# Patient Record
Sex: FEMALE | Race: BLACK OR AFRICAN AMERICAN | Employment: UNEMPLOYED | URBAN - METROPOLITAN AREA
[De-identification: names, ages, dates, MRNs, and addresses within clinical notes are randomized per-mention and may not be internally consistent; named-entity substitution may affect disease eponyms.]

---

## 2017-12-30 ENCOUNTER — APPOINTMENT (OUTPATIENT)
Dept: GENERAL RADIOLOGY | Age: 72
End: 2017-12-30
Attending: EMERGENCY MEDICINE
Payer: MEDICARE

## 2017-12-30 ENCOUNTER — HOSPITAL ENCOUNTER (EMERGENCY)
Age: 72
Discharge: HOME | End: 2017-12-31
Attending: EMERGENCY MEDICINE
Payer: MEDICARE

## 2017-12-30 DIAGNOSIS — B34.9 VIRAL SYNDROME: Primary | ICD-10-CM

## 2017-12-30 DIAGNOSIS — J44.1 ACUTE EXACERBATION OF CHRONIC OBSTRUCTIVE PULMONARY DISEASE (COPD) (HCC): ICD-10-CM

## 2017-12-30 DIAGNOSIS — R05.9 COUGH: ICD-10-CM

## 2017-12-30 PROCEDURE — 99283 EMERGENCY DEPT VISIT LOW MDM: CPT

## 2017-12-30 PROCEDURE — 71020 XR CHEST PA LAT: CPT

## 2017-12-30 RX ORDER — IPRATROPIUM BROMIDE AND ALBUTEROL SULFATE 2.5; .5 MG/3ML; MG/3ML
3 SOLUTION RESPIRATORY (INHALATION)
Status: COMPLETED | OUTPATIENT
Start: 2017-12-30 | End: 2017-12-31

## 2017-12-30 RX ORDER — COLCHICINE 0.6 MG/1
0.6 TABLET ORAL DAILY
COMMUNITY
End: 2018-12-03

## 2017-12-30 RX ORDER — PANTOPRAZOLE SODIUM 40 MG/1
40 TABLET, DELAYED RELEASE ORAL DAILY
COMMUNITY
End: 2018-01-30

## 2017-12-30 RX ORDER — ACETAMINOPHEN 500 MG
1000 TABLET ORAL ONCE
Status: COMPLETED | OUTPATIENT
Start: 2017-12-30 | End: 2017-12-31

## 2017-12-30 RX ORDER — CHLORTHALIDONE 25 MG/1
TABLET ORAL DAILY
COMMUNITY
End: 2018-12-03

## 2017-12-31 VITALS
TEMPERATURE: 100.5 F | HEART RATE: 83 BPM | OXYGEN SATURATION: 93 % | WEIGHT: 275 LBS | SYSTOLIC BLOOD PRESSURE: 130 MMHG | BODY MASS INDEX: 46.95 KG/M2 | DIASTOLIC BLOOD PRESSURE: 52 MMHG | RESPIRATION RATE: 16 BRPM | HEIGHT: 64 IN

## 2017-12-31 LAB
ALBUMIN SERPL-MCNC: 3.8 G/DL (ref 3.4–5)
ALBUMIN/GLOB SERPL: 0.9 {RATIO} (ref 0.8–1.7)
ALP SERPL-CCNC: 85 U/L (ref 45–117)
ALT SERPL-CCNC: 52 U/L (ref 13–56)
ANION GAP SERPL CALC-SCNC: 9 MMOL/L (ref 3–18)
AST SERPL-CCNC: 42 U/L (ref 15–37)
ATRIAL RATE: 84 BPM
BASOPHILS # BLD: 0 K/UL (ref 0–0.06)
BASOPHILS NFR BLD: 0 % (ref 0–2)
BILIRUB SERPL-MCNC: 0.3 MG/DL (ref 0.2–1)
BUN SERPL-MCNC: 12 MG/DL (ref 7–18)
BUN/CREAT SERPL: 12 (ref 12–20)
CALCIUM SERPL-MCNC: 9.3 MG/DL (ref 8.5–10.1)
CALCULATED P AXIS, ECG09: 70 DEGREES
CALCULATED R AXIS, ECG10: -2 DEGREES
CALCULATED T AXIS, ECG11: 49 DEGREES
CHLORIDE SERPL-SCNC: 101 MMOL/L (ref 100–108)
CO2 SERPL-SCNC: 30 MMOL/L (ref 21–32)
CREAT SERPL-MCNC: 0.99 MG/DL (ref 0.6–1.3)
DIAGNOSIS, 93000: NORMAL
DIFFERENTIAL METHOD BLD: ABNORMAL
EOSINOPHIL # BLD: 0.1 K/UL (ref 0–0.4)
EOSINOPHIL NFR BLD: 1 % (ref 0–5)
ERYTHROCYTE [DISTWIDTH] IN BLOOD BY AUTOMATED COUNT: 12.8 % (ref 11.6–14.5)
FLUAV AG NPH QL IA: NEGATIVE
FLUBV AG NOSE QL IA: NEGATIVE
GLOBULIN SER CALC-MCNC: 4.3 G/DL (ref 2–4)
GLUCOSE SERPL-MCNC: 114 MG/DL (ref 74–99)
HCT VFR BLD AUTO: 40.8 % (ref 35–45)
HGB BLD-MCNC: 13.7 G/DL (ref 12–16)
LYMPHOCYTES # BLD: 1.2 K/UL (ref 0.9–3.6)
LYMPHOCYTES NFR BLD: 20 % (ref 21–52)
MCH RBC QN AUTO: 29 PG (ref 24–34)
MCHC RBC AUTO-ENTMCNC: 33.6 G/DL (ref 31–37)
MCV RBC AUTO: 86.4 FL (ref 74–97)
MONOCYTES # BLD: 0.5 K/UL (ref 0.05–1.2)
MONOCYTES NFR BLD: 9 % (ref 3–10)
NEUTS SEG # BLD: 4.1 K/UL (ref 1.8–8)
NEUTS SEG NFR BLD: 70 % (ref 40–73)
P-R INTERVAL, ECG05: 148 MS
PLATELET # BLD AUTO: 204 K/UL (ref 135–420)
PMV BLD AUTO: 9.6 FL (ref 9.2–11.8)
POTASSIUM SERPL-SCNC: 3.9 MMOL/L (ref 3.5–5.5)
PROT SERPL-MCNC: 8.1 G/DL (ref 6.4–8.2)
Q-T INTERVAL, ECG07: 330 MS
QRS DURATION, ECG06: 88 MS
QTC CALCULATION (BEZET), ECG08: 389 MS
RBC # BLD AUTO: 4.72 M/UL (ref 4.2–5.3)
SODIUM SERPL-SCNC: 140 MMOL/L (ref 136–145)
VENTRICULAR RATE, ECG03: 84 BPM
WBC # BLD AUTO: 5.9 K/UL (ref 4.6–13.2)

## 2017-12-31 PROCEDURE — 93005 ELECTROCARDIOGRAM TRACING: CPT

## 2017-12-31 PROCEDURE — 77030013140 HC MSK NEB VYRM -A

## 2017-12-31 PROCEDURE — 94640 AIRWAY INHALATION TREATMENT: CPT

## 2017-12-31 PROCEDURE — 74011000250 HC RX REV CODE- 250: Performed by: EMERGENCY MEDICINE

## 2017-12-31 PROCEDURE — 85025 COMPLETE CBC W/AUTO DIFF WBC: CPT | Performed by: EMERGENCY MEDICINE

## 2017-12-31 PROCEDURE — 74011250637 HC RX REV CODE- 250/637: Performed by: EMERGENCY MEDICINE

## 2017-12-31 PROCEDURE — 87804 INFLUENZA ASSAY W/OPTIC: CPT | Performed by: EMERGENCY MEDICINE

## 2017-12-31 PROCEDURE — 80053 COMPREHEN METABOLIC PANEL: CPT | Performed by: EMERGENCY MEDICINE

## 2017-12-31 RX ORDER — ALBUTEROL SULFATE 90 UG/1
2 AEROSOL, METERED RESPIRATORY (INHALATION)
Qty: 1 INHALER | Refills: 2 | Status: SHIPPED | OUTPATIENT
Start: 2017-12-31 | End: 2018-01-05

## 2017-12-31 RX ORDER — CODEINE PHOSPHATE AND GUAIFENESIN 10; 100 MG/5ML; MG/5ML
5 SOLUTION ORAL
Qty: 118 ML | Refills: 0 | Status: SHIPPED | OUTPATIENT
Start: 2017-12-31 | End: 2018-12-03

## 2017-12-31 RX ORDER — AZITHROMYCIN 250 MG/1
TABLET, FILM COATED ORAL
Qty: 6 TAB | Refills: 0 | Status: SHIPPED | OUTPATIENT
Start: 2017-12-31 | End: 2018-01-05

## 2017-12-31 RX ORDER — PREDNISONE 10 MG/1
TABLET ORAL
Qty: 1 PACKAGE | Refills: 0 | Status: SHIPPED | OUTPATIENT
Start: 2017-12-31 | End: 2018-01-30

## 2017-12-31 RX ORDER — IBUPROFEN 600 MG/1
600 TABLET ORAL
Status: COMPLETED | OUTPATIENT
Start: 2017-12-31 | End: 2017-12-31

## 2017-12-31 RX ADMIN — IBUPROFEN 600 MG: 600 TABLET, FILM COATED ORAL at 01:48

## 2017-12-31 RX ADMIN — IPRATROPIUM BROMIDE AND ALBUTEROL SULFATE 3 ML: .5; 3 SOLUTION RESPIRATORY (INHALATION) at 00:06

## 2017-12-31 RX ADMIN — ACETAMINOPHEN 1000 MG: 500 TABLET ORAL at 00:30

## 2017-12-31 NOTE — ED TRIAGE NOTES
Cough for three days. Reports pain in chest when she coughs. Sepsis Screening completed    (  )Patient meets SIRS criteria. ( x )Patient does not meet SIRS criteria.       SIRS Criteria is achieved when two or more of the following are present   Temperature < 96.8°F (36°C) or > 100.9°F (38.3°C)   Heart Rate > 90 beats per minute   Respiratory Rate > 20 breaths per minute   WBC count > 12,000 or <4,000 or > 10% bands

## 2017-12-31 NOTE — DISCHARGE INSTRUCTIONS
COPD Exacerbation Plan: Care Instructions  Your Care Instructions    If you have chronic obstructive pulmonary disease (COPD), your usual shortness of breath could suddenly get worse. You may start coughing more and have more mucus. This flare-up is called a COPD exacerbation (say \"ca-HXO-cd-BAY-talat\"). A lung infection or air pollution could set off an exacerbation. Sometimes it can happen after a quick change in temperature or being around chemicals. Work with your doctor to make a plan for dealing with an exacerbation. You can better manage it if you plan ahead. Follow-up care is a key part of your treatment and safety. Be sure to make and go to all appointments, and call your doctor if you are having problems. It's also a good idea to know your test results and keep a list of the medicines you take. How can you care for yourself at home? During an exacerbation  · Do not panic if you start to have one. Quick treatment at home may help you prevent serious breathing problems. If you have a COPD exacerbation plan that you developed with your doctor, follow it. · Take your medicines exactly as your doctor tells you. ¨ Use your inhaler as directed by your doctor. If your symptoms do not get better after you use your medicine, have someone take you to the emergency room. Call an ambulance if necessary. ¨ With inhaled medicines, a spacer or a nebulizer may help you get more medicine to your lungs. Ask your doctor or pharmacist how to use them properly. Practice using the spacer in front of a mirror before you have an exacerbation. This may help you get the medicine into your lungs quickly. ¨ If your doctor has given you steroid pills, take them as directed. ¨ Your doctor may have given you a prescription for antibiotics, which you can fill if you need it. ¨ Talk to your doctor if you have any problems with your medicine.  And call your doctor if you have to use your antibiotic or steroid pills.  Preventing an exacerbation  · Do not smoke. This is the most important step you can take to prevent more damage to your lungs and prevent problems. If you already smoke, it is never too late to stop. If you need help quitting, talk to your doctor about stop-smoking programs and medicines. These can increase your chances of quitting for good. · Take your daily medicines as prescribed. · Avoid colds and flu. ¨ Get a pneumococcal vaccine. ¨ Get a flu vaccine each year, as soon as it is available. Ask those you live or work with to do the same, so they will not get the flu and infect you. ¨ Try to stay away from people with colds or the flu. ¨ Wash your hands often. · Avoid secondhand smoke; air pollution; cold, dry air; hot, humid air; and high altitudes. Stay at home with your windows closed when air pollution is bad. · Learn breathing techniques for COPD, such as breathing through pursed lips. These techniques can help you breathe easier during an exacerbation. When should you call for help? Call 911 anytime you think you may need emergency care. For example, call if:  ? · You have severe trouble breathing. ? · You have severe chest pain. ?Call your doctor now or seek immediate medical care if:  ? · You have new or worse shortness of breath. ? · You develop new chest pain. ? · You are coughing more deeply or more often, especially if you notice more mucus or a change in the color of your mucus. ? · You cough up blood. ? · You have new or increased swelling in your legs or belly. ? · You have a fever. ? Watch closely for changes in your health, and be sure to contact your doctor if:  ? · You need to use your antibiotic or steroid pills. ? · Your symptoms are getting worse. Where can you learn more? Go to http://ebnoy-marci.info/. Enter U568 in the search box to learn more about \"COPD Exacerbation Plan: Care Instructions. \"  Current as of:  May 12, 2017  Content Version: 11.4  © 0804-9210 [employer]. Care instructions adapted under license by [employer] (which disclaims liability or warranty for this information). If you have questions about a medical condition or this instruction, always ask your healthcare professional. [address] any warranty or liability for your use of this information.

## 2017-12-31 NOTE — ED PROVIDER NOTES
EMERGENCY DEPARTMENT HISTORY AND PHYSICAL EXAM    Date: 12/30/2017  Patient Name: Lea Vang    History of Presenting Illness     Chief Complaint   Patient presents with    Cough         History Provided By: Patient    Chief Complaint: Cough  Duration: 3 Days  Timing:  Constant  Severity: 10 out of 10  Modifying Factors: No modifying factors   Associated Symptoms: rhinorrhea, sore throat, fever, and HA    Additional History (Context):   10:53 PM  Lea Vang is a 81 y.o. female with PMHx of CAD, HTN, DM, and GERD presents to the emergency department C/O productive cough onset 3 days. Associated sxs include rhinorrhea, sore throat, fever, and HA. Pt states that she has been having a persistent painful and productive cough with yellowish sputum. Pt affirms contact with her daughter-in-law and granddaughter, both of which are currently sick with similar symptoms. Pt denies any other sxs or complaints. - smoker, - EtOH use, - illicit drug use     PCP: Arlander Meckel, MD    Current Outpatient Prescriptions   Medication Sig Dispense Refill    albuterol (PROVENTIL HFA, VENTOLIN HFA, PROAIR HFA) 90 mcg/actuation inhaler Take 2 Puffs by inhalation every four (4) hours as needed for Wheezing or Shortness of Breath for up to 5 days. 1 Inhaler 2    predniSONE (STERAPRED DS) 10 mg dose pack 6 day dose pack per package instructions 1 Package 0    azithromycin (ZITHROMAX Z-YARON) 250 mg tablet Take 2 Tabs PO Day 1 and 1 Tab PO Day 2-5 6 Tab 0    chlorthalidone (HYGROTEN) 25 mg tablet Take  by mouth daily.  pantoprazole (PROTONIX) 40 mg tablet Take 40 mg by mouth daily.  colchicine 0.6 mg tablet Take 0.6 mg by mouth daily.  clopidogrel (PLAVIX) 75 mg tablet Take 75 mg by mouth daily.  simvastatin (ZOCOR) 40 mg tablet Take 40 mg by mouth nightly.  traZODone (DESYREL) 50 mg tablet Take 50 mg by mouth nightly.  atenolol (TENORMIN) 50 mg tablet Take 50 mg by mouth daily.         metformin 500 mg TG24 Take 500 mg by mouth daily (with breakfast).  albuterol (PROVENTIL, VENTOLIN) 90 mcg/actuation inhaler Take 1-2 Puffs by inhalation every four (4) hours as needed for Wheezing. 17 g 0       Past History     Past Medical History:  Past Medical History:   Diagnosis Date    CAD (coronary artery disease)     Diabetes ([address]     Gastrointestinal disorder     Hypertension     Reflux        Past Surgical History:  Past Surgical History:   Procedure Laterality Date    CARDIAC SURG PROCEDURE UNLIST      VASCULAR SURGERY PROCEDURE UNLIST         Family History:  History reviewed. No pertinent family history. Social History:  Social History   Substance Use Topics    Smoking status: Never Smoker    Smokeless tobacco: None    Alcohol use No       Allergies:  No Known Allergies      Review of Systems   Review of Systems   Constitutional: Positive for fever. HENT: Positive for rhinorrhea and sore throat. Respiratory: Positive for cough. Neurological: Positive for headaches. All other systems reviewed and are negative. Physical Exam     Vitals:    12/30/17 2251 12/31/17 0008   BP: 164/75    Pulse: 83    Resp: 16    Temp: (!) 100.5 °F (38.1 °C)    SpO2: 96% 96%   Weight: 124.7 kg (275 lb)    Height: 5' 3.5\" (1.613 m)      Physical Exam   Nursing note and vitals reviewed.   Constitutional: Well appearing, Elderly, mild distress   Head: Normocephalic, Atraumatic  ENT: Erythematous oropharynx and rhinorrhea   Eyes: Pupils are equal, round, and reactive to light, EOMI  Neck: Supple  Cardiovascular: Regular rate and rhythm, no murmurs, rubs, or gallops  Chest: Normal work of breathing and chest excursion bilaterally  Lungs: Slightly diminished in bilateral bases with scattered wheezes  Back: No evidence of trauma or deformity  Extremities: No evidence of trauma or deformity, no LE edema  Skin: Warm and dry, normal cap refill  Neuro: Alert and appropriate, CN intact, normal speech  Psychiatric: Normal mood and affect      Diagnostic Study Results     Labs -     Recent Results (from the past 12 hour(s))   CBC WITH AUTOMATED DIFF    Collection Time: 12/31/17 12:30 AM   Result Value Ref Range    WBC 5.9 4.6 - 13.2 K/uL    RBC 4.72 4.20 - 5.30 M/uL    HGB 13.7 12.0 - 16.0 g/dL    HCT 40.8 35.0 - 45.0 %    MCV 86.4 74.0 - 97.0 FL    MCH 29.0 24.0 - 34.0 PG    MCHC 33.6 31.0 - 37.0 g/dL    RDW 12.8 11.6 - 14.5 %    PLATELET 594 176 - 061 K/uL    MPV 9.6 9.2 - 11.8 FL    NEUTROPHILS 70 40 - 73 %    LYMPHOCYTES 20 (L) 21 - 52 %    MONOCYTES 9 3 - 10 %    EOSINOPHILS 1 0 - 5 %    BASOPHILS 0 0 - 2 %    ABS. NEUTROPHILS 4.1 1.8 - 8.0 K/UL    ABS. LYMPHOCYTES 1.2 0.9 - 3.6 K/UL    ABS. MONOCYTES 0.5 0.05 - 1.2 K/UL    ABS. EOSINOPHILS 0.1 0.0 - 0.4 K/UL    ABS. BASOPHILS 0.0 0.0 - 0.06 K/UL    DF AUTOMATED     METABOLIC PANEL, COMPREHENSIVE    Collection Time: 12/31/17 12:30 AM   Result Value Ref Range    Sodium 140 136 - 145 mmol/L    Potassium 3.9 3.5 - 5.5 mmol/L    Chloride 101 100 - 108 mmol/L    CO2 30 21 - 32 mmol/L    Anion gap 9 3.0 - 18 mmol/L    Glucose 114 (H) 74 - 99 mg/dL    BUN 12 7.0 - 18 MG/DL    Creatinine 0.99 0.6 - 1.3 MG/DL    BUN/Creatinine ratio 12 12 - 20      GFR est AA >60 >60 ml/min/1.73m2    GFR est non-AA 55 (L) >60 ml/min/1.73m2    Calcium 9.3 8.5 - 10.1 MG/DL    Bilirubin, total 0.3 0.2 - 1.0 MG/DL    ALT (SGPT) 52 13 - 56 U/L    AST (SGOT) 42 (H) 15 - 37 U/L    Alk.  phosphatase 85 45 - 117 U/L    Protein, total 8.1 6.4 - 8.2 g/dL    Albumin 3.8 3.4 - 5.0 g/dL    Globulin 4.3 (H) 2.0 - 4.0 g/dL    A-G Ratio 0.9 0.8 - 1.7     INFLUENZA A & B AG (RAPID TEST)    Collection Time: 12/31/17 12:35 AM   Result Value Ref Range    Influenza A Antigen NEGATIVE  NEG      Influenza B Antigen NEGATIVE  NEG         Radiologic Studies -   XR CHEST PA LAT    (Results Pending)     CT Results  (Last 48 hours)    None        CXR Results  (Last 48 hours)    None        12:20 AM  RADIOLOGY FINDINGS  Chest X-ray shows No acute process  Pending review by Radiologist  Recorded by Gorge Johnson ED Scribe, as dictated by Brenda Montano MD    Medical Decision Making   I am the first provider for this patient. I reviewed the vital signs, available nursing notes, past medical history, past surgical history, family history and social history. Vital Signs-Reviewed the patient's vital signs. Pulse Oximetry Analysis - 96% on RA     Cardiac Monitor:  Rate: 83 bpm  Rhythm: NSR    EKG interpretation: (Preliminary)  12:43 AM   84 bpm NSR QRS duration 88 ms  EKG read by Brenda Montano MD at 12:44 AM     Records Reviewed: Nursing Notes    Provider Notes (Medical Decision Making):     Procedures:  Procedures    ED Course:   10:59 PM Initial assessment performed. The patients presenting problems have been discussed, and they are in agreement with the care plan formulated and outlined with them. I have encouraged them to ask questions as they arise throughout their visit. Diagnosis and Disposition       DISCHARGE NOTE:  1:11 AM  Zhou Bautista  results have been reviewed with her. She has been counseled regarding her diagnosis, treatment, and plan. She verbally conveys understanding and agreement of the signs, symptoms, diagnosis, treatment and prognosis and additionally agrees to follow up as discussed. She also agrees with the care-plan and conveys that all of her questions have been answered. I have also provided discharge instructions for her that include: educational information regarding their diagnosis and treatment, and list of reasons why they would want to return to the ED prior to their follow-up appointment, should her condition change. She has been provided with education for proper emergency department utilization. CLINICAL IMPRESSION:    1. Viral syndrome    2.  Cough    3. Acute exacerbation of chronic obstructive pulmonary disease (COPD) (Former[facility])        Discussion:  80 y/o female presenting for three days of cough, rhinorrhea. sxs similar to sick contacts in her house, no pneumonia on xray, flu negative. Will treat as COPD exacerbation with zpak steroids and albuterol and will discharge with PCP follow and return precautions, pt understands and agrees with plan. PLAN:  1. D/C Home  2. Current Discharge Medication List      START taking these medications    Details   albuterol (PROVENTIL HFA, VENTOLIN HFA, PROAIR HFA) 90 mcg/actuation inhaler Take 2 Puffs by inhalation every four (4) hours as needed for Wheezing or Shortness of Breath for up to 5 days. Qty: 1 Inhaler, Refills: 2      predniSONE (STERAPRED DS) 10 mg dose pack 6 day dose pack per package instructions  Qty: 1 Package, Refills: 0      azithromycin (ZITHROMAX Z-YARON) 250 mg tablet Take 2 Tabs PO Day 1 and 1 Tab PO Day 2-5  Qty: 6 Tab, Refills: 0         CONTINUE these medications which have NOT CHANGED    Details   albuterol (PROVENTIL, VENTOLIN) 90 mcg/actuation inhaler Take 1-2 Puffs by inhalation every four (4) hours as needed for Wheezing. Qty: 17 g, Refills: 0           3. Follow-up Information     Follow up With Details Comments [id number] y [phone], MD Schedule an appointment as soon as possible for a visit in 2 days For primary care follow up [address] [facility]. [address] [place] [address]      [facility] EMERGENCY DEPT  As needed, if symptoms worsen [address] [place] [id number] [phone]        _______________________________    Attestations: This note is prepared by Gorge Johnson, acting as Scribe for Brenda Montano MD.    Brenda Montano MD: The scribe's documentation has been prepared under my direction and personally reviewed by me in its entirety.   I confirm that the note above accurately reflects all work, treatment, procedures, and medical decision making performed by me.  _______________________________

## 2018-01-03 ENCOUNTER — HOSPITAL ENCOUNTER (EMERGENCY)
Age: 73
Discharge: HOME | End: 2018-01-03
Attending: EMERGENCY MEDICINE
Payer: MEDICARE

## 2018-01-03 VITALS
RESPIRATION RATE: 16 BRPM | WEIGHT: 265 LBS | OXYGEN SATURATION: 98 % | TEMPERATURE: 97.5 F | SYSTOLIC BLOOD PRESSURE: 139 MMHG | HEIGHT: 64 IN | BODY MASS INDEX: 45.24 KG/M2 | DIASTOLIC BLOOD PRESSURE: 98 MMHG | HEART RATE: 64 BPM

## 2018-01-03 DIAGNOSIS — R06.2 WHEEZING: ICD-10-CM

## 2018-01-03 DIAGNOSIS — J20.9 ACUTE BRONCHITIS, UNSPECIFIED ORGANISM: ICD-10-CM

## 2018-01-03 DIAGNOSIS — K12.0 ORAL APHTHOUS ULCER: Primary | ICD-10-CM

## 2018-01-03 PROCEDURE — 99283 EMERGENCY DEPT VISIT LOW MDM: CPT

## 2018-01-03 PROCEDURE — 74011000250 HC RX REV CODE- 250: Performed by: EMERGENCY MEDICINE

## 2018-01-03 PROCEDURE — 74011250637 HC RX REV CODE- 250/637: Performed by: EMERGENCY MEDICINE

## 2018-01-03 PROCEDURE — 94640 AIRWAY INHALATION TREATMENT: CPT

## 2018-01-03 PROCEDURE — 77030013140 HC MSK NEB VYRM -A

## 2018-01-03 RX ORDER — HYDROXYZINE 25 MG/1
25 TABLET, FILM COATED ORAL ONCE
Status: COMPLETED | OUTPATIENT
Start: 2018-01-03 | End: 2018-01-03

## 2018-01-03 RX ORDER — IPRATROPIUM BROMIDE AND ALBUTEROL SULFATE 2.5; .5 MG/3ML; MG/3ML
3 SOLUTION RESPIRATORY (INHALATION)
Status: COMPLETED | OUTPATIENT
Start: 2018-01-03 | End: 2018-01-03

## 2018-01-03 RX ORDER — HYDROXYZINE 25 MG/1
TABLET, FILM COATED ORAL
Qty: 30 TAB | Refills: 0 | Status: SHIPPED | OUTPATIENT
Start: 2018-01-03 | End: 2018-12-03

## 2018-01-03 RX ADMIN — Medication 30 ML: at 03:24

## 2018-01-03 RX ADMIN — HYDROXYZINE HYDROCHLORIDE 25 MG: 25 TABLET, FILM COATED ORAL at 03:45

## 2018-01-03 RX ADMIN — IPRATROPIUM BROMIDE AND ALBUTEROL SULFATE 3 ML: .5; 3 SOLUTION RESPIRATORY (INHALATION) at 03:25

## 2018-01-03 NOTE — ED NOTES
Pt stable. No signs of acute distress. No complaints or questions at this time. Pt discharged home. I have reviewed discharge instructions with the patient. The patient verbalized understanding.

## 2018-01-03 NOTE — ED TRIAGE NOTES
Pt reports that she thinks she may be having an allergic reaction. Has rash to top of mouth and reports that lips feel they are peeling. Sepsis Screening completed    (  )Patient meets SIRS criteria. ( x )Patient does not meet SIRS criteria.       SIRS Criteria is achieved when two or more of the following are present   Temperature < 96.8°F (36°C) or > 100.9°F (38.3°C)   Heart Rate > 90 beats per minute   Respiratory Rate > 20 breaths per minute   WBC count > 12,000 or <4,000 or > 10% bands

## 2018-01-03 NOTE — ED PROVIDER NOTES
EMERGENCY DEPARTMENT HISTORY AND PHYSICAL EXAM    Date: 1/3/2018  Patient Name: Enoc Goff    History of Presenting Illness     Chief Complaint   Patient presents with    Medication Reaction         History Provided By: Patient    Chief Complaint: Wheezing  Duration: 2 days  Timing:  Improving  Location: Chest  Severity: 9 out of 10  Associated Symptoms: burning sensation to lips and roof of mouth x few days, cough, HA, dizziness, and lightheadedness    Additional History (Context):   3:03 AM  Enoc Goff is a 81 y.o. female with PMHX CAD, HTN, DM, GERD who presents ambulatory to the emergency department C/O wheezing that has improved (rated 9/10), onset 2 days ago. Pt used steroid inhaler with improvement of wheezing. Associated sxs include burning sensation to lips and roof of mouth x few days, cough, HA, dizziness, and lightheadedness. Pt states that she \"feels like a blister ruptured in my mouth. \" Pt thinks that she may be having a reaction to medication to recently prescribed abx. Notes FHX of DM. Pt denies tobacco use, rash, or any other sxs or complaints. PCP: Yesica Villarreal MD    Current Outpatient Prescriptions   Medication Sig Dispense Refill    MAG&AL/SIM/DIPHENHYD/LIDOCAINE (MAGIC MOUTHWASH, NO SUCRALFATE,) mwsh oral suspension (compounded) Take 5 mL by mouth Before breakfast, lunch, and dinner. 90 mL 0    hydrOXYzine HCl (ATARAX) 25 mg tablet Take 1-2 tablets by mouth every 6 hours as needed. Indications: Pruritus of Skin 30 Tab 0    albuterol (PROVENTIL HFA, VENTOLIN HFA, PROAIR HFA) 90 mcg/actuation inhaler Take 2 Puffs by inhalation every four (4) hours as needed for Wheezing or Shortness of Breath for up to 5 days.  1 Inhaler 2    predniSONE (STERAPRED DS) 10 mg dose pack 6 day dose pack per package instructions 1 Package 0    azithromycin (ZITHROMAX Z-YARON) 250 mg tablet Take 2 Tabs PO Day 1 and 1 Tab PO Day 2-5 6 Tab 0    guaiFENesin-codeine (ROBITUSSIN AC) 100-10 mg/5 mL solution Take 5 mL by mouth three (3) times daily as needed for Cough. Max Daily Amount: 15 mL. 118 mL 0    chlorthalidone (HYGROTEN) 25 mg tablet Take  by mouth daily.  pantoprazole (PROTONIX) 40 mg tablet Take 40 mg by mouth daily.  colchicine 0.6 mg tablet Take 0.6 mg by mouth daily.  clopidogrel (PLAVIX) 75 mg tablet Take 75 mg by mouth daily.  simvastatin (ZOCOR) 40 mg tablet Take 40 mg by mouth nightly.  traZODone (DESYREL) 50 mg tablet Take 50 mg by mouth nightly.  atenolol (TENORMIN) 50 mg tablet Take 50 mg by mouth daily.  metformin 500 mg TG24 Take 500 mg by mouth daily (with breakfast). Past History     Past Medical History:  Past Medical History:   Diagnosis Date    CAD (coronary artery disease)     Diabetes ([address]     Gastrointestinal disorder     Hypertension     Reflux        Past Surgical History:  Past Surgical History:   Procedure Laterality Date    CARDIAC SURG PROCEDURE UNLIST      VASCULAR SURGERY PROCEDURE UNLIST         Family History:  History reviewed. No pertinent family history. Social History:  Social History   Substance Use Topics    Smoking status: Never Smoker    Smokeless tobacco: None    Alcohol use No       Allergies:  No Known Allergies      Review of Systems   Review of Systems   Constitutional: Negative for chills, diaphoresis, fever and unexpected weight change. HENT: Negative for congestion, drooling, ear pain, rhinorrhea, sore throat, tinnitus and trouble swallowing.         (+)  burning sensation to lips and roof of mouth   Eyes: Negative for photophobia, pain, redness and visual disturbance. Respiratory: Positive for cough and wheezing. Negative for choking, chest tightness, shortness of breath and stridor. Cardiovascular: Negative for chest pain, palpitations and leg swelling.    Gastrointestinal: Negative for abdominal distention, abdominal pain, anal bleeding, blood in stool, constipation, diarrhea, nausea and vomiting. Genitourinary: Negative for difficulty urinating, dysuria, flank pain, frequency, hematuria and urgency. Musculoskeletal: Negative for arthralgias, back pain and neck pain. Skin: Negative for color change, rash and wound. Neurological: Positive for dizziness and headaches. Negative for seizures, syncope, speech difficulty and light-headedness. Hematological: Does not bruise/bleed easily. Psychiatric/Behavioral: Negative for agitation, behavioral problems, hallucinations, self-injury and suicidal ideas. The patient is not hyperactive. Physical Exam     Vitals:    01/03/18 0222   BP: (!) 139/98   Pulse: 64   Resp: 16   Temp: 97.5 °F (36.4 °C)   SpO2: 98%   Weight: 120.2 kg (265 lb)   Height: 5' 4\" (1.626 m)     Physical Exam   Constitutional: She is oriented to person, place, and time. She appears well-developed and well-nourished. No distress. Obese, comfortable appearing, nontoxic   HENT:   Head: Normocephalic and atraumatic. Right Ear: External ear normal.   Left Ear: External ear normal.   Mouth/Throat: Oropharynx is clear and moist. No oropharyngeal exudate. Scattered minute vesicles   Eyes: Conjunctivae and EOM are normal. Pupils are equal, round, and reactive to light. No scleral icterus. No pallor   Neck: Normal range of motion. Neck supple. No JVD present. No tracheal deviation present. No thyromegaly present. Cardiovascular: Normal rate, regular rhythm and normal heart sounds. Pulmonary/Chest: Effort normal. No stridor. No respiratory distress. She has wheezes (Expiratory wheezes in right apex only). Abdominal: Soft. Bowel sounds are normal. She exhibits no distension. There is no tenderness. There is no rebound and no guarding. Musculoskeletal: Normal range of motion. She exhibits no edema or tenderness. No soft tissue injuries   Lymphadenopathy:     She has no cervical adenopathy.    Neurological: She is alert and oriented to person, place, and time. She has normal reflexes. No cranial nerve deficit. Coordination normal.   Skin: Skin is warm and dry. No rash noted. She is not diaphoretic. No erythema. Psychiatric: She has a normal mood and affect. Her behavior is normal. Judgment and thought content normal.   Nursing note and vitals reviewed. Diagnostic Study Results     Labs -   No results found for this or any previous visit (from the past 12 hour(s)). Radiologic Studies -    No orders to display     CT Results  (Last 48 hours)    None        CXR Results  (Last 48 hours)    None            Medical Decision Making   I am the first provider for this patient. I reviewed the vital signs, available nursing notes, past medical history, past surgical history, family history and social history. Vital Signs-Reviewed the patient's vital signs. Pulse Oximetry Analysis - 98% on RA     Records Reviewed: Nursing Notes    Provider Notes (Medical Decision Making):   MDM: Small vesicles that appear to be more viral lesions than allergic reaction, though Jerson-Johnsons syndrome is possible but very unlikely    Procedures:  Procedures    ED Course:   3:03 AM   Initial assessment performed. The patients presenting problems have been discussed, and they are in agreement with the care plan formulated and outlined with them. I have encouraged them to ask questions as they arise throughout their visit. Diagnosis and Disposition       DISCHARGE NOTE:  4:01 AM  Perla Gonsales's  results have been reviewed with her. She has been counseled regarding her diagnosis, treatment, and plan. She verbally conveys understanding and agreement of the signs, symptoms, diagnosis, treatment and prognosis and additionally agrees to follow up as discussed. She also agrees with the care-plan and conveys that all of her questions have been answered.   I have also provided discharge instructions for her that include: educational information regarding their diagnosis and treatment, and list of reasons why they would want to return to the ED prior to their follow-up appointment, should her condition change. She has been provided with education for proper emergency department utilization. CLINICAL IMPRESSION:    1. Oral aphthous ulcer    2. Acute bronchitis, unspecified organism    3. Wheezing        PLAN:  1. D/C Home  2. Discharge Medication List as of 1/3/2018  4:03 AM      START taking these medications    Details   MAG&AL/SIM/DIPHENHYD/LIDOCAINE (MAGIC MOUTHWASH, NO SUCRALFATE,) mwsh oral suspension (compounded) Take 5 mL by mouth Before breakfast, lunch, and dinner., Print, Disp-90 mL, R-0      hydrOXYzine HCl (ATARAX) 25 mg tablet Take 1-2 tablets by mouth every 6 hours as needed. Indications: Pruritus of Skin, Normal, Disp-30 Tab, R-0         CONTINUE these medications which have NOT CHANGED    Details   albuterol (PROVENTIL HFA, VENTOLIN HFA, PROAIR HFA) 90 mcg/actuation inhaler Take 2 Puffs by inhalation every four (4) hours as needed for Wheezing or Shortness of Breath for up to 5 days. , Print, Disp-1 Inhaler, R-2      predniSONE (STERAPRED DS) 10 mg dose pack 6 day dose pack per package instructions, Print, Disp-1 Package, R-0      azithromycin (ZITHROMAX Z-YARON) 250 mg tablet Take 2 Tabs PO Day 1 and 1 Tab PO Day 2-5, Print, Disp-6 Tab, R-0      guaiFENesin-codeine (ROBITUSSIN AC) 100-10 mg/5 mL solution Take 5 mL by mouth three (3) times daily as needed for Cough. Max Daily Amount: 15 mL. , Print, Disp-118 mL, R-0      chlorthalidone (HYGROTEN) 25 mg tablet Take  by mouth daily. , Historical Med      pantoprazole (PROTONIX) 40 mg tablet Take 40 mg by mouth daily. , Historical Med      colchicine 0.6 mg tablet Take 0.6 mg by mouth daily. , Historical Med      clopidogrel (PLAVIX) 75 mg tablet Take 75 mg by mouth daily. Historical Med, 75 mg      simvastatin (ZOCOR) 40 mg tablet Take 40 mg by mouth nightly.   Historical Med, 40 mg      traZODone (DESYREL) 50 mg tablet Take 50 mg by mouth nightly. Historical Med, 50 mg      atenolol (TENORMIN) 50 mg tablet Take 50 mg by mouth daily. Historical Med, 50 mg      metformin 500 mg TG24 Take 500 mg by mouth daily (with breakfast). Historical Med, 500 mg           3. Follow-up Information     Follow up With Details Comments 1859 Mary Davis MD Schedule an appointment as soon as possible for a visit in 2 days for PCP follow up [address] [facility]. [address] [facility] EMERGENCY DEPT  As needed, If symptoms worsen [address] [id number]  [phone]        _______________________________    Attestations: This note is prepared by Jenifer Alegria, acting as Scribe for Nadege. Halina Go MD.    [employer] Halina Go MD:  The scribe's documentation has been prepared under my direction and personally reviewed by me in its entirety.   I confirm that the note above accurately reflects all work, treatment, procedures, and medical decision making performed by me.  _______________________________

## 2018-01-03 NOTE — ED NOTES
Pt alert and oriented x4. No signs of acute distress. Pt breathing with ease on room air. Pt states \"I think I have a reaction from the steroids I am taking. I have a rash on the top of my mouth and I have itching\" Skin warm dry and intact. Continue to monitor. M aintain safety precautions.

## 2018-01-29 ENCOUNTER — APPOINTMENT (OUTPATIENT)
Dept: GENERAL RADIOLOGY | Age: 73
End: 2018-01-29
Attending: EMERGENCY MEDICINE
Payer: MEDICARE

## 2018-01-29 ENCOUNTER — HOSPITAL ENCOUNTER (EMERGENCY)
Age: 73
Discharge: HOME | End: 2018-01-30
Attending: EMERGENCY MEDICINE
Payer: MEDICARE

## 2018-01-29 ENCOUNTER — APPOINTMENT (OUTPATIENT)
Dept: ULTRASOUND IMAGING | Age: 73
End: 2018-01-29
Attending: EMERGENCY MEDICINE
Payer: MEDICARE

## 2018-01-29 VITALS
OXYGEN SATURATION: 100 % | HEART RATE: 69 BPM | HEIGHT: 64 IN | WEIGHT: 260 LBS | BODY MASS INDEX: 44.39 KG/M2 | SYSTOLIC BLOOD PRESSURE: 188 MMHG | RESPIRATION RATE: 18 BRPM | DIASTOLIC BLOOD PRESSURE: 89 MMHG | TEMPERATURE: 98.5 F

## 2018-01-29 DIAGNOSIS — R10.13 ABDOMINAL PAIN, EPIGASTRIC: Primary | ICD-10-CM

## 2018-01-29 DIAGNOSIS — K29.60 REFLUX GASTRITIS: ICD-10-CM

## 2018-01-29 LAB
ALBUMIN SERPL-MCNC: 3.4 G/DL (ref 3.4–5)
ALBUMIN/GLOB SERPL: 0.8 {RATIO} (ref 0.8–1.7)
ALP SERPL-CCNC: 79 U/L (ref 45–117)
ALT SERPL-CCNC: 34 U/L (ref 13–56)
ANION GAP SERPL CALC-SCNC: 11 MMOL/L (ref 3–18)
AST SERPL-CCNC: 31 U/L (ref 15–37)
BASOPHILS # BLD: 0 K/UL (ref 0–0.06)
BASOPHILS NFR BLD: 1 % (ref 0–2)
BILIRUB SERPL-MCNC: 0.2 MG/DL (ref 0.2–1)
BUN SERPL-MCNC: 16 MG/DL (ref 7–18)
BUN/CREAT SERPL: 20 (ref 12–20)
CALCIUM SERPL-MCNC: 8.7 MG/DL (ref 8.5–10.1)
CHLORIDE SERPL-SCNC: 108 MMOL/L (ref 100–108)
CO2 SERPL-SCNC: 26 MMOL/L (ref 21–32)
CREAT SERPL-MCNC: 0.82 MG/DL (ref 0.6–1.3)
DIFFERENTIAL METHOD BLD: NORMAL
EOSINOPHIL # BLD: 0.1 K/UL (ref 0–0.4)
EOSINOPHIL NFR BLD: 2 % (ref 0–5)
ERYTHROCYTE [DISTWIDTH] IN BLOOD BY AUTOMATED COUNT: 13.3 % (ref 11.6–14.5)
GLOBULIN SER CALC-MCNC: 4.2 G/DL (ref 2–4)
GLUCOSE SERPL-MCNC: 126 MG/DL (ref 74–99)
HCT VFR BLD AUTO: 41.2 % (ref 35–45)
HGB BLD-MCNC: 13.5 G/DL (ref 12–16)
LIPASE SERPL-CCNC: 76 U/L (ref 73–393)
LYMPHOCYTES # BLD: 2.7 K/UL (ref 0.9–3.6)
LYMPHOCYTES NFR BLD: 45 % (ref 21–52)
MAGNESIUM SERPL-MCNC: 1.7 MG/DL (ref 1.6–2.6)
MCH RBC QN AUTO: 28.5 PG (ref 24–34)
MCHC RBC AUTO-ENTMCNC: 32.8 G/DL (ref 31–37)
MCV RBC AUTO: 87.1 FL (ref 74–97)
MONOCYTES # BLD: 0.4 K/UL (ref 0.05–1.2)
MONOCYTES NFR BLD: 7 % (ref 3–10)
NEUTS SEG # BLD: 2.7 K/UL (ref 1.8–8)
NEUTS SEG NFR BLD: 45 % (ref 40–73)
PLATELET # BLD AUTO: 226 K/UL (ref 135–420)
PMV BLD AUTO: 9.8 FL (ref 9.2–11.8)
POTASSIUM SERPL-SCNC: 3.9 MMOL/L (ref 3.5–5.5)
PROT SERPL-MCNC: 7.6 G/DL (ref 6.4–8.2)
RBC # BLD AUTO: 4.73 M/UL (ref 4.2–5.3)
SODIUM SERPL-SCNC: 145 MMOL/L (ref 136–145)
WBC # BLD AUTO: 6 K/UL (ref 4.6–13.2)

## 2018-01-29 PROCEDURE — 83690 ASSAY OF LIPASE: CPT | Performed by: EMERGENCY MEDICINE

## 2018-01-29 PROCEDURE — 80053 COMPREHEN METABOLIC PANEL: CPT | Performed by: EMERGENCY MEDICINE

## 2018-01-29 PROCEDURE — 85025 COMPLETE CBC W/AUTO DIFF WBC: CPT | Performed by: EMERGENCY MEDICINE

## 2018-01-29 PROCEDURE — 74011250637 HC RX REV CODE- 250/637: Performed by: EMERGENCY MEDICINE

## 2018-01-29 PROCEDURE — 83735 ASSAY OF MAGNESIUM: CPT | Performed by: EMERGENCY MEDICINE

## 2018-01-29 PROCEDURE — 96374 THER/PROPH/DIAG INJ IV PUSH: CPT

## 2018-01-29 PROCEDURE — 74011250636 HC RX REV CODE- 250/636: Performed by: EMERGENCY MEDICINE

## 2018-01-29 PROCEDURE — 71045 X-RAY EXAM CHEST 1 VIEW: CPT

## 2018-01-29 PROCEDURE — 99283 EMERGENCY DEPT VISIT LOW MDM: CPT

## 2018-01-29 PROCEDURE — 76705 ECHO EXAM OF ABDOMEN: CPT

## 2018-01-29 PROCEDURE — 93005 ELECTROCARDIOGRAM TRACING: CPT

## 2018-01-29 RX ORDER — KETOROLAC TROMETHAMINE 30 MG/ML
15 INJECTION, SOLUTION INTRAMUSCULAR; INTRAVENOUS
Status: DISCONTINUED | OUTPATIENT
Start: 2018-01-29 | End: 2018-01-29

## 2018-01-29 RX ORDER — KETOROLAC TROMETHAMINE 15 MG/ML
15 INJECTION, SOLUTION INTRAMUSCULAR; INTRAVENOUS
Status: COMPLETED | OUTPATIENT
Start: 2018-01-29 | End: 2018-01-29

## 2018-01-29 RX ORDER — DICYCLOMINE HYDROCHLORIDE 10 MG/1
20 CAPSULE ORAL
Status: COMPLETED | OUTPATIENT
Start: 2018-01-29 | End: 2018-01-29

## 2018-01-29 RX ADMIN — KETOROLAC TROMETHAMINE 15 MG: 15 INJECTION, SOLUTION INTRAMUSCULAR; INTRAVENOUS at 22:04

## 2018-01-29 RX ADMIN — DICYCLOMINE HYDROCHLORIDE 20 MG: 10 CAPSULE ORAL at 21:44

## 2018-01-30 PROCEDURE — 74011250637 HC RX REV CODE- 250/637: Performed by: EMERGENCY MEDICINE

## 2018-01-30 RX ORDER — PANTOPRAZOLE SODIUM 40 MG/1
80 TABLET, DELAYED RELEASE ORAL DAILY
Qty: 60 TAB | Refills: 0 | Status: SHIPPED | OUTPATIENT
Start: 2018-01-30 | End: 2018-12-03

## 2018-01-30 RX ORDER — DIPHENHYDRAMINE HCL 25 MG
25 CAPSULE ORAL
Status: COMPLETED | OUTPATIENT
Start: 2018-01-30 | End: 2018-01-30

## 2018-01-30 RX ORDER — FAMOTIDINE 20 MG/1
20 TABLET, FILM COATED ORAL 2 TIMES DAILY
Qty: 30 TAB | Refills: 0 | Status: SHIPPED | OUTPATIENT
Start: 2018-01-30 | End: 2018-12-03

## 2018-01-30 RX ADMIN — DIPHENHYDRAMINE HYDROCHLORIDE 25 MG: 25 CAPSULE ORAL at 00:39

## 2018-01-30 NOTE — ED TRIAGE NOTES
Pt reports back pain and intermittent headache x 1 week. Sepsis Screening completed    (  )Patient meets SIRS criteria. ( X )Patient does not meet SIRS criteria.       SIRS Criteria is achieved when two or more of the following are present   Temperature < 96.8°F (36°C) or > 100.9°F (38.3°C)   Heart Rate > 90 beats per minute   Respiratory Rate > 20 breaths per minute   WBC count > 12,000 or <4,000 or > 10% bands

## 2018-01-30 NOTE — DISCHARGE INSTRUCTIONS
Gastritis: Care Instructions  Your Care Instructions    Gastritis is a sore and upset stomach. It happens when something irritates the stomach lining. Many things can cause it. These include an infection such as the flu or something you ate or drank. Medicines or a sore on the lining of the stomach (ulcer) also can cause it. Your belly may bloat and ache. You may belch, vomit, and feel sick to your stomach. You should be able to relieve the problem by taking medicine. And it may help to change your diet. If gastritis lasts, your doctor may prescribe medicine. Follow-up care is a key part of your treatment and safety. Be sure to make and go to all appointments, and call your doctor if you are having problems. It's also a good idea to know your test results and keep a list of the medicines you take. How can you care for yourself at home? · If your doctor prescribed antibiotics, take them as directed. Do not stop taking them just because you feel better. You need to take the full course of antibiotics. · Be safe with medicines. If your doctor prescribed medicine to decrease stomach acid, take it as directed. Call your doctor if you think you are having a problem with your medicine. · Do not take any other medicine, including over-the-counter pain relievers, without talking to your doctor first.  · If your doctor recommends over-the-counter medicine to reduce stomach acid, such as Pepcid AC, Prilosec, Tagamet HB, or Zantac 75, follow the directions on the label. · Drink plenty of fluids (enough so that your urine is light yellow or clear like water) to prevent dehydration. Choose water and other caffeine-free clear liquids. If you have kidney, heart, or liver disease and have to limit fluids, talk with your doctor before you increase the amount of fluids you drink. · Limit how much alcohol you drink. · Avoid coffee, tea, cola drinks, chocolate, and other foods with caffeine.  They increase stomach acid.  When should you call for help? Call 911 anytime you think you may need emergency care. For example, call if:  ? · You vomit blood or what looks like coffee grounds. ? · You pass maroon or very bloody stools. ?Call your doctor now or seek immediate medical care if:  ? · You start breathing fast and have not produced urine in the last 8 hours. ? · You cannot keep fluids down. ? Watch closely for changes in your health, and be sure to contact your doctor if:  ? · You do not get better as expected. Where can you learn more? Go to http://ebony-marci.info/. Enter [id number] in the search box to learn more about \"Gastritis: Care Instructions. \"  Current as of: May 12, 2017  Content Version: 11.4  © 6990-0310 [employer]. Care instructions adapted under license by [employer] (which disclaims liability or warranty for this information). If you have questions about a medical condition or this instruction, always ask your healthcare professional. [place] [address] any warranty or liability for your use of this information. Abdominal Pain: Care Instructions  Your Care Instructions    Abdominal pain has many possible causes. Some aren't serious and get better on their own in a few days. Others need more testing and treatment. If your pain continues or gets worse, you need to be rechecked and may need more tests to find out what is wrong. You may need surgery to correct the problem. Don't ignore new symptoms, such as fever, nausea and vomiting, urination problems, pain that gets worse, and dizziness. These may be signs of a more serious problem. Your doctor may have recommended a follow-up visit in the next 8 to 12 hours. If you are not getting better, you may need more tests or treatment. The doctor has checked you carefully, but problems can develop later. If you notice any problems or new symptoms, get medical treatment right away.   Follow-up care is a key part of your treatment and safety. Be sure to make and go to all appointments, and call your doctor if you are having problems. It's also a good idea to know your test results and keep a list of the medicines you take. How can you care for yourself at home? · Rest until you feel better. · To prevent dehydration, drink plenty of fluids, enough so that your urine is light yellow or clear like water. Choose water and other caffeine-free clear liquids until you feel better. If you have kidney, heart, or liver disease and have to limit fluids, talk with your doctor before you increase the amount of fluids you drink. · If your stomach is upset, eat mild foods, such as rice, dry toast or crackers, bananas, and applesauce. Try eating several small meals instead of two or three large ones. · Wait until 48 hours after all symptoms have gone away before you have spicy foods, alcohol, and drinks that contain caffeine. · Do not eat foods that are high in fat. · Avoid anti-inflammatory medicines such as aspirin, ibuprofen (Advil, Motrin), and naproxen (Aleve). These can cause stomach upset. Talk to your doctor if you take daily aspirin for another health problem. When should you call for help? Call 911 anytime you think you may need emergency care. For example, call if:  ? · You passed out (lost consciousness). ? · You pass maroon or very bloody stools. ? · You vomit blood or what looks like coffee grounds. ? · You have new, severe belly pain. ?Call your doctor now or seek immediate medical care if:  ? · Your pain gets worse, especially if it becomes focused in one area of your belly. ? · You have a new or higher fever. ? · Your stools are black and look like tar, or they have streaks of blood. ? · You have unexpected vaginal bleeding. ? · You have symptoms of a urinary tract infection. These may include:  ¨ Pain when you urinate. ¨ Urinating more often than usual.  ¨ Blood in your urine. ? · You are dizzy or lightheaded, or you feel like you may faint. ? Watch closely for changes in your health, and be sure to contact your doctor if:  ? · You are not getting better after 1 day (24 hours). Where can you learn more? Go to http://ebony-marci.info/. Enter R971 in the search box to learn more about \"Abdominal Pain: Care Instructions. \"  Current as of: March 20, 2017  Content Version: 11.4  © 3938-7863 [employer]. Care instructions adapted under license by [employer] (which disclaims liability or warranty for this information). If you have questions about a medical condition or this instruction, always ask your healthcare professional. [address] any warranty or liability for your use of this information.

## 2018-01-30 NOTE — ED PROVIDER NOTES
EMERGENCY DEPARTMENT HISTORY AND PHYSICAL EXAM    Date: 1/29/2018  Patient Name: Nini Hyman    History of Presenting Illness     Chief Complaint   Patient presents with    Headache    Back Pain         History Provided By: Patient    Chief Complaint: Back pain  Duration: 1 week   Timing:  Intermittent  Location: Back  Quality: Aching  Severity: 10 out of 10  Modifying Factors: Worsened after certain foods such as steak and barbeque  Associated Symptoms: HA, epigastric pain, nausea, vomiting, and generalized body aches    Additional History (Context):   9:22 PM  Nini Hyman is a 81 y.o. female with PMHX CAD, MI, HTN, DM, acid reflux who presents ambulatory to the emergency department C/O upper and low back pain (rated 10/10), onset 1 week ago. Worsened after certain foods such as steak and barbeque. Associated sxs include HA, epigastric pain, nausea, vomiting, and generalized body aches. Pt took Motrin for HA. Pt states that pain is similar to acid reflux and on same severity as previous MI. Pt states that she would throw up food and often throw up water after drinking. Pt has not taken Plavix in the last 24 hours because she took her gout medication. Denies any hx of abdominal surgery. Pt denies fever, chills, tobacco use (former), chance of pregnancy, or any other sxs or complaints. PCP: Phillip Villalta MD    Current Outpatient Prescriptions   Medication Sig Dispense Refill    pantoprazole (PROTONIX) 40 mg tablet Take 2 Tabs by mouth daily. 60 Tab 0    famotidine (PEPCID AC) 20 mg tablet Take 1 Tab by mouth two (2) times a day. 30 Tab 0    MAG&AL/SIM/DIPHENHYD/LIDOCAINE (MAGIC MOUTHWASH, NO SUCRALFATE,) mwsh oral suspension (compounded) Take 5 mL by mouth Before breakfast, lunch, and dinner. 90 mL 0    hydrOXYzine HCl (ATARAX) 25 mg tablet Take 1-2 tablets by mouth every 6 hours as needed.   Indications: Pruritus of Skin 30 Tab 0    guaiFENesin-codeine (ROBITUSSIN AC) 100-10 mg/5 mL solution Take 5 mL by mouth three (3) times daily as needed for Cough. Max Daily Amount: 15 mL. 118 mL 0    chlorthalidone (HYGROTEN) 25 mg tablet Take  by mouth daily.  colchicine 0.6 mg tablet Take 0.6 mg by mouth daily.  clopidogrel (PLAVIX) 75 mg tablet Take 75 mg by mouth daily.  simvastatin (ZOCOR) 40 mg tablet Take 40 mg by mouth nightly.  traZODone (DESYREL) 50 mg tablet Take 50 mg by mouth nightly.  atenolol (TENORMIN) 50 mg tablet Take 50 mg by mouth daily.  metformin 500 mg TG24 Take 500 mg by mouth daily (with breakfast). Past History     Past Medical History:  Past Medical History:   Diagnosis Date    CAD (coronary artery disease)     Diabetes ([address]     Gastrointestinal disorder     Hypertension     Reflux        Past Surgical History:  Past Surgical History:   Procedure Laterality Date    CARDIAC SURG PROCEDURE UNLIST      VASCULAR SURGERY PROCEDURE UNLIST         Family History:  History reviewed. No pertinent family history. Social History:  Social History   Substance Use Topics    Smoking status: Never Smoker    Smokeless tobacco: Never Used    Alcohol use No       Allergies: Allergies   Allergen Reactions    Pcn [Penicillins] Angioedema         Review of Systems   Review of Systems   Constitutional: Negative for chills, diaphoresis, fever and unexpected weight change. HENT: Negative for congestion, drooling, ear pain, rhinorrhea, sore throat, tinnitus and trouble swallowing. Eyes: Negative for photophobia, pain, redness and visual disturbance. Respiratory: Negative for cough, choking, chest tightness, shortness of breath, wheezing and stridor. Cardiovascular: Negative for chest pain, palpitations and leg swelling. Gastrointestinal: Positive for abdominal pain (epigastric), nausea and vomiting. Negative for abdominal distention, anal bleeding, blood in stool, constipation and diarrhea.    Genitourinary: Negative for difficulty urinating, dysuria, flank pain, frequency, hematuria and urgency. Musculoskeletal: Positive for back pain and myalgias (generalized). Negative for arthralgias and neck pain. Skin: Negative for color change, rash and wound. Neurological: Positive for headaches. Negative for dizziness, seizures, syncope, speech difficulty and light-headedness. Hematological: Does not bruise/bleed easily. Psychiatric/Behavioral: Negative for agitation, behavioral problems, hallucinations, self-injury and suicidal ideas. The patient is not hyperactive. Physical Exam     Vitals:    01/29/18 2004 01/29/18 2207   BP: 183/72 188/89   Pulse: 69    Resp: 18    Temp: 98.5 °F (36.9 °C)    SpO2: 98% 100%   Weight: 117.9 kg (260 lb)    Height: 5' 4\" (1.626 m)      Physical Exam   Constitutional: She is oriented to person, place, and time. She appears well-developed and well-nourished. No distress. HENT:   Head: Normocephalic and atraumatic. Right Ear: External ear normal.   Left Ear: External ear normal.   Mouth/Throat: Oropharynx is clear and moist. No oropharyngeal exudate. Eyes: Conjunctivae and EOM are normal. Pupils are equal, round, and reactive to light. No scleral icterus. No pallor   Neck: Normal range of motion. Neck supple. No JVD present. No tracheal deviation present. No thyromegaly present. Cardiovascular: Normal rate, regular rhythm and normal heart sounds. Pulmonary/Chest: Effort normal and breath sounds normal. No stridor. No respiratory distress. Abdominal: Soft. Bowel sounds are normal. She exhibits no distension. There is tenderness in the right upper quadrant and epigastric area. There is no rebound, no guarding and negative Rios's sign. Musculoskeletal: Normal range of motion. She exhibits no edema or tenderness. No soft tissue injuries   Lymphadenopathy:     She has no cervical adenopathy. Neurological: She is alert and oriented to person, place, and time. She has normal reflexes.  No cranial nerve deficit. Coordination normal.   Skin: Skin is warm and dry. No rash noted. She is not diaphoretic. No erythema. Psychiatric: She has a normal mood and affect. Her behavior is normal. Judgment and thought content normal.   Nursing note and vitals reviewed. Diagnostic Study Results     Labs -     Recent Results (from the past 12 hour(s))   CBC WITH AUTOMATED DIFF    Collection Time: 01/29/18 10:00 PM   Result Value Ref Range    WBC 6.0 4.6 - 13.2 K/uL    RBC 4.73 4.20 - 5.30 M/uL    HGB 13.5 12.0 - 16.0 g/dL    HCT 41.2 35.0 - 45.0 %    MCV 87.1 74.0 - 97.0 FL    MCH 28.5 24.0 - 34.0 PG    MCHC 32.8 31.0 - 37.0 g/dL    RDW 13.3 11.6 - 14.5 %    PLATELET 920 083 - 552 K/uL    MPV 9.8 9.2 - 11.8 FL    NEUTROPHILS 45 40 - 73 %    LYMPHOCYTES 45 21 - 52 %    MONOCYTES 7 3 - 10 %    EOSINOPHILS 2 0 - 5 %    BASOPHILS 1 0 - 2 %    ABS. NEUTROPHILS 2.7 1.8 - 8.0 K/UL    ABS. LYMPHOCYTES 2.7 0.9 - 3.6 K/UL    ABS. MONOCYTES 0.4 0.05 - 1.2 K/UL    ABS. EOSINOPHILS 0.1 0.0 - 0.4 K/UL    ABS. BASOPHILS 0.0 0.0 - 0.06 K/UL    DF AUTOMATED     LIPASE    Collection Time: 01/29/18 10:00 PM   Result Value Ref Range    Lipase 76 73 - 393 U/L   MAGNESIUM    Collection Time: 01/29/18 10:00 PM   Result Value Ref Range    Magnesium 1.7 1.6 - 2.6 mg/dL   METABOLIC PANEL, COMPREHENSIVE    Collection Time: 01/29/18 10:00 PM   Result Value Ref Range    Sodium 145 136 - 145 mmol/L    Potassium 3.9 3.5 - 5.5 mmol/L    Chloride 108 100 - 108 mmol/L    CO2 26 21 - 32 mmol/L    Anion gap 11 3.0 - 18 mmol/L    Glucose 126 (H) 74 - 99 mg/dL    BUN 16 7.0 - 18 MG/DL    Creatinine 0.82 0.6 - 1.3 MG/DL    BUN/Creatinine ratio 20 12 - 20      GFR est AA >60 >60 ml/min/1.73m2    GFR est non-AA >60 >60 ml/min/1.73m2    Calcium 8.7 8.5 - 10.1 MG/DL    Bilirubin, total 0.2 0.2 - 1.0 MG/DL    ALT (SGPT) 34 13 - 56 U/L    AST (SGOT) 31 15 - 37 U/L    Alk.  phosphatase 79 45 - 117 U/L    Protein, total 7.6 6.4 - 8.2 g/dL    Albumin 3.4 3.4 - 5.0 g/dL    Globulin 4.2 (H) 2.0 - 4.0 g/dL    A-G Ratio 0.8 0.8 - 1.7     EKG, 12 LEAD, INITIAL    Collection Time: 01/29/18 10:02 PM   Result Value Ref Range    Ventricular Rate 62 BPM    Atrial Rate 62 BPM    P-R Interval 162 ms    QRS Duration 90 ms    Q-T Interval 408 ms    QTC Calculation (Bezet) 414 ms    Calculated P Axis 61 degrees    Calculated T Axis -22 degrees    Diagnosis       Normal sinus rhythm  Nonspecific T wave abnormality  Abnormal ECG  When compared with ECG of 31-DEC-2017 00:43,  Inverted T waves have replaced nonspecific T wave abnormality in Inferior   leads  Nonspecific T wave abnormality, improved in Lateral leads         Radiologic Studies -    US ABD LTD   Final Result   IMPRESSION:     No acute sonographic abnormalities. Specifically, no abnormal gallbladder  findings.     Hepatic steatosis. As read by the radiologist.   XR CHEST PORT    (Results Pending)     2:07 AM  RADIOLOGY FINDINGS  Chest X-ray shows NAP  Pending review by Radiologist  Recorded by Janny Chandler ED Scribe, as dictated by Fidel Guidry. Rach Jarrett MD    CT Results  (Last 48 hours)    None        CXR Results  (Last 48 hours)    None            Medical Decision Making   I am the first provider for this patient. I reviewed the vital signs, available nursing notes, past medical history, past surgical history, family history and social history. Vital Signs-Reviewed the patient's vital signs. Pulse Oximetry Analysis - 98% on RA     EKG interpretation: (Preliminary)  22:02  NSR at 62 bpm. Flattened T waves. EKG read by Parra Ramp. Rach Jarrett MD at 22:03    Records Reviewed: Nursing Notes    Provider Notes (Medical Decision Making):   Ddx: RUQ pain after heavy meals concerning for gallbladder disease. This could be reflux or other cardiopulmonary process. She is overweight so musculoskeletal pain and kidney problems are also in the scope of her pain location.  Unlikely that this represents brain or cervical radicular pain.    Procedures:  Procedures    ED Course:   9:22 PM   Initial assessment performed. The patients presenting problems have been discussed, and they are in agreement with the care plan formulated and outlined with them. I have encouraged them to ask questions as they arise throughout their visit. 2:11 AM  Feeling better after tx. Review of medications shows recent use of steroids and she used Motrin to tx head pain, these are likely contributors to gastritis reflux. Diagnosis and Disposition       DISCHARGE NOTE:  2:10 AM  Marco A Kinsey  results have been reviewed with her. She has been counseled regarding her diagnosis, treatment, and plan. She verbally conveys understanding and agreement of the signs, symptoms, diagnosis, treatment and prognosis and additionally agrees to follow up as discussed. She also agrees with the care-plan and conveys that all of her questions have been answered. I have also provided discharge instructions for her that include: educational information regarding their diagnosis and treatment, and list of reasons why they would want to return to the ED prior to their follow-up appointment, should her condition change. She has been provided with education for proper emergency department utilization. CLINICAL IMPRESSION:    1. Abdominal pain, epigastric    2. Reflux gastritis        PLAN:  1. D/C Home  2. Current Discharge Medication List      START taking these medications    Details   famotidine (PEPCID AC) 20 mg tablet Take 1 Tab by mouth two (2) times a day. Qty: 30 Tab, Refills: 0         CONTINUE these medications which have CHANGED    Details   pantoprazole (PROTONIX) 40 mg tablet Take 2 Tabs by mouth daily. Qty: 60 Tab, Refills: 0         CONTINUE these medications which have NOT CHANGED    Details   hydrOXYzine HCl (ATARAX) 25 mg tablet Take 1-2 tablets by mouth every 6 hours as needed.   Indications: Pruritus of Skin  Qty: 30 Tab, Refills: 0 guaiFENesin-codeine (ROBITUSSIN AC) 100-10 mg/5 mL solution Take 5 mL by mouth three (3) times daily as needed for Cough. Max Daily Amount: 15 mL. Qty: 118 mL, Refills: 0    Associated Diagnoses: Cough      chlorthalidone (HYGROTEN) 25 mg tablet Take  by mouth daily. colchicine 0.6 mg tablet Take 0.6 mg by mouth daily. clopidogrel (PLAVIX) 75 mg tablet Take 75 mg by mouth daily. simvastatin (ZOCOR) 40 mg tablet Take 40 mg by mouth nightly. traZODone (DESYREL) 50 mg tablet Take 50 mg by mouth nightly. atenolol (TENORMIN) 50 mg tablet Take 50 mg by mouth daily. metformin 500 mg TG24 Take 500 mg by mouth daily (with breakfast). STOP taking these medications       predniSONE (STERAPRED DS) 10 mg dose pack Comments:   Reason for Stopping:             3.   Follow-up Information     Follow up With Details Comments [id number], MD Schedule an appointment as soon as possible for a visit in 2 days for PCP follow up [address] [facility]. [address] [place] [address] EMERGENCY DEPT  As needed, If symptoms worsen [address] [facility] [id number]  [phone]        _______________________________    Attestations: This note is prepared by Savana Wills, acting as Scribe for Nadege. Idalia Bermudez MD.    [employer]. Idalia Bermudez MD:  The scribe's documentation has been prepared under my direction and personally reviewed by me in its entirety.   I confirm that the note above accurately reflects all work, treatment, procedures, and medical decision making performed by me.  _______________________________

## 2018-02-06 ENCOUNTER — HOSPITAL ENCOUNTER (EMERGENCY)
Age: 73
Discharge: HOME | End: 2018-02-06
Attending: INTERNAL MEDICINE
Payer: MEDICARE

## 2018-02-06 ENCOUNTER — APPOINTMENT (OUTPATIENT)
Dept: CT IMAGING | Age: 73
End: 2018-02-06
Attending: PHYSICIAN ASSISTANT
Payer: MEDICARE

## 2018-02-06 VITALS
TEMPERATURE: 97.5 F | HEART RATE: 70 BPM | BODY MASS INDEX: 45.24 KG/M2 | HEIGHT: 64 IN | OXYGEN SATURATION: 99 % | SYSTOLIC BLOOD PRESSURE: 139 MMHG | WEIGHT: 265 LBS | RESPIRATION RATE: 20 BRPM | DIASTOLIC BLOOD PRESSURE: 87 MMHG

## 2018-02-06 DIAGNOSIS — M62.838 MUSCLE SPASM: ICD-10-CM

## 2018-02-06 DIAGNOSIS — M48.062 SPINAL STENOSIS OF LUMBAR REGION WITH NEUROGENIC CLAUDICATION: ICD-10-CM

## 2018-02-06 DIAGNOSIS — K21.9 GASTROESOPHAGEAL REFLUX DISEASE, ESOPHAGITIS PRESENCE NOT SPECIFIED: Primary | ICD-10-CM

## 2018-02-06 LAB
ALBUMIN SERPL-MCNC: 3.5 G/DL (ref 3.4–5)
ALBUMIN/GLOB SERPL: 0.8 {RATIO} (ref 0.8–1.7)
ALP SERPL-CCNC: 79 U/L (ref 45–117)
ALT SERPL-CCNC: 36 U/L (ref 13–56)
ANION GAP SERPL CALC-SCNC: 9 MMOL/L (ref 3–18)
APPEARANCE UR: CLEAR
AST SERPL-CCNC: 34 U/L (ref 15–37)
BASOPHILS # BLD: 0 K/UL (ref 0–0.06)
BASOPHILS NFR BLD: 0 % (ref 0–2)
BILIRUB SERPL-MCNC: 0.2 MG/DL (ref 0.2–1)
BILIRUB UR QL: NEGATIVE
BUN SERPL-MCNC: 16 MG/DL (ref 7–18)
BUN/CREAT SERPL: 17 (ref 12–20)
CALCIUM SERPL-MCNC: 9 MG/DL (ref 8.5–10.1)
CHLORIDE SERPL-SCNC: 104 MMOL/L (ref 100–108)
CO2 SERPL-SCNC: 30 MMOL/L (ref 21–32)
COLOR UR: YELLOW
CREAT SERPL-MCNC: 0.94 MG/DL (ref 0.6–1.3)
DIFFERENTIAL METHOD BLD: NORMAL
EOSINOPHIL # BLD: 0.1 K/UL (ref 0–0.4)
EOSINOPHIL NFR BLD: 2 % (ref 0–5)
ERYTHROCYTE [DISTWIDTH] IN BLOOD BY AUTOMATED COUNT: 13.3 % (ref 11.6–14.5)
GLOBULIN SER CALC-MCNC: 4.2 G/DL (ref 2–4)
GLUCOSE SERPL-MCNC: 122 MG/DL (ref 74–99)
GLUCOSE UR STRIP.AUTO-MCNC: NEGATIVE MG/DL
HCT VFR BLD AUTO: 41 % (ref 35–45)
HGB BLD-MCNC: 13.7 G/DL (ref 12–16)
HGB UR QL STRIP: NEGATIVE
KETONES UR QL STRIP.AUTO: ABNORMAL MG/DL
LEUKOCYTE ESTERASE UR QL STRIP.AUTO: NEGATIVE
LIPASE SERPL-CCNC: 93 U/L (ref 73–393)
LYMPHOCYTES # BLD: 2.2 K/UL (ref 0.9–3.6)
LYMPHOCYTES NFR BLD: 39 % (ref 21–52)
MCH RBC QN AUTO: 29 PG (ref 24–34)
MCHC RBC AUTO-ENTMCNC: 33.4 G/DL (ref 31–37)
MCV RBC AUTO: 86.7 FL (ref 74–97)
MONOCYTES # BLD: 0.4 K/UL (ref 0.05–1.2)
MONOCYTES NFR BLD: 7 % (ref 3–10)
NEUTS SEG # BLD: 2.9 K/UL (ref 1.8–8)
NEUTS SEG NFR BLD: 52 % (ref 40–73)
NITRITE UR QL STRIP.AUTO: NEGATIVE
PH UR STRIP: 5.5 [PH] (ref 5–8)
PLATELET # BLD AUTO: 244 K/UL (ref 135–420)
PMV BLD AUTO: 10.2 FL (ref 9.2–11.8)
POTASSIUM SERPL-SCNC: 4 MMOL/L (ref 3.5–5.5)
PROT SERPL-MCNC: 7.7 G/DL (ref 6.4–8.2)
PROT UR STRIP-MCNC: NEGATIVE MG/DL
RBC # BLD AUTO: 4.73 M/UL (ref 4.2–5.3)
SODIUM SERPL-SCNC: 143 MMOL/L (ref 136–145)
SP GR UR REFRACTOMETRY: 1.02 (ref 1–1.03)
UROBILINOGEN UR QL STRIP.AUTO: 0.2 EU/DL (ref 0.2–1)
WBC # BLD AUTO: 5.6 K/UL (ref 4.6–13.2)

## 2018-02-06 PROCEDURE — 99283 EMERGENCY DEPT VISIT LOW MDM: CPT

## 2018-02-06 PROCEDURE — 81003 URINALYSIS AUTO W/O SCOPE: CPT

## 2018-02-06 PROCEDURE — 74176 CT ABD & PELVIS W/O CONTRAST: CPT

## 2018-02-06 PROCEDURE — 85025 COMPLETE CBC W/AUTO DIFF WBC: CPT

## 2018-02-06 PROCEDURE — 80053 COMPREHEN METABOLIC PANEL: CPT

## 2018-02-06 PROCEDURE — 83690 ASSAY OF LIPASE: CPT

## 2018-02-06 PROCEDURE — 74011250637 HC RX REV CODE- 250/637: Performed by: PHYSICIAN ASSISTANT

## 2018-02-06 RX ORDER — DIAZEPAM 5 MG/1
5 TABLET ORAL
Qty: 20 TAB | Refills: 0 | Status: SHIPPED | OUTPATIENT
Start: 2018-02-06 | End: 2018-12-03

## 2018-02-06 RX ORDER — DIAZEPAM 5 MG/1
5 TABLET ORAL
Status: COMPLETED | OUTPATIENT
Start: 2018-02-06 | End: 2018-02-06

## 2018-02-06 RX ADMIN — Medication 30 ML: at 19:16

## 2018-02-06 RX ADMIN — DIAZEPAM 5 MG: 5 TABLET ORAL at 20:28

## 2018-02-06 NOTE — ED TRIAGE NOTES
Patient ambu to ED with c/o lower back pain. Patient was seen here last week for same complaint. Sepsis Screening completed    (  )Patient meets SIRS criteria. ( x )Patient does not meet SIRS criteria.       SIRS Criteria is achieved when two or more of the following are present   Temperature < 96.8°F (36°C) or > 100.9°F (38.3°C)   Heart Rate > 90 beats per minute   Respiratory Rate > 20 breaths per minute   WBC count > 12,000 or <4,000 or > 10% bands

## 2018-02-06 NOTE — ED PROVIDER NOTES
EMERGENCY DEPARTMENT HISTORY AND PHYSICAL EXAM    Date: 2/6/2018  Patient Name: Samantha Mckeon    History of Presenting Illness     Chief Complaint   Patient presents with    Back Pain         History Provided By: Patient    Chief Complaint: back pain  Duration: 1 Weeks  Timing:  Constant  Location: lumbar spine  Quality: Burning  Severity: 10 out of 10  Modifying Factors: pain worsens when eating, no relief with Pepcid AC and Atarax  Associated Symptoms: nausea, abdominal pain, and leg and ankle swelling    Additional History (Context):   6:48 PM  Samantha Mckeon is a 81 y.o. female with PMHX of CAD, HTN, GERD, and DM who presents to the emergency department C/O constant 10/10 burning lower back pain onset 1 week. Associated sxs include nausea, abdominal pain, and leg and ankle swelling. Pt was last seen at [facility] 7 days ago for her back pain, had an US, and was rx'd Pepcid AC and Atarax, but has not received any relief. Pt states that her sxs are similar to when she had gallstones previously. Pt states that her pain worsens when eating. Pt denies vomiting, dysuria, urinary frequency, and any other sxs or complaints. PCP: Arlander Meckel, MD    Current Facility-Administered Medications   Medication Dose Route Frequency Provider Last Rate Last Dose    diazePAM (VALIUM) tablet 5 mg  5 mg Oral NOW KATIE Moreno         Current Outpatient Prescriptions   Medication Sig Dispense Refill    diazePAM (VALIUM) 5 mg tablet Take 1 Tab by mouth every eight (8) hours as needed. Max Daily Amount: 15 mg. Indications: Muscle Spasm 20 Tab 0    pantoprazole (PROTONIX) 40 mg tablet Take 2 Tabs by mouth daily. 60 Tab 0    famotidine (PEPCID AC) 20 mg tablet Take 1 Tab by mouth two (2) times a day. 30 Tab 0    MAG&AL/SIM/DIPHENHYD/LIDOCAINE (MAGIC MOUTHWASH, NO SUCRALFATE,) mwsh oral suspension (compounded) Take 5 mL by mouth Before breakfast, lunch, and dinner.  90 mL 0    hydrOXYzine HCl (ATARAX) 25 mg tablet Take 1-2 tablets by mouth every 6 hours as needed. Indications: Pruritus of Skin 30 Tab 0    guaiFENesin-codeine (ROBITUSSIN AC) 100-10 mg/5 mL solution Take 5 mL by mouth three (3) times daily as needed for Cough. Max Daily Amount: 15 mL. 118 mL 0    chlorthalidone (HYGROTEN) 25 mg tablet Take  by mouth daily.  colchicine 0.6 mg tablet Take 0.6 mg by mouth daily.  clopidogrel (PLAVIX) 75 mg tablet Take 75 mg by mouth daily.  simvastatin (ZOCOR) 40 mg tablet Take 40 mg by mouth nightly.  traZODone (DESYREL) 50 mg tablet Take 50 mg by mouth nightly.  atenolol (TENORMIN) 50 mg tablet Take 50 mg by mouth daily.  metformin 500 mg TG24 Take 500 mg by mouth daily (with breakfast). Past History     Past Medical History:  Past Medical History:   Diagnosis Date    CAD (coronary artery disease)     Diabetes ([address]     Gastrointestinal disorder     Hypertension     Reflux        Past Surgical History:  Past Surgical History:   Procedure Laterality Date    CARDIAC SURG PROCEDURE UNLIST      VASCULAR SURGERY PROCEDURE UNLIST         Family History:  History reviewed. No pertinent family history. Social History:  Social History   Substance Use Topics    Smoking status: Never Smoker    Smokeless tobacco: Never Used    Alcohol use No       Allergies: Allergies   Allergen Reactions    Pcn [Penicillins] Angioedema         Review of Systems   Review of Systems   Constitutional: Negative for chills and fever. Cardiovascular: Positive for leg swelling (and ankle swelling). Gastrointestinal: Positive for abdominal pain and nausea. Negative for vomiting. Genitourinary: Negative for dysuria and frequency. Musculoskeletal: Positive for back pain (lower). All other systems reviewed and are negative.       Physical Exam     Vitals:    02/06/18 1708   BP: (!) 180/156   Pulse: 70   Resp: 20   Temp: 97.5 °F (36.4 °C)   SpO2: 99%   Weight: 120.2 kg (265 lb)   Height: 5' 4\" (1.626 m) Physical Exam   Constitutional: She is oriented to person, place, and time. Vital signs are normal. She appears well-developed and well-nourished. No distress. HENT:   Head: Normocephalic and atraumatic. Right Ear: External ear normal.   Left Ear: External ear normal.   Nose: Nose normal.   Mouth/Throat: Oropharynx is clear and moist. No oropharyngeal exudate. Eyes: Conjunctivae and EOM are normal. Pupils are equal, round, and reactive to light. Neck: Normal range of motion. Neck supple. Cardiovascular: Normal rate, regular rhythm, normal heart sounds and intact distal pulses. Pulmonary/Chest: Effort normal and breath sounds normal. No respiratory distress. She has no wheezes. She has no rales. She exhibits no tenderness. Abdominal: Soft. Bowel sounds are normal. She exhibits no distension and no mass. There is tenderness (epigastric, \"burning\"). There is no rebound and no guarding. Musculoskeletal: Normal range of motion. She exhibits tenderness. She exhibits no edema or deformity. Right knee: Normal.        Left knee: Normal.        Right ankle: Normal.        Left ankle: Normal.        Lumbar back: She exhibits tenderness and spasm. She exhibits normal range of motion, no bony tenderness, no swelling, no edema, no deformity, no laceration, no pain and normal pulse. Back:         Right upper leg: Normal.        Left upper leg: Normal.        Right lower leg: Normal.        Left lower leg: Normal.   Neurological: She is alert and oriented to person, place, and time. Skin: Skin is warm and dry. She is not diaphoretic. Psychiatric: She has a normal mood and affect. Her behavior is normal.   Nursing note and vitals reviewed.         Diagnostic Study Results     Labs -     Recent Results (from the past 12 hour(s))   URINALYSIS W/ RFLX MICROSCOPIC    Collection Time: 02/06/18  6:21 PM   Result Value Ref Range    Color YELLOW      Appearance CLEAR      Specific gravity 1.021 1.005 - 1.030      pH (UA) 5.5 5.0 - 8.0      Protein NEGATIVE  NEG mg/dL    Glucose NEGATIVE  NEG mg/dL    Ketone TRACE (A) NEG mg/dL    Bilirubin NEGATIVE  NEG      Blood NEGATIVE  NEG      Urobilinogen 0.2 0.2 - 1.0 EU/dL    Nitrites NEGATIVE  NEG      Leukocyte Esterase NEGATIVE  NEG     CBC WITH AUTOMATED DIFF    Collection Time: 02/06/18  7:05 PM   Result Value Ref Range    WBC 5.6 4.6 - 13.2 K/uL    RBC 4.73 4.20 - 5.30 M/uL    HGB 13.7 12.0 - 16.0 g/dL    HCT 41.0 35.0 - 45.0 %    MCV 86.7 74.0 - 97.0 FL    MCH 29.0 24.0 - 34.0 PG    MCHC 33.4 31.0 - 37.0 g/dL    RDW 13.3 11.6 - 14.5 %    PLATELET 063 488 - 587 K/uL    MPV 10.2 9.2 - 11.8 FL    NEUTROPHILS 52 40 - 73 %    LYMPHOCYTES 39 21 - 52 %    MONOCYTES 7 3 - 10 %    EOSINOPHILS 2 0 - 5 %    BASOPHILS 0 0 - 2 %    ABS. NEUTROPHILS 2.9 1.8 - 8.0 K/UL    ABS. LYMPHOCYTES 2.2 0.9 - 3.6 K/UL    ABS. MONOCYTES 0.4 0.05 - 1.2 K/UL    ABS. EOSINOPHILS 0.1 0.0 - 0.4 K/UL    ABS. BASOPHILS 0.0 0.0 - 0.06 K/UL    DF AUTOMATED     METABOLIC PANEL, COMPREHENSIVE    Collection Time: 02/06/18  7:05 PM   Result Value Ref Range    Sodium 143 136 - 145 mmol/L    Potassium 4.0 3.5 - 5.5 mmol/L    Chloride 104 100 - 108 mmol/L    CO2 30 21 - 32 mmol/L    Anion gap 9 3.0 - 18 mmol/L    Glucose 122 (H) 74 - 99 mg/dL    BUN 16 7.0 - 18 MG/DL    Creatinine 0.94 0.6 - 1.3 MG/DL    BUN/Creatinine ratio 17 12 - 20      GFR est AA >60 >60 ml/min/1.73m2    GFR est non-AA 59 (L) >60 ml/min/1.73m2    Calcium 9.0 8.5 - 10.1 MG/DL    Bilirubin, total 0.2 0.2 - 1.0 MG/DL    ALT (SGPT) 36 13 - 56 U/L    AST (SGOT) 34 15 - 37 U/L    Alk.  phosphatase 79 45 - 117 U/L    Protein, total 7.7 6.4 - 8.2 g/dL    Albumin 3.5 3.4 - 5.0 g/dL    Globulin 4.2 (H) 2.0 - 4.0 g/dL    A-G Ratio 0.8 0.8 - 1.7     LIPASE    Collection Time: 02/06/18  7:05 PM   Result Value Ref Range    Lipase 93 73 - 393 U/L       Radiologic Studies -   CT ABD PELV WO CONT   Final Result        CT Results  (Last 48 hours) 02/06/18 1930  CT ABD PELV WO CONT Final result    Impression:  IMPRESSION:       1. No acute process       Severe spinal stenosis present at L4-L5 in significant spinal stenosis at L5-S1           Narrative:  EXAM: CT of the abdomen and pelvis       INDICATION: Abdominal pain, nausea, vomiting, bilateral lower back pain       COMPARISON: September 22, 2009       TECHNIQUE: Axial CT imaging of the abdomen and pelvis was performed without   intravenous contrast. Multiplanar reformats were generated. DOSE REDUCTION:  One or more dose reduction techniques were used on this CT:   automated exposure control, adjustment of the mAs and/or kVp according to   patient's size, and iterative reconstruction techniques. The specific techniques   utilized on this CT exam have been documented in the patient's electronic   medical record.       _______________       FINDINGS:       LOWER CHEST: Unremarkable. Small hiatal hernia present. Raenell Mortimer LIVER, BILIARY: Liver is normal. No biliary dilation. Gallbladder is   unremarkable. PANCREAS: Normal.       SPLEEN: Normal.       ADRENALS: Normal.       KIDNEYS/URETERS: Normal.       VASCULATURE: Mild calcific atherosclerosis. LYMPH NODES: No enlarged lymph nodes       GASTRO INTESTINAL TRACT: Appendix is normal. There is no bowel obstruction. Colonic diverticulosis present of the descending and sigmoid colon. No acute   diverticulitis present. PELVIC ORGANS/BLADDER: Calcified fibroid seen in the uterus. The uterus is   enlarged. Urinary bladder is under distended therefore difficult to evaluate. No   free fluid is seen. BONES: No acute or aggressive osseous abnormalities identified. There is   osteopenia. Degenerative disc disease present at L5-S1 with moderate disc space   narrowing and vacuum phenomenon. There is significant spinal stenosis at L5-S1   secondary to posterior disc osteophyte complex and facet arthropathy.  At L4-L5   there is posterior disc osteophyte complex and facet arthropathy resulting in   severe spinal and foraminal stenosis. .       OTHER: None.       _______________               CXR Results  (Last 48 hours)    None          Medications given in the ED-  Medications   diazePAM (VALIUM) tablet 5 mg (not administered)   GI COCKTAIL [facility]) (30 mL Oral Given 2/6/18 1916)         Medical Decision Making   I am the first provider for this patient. I reviewed the vital signs, available nursing notes, past medical history, past surgical history, family history and social history. Vital Signs-Reviewed the patient's vital signs. Pulse Oximetry Analysis - 99% on RA     Records Reviewed: Nursing Notes and Old Medical Records    Provider Notes (Medical Decision Making):     Procedures:  Procedures    ED Course:   6:48 PM Initial assessment performed. The patients presenting problems have been discussed, and they are in agreement with the care plan formulated and outlined with them. I have encouraged them to ask questions as they arise throughout their visit. 8:05 PM HANG mae helped the pt with her pain. Discussed spinal stenosis as the likely cause of her back pan and LE sxs. Pt is to follow up with GI and orthopedics. Diagnosis and Disposition       DISCHARGE NOTE:  8:15 PM  Abel Christianson  results have been reviewed with her. She has been counseled regarding her diagnosis, treatment, and plan. She verbally conveys understanding and agreement of the signs, symptoms, diagnosis, treatment and prognosis and additionally agrees to follow up as discussed. She also agrees with the care-plan and conveys that all of her questions have been answered. I have also provided discharge instructions for her that include: educational information regarding their diagnosis and treatment, and list of reasons why they would want to return to the ED prior to their follow-up appointment, should her condition change.  She has been provided with education for proper emergency department utilization. CLINICAL IMPRESSION:    1. Gastroesophageal reflux disease, esophagitis presence not specified    2. Spinal stenosis of lumbar region with neurogenic claudication    3. Muscle spasm        PLAN:  1. D/C Home  2. Current Discharge Medication List      START taking these medications    Details   diazePAM (VALIUM) 5 mg tablet Take 1 Tab by mouth every eight (8) hours as needed. Max Daily Amount: 15 mg. Indications: Muscle Spasm  Qty: 20 Tab, Refills: 0    Associated Diagnoses: Muscle spasm; Spinal stenosis of lumbar region with neurogenic claudication           3. Follow-up Information     Follow up With Details Comments Karen Tolentino MD Schedule an appointment as soon as possible for a visit in 2 days For follow up with GI [address] [place] [address] [id number] [facility]      Magan Cano MD Schedule an appointment as soon as possible for a visit in 2 days For follow up with orthopedics [address]      [facility] EMERGENCY DEPT Go to As needed, If symptoms worsen [address]  [address] [address]  [phone]        _______________________________    Attestations: This note is prepared by Eryn Mckeon, acting as Scribe for [employer], PA-C. [employer], PA-C:  The scribe's documentation has been prepared under my direction and personally reviewed by me in its entirety.   I confirm that the note above accurately reflects all work, treatment, procedures, and medical decision making performed by me.  _______________________________

## 2018-02-07 NOTE — DISCHARGE INSTRUCTIONS
Gastroesophageal Reflux Disease (GERD): Care Instructions  Your Care Instructions    Gastroesophageal reflux disease (GERD) is the backward flow of stomach acid into the esophagus. The esophagus is the tube that leads from your throat to your stomach. A one-way valve prevents the stomach acid from moving up into this tube. When you have GERD, this valve does not close tightly enough. If you have mild GERD symptoms including heartburn, you may be able to control the problem with antacids or over-the-counter medicine. Changing your diet, losing weight, and making other lifestyle changes can also help reduce symptoms. Follow-up care is a key part of your treatment and safety. Be sure to make and go to all appointments, and call your doctor if you are having problems. It's also a good idea to know your test results and keep a list of the medicines you take. How can you care for yourself at home? · Take your medicines exactly as prescribed. Call your doctor if you think you are having a problem with your medicine. · Your doctor may recommend over-the-counter medicine. For mild or occasional indigestion, antacids, such as Tums, Gaviscon, Mylanta, or Maalox, may help. Your doctor also may recommend over-the-counter acid reducers, such as Pepcid AC, Tagamet HB, Zantac 75, or Prilosec. Read and follow all instructions on the label. If you use these medicines often, talk with your doctor. · Change your eating habits. ¨ It's best to eat several small meals instead of two or three large meals. ¨ After you eat, wait 2 to 3 hours before you lie down. ¨ Chocolate, mint, and alcohol can make GERD worse. ¨ Spicy foods, foods that have a lot of acid (like tomatoes and oranges), and coffee can make GERD symptoms worse in some people. If your symptoms are worse after you eat a certain food, you may want to stop eating that food to see if your symptoms get better.   · Do not smoke or chew tobacco. Smoking can make GERD worse. If you need help quitting, talk to your doctor about stop-smoking programs and medicines. These can increase your chances of quitting for good. · If you have GERD symptoms at night, raise the head of your bed 6 to 8 inches by putting the frame on blocks or placing a foam wedge under the head of your mattress. (Adding extra pillows does not work.)  · Do not wear tight clothing around your middle. · Lose weight if you need to. Losing just 5 to 10 pounds can help. When should you call for help? Call your doctor now or seek immediate medical care if:  ? · You have new or different belly pain. ? · Your stools are black and tarlike or have streaks of blood. ? Watch closely for changes in your health, and be sure to contact your doctor if:  ? · Your symptoms have not improved after 2 days. ? · Food seems to catch in your throat or chest.   Where can you learn more? Go to http://ebony-marci.info/. Enter B972 in the search box to learn more about \"Gastroesophageal Reflux Disease (GERD): Care Instructions. \"  Current as of: May 12, 2017  Content Version: 11.4  © 2774-6891 [employer]. Care instructions adapted under license by [employer] (which disclaims liability or warranty for this information). If you have questions about a medical condition or this instruction, always ask your healthcare professional. [place] [address] any warranty or liability for your use of this information. Lumbar Spinal Stenosis: Care Instructions  Your Care Instructions    Stenosis in the spine is a narrowing of the canal that is around the spinal cord and nerve roots in your back. It can happen as part of aging. Sometimes bone and other tissue grow into this canal and press on the nerves that branch out from the spinal cord. This can cause pain, numbness, and weakness. When it happens in the lower part of your back, it is called lumbar spinal stenosis.  It can cause problems in the legs, feet, and rear end (buttocks). You may be able to get relief from the symptoms of spinal stenosis by taking pain medicine. Your doctor may suggest physical therapy and exercises to keep your spine strong and flexible. Some people try steroid shots to reduce swelling. If pain and numbness in your legs are still so bad that you cannot do your normal activities, you may need surgery. Follow-up care is a key part of your treatment and safety. Be sure to make and go to all appointments, and call your doctor if you are having problems. It's also a good idea to know your test results and keep a list of the medicines you take. How can you care for yourself at home? · Take an over-the-counter pain medicine, such as acetaminophen (Tylenol), ibuprofen (Advil, Motrin), or naproxen (Aleve). Be safe with medicines. Read and follow all instructions on the label. · Do not take two or more pain medicines at the same time unless the doctor told you to. Many pain medicines have acetaminophen, which is Tylenol. Too much acetaminophen (Tylenol) can be harmful. · Stay at a healthy weight. Being overweight puts extra strain on your spine. · Change positions often when you sit or stand. This can ease pain. It may also reduce pressure on the spinal cord and its nerves. · Avoid doing things that make your symptoms worse. Walking downhill and standing for a long time may cause pain. · Stretch and strengthen your back muscles as your doctor or physical therapist recommends. If your doctor says it is okay to do them, these exercises may help. ¨ Lie on your back with your knees bent. Gently pull one bent knee to your chest. Put that foot back on the floor, and then pull the other knee to your chest.  ¨ Do pelvic tilts. Lie on your back with your knees bent. Tighten your stomach muscles. Pull your belly button (navel) in and up toward your ribs.  You should feel like your back is pressing to the floor and your hips and pelvis are slightly lifting off the floor. Hold for 6 seconds while breathing smoothly. ¨ Stand with your back flat against a wall. Slowly slide down until your knees are slightly bent. Hold for 10 seconds, then slide back up the wall. · Remove or change anything in your house that may cause you to fall. Keep walkways clear of clutter, electrical cords, and throw rugs. When should you call for help? Call 911 anytime you think you may need emergency care. For example, call if:  ? · You are unable to move a leg at all. ?Call your doctor now or seek immediate medical care if:  ? · You have new or worse symptoms in your legs, belly, or buttocks. Symptoms may include:  ¨ Numbness or tingling. ¨ Weakness. ¨ Pain. ? · You lose bladder or bowel control. ? Watch closely for changes in your health, and be sure to contact your doctor if you are not getting better as expected. Where can you learn more? Go to http://ebony-marci.info/. Chinedu Valadez in the search box to learn more about \"Lumbar Spinal Stenosis: Care Instructions. \"  Current as of: March 21, 2017  Content Version: 11.4  © 5931-8196 [employer]. Care instructions adapted under license by [employer] (which disclaims liability or warranty for this information). If you have questions about a medical condition or this instruction, always ask your healthcare professional. [place] [address] any warranty or liability for your use of this information. Back Spasm: Care Instructions  Your Care Instructions  A back spasm is sudden tightness and pain in your back muscles. It may happen from overuse or an injury. Things like sleeping in an awkward way, bending, lifting, standing, or sitting can sometimes cause a spasm. But the cause isn't always clear.   Home treatment includes using heat or ice, taking over-the-counter (OTC) pain medicines, and avoiding activities that may cause back pain.  For a back spasm that doesn't get better with home care, your doctor may prescribe medicine. Treatments such as massage or manipulation may also help ease a back spasm. Your doctor may also suggest exercise or physical therapy to help improve strength and flexibility in your back muscles. In most cases, getting back to your normal activities is good foryour back. Just make sure to avoid doing things that make your pain worse. Follow-up care is a key part of your treatment and safety. Be sure to make and go to all appointments, and call your doctor if you are having problems. It's also a good idea to know your test results and keep a list of the medicines you take. How can you care for yourself at home? ? Heat, ice, and medicines  ? · To relieve pain, use heat or ice (whichever feels better) on the affected area. ¨ Put a warm water bottle, a heating pad set on low, or a warm cloth on your back. Put a thin cloth between the heating pad and your skin. Do not go to sleep with a heating pad on your skin. ¨ Try ice or a cold pack on the area for 10 to 20 minutes at a time. Put a thin cloth between the ice and your skin. ? · Ask your doctor if you can take acetaminophen (such as Tylenol) or nonsteroidal anti-inflammatory drugs, such as ibuprofen or naproxen. Your doctor can prescribe stronger medicines if needed. Be safe with medicines. Read and follow all instructions on the label. ?Body positions and posture  ? · Sit or lie in positions that are most comfortable for you and that reduce pain. Try one of these positions when you lie down:  ¨ Lie on your back with your knees bent and supported by large pillows. ¨ Lie on the floor with your legs on the seat of a sofa or chair. Rachael Basset on your side with your knees and hips bent and a pillow between your legs. ¨ Lie on your stomach if it does not make pain worse. ? · Do not sit up in bed. Avoid soft couches and twisted positions.    ? · Avoid bed rest after the first day of back pain. Bed rest can help relieve pain at first, but it delays healing. Continued rest without activity is usually not good for your back. ? · If you must sit for long periods of time, take breaks from sitting. Change positions every 30 minutes. Get up and walk around, or lie in a comfortable position. Activity  ? · Take short walks several times a day. You can start with 5 to 10 minutes, 3 or 4 times a day, and work up to longer walks. Walk on level surfaces and avoid hills and stairs until your back starts to feel better. ? · After your back spasm starts to feel better, try to stretch your muscles every day, especially before and after exercise and at bedtime. Regular stretching can help relax your muscles. ? · To prevent future back pain, do exercises to stretch and strengthen your back and stomach. Learn to use good posture, safe lifting techniques, and other ways to move to help you avoid back pain. When should you call for help? Call 911 anytime you think you may need emergency care. For example, call if:  ? · You are unable to move an arm or a leg at all. ?Call your doctor now or seek immediate medical care if:  ? · You have new or worse symptoms in your legs, belly, or buttocks. Symptoms may include:  ¨ Numbness or tingling. ¨ Weakness. ¨ Pain. ? · You lose bladder or bowel control. ? Watch closely for changes in your health, and be sure to contact your doctor if:  ? · You do not get better as expected. Where can you learn more? Go to http://ebony-marci.info/. Enter E232 in the search box to learn more about \"Back Spasm: Care Instructions. \"  Current as of: March 21, 2017  Content Version: 11.4  © 6143-0694 [employer]. Care instructions adapted under license by [employer] (which disclaims liability or warranty for this information).  If you have questions about a medical condition or this instruction, always ask your healthcare professional. [address] any warranty or liability for your use of this information.

## 2018-02-10 LAB
ATRIAL RATE: 62 BPM
CALCULATED P AXIS, ECG09: 61 DEGREES
CALCULATED T AXIS, ECG11: -22 DEGREES
DIAGNOSIS, 93000: NORMAL
P-R INTERVAL, ECG05: 162 MS
Q-T INTERVAL, ECG07: 408 MS
QRS DURATION, ECG06: 90 MS
QTC CALCULATION (BEZET), ECG08: 414 MS
VENTRICULAR RATE, ECG03: 62 BPM

## 2018-09-17 ENCOUNTER — HOSPITAL ENCOUNTER (EMERGENCY)
Age: 73
Discharge: HOME | End: 2018-09-18
Attending: EMERGENCY MEDICINE
Payer: MEDICARE

## 2018-09-17 DIAGNOSIS — R22.0 LIP SWELLING: Primary | ICD-10-CM

## 2018-09-17 DIAGNOSIS — T78.40XA ALLERGIC REACTION, INITIAL ENCOUNTER: ICD-10-CM

## 2018-09-17 PROCEDURE — 96375 TX/PRO/DX INJ NEW DRUG ADDON: CPT

## 2018-09-17 PROCEDURE — 74011250636 HC RX REV CODE- 250/636: Performed by: EMERGENCY MEDICINE

## 2018-09-17 PROCEDURE — 96374 THER/PROPH/DIAG INJ IV PUSH: CPT

## 2018-09-17 PROCEDURE — 99283 EMERGENCY DEPT VISIT LOW MDM: CPT

## 2018-09-17 RX ORDER — FAMOTIDINE 10 MG/ML
20 INJECTION INTRAVENOUS
Status: COMPLETED | OUTPATIENT
Start: 2018-09-17 | End: 2018-09-17

## 2018-09-17 RX ORDER — DIPHENHYDRAMINE HYDROCHLORIDE 50 MG/ML
25 INJECTION, SOLUTION INTRAMUSCULAR; INTRAVENOUS ONCE
Status: COMPLETED | OUTPATIENT
Start: 2018-09-17 | End: 2018-09-17

## 2018-09-17 RX ADMIN — METHYLPREDNISOLONE SODIUM SUCCINATE 125 MG: 125 INJECTION, POWDER, FOR SOLUTION INTRAMUSCULAR; INTRAVENOUS at 23:57

## 2018-09-17 RX ADMIN — FAMOTIDINE 20 MG: 10 INJECTION, SOLUTION INTRAVENOUS at 23:57

## 2018-09-17 RX ADMIN — DIPHENHYDRAMINE HYDROCHLORIDE 25 MG: 50 INJECTION, SOLUTION INTRAMUSCULAR; INTRAVENOUS at 23:57

## 2018-09-18 ENCOUNTER — HOSPITAL ENCOUNTER (EMERGENCY)
Dept: LAB | Age: 73
Discharge: HOME | End: 2018-09-18

## 2018-09-18 VITALS
BODY MASS INDEX: 45.24 KG/M2 | TEMPERATURE: 97.7 F | DIASTOLIC BLOOD PRESSURE: 65 MMHG | RESPIRATION RATE: 13 BRPM | OXYGEN SATURATION: 99 % | SYSTOLIC BLOOD PRESSURE: 186 MMHG | HEIGHT: 64 IN | WEIGHT: 265 LBS | HEART RATE: 67 BPM

## 2018-09-18 LAB
HBV SURFACE AG SER QL: <0.1 INDEX
HBV SURFACE AG SER QL: NEGATIVE
HCV AB SER IA-ACNC: 0.14 INDEX
HCV AB SERPL QL IA: NEGATIVE
HCV COMMENT,HCGAC: NORMAL
HIV1 P24 AG SERPL QL IA: NONREACTIVE
HIV1+2 AB SERPL QL IA: NONREACTIVE

## 2018-09-18 PROCEDURE — 86803 HEPATITIS C AB TEST: CPT | Performed by: EMERGENCY MEDICINE

## 2018-09-18 PROCEDURE — 87389 HIV-1 AG W/HIV-1&-2 AB AG IA: CPT | Performed by: EMERGENCY MEDICINE

## 2018-09-18 PROCEDURE — 87340 HEPATITIS B SURFACE AG IA: CPT | Performed by: EMERGENCY MEDICINE

## 2018-09-18 RX ORDER — PREDNISONE 20 MG/1
60 TABLET ORAL DAILY
Qty: 9 TAB | Refills: 0 | Status: SHIPPED | OUTPATIENT
Start: 2018-09-18 | End: 2018-09-21

## 2018-09-18 NOTE — ED NOTES
Pt presents to ED with having Lower lip swelling. Pt reports that she feels like she is having trouble swallowing/and SOB. \"It all started today. \" Pt appears to be managing her secretions, RR non labored. Speaking in clear sentences. NAD observed upon assessment.

## 2018-09-18 NOTE — ED NOTES
Pt denies SOB. Pt continues to have the lower lip swelling- not gotten larger in size. Pt voicing no complaints/or needs. Speech is clear.

## 2018-09-18 NOTE — ED PROVIDER NOTES
EMERGENCY DEPARTMENT HISTORY AND PHYSICAL EXAM 
 
Date: 9/17/2018 Patient Name: Stefano To History of Presenting Illness Chief Complaint Patient presents with  Lip Swelling History Provided By: Patient Chief Complaint: Lip swelling Duration: Few Hours Timing:  Acute Location: Lower lip Quality: Swelling Severity: Mild Modifying Factors: No modifying factors Associated Symptoms:  trouble swallowing, SOB Additional History (Context):  
11:31 PM 
Stefano To is a 72 y.o. female with PMHX of CAD, HTN, DM, GERD, who presents to the emergency department C/O lip swelling onset few hours ago. Associated sxs include trouble swallowing, SOB. Pt states that this has happened before and went to the ED. Pt states that she was told it was caused by Lisinopril. Pt denies fever, chills, and any other sxs or complaints. PCP: Callie Valencia MD 
 
Current Outpatient Prescriptions Medication Sig Dispense Refill  predniSONE (DELTASONE) 20 mg tablet Take 3 Tabs by mouth daily for 3 days. With Breakfast 9 Tab 0  
 diazePAM (VALIUM) 5 mg tablet Take 1 Tab by mouth every eight (8) hours as needed. Max Daily Amount: 15 mg. Indications: Muscle Spasm 20 Tab 0  
 pantoprazole (PROTONIX) 40 mg tablet Take 2 Tabs by mouth daily. 60 Tab 0  
 famotidine (PEPCID AC) 20 mg tablet Take 1 Tab by mouth two (2) times a day. 30 Tab 0  
 MAG&AL/SIM/DIPHENHYD/LIDOCAINE (MAGIC MOUTHWASH, NO SUCRALFATE,) mwsh oral suspension (compounded) Take 5 mL by mouth Before breakfast, lunch, and dinner. 90 mL 0  
 hydrOXYzine HCl (ATARAX) 25 mg tablet Take 1-2 tablets by mouth every 6 hours as needed. Indications: Pruritus of Skin 30 Tab 0  
 guaiFENesin-codeine (ROBITUSSIN AC) 100-10 mg/5 mL solution Take 5 mL by mouth three (3) times daily as needed for Cough. Max Daily Amount: 15 mL. 118 mL 0  
 chlorthalidone (HYGROTEN) 25 mg tablet Take  by mouth daily.  colchicine 0.6 mg tablet Take 0.6 mg by mouth daily.  clopidogrel (PLAVIX) 75 mg tablet Take 75 mg by mouth daily.  simvastatin (ZOCOR) 40 mg tablet Take 40 mg by mouth nightly.  traZODone (DESYREL) 50 mg tablet Take 50 mg by mouth nightly.  atenolol (TENORMIN) 50 mg tablet Take 50 mg by mouth daily.  metformin 500 mg TG24 Take 500 mg by mouth daily (with breakfast). Past History Past Medical History: 
Past Medical History:  
Diagnosis Date  CAD (coronary artery disease)  Diabetes ([address]  Gastrointestinal disorder  Hypertension  Reflux Past Surgical History: 
Past Surgical History:  
Procedure Laterality Date  CARDIAC SURG PROCEDURE UNLIST  VASCULAR SURGERY PROCEDURE UNLIST Family History: No family history on file. Social History: 
Social History Substance Use Topics  Smoking status: Never Smoker  Smokeless tobacco: Never Used  Alcohol use No  
 
 
Allergies: Allergies Allergen Reactions  Lisinopril Swelling  Pcn [Penicillins] Angioedema Review of Systems Review of Systems Constitutional: Negative for chills and fever. HENT: Positive for trouble swallowing.   
     (+) lower lip swelling Respiratory: Positive for shortness of breath. All other systems reviewed and are negative. Physical Exam  
 
Vitals:  
 09/17/18 2335 09/17/18 2335 09/17/18 2340 09/18/18 0000 BP: (!) 166/101 172/87 172/87 Pulse:  71 Resp:  18 Temp:  98.3 °F (36.8 °C) SpO2: 98% 98% 96% 98% Weight:  120.2 kg (265 lb) Height:  5' 4\" (1.626 m) Physical Exam 
Constitutional: Elderly female, no acute distress Head: Normocephalic, Atraumatic Eyes: Pupils are equal, round, and reactive to light, EOMI, no scleral icterus, no conjunctival pallor ENT: Left lower lip swelling, no pharyngeal edema, moist mucous membranes, hearing intact, left Neck: Supple, non-tender Cardiovascular: Regular rate and rhythm, no murmurs, rubs, or gallops Chest: Normal work of breathing and chest excursion bilaterally Lungs: Clear to ausculation bilaterally, no wheezes, no rhonchi Abdomen: Soft, non tender, non distended, normoactive bowel sounds Back: No evidence of trauma or deformity Extremities: No evidence of trauma or deformity, no LE edema Skin: Warm and dry, normal cap refill Neuro: Alert and appropriate, CN intact, normal speech, moving all 4 extremities freely and symmetrically Psychiatric: Cooperative, appropriate mood Diagnostic Study Results Labs - No results found for this or any previous visit (from the past 12 hour(s)). Radiologic Studies - No orders to display CT Results  (Last 48 hours) None CXR Results  (Last 48 hours) None Medications given in the ED- Medications  
famotidine (PF) (PEPCID) injection 20 mg (20 mg IntraVENous Given 9/17/18 2357) methylPREDNISolone (PF) (SOLU-MEDROL) injection 125 mg (125 mg IntraVENous Given 9/17/18 2357) diphenhydrAMINE (BENADRYL) injection 25 mg (25 mg IntraVENous Given 9/17/18 2357) Medical Decision Making I am the first provider for this patient. I reviewed the vital signs, available nursing notes, past medical history, past surgical history, family history and social history. Vital Signs-Reviewed the patient's vital signs. Pulse Oximetry Analysis - 98% on RA Records Reviewed: Nursing Notes and Old Medical Records Provider Notes (Medical Decision Making):  
73 y/o female hx of HTN prior angioedema with Lisinopril and Penicillin who presents with left lower lip swelling appears in no respiratory distress, clear lung sounds, with no pharyngeal edema. Will treat with Solu-medrol, Pepcid, and Benadryl and observe in ED for progression of sxs. Procedures: 
Procedures ED Course:  
11:31 PM Initial assessment performed.  The patients presenting problems have been discussed, and they are in agreement with the care plan formulated and outlined with them. I have encouraged them to ask questions as they arise throughout their visit. 1:15 AM On reassessment patient continues to be well appearing and in no respiratory distress. Able to tolerate PO without difficulty or drooling. Discharge with short course of Prednisone. Diagnosis and Disposition DISCHARGE NOTE: 
1:20 AM 
Perla Gonsales's  results have been reviewed with her. She has been counseled regarding her diagnosis, treatment, and plan. She verbally conveys understanding and agreement of the signs, symptoms, diagnosis, treatment and prognosis and additionally agrees to follow up as discussed. She also agrees with the care-plan and conveys that all of her questions have been answered. I have also provided discharge instructions for her that include: educational information regarding their diagnosis and treatment, and list of reasons why they would want to return to the ED prior to their follow-up appointment, should her condition change. She has been provided with education for proper emergency department utilization. CLINICAL IMPRESSION: 
 
1. Lip swelling 2. Allergic reaction, initial encounter PLAN: 
1. D/C Home 2. Current Discharge Medication List  
  
START taking these medications Details  
predniSONE (DELTASONE) 20 mg tablet Take 3 Tabs by mouth daily for 3 days. With Breakfast 
Qty: 9 Tab, Refills: 0  
  
  
 
3. Follow-up Information Follow up With Details Comments Contact Info Erika Amanda MD Schedule an appointment as soon as possible for a visit in 2 days For primary care follow up [address] [place] [address] [facility] 
[phone] [facility] EMERGENCY DEPT Go to As needed, if symptoms worsen [address] [address] 
[phone]  
  
 
_______________________________ Attestations: This note is prepared by [facility], acting as Scribe for General Rigo DO. General Rigo DO:  The scribe's documentation has been prepared under my direction and personally reviewed by me in its entirety. I confirm that the note above accurately reflects all work, treatment, procedures, and medical decision making performed by me. 
_______________________________

## 2018-09-18 NOTE — ED NOTES
PIV access obtained. See MAR for the adm of meds to aide sxms. Pt has the continuous pulseox intact. NAD observed. Pt voicing no complaints. Lights dimmed to aide comfort. Sheet provided- offered blanket, as pt deferred.

## 2018-09-18 NOTE — ED NOTES
The swelling of her lower lip has lessen. Pt denying SOB, managing her own secretions well, tolerated drinking ice water. Pt being d/c home. D/C instructions/RX reviewed with pt/understanding acknowledged by pt. Pt d/c to [facility] via [facility]- pt calling her son for ride home.

## 2018-09-18 NOTE — DISCHARGE INSTRUCTIONS
Allergic Reaction: Care Instructions  Your Care Instructions    An allergic reaction is an excessive response from your immune system to a medicine, chemical, food, insect bite, or other substance. A reaction can range from mild to life-threatening. Some people have a mild rash, hives, and itching or stomach cramps. In severe reactions, swelling of your tongue and throat can close up your airway so that you cannot breathe. Follow-up care is a key part of your treatment and safety. Be sure to make and go to all appointments, and call your doctor if you are having problems. It's also a good idea to know your test results and keep a list of the medicines you take. How can you care for yourself at home? · If you know what caused your allergic reaction, be sure to avoid it. Your allergy may become more severe each time you have a reaction. · Take an over-the-counter antihistamine, such as cetirizine (Zyrtec) or loratadine (Claritin), to treat mild symptoms. Read and follow directions on the label. Some antihistamines can make you feel sleepy. Do not give antihistamines to a child unless you have checked with your doctor first. Mild symptoms include sneezing or an itchy or runny nose; an itchy mouth; a few hives or mild itching; and mild nausea or stomach discomfort. · Do not scratch hives or a rash. Put a cold, moist towel on them or take cool baths to relieve itching. Put ice packs on hives, swelling, or insect stings for 10 to 15 minutes at a time. Put a thin cloth between the ice pack and your skin. Do not take hot baths or showers. They will make the itching worse. · Your doctor may prescribe a shot of epinephrine to carry with you in case you have a severe reaction. Learn how to give yourself the shot and keep it with you at all times. Make sure it is not . · Go to the emergency room every time you have a severe reaction, even if you have used your shot of epinephrine and are feeling better. Symptoms can come back after a shot. · Wear medical alert jewelry that lists your allergies. You can buy this at most drugstores. · If your child has a severe allergy, make sure that his or her teachers, babysitters, coaches, and other caregivers know about the allergy. They should have an epinephrine shot, know how and when to give it, and have a plan to take your child to the hospital.  When should you call for help? Give an epinephrine shot if:    · You think you are having a severe allergic reaction.     · You have symptoms in more than one body area, such as mild nausea and an itchy mouth.    After giving an epinephrine shot call 911, even if you feel better.   Call 911 if:    · You have symptoms of a severe allergic reaction. These may include:  ¨ Sudden raised, red areas (hives) all over your body. ¨ Swelling of the throat, mouth, lips, or tongue. ¨ Trouble breathing. ¨ Passing out (losing consciousness). Or you may feel very lightheaded or suddenly feel weak, confused, or restless.     · You have been given an epinephrine shot, even if you feel better.    Call your doctor now or seek immediate medical care if:    · You have symptoms of an allergic reaction, such as:  ¨ A rash or hives (raised, red areas on the skin). ¨ Itching. ¨ Swelling. ¨ Belly pain, nausea, or vomiting.    Watch closely for changes in your health, and be sure to contact your doctor if:    · You do not get better as expected. Where can you learn more? Go to http://ebony-marci.info/. Enter V704 in the search box to learn more about \"Allergic Reaction: Care Instructions. \"  Current as of: October 6, 2017  Content Version: 11.7  © 4588-2792 [employer]. Care instructions adapted under license by [employer] (which disclaims liability or warranty for this information).  If you have questions about a medical condition or this instruction, always ask your healthcare professional. [employer] disclaims any warranty or liability for your use of this information.

## 2018-09-19 LAB
HIV 1+2 AB+HIV1 P24 AG SERPL QL IA: NONREACTIVE
HIV12 RESULT COMMENT, HHIVC: NORMAL

## 2018-12-03 ENCOUNTER — APPOINTMENT (OUTPATIENT)
Dept: GENERAL RADIOLOGY | Age: 73
DRG: 300 | End: 2018-12-03
Attending: EMERGENCY MEDICINE
Payer: MEDICARE

## 2018-12-03 ENCOUNTER — APPOINTMENT (OUTPATIENT)
Dept: CT IMAGING | Age: 73
DRG: 300 | End: 2018-12-03
Attending: EMERGENCY MEDICINE
Payer: MEDICARE

## 2018-12-03 ENCOUNTER — APPOINTMENT (OUTPATIENT)
Dept: VASCULAR SURGERY | Age: 73
DRG: 300 | End: 2018-12-03
Attending: EMERGENCY MEDICINE
Payer: MEDICARE

## 2018-12-03 ENCOUNTER — HOSPITAL ENCOUNTER (INPATIENT)
Age: 73
LOS: 3 days | Discharge: HOME | DRG: 300 | End: 2018-12-06
Attending: EMERGENCY MEDICINE | Admitting: HOSPITALIST
Payer: MEDICARE

## 2018-12-03 DIAGNOSIS — R07.9 ACUTE CHEST PAIN: Primary | ICD-10-CM

## 2018-12-03 DIAGNOSIS — I82.402 ACUTE DEEP VEIN THROMBOSIS (DVT) OF LEFT LOWER EXTREMITY, UNSPECIFIED VEIN (HCC): ICD-10-CM

## 2018-12-03 PROBLEM — I10 HYPERTENSION: Status: ACTIVE | Noted: 2018-12-03

## 2018-12-03 PROBLEM — I82.442 ACUTE DVT OF LEFT TIBIAL VEIN (HCC): Status: ACTIVE | Noted: 2018-12-03

## 2018-12-03 PROBLEM — M10.9 GOUT: Status: ACTIVE | Noted: 2018-12-03

## 2018-12-03 PROBLEM — E66.01 MORBID OBESITY WITH BMI OF 40.0-44.9, ADULT (HCC): Status: ACTIVE | Noted: 2018-12-03

## 2018-12-03 PROBLEM — K92.9 GASTROINTESTINAL DISORDER: Status: ACTIVE | Noted: 2018-12-03

## 2018-12-03 PROBLEM — E11.9 DIABETES (HCC): Status: ACTIVE | Noted: 2018-12-03

## 2018-12-03 PROBLEM — I25.10 CAD (CORONARY ARTERY DISEASE): Status: ACTIVE | Noted: 2018-12-03

## 2018-12-03 LAB
ALBUMIN SERPL-MCNC: 3 G/DL (ref 3.4–5)
ALBUMIN/GLOB SERPL: 0.7 {RATIO} (ref 0.8–1.7)
ALP SERPL-CCNC: 94 U/L (ref 45–117)
ALT SERPL-CCNC: 28 U/L (ref 13–56)
ANION GAP SERPL CALC-SCNC: 9 MMOL/L (ref 3–18)
APTT PPP: 26.8 SEC (ref 23–36.4)
APTT PPP: >180 SEC (ref 23–36.4)
AST SERPL-CCNC: 22 U/L (ref 15–37)
BASOPHILS # BLD: 0 K/UL (ref 0–0.1)
BASOPHILS NFR BLD: 0 % (ref 0–2)
BILIRUB SERPL-MCNC: 0.4 MG/DL (ref 0.2–1)
BNP SERPL-MCNC: 116 PG/ML (ref 0–900)
BUN SERPL-MCNC: 16 MG/DL (ref 7–18)
BUN/CREAT SERPL: 18
CALCIUM SERPL-MCNC: 8.6 MG/DL (ref 8.5–10.1)
CHLORIDE SERPL-SCNC: 102 MMOL/L (ref 100–108)
CK MB CFR SERPL CALC: 1.6 % (ref 0–4)
CK MB CFR SERPL CALC: 1.7 % (ref 0–4)
CK MB SERPL-MCNC: 6.5 NG/ML (ref 5–25)
CK MB SERPL-MCNC: 6.8 NG/ML (ref 5–25)
CK MB SERPL-MCNC: 7 NG/ML (ref 5–25)
CK MB SERPL-MCNC: 7.4 NG/ML (ref 5–25)
CK SERPL-CCNC: 376 U/L (ref 26–192)
CK SERPL-CCNC: 410 U/L (ref 26–192)
CK SERPL-CCNC: 428 U/L (ref 26–192)
CK SERPL-CCNC: 430 U/L (ref 26–192)
CO2 SERPL-SCNC: 31 MMOL/L (ref 21–32)
CREAT SERPL-MCNC: 0.88 MG/DL (ref 0.6–1.3)
D DIMER PPP FEU-MCNC: 1.15 UG/ML(FEU)
DIFFERENTIAL METHOD BLD: NORMAL
EOSINOPHIL # BLD: 0.2 K/UL (ref 0–0.4)
EOSINOPHIL NFR BLD: 2 % (ref 0–5)
ERYTHROCYTE [DISTWIDTH] IN BLOOD BY AUTOMATED COUNT: 12.8 % (ref 11.6–14.5)
EST. AVERAGE GLUCOSE BLD GHB EST-MCNC: 183 MG/DL
GLOBULIN SER CALC-MCNC: 4.6 G/DL (ref 2–4)
GLUCOSE BLD STRIP.AUTO-MCNC: 117 MG/DL (ref 70–110)
GLUCOSE BLD STRIP.AUTO-MCNC: 166 MG/DL (ref 70–110)
GLUCOSE BLD STRIP.AUTO-MCNC: 181 MG/DL (ref 70–110)
GLUCOSE SERPL-MCNC: 134 MG/DL (ref 74–99)
HBA1C MFR BLD: 8 % (ref 4.2–5.6)
HCT VFR BLD AUTO: 39.2 % (ref 35–45)
HGB BLD-MCNC: 12.8 G/DL (ref 12–16)
LIPASE SERPL-CCNC: 63 U/L (ref 73–393)
LYMPHOCYTES # BLD: 3.1 K/UL (ref 0.9–3.6)
LYMPHOCYTES NFR BLD: 41 % (ref 21–52)
MAGNESIUM SERPL-MCNC: 1.7 MG/DL (ref 1.6–2.6)
MCH RBC QN AUTO: 28.6 PG (ref 24–34)
MCHC RBC AUTO-ENTMCNC: 32.7 G/DL (ref 31–37)
MCV RBC AUTO: 87.7 FL (ref 74–97)
MONOCYTES # BLD: 0.5 K/UL (ref 0.05–1.2)
MONOCYTES NFR BLD: 6 % (ref 3–10)
NEUTS SEG # BLD: 3.9 K/UL (ref 1.8–8)
NEUTS SEG NFR BLD: 51 % (ref 40–73)
PLATELET # BLD AUTO: 230 K/UL (ref 135–420)
PMV BLD AUTO: 9.5 FL (ref 9.2–11.8)
POTASSIUM SERPL-SCNC: 3.5 MMOL/L (ref 3.5–5.5)
PROT SERPL-MCNC: 7.6 G/DL (ref 6.4–8.2)
RBC # BLD AUTO: 4.47 M/UL (ref 4.2–5.3)
SODIUM SERPL-SCNC: 142 MMOL/L (ref 136–145)
TROPONIN I SERPL-MCNC: 0.02 NG/ML (ref 0–0.04)
TROPONIN I SERPL-MCNC: <0.02 NG/ML (ref 0–0.04)
WBC # BLD AUTO: 7.7 K/UL (ref 4.6–13.2)

## 2018-12-03 PROCEDURE — 74011250636 HC RX REV CODE- 250/636: Performed by: EMERGENCY MEDICINE

## 2018-12-03 PROCEDURE — 74011000250 HC RX REV CODE- 250: Performed by: HOSPITALIST

## 2018-12-03 PROCEDURE — 80053 COMPREHEN METABOLIC PANEL: CPT

## 2018-12-03 PROCEDURE — 74011250636 HC RX REV CODE- 250/636: Performed by: HOSPITALIST

## 2018-12-03 PROCEDURE — 83690 ASSAY OF LIPASE: CPT

## 2018-12-03 PROCEDURE — 71045 X-RAY EXAM CHEST 1 VIEW: CPT

## 2018-12-03 PROCEDURE — 83735 ASSAY OF MAGNESIUM: CPT

## 2018-12-03 PROCEDURE — 74011636637 HC RX REV CODE- 636/637: Performed by: HOSPITALIST

## 2018-12-03 PROCEDURE — 71275 CT ANGIOGRAPHY CHEST: CPT

## 2018-12-03 PROCEDURE — 74011250637 HC RX REV CODE- 250/637: Performed by: EMERGENCY MEDICINE

## 2018-12-03 PROCEDURE — 93005 ELECTROCARDIOGRAM TRACING: CPT

## 2018-12-03 PROCEDURE — 74011636320 HC RX REV CODE- 636/320: Performed by: EMERGENCY MEDICINE

## 2018-12-03 PROCEDURE — 85025 COMPLETE CBC W/AUTO DIFF WBC: CPT

## 2018-12-03 PROCEDURE — 82553 CREATINE MB FRACTION: CPT

## 2018-12-03 PROCEDURE — 96375 TX/PRO/DX INJ NEW DRUG ADDON: CPT

## 2018-12-03 PROCEDURE — 82962 GLUCOSE BLOOD TEST: CPT

## 2018-12-03 PROCEDURE — 94660 CPAP INITIATION&MGMT: CPT

## 2018-12-03 PROCEDURE — 93971 EXTREMITY STUDY: CPT

## 2018-12-03 PROCEDURE — C9113 INJ PANTOPRAZOLE SODIUM, VIA: HCPCS | Performed by: HOSPITALIST

## 2018-12-03 PROCEDURE — 99285 EMERGENCY DEPT VISIT HI MDM: CPT

## 2018-12-03 PROCEDURE — 74011250637 HC RX REV CODE- 250/637: Performed by: HOSPITALIST

## 2018-12-03 PROCEDURE — 85379 FIBRIN DEGRADATION QUANT: CPT

## 2018-12-03 PROCEDURE — 83880 ASSAY OF NATRIURETIC PEPTIDE: CPT

## 2018-12-03 PROCEDURE — 85730 THROMBOPLASTIN TIME PARTIAL: CPT

## 2018-12-03 PROCEDURE — 83036 HEMOGLOBIN GLYCOSYLATED A1C: CPT

## 2018-12-03 PROCEDURE — 96365 THER/PROPH/DIAG IV INF INIT: CPT

## 2018-12-03 PROCEDURE — 36415 COLL VENOUS BLD VENIPUNCTURE: CPT

## 2018-12-03 PROCEDURE — 65660000000 HC RM CCU STEPDOWN

## 2018-12-03 RX ORDER — GABAPENTIN 300 MG/1
300 CAPSULE ORAL 3 TIMES DAILY
Status: DISCONTINUED | OUTPATIENT
Start: 2018-12-03 | End: 2018-12-06 | Stop reason: HOSPADM

## 2018-12-03 RX ORDER — HEPARIN SODIUM 1000 [USP'U]/ML
80 INJECTION, SOLUTION INTRAVENOUS; SUBCUTANEOUS ONCE
Status: COMPLETED | OUTPATIENT
Start: 2018-12-03 | End: 2018-12-03

## 2018-12-03 RX ORDER — METFORMIN HYDROCHLORIDE 500 MG/1
500 TABLET ORAL 2 TIMES DAILY WITH MEALS
COMMUNITY
End: 2021-11-06

## 2018-12-03 RX ORDER — NALOXONE HYDROCHLORIDE 0.4 MG/ML
0.4 INJECTION, SOLUTION INTRAMUSCULAR; INTRAVENOUS; SUBCUTANEOUS AS NEEDED
Status: DISCONTINUED | OUTPATIENT
Start: 2018-12-03 | End: 2018-12-06 | Stop reason: HOSPADM

## 2018-12-03 RX ORDER — DEXTROSE 50 % IN WATER (D50W) INTRAVENOUS SYRINGE
25-50 AS NEEDED
Status: DISCONTINUED | OUTPATIENT
Start: 2018-12-03 | End: 2018-12-06 | Stop reason: HOSPADM

## 2018-12-03 RX ORDER — CHOLECALCIFEROL (VITAMIN D3) 125 MCG
2000 CAPSULE ORAL DAILY
Status: ON HOLD | COMMUNITY
End: 2018-12-15

## 2018-12-03 RX ORDER — NITROGLYCERIN 20 MG/1
1 PATCH TRANSDERMAL EVERY 24 HOURS
Status: DISCONTINUED | OUTPATIENT
Start: 2018-12-03 | End: 2018-12-05

## 2018-12-03 RX ORDER — TRAZODONE HYDROCHLORIDE 50 MG/1
50 TABLET ORAL
COMMUNITY
End: 2020-09-01

## 2018-12-03 RX ORDER — GABAPENTIN 300 MG/1
300 CAPSULE ORAL 3 TIMES DAILY
COMMUNITY
End: 2021-11-06

## 2018-12-03 RX ORDER — INSULIN GLARGINE 100 [IU]/ML
45 INJECTION, SOLUTION SUBCUTANEOUS
COMMUNITY
End: 2020-09-01

## 2018-12-03 RX ORDER — MAGNESIUM SULFATE 100 %
4 CRYSTALS MISCELLANEOUS AS NEEDED
Status: DISCONTINUED | OUTPATIENT
Start: 2018-12-03 | End: 2018-12-06 | Stop reason: HOSPADM

## 2018-12-03 RX ORDER — SIMVASTATIN 10 MG/1
10 TABLET, FILM COATED ORAL
COMMUNITY
End: 2020-09-01

## 2018-12-03 RX ORDER — TRAZODONE HYDROCHLORIDE 50 MG/1
50 TABLET ORAL
Status: DISCONTINUED | OUTPATIENT
Start: 2018-12-03 | End: 2018-12-06 | Stop reason: HOSPADM

## 2018-12-03 RX ORDER — DIPHENHYDRAMINE HYDROCHLORIDE 50 MG/ML
12.5 INJECTION, SOLUTION INTRAMUSCULAR; INTRAVENOUS
Status: DISCONTINUED | OUTPATIENT
Start: 2018-12-03 | End: 2018-12-06 | Stop reason: HOSPADM

## 2018-12-03 RX ORDER — ACETAMINOPHEN 325 MG/1
650 TABLET ORAL
Status: DISCONTINUED | OUTPATIENT
Start: 2018-12-03 | End: 2018-12-06 | Stop reason: HOSPADM

## 2018-12-03 RX ORDER — NITROGLYCERIN 0.4 MG/1
0.4 TABLET SUBLINGUAL AS NEEDED
Status: DISCONTINUED | OUTPATIENT
Start: 2018-12-03 | End: 2018-12-06 | Stop reason: HOSPADM

## 2018-12-03 RX ORDER — ONDANSETRON 2 MG/ML
4 INJECTION INTRAMUSCULAR; INTRAVENOUS
Status: DISCONTINUED | OUTPATIENT
Start: 2018-12-03 | End: 2018-12-06 | Stop reason: HOSPADM

## 2018-12-03 RX ORDER — HEPARIN SODIUM 10000 [USP'U]/100ML
18-36 INJECTION, SOLUTION INTRAVENOUS
Status: DISCONTINUED | OUTPATIENT
Start: 2018-12-03 | End: 2018-12-05

## 2018-12-03 RX ORDER — NITROGLYCERIN 20 MG/1
1 PATCH TRANSDERMAL DAILY
Status: DISCONTINUED | OUTPATIENT
Start: 2018-12-04 | End: 2018-12-03

## 2018-12-03 RX ORDER — SIMVASTATIN 10 MG/1
10 TABLET, FILM COATED ORAL
Status: DISCONTINUED | OUTPATIENT
Start: 2018-12-03 | End: 2018-12-06 | Stop reason: HOSPADM

## 2018-12-03 RX ORDER — ALLOPURINOL 100 MG/1
100 TABLET ORAL 2 TIMES DAILY
Status: DISCONTINUED | OUTPATIENT
Start: 2018-12-03 | End: 2018-12-06 | Stop reason: HOSPADM

## 2018-12-03 RX ORDER — DIFLUPREDNATE 0.5 MG/ML
1 EMULSION OPHTHALMIC 4 TIMES DAILY
COMMUNITY
End: 2021-07-29

## 2018-12-03 RX ORDER — ACETAMINOPHEN 10 MG/ML
1000 INJECTION, SOLUTION INTRAVENOUS ONCE
Status: COMPLETED | OUTPATIENT
Start: 2018-12-03 | End: 2018-12-03

## 2018-12-03 RX ORDER — INSULIN LISPRO 100 [IU]/ML
INJECTION, SOLUTION INTRAVENOUS; SUBCUTANEOUS
Status: DISCONTINUED | OUTPATIENT
Start: 2018-12-03 | End: 2018-12-06 | Stop reason: HOSPADM

## 2018-12-03 RX ORDER — ALLOPURINOL 100 MG/1
100 TABLET ORAL 2 TIMES DAILY
COMMUNITY

## 2018-12-03 RX ORDER — KETOROLAC TROMETHAMINE 30 MG/ML
15 INJECTION, SOLUTION INTRAMUSCULAR; INTRAVENOUS
Status: DISCONTINUED | OUTPATIENT
Start: 2018-12-03 | End: 2018-12-03

## 2018-12-03 RX ORDER — INSULIN GLARGINE 100 [IU]/ML
35 INJECTION, SOLUTION SUBCUTANEOUS DAILY
Status: DISCONTINUED | OUTPATIENT
Start: 2018-12-04 | End: 2018-12-06 | Stop reason: HOSPADM

## 2018-12-03 RX ORDER — ERGOCALCIFEROL 1.25 MG/1
50000 CAPSULE ORAL
COMMUNITY
End: 2021-07-29

## 2018-12-03 RX ORDER — ATENOLOL 50 MG/1
100 TABLET ORAL DAILY
Status: DISCONTINUED | OUTPATIENT
Start: 2018-12-04 | End: 2018-12-06 | Stop reason: HOSPADM

## 2018-12-03 RX ORDER — GUAIFENESIN 100 MG/5ML
324 LIQUID (ML) ORAL
Status: COMPLETED | OUTPATIENT
Start: 2018-12-03 | End: 2018-12-03

## 2018-12-03 RX ORDER — MORPHINE SULFATE 2 MG/ML
1 INJECTION, SOLUTION INTRAMUSCULAR; INTRAVENOUS
Status: DISCONTINUED | OUTPATIENT
Start: 2018-12-03 | End: 2018-12-06 | Stop reason: HOSPADM

## 2018-12-03 RX ORDER — ATENOLOL 100 MG/1
100 TABLET ORAL DAILY
COMMUNITY
End: 2020-09-01

## 2018-12-03 RX ORDER — MORPHINE SULFATE 2 MG/ML
4 INJECTION, SOLUTION INTRAMUSCULAR; INTRAVENOUS
Status: COMPLETED | OUTPATIENT
Start: 2018-12-03 | End: 2018-12-03

## 2018-12-03 RX ORDER — DIFLUPREDNATE 0.5 MG/ML
1 EMULSION OPHTHALMIC 4 TIMES DAILY
Status: DISCONTINUED | OUTPATIENT
Start: 2018-12-03 | End: 2018-12-06 | Stop reason: HOSPADM

## 2018-12-03 RX ADMIN — INSULIN LISPRO 2 UNITS: 100 INJECTION, SOLUTION INTRAVENOUS; SUBCUTANEOUS at 22:10

## 2018-12-03 RX ADMIN — HEPARIN SODIUM 15 UNITS/KG/HR: 10000 INJECTION, SOLUTION INTRAVENOUS at 22:18

## 2018-12-03 RX ADMIN — MORPHINE SULFATE 4 MG: 2 INJECTION, SOLUTION INTRAMUSCULAR; INTRAVENOUS at 09:35

## 2018-12-03 RX ADMIN — INSULIN LISPRO 2 UNITS: 100 INJECTION, SOLUTION INTRAVENOUS; SUBCUTANEOUS at 16:30

## 2018-12-03 RX ADMIN — HEPARIN SODIUM 9620 UNITS: 1000 INJECTION INTRAVENOUS; SUBCUTANEOUS at 10:46

## 2018-12-03 RX ADMIN — HEPARIN SODIUM 18 UNITS/KG/HR: 10000 INJECTION, SOLUTION INTRAVENOUS at 10:49

## 2018-12-03 RX ADMIN — ASPIRIN 81 MG 324 MG: 81 TABLET ORAL at 09:34

## 2018-12-03 RX ADMIN — GABAPENTIN 300 MG: 300 CAPSULE ORAL at 19:21

## 2018-12-03 RX ADMIN — IOPAMIDOL 100 ML: 755 INJECTION, SOLUTION INTRAVENOUS at 06:48

## 2018-12-03 RX ADMIN — TRAZODONE HYDROCHLORIDE 50 MG: 50 TABLET ORAL at 22:10

## 2018-12-03 RX ADMIN — HEPARIN SODIUM 15 UNITS/KG/HR: 10000 INJECTION, SOLUTION INTRAVENOUS at 19:25

## 2018-12-03 RX ADMIN — ACETAMINOPHEN 1000 MG: 10 INJECTION, SOLUTION INTRAVENOUS at 07:09

## 2018-12-03 RX ADMIN — SIMVASTATIN 10 MG: 10 TABLET, FILM COATED ORAL at 22:10

## 2018-12-03 RX ADMIN — ALLOPURINOL 100 MG: 100 TABLET ORAL at 22:10

## 2018-12-03 RX ADMIN — LIDOCAINE HYDROCHLORIDE: 20 SOLUTION ORAL; TOPICAL at 19:29

## 2018-12-03 RX ADMIN — MORPHINE SULFATE 1 MG: 2 INJECTION, SOLUTION INTRAMUSCULAR; INTRAVENOUS at 14:28

## 2018-12-03 RX ADMIN — PANTOPRAZOLE SODIUM 40 MG: 40 INJECTION, POWDER, FOR SOLUTION INTRAVENOUS at 14:27

## 2018-12-03 RX ADMIN — MORPHINE SULFATE 1 MG: 2 INJECTION, SOLUTION INTRAMUSCULAR; INTRAVENOUS at 19:22

## 2018-12-03 RX ADMIN — LIDOCAINE HYDROCHLORIDE 40 ML: 20 SOLUTION ORAL; TOPICAL at 14:30

## 2018-12-03 RX ADMIN — GABAPENTIN 300 MG: 300 CAPSULE ORAL at 22:10

## 2018-12-03 NOTE — PROGRESS NOTES
Admission Medication Reconciliation has been performed on this ED patient consisting of interview of the patient regarding their PTA Home Medication List, Allergies and PMH as well as obtaining outpatient pharmacy information. Chief complaint:  
Chief Complaint Patient presents with  Back Pain  Ankle swelling  
  left Pt has PMH of  
Past Medical History:  
Diagnosis Date  CAD (coronary artery disease)  Diabetes ([facility] [address]  Gastrointestinal disorder  Hypertension  MI (myocardial infarction) ([facility] [address]  Reflux Interviewed patient who was very interactive but a very poor historian. Patient did not provide a written list of home medications. Medications are managed by: self Patient's outpatient pharmacy is [facility] Added gout to [facility] list as the patient's PTA Med List contains medication for this indication. PAML was not marked complete and did  require modification. Admission orders have not already been written. Medication Compliance Issues and/or Medication Concerns:  
Pt is a very poor historian. There is nobody at home who can relay RX vial info to me. Obtained current RX list from [facility] [employer]. Discussed with pt the importance of and strategies they could employ to maintain a current and readily available home medication list . Pt states she has been taking an OTC vit D supplement in addition to weekly RX vit D, she states her doctor is unaware. Pt states she hasn't taken her Plavix for over a month because \"somebody\" told her she can't take it with her gout meds. I recommended that she not stop nor start any meds without checking with her doctor first. 
 
         
 
 
Lakshmi Roper Clinical Pharmacist 
[phone]

## 2018-12-03 NOTE — CONSULTS
[facility] Consult Note      Patient: Nevin Melgoza MRN: [id number]  SSN: [id number]    YOB: 1945  Age: 72 y.o. Sex: female    Date of Consultation: 12/3/2018  Referring Physician: Dr. Karlie Briceno  Reason for Consultation: chest pain:    HPI:  I was asked by ER physician Dr. Ashtyn Almazan to see this pleasant patient. Nevin Melgoza is a 72years old fem aly with past medical history of CAD and PCI to left circumflex artery in 2007, HTN, Obesity, Gout, arthritis. patient came to hospital with intermittent chest pain more in right side of chest and also in lower chest and back pain, 5/10, pressure like without any radiation. Denied SOB, palpitation, presyncope, syncope.   Patient underwent CTA chest for PE that was negative for large PE, but positive for left leg DVT, But patient she may have gall bladder disease because her mother was having similar type of symptom  Denied fever, nausea/vomiting        Past Medical History:   Diagnosis Date    CAD (coronary artery disease)     [facility] ([facility] [address]     Gastrointestinal disorder     Gout     Hypertension     MI (myocardial infarction) ([facility] [address]     Reflux      Past Surgical History:   Procedure Laterality Date    CARDIAC SURG PROCEDURE UNLIST      VASCULAR SURGERY PROCEDURE UNLIST       Current Facility-Administered Medications   Medication Dose Route Frequency    heparin 25,000 units in D5W 250 ml infusion  18-36 Units/kg/hr IntraVENous TITRATE    insulin lispro (HUMALOG) injection   SubCUTAneous AC&HS    glucose chewable tablet 16 g  4 Tab Oral PRN    glucagon (GLUCAGEN) injection 1 mg  1 mg IntraMUSCular PRN    dextrose (D50W) injection syrg 12.5-25 g  25-50 mL IntraVENous PRN    acetaminophen (TYLENOL) tablet 650 mg  650 mg Oral Q4H PRN    naloxone (NARCAN) injection 0.4 mg  0.4 mg IntraVENous PRN    diphenhydrAMINE (BENADRYL) injection 12.5 mg  12.5 mg IntraVENous Q4H PRN    ondansetron (ZOFRAN) injection 4 mg  4 mg IntraVENous Q4H PRN    morphine injection 1 mg  1 mg IntraVENous Q2H PRN    nitroglycerin (NITROSTAT) tablet 0.4 mg  0.4 mg SubLINGual PRN    [START ON 12/4/2018] nitroglycerin (NITRODUR) 0.1 mg/hr patch 1 Patch  1 Patch TransDERmal DAILY    pantoprazole (PROTONIX) 40 mg in sodium chloride 0.9% 10 mL injection  40 mg IntraVENous DAILY    [START ON 12/4/2018] atenolol (TENORMIN) tablet 100 mg  100 mg Oral DAILY    Difluprednate (DUREZOL) 0.05 % ophthalmic emulsion 1 Drop (Patient Supplied)  1 Drop Right Eye QID    gabapentin (NEURONTIN) capsule 300 mg  300 mg Oral TID    simvastatin (ZOCOR) tablet 10 mg  10 mg Oral QHS    traZODone (DESYREL) tablet 50 mg  50 mg Oral QHS PRN    allopurinol (ZYLOPRIM) tablet 100 mg  100 mg Oral BID    [START ON 12/4/2018] insulin glargine (LANTUS) injection 35 Units  35 Units SubCUTAneous DAILY       Allergies and Intolerances: Allergies   Allergen Reactions    Lisinopril Swelling    Pcn [Penicillins] Angioedema       Family History:   No family history on file. Social History:   She  reports that  has never smoked. she has never used smokeless tobacco.  She  reports that she does not drink alcohol. Review of Systems:     Review of Systems  Gen: No fever, chills, malaise, weight loss/gain. Heent: No headache, rhinorrhea, epistaxis, ear pain, hearing loss, sinus pain, neck pain/stiffness, sore throat. Heart: + chest pain, palpitations, DEMARCO, pnd, or orthopnea. Resp: No cough, hemoptysis, wheezing and shortness of breath. GI: No nausea, vomiting, diarrhea, constipation, melena or hematochezia. : No urinary obstruction, dysuria or hematuria. Derm: No rash, new skin lesion or pruritis. Musc/skeletal: no bone or + joint complains. Vasc: No edema, cyanosis or claudication. Endo: No heat/cold intolerance, no polyuria,polydipsia or polyphagia. Neuro: No unilateral weakness, numbness, tingling. No seizures. Heme: No easy bruising or bleeding.         Physical:   Patient Vitals for the past 6 hrs:   Temp Pulse Resp BP SpO2   12/03/18 1635 98.5 °F (36.9 °C) 83 17 143/73 98 %   12/03/18 1255 98 °F (36.7 °C) 79 17 140/69 100 %         Exam:   General Appearance: Comfortable, not using accessory muscles of respiration. Reproducible in right and mid chest pain and moderate tenderness. HEENT: JUAN. HEAD: Atraumatic  NECK: No JVD, no thyroidomeglay. CAROTIDS:claer   LUNGS: Clear bilaterally. HEART: S1+S2     ABD: Non-tender, BS Audible    EXT: No edema, and no cysnosis. VASCULAR EXAM: Pulses are intact. PSYCHIATRIC EXAM: Mood is appropriate. MUSCULOSKELETAL: Grossly no joint deformity. NEUROLOGICAL: Motor and sensory sytem intact and Cranial nerves II-XII intact.     Review of Data:   LABS:   Lab Results   Component Value Date/Time    WBC 7.7 12/03/2018 05:10 AM    HGB 12.8 12/03/2018 05:10 AM    HCT 39.2 12/03/2018 05:10 AM    PLATELET 494 48/05/8518 05:10 AM     Lab Results   Component Value Date/Time    Sodium 142 12/03/2018 05:10 AM    Potassium 3.5 12/03/2018 05:10 AM    Chloride 102 12/03/2018 05:10 AM    CO2 31 12/03/2018 05:10 AM    Glucose 134 (H) 12/03/2018 05:10 AM    BUN 16 12/03/2018 05:10 AM    Creatinine 0.88 12/03/2018 05:10 AM     No results found for: CHOL, CHOLX, CHLST, CHOLV, HDL, LDL, LDLC, DLDLP, TGLX, TRIGL, TRIGP  No results found for: GPT  Lab Results   Component Value Date/Time    Hemoglobin A1c 8.0 (H) 12/03/2018 05:10 AM         Cardiology Procedures:   Results for orders placed or performed during the hospital encounter of 12/03/18   EKG, 12 LEAD, INITIAL   Result Value Ref Range    Ventricular Rate 89 BPM    Atrial Rate 89 BPM    P-R Interval 144 ms    QRS Duration 90 ms    Q-T Interval 362 ms    QTC Calculation (Bezet) 440 ms    Calculated P Axis 69 degrees    Calculated R Axis -11 degrees    Calculated T Axis 42 degrees    Diagnosis       Normal sinus rhythm  Minimal voltage criteria for LVH, may be normal variant  Borderline ECG  When compared with ECG of 29-JAN-2018 22:02,  Nonspecific T wave abnormality has replaced inverted T waves in Inferior   leads  Nonspecific T wave abnormality, improved in Lateral leads             Impression / Plan:    Patient Active Problem List   Diagnosis Code    Acute bronchitis J20.9    Leg muscle spasm M62.838    Chest pain R07.9    CAD (coronary artery disease) I25.10    Hypertension I10    Diabetes ([facility] [address] E11.9    Gastrointestinal disorder K92.9    Acute DVT of left tibial vein ([facility]) I82.442    Morbid obesity with BMI of 40.0-44.9, adult ([facility]) E66.01, Z68.41    Gout M10.9       A/P  #1 chest pain  #2 acute DVT of LEFT tibial vein  #3 CAD, status post PCI in 2007  #4 hypertension  #5 obesity    Plan  Patient's symptoms are makes more of open musculoskeletal symptoms but also have risk factors for CAD with known history of  PCI. Frutoso Golder Continue with heparin as per ACS protocol  Will get Pharmacological Lexiscan nuclear stress test  Will get echocardiogram to assess systolic and diastolic function. Continue with atenolol, simvastatin and aspirin. Management plan was discussed wit and she agreed with above treatment.             Signed By: Winnie Rutherford MD     December 3, 2018

## 2018-12-03 NOTE — ED NOTES
Pt returned from ultrasound with complaints of shoulder pain and requesting medication. MD notified.

## 2018-12-03 NOTE — ED PROVIDER NOTES
EMERGENCY DEPARTMENT HISTORY AND PHYSICAL EXAM 
 
Date: 12/3/2018 Patient Name: Logan Samuel History of Presenting Illness Chief Complaint Patient presents with  Back Pain  Ankle swelling  
  left History Provided By: Patient Chief Complaint: Multiple complaints Duration: 2-3 Days Timing:  Constant Severity: N/A Modifying Factors: No modifying factors Associated Symptoms: right shoulder pain, left LE swelling, nausea, back pain Additional History (Context):  
4:44 AM 
Logan Samuel is a 72 y.o. female with PMHx of CAD, HTN, DM, GERD, MI presents to the emergency department with multiple complaints onset 2-3 days . Associated sxs include right shoulder pain, left LE swelling, nausea, back pain, and left ankle pain. Pt's son states that the patient has been having epigastric pain, shoulder pain, and left ankle pain. Pt believes her abd pain is related to her gall bladder. . Pt denies chills, fever, diarrhea, vomiting, and any other sxs or complaints. PCP: Usha Spence MD 
 
Current Facility-Administered Medications Medication Dose Route Frequency Provider Last Rate Last Dose  heparin (porcine) 1,000 unit/mL injection 9,620 Units  80 Units/kg IntraVENous ONCE Ana Paula Gale MD      
 heparin 25,000 units in D5W 250 ml infusion  18-36 Units/kg/hr IntraVENous TITRATE Ana Paula Gale MD      
 
Current Outpatient Medications Medication Sig Dispense Refill  pantoprazole (PROTONIX) 40 mg tablet Take 2 Tabs by mouth daily. 60 Tab 0  chlorthalidone (HYGROTEN) 25 mg tablet Take  by mouth daily.  atenolol (TENORMIN) 50 mg tablet Take 50 mg by mouth daily.  famotidine (PEPCID AC) 20 mg tablet Take 1 Tab by mouth two (2) times a day. 30 Tab 0  
 colchicine 0.6 mg tablet Take 0.6 mg by mouth daily.  clopidogrel (PLAVIX) 75 mg tablet Take 75 mg by mouth daily.  simvastatin (ZOCOR) 40 mg tablet Take 40 mg by mouth nightly.  traZODone (DESYREL) 50 mg tablet Take 50 mg by mouth nightly.  metformin 500 mg TG24 Take 500 mg by mouth daily (with breakfast). Past History Past Medical History: 
Past Medical History:  
Diagnosis Date  CAD (coronary artery disease)  Diabetes ([facility] [address]  Gastrointestinal disorder  Hypertension  MI (myocardial infarction) ([facility] [address]  Reflux Past Surgical History: 
Past Surgical History:  
Procedure Laterality Date  CARDIAC SURG PROCEDURE UNLIST  VASCULAR SURGERY PROCEDURE UNLIST Family History: No family history on file. Social History: 
Social History Tobacco Use  Smoking status: Never Smoker  Smokeless tobacco: Never Used Substance Use Topics  Alcohol use: No  
 Drug use: No  
 
 
Allergies: Allergies Allergen Reactions  Lisinopril Swelling  Pcn [Penicillins] Angioedema Review of Systems Review of Systems Constitutional: Negative for chills and fever. Respiratory: Positive for shortness of breath. Gastrointestinal: Positive for nausea. Negative for diarrhea and vomiting. Musculoskeletal: Positive for arthralgias, back pain and joint swelling. All other systems reviewed and are negative. Physical Exam  
 
Vitals:  
 12/03/18 0430 12/03/18 7649 12/03/18 0615 12/03/18 4392 BP: 168/72 188/87 159/82 146/70 Pulse: 82 77 75 72 Resp: 24 25 25 18 Temp:    97.6 °F (36.4 °C) SpO2: 97% 99% 97% 100% Weight:      
Height:      
 
Physical Exam  
Nursing note and vitals reviewed. GENERAL: Obese , NAD, AOx4, NAD, awake EYES: PERRLA, EOMI, no conjunctival pallor, no hyphema HEAD: NC/AT, no lacerations NECK: No midline tenderness, no step offs,  trachea midline ENT: MMM, no pharyngeal edema, no hemotympanum, hearing intact CV: Distant heart sounds, RRR, +S1/S2, no JVD RESP: Diminished breath sounds secondary to body habitus, no w/r/r, breaths sounds equal and nonlabored, no accessory muscle use CHEST: No crepitus or obvious deformity, no ecchymosis or abrasions ABD: Exam limited secondary to obese habitus but normoactive BS x4 quadrants, non-TTP EXTREMITIES:  +1 pitting edema bilaterally extending up to her ankle, no obvious deformity INTEGUMENTARY: warm, dry, no pallor MSK: normal ROM, normal gross strength NEURO: Alert and appropriate, normal speech,  CNs II-XII intact, moving all 4 extremities freely and symmetrically Diagnostic Study Results Labs - Recent Results (from the past 12 hour(s)) EKG, 12 LEAD, INITIAL Collection Time: 12/03/18  4:32 AM  
Result Value Ref Range Ventricular Rate 89 BPM  
 Atrial Rate 89 BPM  
 P-R Interval 144 ms QRS Duration 90 ms Q-T Interval 362 ms QTC Calculation (Bezet) 440 ms Calculated P Axis 69 degrees Calculated R Axis -11 degrees Calculated T Axis 42 degrees Diagnosis Normal sinus rhythm Minimal voltage criteria for LVH, may be normal variant Borderline ECG When compared with ECG of 29-JAN-2018 22:02, Nonspecific T wave abnormality has replaced inverted T waves in Inferior  
leads Nonspecific T wave abnormality, improved in Lateral leads CBC WITH AUTOMATED DIFF Collection Time: 12/03/18  5:10 AM  
Result Value Ref Range WBC 7.7 4.6 - 13.2 K/uL  
 RBC 4.47 4.20 - 5.30 M/uL  
 HGB 12.8 12.0 - 16.0 g/dL HCT 39.2 35.0 - 45.0 % MCV 87.7 74.0 - 97.0 FL  
 MCH 28.6 24.0 - 34.0 PG  
 MCHC 32.7 31.0 - 37.0 g/dL  
 RDW 12.8 11.6 - 14.5 % PLATELET 704 367 - 535 K/uL MPV 9.5 9.2 - 11.8 FL  
 NEUTROPHILS 51 40 - 73 % LYMPHOCYTES 41 21 - 52 % MONOCYTES 6 3 - 10 % EOSINOPHILS 2 0 - 5 % BASOPHILS 0 0 - 2 %  
 ABS. NEUTROPHILS 3.9 1.8 - 8.0 K/UL  
 ABS. LYMPHOCYTES 3.1 0.9 - 3.6 K/UL  
 ABS. MONOCYTES 0.5 0.05 - 1.2 K/UL  
 ABS. EOSINOPHILS 0.2 0.0 - 0.4 K/UL  
 ABS. BASOPHILS 0.0 0.0 - 0.1 K/UL  
 DF AUTOMATED METABOLIC PANEL, COMPREHENSIVE  Collection Time: 12/03/18  5:10 AM  
 Result Value Ref Range Sodium 142 136 - 145 mmol/L Potassium 3.5 3.5 - 5.5 mmol/L Chloride 102 100 - 108 mmol/L  
 CO2 31 21 - 32 mmol/L Anion gap 9 3.0 - 18 mmol/L Glucose 134 (H) 74 - 99 mg/dL BUN 16 7.0 - 18 MG/DL Creatinine 0.88 0.6 - 1.3 MG/DL  
 BUN/Creatinine ratio 18 GFR est AA >60 >60 ml/min/1.73m2 GFR est non-AA >60 >60 ml/min/1.73m2 Calcium 8.6 8.5 - 10.1 MG/DL Bilirubin, total 0.4 0.2 - 1.0 MG/DL  
 ALT (SGPT) 28 13 - 56 U/L  
 AST (SGOT) 22 15 - 37 U/L Alk. phosphatase 94 45 - 117 U/L Protein, total 7.6 6.4 - 8.2 g/dL Albumin 3.0 (L) 3.4 - 5.0 g/dL Globulin 4.6 (H) 2.0 - 4.0 g/dL A-G Ratio 0.7 (L) 0.8 - 1.7 NT-PRO BNP Collection Time: 12/03/18  5:10 AM  
Result Value Ref Range NT pro- 0 - 900 PG/ML  
CARDIAC PANEL,(CK, CKMB & TROPONIN) Collection Time: 12/03/18  5:10 AM  
Result Value Ref Range  (H) 26 - 192 U/L  
 CK - MB 7.0 (H) <3.6 ng/ml CK-MB Index 1.6 0.0 - 4.0 % Troponin-I, Qt. 0.02 0.0 - 0.045 NG/ML  
LIPASE Collection Time: 12/03/18  5:10 AM  
Result Value Ref Range Lipase 63 (L) 73 - 393 U/L MAGNESIUM Collection Time: 12/03/18  5:10 AM  
Result Value Ref Range Magnesium 1.7 1.6 - 2.6 mg/dL D DIMER Collection Time: 12/03/18  5:10 AM  
Result Value Ref Range D DIMER 1.15 (H) <0.46 ug/ml(FEU) EKG, 12 LEAD, SUBSEQUENT Collection Time: 12/03/18  7:49 AM  
Result Value Ref Range Ventricular Rate 73 BPM  
 Atrial Rate 73 BPM  
 P-R Interval 160 ms QRS Duration 92 ms Q-T Interval 390 ms QTC Calculation (Bezet) 429 ms Calculated P Axis 58 degrees Calculated R Axis -11 degrees Calculated T Axis 24 degrees Diagnosis Normal sinus rhythm Minimal voltage criteria for LVH, may be normal variant Nonspecific T wave abnormality Abnormal ECG When compared with ECG of 03-DEC-2018 04:32, No significant change was found CARDIAC PANEL,(CK, CKMB & TROPONIN) Collection Time: 12/03/18  7:55 AM  
Result Value Ref Range  (H) 26 - 192 U/L  
 CK - MB 6.8 (H) <3.6 ng/ml CK-MB Index 1.7 0.0 - 4.0 % Troponin-I, Qt. <0.02 0.0 - 0.045 NG/ML Radiologic Studies -  
DUPLEX LOWER EXT VENOUS LEFT 
 
· Non-occlusive thrombus present in one of the left posterior tibial veins. · No evidence of deep venous thrombosis in the contralateral common femoral vein. As read by the ultrasound tech CT Results  (Last 48 hours) 12/03/18 1205  CTA [address] [facility] CONT Final result Impression:  IMPRESSION:  
   
Motion degraded study. 1.  No convincing evidence of pulmonary embolism. No large or central PE. Cannot  
exclude small distal filling defects given motion artifact. 2.  No consolidation. Mild amount of bibasilar scarring versus atelectasis. Narrative:  EXAM: CTA CHEST INDICATION: Chest pain, acute; pulmonary embolism (PE) suspected COMPARISON: CT chest 09/01/2012 TECHNIQUE: Axial CT imaging from the thoracic inlet through the diaphragm with  
intravenous contrast utilizing CTA study for pulmonary artery evaluation. Coronal and sagittal MIP reformations were generated at a separate workstation. One or more dose reduction techniques were used on this CT: automated exposure  
control, adjustment of the mAs and/or kVp according to patient's size, and  
iterative reconstruction techniques. The specific techniques utilized on this CT  
exam have been documented in the patient's electronic medical record.  
   
_______________ FINDINGS:  
   
EXAM QUALITY: Overall exam quality is suboptimal. Pulmonary arterial enhancement  
is adequate. The breath hold is suboptimal. Respiratory motion artifact is  
present, limiting evaluation, especially in the lung bases. Scatter artifact is  
produced by dense contrast within the SVC PULMONARY ARTERIES: No convincing evidence of pulmonary embolism. No large or central PE is seen. Respiratory motion artifact does degrade evaluation of  
segmental and subsegmental arteries particularly in the lung bases. MEDIASTINUM: Mild atherosclerotic calcifications are present. Normal variant  
branching pattern of great vessels along aortic arch is present with a conjoined  
origin of the innominate artery and left common carotid artery. Coronary artery  
calcifications are present. PLEURA: Unremarkable. LYMPH NODES: No enlarged lymph nodes by size criteria. LUNGS/AIRWAY: No consolidation is seen. Tracheomalacia and bronchomalacia is  
seen. Small subpleural curvilinear densities are present in both lung bases  
greater on the right scarring or atelectasis. No definite suspicious pulmonary  
nodules are seen. UPPER ABDOMEN: Hiatal hernia is present. Visualized upper abdominal structures  
are unremarkable. OSSEOUS STRUCTURES: Degenerative changes are seen in the spine. OTHER: None  
_______________ CXR Results  (Last 48 hours) 12/03/18 0502  XR CHEST PORT Final result Impression:  IMPRESSION:  
   
1. No acute cardiopulmonary disease. Narrative:  EXAM: Chest portable INDICATION: Chest pain COMPARISON: Single view chest 1/29/2018  
   
_______________ FINDINGS:  
   
AP portable chest film was performed. Lungs remain clear. No effusion or  
pneumothorax. Heart and pulmonary vascularity are normal for AP technique. _______________  
   
  
  
 
7:04 AM 
RADIOLOGY FINDINGS Chest X-ray shows cardiomegaly with mild vascular congestion Pending review by Radiologist 
Recorded by Nick Boyd ED Scribe, as dictated by Mari Garcia DO Medical Decision Making I am the first provider for this patient. I reviewed the vital signs, available nursing notes, past medical history, past surgical history, family history and social history. Vital Signs-Reviewed the patient's vital signs. Pulse Oximetry Analysis - 98% on RA Cardiac Monitor: 
Rate: 88 bpm 
Rhythm: NSR 
 
EKG interpretation: (Preliminary) 4:32 AM  
89 bpm NSR No CAT QTc 440 ms EKG read by Mari Garcia DO at 4:34 PM  
 
EKG interpretation: (Subsequent) 7:55 AM  
Normal sinus rhythm, 73 bpm, QRS duration: 92 ms. EKG read by Sita Ramirez MD at 7:55 AM. Records Reviewed: Nursing Notes and Old Medical Records Provider Notes (Medical Decision Making):  
72 y.o. female Hx of DM, HTN, presenting for multiple complaints in, CP, lower Ext edema x 2-3 days. On exam she is in NAD with appropriate VS, mild bibasilar crackles. We will obtain cardiac enzymes, CXR, BNP to evaluate for CHF versus ACS. Will obtain D-Dimer. Procedures: 
Procedures ED Course:  
4:44 AM Initial assessment performed. The patients presenting problems have been discussed, and they are in agreement with the care plan formulated and outlined with them. I have encouraged them to ask questions as they arise throughout their visit. BEDSIDE TRANSFER OF CARE: 
7:00 AM 
Patient care will be transferred from Mari Garcia DO to Sita Ramirez MD.  Discussed available diagnostic results and care plan at length at beside. Patient and family made aware of provider change. All questions answered. Patient and family voiced understanding. 9:23 AM Patient updated on results. She is still having some chest pain on the right. I will consult cardiology and order Morphine. 9:29 AM Discussed patient's history, exam, and available diagnostics results with José Mora MD, cardiology, who agrees with admission. He would like the patient to have Aspirin and be started on a Heparin drip with bolus. 10:16 AM Discussed patient's history, exam, and available diagnostics results with Adriana Melo MD, internal medicine, who agrees to admit the patient to telemetry. Diagnosis and Disposition Critical Care Time:  
I have spent 41 minutes of critical care time involved in lab review, consultations with specialist, family decision-making, and documentation. During this entire length of time I was immediately available to the patient. Critical Care: The reason for providing this level of medical care for this critically ill patient was due a critical illness that impaired one or more vital organ systems such that there was a high probability of imminent or life threatening deterioration in the patients condition. This care involved high complexity decision making to assess, manipulate, and support vital system functions, to treat this degreee vital organ system failure and to prevent further life threatening deterioration of the patients condition. Core Measures: 
For Hospitalized Patients: 
 
1. Hospitalization Decision Time: The decision to hospitalize the patient was made by Luis Antonio Patel MD at 9:17 AM on 12/3/2018 2. Aspirin: Aspirin was given 10:16 AM  Patient is being admitted to the hospital by Allene Ormond, MD to telemetry. The results of their tests and reasons for their admission have been discussed with them and/or available family. They convey agreement and understanding for the need to be admitted and for their admission diagnosis. CONDITIONS ON ADMISSION: 
Sepsis is not present at the time of admission. Deep Vein Thrombosis is present at the time of admission. Thrombosis is not present at the time of admission. Urinary Tract Infection is not present at the time of admission. Pneumonia is not present at the time of admission. MRSA is not present at the time of admission. Wound infection is not present at the time of admission. Pressure Ulcer is not present at the time of admission. CLINICAL IMPRESSION: 
 
1. Acute chest pain 2. Acute deep vein thrombosis (DVT) of left lower extremity, unspecified vein (HCC) _______________________________ Attestations: This note is prepared by Nancy Rodriguez, acting as Scribe for General Rigo DO. General Rigo DO:  The scribe's documentation has been prepared under my direction and personally reviewed by me in its entirety. I confirm that the note above accurately reflects all work, treatment, procedures, and medical decision making performed by me. This note is prepared by [employer] and Shay Mcdermott, acting as Scribe for Jensen Crandall MD. Jensen Crandall MD:  The scribe's documentation has been prepared under my direction and personally reviewed by me in its entirety. I confirm that the note above accurately reflects all work, treatment, procedures, and medical decision making performed by me. 
_______________________________

## 2018-12-03 NOTE — ED NOTES
Attempted to call report. Was notified that nurse is doing rounds until [phone]. Will reattempt at that time.

## 2018-12-03 NOTE — ED TRIAGE NOTES
Triage: pt complains of right posterior shoulder pain, left LE swelling. Pt stopped taking her plavix 1 month ago because she says that she has been having problems with gout and cannot mix the medications.

## 2018-12-03 NOTE — PROGRESS NOTES
Physical Therapy Screening: 
Services are indicated and therapy order is required. An InBasket screening referral was triggered for physical therapy based on results obtained during the nursing admission assessment. The patients chart was reviewed and the patient is appropriate for a skilled therapy evaluation. Please order a consult for physical therapy if you are in agreement and would like an evaluation to be completed. Thank you.  
 
Tawanna Ramirez, PTA

## 2018-12-03 NOTE — ROUTINE PROCESS
TRANSFER - OUT REPORT: 
 
Verbal report given to Dorrie Paget, RN(name) on Grisel Ortega  being transferred to 3N(unit) for routine progression of care Report consisted of patients Situation, Background, Assessment and  
Recommendations(SBAR). Information from the following report(s) SBAR, ED Summary, Intake/Output, MAR and Recent Results was reviewed with the receiving nurse. Lines:  
Peripheral IV 12/03/18 Right Hand (Active) Site Assessment Clean, dry, & intact 12/3/2018 10:18 AM  
Phlebitis Assessment 0 12/3/2018 10:18 AM  
Infiltration Assessment 0 12/3/2018 10:18 AM  
Dressing Status Clean, dry, & intact 12/3/2018 10:18 AM  
Dressing Type Transparent 12/3/2018 10:18 AM  
  
 
Opportunity for questions and clarification was provided. Patient transported with: 
 Monitor Registered Nurse

## 2018-12-03 NOTE — H&P
History & Physical 
Patient: Chelsea Charles MRN: [id number]  CSN: [id number] YOB: 1945  Age: 72 y.o. Sex: female DOA: 12/3/2018 Primary Care Provider:  Manoj Mason MD 
 
 
Assessment/Plan Hospital Problems  Never Reviewed Codes Class Noted POA Chest pain ICD-10-CM: R07.9 ICD-9-CM: 786.50  12/3/2018 Unknown CAD (coronary artery disease) ICD-10-CM: I25.10 ICD-9-CM: 414.00  12/3/2018 Unknown Hypertension ICD-10-CM: I10 
ICD-9-CM: 401.9  12/3/2018 Unknown Diabetes ([address] ICD-10-CM: E11.9 ICD-9-CM: 250.00  12/3/2018 Unknown Gastrointestinal disorder ICD-10-CM: K92.9 ICD-9-CM: 569.9  12/3/2018 Unknown Acute DVT of left tibial vein (HCC) ICD-10-CM: Q52.167 ICD-9-CM: 453.42  12/3/2018 Unknown Morbid obesity with BMI of 40.0-44.9, adult [facility]) ICD-10-CM: E66.01, Z68.41 
ICD-9-CM: 278.01, V85.41  12/3/2018 Unknown Gout ICD-10-CM: M10.9 ICD-9-CM: 274.9  12/3/2018 Unknown Admit to tele Chest pain and  cad Still having, on heparin gtt now Will continue ce trend, so far negative 
cta : no PE in main artery, can not exclude small one Card on board, possible cath tomorrow Will work on possible evaluation after acs ruled out Will try gi cocktail, ppi also Echo, case discussed with dr. Frank Adams  
plavix was stopped per her self  
 
 
 
dvt:left posterior tibial veins On heparin gtt . Monitoring h/h  
 
 
DM type II , with complication, 
-long term insulin , Complication with cad 
-on lantus,  ssi, diabetic diet , hypoglycemia protocol   
 
 HTN, accelerated Continue home medication. Leg swelling Left dvt,  
 
gerd  
ppi Gout: so far stable. continue home regimen Morbid obesity Continue lifestyle modification  
 
 full code DVT : heparin,  ppi proph CC: chest pain and leg swelling HPI:  
 
Chelsea Charles is a 72 y.o. female who hx of cad, HTN, gout , obesity came to ER due to chest pain and leg swellings. She had hx of MI, not f/u with cardiology. She  stopped plavix for one month per herself due to recurrent gout. She has on and off chest pain-located epigastric area radiated to her back for several days. She \"prayed-some times works\". The pain is dull and sharp, not related to palpitation or sob. She came to the hospital suspected some gallbladder disease. She also reported legs swelling for several days, rt side-chronic swelling and left side was new. In ER, her trop wnl, but ck-mb elevated. cta no pe in big artery. pvl indicated left leg dvt Visit Vitals /69 (BP 1 Location: Left arm, BP Patient Position: Sitting) Pulse 79 Temp 98 °F (36.7 °C) Resp 17 Ht 5' 4\" (1.626 m) Wt 120.2 kg (265 lb) SpO2 100% BMI 45.49 kg/m² O2 Device: Room air Past Medical History:  
Diagnosis Date  CAD (coronary artery disease)  Diabetes ([facility] [address]  Gastrointestinal disorder  Gout  Hypertension  MI (myocardial infarction) ([facility] [address]  Reflux Past Surgical History:  
Procedure Laterality Date  CARDIAC SURG PROCEDURE UNLIST  VASCULAR SURGERY PROCEDURE UNLIST No family history on file. Social History Socioeconomic History  Marital status:  Spouse name: Not on file  Number of children: Not on file  Years of education: Not on file  Highest education level: Not on file Tobacco Use  Smoking status: Never Smoker  Smokeless tobacco: Never Used Substance and Sexual Activity  Alcohol use: No  
 Drug use: No  
 
 
Prior to Admission medications Medication Sig Start Date End Date Taking? Authorizing Provider  
atenolol (TENORMIN) 100 mg tablet Take 100 mg by mouth daily. Yes Provider, Historical  
traZODone (DESYREL) 50 mg tablet Take 50 mg by mouth nightly as needed for Sleep. Yes Provider, Historical  
simvastatin (ZOCOR) 10 mg tablet Take 10 mg by mouth nightly.    Yes Provider, Historical  
metFORMIN (GLUCOPHAGE) 500 mg tablet Take 500 mg by mouth two (2) times daily (with meals). Yes Provider, Historical  
multivitamin, tx-iron-ca-min (THERA-M W/ IRON) 9 mg iron-400 mcg tab tablet Take 1 Tab by mouth daily. Formulary substitution for DAILY MULTIVITAMIN   Yes Provider, Historical  
ergocalciferol (VITAMIN D2) 50,000 unit capsule Take 50,000 Units by mouth every Monday. Yes Provider, Historical  
insulin glargine (LANTUS U-100 INSULIN) 100 unit/mL injection 42 Units by SubCUTAneous route nightly. Yes Provider, Historical  
allopurinol (ZYLOPRIM) 100 mg tablet Take 100 mg by mouth two (2) times a day. Yes Provider, Historical  
gabapentin (NEURONTIN) 300 mg capsule Take 300 mg by mouth three (3) times daily. Yes Provider, Historical  
Difluprednate (DUREZOL) 0.05 % ophthalmic emulsion Administer 1 Drop to right eye four (4) times daily. In relation to cataract surgery   Yes Provider, Historical  
cholecalciferol, vitamin D3, (VITAMIN D3) 2,000 unit tab Take 2,000 Units by mouth daily. Yes Provider, Historical  
clopidogrel (PLAVIX) 75 mg tablet Take 75 mg by mouth daily. Other, MD Santiago  
 
 
Allergies Allergen Reactions  Lisinopril Swelling  Pcn [Penicillins] Angioedema Review of Systems Gen: No fever, chills, malaise, weight loss/gain. Heent: No headache, rhinorrhea, epistaxis, ear pain, hearing loss, sinus pain, neck pain/stiffness, sore throat. Heart: chest pain, no palpitations, DEMARCO, pnd, or orthopnea. Resp: No cough, hemoptysis, wheezing and shortness of breath. GI: No nausea, vomiting, diarrhea, constipation, melena or hematochezia. : No urinary obstruction, dysuria or hematuria. Derm: No rash, new skin lesion or pruritis. Musc/skeletal: back pain , leg pain Vasc: edema in bilateral legs, no cyanosis or claudication. Endo: No heat/cold intolerance, no polyuria,polydipsia or polyphagia. Neuro: No unilateral weakness, numbness, tingling. No seizures. Heme: No easy bruising or bleeding. Physical Exam:  
 
Physical Exam: 
Visit Vitals /69 (BP 1 Location: Left arm, BP Patient Position: Sitting) Pulse 79 Temp 98 °F (36.7 °C) Resp 17 Ht 5' 4\" (1.626 m) Wt 120.2 kg (265 lb) SpO2 100% BMI 45.49 kg/m² O2 Device: Room air Temp (24hrs), Av.8 °F (36.6 °C), Min:97.5 °F (36.4 °C), Max:98 °F (36.7 °C) No intake/output data recorded. No intake/output data recorded. General:  Awake, cooperative, no distress. Head:  Normocephalic, without obvious abnormality, atraumatic. Eyes:  Conjunctivae/corneas clear, sclera anicteric, PERRL, EOMs intact. Nose: Nares normal. No drainage or sinus tenderness. Throat: Lips, mucosa, and tongue normal. .  
Neck: Supple, symmetrical, trachea midline, no adenopathy. Lungs:   Clear to auscultation bilaterally. Heart:  Regular rate and rhythm, S1, S2 normal, no murmur, click, rub or gallop. Abdomen: Soft, tender in epigastric area. Bowel sounds normal. No masses,  No organomegaly. Extremities: Extremities normal, atraumatic, no cyanosis. +edema bilaterally Pulses: 2+ and symmetric all extremities. Skin: Skin color-pink, texture, turgor normal. No rashes or lesions. Capillary refill normal   
Neurologic: CNII-XII intact. No focal motor or sensory deficit. Labs Reviewed: 
 
BMP:  
Lab Results Component Value Date/Time  2018 05:10 AM  
 K 3.5 2018 05:10 AM  
  2018 05:10 AM  
 CO2 31 2018 05:10 AM  
 AGAP 9 2018 05:10 AM  
  (H) 2018 05:10 AM  
 BUN 16 2018 05:10 AM  
 CREA 0.88 2018 05:10 AM  
 GFRAA >60 2018 05:10 AM  
 GFRNA >60 2018 05:10 AM  
 
CMP:  
Lab Results Component Value Date/Time   2018 05:10 AM  
 K 3.5 2018 05:10 AM  
  2018 05:10 AM  
 CO2 31 2018 05:10 AM  
 AGAP 9 12/03/2018 05:10 AM  
  (H) 12/03/2018 05:10 AM  
 BUN 16 12/03/2018 05:10 AM  
 CREA 0.88 12/03/2018 05:10 AM  
 GFRAA >60 12/03/2018 05:10 AM  
 GFRNA >60 12/03/2018 05:10 AM  
 CA 8.6 12/03/2018 05:10 AM  
 MG 1.7 12/03/2018 05:10 AM  
 ALB 3.0 (L) 12/03/2018 05:10 AM  
 TP 7.6 12/03/2018 05:10 AM  
 GLOB 4.6 (H) 12/03/2018 05:10 AM  
 AGRAT 0.7 (L) 12/03/2018 05:10 AM  
 SGOT 22 12/03/2018 05:10 AM  
 ALT 28 12/03/2018 05:10 AM  
 
CBC:  
Lab Results Component Value Date/Time WBC 7.7 12/03/2018 05:10 AM  
 HGB 12.8 12/03/2018 05:10 AM  
 HCT 39.2 12/03/2018 05:10 AM  
  12/03/2018 05:10 AM  
 
All Cardiac Markers in the last 24 hours:  
Lab Results Component Value Date/Time  (H) 12/03/2018 07:55 AM  
  (H) 12/03/2018 05:10 AM  
 CKMB 6.8 (H) 12/03/2018 07:55 AM  
 CKMB 7.0 (H) 12/03/2018 05:10 AM  
 CKND1 1.7 12/03/2018 07:55 AM  
 CKND1 1.6 12/03/2018 05:10 AM  
 TROIQ <0.02 12/03/2018 07:55 AM  
 TROIQ 0.02 12/03/2018 05:10 AM  
 
Recent Glucose Results:  
Lab Results Component Value Date/Time  (H) 12/03/2018 05:10 AM  
 
ABG: No results found for: PH, PHI, PCO2, PCO2I, PO2, PO2I, HCO3, HCO3I, FIO2, FIO2I 
COAGS:  
Lab Results Component Value Date/Time APTT 26.8 12/03/2018 10:10 AM  
 
Liver Panel:  
Lab Results Component Value Date/Time ALB 3.0 (L) 12/03/2018 05:10 AM  
 TP 7.6 12/03/2018 05:10 AM  
 GLOB 4.6 (H) 12/03/2018 05:10 AM  
 AGRAT 0.7 (L) 12/03/2018 05:10 AM  
 SGOT 22 12/03/2018 05:10 AM  
 ALT 28 12/03/2018 05:10 AM  
 AP 94 12/03/2018 05:10 AM  
 
Pancreatic Markers:  
Lab Results Component Value Date/Time LPSE 63 (L) 12/03/2018 05:10 AM  
 
 
Procedures/imaging: see electronic medical records for all procedures/Xrays and details which were not copied into this note but were reviewed prior to creation of Plan Radha Ortiz MD, Internal Medicine CC: Kati Whitman MD

## 2018-12-04 ENCOUNTER — APPOINTMENT (OUTPATIENT)
Dept: NON INVASIVE DIAGNOSTICS | Age: 73
DRG: 300 | End: 2018-12-04
Attending: HOSPITALIST
Payer: MEDICARE

## 2018-12-04 ENCOUNTER — APPOINTMENT (OUTPATIENT)
Dept: NUCLEAR MEDICINE | Age: 73
DRG: 300 | End: 2018-12-04
Attending: INTERNAL MEDICINE
Payer: MEDICARE

## 2018-12-04 ENCOUNTER — APPOINTMENT (OUTPATIENT)
Dept: NON INVASIVE DIAGNOSTICS | Age: 73
DRG: 300 | End: 2018-12-04
Attending: INTERNAL MEDICINE
Payer: MEDICARE

## 2018-12-04 LAB
ANION GAP SERPL CALC-SCNC: 7 MMOL/L (ref 3–18)
APTT PPP: 82.9 SEC (ref 23–36.4)
APTT PPP: >180 SEC (ref 23–36.4)
BASOPHILS # BLD: 0 K/UL (ref 0–0.1)
BASOPHILS NFR BLD: 0 % (ref 0–2)
BUN SERPL-MCNC: 12 MG/DL (ref 7–18)
BUN/CREAT SERPL: 16
CALCIUM SERPL-MCNC: 7.9 MG/DL (ref 8.5–10.1)
CHLORIDE SERPL-SCNC: 102 MMOL/L (ref 100–108)
CK MB CFR SERPL CALC: 1.7 % (ref 0–4)
CK MB SERPL-MCNC: 6.7 NG/ML (ref 5–25)
CK SERPL-CCNC: 395 U/L (ref 26–192)
CO2 SERPL-SCNC: 29 MMOL/L (ref 21–32)
CREAT SERPL-MCNC: 0.76 MG/DL (ref 0.6–1.3)
DIFFERENTIAL METHOD BLD: NORMAL
ECHO AO ARCH DIAM: 2.79 CM
ECHO AO ASC DIAM: 3.07 CM
ECHO AO ROOT DIAM: 2.98 CM
ECHO AV AREA PEAK VELOCITY: 2.2 CM2
ECHO AV AREA VTI: 2.3 CM2
ECHO AV AREA/BSA PEAK VELOCITY: 0.9 CM2/M2
ECHO AV AREA/BSA VTI: 1 CM2/M2
ECHO AV MEAN GRADIENT: 2.8 MMHG
ECHO AV PEAK GRADIENT: 4.6 MMHG
ECHO AV PEAK VELOCITY: 106.8 CM/S
ECHO AV VTI: 25.39 CM
ECHO LA MAJOR AXIS: 3.77 CM
ECHO LA VOL 2C: 40.73 ML (ref 22–52)
ECHO LA VOL 4C: 35.24 ML (ref 22–52)
ECHO LA VOL BP: 43.24 ML (ref 22–52)
ECHO LA VOL/BSA BIPLANE: 19.61 ML/M2 (ref 16–28)
ECHO LA VOLUME INDEX A2C: 18.48 ML/M2 (ref 16–28)
ECHO LA VOLUME INDEX A4C: 15.99 ML/M2 (ref 16–28)
ECHO LV E' LATERAL VELOCITY: 0.07 CM/S
ECHO LV E' SEPTAL VELOCITY: 0.04 CM/S
ECHO LV EDV A2C: 97.8 ML
ECHO LV EDV A4C: 108.1 ML
ECHO LV EDV BP: 103.2 ML (ref 56–104)
ECHO LV EDV INDEX A4C: 49 ML/M2
ECHO LV EDV INDEX BP: 46.8 ML/M2
ECHO LV EDV NDEX A2C: 44.4 ML/M2
ECHO LV EJECTION FRACTION A2C: 53 %
ECHO LV EJECTION FRACTION A4C: 47 %
ECHO LV EJECTION FRACTION BIPLANE: 49.9 % (ref 55–100)
ECHO LV ESV A2C: 46 ML
ECHO LV ESV A4C: 56.9 ML
ECHO LV ESV BP: 51.7 ML (ref 19–49)
ECHO LV ESV INDEX A2C: 20.9 ML/M2
ECHO LV ESV INDEX A4C: 25.8 ML/M2
ECHO LV ESV INDEX BP: 23.5 ML/M2
ECHO LV INTERNAL DIMENSION DIASTOLIC: 4.85 CM (ref 3.9–5.3)
ECHO LV INTERNAL DIMENSION SYSTOLIC: 3.8 CM
ECHO LV IVSD: 1.23 CM (ref 0.6–0.9)
ECHO LV MASS 2D: 270.9 G (ref 67–162)
ECHO LV MASS INDEX 2D: 122.9 G/M2 (ref 43–95)
ECHO LV POSTERIOR WALL DIASTOLIC: 1.21 CM (ref 0.6–0.9)
ECHO LVOT DIAM: 1.98 CM
ECHO LVOT PEAK GRADIENT: 2.3 MMHG
ECHO LVOT PEAK VELOCITY: 76.47 CM/S
ECHO LVOT VTI: 18.93 CM
ECHO MV A VELOCITY: 85.27 CM/S
ECHO MV AREA PHT: 3.1 CM2
ECHO MV E DECELERATION TIME (DT): 247.2 MS
ECHO MV E VELOCITY: 0.54 CM/S
ECHO MV E/A RATIO: 0.01
ECHO MV E/E' LATERAL: 7.71
ECHO MV E/E' RATIO (AVERAGED): 10.61
ECHO MV E/E' SEPTAL: 13.5
ECHO MV PRESSURE HALF TIME (PHT): 71.7 MS
ECHO PULMONARY ARTERY SYSTOLIC PRESSURE (PASP): 30 MMHG
ECHO RA AREA 4C: 10.99 CM2
ECHO RV INTERNAL DIMENSION: 3.63 CM
ECHO RV TAPSE: 1.9 CM (ref 1.5–2)
ECHO TV REGURGITANT MAX VELOCITY: 259.82 CM/S
ECHO TV REGURGITANT PEAK GRADIENT: 27 MMHG
EOSINOPHIL # BLD: 0.2 K/UL (ref 0–0.4)
EOSINOPHIL NFR BLD: 2 % (ref 0–5)
ERYTHROCYTE [DISTWIDTH] IN BLOOD BY AUTOMATED COUNT: 12.9 % (ref 11.6–14.5)
GLUCOSE BLD STRIP.AUTO-MCNC: 124 MG/DL (ref 70–110)
GLUCOSE BLD STRIP.AUTO-MCNC: 129 MG/DL (ref 70–110)
GLUCOSE BLD STRIP.AUTO-MCNC: 152 MG/DL (ref 70–110)
GLUCOSE BLD STRIP.AUTO-MCNC: 174 MG/DL (ref 70–110)
GLUCOSE SERPL-MCNC: 128 MG/DL (ref 74–99)
HCT VFR BLD AUTO: 37.4 % (ref 35–45)
HGB BLD-MCNC: 12.2 G/DL (ref 12–16)
LYMPHOCYTES # BLD: 3.4 K/UL (ref 0.9–3.6)
LYMPHOCYTES NFR BLD: 45 % (ref 21–52)
MAGNESIUM SERPL-MCNC: 1.9 MG/DL (ref 1.6–2.6)
MCH RBC QN AUTO: 28.8 PG (ref 24–34)
MCHC RBC AUTO-ENTMCNC: 32.6 G/DL (ref 31–37)
MCV RBC AUTO: 88.2 FL (ref 74–97)
MONOCYTES # BLD: 0.6 K/UL (ref 0.05–1.2)
MONOCYTES NFR BLD: 8 % (ref 3–10)
NEUTS SEG # BLD: 3.4 K/UL (ref 1.8–8)
NEUTS SEG NFR BLD: 45 % (ref 40–73)
PLATELET # BLD AUTO: 210 K/UL (ref 135–420)
PMV BLD AUTO: 9.7 FL (ref 9.2–11.8)
POTASSIUM SERPL-SCNC: 3.7 MMOL/L (ref 3.5–5.5)
RBC # BLD AUTO: 4.24 M/UL (ref 4.2–5.3)
SODIUM SERPL-SCNC: 138 MMOL/L (ref 136–145)
STRESS BASELINE DIAS BP: 79 MMHG
STRESS BASELINE HR: 63 BPM
STRESS BASELINE SYS BP: 185 MMHG
STRESS ESTIMATED WORKLOAD: 1 METS
STRESS EXERCISE DUR MIN: NORMAL
STRESS PEAK DIAS BP: 79 MMHG
STRESS PEAK SYS BP: 185 MMHG
STRESS PERCENT HR ACHIEVED: 61 %
STRESS POST PEAK HR: 77 BPM
STRESS RATE PRESSURE PRODUCT: NORMAL BPM*MMHG
STRESS ST DEPRESSION: 0 MM
STRESS ST ELEVATION: 0 MM
STRESS STAGE 1 BP: NORMAL MMHG
STRESS STAGE 1 DURATION: NORMAL MIN:SEC
STRESS STAGE 1 HR: 68 BPM
STRESS STAGE RECOVERY 1 BP: NORMAL MMHG
STRESS STAGE RECOVERY 1 DURATION: NORMAL MIN:SEC
STRESS STAGE RECOVERY 1 HR: 74 BPM
STRESS TARGET HR: 127 BPM
TROPONIN I SERPL-MCNC: <0.02 NG/ML (ref 0–0.04)
WBC # BLD AUTO: 7.5 K/UL (ref 4.6–13.2)

## 2018-12-04 PROCEDURE — 82962 GLUCOSE BLOOD TEST: CPT

## 2018-12-04 PROCEDURE — A9500 TC99M SESTAMIBI: HCPCS

## 2018-12-04 PROCEDURE — 83735 ASSAY OF MAGNESIUM: CPT

## 2018-12-04 PROCEDURE — 77030035694 HC MSK BIPAP FLL FAC PERFMAX PHIL -B

## 2018-12-04 PROCEDURE — 82550 ASSAY OF CK (CPK): CPT

## 2018-12-04 PROCEDURE — 85025 COMPLETE CBC W/AUTO DIFF WBC: CPT

## 2018-12-04 PROCEDURE — 74011250637 HC RX REV CODE- 250/637: Performed by: HOSPITALIST

## 2018-12-04 PROCEDURE — C8929 TTE W OR WO FOL WCON,DOPPLER: HCPCS

## 2018-12-04 PROCEDURE — C9113 INJ PANTOPRAZOLE SODIUM, VIA: HCPCS | Performed by: HOSPITALIST

## 2018-12-04 PROCEDURE — 65660000000 HC RM CCU STEPDOWN

## 2018-12-04 PROCEDURE — 85730 THROMBOPLASTIN TIME PARTIAL: CPT

## 2018-12-04 PROCEDURE — 74011250636 HC RX REV CODE- 250/636: Performed by: HOSPITALIST

## 2018-12-04 PROCEDURE — 74011636637 HC RX REV CODE- 636/637: Performed by: HOSPITALIST

## 2018-12-04 PROCEDURE — 80048 BASIC METABOLIC PNL TOTAL CA: CPT

## 2018-12-04 PROCEDURE — 36415 COLL VENOUS BLD VENIPUNCTURE: CPT

## 2018-12-04 PROCEDURE — 93017 CV STRESS TEST TRACING ONLY: CPT

## 2018-12-04 PROCEDURE — 74011250636 HC RX REV CODE- 250/636: Performed by: EMERGENCY MEDICINE

## 2018-12-04 RX ADMIN — GABAPENTIN 300 MG: 300 CAPSULE ORAL at 22:13

## 2018-12-04 RX ADMIN — GABAPENTIN 300 MG: 300 CAPSULE ORAL at 18:03

## 2018-12-04 RX ADMIN — GABAPENTIN 300 MG: 300 CAPSULE ORAL at 08:26

## 2018-12-04 RX ADMIN — MORPHINE SULFATE 1 MG: 2 INJECTION, SOLUTION INTRAMUSCULAR; INTRAVENOUS at 14:38

## 2018-12-04 RX ADMIN — SIMVASTATIN 10 MG: 10 TABLET, FILM COATED ORAL at 22:13

## 2018-12-04 RX ADMIN — INSULIN GLARGINE 35 UNITS: 100 INJECTION, SOLUTION SUBCUTANEOUS at 08:27

## 2018-12-04 RX ADMIN — ATENOLOL 100 MG: 50 TABLET ORAL at 08:25

## 2018-12-04 RX ADMIN — ALLOPURINOL 100 MG: 100 TABLET ORAL at 20:42

## 2018-12-04 RX ADMIN — PANTOPRAZOLE SODIUM 40 MG: 40 INJECTION, POWDER, FOR SOLUTION INTRAVENOUS at 08:25

## 2018-12-04 RX ADMIN — TRAZODONE HYDROCHLORIDE 50 MG: 50 TABLET ORAL at 23:51

## 2018-12-04 RX ADMIN — REGADENOSON 0.4 MG: 0.08 INJECTION, SOLUTION INTRAVENOUS at 12:15

## 2018-12-04 RX ADMIN — MORPHINE SULFATE 1 MG: 2 INJECTION, SOLUTION INTRAMUSCULAR; INTRAVENOUS at 20:42

## 2018-12-04 RX ADMIN — INSULIN LISPRO 2 UNITS: 100 INJECTION, SOLUTION INTRAVENOUS; SUBCUTANEOUS at 22:13

## 2018-12-04 RX ADMIN — ALLOPURINOL 100 MG: 100 TABLET ORAL at 08:26

## 2018-12-04 RX ADMIN — PERFLUTREN 1 ML: 6.52 INJECTION, SUSPENSION INTRAVENOUS at 10:52

## 2018-12-04 RX ADMIN — HEPARIN SODIUM 12 UNITS/KG/HR: 10000 INJECTION, SOLUTION INTRAVENOUS at 16:55

## 2018-12-04 NOTE — PROGRESS NOTES
TRANSFER - IN REPORT: 
 
Verbal report received from Simona Larson RN(name) on Maxi Nabor  being received from ED(unit) for routine progression of care Report consisted of patients Situation, Background, Assessment and  
Recommendations(SBAR). Information from the following report(s) SBAR, Kardex and MAR was reviewed with the receiving nurse. Opportunity for questions and clarification was provided. Assessment completed upon patients arrival to unit and care assumed. Bedside shift change report given to Ruy Causey RN (oncoming nurse) by Yury Goode RN (offgoing nurse). Report included the following information SBAR, Kardex and MAR.

## 2018-12-04 NOTE — PROGRESS NOTES
Hospitalist Progress Note-critical care note Patient: Maxi Tomlin MRN: [id number]  CSN: [id number] YOB: 1945  Age: 72 y.o. Sex: female DOA: 12/3/2018 LOS:  LOS: 1 day Chief complaint: dvt. Chest pain, htn, dm ,  
 
Assessment/Plan Hospital Problems  Never Reviewed Codes Class Noted POA * (Principal) Chest pain ICD-10-CM: R07.9 ICD-9-CM: 786.50  12/3/2018 Unknown CAD (coronary artery disease) ICD-10-CM: I25.10 ICD-9-CM: 414.00  12/3/2018 Unknown Hypertension ICD-10-CM: I10 
ICD-9-CM: 401.9  12/3/2018 Unknown Diabetes ([address] ICD-10-CM: E11.9 ICD-9-CM: 250.00  12/3/2018 Unknown Gastrointestinal disorder ICD-10-CM: K92.9 ICD-9-CM: 569.9  12/3/2018 Unknown Acute DVT of left tibial vein (HCC) ICD-10-CM: N15.122 ICD-9-CM: 453.42  12/3/2018 Unknown Morbid obesity with BMI of 40.0-44.9, adult [facility]) ICD-10-CM: E66.01, Z68.41 
ICD-9-CM: 278.01, V85.41  12/3/2018 Unknown Gout ICD-10-CM: M10.9 ICD-9-CM: 274.9  12/3/2018 Unknown Chest pain and  cad No chest pain overnight  
on heparin gtt now Stress test done today  
cta : no PE in main artery, can not exclude small one 
  
  
  
dvt:left posterior tibial veins On heparin gtt . Monitoring h/h  
  
  
DM type II , with complication, 
-long term insulin , Complication with cad 
-on lantus,  ssi, diabetic diet , hypoglycemia protocol   
  
 HTN, accelerated Continue home medication. 
  
Leg swelling Left dvt improving  
  
gerd  
ppi  
  
Gout: so far stable. On allopurinol  
  
Morbid obesity Continue lifestyle modification Continue hospitalization, will have cath per cardiology if stress test positive, if negative ,will have v/q scan to r/o PE due to cta no conclusive. , due to she got nuclear stress test today, for pt safety,v/w scan will be perform in 24 hr after nuclear stress test.  
 Son was at the bedside. Discussed with pt about anticoagulant about warfarin/xarelto/eliquis -side effect and benefit. Then can not make decision now. All questions have been answered. 35 total min's spent on patient care including >50% on counseling/coordinating care. Discussed the above assessments. also discussed labs, medications and hospital course Disposition :1-2 days Review of systems: 
 
General: No fevers or chills. Cardiovascular: No chest pain or pressure. No palpitations. Pulmonary: No shortness of breath. Gastrointestinal: No nausea, vomiting. Vital signs/Intake and Output: 
Visit Vitals /84 (BP 1 Location: Right arm, BP Patient Position: At rest) Pulse 67 Temp 97.4 °F (36.3 °C) Resp 18 Ht 5' 4\" (1.626 m) Wt 120.2 kg (265 lb) SpO2 96% BMI 45.49 kg/m² Current Shift:  No intake/output data recorded. Last three shifts:  No intake/output data recorded. Physical Exam: 
General: WD, WN. Alert, cooperative, no acute distress   
HEENT: NC, Atraumatic. PERRLA, anicteric sclerae. Lungs: CTA Bilaterally. No Wheezing/Rhonchi/Rales. Heart:  Regular  rhythm,  No murmur, No Rubs, No Gallops Abdomen: Soft, Non distended, Non tender.  +Bowel sounds, Extremities: No c/c/e Psych:   Not anxious or agitated. Neurologic:  No acute neurological deficit. Labs: Results:  
   
Chemistry Recent Labs 12/04/18 0113 12/03/18 
0510 * 134*  142  
K 3.7 3.5  102 CO2 29 31 BUN 12 16 CREA 0.76 0.88 CA 7.9* 8.6 AGAP 7 9 BUCR 16 18 AP  --  94  
TP  --  7.6 ALB  --  3.0*  
GLOB  --  4.6* AGRAT  --  0.7* CBC w/Diff Recent Labs 12/04/18 0113 12/03/18 
0510 WBC 7.5 7.7  
RBC 4.24 4.47 HGB 12.2 12.8 HCT 37.4 39.2  230 GRANS 45 51 LYMPH 45 41 EOS 2 2 Cardiac Enzymes Recent Labs 12/04/18 0113 12/03/18 [address] * 376* CKND1 1.7 1.7 Coagulation Recent Labs 12/04/18 1300 12/04/18 
0113 APTT 82.9* >180.0* Lipid Panel No results found for: CHOL, CHOLPOCT, CHOLX, CHLST, CHOLV, 049131, HDL, LDL, LDLC, DLDLP, 988558, VLDLC, VLDL, TGLX, TRIGL, TRIGP, TGLPOCT, CHHD, CHHDX  
BNP No results for input(s): BNPP in the last 72 hours. Liver Enzymes Recent Labs 12/03/18 
0510 TP 7.6 ALB 3.0* AP 94 SGOT 22 Thyroid Studies No results found for: T4, T3U, TSH, TSHEXT Procedures/imaging: see electronic medical records for all procedures/Xrays and details which were not copied into this note but were reviewed prior to creation of Plan Khoi Ugalde MD

## 2018-12-04 NOTE — DIABETES MGMT
Diabetes Patient/Family Education RecordFactors That  May Influence Patients Ability  to Learn or  Comply with Recommendations []   Language barrier    []   Cultural needs   []   Motivation  
 []   Cognitive limitation    []   Physical   []   Education  
 []   Physiological factors   []   Hearing/vision/speaking impairment   []   Ep[employer] beliefs []   Financial factors   []  Other:   [x]  No factors identified at this time. Person Instructed: [x]   Patient   []   Family   []  Other Preference for Learning: 
 [x]   Verbal   []   Written   []  Demonstration Level of Comprehension & Competence:   
[]  Good                                      [x] Fair                                     []  Poor                             []  Needs Reinforcement  
[x]  Teachback completed Education Component:  
[x]  Medication management, including how to administer insulin and confirmation of home regimen; pt c/o pain when injecting Lantus; GC will follow up with evaluation of pt injection technique prior to d/c   
[]  Nutritional management -obtain usual meal pattern  
[]  Exercise  
[]  Signs, symptoms, and treatment of hyperglycemia and hypoglycemia  
[] Prevention, recognition and treatment of hyperglycemia and hypoglycemia [x]  Importance of blood glucose monitoring; SMBG 1-2x/day  
[]  Instruction on use of the blood glucose meter [x]  Discuss the importance of HbA1C monitoring   
[]  Sick day guidelines  
[]  Proper use and disposal of lancets, needles, syringes or insulin pens (if appropriate) []  Potential long-term complications (retinopathy, kidney disease, neuropathy, foot care)  
[] Information about whom to contact in case of emergency or for more information   
[x]  Goal:  Patient/family will demonstrate understanding of Diabetes Self Management Skills by: 2/28/19 Plan for post-discharge education or self-management support: [x] Outpatient class schedule provided            [] Patient Declined 
  [] Scheduled for outpatient classes (date) _______ Verify: 
Does patient understand how diabetes medications work? ____________________________ Does patient know what their most recent A1c is? yes Does patient monitor glucose at home? yes Does patient have a glucometer, testing supplies or difficulty obtaining diabetes medications? Hari Thompson MS, RN, CDE Glycemic Control Team 
[phone] Pager [phone] (M-TH 8:00-4:30P) *After Hours pager [phone]

## 2018-12-04 NOTE — PROGRESS NOTES
Problem: Falls - Risk of 
Goal: *Absence of Falls Document Kellie Dia Fall Risk and appropriate interventions in the flowsheet. Outcome: Progressing Towards Goal 
Fall Risk Interventions: 
Mobility Interventions: Assess mobility with egress test, Bed/chair exit alarm, Communicate number of staff needed for ambulation/transfer, Utilize walker, cane, or other assistive device Medication Interventions: Patient to call before getting OOB, Teach patient to arise slowly

## 2018-12-04 NOTE — PROGRESS NOTES
1900: Received report from [facility]. White board updated. Pt is alert and oriented x4. Pt currently reports left leg pain, leg is repositioned to help with discomfort. Pt's questions and concerns addressed at this time. Will continue to monitor. 2146: Pt states to CNA pain as a '9' in left leg, pt declines pain medication at this time, legs repositioned and pt encouraged to rest to ease discomfort. 0000: Reassessment completed, no changes compared to initial assessment, pt does not report any pain or discomfort at this time. Bed in lowest position, and call bell within reach. Will continue to monitor. 0147: PTT result >180, infusion held for one hour, change dose to 12. Next PTT to be drawn @0900. 
 
0730: Bedside and Verbal shift change report given to Neeraj MCKEON (oncoming nurse) by Ko Rausch RN 
 (offgoing nurse). Report included the following information SBAR, Kardex, Procedure Summary, Intake/Output, MAR and Recent Results.

## 2018-12-04 NOTE — DIABETES MGMT
NUTRITIONAL ASSESSMENT GLYCEMIC CONTROL/ PLAN OF CARE Ora Mann           72 y.o.           12/3/2018 1. Acute chest pain 2. Acute deep vein thrombosis (DVT) of left lower extremity, unspecified vein (HCC) PMHx: Diabetes, CAD, Gastrointestinal disorder, Gout, HTN, MI, Reflux INTERVENTIONS/PLAN:  
-Recommend continuing Diabetic Consistent Carbohydrate/ AHA LOW CHOL FAT Diet 
-Provide Diabetes Self-Mgt Education p/t d/c. ASSESSMENT:  
Nutritional Status: 
Obese class III per BMI 45.5 kg/m2 (>40.0 kg/m2) Diabetes Management:  
Recent Glucose Results:  
Lab Results Component Value Date/Time  (H) 12/04/2018 01:13 AM  
 GLUCPOC 166 (H) 12/03/2018 09:48 PM  
 GLUCPOC 181 (H) 12/03/2018 06:33 PM  
 GLUCPOC 117 (H) 12/03/2018 11:18 AM  
 
Within target range (non-ICU: <140; ICU<180): [] Yes   [x]  No 
 
Current Insulin regimen:   
Lanuts 35 units Humalog, corrective, normal insulin sensitivity Home medication/insulin regimen:  
Lantus 42 units Metformin 500 mg 2 x daily HbA1c: (12/3/18) 8.0% equivalent to average blood glucose level 183 mg/dL. Adequate glycemic control PTA:  [] Yes  [x] No 
  
SUBJECTIVE/OBJECTIVE: Information obtained from: Pt confirmed PTA diabetes medications. Diet: DIET DIABETIC CONSISTENT CARB Patient Vitals for the past 100 hrs: 
 % Diet Eaten 12/03/18 1911 0 % Medications: [x]                Reviewed Most Recent POC Glucose:  
Recent Labs 12/04/18 
0113 12/03/18 
0510 * 134* Labs:  
Lab Results Component Value Date/Time Hemoglobin A1c 8.0 (H) 12/03/2018 05:10 AM  
 
Lab Results Component Value Date/Time  Sodium 138 12/04/2018 01:13 AM  
 Potassium 3.7 12/04/2018 01:13 AM  
 Chloride 102 12/04/2018 01:13 AM  
 CO2 29 12/04/2018 01:13 AM  
 Anion gap 7 12/04/2018 01:13 AM  
 Glucose 128 (H) 12/04/2018 01:13 AM  
 BUN 12 12/04/2018 01:13 AM  
 Creatinine 0.76 12/04/2018 01:13 AM  
 Calcium 7.9 (L) 12/04/2018 01:13 AM  
 Magnesium 1.9 12/04/2018 01:13 AM  
 Albumin 3.0 (L) 12/03/2018 05:10 AM  
 
 
Anthropometrics:BMI (calculated): 45.5 Wt Readings from Last 1 Encounters:  
12/03/18 120.2 kg (265 lb) Ht Readings from Last 1 Encounters:  
12/03/18 5' 4\" (1.626 m) Estimated Nutrition Needs: 1704 kcal/day (MSJ x 1.0 ),  71 g PRO/day ( 1 g PRO/kg), 2404 mL fluid/day (20 mL fluid/ kg Actual BW) Based on:   [x]  Actual BW for fluid needs: 120.2 kg ,  [x]    Adjusted BW for Kcal and Protein needs: 71.02 kg Nutrition Diagnoses: Altered nutrition-related lab value related to less stringent diabetes self-mgt as evidenced by A1C 8.0%. Nutrition Interventions: 
-Recommend continuing Diabetic Consistent Carbohydrate/ AHA LOW CHOL FAT Diet Goal:  
Blood glucose will be within target range of  mg/dL by 12/7/18. Nutrition Monitoring and Evaluation []     Monitor po intake on meal rounds 
[x]     Continue inpatient monitoring and intervention 
[]     Other: 
 
Nutrition Risk:  []   High     []  Moderate    [x]  Minimal/Uncompromised Jono Apodaca RD 
pgr [phone]

## 2018-12-04 NOTE — PROGRESS NOTES
Cardiology Progress Note Patient: Sylvain Hallman        Sex: female          DOA: 12/3/2018 YOB: 1945      Age:  72 y.o.        LOS:  LOS: 1 day Assessment/Plan Principal Problem: 
  Chest pain (12/3/2018) Active Problems: 
  CAD (coronary artery disease) (12/3/2018) Hypertension (12/3/2018) Diabetes ([address] (12/3/2018) Gastrointestinal disorder (12/3/2018) Acute DVT of left tibial vein ([address] (12/3/2018) Morbid obesity with BMI of 40.0-44.9, adult ([address] (12/3/2018) Gout (12/3/2018) Plan: 
Chest pain is clearly musculoskeletal pain and reproducible pain Stress test is negative for ischemia EF 50% Pt can be switched to oral anticoagulation for DVT Discussed with patient and son Subjective:  
 cc: 
Chest pain REVIEW OF SYSTEMS:  
 
General: No fevers or chills. Cardiovascular: No chest pain or pressure. No palpitations. No ankle swelling Pulmonary: No SOB, orthopnea, PND Gastrointestinal: No nausea, vomiting or diarrhea Objective:  
  
Visit Vitals /84 (BP 1 Location: Right arm, BP Patient Position: At rest) Pulse 67 Temp 97.4 °F (36.3 °C) Resp 18 Ht 5' 4\" (1.626 m) Wt 120.2 kg (265 lb) SpO2 96% BMI 45.49 kg/m² Body mass index is 45.49 kg/m². Physical Exam: 
General Appearance: Comfortable, not using accessory muscles of respiration. NECK: No JVD, no thyroidomeglay. LUNGS: Clear bilaterally. HEART: S1+S2 audible, ABD: Non-tender, BS Audible EXT: No edema, and no cysnosis. VASCULAR EXAM: Pulses are intact. PSYCHIATRIC EXAM: Mood is appropriate. Medication: 
Current Facility-Administered Medications Medication Dose Route Frequency  heparin 25,000 units in D5W 250 ml infusion  18-36 Units/kg/hr IntraVENous TITRATE  insulin lispro (HUMALOG) injection   SubCUTAneous AC&HS  
 glucose chewable tablet 16 g  4 Tab Oral PRN  
  glucagon (GLUCAGEN) injection 1 mg  1 mg IntraMUSCular PRN  
 dextrose (D50W) injection syrg 12.5-25 g  25-50 mL IntraVENous PRN  
 acetaminophen (TYLENOL) tablet 650 mg  650 mg Oral Q4H PRN  
 naloxone (NARCAN) injection 0.4 mg  0.4 mg IntraVENous PRN  
 diphenhydrAMINE (BENADRYL) injection 12.5 mg  12.5 mg IntraVENous Q4H PRN  
 ondansetron (ZOFRAN) injection 4 mg  4 mg IntraVENous Q4H PRN  
 morphine injection 1 mg  1 mg IntraVENous Q2H PRN  
 nitroglycerin (NITROSTAT) tablet 0.4 mg  0.4 mg SubLINGual PRN  pantoprazole (PROTONIX) 40 mg in sodium chloride 0.9% 10 mL injection  40 mg IntraVENous DAILY  atenolol (TENORMIN) tablet 100 mg  100 mg Oral DAILY  Difluprednate (DUREZOL) 0.05 % ophthalmic emulsion 1 Drop (Patient Supplied)  1 Drop Right Eye QID  gabapentin (NEURONTIN) capsule 300 mg  300 mg Oral TID  simvastatin (ZOCOR) tablet 10 mg  10 mg Oral QHS  traZODone (DESYREL) tablet 50 mg  50 mg Oral QHS PRN  
 allopurinol (ZYLOPRIM) tablet 100 mg  100 mg Oral BID  insulin glargine (LANTUS) injection 35 Units  35 Units SubCUTAneous DAILY  nitroglycerin (NITRODUR) 0.1 mg/hr patch 1 Patch  1 Patch TransDERmal Q24H Lab/Data Reviewed: 
Procedures/imaging: see electronic medical records for all procedures/Xrays  
and details which were not copied into this note but were reviewed prior to creation of Plan 
  
 
All lab results for the last 24 hours reviewed. Recent Labs 12/04/18 
0113 12/03/18 
0510 WBC 7.5 7.7 HGB 12.2 12.8 HCT 37.4 39.2  230 Recent Labs 12/04/18 
0113 12/03/18 
0510  142  
K 3.7 3.5  102 CO2 29 31 * 134* BUN 12 16 CREA 0.76 0.88 CA 7.9* 8.6 Signed By: Joe Callaway MD   
 December 4, 2018

## 2018-12-04 NOTE — PROGRESS NOTES
Patient was visited by [facility]. Volunteer conducted a Spiritual Care Screening and reported no needs to this . Spiritual Care literature was provided. Chaplains will continue to follow and will provide pastoral care as needed or requested. [address], M.Div. Board Certified [address] [place] [address] Road [phone] - Office

## 2018-12-04 NOTE — PROGRESS NOTES
Reason for Admission:   dvt. Chest pain, htn, dm RRAT Score:  Low; 9 Plan for utilizing home health: Yes Likelihood of Readmission:  Low Transition of Care Plan:    Yohana Cervantes is a 72 y.o. female who hx of cad, HTN, gout , obesity came to ER due to chest pain and leg swellings. CM met with pt and her son, Dilan Rausch ([phone]), at bedside to discuss transition of care. Pt lives alone and uses a cane to ambulate. Pt will be going to her son's home ([address], [address] [place]), when discharged. Discussed HH as an option and both are in agreement if needed. CM provided a list of area [facility] agencies for family to review. Please order therapy services when appropriate to assist with determining transition of care needs. CM continuing to follow. Care Management Interventions PCP Verified by CM: Yes Mode of Transport at Discharge: Other (see comment)(Family) Transition of Care Consult (CM Consult): Discharge Planning Health Maintenance Reviewed: Yes Current Support Network: Lives Alone, Family Lives [place] Confirm Follow Up Transport: Family Plan discussed with Pt/Family/Caregiver: Yes Freedom of Choice Offered: Yes Discharge Location Discharge Placement: Home with home health

## 2018-12-04 NOTE — DIABETES MGMT
NUTRITION / GLYCEMIC CONTROL PLAN OF CARE Diabetes Management:  
 -known h/o T2DM, HbA1c not within recommended range for age + comorbids on basal + oral home regimen 
-morbid obesity with h/o excessive energy intake, do believe diet and lifestyle are biggest opportunities to improvement of DM control 
-pt may benefit from dose adjustment to Metformin and/or an agent to target PPG execursions 
- recommend: monitor overnight BG trends, pt may benefit from   
- education: (see [address] RN note) 
- goals: *BG will be in target range of  mg/dl (non-ICU) by 12/14 *PO intake will be 75% of meals offered by 12/14 *Pt will follow up with OP PCP for Diabetes Management by 12/30 
- TDD: < 24 hour admission - BG range: < 24 hour admission - Hypo: no 
- BG in target range (non-ICU: <140; ICU<180): [] Yes  [x] No 
- Steroids: no 
- Intake:  
  
Patient Vitals for the past 100 hrs: 
 % Diet Eaten 12/03/18 1911 0 % Current Insulin regimen:  
Lantus 35 units daily - Humalog Normal Insulin Sensitivity Corrective Coverage Home medication/insulin regimen: 
Lantus 42 units daily Metformin 500 mg BID Recent Glucose Results:  
Lab Results Component Value Date/Time  (H) 12/04/2018 01:13 AM  
 GLUCPOC 152 (H) 12/04/2018 06:33 AM  
 GLUCPOC 166 (H) 12/03/2018 09:48 PM  
 GLUCPOC 181 (H) 12/03/2018 06:33 PM  
Adequate glycemic control PTA:  [] Yes  [x] No 
 
HbA1c: equivalent  to ave BGlucose of 183 mg/dl for 2-3 months prior to admission Lab Results Component Value Date/Time Hemoglobin A1c 8.0 (H) 12/03/2018 05:10 AM  
 
Diet:  
Active Orders Diet DIET DIABETIC CONSISTENT CARB Regular; AHA-LOW-CHOL FAT Tamanna Rodriguez MS, RN, CDE Glycemic Control Team 
[phone] Pager [phone] (M-TH 8:00-4:30P) *After Hours pager [phone]

## 2018-12-04 NOTE — PROGRESS NOTES
Problem: Falls - Risk of 
Goal: *Absence of Falls Document Rosendo Yu Fall Risk and appropriate interventions in the flowsheet. Outcome: Progressing Towards Goal 
Fall Risk Interventions: 
Mobility Interventions: Patient to call before getting OOB Medication Interventions: Teach patient to arise slowly

## 2018-12-05 PROBLEM — M25.569 KNEE PAIN: Status: ACTIVE | Noted: 2018-12-05

## 2018-12-05 PROBLEM — M54.9 BACK PAIN: Status: ACTIVE | Noted: 2018-12-05

## 2018-12-05 LAB
ANION GAP SERPL CALC-SCNC: 7 MMOL/L (ref 3–18)
APTT PPP: 108.5 SEC (ref 23–36.4)
BASOPHILS # BLD: 0 K/UL (ref 0–0.1)
BASOPHILS NFR BLD: 0 % (ref 0–2)
BUN SERPL-MCNC: 14 MG/DL (ref 7–18)
BUN/CREAT SERPL: 18
CALCIUM SERPL-MCNC: 8.2 MG/DL (ref 8.5–10.1)
CHLORIDE SERPL-SCNC: 103 MMOL/L (ref 100–108)
CO2 SERPL-SCNC: 31 MMOL/L (ref 21–32)
CREAT SERPL-MCNC: 0.8 MG/DL (ref 0.6–1.3)
DIFFERENTIAL METHOD BLD: NORMAL
EOSINOPHIL # BLD: 0.1 K/UL (ref 0–0.4)
EOSINOPHIL NFR BLD: 2 % (ref 0–5)
ERYTHROCYTE [DISTWIDTH] IN BLOOD BY AUTOMATED COUNT: 13 % (ref 11.6–14.5)
GLUCOSE BLD STRIP.AUTO-MCNC: 123 MG/DL (ref 70–110)
GLUCOSE BLD STRIP.AUTO-MCNC: 145 MG/DL (ref 70–110)
GLUCOSE BLD STRIP.AUTO-MCNC: 176 MG/DL (ref 70–110)
GLUCOSE BLD STRIP.AUTO-MCNC: 186 MG/DL (ref 70–110)
GLUCOSE SERPL-MCNC: 122 MG/DL (ref 74–99)
HCT VFR BLD AUTO: 37.5 % (ref 35–45)
HGB BLD-MCNC: 12.1 G/DL (ref 12–16)
LYMPHOCYTES # BLD: 2.8 K/UL (ref 0.9–3.6)
LYMPHOCYTES NFR BLD: 42 % (ref 21–52)
MAGNESIUM SERPL-MCNC: 2.1 MG/DL (ref 1.6–2.6)
MCH RBC QN AUTO: 28.8 PG (ref 24–34)
MCHC RBC AUTO-ENTMCNC: 32.3 G/DL (ref 31–37)
MCV RBC AUTO: 89.3 FL (ref 74–97)
MONOCYTES # BLD: 0.5 K/UL (ref 0.05–1.2)
MONOCYTES NFR BLD: 7 % (ref 3–10)
NEUTS SEG # BLD: 3.3 K/UL (ref 1.8–8)
NEUTS SEG NFR BLD: 49 % (ref 40–73)
PLATELET # BLD AUTO: 227 K/UL (ref 135–420)
PMV BLD AUTO: 9.8 FL (ref 9.2–11.8)
POTASSIUM SERPL-SCNC: 4.1 MMOL/L (ref 3.5–5.5)
RBC # BLD AUTO: 4.2 M/UL (ref 4.2–5.3)
SODIUM SERPL-SCNC: 141 MMOL/L (ref 136–145)
WBC # BLD AUTO: 6.7 K/UL (ref 4.6–13.2)

## 2018-12-05 PROCEDURE — 74011250636 HC RX REV CODE- 250/636: Performed by: HOSPITALIST

## 2018-12-05 PROCEDURE — 36415 COLL VENOUS BLD VENIPUNCTURE: CPT

## 2018-12-05 PROCEDURE — 80048 BASIC METABOLIC PNL TOTAL CA: CPT

## 2018-12-05 PROCEDURE — 85025 COMPLETE CBC W/AUTO DIFF WBC: CPT

## 2018-12-05 PROCEDURE — 74011250636 HC RX REV CODE- 250/636: Performed by: INTERNAL MEDICINE

## 2018-12-05 PROCEDURE — 83735 ASSAY OF MAGNESIUM: CPT

## 2018-12-05 PROCEDURE — 85730 THROMBOPLASTIN TIME PARTIAL: CPT

## 2018-12-05 PROCEDURE — 82962 GLUCOSE BLOOD TEST: CPT

## 2018-12-05 PROCEDURE — 74011250636 HC RX REV CODE- 250/636: Performed by: EMERGENCY MEDICINE

## 2018-12-05 PROCEDURE — 74011250637 HC RX REV CODE- 250/637: Performed by: HOSPITALIST

## 2018-12-05 PROCEDURE — C9113 INJ PANTOPRAZOLE SODIUM, VIA: HCPCS | Performed by: HOSPITALIST

## 2018-12-05 PROCEDURE — 65660000000 HC RM CCU STEPDOWN

## 2018-12-05 PROCEDURE — 74011636637 HC RX REV CODE- 636/637: Performed by: HOSPITALIST

## 2018-12-05 PROCEDURE — 74011000250 HC RX REV CODE- 250: Performed by: HOSPITALIST

## 2018-12-05 RX ORDER — LIDOCAINE 4 G/100G
1 PATCH TOPICAL EVERY 24 HOURS
Status: DISCONTINUED | OUTPATIENT
Start: 2018-12-05 | End: 2018-12-06 | Stop reason: HOSPADM

## 2018-12-05 RX ORDER — HEPARIN SODIUM 10000 [USP'U]/100ML
18-36 INJECTION, SOLUTION INTRAVENOUS
Status: DISCONTINUED | OUTPATIENT
Start: 2018-12-05 | End: 2018-12-05

## 2018-12-05 RX ORDER — HYDRALAZINE HYDROCHLORIDE 20 MG/ML
10 INJECTION INTRAMUSCULAR; INTRAVENOUS AS NEEDED
Status: DISCONTINUED | OUTPATIENT
Start: 2018-12-05 | End: 2018-12-06 | Stop reason: HOSPADM

## 2018-12-05 RX ADMIN — RIVAROXABAN 15 MG: 15 TABLET, FILM COATED ORAL at 13:46

## 2018-12-05 RX ADMIN — MORPHINE SULFATE 1 MG: 2 INJECTION, SOLUTION INTRAMUSCULAR; INTRAVENOUS at 13:00

## 2018-12-05 RX ADMIN — INSULIN LISPRO 2 UNITS: 100 INJECTION, SOLUTION INTRAVENOUS; SUBCUTANEOUS at 22:00

## 2018-12-05 RX ADMIN — ALLOPURINOL 100 MG: 100 TABLET ORAL at 08:17

## 2018-12-05 RX ADMIN — SIMVASTATIN 10 MG: 10 TABLET, FILM COATED ORAL at 22:48

## 2018-12-05 RX ADMIN — ACETAMINOPHEN 650 MG: 325 TABLET ORAL at 04:48

## 2018-12-05 RX ADMIN — DIFLUPREDNATE 1 DROP: 0.5 EMULSION OPHTHALMIC at 22:58

## 2018-12-05 RX ADMIN — INSULIN GLARGINE 35 UNITS: 100 INJECTION, SOLUTION SUBCUTANEOUS at 08:18

## 2018-12-05 RX ADMIN — GABAPENTIN 300 MG: 300 CAPSULE ORAL at 22:48

## 2018-12-05 RX ADMIN — ACETAMINOPHEN 650 MG: 325 TABLET ORAL at 10:56

## 2018-12-05 RX ADMIN — HEPARIN SODIUM 12 UNITS/KG/HR: 10000 INJECTION, SOLUTION INTRAVENOUS at 07:30

## 2018-12-05 RX ADMIN — GABAPENTIN 300 MG: 300 CAPSULE ORAL at 08:17

## 2018-12-05 RX ADMIN — GABAPENTIN 300 MG: 300 CAPSULE ORAL at 16:46

## 2018-12-05 RX ADMIN — INSULIN LISPRO 2 UNITS: 100 INJECTION, SOLUTION INTRAVENOUS; SUBCUTANEOUS at 12:10

## 2018-12-05 RX ADMIN — ALLOPURINOL 100 MG: 100 TABLET ORAL at 22:48

## 2018-12-05 RX ADMIN — HEPARIN SODIUM 12 UNITS/KG/HR: 10000 INJECTION, SOLUTION INTRAVENOUS at 11:01

## 2018-12-05 RX ADMIN — HYDRALAZINE HYDROCHLORIDE 10 MG: 20 INJECTION INTRAMUSCULAR; INTRAVENOUS at 07:22

## 2018-12-05 RX ADMIN — PANTOPRAZOLE SODIUM 40 MG: 40 INJECTION, POWDER, FOR SOLUTION INTRAVENOUS at 08:18

## 2018-12-05 RX ADMIN — MORPHINE SULFATE 1 MG: 2 INJECTION, SOLUTION INTRAMUSCULAR; INTRAVENOUS at 22:51

## 2018-12-05 RX ADMIN — ATENOLOL 100 MG: 50 TABLET ORAL at 08:18

## 2018-12-05 NOTE — PROGRESS NOTES
Cannot do VQ scan today as pt had nuclear stress test yesterday. Residual isotope will still remain in the heart obstructing images of the lungs. VQ cannot be done until tomorrow if still needed

## 2018-12-05 NOTE — PROGRESS NOTES
Verbal bedside received from [facility] off going nurse. Assumed patient care, bed in low position, call light within reach, white board updated. 2000 Meds administered and assessment completed. 2300 Patient Bp trending up. 150/92, talked to Dr. Effie Washburn about it. Order given for hydralazine 10 mg for EO>787 systolic and >329 diastolic.   
 
7756 Patient had uneventful night. Complains of pain reported was addressed. NAD 
 
0700 patient /71. Hydralazine 10 mg administered. Bedside and Verbal shift change report given to Arielle Leyva RN (oncoming nurse) by Will Christensen (offgoing nurse). Report included the following information SBAR, Kardex and MAR.

## 2018-12-05 NOTE — PROGRESS NOTES
Problem: Falls - Risk of 
Goal: *Absence of Falls Document Kellie Dia Fall Risk and appropriate interventions in the flowsheet. Outcome: Progressing Towards Goal 
Fall Risk Interventions: 
Mobility Interventions: Patient to call before getting OOB, Utilize gait belt for transfers/ambulation Medication Interventions: Teach patient to arise slowly, Patient to call before getting OOB

## 2018-12-05 NOTE — PROGRESS NOTES
DC Plan: DC to son's house, possible need for [facility] Chart reviewed. Pt admitted for chest pain. Attended IDRs with MD Carmine Kenney and primary RN Jason Aranda. Pt will likely dc in 24-48hrs. MD Carmine Kenney will place order for PT eval.  Pt will have VQ scan tomorrow. This CM met with pt at bedside. Pt states she lives alone, but will dc to son, Bridger crespo. Pt has a cane. Offered FOC for [facility] and provided pt a HH list.  Pt will discuss with son and get back with CM. Will await PT recommendations. No other dc concerns identified. CM will cont to follow.

## 2018-12-05 NOTE — PROGRESS NOTES
Hospitalist Progress Note-critical care note Patient: Grisel Ortega MRN: [id number]  [facility]: [id number] YOB: 1945  Age: 72 y.o. Sex: female DOA: 12/3/2018 LOS:  LOS: 2 days Chief complaint: dvt. Chest pain, htn, dm , knee pain, back pain Assessment/Plan Hospital Problems  Never Reviewed Codes Class Noted POA * (Principal) Chest pain ICD-10-CM: R07.9 ICD-9-CM: 786.50  12/3/2018 Unknown CAD (coronary artery disease) ICD-10-CM: I25.10 ICD-9-CM: 414.00  12/3/2018 Unknown Hypertension ICD-10-CM: I10 
ICD-9-CM: 401.9  12/3/2018 Unknown Diabetes ([address] ICD-10-CM: E11.9 ICD-9-CM: 250.00  12/3/2018 Unknown Gastrointestinal disorder ICD-10-CM: K92.9 ICD-9-CM: 569.9  12/3/2018 Unknown Acute DVT of left tibial vein (HCC) ICD-10-CM: Z03.993 ICD-9-CM: 453.42  12/3/2018 Unknown Morbid obesity with BMI of 40.0-44.9, adult [facility]) ICD-10-CM: E66.01, Z68.41 
ICD-9-CM: 278.01, V85.41  12/3/2018 Unknown Gout ICD-10-CM: M10.9 ICD-9-CM: 274.9  12/3/2018 Unknown Chest pain and  cad No chest pain overnight  
on heparin gtt , will d/c  
Stress test done negative  
cta : no PE in main artery, can not exclude small one Will have v/q test -tomorrow  
  
  
  
dvt:left posterior tibial veins On heparin gtt . Agree to have xarlto . Will start today Monitoring h/h  
  
  
DM type II , with complication, 
-long term insulin , Complication with cad Well controlled  
-on lantus,  ssi, diabetic diet , hypoglycemia protocol   
  
 HTN, accelerated Continue home medication. 
  
Leg swelling Left dvt improving  
  
gerd  
ppi  
  
Gout: so far stable. On allopurinol  
  
Morbid obesity Continue lifestyle modification Knee pain/back pain Lidocaine patch Will d/c home tomorrow after v/q scan Pt/ot today Subjective: knee pain. Back pain Disposition :1-2 days Review of systems: General: No fevers or chills. Cardiovascular: No chest pain or pressure. No palpitations. Pulmonary: No shortness of breath. Gastrointestinal: No nausea, vomiting. Vital signs/Intake and Output: 
Visit Vitals /58 (BP 1 Location: Left arm, BP Patient Position: Sitting) Pulse 64 Temp 97.4 °F (36.3 °C) Resp 18 Ht 5' 4\" (1.626 m) Wt 122.3 kg (269 lb 10 oz) SpO2 100% BMI 46.28 kg/m² Current Shift:  No intake/output data recorded. Last three shifts:  12/03 1901 - 12/05 0700 In: 360 [P.O.:360] Out: 0 Physical Exam: 
General: WD, WN. Alert, cooperative, no acute distress   
HEENT: NC, Atraumatic. PERRLA, anicteric sclerae. Lungs: CTA Bilaterally. No Wheezing/Rhonchi/Rales. Heart:  Regular  rhythm,  No murmur, No Rubs, No Gallops Abdomen: Soft, Non distended, Non tender.  +Bowel sounds, Extremities: No c/c/e Psych:   Not anxious or agitated. Neurologic:  No acute neurological deficit. Labs: Results:  
   
Chemistry Recent Labs 12/05/18 0425 12/04/18 
0113 12/03/18 
0510 * 128* 134*  138 142  
K 4.1 3.7 3.5  102 102 CO2 31 29 31 BUN 14 12 16 CREA 0.80 0.76 0.88 CA 8.2* 7.9* 8.6 AGAP 7 7 9 BUCR 18 16 18 AP  --   --  94  
TP  --   --  7.6 ALB  --   --  3.0*  
GLOB  --   --  4.6* AGRAT  --   --  0.7* CBC w/Diff Recent Labs 12/05/18 
0425 12/04/18 
0113 12/03/18 
0510 WBC 6.7 7.5 7.7  
RBC 4.20 4.24 4.47 HGB 12.1 12.2 12.8 HCT 37.5 37.4 39.2  210 230 GRANS 49 45 51 LYMPH 42 45 41 EOS 2 2 2 Cardiac Enzymes Recent Labs 12/04/18 
0113 12/03/18 1201 [address] * 376* CKND1 1.7 1.7 Coagulation Recent Labs 12/05/18 
0425 12/04/18 
1300 APTT 108.5* 82.9* Lipid Panel No results found for: CHOL, CHOLPOCT, CHOLX, CHLST, CHOLV, 930292, HDL, LDL, LDLC, DLDLP, 563912, VLDLC, VLDL, TGLX, TRIGL, TRIGP, TGLPOCT, CHHD, CHHDX BNP No results for input(s): BNPP in the last 72 hours. Liver Enzymes Recent Labs 12/03/18 
0510 TP 7.6 ALB 3.0* AP 94 SGOT 22 Thyroid Studies No results found for: T4, T3U, TSH, TSHEXT, TSHEXT Procedures/imaging: see electronic medical records for all procedures/Xrays and details which were not copied into this note but were reviewed prior to creation of Plan Mikal Rogers MD

## 2018-12-06 ENCOUNTER — APPOINTMENT (OUTPATIENT)
Dept: NUCLEAR MEDICINE | Age: 73
DRG: 300 | End: 2018-12-06
Attending: HOSPITALIST
Payer: MEDICARE

## 2018-12-06 VITALS
SYSTOLIC BLOOD PRESSURE: 121 MMHG | RESPIRATION RATE: 20 BRPM | WEIGHT: 272.6 LBS | HEART RATE: 63 BPM | DIASTOLIC BLOOD PRESSURE: 53 MMHG | BODY MASS INDEX: 46.54 KG/M2 | HEIGHT: 64 IN | OXYGEN SATURATION: 100 % | TEMPERATURE: 98.2 F

## 2018-12-06 LAB
ANION GAP SERPL CALC-SCNC: 6 MMOL/L (ref 3–18)
BASOPHILS # BLD: 0 K/UL (ref 0–0.1)
BASOPHILS NFR BLD: 0 % (ref 0–2)
BUN SERPL-MCNC: 14 MG/DL (ref 7–18)
BUN/CREAT SERPL: 18
CALCIUM SERPL-MCNC: 8.3 MG/DL (ref 8.5–10.1)
CHLORIDE SERPL-SCNC: 102 MMOL/L (ref 100–108)
CO2 SERPL-SCNC: 31 MMOL/L (ref 21–32)
CREAT SERPL-MCNC: 0.8 MG/DL (ref 0.6–1.3)
DIFFERENTIAL METHOD BLD: ABNORMAL
EOSINOPHIL # BLD: 0.1 K/UL (ref 0–0.4)
EOSINOPHIL NFR BLD: 2 % (ref 0–5)
ERYTHROCYTE [DISTWIDTH] IN BLOOD BY AUTOMATED COUNT: 13 % (ref 11.6–14.5)
GLUCOSE BLD STRIP.AUTO-MCNC: 126 MG/DL (ref 70–110)
GLUCOSE BLD STRIP.AUTO-MCNC: 171 MG/DL (ref 70–110)
GLUCOSE SERPL-MCNC: 114 MG/DL (ref 74–99)
HCT VFR BLD AUTO: 37.2 % (ref 35–45)
HGB BLD-MCNC: 12 G/DL (ref 12–16)
LYMPHOCYTES # BLD: 2.6 K/UL (ref 0.9–3.6)
LYMPHOCYTES NFR BLD: 41 % (ref 21–52)
MAGNESIUM SERPL-MCNC: 2.2 MG/DL (ref 1.6–2.6)
MCH RBC QN AUTO: 28.6 PG (ref 24–34)
MCHC RBC AUTO-ENTMCNC: 32.3 G/DL (ref 31–37)
MCV RBC AUTO: 88.8 FL (ref 74–97)
MONOCYTES # BLD: 0.7 K/UL (ref 0.05–1.2)
MONOCYTES NFR BLD: 11 % (ref 3–10)
NEUTS SEG # BLD: 2.8 K/UL (ref 1.8–8)
NEUTS SEG NFR BLD: 46 % (ref 40–73)
PLATELET # BLD AUTO: 231 K/UL (ref 135–420)
PMV BLD AUTO: 9.7 FL (ref 9.2–11.8)
POTASSIUM SERPL-SCNC: 4.2 MMOL/L (ref 3.5–5.5)
RBC # BLD AUTO: 4.19 M/UL (ref 4.2–5.3)
SODIUM SERPL-SCNC: 139 MMOL/L (ref 136–145)
WBC # BLD AUTO: 6.3 K/UL (ref 4.6–13.2)

## 2018-12-06 PROCEDURE — 74011636637 HC RX REV CODE- 636/637: Performed by: HOSPITALIST

## 2018-12-06 PROCEDURE — 94660 CPAP INITIATION&MGMT: CPT

## 2018-12-06 PROCEDURE — 36415 COLL VENOUS BLD VENIPUNCTURE: CPT

## 2018-12-06 PROCEDURE — 85025 COMPLETE CBC W/AUTO DIFF WBC: CPT

## 2018-12-06 PROCEDURE — 80048 BASIC METABOLIC PNL TOTAL CA: CPT

## 2018-12-06 PROCEDURE — 74011250637 HC RX REV CODE- 250/637: Performed by: HOSPITALIST

## 2018-12-06 PROCEDURE — C9113 INJ PANTOPRAZOLE SODIUM, VIA: HCPCS | Performed by: HOSPITALIST

## 2018-12-06 PROCEDURE — A9540 TC99M MAA: HCPCS

## 2018-12-06 PROCEDURE — 74011000250 HC RX REV CODE- 250: Performed by: HOSPITALIST

## 2018-12-06 PROCEDURE — 74011250636 HC RX REV CODE- 250/636: Performed by: HOSPITALIST

## 2018-12-06 PROCEDURE — 82962 GLUCOSE BLOOD TEST: CPT

## 2018-12-06 PROCEDURE — 77030018846 HC SOL IRR STRL H20 ICUM -A

## 2018-12-06 PROCEDURE — 83735 ASSAY OF MAGNESIUM: CPT

## 2018-12-06 RX ORDER — ASPIRIN 81 MG/1
81 TABLET ORAL DAILY
Qty: 30 TAB | Refills: 0 | Status: SHIPPED | OUTPATIENT
Start: 2018-12-06

## 2018-12-06 RX ADMIN — PANTOPRAZOLE SODIUM 40 MG: 40 INJECTION, POWDER, FOR SOLUTION INTRAVENOUS at 09:39

## 2018-12-06 RX ADMIN — INSULIN GLARGINE 35 UNITS: 100 INJECTION, SOLUTION SUBCUTANEOUS at 09:39

## 2018-12-06 RX ADMIN — MORPHINE SULFATE 1 MG: 2 INJECTION, SOLUTION INTRAMUSCULAR; INTRAVENOUS at 09:50

## 2018-12-06 RX ADMIN — RIVAROXABAN 15 MG: 15 TABLET, FILM COATED ORAL at 09:39

## 2018-12-06 RX ADMIN — GABAPENTIN 300 MG: 300 CAPSULE ORAL at 09:39

## 2018-12-06 RX ADMIN — ALLOPURINOL 100 MG: 100 TABLET ORAL at 09:39

## 2018-12-06 RX ADMIN — INSULIN LISPRO 2 UNITS: 100 INJECTION, SOLUTION INTRAVENOUS; SUBCUTANEOUS at 11:30

## 2018-12-06 RX ADMIN — MORPHINE SULFATE 1 MG: 2 INJECTION, SOLUTION INTRAMUSCULAR; INTRAVENOUS at 05:43

## 2018-12-06 RX ADMIN — ATENOLOL 100 MG: 50 TABLET ORAL at 09:39

## 2018-12-06 NOTE — DISCHARGE SUMMARY
Discharge Summary    Patient: Nadja Patel MRN: [id number]  CSN: [id number]    YOB: 1945  Age: 72 y.o. Sex: female    DOA: 12/3/2018 LOS:  LOS: 3 days   Discharge Date: 12/6/2018     Primary Care Provider:  Trevon Villa MD    Admission Diagnoses: Chest pain [R07.9]    Discharge Diagnoses:    Hospital Problems  Never Reviewed          Codes Class Noted POA    Knee pain ICD-10-CM: M25.569  ICD-9-CM: 719.46  12/5/2018 Unknown        Back pain ICD-10-CM: M54.9  ICD-9-CM: 724.5  12/5/2018 Unknown        * (Principal) Chest pain ICD-10-CM: R07.9  ICD-9-CM: 786.50  12/3/2018 Unknown        CAD (coronary artery disease) ICD-10-CM: I25.10  ICD-9-CM: 414.00  12/3/2018 Unknown        Hypertension ICD-10-CM: I10  ICD-9-CM: 401.9  12/3/2018 Unknown        Diabetes ([facility] ICD-10-CM: E11.9  ICD-9-CM: 250.00  12/3/2018 Unknown        Gastrointestinal disorder ICD-10-CM: K92.9  ICD-9-CM: 569.9  12/3/2018 Unknown        Acute DVT of left tibial vein ([facility] ICD-10-CM: R64.716  ICD-9-CM: 453.42  12/3/2018 Unknown        Morbid obesity with BMI of 40.0-44.9, adult [facility]) ICD-10-CM: E66.01, Z68.41  ICD-9-CM: 278.01, V85.41  12/3/2018 Unknown        Gout ICD-10-CM: M10.9  ICD-9-CM: 274.9  12/3/2018 Unknown              Discharge Condition: Stable    Discharge Medications:     Discharge Medication List as of 12/6/2018  1:06 PM      START taking these medications    Details   rivaroxaban (XARELTO) 15 mg (42)- 20 mg (9) DsPk Take one 15 mg tablet twice a day with food for the first 21 days. Then, take one 20 mg tablet once a day with food for 9 days. , Normal, Disp-1 Dose Pack, R-0      aspirin delayed-release 81 mg tablet Take 1 Tab by mouth daily. , Normal, Disp-30 Tab, R-0         CONTINUE these medications which have NOT CHANGED    Details   atenolol (TENORMIN) 100 mg tablet Take 100 mg by mouth daily. , Historical Med      traZODone (DESYREL) 50 mg tablet Take 50 mg by mouth nightly as needed for Sleep., Historical Med      simvastatin (ZOCOR) 10 mg tablet Take 10 mg by mouth nightly., Historical Med      metFORMIN (GLUCOPHAGE) 500 mg tablet Take 500 mg by mouth two (2) times daily (with meals). , Historical Med      multivitamin, tx-iron-ca-min (THERA-M W/ IRON) 9 mg iron-400 mcg tab tablet Take 1 Tab by mouth daily. Formulary substitution for DAILY MULTIVITAMIN, Historical Med      ergocalciferol (VITAMIN D2) 50,000 unit capsule Take 50,000 Units by mouth every Monday., Historical Med      insulin glargine (LANTUS U-100 INSULIN) 100 unit/mL injection 42 Units by SubCUTAneous route nightly., Historical Med      allopurinol (ZYLOPRIM) 100 mg tablet Take 100 mg by mouth two (2) times a day., Historical Med      gabapentin (NEURONTIN) 300 mg capsule Take 300 mg by mouth three (3) times daily. , Historical Med      Difluprednate (DUREZOL) 0.05 % ophthalmic emulsion Administer 1 Drop to right eye four (4) times daily. In relation to cataract surgery, Historical Med      cholecalciferol, vitamin D3, (VITAMIN D3) 2,000 unit tab Take 2,000 Units by mouth daily. , Historical Med         STOP taking these medications       clopidogrel (PLAVIX) 75 mg tablet Comments:   Reason for Stopping:               Procedures : stress test     Consults: Cardiology      PHYSICAL EXAM   Visit Vitals  /53 (BP 1 Location: Left arm, BP Patient Position: At rest;Supine)   Pulse 63   Temp 98.2 °F (36.8 °C)   Resp 20   Ht 5' 4\" (1.626 m)   Wt 123.7 kg (272 lb 9.6 oz)   SpO2 100%   BMI 46.79 kg/m²     General: Awake, cooperative, no acute distress    HEENT: NC, Atraumatic. PERRLA, EOMI. Anicteric sclerae. Lungs:  CTA Bilaterally. No Wheezing/Rhonchi/Rales. Heart:  Regular  rhythm,  No murmur, No Rubs, No Gallops  Abdomen: Soft, Non distended, Non tender. +Bowel sounds,   Extremities: No c/c/e  Psych:   Not anxious or agitated. Neurologic:  No acute neurological deficits.                                      Admission HPI :   Akosua Ortiz is a 72 y.o. female who hx of cad, HTN, gout , obesity came to ER due to chest pain and leg swellings. She had hx of MI, not f/u with cardiology. She  stopped plavix for one month per herself due to recurrent gout. She has on and off chest pain-located epigastric area radiated to her back for several days. She \"prayed-some times works\". The pain is dull and sharp, not related to palpitation or sob. She came to the hospital suspected some gallbladder disease. She also reported legs swelling for several days, rt side-chronic swelling and left side was new. In ER, her trop wnl, but ck-mb elevated. cta no pe in big artery. pvl indicated left leg dvt         Hospital Course :   Since she was admitted, iv  Heparin gtt were administrated for possible acs and dvt. Cardiology on board, stress test negative. acs was ruled out. On admission, cta can not ruled out PE, v/q scan was performed and PE was ruled out. She agrees to take xarelto for her dvt. She was on plavix for her cad, case discussed with Dr. Kathleen Brar, he is ok to hold palvix, recommend aspirin. She was cleared to be d/c home per cardiology. Her gout was stable during her stay and she needs to f/u with her pcm for her knee pain and back pain.      Activity: Activity as tolerated    Diet: Cardiac Diet    Follow-up: pcm and Dr. Kathleen Brar     Disposition: home     Minutes spent on discharge: 45 min       Labs: Results:       Chemistry Recent Labs     12/06/18  0400 12/05/18  0425 12/04/18  0113   * 122* 128*    141 138   K 4.2 4.1 3.7    103 102   CO2 31 31 29   BUN 14 14 12   CREA 0.80 0.80 0.76   CA 8.3* 8.2* 7.9*   AGAP 6 7 7   BUCR 18 18 16      CBC w/Diff Recent Labs     12/06/18  0400 12/05/18  0425 12/04/18  0113   WBC 6.3 6.7 7.5   RBC 4.19* 4.20 4.24   HGB 12.0 12.1 12.2   HCT 37.2 37.5 37.4    227 210   GRANS 46 49 45   LYMPH 41 42 45   EOS 2 2 2      Cardiac Enzymes Recent Labs     12/04/18  0113 12/03/18  2215   * 376*   CKND1 1.7 1.7      Coagulation Recent Labs     12/05/18  0425 12/04/18  1300   APTT 108.5* 82.9*       Lipid Panel No results found for: CHOL, CHOLPOCT, CHOLX, CHLST, CHOLV, 930803, HDL, LDL, LDLC, DLDLP, 559753, VLDLC, VLDL, TGLX, TRIGL, TRIGP, TGLPOCT, CHHD, CHHDX   BNP No results for input(s): BNPP in the last 72 hours. Liver Enzymes No results for input(s): TP, ALB, TBIL, AP, SGOT, GPT in the last 72 hours. No lab exists for component: DBIL   Thyroid Studies No results found for: T4, T3U, TSH, TSHEXT, TSHEXT         Significant Diagnostic Studies: Cta Chest W Or W Wo Cont    Result Date: 12/3/2018  EXAM: CTA CHEST INDICATION: Chest pain, acute; pulmonary embolism (PE) suspected COMPARISON: CT chest 09/01/2012 TECHNIQUE: Axial CT imaging from the thoracic inlet through the diaphragm with intravenous contrast utilizing CTA study for pulmonary artery evaluation. Coronal and sagittal MIP reformations were generated at a separate workstation. One or more dose reduction techniques were used on this CT: automated exposure control, adjustment of the mAs and/or kVp according to patient's size, and iterative reconstruction techniques. The specific techniques utilized on this CT exam have been documented in the patient's electronic medical record. _______________ FINDINGS: EXAM QUALITY: Overall exam quality is suboptimal. Pulmonary arterial enhancement is adequate. The breath hold is suboptimal. Respiratory motion artifact is present, limiting evaluation, especially in the lung bases. Scatter artifact is produced by dense contrast within the SVC PULMONARY ARTERIES: No convincing evidence of pulmonary embolism. No large or central PE is seen. Respiratory motion artifact does degrade evaluation of segmental and subsegmental arteries particularly in the lung bases. MEDIASTINUM: Mild atherosclerotic calcifications are present.  Normal variant branching pattern of great vessels along aortic arch is present with a conjoined origin of the innominate artery and left common carotid artery. Coronary artery calcifications are present. PLEURA: Unremarkable. LYMPH NODES: No enlarged lymph nodes by size criteria. LUNGS/AIRWAY: No consolidation is seen. Tracheomalacia and bronchomalacia is seen. Small subpleural curvilinear densities are present in both lung bases greater on the right scarring or atelectasis. No definite suspicious pulmonary nodules are seen. UPPER ABDOMEN: Hiatal hernia is present. Visualized upper abdominal structures are unremarkable. OSSEOUS STRUCTURES: Degenerative changes are seen in the spine. OTHER: None _______________     IMPRESSION: Motion degraded study. 1.  No convincing evidence of pulmonary embolism. No large or central PE. Cannot exclude small distal filling defects given motion artifact. 2.  No consolidation. Mild amount of bibasilar scarring versus atelectasis. Xr Chest Port    Result Date: 12/3/2018  EXAM: Chest portable INDICATION: Chest pain COMPARISON: Single view chest 1/29/2018 _______________ FINDINGS: AP portable chest film was performed. Lungs remain clear. No effusion or pneumothorax. Heart and pulmonary vascularity are normal for AP technique. _______________     IMPRESSION: 1. No acute cardiopulmonary disease.             [facility]     CC: Royal Hilton MD

## 2018-12-06 NOTE — ROUTINE PROCESS
Bedside and Verbal shift change report given to KENDAL COPELAND [facility] (oncoming nurse) by Susanne Villareal RN 
 (offgoing nurse). Report included the following information SBAR, Kardex, Intake/Output, MAR and Recent Results.

## 2018-12-06 NOTE — DISCHARGE INSTRUCTIONS
Deep Vein Thrombosis: Care Instructions  Your Care Instructions    A deep vein thrombosis (DVT) is a blood clot in certain veins of the legs, pelvis, or arms. Blood clots in these veins need to be treated because they can get bigger, break loose, and travel through the bloodstream to the lungs. A blood clot in a lung can be life-threatening. The doctor may have given you a blood thinner (anticoagulant). A blood thinner can stop the blood clot from growing larger and prevent new clots from forming. You will need to take a blood thinner for 3 to 6 months or longer. The doctor has checked you carefully, but problems can develop later. If you notice any problems or new symptoms, get medical treatment right away. Follow-up care is a key part of your treatment and safety. Be sure to make and go to all appointments, and call your doctor if you are having problems. It's also a good idea to know your test results and keep a list of the medicines you take. How can you care for yourself at home? · Take your medicines exactly as prescribed. Call your doctor if you think you are having a problem with your medicine. · If you are taking a blood thinner, be sure you get instructions about how to take your medicine safely. Blood thinners can cause serious bleeding problems. · Wear compression stockings if your doctor recommends them. These stockings are tighter at the feet than on the legs. They may reduce pain and swelling in your legs. But there are different types of stockings, and they need to fit right. So your doctor will recommend what you need. · When you sit, use a pillow to raise the arm or leg that has the blood clot. Try to keep it above the level of your heart. When should you call for help? Call 911 anytime you think you may need emergency care. For example, call if:    · You passed out (lost consciousness).     · You have symptoms of a blood clot in your lung (called a pulmonary embolism).  These include:  ? Sudden chest pain. ? Trouble breathing. ? Coughing up blood.    Call your doctor now or seek immediate medical care if:    · You have new or worse trouble breathing.     · You are dizzy or lightheaded, or you feel like you may faint.     · You have symptoms of a blood clot in your arm or leg. These may include:  ? Pain in the arm, calf, back of the knee, thigh, or groin. ? Redness and swelling in the arm, leg, or groin.    Watch closely for changes in your health, and be sure to contact your doctor if:    · You do not get better as expected. Where can you learn more? Go to http://ebony-marci.info/. Enter M640 in the search box to learn more about \"Deep Vein Thrombosis: Care Instructions. \"  Current as of: November 21, 2017  Content Version: 11.8  © 2635-4845 [employer]. Care instructions adapted under license by [employer] (which disclaims liability or warranty for this information). If you have questions about a medical condition or this instruction, always ask your healthcare professional. [address] any warranty or liability for your use of this information.   Patient armband removed and shredded

## 2018-12-06 NOTE — PROGRESS NOTES
DC Plan:  Discharge home with MD follow up and family assistance Chart reviewed. Discharge order for today noted. Attended IDRs this AM with MD Estuardo Hernandez and primary RN. Met with pt at bedside, offered [facility] for [place]. Pt defers to her son, Alok Body . This CM spoke with son and offered [facility]. He states he is unable to make [place] decision, as he is thinking about having a family member's company utilized for [place]. Home health list and CM card left at bedside. Pt will dc to son's house. No other dc concerns identified by pt or pts son. CM will cont to be available. Care Management Interventions PCP Verified by CM: Yes Mode of Transport at Discharge: Other (see comment)(son) Transition of Care Consult (CM Consult): Discharge Planning Health Maintenance Reviewed: Yes Current Support Network: Relative's Home(will dc to son's house) Confirm Follow Up Transport: Family Plan discussed with Pt/Family/Caregiver: Yes Freedom of Choice Offered: Yes Discharge Location Discharge Placement: Home with family assistance

## 2018-12-06 NOTE — PROGRESS NOTES
Shift progress Not: 
Assumed care of patient in bed awake all night, wore cpcpa part of the night. Continues to c/o of back and rib cage pain. Talking on phone all night. No Shortness of breath or s/s of acute distress or discomfort. Call bell within reach. Patient Vitals for the past 12 hrs: 
 Temp Pulse Resp BP SpO2  
12/06/18 0400 98.8 °F (37.1 °C) 63 19 145/58 100 % 12/06/18 0000 98.2 °F (36.8 °C) 62 18 149/70 100 % 12/05/18 2000 98.2 °F (36.8 °C) 66 19 156/79 100 %

## 2018-12-06 NOTE — ROUTINE PROCESS
Bedside and Verbal shift change report given to Emely Harley RN (oncoming nurse) by Pavithra Elizalde RN (offgoing nurse). Report included the following information SBAR, Kardex, Procedure Summary, Intake/Output, MAR, Accordion and Recent Results.

## 2018-12-06 NOTE — ROUTINE PROCESS
Shift summary: 
0820: Pt transported to [facility] via bed with transporter. 0930: Pt arrived back on unit 
1000: Contacted Janet about diabetes teaching for pt. Stated she will educate pt before discharge. {Dual AVS reviewed with Mattie Iglesias RN. All medications reviewed individually with patient. Opportunities for questions and concerns provided. Patient discharged via (mode of transport ie. Car, ambulance or air transport) wheelchair  Patient's arm band appropriately discarded.

## 2018-12-06 NOTE — DIABETES MGMT
GLYCEMIC CONTROL PROGRESS NOTE: 
 
-known h/o T2DM, HbA1c not within recommended range for age + comorbids on basal + oral home regimen 
-pt c/o pain with Lantus injections at home \"it hurts really bad when I take my shot\" 
-pt denies reuse of needles, cold insulin, or injection into scar tissue or use of same injection sites 
-this writer requested pt have family member bring in pen needle to evaluate if needle length too long and causing pain r/t injecting into muscle vs subq tissue 
-this writer observed pt use 4mm needle (from home) and inject using a 15 degree angle 
-pt educated on the correct technique for insulin injection using 90 angle 
-pt able to demonstrate successful dialing up of correct dose (42 units) and injection using a sample insulin pen 
-educated on the risk of hypoglycemia with pt able to identify appropriate treatment options 
-pt verbalized understanding of above education with no further questions at this time Recent Glucose Results:  
Lab Results Component Value Date/Time  (H) 12/06/2018 04:00 AM  
 GLUCPOC 171 (H) 12/06/2018 11:03 AM  
 GLUCPOC 126 (H) 12/06/2018 06:57 AM  
 GLUCPOC 186 (H) 12/05/2018 09:57 PM  
 
Mendoza Castillo MS, RN, CDE Glycemic Control Team 
[phone] Pager [phone] (M-TH 8:00-4:30P) *After Hours pager [phone]

## 2018-12-06 NOTE — PROGRESS NOTES
Patient up and talking on phone. Convinced patient to get in the bed with legs elevated and place cpap on as ordered. Patient states she uses her cpap @ home . Patient sated \" I sit up with my cpap on and watch TV cause I never really sleep. \"

## 2018-12-06 NOTE — PROGRESS NOTES
OT orders received and chart reviewed. Pt was sitting in chair upon arrival fully dressed and ready to be discharged. Pt does not need skilled OT services at this time.  
 
Thank you for referral, 
Fausto Quick MS, OTR/L

## 2018-12-06 NOTE — PROGRESS NOTES
0010--pt awake and sitting up at the bedside. Pt drinking coffee and having a snack and is not yet ready to go to sleep. RT will check back at a later time. 0100--pt continues to be awake and sitting at bedside, not ready for cpap for HS. RN notified. Will check back. 0200--pt encouraged by RN to get settled into bed and go on cpap. Hospital-issued Resmed Cpap in auto-titrating cpap mode subsequently placed on patient with assistance of RN. No distress noted.

## 2018-12-06 NOTE — PROGRESS NOTES
12/6/2018 PT note: consult received and chart reviewed. Pt dressed and sitting up in chair awaiting discharge on PT arrival.  Reports ambulating with cane without assistance. Observed pt ambulate to transfer chair with supervision/mod I. Pt using cane in L hand due to ?arthritic pain R knee per pt report. Pt may benefit from [address]; pt states her family member \"will take care of all that. \"   No evaluation completed as pt discharging at this time. Thank you.    
Amara Jacome, PT

## 2018-12-12 ENCOUNTER — APPOINTMENT (OUTPATIENT)
Dept: CT IMAGING | Age: 73
DRG: 690 | End: 2018-12-12
Attending: EMERGENCY MEDICINE
Payer: MEDICARE

## 2018-12-12 ENCOUNTER — APPOINTMENT (OUTPATIENT)
Dept: ULTRASOUND IMAGING | Age: 73
DRG: 690 | End: 2018-12-12
Attending: EMERGENCY MEDICINE
Payer: MEDICARE

## 2018-12-12 ENCOUNTER — HOSPITAL ENCOUNTER (EMERGENCY)
Age: 73
Discharge: HOME | DRG: 690 | End: 2018-12-12
Attending: EMERGENCY MEDICINE
Payer: MEDICARE

## 2018-12-12 VITALS
TEMPERATURE: 98 F | OXYGEN SATURATION: 100 % | WEIGHT: 290 LBS | RESPIRATION RATE: 12 BRPM | DIASTOLIC BLOOD PRESSURE: 83 MMHG | HEART RATE: 94 BPM | BODY MASS INDEX: 49.51 KG/M2 | HEIGHT: 64 IN | SYSTOLIC BLOOD PRESSURE: 173 MMHG

## 2018-12-12 DIAGNOSIS — N12 PYELONEPHRITIS: Primary | ICD-10-CM

## 2018-12-12 DIAGNOSIS — D28.1 VAGINAL FIBROIDS: ICD-10-CM

## 2018-12-12 LAB
ALBUMIN SERPL-MCNC: 3.3 G/DL (ref 3.4–5)
ALBUMIN/GLOB SERPL: 0.7 {RATIO} (ref 0.8–1.7)
ALP SERPL-CCNC: 95 U/L (ref 45–117)
ALT SERPL-CCNC: 35 U/L (ref 13–56)
ANION GAP SERPL CALC-SCNC: 6 MMOL/L (ref 3–18)
APPEARANCE UR: ABNORMAL
APTT PPP: 38.6 SEC (ref 23–36.4)
AST SERPL-CCNC: 21 U/L (ref 15–37)
BACTERIA URNS QL MICRO: ABNORMAL /HPF
BASOPHILS # BLD: 0 K/UL (ref 0–0.1)
BASOPHILS NFR BLD: 0 % (ref 0–2)
BILIRUB SERPL-MCNC: 0.4 MG/DL (ref 0.2–1)
BILIRUB UR QL: NEGATIVE
BUN SERPL-MCNC: 18 MG/DL (ref 7–18)
BUN/CREAT SERPL: 20
C TRACH RRNA SPEC QL NAA+PROBE: NEGATIVE
CALCIUM SERPL-MCNC: 8.6 MG/DL (ref 8.5–10.1)
CHLORIDE SERPL-SCNC: 104 MMOL/L (ref 100–108)
CO2 SERPL-SCNC: 30 MMOL/L (ref 21–32)
COLOR UR: ABNORMAL
CREAT SERPL-MCNC: 0.88 MG/DL (ref 0.6–1.3)
DIFFERENTIAL METHOD BLD: NORMAL
EOSINOPHIL # BLD: 0.1 K/UL (ref 0–0.4)
EOSINOPHIL NFR BLD: 1 % (ref 0–5)
EPITH CASTS URNS QL MICRO: ABNORMAL /LPF (ref 0–5)
ERYTHROCYTE [DISTWIDTH] IN BLOOD BY AUTOMATED COUNT: 13 % (ref 11.6–14.5)
GLOBULIN SER CALC-MCNC: 4.8 G/DL (ref 2–4)
GLUCOSE SERPL-MCNC: 136 MG/DL (ref 74–99)
GLUCOSE UR STRIP.AUTO-MCNC: NEGATIVE MG/DL
HCT VFR BLD AUTO: 39.9 % (ref 35–45)
HGB BLD-MCNC: 13 G/DL (ref 12–16)
HGB UR QL STRIP: ABNORMAL
INR PPP: 1.6 (ref 0.8–1.2)
KETONES UR QL STRIP.AUTO: NEGATIVE MG/DL
LEUKOCYTE ESTERASE UR QL STRIP.AUTO: ABNORMAL
LYMPHOCYTES # BLD: 2.1 K/UL (ref 0.9–3.6)
LYMPHOCYTES NFR BLD: 24 % (ref 21–52)
MCH RBC QN AUTO: 29.4 PG (ref 24–34)
MCHC RBC AUTO-ENTMCNC: 32.6 G/DL (ref 31–37)
MCV RBC AUTO: 90.3 FL (ref 74–97)
MONOCYTES # BLD: 0.6 K/UL (ref 0.05–1.2)
MONOCYTES NFR BLD: 7 % (ref 3–10)
N GONORRHOEA RRNA SPEC QL NAA+PROBE: NEGATIVE
NEUTS SEG # BLD: 6 K/UL (ref 1.8–8)
NEUTS SEG NFR BLD: 68 % (ref 40–73)
NITRITE UR QL STRIP.AUTO: POSITIVE
PH UR STRIP: 5.5 [PH] (ref 5–8)
PLATELET # BLD AUTO: 264 K/UL (ref 135–420)
PMV BLD AUTO: 9.8 FL (ref 9.2–11.8)
POTASSIUM SERPL-SCNC: 4.3 MMOL/L (ref 3.5–5.5)
PROT SERPL-MCNC: 8.1 G/DL (ref 6.4–8.2)
PROT UR STRIP-MCNC: 100 MG/DL
PROTHROMBIN TIME: 18.5 SEC (ref 11.5–15.2)
RBC # BLD AUTO: 4.42 M/UL (ref 4.2–5.3)
RBC #/AREA URNS HPF: ABNORMAL /HPF (ref 0–5)
SERVICE CMNT-IMP: NORMAL
SODIUM SERPL-SCNC: 140 MMOL/L (ref 136–145)
SP GR UR REFRACTOMETRY: 1.02 (ref 1–1.03)
SPECIMEN SOURCE: NORMAL
UROBILINOGEN UR QL STRIP.AUTO: 1 EU/DL (ref 0.2–1)
WBC # BLD AUTO: 8.8 K/UL (ref 4.6–13.2)
WBC URNS QL MICRO: >100 /HPF (ref 0–5)
WET PREP GENITAL: NORMAL

## 2018-12-12 PROCEDURE — 99285 EMERGENCY DEPT VISIT HI MDM: CPT

## 2018-12-12 PROCEDURE — 87077 CULTURE AEROBIC IDENTIFY: CPT

## 2018-12-12 PROCEDURE — 76830 TRANSVAGINAL US NON-OB: CPT

## 2018-12-12 PROCEDURE — 96365 THER/PROPH/DIAG IV INF INIT: CPT

## 2018-12-12 PROCEDURE — 85610 PROTHROMBIN TIME: CPT

## 2018-12-12 PROCEDURE — 74011250636 HC RX REV CODE- 250/636: Performed by: EMERGENCY MEDICINE

## 2018-12-12 PROCEDURE — 93005 ELECTROCARDIOGRAM TRACING: CPT

## 2018-12-12 PROCEDURE — 80053 COMPREHEN METABOLIC PANEL: CPT

## 2018-12-12 PROCEDURE — 81001 URINALYSIS AUTO W/SCOPE: CPT

## 2018-12-12 PROCEDURE — 96361 HYDRATE IV INFUSION ADD-ON: CPT

## 2018-12-12 PROCEDURE — 96375 TX/PRO/DX INJ NEW DRUG ADDON: CPT

## 2018-12-12 PROCEDURE — 87591 N.GONORRHOEAE DNA AMP PROB: CPT

## 2018-12-12 PROCEDURE — 96366 THER/PROPH/DIAG IV INF ADDON: CPT

## 2018-12-12 PROCEDURE — 36415 COLL VENOUS BLD VENIPUNCTURE: CPT

## 2018-12-12 PROCEDURE — 87086 URINE CULTURE/COLONY COUNT: CPT

## 2018-12-12 PROCEDURE — 74176 CT ABD & PELVIS W/O CONTRAST: CPT

## 2018-12-12 PROCEDURE — 96376 TX/PRO/DX INJ SAME DRUG ADON: CPT

## 2018-12-12 PROCEDURE — 87186 SC STD MICRODIL/AGAR DIL: CPT

## 2018-12-12 PROCEDURE — 85025 COMPLETE CBC W/AUTO DIFF WBC: CPT

## 2018-12-12 PROCEDURE — 85730 THROMBOPLASTIN TIME PARTIAL: CPT

## 2018-12-12 PROCEDURE — 87210 SMEAR WET MOUNT SALINE/INK: CPT

## 2018-12-12 RX ORDER — CIPROFLOXACIN 500 MG/1
500 TABLET ORAL 2 TIMES DAILY
Qty: 14 TAB | Refills: 0 | Status: ON HOLD | OUTPATIENT
Start: 2018-12-12 | End: 2018-12-18 | Stop reason: SDUPTHER

## 2018-12-12 RX ORDER — CIPROFLOXACIN 500 MG/1
500 TABLET ORAL 2 TIMES DAILY
Qty: 14 TAB | Refills: 0 | Status: SHIPPED | OUTPATIENT
Start: 2018-12-12 | End: 2018-12-12 | Stop reason: SDUPTHER

## 2018-12-12 RX ORDER — ONDANSETRON 2 MG/ML
4 INJECTION INTRAMUSCULAR; INTRAVENOUS
Status: COMPLETED | OUTPATIENT
Start: 2018-12-12 | End: 2018-12-12

## 2018-12-12 RX ORDER — PHENAZOPYRIDINE HYDROCHLORIDE 200 MG/1
200 TABLET, FILM COATED ORAL 3 TIMES DAILY
Qty: 6 TAB | Refills: 0 | Status: SHIPPED | OUTPATIENT
Start: 2018-12-12 | End: 2018-12-12

## 2018-12-12 RX ORDER — MORPHINE SULFATE 4 MG/ML
4 INJECTION INTRAVENOUS
Status: COMPLETED | OUTPATIENT
Start: 2018-12-12 | End: 2018-12-12

## 2018-12-12 RX ORDER — CIPROFLOXACIN 500 MG/1
500 TABLET ORAL 2 TIMES DAILY
Qty: 20 TAB | Refills: 0 | Status: SHIPPED | OUTPATIENT
Start: 2018-12-12 | End: 2018-12-12

## 2018-12-12 RX ORDER — OXYCODONE AND ACETAMINOPHEN 5; 325 MG/1; MG/1
1 TABLET ORAL
Qty: 10 TAB | Refills: 0 | Status: SHIPPED | OUTPATIENT
Start: 2018-12-12 | End: 2019-05-22

## 2018-12-12 RX ORDER — LEVOFLOXACIN 5 MG/ML
750 INJECTION, SOLUTION INTRAVENOUS
Status: COMPLETED | OUTPATIENT
Start: 2018-12-12 | End: 2018-12-12

## 2018-12-12 RX ORDER — PHENAZOPYRIDINE HYDROCHLORIDE 200 MG/1
200 TABLET, FILM COATED ORAL 3 TIMES DAILY
Qty: 6 TAB | Refills: 0 | Status: SHIPPED | OUTPATIENT
Start: 2018-12-12 | End: 2018-12-18

## 2018-12-12 RX ADMIN — ONDANSETRON 4 MG: 2 INJECTION INTRAMUSCULAR; INTRAVENOUS at 02:20

## 2018-12-12 RX ADMIN — MORPHINE SULFATE 4 MG: 4 INJECTION INTRAVENOUS at 04:04

## 2018-12-12 RX ADMIN — SODIUM CHLORIDE 1000 ML: 900 INJECTION, SOLUTION INTRAVENOUS at 02:20

## 2018-12-12 RX ADMIN — LEVOFLOXACIN 750 MG: 5 INJECTION, SOLUTION INTRAVENOUS at 06:53

## 2018-12-12 RX ADMIN — MORPHINE SULFATE 4 MG: 4 INJECTION INTRAVENOUS at 02:20

## 2018-12-12 NOTE — ED TRIAGE NOTES
Pt states \" I am having some sort of reaction to the Xarelto, I am having stomach pains, lower back pain and also vaginal bleeding. I am feeling short of breath and nauseas. \"

## 2018-12-12 NOTE — ED PROVIDER NOTES
EMERGENCY DEPARTMENT HISTORY AND PHYSICAL EXAM    Date: 12/12/2018  Patient Name: Amber Officer    History of Presenting Illness     Chief Complaint   Patient presents with    Medication Reaction         History Provided By: Patient    Chief Complaint: Medication Reation  Duration: 3 Days  Timing:  Acute  Location: N/A  Quality: N/A  Severity: Mild  Modifying Factors: No modifying factors  Associated Symptoms: back pain, abdominal pain, vaginal bleeding    Additional History (Context):   1:26 AM  Amber Officer is a 72 y.o. female with PMHX of CAD, HTN, DM, Gastrointestinal disorder, Reflux, MI, Gout, who presents to the emergency department C/O medication reaction onset 3 days ago. Associated sxs include back pain, abdominal pain, urinary frequency, vaginal bleeding. Pt states that she started a new medication for a blood clot in her leg. Pt denies fever, chills, and any other sxs or complaints. PCP: Esperanza Rivera MD    Current Facility-Administered Medications   Medication Dose Route Frequency Provider Last Rate Last Dose    levoFLOXacin (LEVAQUIN) 750 mg in D5W IVPB  750 mg IntraVENous NOW Tej Vines  mL/hr at 12/12/18 0653 750 mg at 12/12/18 5675     Current Outpatient Medications   Medication Sig Dispense Refill    rivaroxaban (XARELTO) 15 mg (42)- 20 mg (9) DsPk Take one 15 mg tablet twice a day with food for the first 21 days. Then, take one 20 mg tablet once a day with food for 9 days. 1 Dose Pack 0    aspirin delayed-release 81 mg tablet Take 1 Tab by mouth daily. 30 Tab 0    atenolol (TENORMIN) 100 mg tablet Take 100 mg by mouth daily.  traZODone (DESYREL) 50 mg tablet Take 50 mg by mouth nightly as needed for Sleep.  simvastatin (ZOCOR) 10 mg tablet Take 10 mg by mouth nightly.  metFORMIN (GLUCOPHAGE) 500 mg tablet Take 500 mg by mouth two (2) times daily (with meals).       ergocalciferol (VITAMIN D2) 50,000 unit capsule Take 50,000 Units by mouth every Monday.  insulin glargine (LANTUS U-100 INSULIN) 100 unit/mL injection 42 Units by SubCUTAneous route nightly.  gabapentin (NEURONTIN) 300 mg capsule Take 300 mg by mouth three (3) times daily.  Difluprednate (DUREZOL) 0.05 % ophthalmic emulsion Administer 1 Drop to right eye four (4) times daily. In relation to cataract surgery      cholecalciferol, vitamin D3, (VITAMIN D3) 2,000 unit tab Take 2,000 Units by mouth daily.  multivitamin, tx-iron-ca-min (THERA-M W/ IRON) 9 mg iron-400 mcg tab tablet Take 1 Tab by mouth daily. Formulary substitution for DAILY MULTIVITAMIN      allopurinol (ZYLOPRIM) 100 mg tablet Take 100 mg by mouth two (2) times a day. Past History     Past Medical History:  Past Medical History:   Diagnosis Date    CAD (coronary artery disease)     Diabetes ([facility] [address]     Gastrointestinal disorder     Gout     Hypertension     MI (myocardial infarction) ([facility] [address]     Reflux        Past Surgical History:  Past Surgical History:   Procedure Laterality Date    CARDIAC SURG PROCEDURE UNLIST      VASCULAR SURGERY PROCEDURE UNLIST         Family History:  History reviewed. No pertinent family history. Social History:  Social History     Tobacco Use    Smoking status: Never Smoker    Smokeless tobacco: Never Used   Substance Use Topics    Alcohol use: No    Drug use: No       Allergies: Allergies   Allergen Reactions    Lisinopril Swelling    Pcn [Penicillins] Angioedema         Review of Systems   Review of Systems   Constitutional: Negative for chills and fever. Gastrointestinal: Positive for abdominal pain. Genitourinary: Positive for frequency and vaginal bleeding. Musculoskeletal: Positive for back pain. All other systems reviewed and are negative.       Physical Exam     Vitals:    12/12/18 0100 12/12/18 0130   BP: (!) 201/93 176/86   Pulse: 91 89   Resp: 18 18   Temp: 98 °F (36.7 °C)    SpO2: 100% 100%   Weight: 131.5 kg (290 lb)    Height: 5' 4\" (1.626 m)      Physical Exam   Constitutional: She is oriented to person, place, and time. She appears well-developed and well-nourished. Obese elderly female in no distress   HENT:   Head: Normocephalic and atraumatic. Eyes: Pupils are equal, round, and reactive to light. Neck: Neck supple. Cardiovascular: Normal rate, regular rhythm, S1 normal, S2 normal and normal heart sounds. Pulmonary/Chest: Breath sounds normal. No respiratory distress. She has no wheezes. She has no rales. She exhibits no tenderness. Abdominal: Soft. She exhibits no distension and no mass. There is no tenderness. There is no guarding. Musculoskeletal: Normal range of motion. She exhibits no edema or tenderness. Neurological: She is alert and oriented to person, place, and time. No cranial nerve deficit. Skin: No rash noted. Psychiatric: She has a normal mood and affect. Her behavior is normal. Thought content normal.   Nursing note and vitals reviewed.         Diagnostic Study Results     Labs -     Recent Results (from the past 12 hour(s))   EKG, 12 LEAD, INITIAL    Collection Time: 12/12/18  1:16 AM   Result Value Ref Range    Ventricular Rate 89 BPM    Atrial Rate 89 BPM    P-R Interval 130 ms    QRS Duration 82 ms    Q-T Interval 338 ms    QTC Calculation (Bezet) 411 ms    Calculated P Axis 62 degrees    Calculated R Axis 4 degrees    Calculated T Axis 20 degrees    Diagnosis       Normal sinus rhythm  Normal ECG  When compared with ECG of 03-DEC-2018 07:49,  No significant change was found     CBC WITH AUTOMATED DIFF    Collection Time: 12/12/18  1:35 AM   Result Value Ref Range    WBC 8.8 4.6 - 13.2 K/uL    RBC 4.42 4.20 - 5.30 M/uL    HGB 13.0 12.0 - 16.0 g/dL    HCT 39.9 35.0 - 45.0 %    MCV 90.3 74.0 - 97.0 FL    MCH 29.4 24.0 - 34.0 PG    MCHC 32.6 31.0 - 37.0 g/dL    RDW 13.0 11.6 - 14.5 %    PLATELET 962 452 - 987 K/uL    MPV 9.8 9.2 - 11.8 FL    NEUTROPHILS 68 40 - 73 %    LYMPHOCYTES 24 21 - 52 %    MONOCYTES 7 3 - 10 %    EOSINOPHILS 1 0 - 5 %    BASOPHILS 0 0 - 2 %    ABS. NEUTROPHILS 6.0 1.8 - 8.0 K/UL    ABS. LYMPHOCYTES 2.1 0.9 - 3.6 K/UL    ABS. MONOCYTES 0.6 0.05 - 1.2 K/UL    ABS. EOSINOPHILS 0.1 0.0 - 0.4 K/UL    ABS. BASOPHILS 0.0 0.0 - 0.1 K/UL    DF AUTOMATED     PROTHROMBIN TIME + INR    Collection Time: 12/12/18  1:35 AM   Result Value Ref Range    Prothrombin time 18.5 (H) 11.5 - 15.2 sec    INR 1.6 (H) 0.8 - 1.2     PTT    Collection Time: 12/12/18  1:35 AM   Result Value Ref Range    aPTT 38.6 (H) 23.0 - 36.4 SEC   URINALYSIS W/ RFLX MICROSCOPIC    Collection Time: 12/12/18  1:48 AM   Result Value Ref Range    Color BROWN      Appearance TURBID      Specific gravity 1.017 1.005 - 1.030      pH (UA) 5.5 5.0 - 8.0      Protein 100 (A) NEG mg/dL    Glucose NEGATIVE  NEG mg/dL    Ketone NEGATIVE  NEG mg/dL    Bilirubin NEGATIVE  NEG      Blood LARGE (A) NEG      Urobilinogen 1.0 0.2 - 1.0 EU/dL    Nitrites POSITIVE (A) NEG      Leukocyte Esterase LARGE (A) NEG     URINE MICROSCOPIC ONLY    Collection Time: 12/12/18  1:48 AM   Result Value Ref Range    WBC >100 (H) 0 - 5 /hpf    RBC TOO NUMEROUS TO COUNT 0 - 5 /hpf    Epithelial cells 2 to 4 0 - 5 /lpf    Bacteria 1+ (A) NEG /hpf   METABOLIC PANEL, COMPREHENSIVE    Collection Time: 12/12/18  2:40 AM   Result Value Ref Range    Sodium 140 136 - 145 mmol/L    Potassium 4.3 3.5 - 5.5 mmol/L    Chloride 104 100 - 108 mmol/L    CO2 30 21 - 32 mmol/L    Anion gap 6 3.0 - 18 mmol/L    Glucose 136 (H) 74 - 99 mg/dL    BUN 18 7.0 - 18 MG/DL    Creatinine 0.88 0.6 - 1.3 MG/DL    BUN/Creatinine ratio 20      GFR est AA >60 >60 ml/min/1.73m2    GFR est non-AA >60 >60 ml/min/1.73m2    Calcium 8.6 8.5 - 10.1 MG/DL    Bilirubin, total 0.4 0.2 - 1.0 MG/DL    ALT (SGPT) 35 13 - 56 U/L    AST (SGOT) 21 15 - 37 U/L    Alk.  phosphatase 95 45 - 117 U/L    Protein, total 8.1 6.4 - 8.2 g/dL    Albumin 3.3 (L) 3.4 - 5.0 g/dL    Globulin 4.8 (H) 2.0 - 4.0 g/dL    A-G Ratio 0.7 (L) 0.8 - 1. 7     WET PREP    Collection Time: 12/12/18  5:28 AM   Result Value Ref Range    Special Requests: NO SPECIAL REQUESTS      Wet prep NO YEAST,TRICHOMONAS OR CLUE CELLS NOTED         Radiologic Studies -   US TRANSVAGINAL   Final Result   IMPRESSION:      1. Multi fibroid uterus. The endometrial complex is not visualized on this   examination. Recommend OB/GYN consultation with consideration for endometrial   sampling given patient history of abnormal vaginal bleeding in a postmenopausal   female. 2. Nonvisualized bilateral ovaries, which is not an unexpected finding in a   postmenopausal female. Of note, comparison with earlier same day CT scan   demonstrates no discrete masses. CT ABD PELV WO CONT   Final Result   IMPRESSION:      Fibroid uterus. Nonobstructing 1 mm calculus midpole left kidney. No hydronephrosis. Descending colon diverticulosis without diverticulitis. Small hiatal hernia. CT Results  (Last 48 hours)               12/12/18 0159  CT ABD PELV WO CONT Final result    Impression:  IMPRESSION:       Fibroid uterus. Nonobstructing 1 mm calculus midpole left kidney. No hydronephrosis. Descending colon diverticulosis without diverticulitis. Small hiatal hernia. Narrative:  EXAM: CT of the abdomen and pelvis       INDICATION: Abdominal pain. COMPARISON: February 6, 2018. TECHNIQUE: Axial CT imaging of the abdomen and pelvis was performed without   intravenous contrast. Multiplanar reformats were generated. Dose reduction   techniques used: automated exposure control, adjustment of the mAs and/or kVp   according to patient size, and iterative reconstruction techniques. _______________       FINDINGS:       LOWER CHEST: There is minimal scarring at the left lung base. LIVER, BILIARY: Liver is normal. No biliary dilation. Gallbladder is   unremarkable.        PANCREAS: Normal.       SPLEEN: Normal.       ADRENALS: There is bilateral adrenal gland thickening and nodularity similar to   previous. KIDNEYS: There is a nonobstructing 1 mm calculus mid pole left kidney. There is   no hydronephrosis. LYMPH NODES: No enlarged lymph nodes. GASTROINTESTINAL TRACT: No bowel dilation or wall thickening. There is   descending colon diverticulosis without diverticulitis. There is a small hiatal   hernia. PELVIC ORGANS: There are partially calcified uterine masses measuring up to 6.3   cm with uterine enlargement. [facility] VASCULATURE: Unremarkable. BONES: There is degenerative change throughout the lumbar spine most pronounced   at L5-S1.       OTHER: None.       _______________               CXR Results  (Last 48 hours)    None          Medications given in the ED-  Medications   levoFLOXacin (LEVAQUIN) 750 mg in D5W IVPB (750 mg IntraVENous New Bag 12/12/18 0653)   sodium chloride 0.9 % bolus infusion 1,000 mL (0 mL IntraVENous IV Completed 12/12/18 0640)   ondansetron (ZOFRAN) injection 4 mg (4 mg IntraVENous Given 12/12/18 0220)   morphine injection 4 mg (4 mg IntraVENous Given 12/12/18 0220)   morphine injection 4 mg (4 mg IntraVENous Given 12/12/18 0404)         Medical Decision Making   I am the first provider for this patient. I reviewed the vital signs, available nursing notes, past medical history, past surgical history, family history and social history. Vital Signs-Reviewed the patient's vital signs. Pulse Oximetry Analysis - 100% on RA     Cardiac Monitor:  Rate: 89 bpm  Rhythm: NSR    EKG interpretation: (Preliminary)  1:17 AM   NSR,89 bpm, QRS 82 ms  EKG read by Carmelita Chen MD at 1:16 AM     Records Reviewed: Nursing Notes and Old Medical Records    Provider Notes (Medical Decision Making): Initial impression back pain and vaginal bleeding.  Need to rule out mediation reaction, pyelonephritis, pancreatitis, UTI      Procedures:  Procedures  PROCEDURE NOTE - PELVIC EXAM:    5:33 AM  Performed by: Tanisha Butler MD  difficult procedure due to pt unable to remain in good position. No blood in vault  Pelvic exam was performed using bimanual and speculum. Procedure chaperoned by nursing staff. The procedure took 1-15 minutes, and pt tolerated well. Written by [id number], ED Scribe, as dictated by Tanisha Butler MD.      ED Course:   1:26 AM Initial assessment performed. The patients presenting problems have been discussed, and they are in agreement with the care plan formulated and outlined with them. I have encouraged them to ask questions as they arise throughout their visit. 7:34 AM Pt is on Xeralto for DVT and c/o vaginal bleeding. No exam no blood in urine or vagina but has UTI and back pain. Most likely has pyelonephritis. Hemoglobin nml. She does have uterine fibroids bilaterally by US and has had vaginal spotting. Will have her follow up with OB or PCP for further evaluation of care and whether to continue with [facility]. Will discharge on Cipro and pain medications. Diagnosis and Disposition       DISCHARGE NOTE:  7:34 AM  Perla Gonsales's  results have been reviewed with her. She has been counseled regarding her diagnosis, treatment, and plan. She verbally conveys understanding and agreement of the signs, symptoms, diagnosis, treatment and prognosis and additionally agrees to follow up as discussed. She also agrees with the care-plan and conveys that all of her questions have been answered. I have also provided discharge instructions for her that include: educational information regarding their diagnosis and treatment, and list of reasons why they would want to return to the ED prior to their follow-up appointment, should her condition change. She has been provided with education for proper emergency department utilization. CLINICAL IMPRESSION:    1. Pyelonephritis    2. Vaginal fibroids        PLAN:  1. D/C Home  2.    Current Discharge Medication List 3.   Follow-up Information     Follow up With Specialties Details Why Contact Info    Amanda Caldwell MD Family Practice Schedule an appointment as soon as possible for a visit For primary care follow up [address] [facility]. [address] [place] [address]      Jasson Spencer MD Obstetrics & Gynecology, Gynecology, Obstetrics Schedule an appointment as soon as possible for a visit For follow up with OB/GYN [id number] [facility] [address]      [facility] EMERGENCY DEPT Emergency Medicine Go to As needed, if symptoms worsen [address] [facility] [id number]  [phone]        _______________________________    Attestations: This note is prepared by ANA SALDAÑA and Maggy Rosa, acting as Scribe for [employer], MD.    [employer], MD:  The scribe's documentation has been prepared under my direction and personally reviewed by me in its entirety.   I confirm that the note above accurately reflects all work, treatment, procedures, and medical decision making performed by me.  _______________________________

## 2018-12-12 NOTE — DISCHARGE INSTRUCTIONS
Kidney Infection: Care Instructions  Your Care Instructions    A kidney infection (pyelonephritis) is a type of urinary tract infection, or UTI. Most UTIs are bladder infections. Kidney infections tend to make people much sicker than bladder infections do. A kidney infection is also more serious because it can cause lasting damage if it is not treated quickly. Follow-up care is a key part of your treatment and safety. Be sure to make and go to all appointments, and call your doctor if you are having problems. It's also a good idea to know your test results and keep a list of the medicines you take. How can you care for yourself at home? · Take your antibiotics as directed. Do not stop taking them just because you feel better. You need to take the full course of antibiotics. · Drink plenty of water, enough so that your urine is light yellow or clear like water. This may help wash out bacteria that are causing the infection. If you have kidney, heart, or liver disease and have to limit fluids, talk with your doctor before you increase the amount of fluids you drink. · Urinate often. Try to empty your bladder each time. · To relieve pain, take a hot shower or lay a heating pad (set on low) over your lower belly. Never go to sleep with a heating pad in place. Put a thin cloth between the heating pad and your skin. To help prevent kidney infections  · Drink plenty of water each day. This helps you urinate often, which clears bacteria from your system. If you have kidney, heart, or liver disease and have to limit fluids, talk with your doctor before you increase the amount of fluids you drink. · Urinate when you have the urge. Do not hold your urine for a long time. Urinate before you go to sleep. · If you have symptoms of a bladder infection, such as burning when you urinate or having to urinate often, call your doctor so you can treat the problem before it gets worse.  If you do not treat a bladder infection quickly, it can spread to the kidney. · Men should keep the tip of the penis clean. If you are a woman, keep these ideas in mind:  · Urinate right after you have sex. · Change sanitary pads often. Avoid douches, feminine hygiene sprays, and other feminine hygiene products that have deodorants. · After going to the bathroom, wipe from front to back. When should you call for help? Call your doctor now or seek immediate medical care if:    · You have symptoms that a kidney infection is getting worse. These may include:  ? Pain or burning when you urinate. ? A frequent need to urinate without being able to pass much urine. ? Pain in the flank, which is just below the rib cage and above the waist on either side of the back. ? Blood in the urine. ? A fever.     · You are vomiting or nauseated.    Watch closely for changes in your health, and be sure to contact your doctor if:    · You do not get better as expected. Where can you learn more? Go to http://ebony-marci.info/. Enter Y560 in the search box to learn more about \"Kidney Infection: Care Instructions. \"  Current as of: March 15, 2018  Content Version: 11.8  © 1817-2978 [employer]. Care instructions adapted under license by [employer] (which disclaims liability or warranty for this information). If you have questions about a medical condition or this instruction, always ask your healthcare professional. [address] any warranty or liability for your use of this information. Uterine Fibroids: Care Instructions  Your Care Instructions    Uterine fibroids are growths in the uterus. Fibroids aren't cancer. Doctors don't know what causes fibroids. Fibroids are very common in women during their childbearing years. Fibroids can grow on the inside of the uterus, in the muscle wall of the uterus, or near the outside wall of the uterus.  In some women, fibroids cause painful cramps and heavy periods. In these cases, taking anti-inflammatory medicines, birth control pills, or using an intrauterine device (IUD) often helps decrease symptoms. Sometimes surgery is needed to treat fibroids. But if you are near menopause, you may want to wait and see if your symptoms get better. Most fibroids shrink and go away after menopause, when your menstrual periods stop completely. Follow-up care is a key part of your treatment and safety. Be sure to make and go to all appointments, and call your doctor if you are having problems. It's also a good idea to know your test results and keep a list of the medicines you take. How can you care for yourself at home? · If your doctor gave you medicine, take it as exactly as prescribed. Be safe with medicines. Call your doctor if you think you are having a problem with your medicine. · Take anti-inflammatory medicines for pain. These include ibuprofen (Advil, Motrin) and naproxen (Aleve). Read and follow all instructions on the label. · Use heat, such as a hot water bottle or a heating pad set on low, or a warm bath to relax tense muscles and relieve cramping. Put a thin cloth between the heating pad and your skin. Never go to sleep with a heating pad on. · Lie down and put a pillow under your knees. Or, lie on your side and bring your knees up to your chest. These positions may help relieve belly pain or pressure. · Keep track of how many sanitary pads or tampons you use each day. · Get at least 30 minutes of exercise on most days of the week. Walking is a good choice. You also may want to do other activities, such as running, swimming, cycling, or playing tennis or team sports. · If you bleed longer than usual or have heavy bleeding, take a daily multivitamin with iron. When should you call for help? Call your doctor now or seek immediate medical care if:    · You have severe vaginal bleeding.     · You have new or worse belly or pelvic pain.  Watch closely for changes in your health, and be sure to contact your doctor if:    · You have unusual vaginal bleeding.     · You do not get better as expected. Where can you learn more? Go to http://ebony-marci.info/. Enter B121 in the search box to learn more about \"Uterine Fibroids: Care Instructions. \"  Current as of: May 15, 2018  Content Version: 11.8  © 7749-6278 [employer]. Care instructions adapted under license by [employer] (which disclaims liability or warranty for this information). If you have questions about a medical condition or this instruction, always ask your healthcare professional. [address] any warranty or liability for your use of this information.

## 2018-12-14 LAB
BACTERIA SPEC CULT: ABNORMAL
SERVICE CMNT-IMP: ABNORMAL

## 2018-12-15 ENCOUNTER — APPOINTMENT (OUTPATIENT)
Dept: ULTRASOUND IMAGING | Age: 73
DRG: 690 | End: 2018-12-15
Attending: NURSE PRACTITIONER
Payer: MEDICARE

## 2018-12-15 ENCOUNTER — APPOINTMENT (OUTPATIENT)
Dept: CT IMAGING | Age: 73
DRG: 690 | End: 2018-12-15
Attending: NURSE PRACTITIONER
Payer: MEDICARE

## 2018-12-15 ENCOUNTER — HOSPITAL ENCOUNTER (INPATIENT)
Age: 73
LOS: 3 days | Discharge: HOME HEALTH | DRG: 690 | End: 2018-12-18
Attending: EMERGENCY MEDICINE | Admitting: HOSPITALIST
Payer: MEDICARE

## 2018-12-15 DIAGNOSIS — R74.8 ELEVATED CK: ICD-10-CM

## 2018-12-15 DIAGNOSIS — R10.84 ABDOMINAL PAIN, GENERALIZED: Primary | ICD-10-CM

## 2018-12-15 DIAGNOSIS — N12 PYELONEPHRITIS: ICD-10-CM

## 2018-12-15 DIAGNOSIS — R11.2 NON-INTRACTABLE VOMITING WITH NAUSEA, UNSPECIFIED VOMITING TYPE: ICD-10-CM

## 2018-12-15 PROBLEM — N39.0 UTI (URINARY TRACT INFECTION): Status: RESOLVED | Noted: 2018-12-15 | Resolved: 2018-12-15

## 2018-12-15 PROBLEM — R11.10 VOMITING: Status: ACTIVE | Noted: 2018-12-15

## 2018-12-15 PROBLEM — R07.9 CHEST PAIN: Status: RESOLVED | Noted: 2018-12-03 | Resolved: 2018-12-15

## 2018-12-15 PROBLEM — M25.569 KNEE PAIN: Status: RESOLVED | Noted: 2018-12-05 | Resolved: 2018-12-15

## 2018-12-15 PROBLEM — E11.9 DIABETES (HCC): Status: RESOLVED | Noted: 2018-12-03 | Resolved: 2018-12-15

## 2018-12-15 PROBLEM — N39.0 UTI (URINARY TRACT INFECTION): Status: ACTIVE | Noted: 2018-12-15

## 2018-12-15 PROBLEM — M54.9 BACK PAIN: Status: RESOLVED | Noted: 2018-12-05 | Resolved: 2018-12-15

## 2018-12-15 LAB
ALBUMIN SERPL-MCNC: 3.2 G/DL (ref 3.4–5)
ALBUMIN/GLOB SERPL: 0.7 {RATIO} (ref 0.8–1.7)
ALP SERPL-CCNC: 91 U/L (ref 45–117)
ALT SERPL-CCNC: 30 U/L (ref 13–56)
ANION GAP SERPL CALC-SCNC: 9 MMOL/L (ref 3–18)
APPEARANCE UR: CLEAR
AST SERPL-CCNC: 26 U/L (ref 15–37)
BACTERIA URNS QL MICRO: ABNORMAL /HPF
BASOPHILS # BLD: 0 K/UL (ref 0–0.1)
BASOPHILS NFR BLD: 0 % (ref 0–2)
BILIRUB SERPL-MCNC: 0.3 MG/DL (ref 0.2–1)
BILIRUB UR QL: NEGATIVE
BUN SERPL-MCNC: 10 MG/DL (ref 7–18)
BUN/CREAT SERPL: 11
CALCIUM SERPL-MCNC: 9.5 MG/DL (ref 8.5–10.1)
CHLORIDE SERPL-SCNC: 97 MMOL/L (ref 100–108)
CK MB CFR SERPL CALC: 1.7 % (ref 0–4)
CK MB CFR SERPL CALC: 1.7 % (ref 0–4)
CK MB SERPL-MCNC: 5.9 NG/ML (ref 5–25)
CK MB SERPL-MCNC: 6.4 NG/ML (ref 5–25)
CK SERPL-CCNC: 351 U/L (ref 26–192)
CK SERPL-CCNC: 379 U/L (ref 26–192)
CO2 SERPL-SCNC: 32 MMOL/L (ref 21–32)
COLOR UR: ABNORMAL
CREAT SERPL-MCNC: 0.88 MG/DL (ref 0.6–1.3)
DIFFERENTIAL METHOD BLD: ABNORMAL
EOSINOPHIL # BLD: 0 K/UL (ref 0–0.4)
EOSINOPHIL NFR BLD: 0 % (ref 0–5)
EPITH CASTS URNS QL MICRO: ABNORMAL /LPF (ref 0–5)
ERYTHROCYTE [DISTWIDTH] IN BLOOD BY AUTOMATED COUNT: 12.4 % (ref 11.6–14.5)
EST. AVERAGE GLUCOSE BLD GHB EST-MCNC: 194 MG/DL
GLOBULIN SER CALC-MCNC: 4.9 G/DL (ref 2–4)
GLUCOSE BLD STRIP.AUTO-MCNC: 164 MG/DL (ref 70–110)
GLUCOSE SERPL-MCNC: 232 MG/DL (ref 74–99)
GLUCOSE UR STRIP.AUTO-MCNC: NEGATIVE MG/DL
HBA1C MFR BLD: 8.4 % (ref 4.2–5.6)
HCT VFR BLD AUTO: 40.6 % (ref 35–45)
HGB BLD-MCNC: 13.3 G/DL (ref 12–16)
HGB UR QL STRIP: ABNORMAL
KETONES UR QL STRIP.AUTO: 15 MG/DL
LEUKOCYTE ESTERASE UR QL STRIP.AUTO: ABNORMAL
LIPASE SERPL-CCNC: 75 U/L (ref 73–393)
LYMPHOCYTES # BLD: 0.9 K/UL (ref 0.9–3.6)
LYMPHOCYTES NFR BLD: 16 % (ref 21–52)
MCH RBC QN AUTO: 28.9 PG (ref 24–34)
MCHC RBC AUTO-ENTMCNC: 32.8 G/DL (ref 31–37)
MCV RBC AUTO: 88.3 FL (ref 74–97)
MONOCYTES # BLD: 0.4 K/UL (ref 0.05–1.2)
MONOCYTES NFR BLD: 6 % (ref 3–10)
NEUTS SEG # BLD: 4.6 K/UL (ref 1.8–8)
NEUTS SEG NFR BLD: 78 % (ref 40–73)
NITRITE UR QL STRIP.AUTO: POSITIVE
PH UR STRIP: 8 [PH] (ref 5–8)
PLATELET # BLD AUTO: 284 K/UL (ref 135–420)
PMV BLD AUTO: 9.2 FL (ref 9.2–11.8)
POTASSIUM SERPL-SCNC: 3.7 MMOL/L (ref 3.5–5.5)
PROT SERPL-MCNC: 8.1 G/DL (ref 6.4–8.2)
PROT UR STRIP-MCNC: ABNORMAL MG/DL
RBC # BLD AUTO: 4.6 M/UL (ref 4.2–5.3)
RBC #/AREA URNS HPF: ABNORMAL /HPF (ref 0–5)
SODIUM SERPL-SCNC: 138 MMOL/L (ref 136–145)
SP GR UR REFRACTOMETRY: 1.01 (ref 1–1.03)
TROPONIN I SERPL-MCNC: <0.02 NG/ML (ref 0–0.04)
TROPONIN I SERPL-MCNC: <0.02 NG/ML (ref 0–0.04)
UROBILINOGEN UR QL STRIP.AUTO: 1 EU/DL (ref 0.2–1)
WBC # BLD AUTO: 5.9 K/UL (ref 4.6–13.2)
WBC URNS QL MICRO: ABNORMAL /HPF (ref 0–5)

## 2018-12-15 PROCEDURE — 65270000029 HC RM PRIVATE

## 2018-12-15 PROCEDURE — 74011250637 HC RX REV CODE- 250/637: Performed by: NURSE PRACTITIONER

## 2018-12-15 PROCEDURE — C9113 INJ PANTOPRAZOLE SODIUM, VIA: HCPCS | Performed by: HOSPITALIST

## 2018-12-15 PROCEDURE — 81001 URINALYSIS AUTO W/SCOPE: CPT

## 2018-12-15 PROCEDURE — 99285 EMERGENCY DEPT VISIT HI MDM: CPT

## 2018-12-15 PROCEDURE — 96375 TX/PRO/DX INJ NEW DRUG ADDON: CPT

## 2018-12-15 PROCEDURE — 83690 ASSAY OF LIPASE: CPT

## 2018-12-15 PROCEDURE — 74011250637 HC RX REV CODE- 250/637: Performed by: HOSPITALIST

## 2018-12-15 PROCEDURE — 74174 CTA ABD&PLVS W/CONTRAST: CPT

## 2018-12-15 PROCEDURE — 74011000258 HC RX REV CODE- 258: Performed by: HOSPITALIST

## 2018-12-15 PROCEDURE — 82962 GLUCOSE BLOOD TEST: CPT

## 2018-12-15 PROCEDURE — 96374 THER/PROPH/DIAG INJ IV PUSH: CPT

## 2018-12-15 PROCEDURE — 93005 ELECTROCARDIOGRAM TRACING: CPT

## 2018-12-15 PROCEDURE — 76775 US EXAM ABDO BACK WALL LIM: CPT

## 2018-12-15 PROCEDURE — 74011250636 HC RX REV CODE- 250/636: Performed by: HOSPITALIST

## 2018-12-15 PROCEDURE — 74011636320 HC RX REV CODE- 636/320: Performed by: EMERGENCY MEDICINE

## 2018-12-15 PROCEDURE — 87086 URINE CULTURE/COLONY COUNT: CPT

## 2018-12-15 PROCEDURE — 74011250636 HC RX REV CODE- 250/636: Performed by: NURSE PRACTITIONER

## 2018-12-15 PROCEDURE — 96376 TX/PRO/DX INJ SAME DRUG ADON: CPT

## 2018-12-15 PROCEDURE — 83036 HEMOGLOBIN GLYCOSYLATED A1C: CPT

## 2018-12-15 PROCEDURE — 96361 HYDRATE IV INFUSION ADD-ON: CPT

## 2018-12-15 PROCEDURE — 80053 COMPREHEN METABOLIC PANEL: CPT

## 2018-12-15 PROCEDURE — 74011636637 HC RX REV CODE- 636/637: Performed by: HOSPITALIST

## 2018-12-15 PROCEDURE — 85025 COMPLETE CBC W/AUTO DIFF WBC: CPT

## 2018-12-15 PROCEDURE — 82553 CREATINE MB FRACTION: CPT

## 2018-12-15 RX ORDER — MORPHINE SULFATE 2 MG/ML
4 INJECTION, SOLUTION INTRAMUSCULAR; INTRAVENOUS
Status: COMPLETED | OUTPATIENT
Start: 2018-12-15 | End: 2018-12-15

## 2018-12-15 RX ORDER — ONDANSETRON 2 MG/ML
4 INJECTION INTRAMUSCULAR; INTRAVENOUS
Status: DISCONTINUED | OUTPATIENT
Start: 2018-12-15 | End: 2018-12-18 | Stop reason: HOSPADM

## 2018-12-15 RX ORDER — INSULIN LISPRO 100 [IU]/ML
INJECTION, SOLUTION INTRAVENOUS; SUBCUTANEOUS
Status: DISCONTINUED | OUTPATIENT
Start: 2018-12-15 | End: 2018-12-18 | Stop reason: HOSPADM

## 2018-12-15 RX ORDER — SODIUM CHLORIDE 9 MG/ML
500 INJECTION, SOLUTION INTRAVENOUS ONCE
Status: COMPLETED | OUTPATIENT
Start: 2018-12-15 | End: 2018-12-15

## 2018-12-15 RX ORDER — MAGNESIUM SULFATE 100 %
4 CRYSTALS MISCELLANEOUS AS NEEDED
Status: DISCONTINUED | OUTPATIENT
Start: 2018-12-15 | End: 2018-12-18 | Stop reason: HOSPADM

## 2018-12-15 RX ORDER — CIPROFLOXACIN 500 MG/1
250 TABLET ORAL
Status: COMPLETED | OUTPATIENT
Start: 2018-12-15 | End: 2018-12-15

## 2018-12-15 RX ORDER — ALLOPURINOL 100 MG/1
100 TABLET ORAL 2 TIMES DAILY
Status: DISCONTINUED | OUTPATIENT
Start: 2018-12-16 | End: 2018-12-16

## 2018-12-15 RX ORDER — SIMVASTATIN 20 MG/1
10 TABLET, FILM COATED ORAL
Status: DISCONTINUED | OUTPATIENT
Start: 2018-12-16 | End: 2018-12-18 | Stop reason: HOSPADM

## 2018-12-15 RX ORDER — ONDANSETRON 2 MG/ML
4 INJECTION INTRAMUSCULAR; INTRAVENOUS
Status: COMPLETED | OUTPATIENT
Start: 2018-12-15 | End: 2018-12-15

## 2018-12-15 RX ORDER — INSULIN GLARGINE 100 [IU]/ML
42 INJECTION, SOLUTION SUBCUTANEOUS
Status: DISCONTINUED | OUTPATIENT
Start: 2018-12-16 | End: 2018-12-18

## 2018-12-15 RX ORDER — ASPIRIN 81 MG/1
81 TABLET ORAL DAILY
Status: DISCONTINUED | OUTPATIENT
Start: 2018-12-16 | End: 2018-12-18 | Stop reason: HOSPADM

## 2018-12-15 RX ORDER — MELATONIN
2000 DAILY
Status: DISCONTINUED | OUTPATIENT
Start: 2018-12-16 | End: 2018-12-18 | Stop reason: HOSPADM

## 2018-12-15 RX ORDER — ACETAMINOPHEN 325 MG/1
650 TABLET ORAL
Status: DISCONTINUED | OUTPATIENT
Start: 2018-12-15 | End: 2018-12-18 | Stop reason: HOSPADM

## 2018-12-15 RX ORDER — DIFLUPREDNATE 0.5 MG/ML
1 EMULSION OPHTHALMIC 4 TIMES DAILY
Status: DISCONTINUED | OUTPATIENT
Start: 2018-12-16 | End: 2018-12-18 | Stop reason: HOSPADM

## 2018-12-15 RX ORDER — SODIUM CHLORIDE 9 MG/ML
50 INJECTION, SOLUTION INTRAVENOUS CONTINUOUS
Status: DISCONTINUED | OUTPATIENT
Start: 2018-12-15 | End: 2018-12-17

## 2018-12-15 RX ORDER — ATENOLOL 50 MG/1
100 TABLET ORAL DAILY
Status: DISCONTINUED | OUTPATIENT
Start: 2018-12-16 | End: 2018-12-18 | Stop reason: HOSPADM

## 2018-12-15 RX ORDER — LEVOFLOXACIN 5 MG/ML
500 INJECTION, SOLUTION INTRAVENOUS EVERY 24 HOURS
Status: DISCONTINUED | OUTPATIENT
Start: 2018-12-15 | End: 2018-12-18 | Stop reason: HOSPADM

## 2018-12-15 RX ORDER — GABAPENTIN 300 MG/1
300 CAPSULE ORAL 3 TIMES DAILY
Status: DISCONTINUED | OUTPATIENT
Start: 2018-12-16 | End: 2018-12-16

## 2018-12-15 RX ORDER — DEXTROSE 50 % IN WATER (D50W) INTRAVENOUS SYRINGE
25-50 AS NEEDED
Status: DISCONTINUED | OUTPATIENT
Start: 2018-12-15 | End: 2018-12-18 | Stop reason: HOSPADM

## 2018-12-15 RX ADMIN — PROMETHAZINE HYDROCHLORIDE 12.5 MG: 25 INJECTION, SOLUTION INTRAMUSCULAR; INTRAVENOUS at 22:58

## 2018-12-15 RX ADMIN — SIMVASTATIN 10 MG: 20 TABLET, FILM COATED ORAL at 23:56

## 2018-12-15 RX ADMIN — LEVOFLOXACIN 500 MG: 5 INJECTION, SOLUTION INTRAVENOUS at 22:59

## 2018-12-15 RX ADMIN — INSULIN GLARGINE 42 UNITS: 100 INJECTION, SOLUTION SUBCUTANEOUS at 23:57

## 2018-12-15 RX ADMIN — INSULIN LISPRO 2 UNITS: 100 INJECTION, SOLUTION INTRAVENOUS; SUBCUTANEOUS at 23:57

## 2018-12-15 RX ADMIN — ONDANSETRON 4 MG: 2 INJECTION INTRAMUSCULAR; INTRAVENOUS at 16:32

## 2018-12-15 RX ADMIN — MORPHINE SULFATE 4 MG: 2 INJECTION, SOLUTION INTRAMUSCULAR; INTRAVENOUS at 18:11

## 2018-12-15 RX ADMIN — SODIUM CHLORIDE 500 ML: 900 INJECTION, SOLUTION INTRAVENOUS at 16:31

## 2018-12-15 RX ADMIN — IOPAMIDOL 100 ML: 755 INJECTION, SOLUTION INTRAVENOUS at 20:31

## 2018-12-15 RX ADMIN — CIPROFLOXACIN 250 MG: 500 TABLET, FILM COATED ORAL at 19:33

## 2018-12-15 RX ADMIN — GABAPENTIN 300 MG: 300 CAPSULE ORAL at 23:56

## 2018-12-15 RX ADMIN — SODIUM CHLORIDE 100 ML/HR: 900 INJECTION, SOLUTION INTRAVENOUS at 22:58

## 2018-12-15 RX ADMIN — MORPHINE SULFATE 4 MG: 2 INJECTION, SOLUTION INTRAMUSCULAR; INTRAVENOUS at 16:47

## 2018-12-15 RX ADMIN — PANTOPRAZOLE SODIUM 40 MG: 40 INJECTION, POWDER, FOR SOLUTION INTRAVENOUS at 22:59

## 2018-12-15 NOTE — ED PROVIDER NOTES
[facility]ENCY DEPARTMENT HISTORY AND PHYSICAL EXAM    Date: 12/15/2018  Patient Name: Josias Martins    History of Presenting Illness     Chief Complaint   Patient presents with    Vomiting         History Provided By: Patient and Patient's Son    Chief Complaint: Vomiting  Duration: 1 Days  Timing:  Acute  Location: GI  Quality: Productive of \"black\" vomit  Severity: Mild  Associated Symptoms: Right sided back pain and abd pain    Additional History (Context):   4:16 PM:  Josias Martins is a 72 y.o. female with PMHX of CAD, HTN, and DM who presents to the emergency department C/O worsening N/V productive of \"black\" vomit, onset last night. Associated sxs include right sided back pain and abd pain, CP, and constipation (LBM 3 days ago). Pt was previously seen on 12/12/2018 for dysuria and was diagnosed with a UTI and discharged with Cipro prescription. Pt notes that back and right sided abd have persisted since last ED visit, but have not changed or worsened. Pt denies bloody vomit, fever, bloody stool, and any other sxs or complaints.      PCP: Marie Oliver MD    Current Facility-Administered Medications   Medication Dose Route Frequency Provider Last Rate Last Dose    allopurinol (ZYLOPRIM) tablet 100 mg  100 mg Oral BID Maicol Tineo MD        aspirin delayed-release tablet 81 mg  81 mg Oral DAILY Maicol Tineo MD        atenolol (TENORMIN) tablet 100 mg  100 mg Oral DAILY Maicol Tineo MD        cholecalciferol (VITAMIN D3) tablet 2,000 Units  2,000 Units Oral DAILY Maicol Tineo MD        difluprednate (DUREZOL) 0.05 % ophthalmic emulsion 1 Drop  1 Drop Right Eye QID Maicol Tineo MD        gabapentin (NEURONTIN) capsule 300 mg  300 mg Oral TID Maicol Tineo MD   300 mg at 12/15/18 2356    insulin glargine (LANTUS) injection 42 Units  42 Units SubCUTAneous QHS Maicol Tineo MD   42 Units at 12/15/18 2357    simvastatin (ZOCOR) tablet 10 mg  10 mg Oral QHS Maicol Tineo MD   10 mg at 12/15/18 2356    rivaroxaban (XARELTO) tablet 20 mg  20 mg Oral DAILY WITH BREAKFAST Geno Paget, MD        insulin lispro (HUMALOG) injection   SubCUTAneous AC&HS Geno Paget, MD   2 Units at 12/15/18 2357    glucose chewable tablet 16 g  4 Tab Oral PRN Geno Paget, MD        glucagon [facility]) injection 1 mg  1 mg IntraMUSCular PRN Geno Paget, MD        dextrose (D50W) injection syrg 12.5-25 g  25-50 mL IntraVENous PRN Geno Paget, MD        ondansetron [facility]) injection 4 mg  4 mg IntraVENous Q6H PRN Geno Paget, MD        0.9% sodium chloride infusion  100 mL/hr IntraVENous CONTINUOUS Geno Paget,  mL/hr at 12/15/18 2258 100 mL/hr at 12/15/18 2258    levoFLOXacin (LEVAQUIN) 500 mg in D5W IVPB  500 mg IntraVENous Q24H Geno Paget,  mL/hr at 12/15/18 2259 500 mg at 12/15/18 2259    acetaminophen (TYLENOL) tablet 650 mg  650 mg Oral Q6H PRN Geno Paget, MD        promethazine (PHENERGAN) 12.5 mg in 0.9% sodium chloride 50 mL IVPB  12.5 mg IntraVENous Q6H PRN Geno Paget,  mL/hr at 12/15/18 2258 12.5 mg at 12/15/18 2258    pantoprazole (PROTONIX) 40 mg in sodium chloride 0.9% 10 mL injection  40 mg IntraVENous DAILY Geno Paget, MD   40 mg at 12/15/18 2259       Past History     Past Medical History:  Past Medical History:   Diagnosis Date    CAD (coronary artery disease)     Diabetes ([facility] [address]     Gastrointestinal disorder     Gout     Hypertension     MI (myocardial infarction) ([facility] [address]     Reflux        Past Surgical History:  Past Surgical History:   Procedure Laterality Date    CARDIAC SURG PROCEDURE UNLIST      VASCULAR SURGERY PROCEDURE UNLIST         Family History:  History reviewed. No pertinent family history. Social History:  Social History     Tobacco Use    Smoking status: Never Smoker    Smokeless tobacco: Never Used   Substance Use Topics    Alcohol use: No    Drug use: No       Allergies:   Allergies   Allergen Reactions    Lisinopril Swelling  Pcn [Penicillins] Angioedema         Review of Systems   Review of Systems   Cardiovascular: Positive for chest pain. Gastrointestinal: Positive for abdominal pain (right sided), constipation (LBM 3 days ago), nausea and vomiting. Negative for blood in stool. (-) Blood in vomit   Musculoskeletal: Positive for back pain (right sided). All other systems reviewed and are negative. Physical Exam     Vitals:    12/15/18 2119 12/15/18 2120 12/15/18 2200 12/15/18 2245   BP: 155/77  (!) 162/92 179/89   Pulse:  93 92 89   Resp:  23 17 16   Temp:   98.4 °F (36.9 °C) 98.8 °F (37.1 °C)   SpO2: 91% 93% 98% 97%   Weight:       Height:         Physical Exam   Constitutional: She is oriented to person, place, and time. She appears well-developed and well-nourished. Elderly, overweight , NAD   HENT:   Head: Normocephalic and atraumatic. Eyes: Conjunctivae are normal.   Neck: Normal range of motion. Neck supple. Cardiovascular: Regular rhythm and intact distal pulses. Mild tachycardia   Strong equal peripheral pulses present    Pulmonary/Chest: Effort normal.   Decreased b/l bases   Abdominal: Soft. Bowel sounds are normal. There is tenderness. There is no rebound and no guarding. C/o right flank pain, no point TTP, generalized TTP, No pulsatile mass palpated   Musculoskeletal: Normal range of motion. Neurological: She is alert and oriented to person, place, and time. Skin: Skin is warm and dry. Nursing note and vitals reviewed.         Diagnostic Study Results     Labs -     Recent Results (from the past 12 hour(s))   CBC WITH AUTOMATED DIFF    Collection Time: 12/15/18  4:28 PM   Result Value Ref Range    WBC 5.9 4.6 - 13.2 K/uL    RBC 4.60 4.20 - 5.30 M/uL    HGB 13.3 12.0 - 16.0 g/dL    HCT 40.6 35.0 - 45.0 %    MCV 88.3 74.0 - 97.0 FL    MCH 28.9 24.0 - 34.0 PG    MCHC 32.8 31.0 - 37.0 g/dL    RDW 12.4 11.6 - 14.5 %    PLATELET 569 954 - 458 K/uL    MPV 9.2 9.2 - 11.8 FL NEUTROPHILS 78 (H) 40 - 73 %    LYMPHOCYTES 16 (L) 21 - 52 %    MONOCYTES 6 3 - 10 %    EOSINOPHILS 0 0 - 5 %    BASOPHILS 0 0 - 2 %    ABS. NEUTROPHILS 4.6 1.8 - 8.0 K/UL    ABS. LYMPHOCYTES 0.9 0.9 - 3.6 K/UL    ABS. MONOCYTES 0.4 0.05 - 1.2 K/UL    ABS. EOSINOPHILS 0.0 0.0 - 0.4 K/UL    ABS. BASOPHILS 0.0 0.0 - 0.1 K/UL    DF AUTOMATED     METABOLIC PANEL, COMPREHENSIVE    Collection Time: 12/15/18  4:28 PM   Result Value Ref Range    Sodium 138 136 - 145 mmol/L    Potassium 3.7 3.5 - 5.5 mmol/L    Chloride 97 (L) 100 - 108 mmol/L    CO2 32 21 - 32 mmol/L    Anion gap 9 3.0 - 18 mmol/L    Glucose 232 (H) 74 - 99 mg/dL    BUN 10 7.0 - 18 MG/DL    Creatinine 0.88 0.6 - 1.3 MG/DL    BUN/Creatinine ratio 11      GFR est AA >60 >60 ml/min/1.73m2    GFR est non-AA >60 >60 ml/min/1.73m2    Calcium 9.5 8.5 - 10.1 MG/DL    Bilirubin, total 0.3 0.2 - 1.0 MG/DL    ALT (SGPT) 30 13 - 56 U/L    AST (SGOT) 26 15 - 37 U/L    Alk.  phosphatase 91 45 - 117 U/L    Protein, total 8.1 6.4 - 8.2 g/dL    Albumin 3.2 (L) 3.4 - 5.0 g/dL    Globulin 4.9 (H) 2.0 - 4.0 g/dL    A-G Ratio 0.7 (L) 0.8 - 1.7     LIPASE    Collection Time: 12/15/18  4:28 PM   Result Value Ref Range    Lipase 75 73 - 393 U/L   CARDIAC PANEL,(CK, CKMB & TROPONIN)    Collection Time: 12/15/18  4:28 PM   Result Value Ref Range     (H) 26 - 192 U/L    CK - MB 6.4 (H) <3.6 ng/ml    CK-MB Index 1.7 0.0 - 4.0 %    Troponin-I, Qt. <0.02 0.0 - 0.045 NG/ML   HEMOGLOBIN A1C WITH EAG    Collection Time: 12/15/18  4:28 PM   Result Value Ref Range    Hemoglobin A1c 8.4 (H) 4.2 - 5.6 %    Est. average glucose 194 mg/dL   EKG, 12 LEAD, INITIAL    Collection Time: 12/15/18  4:43 PM   Result Value Ref Range    Ventricular Rate 96 BPM    Atrial Rate 96 BPM    P-R Interval 148 ms    QRS Duration 90 ms    Q-T Interval 350 ms    QTC Calculation (Bezet) 442 ms    Calculated P Axis 49 degrees    Calculated R Axis -15 degrees    Calculated T Axis 47 degrees    Diagnosis Normal sinus rhythm  Moderate voltage criteria for LVH, may be normal variant  Borderline ECG  When compared with ECG of 12-DEC-2018 01:16,  No significant change was found     URINALYSIS W/ RFLX MICROSCOPIC    Collection Time: 12/15/18  5:27 PM   Result Value Ref Range    Color DARK YELLOW      Appearance CLEAR      Specific gravity 1.012 1.005 - 1.030      pH (UA) 8.0 5.0 - 8.0      Protein TRACE (A) NEG mg/dL    Glucose NEGATIVE  NEG mg/dL    Ketone 15 (A) NEG mg/dL    Bilirubin NEGATIVE  NEG      Blood LARGE (A) NEG      Urobilinogen 1.0 0.2 - 1.0 EU/dL    Nitrites POSITIVE (A) NEG      Leukocyte Esterase TRACE (A) NEG     URINE MICROSCOPIC ONLY    Collection Time: 12/15/18  5:27 PM   Result Value Ref Range    WBC 5 to 10 0 - 5 /hpf    RBC TOO NUMEROUS TO COUNT 0 - 5 /hpf    Epithelial cells 1+ 0 - 5 /lpf    Bacteria 1+ (A) NEG /hpf   EKG, 12 LEAD, SUBSEQUENT    Collection Time: 12/15/18  7:29 PM   Result Value Ref Range    Ventricular Rate 94 BPM    Atrial Rate 94 BPM    P-R Interval 150 ms    QRS Duration 88 ms    Q-T Interval 352 ms    QTC Calculation (Bezet) 440 ms    Calculated P Axis 70 degrees    Calculated R Axis -15 degrees    Calculated T Axis 33 degrees    Diagnosis       Sinus rhythm with premature supraventricular complexes  Minimal voltage criteria for LVH, may be normal variant  Borderline ECG  When compared with ECG of 15-DEC-2018 16:43,  premature supraventricular complexes are now present     CARDIAC PANEL,(CK, CKMB & TROPONIN)    Collection Time: 12/15/18  7:40 PM   Result Value Ref Range     (H) 26 - 192 U/L    CK - MB 5.9 (H) <3.6 ng/ml    CK-MB Index 1.7 0.0 - 4.0 %    Troponin-I, Qt. <0.02 0.0 - 0.045 NG/ML   GLUCOSE, POC    Collection Time: 12/15/18 11:42 PM   Result Value Ref Range    Glucose (POC) 164 (H) 70 - 110 mg/dL       Radiologic Studies -   US AORTA B-SCAN LTD   Final Result   IMPRESSION:      Aorta and iliac arteries are patent and normal in caliber.  The inferior vena cava is patent. Complex cystic structure seen in the region the umbilicus unclear etiology. CT   recommended for further evaluation. Findings are discussed with nurse   practitioner Dayo Syed 7:51 PM 7/15/2018. CTA CHEST ABD PELV W CONT    (Results Pending)     CT Results  (Last 48 hours)               12/15/18 2052  CTA CHEST ABD PELV W CONT Final result    Impression:  IMPRESSION:       No evidence of an abdominal aortic aneurysm. Mild calcific atherosclerosis   present. No complex mass or abnormal fluid collection seen to account for the ultrasound   findings. No abnormality seen in the region the umbilicus. Stranding around the left proximal ureter raising the possibility of pyelitis   correlate with urinalysis. Fluid distended stomach with some wall thickening of the antrum of the stomach   and first portion the duodenum. This may reflect under distention versus   gastritis or other gastric wall pathology. Large calcified fibroid, colonic diverticulosis without diverticulitis, small   hiatal hernia. Narrative:  EXAM: CTA chest, abdomen and pelvis       INDICATION: Vomiting since yesterday complex cystic structure seen on ultrasound       COMPARISON: Ultrasound dated today and CT dated December 12, 2018       TECHNIQUE: Axial CT imaging from the thoracic inlet through the symphysis with   intravenous contrast. Coronal and sagittal MIP reformats were generated. One or   more dose reduction techniques were used on this CT: automated exposure control,   adjustment of the mAs and/or kVp according to patient size, and iterative   reconstruction techniques. The specific techniques used on this CT exam have   been documented in the patient's electronic medical record.       _______________       FINDINGS:       AORTA AND VESSELS: The neck vessels are patent. There is no aortic dissection. Mild calcific atherosclerosis present.  Celiac artery, superior mesenteric artery, both renal arteries, inferior mesenteric artery, iliac arteries and   proximal femoral arteries are patent. There is no evidence of an abdominal   aortic aneurysm. PULMONARY ARTERIES: No evidence of pulmonary embolism. LYMPH Nodes: No enlarged lymph nodes seen       PLEURA: There are no pleural effusions       HEART: Cardiac size is borderline. There is no pericardial effusion. Calcific   coronary artery disease present. VASCULATURE/MEDIASTINUM: There is a small hiatal hernia. LUNGS: No suspicious nodule or mass. No abnormal opacities. AIRWAY: Normal.       ABDOMEN AND PELVIS:        Liver: There is hepatic steatosis. No hepatic lesion seen. Gallbladder is   unremarkable. No bile duct dilatation. Pancreas: Unremarkable        Spleen: Unremarkable        Adrenals: Hyperplasia of both adrenals present. Kidneys: Kidneys demonstrate no nephrolithiasis. There is normal enhancement. There is perinephric stranding around the left ureter raising the possibility of   pyelitis. .       Lymph nodes: Subcentimeter periaortic lymph nodes are seen       Bowel: There is colonic diverticulosis without diverticulitis. Appendix is   normal. No bowel obstruction seen. There is a small hiatal hernia. There is wall   thickening of the antrum of the stomach as well as first portion the duodenum. This may reflect under distention versus gastritis or gastric wall pathology. There is no small bowel obstruction. Pelvic organs: There is an enlarged uterus with calcified fibroids. There is no   free fluid. The urinary bladder is under distended therefore difficult to   evaluate. No abnormal fluid collections or heterogeneous masses seen to account for the   ultrasound findings. OTHER: No acute or aggressive osseous abnormalities identified.  There is   osteopenia and degenerative disc disease at L5-S1.       _______________               CXR Results  (Last 48 hours) None          Medications given in the ED-  Medications   allopurinol (ZYLOPRIM) tablet 100 mg (100 mg Oral Refused 12/16/18 0000)   aspirin delayed-release tablet 81 mg (not administered)   atenolol (TENORMIN) tablet 100 mg (not administered)   cholecalciferol (VITAMIN D3) tablet 2,000 Units (not administered)   difluprednate (DUREZOL) 0.05 % ophthalmic emulsion 1 Drop (not administered)   gabapentin (NEURONTIN) capsule 300 mg (300 mg Oral Given 12/15/18 2356)   insulin glargine (LANTUS) injection 42 Units (42 Units SubCUTAneous Given 12/15/18 2357)   simvastatin (ZOCOR) tablet 10 mg (10 mg Oral Given 12/15/18 2356)   rivaroxaban (XARELTO) tablet 20 mg (not administered)   insulin lispro (HUMALOG) injection (2 Units SubCUTAneous Given 12/15/18 2357)   glucose chewable tablet 16 g (not administered)   glucagon (GLUCAGEN) injection 1 mg (not administered)   dextrose (D50W) injection syrg 12.5-25 g (not administered)   ondansetron (ZOFRAN) injection 4 mg (not administered)   0.9% sodium chloride infusion (100 mL/hr IntraVENous New Bag 12/15/18 2258)   levoFLOXacin (LEVAQUIN) 500 mg in D5W IVPB (500 mg IntraVENous New Bag 12/15/18 2259)   acetaminophen (TYLENOL) tablet 650 mg (not administered)   promethazine (PHENERGAN) 12.5 mg in 0.9% sodium chloride 50 mL IVPB (12.5 mg IntraVENous New Bag 12/15/18 2258)   pantoprazole (PROTONIX) 40 mg in sodium chloride 0.9% 10 mL injection (40 mg IntraVENous Given 12/15/18 2259)   0.9% sodium chloride infusion 500 mL (0 mL IntraVENous IV Completed 12/15/18 1726)   morphine injection 4 mg (4 mg IntraVENous Given 12/15/18 1647)   ondansetron (ZOFRAN) injection 4 mg (4 mg IntraVENous Given 12/15/18 1632)   morphine injection 4 mg (4 mg IntraVENous Given 12/15/18 1811)   ciprofloxacin HCl (CIPRO) tablet 250 mg (250 mg Oral Given 12/15/18 1933)   iopamidol (ISOVUE-370) 76 % injection  mL (100 mL IntraVENous Given 12/15/18 2031)         Medical Decision Making   I am the first provider for this patient. I reviewed the vital signs, available nursing notes, past medical history, past surgical history, family history and social history. Vital Signs-Reviewed the patient's vital signs. Pulse Oximetry Analysis - 100% on RA     Cardiac Monitor:  Rate: 104 bpm  Rhythm: tachycardia    EKG interpretation: (Preliminary)  4:43 PM   NSR at 96 bpm. No STEMI. EKG read by Sofy Mccollum NP at 4:43 PM     EKG interpretation: (Preliminary)  7:29 PM   94 bpm NSR with PVCs No STEMI  EKG read by Sofy Mccollum FNP-BC at 7:32 PM    Records Reviewed: Nursing Notes and Old Medical Records   Patient was seen 12/12 for back pain, abdominal pain and vaginal bleeding and increased frequency  of urination. Transvaginal ultrasound performed shows Mulit-fibroid uterus, CT abd pelv wo contrast shows Fibroid uterus, 1 mm calculus non-obstructing left kidney, diverticulosis and a small hiatal hernia. Pelvic exam performed showing no blood. Started on Cipro for UTI and possibly Pyelo. Labs showed no signs of infection and was not anemic    Provider Notes (Medical Decision Making):     Procedures:  Procedures    ED Course:   4:16 PM: Initial assessment performed. The patients presenting problems have been discussed, and they are in agreement with the care plan formulated and outlined with them. I have encouraged them to ask questions as they arise throughout their visit. 6:43 PM Discussed patient's history, exam, and available diagnostics results with Chelsea Johnson DO, ED Attending, who agrees to evaluate the patient. 7:50 PM: Received Call from radiologist requesting Ct abd pelv with contrast to look at cystic structure not seen in previous CT    FACE-TO-FACE PROGRESS NOTE:  7:17 PM  72 y.o. recently diagnosed with UTI but has been vomiting, unable to tolerate ABX since yesterday. On exam, pt is non-toxic appearing and afebrile with no leukocytosis on lab work.  Pt already had CT scan when seen at ED a few days ago thus will obtain ultrasound to evaluate for aortic pathology. Pt will have to be PO challenged to see if she is able to tolerate oral ABX and if she is unable, will require admission for IV ABX as her UA is indicative of persistent UTI/pyelonephritis. I have personally seen and examined the patient, reviewed the CHIARA's note and agree with findings and plan. Written by JIM Galeana, as dictated by Brandi Worley DO.    9:33 PM Discussed patient's history, exam, and available diagnostics results with Karen Marie MD, internal medicine, who agrees to admit the patient to medical.     9:36 PM Pt updated on need for admission, patient is agreeable and has no questions or concerns at this point. Diagnosis and Disposition       Core Measures:  For Hospitalized Patients:    1. Hospitalization Decision Time:  The decision to hospitalize the patient was made by Rickey PIERRE at 9:15 PM on 12/15/2018    2. Aspirin: Aspirin was not given because the patient did not present with a stroke at the time of their Emergency Department evaluation    9:35 PM  Patient is being admitted to the hospital by Karen Marie MD. The results of their tests and reasons for their admission have been discussed with them and/or available family. They convey agreement and understanding for the need to be admitted and for their admission diagnosis. CONDITIONS ON ADMISSION:  Sepsis is not present at the time of admission. Deep Vein Thrombosis is not present at the time of admission. Urinary Tract Infection is present at the time of admission. MRSA is not present at the time of admission. Wound infection is not present at the time of admission. Pressure Ulcer is not present at the time of admission. Discussion: 72 y.o. female presents after recent diagnosis of UTI with persistent vomiting and epigastric pain. Labs showed no signs of infection, they do show elevated CK and CKNB.  She had no further vomiting but had extreme nausea after taking Cipro. US does not show AAA but radiologist requested CTA to further assess and is also negative for aneurysm. Pt will be admitted for nausea medications and IV abx. She was stable during her stay and agreeable to treatment. CLINICAL IMPRESSION:    1. Abdominal pain, generalized    2. Pyelonephritis    3. Non-intractable vomiting with nausea, unspecified vomiting type    4. Elevated CK         PLAN:  1. Admit to medical  _______________________________    Attestations: This note is prepared by Jalyn Guzmán, acting as Scribe for Mortimer Post FNP-BC. Mortimer Post FNP-BC:  The scribe's documentation has been prepared under my direction and personally reviewed by me in its entirety. I confirm that the note above accurately reflects all work, treatment, procedures, and medical decision making performed by me. This note is prepared by Marissa Gutierrez, acting as Scribe for General Rigo DO. General Rigo DO:  The scribe's documentation has been prepared under my direction and personally reviewed by me in its entirety.   I confirm that the note above accurately reflects all work, treatment, procedures, and medical decision making performed by me.  _______________________________

## 2018-12-16 LAB
ANION GAP SERPL CALC-SCNC: 6 MMOL/L (ref 3–18)
BUN SERPL-MCNC: 8 MG/DL (ref 7–18)
BUN/CREAT SERPL: 10
CALCIUM SERPL-MCNC: 8.6 MG/DL (ref 8.5–10.1)
CHLORIDE SERPL-SCNC: 101 MMOL/L (ref 100–108)
CO2 SERPL-SCNC: 32 MMOL/L (ref 21–32)
CREAT SERPL-MCNC: 0.83 MG/DL (ref 0.6–1.3)
ERYTHROCYTE [DISTWIDTH] IN BLOOD BY AUTOMATED COUNT: 12.5 % (ref 11.6–14.5)
GLUCOSE BLD STRIP.AUTO-MCNC: 103 MG/DL (ref 70–110)
GLUCOSE BLD STRIP.AUTO-MCNC: 140 MG/DL (ref 70–110)
GLUCOSE BLD STRIP.AUTO-MCNC: 177 MG/DL (ref 70–110)
GLUCOSE BLD STRIP.AUTO-MCNC: 198 MG/DL (ref 70–110)
GLUCOSE SERPL-MCNC: 106 MG/DL (ref 74–99)
HCT VFR BLD AUTO: 36.8 % (ref 35–45)
HGB BLD-MCNC: 11.9 G/DL (ref 12–16)
MCH RBC QN AUTO: 28.9 PG (ref 24–34)
MCHC RBC AUTO-ENTMCNC: 32.3 G/DL (ref 31–37)
MCV RBC AUTO: 89.3 FL (ref 74–97)
PLATELET # BLD AUTO: 287 K/UL (ref 135–420)
PMV BLD AUTO: 9.4 FL (ref 9.2–11.8)
POTASSIUM SERPL-SCNC: 3.8 MMOL/L (ref 3.5–5.5)
RBC # BLD AUTO: 4.12 M/UL (ref 4.2–5.3)
SODIUM SERPL-SCNC: 139 MMOL/L (ref 136–145)
WBC # BLD AUTO: 6.8 K/UL (ref 4.6–13.2)

## 2018-12-16 PROCEDURE — 82962 GLUCOSE BLOOD TEST: CPT

## 2018-12-16 PROCEDURE — 85027 COMPLETE CBC AUTOMATED: CPT

## 2018-12-16 PROCEDURE — 74011636637 HC RX REV CODE- 636/637: Performed by: HOSPITALIST

## 2018-12-16 PROCEDURE — 74011250637 HC RX REV CODE- 250/637: Performed by: HOSPITALIST

## 2018-12-16 PROCEDURE — 80048 BASIC METABOLIC PNL TOTAL CA: CPT

## 2018-12-16 PROCEDURE — 36415 COLL VENOUS BLD VENIPUNCTURE: CPT

## 2018-12-16 PROCEDURE — 65270000029 HC RM PRIVATE

## 2018-12-16 PROCEDURE — 74011250636 HC RX REV CODE- 250/636: Performed by: HOSPITALIST

## 2018-12-16 PROCEDURE — 74011250637 HC RX REV CODE- 250/637: Performed by: FAMILY MEDICINE

## 2018-12-16 RX ORDER — ALLOPURINOL 100 MG/1
100 TABLET ORAL DAILY
Status: DISCONTINUED | OUTPATIENT
Start: 2018-12-17 | End: 2018-12-18 | Stop reason: HOSPADM

## 2018-12-16 RX ORDER — HYDROCODONE BITARTRATE AND ACETAMINOPHEN 5; 325 MG/1; MG/1
1-2 TABLET ORAL
Status: DISCONTINUED | OUTPATIENT
Start: 2018-12-16 | End: 2018-12-18 | Stop reason: HOSPADM

## 2018-12-16 RX ORDER — DIPHENHYDRAMINE HYDROCHLORIDE 50 MG/ML
25 INJECTION, SOLUTION INTRAMUSCULAR; INTRAVENOUS
Status: DISCONTINUED | OUTPATIENT
Start: 2018-12-16 | End: 2018-12-16

## 2018-12-16 RX ORDER — DIPHENHYDRAMINE HCL 25 MG
25 CAPSULE ORAL
Status: DISCONTINUED | OUTPATIENT
Start: 2018-12-16 | End: 2018-12-18 | Stop reason: HOSPADM

## 2018-12-16 RX ADMIN — HYDROCODONE BITARTRATE AND ACETAMINOPHEN 2 TABLET: 5; 325 TABLET ORAL at 11:41

## 2018-12-16 RX ADMIN — INSULIN LISPRO 2 UNITS: 100 INJECTION, SOLUTION INTRAVENOUS; SUBCUTANEOUS at 16:53

## 2018-12-16 RX ADMIN — ACETAMINOPHEN 650 MG: 325 TABLET ORAL at 08:52

## 2018-12-16 RX ADMIN — SIMVASTATIN 10 MG: 20 TABLET, FILM COATED ORAL at 22:22

## 2018-12-16 RX ADMIN — INSULIN GLARGINE 42 UNITS: 100 INJECTION, SOLUTION SUBCUTANEOUS at 22:21

## 2018-12-16 RX ADMIN — RIVAROXABAN 20 MG: 10 TABLET, FILM COATED ORAL at 08:51

## 2018-12-16 RX ADMIN — ATENOLOL 100 MG: 50 TABLET ORAL at 08:51

## 2018-12-16 RX ADMIN — VITAMIN D, TAB 1000IU (100/BT) 2000 UNITS: 25 TAB at 08:51

## 2018-12-16 RX ADMIN — INSULIN LISPRO 2 UNITS: 100 INJECTION, SOLUTION INTRAVENOUS; SUBCUTANEOUS at 22:21

## 2018-12-16 RX ADMIN — SODIUM CHLORIDE 100 ML/HR: 900 INJECTION, SOLUTION INTRAVENOUS at 11:44

## 2018-12-16 RX ADMIN — HYDROCODONE BITARTRATE AND ACETAMINOPHEN 2 TABLET: 5; 325 TABLET ORAL at 22:21

## 2018-12-16 RX ADMIN — ASPIRIN 81 MG: 81 TABLET, COATED ORAL at 08:52

## 2018-12-16 RX ADMIN — LEVOFLOXACIN 500 MG: 5 INJECTION, SOLUTION INTRAVENOUS at 22:23

## 2018-12-16 RX ADMIN — ACETAMINOPHEN 650 MG: 325 TABLET ORAL at 04:18

## 2018-12-16 NOTE — PROGRESS NOTES
Hospitalist Progress Note    Patient: Terence Aponte MRN: [id number]  CSN: [id number]    YOB: 1945  Age: 72 y.o. Sex: female    DOA: 12/15/2018 LOS:  LOS: 1 day            Assessment/Plan     Principal Problem:    Pyelonephritis (12/15/2018)    Active Problems:    CAD (coronary artery disease) (12/3/2018)      Hypertension (12/3/2018)      Insulin dependent diabetes mellitus with complications ([facility] (85/3/3458)      Acute DVT of left tibial vein ([facility] (12/3/2018)      Morbid obesity with BMI of 40.0-44.9, adult ([facility] (12/3/2018)      Vomiting (12/15/2018)      Intractable vomiting with nausea (12/15/2018)    Pyelonephritis with intractable nausea and vomiting:  IV Levaquin. Will f/u the urine Cx (+ E.Coli on 12/12). Antiemesis for N/V    Mild dehydration: IVF    Insulin-dependent diabetes mellitus: On lantus and SSI    Recent hx of left leg DVT: On Xarelto      Coronary artery disease: On ASA, Zocor, lopressor      History of gout:      Gastroesophageal reflux disease: Symptomatic. On Protonix     Abnormal abdominal ultrasound: CT done. + multiple fibroids. Denies vaginal bleeding. Need GYN eval but can be done per out patient. Hypertension: Controlled. On Lopressor    Hyperlipidemia: On Zocor    Pain control    Dispo: 2-3 days       CC:   \" My back hurts\"         Subjective:     Pt was seen and examined with the nurse in the morning round. C/O right back pain. 8/10 with radiation to abdominal area. + N/V. Review of systems  General: No fevers or chills. Cardiovascular: No chest pain. + pressure. No palpitations. Pulmonary: No cough, SOB  Gastrointestinal: + nausea/vomiting. Objective:      Visit Vitals  /73 (BP 1 Location: Right arm, BP Patient Position: At rest)   Pulse 83   Temp 98.2 °F (36.8 °C)   Resp 18   Ht 5' 4\" (1.626 m)   Wt 116.7 kg (257 lb 4.4 oz)   SpO2 98%   BMI 44.16 kg/m²       Physical Exam:    Gen: NAD, uncomfortable  Heent:  MMM, NC, AT. Cor: s1s2 RRR. No MRG. PMI mid 5th intercostal space. Resp:  CTA b/l. No w/r/r. Nml effort and diaphragmatic excursion. Abd:  TTP diffusely. BS positive. No rebound or guarding. Ext: No edema or cyanosis. Intake and Output:  Current Shift:  12/16 0701 - 12/16 1900  In: -   Out: 300 [Urine:300]  Last three shifts:  12/14 1901 - 12/16 0700  In: 650 [I.V.:650]  Out: -     Labs: Results:       Chemistry Recent Labs     12/16/18  0401 12/15/18  1628   * 232*    138   K 3.8 3.7    97*   CO2 32 32   BUN 8 10   CREA 0.83 0.88   CA 8.6 9.5   AGAP 6 9   BUCR 10 11   AP  --  91   TP  --  8.1   ALB  --  3.2*   GLOB  --  4.9*   AGRAT  --  0.7*      CBC w/Diff Recent Labs     12/16/18  0401 12/15/18  1628   WBC 6.8 5.9   RBC 4.12* 4.60   HGB 11.9* 13.3   HCT 36.8 40.6    284   GRANS  --  78*   LYMPH  --  16*   EOS  --  0      Cardiac Enzymes Recent Labs     12/15/18  1940 12/15/18  1628   * 379*   CKND1 1.7 1.7      Coagulation No results for input(s): PTP, INR, APTT in the last 72 hours. No lab exists for component: INREXT, INREXT    Lipid Panel No results found for: CHOL, CHOLPOCT, CHOLX, CHLST, CHOLV, 469814, HDL, LDL, LDLC, DLDLP, 083412, VLDLC, VLDL, TGLX, TRIGL, TRIGP, TGLPOCT, CHHD, CHHDX   BNP No results for input(s): BNPP in the last 72 hours.    Liver Enzymes Recent Labs     12/15/18  1628   TP 8.1   ALB 3.2*   AP 91   SGOT 26      Thyroid Studies No results found for: T4, T3U, TSH, TSHEXT, TSHEXT     Procedures/imaging: see electronic medical records for all procedures/Xrays and details which were not copied into this note but were reviewed prior to creation of Plan      Medications Reviewed  Xi Siegel MD

## 2018-12-16 NOTE — PROGRESS NOTES
Chart reviewed. Pt admitted for pyelonephritis. Pt noted to be readmit. Pt has a high RRAT of 21. Per CM notes, on pts previous admission she refused HH and decided to have family assistance instead. Provider, please consider ordering PT/OT to assist in identifying discharge planning needs. 1010  Met with patient at bedside, no family present. Pt lives alone, but has been staying at her son Marcio crespo. Pt has cane. Pt denies having RW. Pts PCP is MD Marques Keita. Discussed need for Located within [facility] and pt states her brother has a home health agency that can assist, if needed. CM will cont to follow. Reason for Readmission:   Per H&P, \"This is a 59-year-old [place] American female who was recently diagnosed with a DVT during her last hospitalization at the beginning of December. She has been on Xarelto since then. She had come in for chest pain, had an extensive workup including a stress test, cardiology consultation. They found a DVT in her left leg, but she had a negative VQ scan and ultimately she went home and was doing okay, but came back to the emergency room this past week, on 12/12/2018, and subsequently was complaining of reflux, possible medication intolerance, and felt like the medicines were making her sick. They did a urinalysis that was positive for infection. She was started on Cipro and discharged from the emergency room. She notes that she has not been able to keep the Cipro down at home. The last 2 days, she has been vomiting. It got worse last night where she was up all night vomiting. She could not keep any medications down. She did take her insulin, however, and her son brought her into the emergency room today. \"    RRAT Score and Risk Level:     21 high     Level of Readmission:    21      Care Conference scheduled:   N/a       Resources/supports as identified by patient/family:          Top Challenges facing patient (as identified by patient/family and CM): Finances/Medication cost?       Transportation        Support system or lack thereof? Living arrangements? Lives alone, but will dc to son's house        Self-care/ADLs/Cognition? Current Advanced Directive/Advance Care Plan:  Not on file. Provider, please consider ordering palliative care/spiritual care consult to address ACP           Plan for utilizing home health:   Likely will be a need, HH was recommended after last admission             Likelihood of additional readmission:   Mod/high             Transition of Care Plan:    Based on readmission, the patient's previous Plan of Care   has been evaluated and/or modified. The current Transition of Care Plan is:    [facility] v SNF? Care Management Interventions  PCP Verified by CM: Yes  Mode of Transport at Discharge: Other (see comment)(family)  Transition of Care Consult (CM Consult): Discharge Planning  Current Support Network: Own Home, Family Lives Nearby(will dc to son's house)  Confirm Follow Up Transport: Family  Plan discussed with Pt/Family/Caregiver: Yes  Freedom of Choice Offered:  Yes

## 2018-12-16 NOTE — ROUTINE PROCESS
Bedside and Verbal shift change report given to FERNANDO Mccurdy RN (oncoming nurse) by Juan Fernandez RN   (offgoing nurse). Report included the following information SBAR, Kardex, Intake/Output, MAR and Recent Results.

## 2018-12-16 NOTE — ROUTINE PROCESS
TRANSFER - IN REPORT:    Verbal report received from Sarah Joy RN (name) on Kia Woodard  being received from ED (unit) for routine progression of care      Report consisted of patients Situation, Background, Assessment and   Recommendations(SBAR). Information from the following report(s) SBAR, Kardex, ED Summary, [facility] and Recent Results was reviewed with the receiving nurse. Opportunity for questions and clarification was provided. Assessment completed upon patients arrival to unit and care assumed.

## 2018-12-16 NOTE — PROGRESS NOTES
[address] [facility] with [facility] from lab and made her aware of STAT urine culture. Will use specimen from UA. Pt received from ED. Ambulated from stretcher to bed with walker. Son at bedside. /89. C/o nausea and headache 8/10. PTA med list is incomplete. Pt states that she has 4 blood pressure medications at home, but only takes 1 because \"they aren't working\". Pt doesn't know which medications these are. Pt has not been taking allopurinol because she says she doesn't have issues with gout. Son who states that he hasn't been giving pt some of her medications like gabapentin because he's afraid of it interacting with her other medications. 7359- Pt c/o throat pain (from vomiting PTA) and back pain. Tylenol given. Will continue to monitor. 0500- Pt sleeping. NAD.        Patient Vitals for the past 12 hrs:   Temp Pulse Resp BP SpO2   12/16/18 0340 98 °F (36.7 °C) 79 18 151/76 100 %   12/15/18 2245 98.8 °F (37.1 °C) 89 16 179/89 97 %   12/15/18 2200 98.4 °F (36.9 °C) 92 17 (!) 162/92 98 %   12/15/18 2120  93 23  93 %   12/15/18 2119    155/77 91 %   12/15/18 1910     100 %   12/15/18 1900  88 19 175/82 94 %   12/15/18 1845  87 16 (!) 172/92 94 %   12/15/18 1830  87 18 175/89 95 %   12/15/18 1815  86 17 (!) 180/110 96 %   12/15/18 1800  95 15 (!) 199/106 96 %

## 2018-12-16 NOTE — ED NOTES
Pt hourly rounding competed. Safety   Pt (x) resting on stretcher with side rails up and call bell in reach. () in chair    () in parents arms. Toileting   Pt offered ()Bedpan     ()Assistance to Restroom     ()Urinal  Ongoing Updates  Updated on plan of care and status of test results. Pain Management  Inquired as to comfort and offered comfort measures:    (x) warm blankets   (x) dimmed lights    Pt resting in stretcher in NAD, able to speak in full, clear sentences to make needs known. States she's had increased nausea, notified provider.

## 2018-12-16 NOTE — PROGRESS NOTES
Problem: Falls - Risk of  Goal: *Absence of Falls  Document Aldo Fall Risk and appropriate interventions in the flowsheet.   Outcome: Progressing Towards Goal  Fall Risk Interventions:  Mobility Interventions: Utilize walker, cane, or other assistive device         Medication Interventions: Teach patient to arise slowly

## 2018-12-16 NOTE — H&P
[facility] [place]  HISTORY AND PHYSICAL      Name:Oma FELIZ  MR#: [id number]  : 1945  ACCOUNT #: [de-identified]   ADMIT DATE: 12/15/2018    ADMITTING DIAGNOSES:  1. Pyelonephritis. 2.  Mild dehydration. 3.  Intractable nausea and vomiting. 4.  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 5.  Coronary artery disease. 6.  Morbid obesity. 7.  History of hypertension. 8.  History of gout. HISTORY OF PRESENT ILLNESS:  This is a 79-year-old [facility] American female who was recently diagnosed with a DVT during her last hospitalization at the beginning of December. She has been on Xarelto since then. She had come in for chest pain, had an extensive workup including a stress test, cardiology consultation. They found a DVT in her left leg, but she had a negative VQ scan and ultimately she went home and was doing okay, but came back to the emergency room this past week, on 2018, and subsequently was complaining of reflux, possible medication intolerance, and felt like the medicines were making her sick. They did a urinalysis that was positive for infection. She was started on Cipro and discharged from the emergency room. She notes that she has not been able to keep the Cipro down at home. The last 2 days, she has been vomiting. It got worse last night where she was up all night vomiting. She could not keep any medications down. She did take her insulin, however, and her son brought her into the emergency room today. PAST MEDICAL HISTORY:  Notable for coronary disease, high blood pressure, insulin-dependent diabetes mellitus, DVT in the left leg, morbid obesity, hypercholesterolemia, gout, diabetic neuropathy, and GERD. PAST SURGICAL HISTORY:  Cardiac procedure. FAMILY HISTORY:  Diabetes, high blood pressure. SOCIAL HISTORY:  Nonsmoker. No alcohol or drug use. She lives with her son. ALLERGIES:  LISINOPRIL AND PENICILLIN.     REVIEW OF SYSTEMS:  GENERAL:  She denies any fevers or chills. No myalgias. GASTROINTESTINAL:  She has had abdominal pain, midepigastric and right-sided; nausea and vomiting over the past 24 hours. No bowel movement or diarrhea. No blood in the emesis. GENITOURINARY:  She denies dysuria, but she has bilateral back floor flank pain primarily on the right; however, she denies any hematuria. CARDIOVASCULAR:  No chest pains that are typical for cardiac symptoms. She complains of pain all across her, basically what she is pointing to is her complete chest and epigastrium. She denies palpitations or leg swelling. RESPIRATORY:  No shortness of breath, cough, or wheezing. HEMATOLOGIC:  No bleeding or bruisability. NEUROLOGIC:  No syncope, dizziness, lightheadedness, or headaches. No visual disturbance. PHYSICAL EXAMINATION:  GENERAL:  She is an obese [place] American female, very pleasant and cooperative, oriented x3, but feeling sick and nauseous and vomited while I was doing her interview. VITAL SIGNS:  Temperature is 98.9, pulse 93, blood pressure 155/77, respiratory rate 23, SpO2 of 92% on room air. HEENT:  Sclerae are anicteric. Conjunctivae are pink. Her oropharynx is dry. No lesions. NECK:  Supple. No thyromegaly, lymphadenopathy. LUNGS:  Clear bilaterally. No rales, rhonchi, wheezes. CARDIOVASCULAR:  Regular rate and rhythm. No murmur, rub, or gallop. ABDOMEN:  Soft, obese, nontender. No right upper quadrant tenderness. Diminished bowel sounds, but no tenderness or guarding. No notable CVA tenderness on exam.  LOWER EXTREMITIES:  Trace ankle edema. Distal pulses palpable bilaterally. SKIN:  No rash on the skin. LABORATORY DATA:  Show a normal CBC, a metabolic panel that is unremarkable except glucose is 232. Two sets of cardiac markers are normal.  Troponin less than 0.02.   CK was 379 and 351 and she just had a stress test, by the way, on 12/04/2018 that showed a low risk of inducible myocardial ischemia and an EF of 55% to 60%. Urine culture from 12/12/2018 grew 2 variance of E. coli. Both are susceptible to fluoroquinolones, which would be our choice considering her significant ALLERGY NOTED TO PENICILLIN and the possibility of cephalosporins cross reactivity. She had an ultrasound of her aorta. The ultrasound was done today. It shows aorta and iliac arteries are patent and normal in caliber. The IVC is patent, but there was a complex cystic structure seen in the region of the umbilicus of unclear etiology and so a CT scan was ordered. The result of that is not back at this point in time. It looks like she had a CT abdomen and pelvis, 12/12/2018, that showed a fibroid uterus, a 1 mm calculus in the mid pole of the left kidney, no hydronephrosis, and she had diverticulosis, so it would be unlikely that something has developed between 12/12/2018 and now, but we will follow up the results of that scan as soon as the results are posted. ASSESSMENT:  1. Pyelonephritis with intractable nausea and vomiting. 2.  Mild dehydration. 3.  Insulin-dependent diabetes mellitus. 4.  Coronary artery disease. 5.  History of gout. 6.  Gastroesophageal reflux disease. 7.  Abnormal abdominal ultrasound. 8.  Hypertension. 9.  Hyperlipidemia. PLAN:  Admission to medical.  There is no indication that there are cardiac issues currently. This appears to be infectious and her symptomatology related to that, so I think she is okay for a medical bed admission. We will put her on Protonix IV for the significant GERD symptoms she is having from the continued retching and vomiting. We will start Levaquin for the pyelonephritis since she has failed oral Cipro. This will be given IV, of course. Accu-Cheks before each meal and at bedtime and we will continue her Xarelto 20 mg daily that will start with breakfast tomorrow. Give her IV fluids tonight.   Alternate Zofran and Phenergan for nausea, depending on which one works better. Tylenol as needed for any headaches and she can be on a diabetic diet once she is able to tolerate something. We will monitor her blood sugars. I have reconciled her home medications for her to continue holding the Glucophage and the various supplements that she takes, which are not absolutely necessary at this moment in time and with that, I expect her to be in the hospital probably 48 hours considering her other comorbidities at this point.       MD FRANKY Wilcox/ALLEGRA  D: 12/15/2018 22:05     T: 12/15/2018 22:41  JOB #: [id number]  CC: Ailyn Dorantes MD  [address], [address] [place] [address]

## 2018-12-16 NOTE — PROGRESS NOTES
0730-received report from Kirill Headely RN included SBAR MAR and Kardex. Shift summary-no change in pt status, medicated for pain with good result. IV infiltrated and removed. Bedside and Verbal shift change report given to Philipp Nageotte RN (oncoming nurse) by Paddy Cota RN (offgoing nurse). Report included the following information SBAR, Kardex and MAR.

## 2018-12-16 NOTE — ED NOTES
TRANSFER - OUT REPORT:    Verbal report given to Shaheen Cotto RN on Socorro Hopkins  being transferred to medical for routine progression of care       Report consisted of patients Situation, Background, Assessment and   Recommendations(SBAR). Information from the following report(s) SBAR, ED Summary, [facility] and Recent Results was reviewed with the receiving nurse. Lines:   Peripheral IV 12/15/18 Right Forearm (Active)   Site Assessment Clean, dry, & intact 12/15/2018  4:35 PM   Phlebitis Assessment 0 12/15/2018  4:35 PM   Infiltration Assessment 0 12/15/2018  4:35 PM   Dressing Status Clean, dry, & intact 12/15/2018  4:35 PM   Dressing Type Transparent 12/15/2018  4:35 PM        Opportunity for questions and clarification was provided.       Patient transported with:   [email or web address]

## 2018-12-17 LAB
BACTERIA SPEC CULT: NORMAL
GLUCOSE BLD STRIP.AUTO-MCNC: 128 MG/DL (ref 70–110)
GLUCOSE BLD STRIP.AUTO-MCNC: 138 MG/DL (ref 70–110)
GLUCOSE BLD STRIP.AUTO-MCNC: 168 MG/DL (ref 70–110)
GLUCOSE BLD STRIP.AUTO-MCNC: 203 MG/DL (ref 70–110)
SERVICE CMNT-IMP: NORMAL

## 2018-12-17 PROCEDURE — 74011250636 HC RX REV CODE- 250/636: Performed by: HOSPITALIST

## 2018-12-17 PROCEDURE — 74011636637 HC RX REV CODE- 636/637: Performed by: HOSPITALIST

## 2018-12-17 PROCEDURE — C9113 INJ PANTOPRAZOLE SODIUM, VIA: HCPCS | Performed by: HOSPITALIST

## 2018-12-17 PROCEDURE — 74011000250 HC RX REV CODE- 250: Performed by: HOSPITALIST

## 2018-12-17 PROCEDURE — 74011250637 HC RX REV CODE- 250/637: Performed by: FAMILY MEDICINE

## 2018-12-17 PROCEDURE — 74011250637 HC RX REV CODE- 250/637: Performed by: INTERNAL MEDICINE

## 2018-12-17 PROCEDURE — 65270000029 HC RM PRIVATE

## 2018-12-17 PROCEDURE — 82962 GLUCOSE BLOOD TEST: CPT

## 2018-12-17 PROCEDURE — 74011250637 HC RX REV CODE- 250/637: Performed by: HOSPITALIST

## 2018-12-17 RX ORDER — DOCUSATE SODIUM 100 MG/1
100 CAPSULE, LIQUID FILLED ORAL 2 TIMES DAILY
Status: DISCONTINUED | OUTPATIENT
Start: 2018-12-17 | End: 2018-12-18 | Stop reason: HOSPADM

## 2018-12-17 RX ORDER — POLYETHYLENE GLYCOL 3350 17 G/17G
17 POWDER, FOR SOLUTION ORAL DAILY
Status: DISCONTINUED | OUTPATIENT
Start: 2018-12-17 | End: 2018-12-18 | Stop reason: HOSPADM

## 2018-12-17 RX ORDER — FLUCONAZOLE 100 MG/1
200 TABLET ORAL DAILY
Status: DISCONTINUED | OUTPATIENT
Start: 2018-12-17 | End: 2018-12-18 | Stop reason: HOSPADM

## 2018-12-17 RX ORDER — NYSTATIN 100000 [USP'U]/G
POWDER TOPICAL 3 TIMES DAILY
Status: DISCONTINUED | OUTPATIENT
Start: 2018-12-17 | End: 2018-12-18 | Stop reason: HOSPADM

## 2018-12-17 RX ADMIN — SIMVASTATIN 10 MG: 20 TABLET, FILM COATED ORAL at 21:17

## 2018-12-17 RX ADMIN — HYDROCODONE BITARTRATE AND ACETAMINOPHEN 2 TABLET: 5; 325 TABLET ORAL at 05:56

## 2018-12-17 RX ADMIN — INSULIN LISPRO 2 UNITS: 100 INJECTION, SOLUTION INTRAVENOUS; SUBCUTANEOUS at 17:48

## 2018-12-17 RX ADMIN — DUREZOL 1 DROP: 0.5 EMULSION OPHTHALMIC at 21:18

## 2018-12-17 RX ADMIN — ONDANSETRON 4 MG: 2 INJECTION INTRAMUSCULAR; INTRAVENOUS at 22:40

## 2018-12-17 RX ADMIN — DIPHENHYDRAMINE HYDROCHLORIDE 25 MG: 25 CAPSULE ORAL at 09:53

## 2018-12-17 RX ADMIN — ALLOPURINOL 100 MG: 100 TABLET ORAL at 08:29

## 2018-12-17 RX ADMIN — VITAMIN D, TAB 1000IU (100/BT) 2000 UNITS: 25 TAB at 08:29

## 2018-12-17 RX ADMIN — ASPIRIN 81 MG: 81 TABLET, COATED ORAL at 08:29

## 2018-12-17 RX ADMIN — RIVAROXABAN 20 MG: 10 TABLET, FILM COATED ORAL at 08:29

## 2018-12-17 RX ADMIN — DIPHENHYDRAMINE HYDROCHLORIDE 25 MG: 25 CAPSULE ORAL at 00:13

## 2018-12-17 RX ADMIN — DIPHENHYDRAMINE HYDROCHLORIDE 25 MG: 25 CAPSULE ORAL at 22:40

## 2018-12-17 RX ADMIN — NYSTATIN: 100000 POWDER TOPICAL at 11:14

## 2018-12-17 RX ADMIN — NYSTATIN: 100000 POWDER TOPICAL at 16:00

## 2018-12-17 RX ADMIN — SODIUM CHLORIDE 100 ML/HR: 900 INJECTION, SOLUTION INTRAVENOUS at 04:14

## 2018-12-17 RX ADMIN — DOCUSATE SODIUM 100 MG: 100 CAPSULE, LIQUID FILLED ORAL at 21:17

## 2018-12-17 RX ADMIN — DOCUSATE SODIUM 100 MG: 100 CAPSULE, LIQUID FILLED ORAL at 09:21

## 2018-12-17 RX ADMIN — ATENOLOL 100 MG: 50 TABLET ORAL at 08:29

## 2018-12-17 RX ADMIN — INSULIN GLARGINE 42 UNITS: 100 INJECTION, SOLUTION SUBCUTANEOUS at 21:17

## 2018-12-17 RX ADMIN — LEVOFLOXACIN 500 MG: 5 INJECTION, SOLUTION INTRAVENOUS at 21:19

## 2018-12-17 RX ADMIN — PANTOPRAZOLE SODIUM 40 MG: 40 INJECTION, POWDER, FOR SOLUTION INTRAVENOUS at 08:28

## 2018-12-17 RX ADMIN — POLYETHYLENE GLYCOL 3350 17 G: 17 POWDER, FOR SOLUTION ORAL at 09:22

## 2018-12-17 RX ADMIN — NYSTATIN: 100000 POWDER TOPICAL at 21:18

## 2018-12-17 RX ADMIN — FLUCONAZOLE 200 MG: 100 TABLET ORAL at 09:21

## 2018-12-17 RX ADMIN — INSULIN LISPRO 2 UNITS: 100 INJECTION, SOLUTION INTRAVENOUS; SUBCUTANEOUS at 21:16

## 2018-12-17 NOTE — ROUTINE PROCESS
Bedside and Verbal shift change report given to Devonna Bernheim, RN (oncoming nurse) by Jon Rashid RN (offgoing nurse). Report included the following information SBAR, Kardex, Intake/Output, MAR and Recent Results.

## 2018-12-17 NOTE — PROGRESS NOTES
Received bedside report from night shift RN. Patient resting quietly in bed. Lung sounds clear, bowel sounds active, patient complaint of vaginal itching and constipation. 6924 Dr Dashawn Marquez in to assess patient. 0935 Benadryl given for general itching all over body. Reassessed and patient stated relief.      Patient Vitals for the past 12 hrs:   Temp Pulse Resp BP SpO2   12/17/18 1525 97.7 °F (36.5 °C) 68 16 168/79 97 %   12/17/18 1101 98.3 °F (36.8 °C) 60 16 142/69 99 %   12/17/18 0721 98.4 °F (36.9 °C) 66 16 186/78 95 %

## 2018-12-17 NOTE — PROGRESS NOTES
Hospitalist Progress Note    Patient: Ameena Pruett MRN: [id number]  CSN: [id number]    YOB: 1945  Age: 72 y.o. Sex: female    DOA: 12/15/2018 LOS:  LOS: 2 days          Chief Complaint:    pyelonephritis      Assessment/Plan     Acute Pyelonephritis-levaquin IV, last cx reviewed  Intractable nausea/vomiting-appears resolved-tolerated diet this am  Recent dx of DVT in leg-xarelto PO  HTN-continue beta blocker  Ins dep diabetes with neuropathy-basal insulin, SSI PRN  Morbid obesity  CAD-no chnages  Yeast vaginitis-add diflucan and nystatin powder  Constipation-add miralax and colace    Expect she can d/c later today or by tomorrow if doing well from pain and GI standpoint      Disposition :  Patient Active Problem List   Diagnosis Code    CAD (coronary artery disease) I25.10    Hypertension I10    [facility] mellitus [facility] complications [facility] X75.8, Z79.4    Gastrointestinal disorder K92.9    Acute DVT of left tibial vein ([facility]) I82.442    Morbid obesity with BMI of 40.0-44.9, adult ([facility] E66.01, Z68.41    Gout M10.9    Vomiting R11.10    Pyelonephritis N12    Intractable vomiting with nausea R11.2       Subjective:    I feel better! Had a good breakfast, reg food  Denies nausea now  Back pain is better  C/o no BM in 5-6 days and vaginal itching c/w yeast like symptoms she has had prior    Review of systems:    Constitutional: denies fevers, chills  Respiratory: denies SOB, cough  Cardiovascular: denies chest pain, palpitations  Gastrointestinal: denies  diarrhea      Vital signs/Intake and Output:  Visit Vitals  /78   Pulse 66   Temp 98.4 °F (36.9 °C)   Resp 16   Ht 5' 4\" (1.626 m)   Wt 119 kg (262 lb 4.8 oz)   SpO2 95%   BMI 45.02 kg/m²     Current Shift:  No intake/output data recorded.   Last three shifts:  12/15 1901 - 12/17 0700  In: 1629.7 [I.V.:1629.7]  Out: 500 [Urine:500]    Exam:    General: obese elderly BF, alert, NAD, OX3  Head/Neck: NCAT, supple, No masses, No lymphadenopathy  CVS:Regular rate and rhythm, no M/R/G, S1/S2 heard, no thrill  Lungs:Clear to auscultation bilaterally, no wheezes, rhonchi, or rales  Abdomen: Soft, Nontender, No distention, Normal Bowel sounds, No hepatomegaly  No CVA tenderness  Extremities: No C/C/E, pulses palpable 2+  Neuro:grossly normal , follows commands  Psych:appropriate                Labs: Results:       Chemistry Recent Labs     12/16/18  0401 12/15/18  1628   * 232*    138   K 3.8 3.7    97*   CO2 32 32   BUN 8 10   CREA 0.83 0.88   CA 8.6 9.5   AGAP 6 9   BUCR 10 11   AP  --  91   TP  --  8.1   ALB  --  3.2*   GLOB  --  4.9*   AGRAT  --  0.7*      CBC w/Diff Recent Labs     12/16/18  0401 12/15/18  1628   WBC 6.8 5.9   RBC 4.12* 4.60   HGB 11.9* 13.3   HCT 36.8 40.6    284   GRANS  --  78*   LYMPH  --  16*   EOS  --  0      Cardiac Enzymes Recent Labs     12/15/18  1940 12/15/18  1628   * 379*   CKND1 1.7 1.7      Coagulation No results for input(s): PTP, INR, APTT in the last 72 hours. No lab exists for component: INREXT    Lipid Panel No results found for: CHOL, CHOLPOCT, CHOLX, CHLST, CHOLV, 605263, HDL, LDL, LDLC, DLDLP, 943190, VLDLC, VLDL, TGLX, TRIGL, TRIGP, TGLPOCT, CHHD, CHHDX   BNP No results for input(s): BNPP in the last 72 hours.    Liver Enzymes Recent Labs     12/15/18  1628   TP 8.1   ALB 3.2*   AP 91   SGOT 26      Thyroid Studies No results found for: T4, T3U, TSH, TSHEXT     Procedures/imaging: see electronic medical records for all procedures/Xrays and details which were not copied into this note but were reviewed prior to creation of Boris Harman MD

## 2018-12-17 NOTE — PROGRESS NOTES
2300 - Pt reporting itching after taking Norco for pain. No current PRN orders. Will notify hospitalist.    [phone] - paged hospitalist.    [phone] - Telephone orders received by Dr. Shannon Fine for Benadryl 25 mg PO Q6 PRN for itching. 0013 - Pt states itching is primarily external vaginal itching. States that this is a frequent problem when taking antibiotics. Will relay this to oncoming nurse for rounds with hospitalist to be aware.

## 2018-12-17 NOTE — CDMP QUERY
A diagnosis of Pyelonephritis is documented in the H&P and progress notes. For further specificity, please clarify if this is Acute or Chronic Pyelonephritis. The medical record reflects the following:    Risk: h/o gout, DM, DVT, recent E. coli UTI    Clinical Indicators:  Patient was seen on 12/12/18 and diagnosed w/ UTI and started on cipro. UC from 12/12//18 returned w/ E. coli. Patient returned to ER on 12/15/18 with continued severe back and abd pain, UA +nitrites, trace LE, 5-10 WBC  CTA: There is perinephric stranding around the left ureter raising the possibility of pyelitis    Treatment:  IV levaquin, Repeated UC    Please clarify if this patient is being treated/managed for:    =>Acute pyelonephritis, treated w/ IV levaquin  =>Chronic pyelonephritis, treated w/ IV levaquin  =>Other Explanation of clinical findings  =>Unable to Determine (no explanation of clinical findings)    Please clarify and document your clinical opinion in the progress notes and discharge summary including the definitive and/or presumptive diagnosis, (suspected or probable), related to the above clinical findings. Please include clinical findings supporting your diagnosis. If you DECLINE this query or would like to communicate with [facility], please utilize the \"[employer] message box\" at the TOP of the Progress Note on the right.       Thank you,  Neftali Arambula [facility], [phone]

## 2018-12-17 NOTE — PROGRESS NOTES
D/C Plan: Home Health 12/17/18 vs 12/18/18    Notified pt will discharge with [facility] services upon discharge. CM met with pt at bedside and provided a list of area [facility] agencies to refer to. Pt has indicated her brother has a [facility] agency and she would like to use his agency. Pt was unable to provide the name of the [facility] agency at this time. CM notified pt we will need to know the name of the agency to assist with [facility]. Pt has indicated she will reach out to her brother and will have a name by [facility]. CM continuing to follow. Care Management Interventions  PCP Verified by CM: Yes  Mode of Transport at Discharge:  Other (see comment)(family)  Transition of Care Consult (CM Consult): Discharge Planning, [address] Maintenance Reviewed: Yes  Current Support Network: Own Home, Family Lives Nearby, Lives Alone(will dc to son's house)  Confirm Follow Up Transport: Family  Plan discussed with Pt/Family/Caregiver: Yes  Freedom of Choice Offered: Yes  Discharge Location  Discharge Placement: Home with home health

## 2018-12-18 VITALS
TEMPERATURE: 98.2 F | WEIGHT: 262.79 LBS | RESPIRATION RATE: 20 BRPM | HEIGHT: 64 IN | OXYGEN SATURATION: 97 % | BODY MASS INDEX: 44.86 KG/M2 | SYSTOLIC BLOOD PRESSURE: 154 MMHG | HEART RATE: 67 BPM | DIASTOLIC BLOOD PRESSURE: 81 MMHG

## 2018-12-18 LAB
ANION GAP SERPL CALC-SCNC: 3 MMOL/L (ref 3–18)
BUN SERPL-MCNC: 9 MG/DL (ref 7–18)
BUN/CREAT SERPL: 11
CALCIUM SERPL-MCNC: 8.6 MG/DL (ref 8.5–10.1)
CHLORIDE SERPL-SCNC: 104 MMOL/L (ref 100–108)
CO2 SERPL-SCNC: 32 MMOL/L (ref 21–32)
CREAT SERPL-MCNC: 0.85 MG/DL (ref 0.6–1.3)
ERYTHROCYTE [DISTWIDTH] IN BLOOD BY AUTOMATED COUNT: 12.6 % (ref 11.6–14.5)
GLUCOSE BLD STRIP.AUTO-MCNC: 90 MG/DL (ref 70–110)
GLUCOSE SERPL-MCNC: 90 MG/DL (ref 74–99)
HCT VFR BLD AUTO: 38.2 % (ref 35–45)
HGB BLD-MCNC: 12.2 G/DL (ref 12–16)
MCH RBC QN AUTO: 28.6 PG (ref 24–34)
MCHC RBC AUTO-ENTMCNC: 31.9 G/DL (ref 31–37)
MCV RBC AUTO: 89.7 FL (ref 74–97)
PLATELET # BLD AUTO: 304 K/UL (ref 135–420)
PMV BLD AUTO: 9.4 FL (ref 9.2–11.8)
POTASSIUM SERPL-SCNC: 4.7 MMOL/L (ref 3.5–5.5)
RBC # BLD AUTO: 4.26 M/UL (ref 4.2–5.3)
SODIUM SERPL-SCNC: 139 MMOL/L (ref 136–145)
WBC # BLD AUTO: 5.8 K/UL (ref 4.6–13.2)

## 2018-12-18 PROCEDURE — 74011250637 HC RX REV CODE- 250/637: Performed by: FAMILY MEDICINE

## 2018-12-18 PROCEDURE — 82962 GLUCOSE BLOOD TEST: CPT

## 2018-12-18 PROCEDURE — 74011000250 HC RX REV CODE- 250: Performed by: HOSPITALIST

## 2018-12-18 PROCEDURE — 85027 COMPLETE CBC AUTOMATED: CPT

## 2018-12-18 PROCEDURE — 80048 BASIC METABOLIC PNL TOTAL CA: CPT

## 2018-12-18 PROCEDURE — 36415 COLL VENOUS BLD VENIPUNCTURE: CPT

## 2018-12-18 PROCEDURE — 97161 PT EVAL LOW COMPLEX 20 MIN: CPT

## 2018-12-18 PROCEDURE — 74011250637 HC RX REV CODE- 250/637: Performed by: HOSPITALIST

## 2018-12-18 PROCEDURE — 74011250637 HC RX REV CODE- 250/637: Performed by: INTERNAL MEDICINE

## 2018-12-18 PROCEDURE — 74011250636 HC RX REV CODE- 250/636: Performed by: HOSPITALIST

## 2018-12-18 RX ORDER — CIPROFLOXACIN 500 MG/1
500 TABLET ORAL 2 TIMES DAILY
Qty: 14 TAB | Refills: 0 | Status: SHIPPED | OUTPATIENT
Start: 2018-12-18 | End: 2018-12-25

## 2018-12-18 RX ORDER — ONDANSETRON HYDROCHLORIDE 8 MG/1
8 TABLET, FILM COATED ORAL
Qty: 10 TAB | Refills: 0 | Status: SHIPPED | OUTPATIENT
Start: 2018-12-18 | End: 2020-09-01

## 2018-12-18 RX ORDER — INSULIN GLARGINE 100 [IU]/ML
38 INJECTION, SOLUTION SUBCUTANEOUS
Status: DISCONTINUED | OUTPATIENT
Start: 2018-12-18 | End: 2018-12-18 | Stop reason: HOSPADM

## 2018-12-18 RX ADMIN — DUREZOL 1 DROP: 0.5 EMULSION OPHTHALMIC at 09:06

## 2018-12-18 RX ADMIN — ALLOPURINOL 100 MG: 100 TABLET ORAL at 09:04

## 2018-12-18 RX ADMIN — ONDANSETRON 4 MG: 2 INJECTION INTRAMUSCULAR; INTRAVENOUS at 07:47

## 2018-12-18 RX ADMIN — DOCUSATE SODIUM 100 MG: 100 CAPSULE, LIQUID FILLED ORAL at 09:04

## 2018-12-18 RX ADMIN — ATENOLOL 100 MG: 50 TABLET ORAL at 09:04

## 2018-12-18 RX ADMIN — FLUCONAZOLE 200 MG: 100 TABLET ORAL at 09:04

## 2018-12-18 RX ADMIN — ASPIRIN 81 MG: 81 TABLET, COATED ORAL at 09:04

## 2018-12-18 RX ADMIN — DIPHENHYDRAMINE HYDROCHLORIDE 25 MG: 25 CAPSULE ORAL at 07:47

## 2018-12-18 RX ADMIN — VITAMIN D, TAB 1000IU (100/BT) 2000 UNITS: 25 TAB at 09:04

## 2018-12-18 RX ADMIN — RIVAROXABAN 20 MG: 10 TABLET, FILM COATED ORAL at 07:47

## 2018-12-18 RX ADMIN — NYSTATIN: 100000 POWDER TOPICAL at 09:07

## 2018-12-18 RX ADMIN — POLYETHYLENE GLYCOL 3350 17 G: 17 POWDER, FOR SOLUTION ORAL at 09:02

## 2018-12-18 RX ADMIN — HYDROCODONE BITARTRATE AND ACETAMINOPHEN 1 TABLET: 5; 325 TABLET ORAL at 07:47

## 2018-12-18 NOTE — PROGRESS NOTES
Discharge instructions explained and understood by the patient. IV discontinued, no redness, swelling or pain noted. Pt awaiting brother to call with home health information and then son will transport home.

## 2018-12-18 NOTE — PROGRESS NOTES
Received bedside report from night shift RN. Patient resting quietly in bed, slight nausea. Lung sounds clear, bowel sounds active, pain rated 4/10.     0747 Benadryl given for itching, Zofran given for nausea, Norco given for lower leg pain from neuropathy. Patient tolerating diet well, and ambulates to restroom without difficulty. Patient Vitals for the past 8 hrs:   Temp Pulse Resp BP SpO2   12/18/18 1036 98.2 °F (36.8 °C) 67 20 154/81 97 %   12/18/18 0741 98.2 °F (36.8 °C) 62 18 178/87 95 %   12/18/18 0332 97.6 °F (36.4 °C) 60 17 158/70 96 %       1100 SBAR given to LINDA Paz taking over care of patient.

## 2018-12-18 NOTE — PROGRESS NOTES
Shift summary: Pt A&Ox4, denies pain, pt reports nausea and mild itching after receiving IV levaquin- pt received IV zofran and PO benadryl per MAR. Symptoms subsided afterwards.

## 2018-12-18 NOTE — PROGRESS NOTES
Problem: Mobility Impaired (Adult and Pediatric)  Goal: *Acute Goals and Plan of Care (Insert Text)  No goals set at this time as pt has been discharged home and to receive HHPT (see CM note for details). Pt demonstrates functional mobility including ambulation with RW with mod I/supervision. Outcome: Resolved/Met Date Met: 12/18/18  physical Therapy EVALUATION & Discharge    Patient: Yuli Cortes (60 y.o. female)  Date: 12/18/2018  Primary Diagnosis: UTI (urinary tract infection)  Vomiting  Precautions:Fall    ASSESSMENT AND RECOMMENDATIONS:  Based on the objective data described below, the patient is a 73 yo F seen on med/surg unit. Pt presents with decreased but functional ROM/motor performance UE's/LE's and demonstrates functional mobility at mod I/supervision level. Initially completed GT with [facility], however, pt reaching out for furniture assist intermittently. Instructed in use of RW and demonstrated use accurately in hallway. Verbal cues for safety when negotiating turns with accurate follow through. Pt returned to bedside and left seated EOB in NAD. Reports pain 7/10 LB due to kidney infection currently. Recommend HHPT f/u upon discharge in order for pt to reach maximal level of independence and safety with LRAD. Care manager made aware of HHPT and RW needs. Discharge Recommendations: Home Health  Further Equipment Recommendations for Discharge: rolling walker      SUBJECTIVE:   Patient stated I just came from the bathroom.     OBJECTIVE DATA SUMMARY:     Past Medical History:   Diagnosis Date    CAD (coronary artery disease)     Diabetes ([facility]     Gastrointestinal disorder     Gout     Hypertension     MI (myocardial infarction) ([facility]     Reflux      Past Surgical History:   Procedure Laterality Date    CARDIAC SURG PROCEDURE UNLIST      VASCULAR SURGERY PROCEDURE UNLIST       Barriers to Learning/Limitations: None  Compensate with: visual, verbal, tactile, kinesthetic cues/model  Prior Level of Function/Home Situation: amb with Rayray Timmons PTA  Home Situation  Home Environment: Private residence(son's home where pt will d/c to )  One/Two Story Residence: Two story  # of Interior Steps: 12  Living Alone: No  Support Systems: Child(sonali)  Patient Expects to be Discharged to[de-identified] Private residence  Current DME Used/Available at Home: [employer], straight  Critical Behavior:  Neurologic State: Alert; Appropriate for age  Orientation Level: Oriented X4  Cognition: Follows commands  Safety/Judgement: Awareness of environment; Fall prevention  Psychosocial  Patient Behaviors: Calm; Cooperative  Purposeful Interaction: Yes  Pt Identified Daily Priority: Clinical issues (comment)  Caring Interventions: Reassure  Reassure: Therapeutic listening; Informing;Caring rounds  Strength:    Strength: Generally decreased, functional  Tone & Sensation:   Tone: Normal  Sensation: Intact  Range Of Motion:  AROM: Grossly decreased, non-functional  Functional Mobility:  Bed Mobility:  Rolling: Modified independent  Supine to Sit: Modified independent     Scooting: Modified independent  Transfers:  Sit to Stand: Modified independent  Stand to Sit: Modified independent  Balance:   Sitting: Intact  Standing: Intact; With support  Ambulation/Gait Training:  Distance (ft): 80 Feet (ft)(40ft  x2)  Assistive Device: Gait belt;Walker, rolling  Ambulation - Level of Assistance: Supervision; Modified independent  Gait Description (WDL): Exceptions to WDL  Gait Abnormalities: Decreased step clearance  Base of Support: Widened  Speed/Debora: Slow  Step Length: Right shortened;Left shortened  Pain:  Pain Scale 1: Numeric (0 - 10)  Pain Intensity 1: 7  Pain Location 1: Back  Pain Orientation 1: Lower  Pain Description 1: Other (comment)(\"hurts\")  Activity Tolerance:   Fair   Please refer to the flowsheet for vital signs taken during this treatment.   After treatment:   [x]         Patient left in no apparent distress sitting up EOB  []         Patient left in no apparent distress in bed  [x]         Call bell left within reach  []         Nursing notified  []         Caregiver present  []         Bed alarm activated    COMMUNICATION/EDUCATION:   [x]         Fall prevention education was provided and the patient/caregiver indicated understanding. []         Patient/family have participated as able in goal setting and plan of care. []         Patient/family agree to work toward stated goals and plan of care. []         Patient understands intent and goals of therapy, but is neutral about his/her participation. []         Patient is unable to participate in goal setting and plan of care. Eval Complexity: History: HIGH Complexity :3+ comorbidities / personal factors will impact the outcome/ POC Exam:LOW Complexity : 1-2 Standardized tests and measures addressing body structure, function, activity limitation and / or participation in recreation  Presentation: LOW Complexity : Stable, uncomplicated  Clinical Decision Making:Low Complexity amb >30ft with RW/sup/mod I Overall Complexity:LOW     G-Codes (GP)  Mobility  [id number] Current  CI= 1-19%   Goal  CI= 1-19%  D/C  CI= 1-19%  The severity rating is based on the functional mobility assessment.     Thank you for this referral.  Aydin Blue, PT   Time Calculation: 15 mins

## 2018-12-18 NOTE — DIABETES MGMT
NUTRITIONAL ASSESSMENT GLYCEMIC CONTROL/ PLAN OF CARE     Kandice Brain           72 y.o.           12/15/2018                 1. Abdominal pain, generalized    2. Pyelonephritis    3. Non-intractable vomiting with nausea, unspecified vomiting type    4. Elevated CK      PMHx: Diabetes, CAD, HTN, DVT of left leg, morbid obesity, hypercholesterolemia, gout, Diabetic neuropathy, GERD     Brief Update: Per H&P pt was admitted early December and found w/ DVT of Right leg. Later presented to ER and found w/ infections. Discharged from ER w/ antibiotics. Returned a few days later w/ complaints fo N/V and inability to keep medications down. Admitted and found w/ pyelonephritis. INTERVENTIONS/PLAN:   -Recommend continuing Diabetic Consistent Carbohydrate Diet   -Provide Diabetes Self-Mgt Education     ASSESSMENT:   Nutritional Status:  Obese class III per BMI 45.11 kg/m2 (>40.0 kg/m2)      Diabetes Management:     Recent Glucose Results:   Lab Results   Component Value Date/Time    GLU 90 12/18/2018 03:50 AM    GLUCPOC 90 12/18/2018 07:01 AM    GLUCPOC 203 (H) 12/17/2018 08:59 PM    GLUCPOC 168 (H) 12/17/2018 04:22 PM       Within target range (non-ICU: <140; ICU<180): [] Yes   [x]  No    Current Insulin regimen:   Lantus 32 units  Humalog, corrective, normal insulin sensitivity     Home medication/insulin regimen:   Lantus 42 units  Metformin 500mg 2x daily     HbA1c: (12/15/18) 8.4% equivalent to average blood glucose level 194 mg/dL. Adequate glycemic control PTA:  [] Yes  [x] No       SUBJECTIVE/OBJECTIVE:   Information obtained from: Unable to meet w/ pt before discharged.      Diet:DIET DIABETIC WITH OPTIONS      Patient Vitals for the past 100 hrs:   % Diet Eaten   12/18/18 0837 100 %   12/17/18 1300 80 %   12/17/18 0837 100 %         Medications: [x]                Reviewed     Most Recent POC Glucose:   Recent Labs     12/18/18  0350 12/16/18  0401 12/15/18  1628   GLU 90 106* 232*         Labs:   Lab Results   Component Value Date/Time    Hemoglobin A1c 8.4 (H) 12/15/2018 04:28 PM     Lab Results   Component Value Date/Time    Sodium 139 12/18/2018 03:50 AM    Potassium 4.7 12/18/2018 03:50 AM    Chloride 104 12/18/2018 03:50 AM    CO2 32 12/18/2018 03:50 AM    Anion gap 3 12/18/2018 03:50 AM    Glucose 90 12/18/2018 03:50 AM    BUN 9 12/18/2018 03:50 AM    Creatinine 0.85 12/18/2018 03:50 AM    Calcium 8.6 12/18/2018 03:50 AM    Magnesium 2.2 12/06/2018 04:00 AM    Albumin 3.2 (L) 12/15/2018 04:28 PM       Anthropometrics: BMI (calculated): 45.1  Wt Readings from Last 1 Encounters:   12/17/18 119.2 kg (262 lb 12.6 oz)      Ht Readings from Last 1 Encounters:   12/16/18 5' 4\" (1.626 m)       Estimated Nutrition Needs: .1626 kcal/day (23kcal/kg), 71 g PRO/day ( 1 g PRO/kg), # mL fluid/day (# mL fluid/ kg or kcal)  Based on:     [x]            Adjusted BW : 70.68 kg      Nutrition Diagnoses: . Altered nutrition-related lab value related to unknown causes at this time as evidenced by A1C 8.4%.      Nutrition Interventions:  -Recommend continuing Diabetic Consistent Carbohydrate     Goal:   Blood glucose will be within target range of  mg/dL by 12/21/18        Nutrition Monitoring and Evaluation      [x]     Monitor po intake   [x]     Continue inpatient monitoring and intervention  []     Other:      Nutrition Risk:  []   High     [x]  Moderate    []  Minimal/Uncompromised    Rex Risk  pgr [phone]

## 2018-12-18 NOTE — DIABETES MGMT
GLYCEMIC CONTROL PROGRESS NOTE:    -known h/o T12DM, HbA1c not within recommended range for age + comorbids  -24 BG trending down FBG within target range 90 mg/dl  -recommend small decrease in Lantus dose (orders entered with permission from Dr. Santi Vargas)   *Lantus 38 units daily    Recent Glucose Results:   Lab Results   Component Value Date/Time    GLU 90 12/18/2018 03:50 AM    GLUCPOC 90 12/18/2018 07:01 AM    GLUCPOC 203 (H) 12/17/2018 08:59 PM    GLUCPOC 168 (H) 12/17/2018 04:22 PM     Thomas Wilkes MS, RN, CDE  Glycemic Control Team  [phone]  Pager [phone] (M-TH 8:00-4:30P)  *After Hours pager [phone]

## 2018-12-18 NOTE — PROGRESS NOTES
D/C Plan: Home Health and RW 12/18/18    CM met with pt at bedside to discuss transition of care and [facility] agency of choice. Pt has indicated she does not have the name of the [facility] agency to provide. CM suggested pt select a [facility] agency from the list provided and she can cancel and request information to be sent to her brother's agency if she is not satisfied with the agency. Pt does not wish to select another [facility] agency and only wants to select her brother's agency. CM asked if there are other family members that would know the name of the [facility] agency and pt indicated they do not. Pt has been provided with a RW to assist with transition home. Pt is to be discharged today and her family will transport her home. Pt will be going to her son's,  Fracisco Barraza ([phone]),  home ([address], [address]), when discharged. CM awaiting pt to provide the name and contact number of the [facility] agency to move forward with transition of care. CM continuing to follow. 1400:  CM met with pt at bedside. Pt has not been able to provide the [facility] agency. CM further discussed transition of care. Pt has indicated she will call CM and provide the name and number of the [facility] agency this evening. Pt has indicated if she does not provide the name and number then she is agreeable to having [facility] arranged through [address]. CM provided pt with her card to ensure she has the correct number to contact CM with name of [facility] agency. CM continuing to assist.    Care Management Interventions  PCP Verified by CM: Yes  Mode of Transport at Discharge:  Other (see comment)(family)  Transition of Care Consult (CM Consult): Discharge Planning, Home Health  Health Maintenance Reviewed: Yes  Current Support Network: Own Home, Family Lives Nearby, Lives Alone(will dc to son's house)  Confirm Follow Up Transport: Family  Plan discussed with Pt/Family/Caregiver: Yes  Freedom of Choice Offered: Yes  Discharge Location  Discharge Placement: Home with home health

## 2018-12-18 NOTE — DISCHARGE SUMMARY
Discharge Summary    Patient: Kush Adler MRN: [id number]  CSN: [id number]    YOB: 1945  Age: 72 y.o.   Sex: female    DOA: 12/15/2018 LOS:  LOS: 3 days   Discharge Date:      Primary Care Provider:  Luis Meyers MD    Admission Diagnoses: UTI (urinary tract infection)  Vomiting    Discharge Diagnoses:    Problem List as of 12/18/2018 Date Reviewed: 12/16/2018          Codes Class Noted - Resolved    Vomiting ICD-10-CM: R11.10  ICD-9-CM: 787.03  12/15/2018 - Present        * (Principal) Pyelonephritis ICD-10-CM: N12  ICD-9-CM: 590.80  12/15/2018 - Present        Intractable vomiting with nausea ICD-10-CM: R11.2  ICD-9-CM: 536.2  12/15/2018 - Present        CAD (coronary artery disease) ICD-10-CM: I25.10  ICD-9-CM: 414.00  12/3/2018 - Present        Hypertension ICD-10-CM: I10  ICD-9-CM: 401.9  12/3/2018 - Present        Insulin dependent diabetes mellitus with complications ([facility] MXE-36-AE: E11.8, Z79.4  ICD-9-CM: 250.90, V58.67  12/3/2018 - Present        Gastrointestinal disorder ICD-10-CM: K92.9  ICD-9-CM: 569.9  12/3/2018 - Present        Acute DVT of left tibial vein ([facility] ICD-10-CM: W40.644  ICD-9-CM: 453.42  12/3/2018 - Present        Morbid obesity with BMI of 40.0-44.9, adult ([facility] ICD-10-CM: E66.01, Z68.41  ICD-9-CM: 278.01, V85.41  12/3/2018 - Present        Gout ICD-10-CM: M10.9  ICD-9-CM: 274.9  12/3/2018 - Present        RESOLVED: UTI (urinary tract infection) ICD-10-CM: N39.0  ICD-9-CM: 599.0  12/15/2018 - 12/15/2018        RESOLVED: Knee pain ICD-10-CM: M25.569  ICD-9-CM: 719.46  12/5/2018 - 12/15/2018        RESOLVED: Back pain ICD-10-CM: M54.9  ICD-9-CM: 724.5  12/5/2018 - 12/15/2018        RESOLVED: Chest pain ICD-10-CM: R07.9  ICD-9-CM: 786.50  12/3/2018 - 12/15/2018        RESOLVED: Acute bronchitis ICD-10-CM: J20.9  ICD-9-CM: 466.0  9/1/2012 - 12/15/2018        RESOLVED: Leg muscle spasm ICD-10-CM: G19.979  ICD-9-CM: 728.85  9/1/2012 - 12/15/2018 Discharge Medications:         Discharge Condition: Good    Procedures : None    Consults: None      PHYSICAL EXAM  Visit Vitals  /87   Pulse 62   Temp 98.2 °F (36.8 °C)   Resp 18   Ht 5' 4\" (1.626 m)   Wt 119.2 kg (262 lb 12.6 oz)   SpO2 95%   BMI 45.11 kg/m²     General: Awake, cooperative, no acute distress    HEENT: NC, Atraumatic. PERRLA, EOMI. Anicteric sclerae. Lungs:  CTA Bilaterally. No Wheezing/Rhonchi/Rales. Heart:  Regular  rhythm,  No murmur, No Rubs, No Gallops  Abdomen: Soft, Non distended, Non tender. +Bowel sounds,   Extremities: No c/c/e  Psych:   Not anxious or agitated. Neurologic:  No acute neurological deficits. Admission HPI :    ADMITTING DIAGNOSES:  1. Pyelonephritis. 2.  Mild dehydration. 3.  Intractable nausea and vomiting. 4.  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 5.  Coronary artery disease. 6.  Morbid obesity. 7.  History of hypertension. 8.  History of gout.     HISTORY OF PRESENT ILLNESS:  This is a 73-year-old [facility] American female who was recently diagnosed with a DVT during her last hospitalization at the beginning of December. She has been on Xarelto since then. She had come in for chest pain, had an extensive workup including a stress test, cardiology consultation. They found a DVT in her left leg, but she had a negative VQ scan and ultimately she went home and was doing okay, but came back to the emergency room this past week, on 12/12/2018, and subsequently was complaining of reflux, possible medication intolerance, and felt like the medicines were making her sick. They did a urinalysis that was positive for infection. She was started on Cipro and discharged from the emergency room. She notes that she has not been able to keep the Cipro down at home. The last 2 days, she has been vomiting. It got worse last night where she was up all night vomiting.   She could not keep any medications down. She did take her insulin, however, and her son brought her into the emergency room today.     PAST MEDICAL HISTORY:  Notable for coronary disease, high blood pressure, insulin-dependent diabetes mellitus, DVT in the left leg, morbid obesity, hypercholesterolemia, gout, diabetic neuropathy, and GERD. Hospital Course :     Admitted. Started on IV Levaquin which is pretty much the same as her Cipro. Given fluids and antiemetics. Improved. Culture of urine with e. coli. Results below. To go homoe on Cipro. Will go home on antiemetic as well. Seen by PtArianna Briones and [facility] recommended. Activity: Activity as tolerated    Diet: Diabetic Diet    Follow-up:  1 week     Disposition: [facility]    Minutes spent on discharge: 45      Labs: Results:       Chemistry Recent Labs     12/18/18  0350 12/16/18  0401 12/15/18  1628   GLU 90 106* 232*    139 138   K 4.7 3.8 3.7    101 97*   CO2 32 32 32   BUN 9 8 10   CREA 0.85 0.83 0.88   CA 8.6 8.6 9.5   AGAP 3 6 9   BUCR 11 10 11   AP  --   --  91   TP  --   --  8.1   ALB  --   --  3.2*   GLOB  --   --  4.9*   AGRAT  --   --  0.7*      CBC w/Diff Recent Labs     12/18/18  0350 12/16/18  0401 12/15/18  1628   WBC 5.8 6.8 5.9   RBC 4.26 4.12* 4.60   HGB 12.2 11.9* 13.3   HCT 38.2 36.8 40.6    287 284   GRANS  --   --  78*   LYMPH  --   --  16*   EOS  --   --  0      Cardiac Enzymes Recent Labs     12/15/18  1940 12/15/18  1628   * 379*   CKND1 1.7 1.7      Coagulation No results for input(s): PTP, INR, APTT in the last 72 hours. No lab exists for component: INREXT    Lipid Panel No results found for: CHOL, CHOLPOCT, CHOLX, CHLST, CHOLV, 546148, HDL, LDL, LDLC, DLDLP, 557986, VLDLC, VLDL, TGLX, TRIGL, TRIGP, TGLPOCT, CHHD, CHHDX   BNP No results for input(s): BNPP in the last 72 hours.    Liver Enzymes Recent Labs     12/15/18  1628   TP 8.1   ALB 3.2*   AP 91   SGOT 26      Thyroid Studies No results found for: T4, T3U, TSH, TSHEXT Significant Diagnostic Studies: Nm Lung Vent/perf    Result Date: 12/6/2018  VQ SCAN INDICATION: Acute chest pain COMPARISON: None Available TECHNIQUE: Right hand venous access. After aerosolization of 0.8 mCi 99mTc-DTPA, ventilatory images of the lungs were obtained in multiple projections. After intravenous administration of 7.2 mCi 99mTc-MAA, perfusion images of the lungs were obtained in multiple projections. Images are correlated with portable AP chest radiograph dated 12/3/2018. FINDINGS: Central deposition of radiotracer on ventilatory phase images, suggestive of nonlaminar/turbulent flow, most often seen with COPD. There is no evidence of moderate or large mismatched segmental perfusion defect to indicate the presence of pulmonary embolism. Impression: Low likelihood ratio for pulmonary embolism. Cta Chest W Or W Wo Cont    Result Date: 12/3/2018  EXAM: CTA CHEST INDICATION: Chest pain, acute; pulmonary embolism (PE) suspected COMPARISON: CT chest 09/01/2012 TECHNIQUE: Axial CT imaging from the thoracic inlet through the diaphragm with intravenous contrast utilizing CTA study for pulmonary artery evaluation. Coronal and sagittal MIP reformations were generated at a separate workstation. One or more dose reduction techniques were used on this CT: automated exposure control, adjustment of the mAs and/or kVp according to patient's size, and iterative reconstruction techniques. The specific techniques utilized on this CT exam have been documented in the patient's electronic medical record. _______________ FINDINGS: EXAM QUALITY: Overall exam quality is suboptimal. Pulmonary arterial enhancement is adequate. The breath hold is suboptimal. Respiratory motion artifact is present, limiting evaluation, especially in the lung bases. Scatter artifact is produced by dense contrast within the SVC PULMONARY ARTERIES: No convincing evidence of pulmonary embolism. No large or central PE is seen. Respiratory motion artifact does degrade evaluation of segmental and subsegmental arteries particularly in the lung bases. MEDIASTINUM: Mild atherosclerotic calcifications are present. Normal variant branching pattern of great vessels along aortic arch is present with a conjoined origin of the innominate artery and left common carotid artery. Coronary artery calcifications are present. PLEURA: Unremarkable. LYMPH NODES: No enlarged lymph nodes by size criteria. LUNGS/AIRWAY: No consolidation is seen. Tracheomalacia and bronchomalacia is seen. Small subpleural curvilinear densities are present in both lung bases greater on the right scarring or atelectasis. No definite suspicious pulmonary nodules are seen. UPPER ABDOMEN: Hiatal hernia is present. Visualized upper abdominal structures are unremarkable. OSSEOUS STRUCTURES: Degenerative changes are seen in the spine. OTHER: None _______________     IMPRESSION: Motion degraded study. 1.  No convincing evidence of pulmonary embolism. No large or central PE. Cannot exclude small distal filling defects given motion artifact. 2.  No consolidation. Mild amount of bibasilar scarring versus atelectasis. Ct Abd Pelv Wo Cont    Result Date: 12/12/2018  EXAM: CT of the abdomen and pelvis INDICATION: Abdominal pain. COMPARISON: February 6, 2018. TECHNIQUE: Axial CT imaging of the abdomen and pelvis was performed without intravenous contrast. Multiplanar reformats were generated. Dose reduction techniques used: automated exposure control, adjustment of the mAs and/or kVp according to patient size, and iterative reconstruction techniques. _______________ FINDINGS: LOWER CHEST: There is minimal scarring at the left lung base. LIVER, BILIARY: Liver is normal. No biliary dilation. Gallbladder is unremarkable. PANCREAS: Normal. SPLEEN: Normal. ADRENALS: There is bilateral adrenal gland thickening and nodularity similar to previous.  KIDNEYS: There is a nonobstructing 1 mm calculus mid pole left kidney. There is no hydronephrosis. LYMPH NODES: No enlarged lymph nodes. GASTROINTESTINAL TRACT: No bowel dilation or wall thickening. There is descending colon diverticulosis without diverticulitis. There is a small hiatal hernia. PELVIC ORGANS: There are partially calcified uterine masses measuring up to 6.3 cm with uterine enlargement. [place] VASCULATURE: Unremarkable. BONES: There is degenerative change throughout the lumbar spine most pronounced at L5-S1. OTHER: None. _______________     IMPRESSION: Fibroid uterus. Nonobstructing 1 mm calculus midpole left kidney. No hydronephrosis. Descending colon diverticulosis without diverticulitis. Small hiatal hernia. Us Transvaginal    Result Date: 12/12/2018  EXAM: Ultrasound Pelvis: Transvaginal HISTORY: Abnormal vaginal bleeding in postmenopausal patient for 3 days. COMPARISON: CT of the abdomen and pelvis from same day. TECHNIQUE: Real-time transvaginal sonography in multiple planes was performed with image documentation. Color Doppler interrogation was also utilized. ___________________ FINDINGS: UTERUS: Fibroid uterus measuring 9 x 8 x 8 cm. Right anterior mid uterine segment heterogeneous partial calcified fibroid measuring 3.9 cm, with a second rim calcified shadowing right posterior mid uterine segment 3.8 cm fibroid. Comparison same day CT scan demonstrates at least 3 separate fibroids. ENDOMETRIUM: The endometrial complex is not visualized. RIGHT OVARY and ADNEXA: The right ovary is not visualized. Comparison same day CT scan demonstrates no discrete right adnexal mass. No discrete right adnexal masses identified on this ultrasound. LEFT OVARY and ADNEXA: The left ovary is not visualized. Comparison same day CT scan demonstrates no discrete left adnexal mass. No discrete left adnexal masses identified on this ultrasound. FREE FLUID: No free fluid identified. _________________    IMPRESSION: 1. Multi fibroid uterus.  The endometrial complex is not visualized on this examination. Recommend OB/GYN consultation with consideration for endometrial sampling given patient history of abnormal vaginal bleeding in a postmenopausal female. 2. Nonvisualized bilateral ovaries, which is not an unexpected finding in a postmenopausal female. Of note, comparison with earlier same day CT scan demonstrates no discrete masses. Xr Chest Port    Result Date: 12/3/2018  EXAM: Chest portable INDICATION: Chest pain COMPARISON: Single view chest 1/29/2018 _______________ FINDINGS: AP portable chest film was performed. Lungs remain clear. No effusion or pneumothorax. Heart and pulmonary vascularity are normal for AP technique. _______________     IMPRESSION: 1. No acute cardiopulmonary disease. Us Aorta B-scan [employer]    Result Date: 12/15/2018  EXAM: Ultrasound aorta limited INDICATION: Epigastric pain and vomiting COMPARISON: CT dated December 12, 2018 _______________ FINDINGS: Proximal abdominal aorta measures 2.2 x 2.2 cm. Mid abdominal aorta measures 2 x 2.2 cm and the distal abdominal aorta measures 1.5 x 1.67 m. Inferior vena cava is patent. Right common iliac artery measures 1 cm in the left common iliac artery also measures 1 cm. There is a complex structure in the umbilical area measuring 9.5 x 6 x 11.2 cm containing both cystic and solid components. Exact etiology of this is unclear. CT of the abdomen and pelvis recommended for further evaluation. _______________     IMPRESSION: Aorta and iliac arteries are patent and normal in caliber. The inferior vena cava is patent. Complex cystic structure seen in the region the umbilicus unclear etiology. CT recommended for further evaluation. Findings are discussed with nurse practitioner Nathaniel Caballero 7:51 PM 7/15/2018.     Cta Chest Abd Pelv W Cont    Result Date: 12/15/2018  EXAM: CTA chest, abdomen and pelvis INDICATION: Vomiting since yesterday complex cystic structure seen on ultrasound COMPARISON: Ultrasound dated today and CT dated December 12, 2018 TECHNIQUE: Axial CT imaging from the thoracic inlet through the symphysis with intravenous contrast. Coronal and sagittal MIP reformats were generated. One or more dose reduction techniques were used on this CT: automated exposure control, adjustment of the mAs and/or kVp according to patient size, and iterative reconstruction techniques. The specific techniques used on this CT exam have been documented in the patient's electronic medical record. _______________ FINDINGS: AORTA AND VESSELS: The neck vessels are patent. There is no aortic dissection. Mild calcific atherosclerosis present. Celiac artery, superior mesenteric artery, both renal arteries, inferior mesenteric artery, iliac arteries and proximal femoral arteries are patent. There is no evidence of an abdominal aortic aneurysm. PULMONARY ARTERIES: No evidence of pulmonary embolism. LYMPH Nodes: No enlarged lymph nodes seen PLEURA: There are no pleural effusions HEART: Cardiac size is borderline. There is no pericardial effusion. Calcific coronary artery disease present. VASCULATURE/MEDIASTINUM: There is a small hiatal hernia. LUNGS: No suspicious nodule or mass. No abnormal opacities. AIRWAY: Normal. ABDOMEN AND PELVIS: Liver: There is hepatic steatosis. No hepatic lesion seen. Gallbladder is unremarkable. No bile duct dilatation. Pancreas: Unremarkable Spleen: Unremarkable Adrenals: Hyperplasia of both adrenals present. Kidneys: Kidneys demonstrate no nephrolithiasis. There is normal enhancement. There is perinephric stranding around the left ureter raising the possibility of pyelitis. . Lymph nodes: Subcentimeter periaortic lymph nodes are seen Bowel: There is colonic diverticulosis without diverticulitis. Appendix is normal. No bowel obstruction seen. There is a small hiatal hernia. There is wall thickening of the antrum of the stomach as well as first portion the duodenum.  This may reflect under distention versus gastritis or gastric wall pathology. There is no small bowel obstruction. Pelvic organs: There is an enlarged uterus with calcified fibroids. There is no free fluid. The urinary bladder is under distended therefore difficult to evaluate. No abnormal fluid collections or heterogeneous masses seen to account for the ultrasound findings. OTHER: No acute or aggressive osseous abnormalities identified. There is osteopenia and degenerative disc disease at L5-S1. _______________     IMPRESSION: No evidence of an abdominal aortic aneurysm. Mild calcific atherosclerosis present. No complex mass or abnormal fluid collection seen to account for the ultrasound findings. No abnormality seen in the region the umbilicus. Stranding around the left proximal ureter raising the possibility of pyelitis correlate with urinalysis. Fluid distended stomach with some wall thickening of the antrum of the stomach and first portion the duodenum. This may reflect under distention versus gastritis or other gastric wall pathology. Large calcified fibroid, colonic diverticulosis without diverticulitis, small hiatal hernia. No results found for this or any previous visit.         CC: Terence Ramos MD     CULT[facility], URINE [[id number]] [id number]   Microbiology   Date: 12/12/2018 Department: [facility] Released By/Authorizing: Elizabeth Gold MD (auto-released)   Specimen Information: Clean catch; Urine        Component Value Flag Ref Range Units Status   Special Requests:      Final   NO SPECIAL REQUESTS    Culture result: (Abnormal)      Final   >100,000 COLONIES/mL ESCHERICHIA COLI Abnormal     Susceptibility     Escherichia coli     Antibiotic Interpretation Value Method Comment   Ampicillin ($) Susceptible <=2 ug/mL JERRY    Ampicillin/sulbactam ($) Susceptible <=2 ug/mL JERRY    Cefazolin ($) Susceptible <=4 ug/mL JERRY    Cefepime ($$) Susceptible <=1 ug/mL JERRY    Ceftazidime ($) Susceptible <=1 ug/mL JERRY    Ceftriaxone ($) Susceptible <=1 ug/mL JERRY    Ciprofloxacin ($) Susceptible <=0.25 ug/mL JERRY    Gentamicin ($) Resistant >=16 ug/mL JERRY    Imipenem Susceptible <=0.25 ug/mL JERRY    Levofloxacin ($) Susceptible <=0.12 ug/mL JERRY    Nitrofurantoin Susceptible <=16 ug/mL JERRY    Piperacillin/Tazobac ($) Susceptible <=4 ug/mL JERRY    Tobramycin ($) Susceptible 4 ug/mL JERRY    Trimeth-Sulfamethoxa Susceptible <=20 ug/mL JERRY    Susceptibility     Escherichia coli (1)     Antibiotic Interpretation Method Status   Ampicillin ($) Susceptible JERRY Final   Ampicillin/sulbactam ($) Susceptible JERRY Final   Cefazolin ($) Susceptible JERRY Final   Cefepime ($$) Susceptible JERRY Final   Ceftazidime ($) Susceptible JERRY Final   Ceftriaxone ($) Susceptible JERRY Final   Ciprofloxacin ($) Susceptible JERRY Final   Gentamicin ($) Resistant JERRY Final   Imipenem Susceptible JERRY Final   Levofloxacin ($) Susceptible JERRY Final   Nitrofurantoin Susceptible JERRY Final   Piperacillin/Tazobac ($) Susceptible JERRY Final   Tobramycin ($) Susceptible JERRY Final   Trimeth-Sulfamethoxa Susceptible JERRY Final

## 2018-12-18 NOTE — ROUTINE PROCESS
Bedside and Verbal shift change report given to LINDA Paz (oncoming nurse) by Sathya Garcia RN (offgoing nurse). Report included the following information SBAR, Kardex, Intake/Output, MAR and Recent Results.

## 2018-12-18 NOTE — ROUTINE PROCESS
Bedside and Verbal shift change report given to KRIS Smart (oncoming nurse) by MICHAEL Johnson RN (offgoing nurse). Report included the following information SBAR, Kardex, Intake/Output and MAR.

## 2018-12-18 NOTE — DISCHARGE INSTRUCTIONS
Kidney Infection: Care Instructions  Your Care Instructions    A kidney infection (pyelonephritis) is a type of urinary tract infection, or UTI. Most UTIs are bladder infections. Kidney infections tend to make people much sicker than bladder infections do. A kidney infection is also more serious because it can cause lasting damage if it is not treated quickly. Follow-up care is a key part of your treatment and safety. Be sure to make and go to all appointments, and call your doctor if you are having problems. It's also a good idea to know your test results and keep a list of the medicines you take. How can you care for yourself at home? · Take your antibiotics as directed. Do not stop taking them just because you feel better. You need to take the full course of antibiotics. · Drink plenty of water, enough so that your urine is light yellow or clear like water. This may help wash out bacteria that are causing the infection. If you have kidney, heart, or liver disease and have to limit fluids, talk with your doctor before you increase the amount of fluids you drink. · Urinate often. Try to empty your bladder each time. · To relieve pain, take a hot shower or lay a heating pad (set on low) over your lower belly. Never go to sleep with a heating pad in place. Put a thin cloth between the heating pad and your skin. To help prevent kidney infections  · Drink plenty of water each day. This helps you urinate often, which clears bacteria from your system. If you have kidney, heart, or liver disease and have to limit fluids, talk with your doctor before you increase the amount of fluids you drink. · Urinate when you have the urge. Do not hold your urine for a long time. Urinate before you go to sleep. · If you have symptoms of a bladder infection, such as burning when you urinate or having to urinate often, call your doctor so you can treat the problem before it gets worse.  If you do not treat a bladder infection quickly, it can spread to the kidney. · Men should keep the tip of the penis clean. If you are a woman, keep these ideas in mind:  · Urinate right after you have sex. · Change sanitary pads often. Avoid douches, feminine hygiene sprays, and other feminine hygiene products that have deodorants. · After going to the bathroom, wipe from front to back. When should you call for help? Call your doctor now or seek immediate medical care if:    · You have symptoms that a kidney infection is getting worse. These may include:  ? Pain or burning when you urinate. ? A frequent need to urinate without being able to pass much urine. ? Pain in the flank, which is just below the rib cage and above the waist on either side of the back. ? Blood in the urine. ? A fever.     · You are vomiting or nauseated.    Watch closely for changes in your health, and be sure to contact your doctor if:    · You do not get better as expected. Where can you learn more? Go to http://ebony-marci.info/. Enter P787 in the search box to learn more about \"Kidney Infection: Care Instructions. \"  Current as of: March 15, 2018  Content Version: 11.8  © 6364-7420 [employer], [employer]. Care instructions adapted under license by [employer] (which disclaims liability or warranty for this information). If you have questions about a medical condition or this instruction, always ask your healthcare professional. [place] [address] any warranty or liability for your use of this information.

## 2018-12-19 NOTE — ADT AUTH CERT NOTES
Patient Demographics     Patient Name  Lidia Cano  [id number] Sex  Female   1945 Address  [address] [place] [address] Phone  [phone] (Home)   CSN:   [id number]   Admit Date: Admit Time Room Bed   Dec 15, 2018  4:11 PM 25 [id number]   Attending Providers     Provider Pager From To   Alvarado Gong DO  12/15/18 12/15/18   David Sommer MD  12/15/18 12/18/18   Emergency Contact(s)     Name Relation Home Work [address] [place] [address] Child [phone]   Utilization Reviews        ADDITIONAL CLINICAL REQUEST. by Juani Moreno RN        Review Entered Review Status   2018 09:32 In Primary      Criteria Review               ADDITIONAL CLINICAL REQUEST 18     FAMILY PRACTICE 2018  Acute Pyelonephritis-levaquin IV, last cx reviewed  Intractable nausea/vomiting-appears resolved-tolerated diet this am  Recent dx of DVT in leg-xarelto PO  HTN-continue beta blocker  Ins dep diabetes with neuropathy-basal insulin, SSI PRN  Morbid obesity  CAD-no chnages  Yeast vaginitis-add diflucan and nystatin powder  Constipation-add miralax and colace  Expect she can d/c later today or by tomorrow if doing well from pain and GI standpoint     Visit Vitals  BP       186/78  Pulse   66  Temp   98.4 °F (36.9 °C)  Resp    16  Ht         5' 4\" (1.626 m)  Wt        119 kg (262 lb 4.8 oz)  SpO2   95% RA     MEDS: PROTONIX 40MG IV QD; ZOFRAN 4MG IV Q6HRS; LEVAQUIN 500MG IV Q24HRS; NACL CONTINUOUS IV INFUSION 100ML/HR      Urinary Tract Infection (UTI) - Care Day 2 (2018) by Juani Moreno RN        Review Entered Review Status   2018 09:26 Completed      Criteria Review      Care Day: 2 Care Date: 2018 Level of Care: Inpatient Floor   Guideline Day 2    Level Of Care   (X) Floor   2018 9:26 AM EST by Beth Crooks     MEDICAL FLOOR         Clinical Status   (X) * Hypotension absent   (X) * Mental status at baseline   (X) * Fever absent or reduced   (X) * Vomiting absent or improved      Activity   (X) * Ambulatory      Routes   (X) IV fluids, medications   (X) Advance diet as tolerated      Interventions   (X) * Reversible urinary system abnormality (eg, obstruction, abscess) absent, addressed, or to be treated at next level of care   (X) WBC   (X) Renal function tests      Medications   (X) Antibiotics   12/19/2018 9:26 AM EST by Jhonatan Waldron     LEVAQUIN 500MG IV Q24HRS                  * Milestone      Additional Notes   REVIEW 12/16/2018      FAMILY PRACTICE PROGRESS NOTE:   Principal Problem:     Pyelonephritis (12/15/2018)   Active Problems:     CAD (coronary artery disease) (12/3/2018)     Hypertension (12/3/2018)     Insulin dependent diabetes mellitus with complications ([facility] (19/4/6133)     Acute DVT of left tibial vein ([facility] (12/3/2018)     Morbid obesity with BMI of 40.0-44.9, adult ([facility] (12/3/2018)    Vomiting (12/15/2018)     Intractable vomiting with nausea (12/15/2018)   Pyelonephritis with intractable nausea and vomiting:  IV Levaquin. Will f/u the urine Cx (+ E.Coli on 12/12). Antiemesis for N/V   Mild dehydration: IVF   Insulin-dependent diabetes mellitus: On lantus and SSI   Recent hx of left leg DVT: On Xarelto     Coronary artery disease: On ASA, Zocor, lopressor     History of gout:     Gastroesophageal reflux disease: Symptomatic. On Protonix    Abnormal abdominal ultrasound: CT done. + multiple fibroids. Denies vaginal bleeding. Need GYN eval but can be done per out patient. Hypertension: Controlled. On Lopressor   Hyperlipidemia: On Zocor   Pain control   Dispo: 2-3 days        Visit Vitals   /73 (BP 1 Location: Right arm, BP Patient Position: At rest)   Pulse 83   Temp 98.2 °F (36.8 °C)   Resp 18   SpO2 98% RA      US AORTA B-SCAN LTD 12/15: IMPRESSION:   Aorta and iliac arteries are patent and normal in caliber. The inferior vena   cava is patent.    Complex cystic structure seen in the region the umbilicus unclear etiology. CT   recommended for further evaluation. Findings are discussed with nurse   practitioner Mikel Park 7:51 PM 7/15/2018. CTA CHEST ABD PELV W CONT 12/15:   IMPRESSION:   No evidence of an abdominal aortic aneurysm. Mild calcific atherosclerosis   present. No complex mass or abnormal fluid collection seen to account for the ultrasound   findings. No abnormality seen in the region the umbilicus. Stranding around the left proximal ureter raising the possibility of pyelitis   correlate with urinalysis. Fluid distended stomach with some wall thickening of the antrum of the stomach   and first portion the duodenum. This may reflect under distention versus   gastritis or other gastric wall pathology.    Large calcified fibroid, colonic diverticulosis without diverticulitis, small   hiatal hernia.       EKG 12/15: Normal sinus rhythm    Moderate voltage criteria for LVH, may be normal variant    Borderline ECG    When compared with ECG of 12-DEC-2018 01:16,    No significant change was found      EKG SUBSEQUENT: Sinus rhythm with premature supraventricular complexes    Minimal voltage criteria for LVH, may be normal variant    Borderline ECG    When compared with ECG of 15-DEC-2018 16:43,    premature supraventricular complexes are now present      MEDS: ZOCOR TAB 10MG PO QHS; XARELTO TAB 20MG PO QD; LEVAQUIN 500MG IV Q24HRS; HUMALOG INJECTION SC QID; LANTUS INJECTION SC QHS; NORCO 2 TAB PO Q6HRS PRN X3 DOSES IN 24HRS; VIT D3 TAB PO QD; TENORMIN TAB 100MG PO QD; ASPIRIN 81MG PO QD; TYLENOL TAB 650MG PO Q6HRS PRN X2 DOSES; NACL CONTINUOUS IV INFUSION 100ML/HR

## 2018-12-20 ENCOUNTER — HOME HEALTH ADMISSION (OUTPATIENT)
Dept: HOME HEALTH SERVICES | Facility: HOME HEALTH | Age: 73
End: 2018-12-20

## 2018-12-21 ENCOUNTER — HOME CARE VISIT (OUTPATIENT)
Dept: HOME HEALTH SERVICES | Facility: HOME HEALTH | Age: 73
End: 2018-12-21

## 2018-12-29 NOTE — DISCHARGE INSTRUCTIONS
Stomatitis: Care Instructions  Your Care Instructions  Stomatitis is swelling and redness of the lining of your mouth. It can cause painful sores that can make it hard for you to eat, drink, or swallow. Stomatitis may be caused by a viral or bacterial infection, a disease, or not taking care of your teeth and gums properly. Other causes include reactions to smoking, burns from hot food or drinks, or an allergic reaction. Allergy causes may be a result of flavorings such as spearmint or cinnamon that are used in chewing gum, toothpaste, soft drinks, candies, and other products. Bronchitis: Care Instructions  Your Care Instructions    Bronchitis is inflammation of the bronchial tubes, which carry air to the lungs. The tubes swell and produce mucus, or phlegm. The mucus and inflamed bronchial tubes make you cough. You may have trouble breathing. Most cases of bronchitis are caused by viruses like those that cause colds. Antibiotics usually do not help and they may be harmful. Bronchitis usually develops rapidly and lasts about 2 to 3 weeks in otherwise healthy people. Follow-up care is a key part of your treatment and safety. Be sure to make and go to all appointments, and call your doctor if you are having problems. It's also a good idea to know your test results and keep a list of the medicines you take. How can you care for yourself at home? · Take all medicines exactly as prescribed. Call your doctor if you think you are having a problem with your medicine. · Get some extra rest.  · Take an over-the-counter pain medicine, such as acetaminophen (Tylenol), ibuprofen (Advil, Motrin), or naproxen (Aleve) to reduce fever and relieve body aches. Read and follow all instructions on the label. · Do not take two or more pain medicines at the same time unless the doctor told you to. Many pain medicines have acetaminophen, which is Tylenol. Too much acetaminophen (Tylenol) can be harmful.   · Take an over-the-counter cough medicine that contains dextromethorphan to help quiet a dry, hacking cough so that you can sleep. Avoid cough medicines that have more than one active ingredient. Read and follow all instructions on the label. · Breathe moist air from a humidifier, hot shower, or sink filled with hot water. The heat and moisture will thin mucus so you can cough it out. · Do not smoke. Smoking can make bronchitis worse. If you need help quitting, talk to your doctor about stop-smoking programs and medicines. These can increase your chances of quitting for good. When should you call for help? Call 911 anytime you think you may need emergency care. For example, call if:  ? · You have severe trouble breathing. ?Call your doctor now or seek immediate medical care if:  ? · You have new or worse trouble breathing. ? · You cough up dark brown or bloody mucus (sputum). ? · You have a new or higher fever. ? · You have a new rash. ? Watch closely for changes in your health, and be sure to contact your doctor if:  ? · You cough more deeply or more often, especially if you notice more mucus or a change in the color of your mucus. ? · You are not getting better as expected. Where can you learn more? Go to http://ebony-marci.info/. Enter H333 in the search box to learn more about \"Bronchitis: Care Instructions. \"  Current as of: May 12, 2017  Content Version: 11.4  © 3009-6805 [employer]. Care instructions adapted under license by [employer] (which disclaims liability or warranty for this information). If you have questions about a medical condition or this instruction, always ask your healthcare professional. [place] [address] any warranty or liability for your use of this information. Your doctor may prescribe pain medicines or special mouth rinses. He or she may tell you to quit using some products that may be causing the sores.  It can take up to 2 weeks for the sores to heal.  Some people with stomatitis also get a yeast infection of the mouth, called thrush. Medicines can treat this problem. Follow-up care is a key part of your treatment and safety. Be sure to make and go to all appointments, and call your doctor if you are having problems. It's also a good idea to know your test results and keep a list of the medicines you take. How can you care for yourself at home? · Be safe with medicines. Read and follow all instructions on the label. ¨ If the doctor gave you a prescription medicine for pain, take it as prescribed. ¨ If you are not taking a prescription pain medicine, ask your doctor if you can take an over-the-counter medicine. · If your doctor prescribed antibiotics, take them as directed. Do not stop taking them just because you feel better. You need to take the full course of antibiotics. · Use prescription mouth rinses as prescribed. Ask your doctor if you can freeze the mouth rinse in an ice cube tray. Sucking on a frozen cube of the mouth rinse can help ease the pain. · Make a rinse to keep your mouth from getting dry. Add 1 teaspoon baking soda and ½ teaspoon salt to a quart of water. Use it to rinse your mouth 4 to 6 times each day. Spit out the rinse. Do not swallow it. · Do not use a mouthwash or other over-the-counter rinse with alcohol. These can dry out your mouth or cause more pain. · If your doctor gave you mouthwash or lozenges to treat your yeast infection, use them as directed. · Eat soft foods such as mashed potatoes and other cooked vegetables, noodles, applesauce, clear broth soups, yogurt, and cottage cheese. You can get extra protein by adding protein powder to milk shakes or breakfast drinks. Avoid eating spicy or crunchy foods. · Try eating cold foods such as ice cream or yogurt. · Avoid drinking high-acid juices such as orange, grapefruit, and cranberry juices.   · Drink plenty of fluids to prevent dehydration. Drinking through a straw may help with pain. · Practice good oral hygiene. Use a very soft toothbrush to brush your teeth at least two times a day. Or use your finger to brush your teeth if your mouth is sore. When should you call for help? Call your doctor now or seek immediate medical care if:  ? · You have new or worse symptoms of infection, such as:  ¨ Increased pain, swelling, warmth, or redness. ¨ Red streaks leading from the area. ¨ Pus draining from the area. ¨ A fever. ? Watch closely for changes in your health, and be sure to contact your doctor if:  ? · You do not get better as expected. Where can you learn more? Go to http://ebony-marci.info/. Enter F907 in the search box to learn more about \"Stomatitis: Care Instructions. \"  Current as of: May 12, 2017  Content Version: 11.4  © 2451-1399 [employer]. Care instructions adapted under license by [employer] (which disclaims liability or warranty for this information). If you have questions about a medical condition or this instruction, always ask your healthcare professional. [place] [address] any warranty or liability for your use of this information. Stage 13: Additional Anesthesia Type: 1% lidocaine with epinephrine

## 2018-12-31 LAB
ATRIAL RATE: 73 BPM
ATRIAL RATE: 89 BPM
ATRIAL RATE: 94 BPM
ATRIAL RATE: 96 BPM
CALCULATED P AXIS, ECG09: 49 DEGREES
CALCULATED P AXIS, ECG09: 58 DEGREES
CALCULATED P AXIS, ECG09: 69 DEGREES
CALCULATED P AXIS, ECG09: 70 DEGREES
CALCULATED R AXIS, ECG10: -11 DEGREES
CALCULATED R AXIS, ECG10: -11 DEGREES
CALCULATED R AXIS, ECG10: -15 DEGREES
CALCULATED R AXIS, ECG10: -15 DEGREES
CALCULATED T AXIS, ECG11: 24 DEGREES
CALCULATED T AXIS, ECG11: 33 DEGREES
CALCULATED T AXIS, ECG11: 42 DEGREES
CALCULATED T AXIS, ECG11: 47 DEGREES
DIAGNOSIS, 93000: NORMAL
P-R INTERVAL, ECG05: 144 MS
P-R INTERVAL, ECG05: 148 MS
P-R INTERVAL, ECG05: 150 MS
P-R INTERVAL, ECG05: 160 MS
Q-T INTERVAL, ECG07: 350 MS
Q-T INTERVAL, ECG07: 352 MS
Q-T INTERVAL, ECG07: 362 MS
Q-T INTERVAL, ECG07: 390 MS
QRS DURATION, ECG06: 88 MS
QRS DURATION, ECG06: 90 MS
QRS DURATION, ECG06: 90 MS
QRS DURATION, ECG06: 92 MS
QTC CALCULATION (BEZET), ECG08: 429 MS
QTC CALCULATION (BEZET), ECG08: 440 MS
QTC CALCULATION (BEZET), ECG08: 440 MS
QTC CALCULATION (BEZET), ECG08: 442 MS
VENTRICULAR RATE, ECG03: 73 BPM
VENTRICULAR RATE, ECG03: 89 BPM
VENTRICULAR RATE, ECG03: 94 BPM
VENTRICULAR RATE, ECG03: 96 BPM

## 2019-01-30 LAB
ATRIAL RATE: 89 BPM
CALCULATED P AXIS, ECG09: 62 DEGREES
CALCULATED R AXIS, ECG10: 4 DEGREES
CALCULATED T AXIS, ECG11: 20 DEGREES
DIAGNOSIS, 93000: NORMAL
P-R INTERVAL, ECG05: 130 MS
Q-T INTERVAL, ECG07: 338 MS
QRS DURATION, ECG06: 82 MS
QTC CALCULATION (BEZET), ECG08: 411 MS
VENTRICULAR RATE, ECG03: 89 BPM

## 2019-05-21 ENCOUNTER — HOSPITAL ENCOUNTER (EMERGENCY)
Age: 74
Discharge: HOME | End: 2019-05-22
Attending: EMERGENCY MEDICINE
Payer: MEDICARE

## 2019-05-21 ENCOUNTER — APPOINTMENT (OUTPATIENT)
Dept: VASCULAR SURGERY | Age: 74
End: 2019-05-21
Attending: EMERGENCY MEDICINE
Payer: MEDICARE

## 2019-05-21 DIAGNOSIS — N12 PYELONEPHRITIS: ICD-10-CM

## 2019-05-21 DIAGNOSIS — M25.462 KNEE EFFUSION, LEFT: ICD-10-CM

## 2019-05-21 DIAGNOSIS — M17.11 ARTHRITIS OF RIGHT KNEE: Primary | ICD-10-CM

## 2019-05-21 LAB
ALBUMIN SERPL-MCNC: 3.2 G/DL (ref 3.4–5)
ALBUMIN/GLOB SERPL: 0.7 {RATIO} (ref 0.8–1.7)
ALP SERPL-CCNC: 94 U/L (ref 45–117)
ALT SERPL-CCNC: 21 U/L (ref 13–56)
ANION GAP SERPL CALC-SCNC: 6 MMOL/L (ref 3–18)
AST SERPL-CCNC: 19 U/L (ref 15–37)
BASOPHILS # BLD: 0 K/UL (ref 0–0.1)
BASOPHILS NFR BLD: 0 % (ref 0–2)
BILIRUB SERPL-MCNC: 0.2 MG/DL (ref 0.2–1)
BNP SERPL-MCNC: 587 PG/ML (ref 0–900)
BUN SERPL-MCNC: 14 MG/DL (ref 7–18)
BUN/CREAT SERPL: 17 (ref 12–20)
CALCIUM SERPL-MCNC: 8.9 MG/DL (ref 8.5–10.1)
CHLORIDE SERPL-SCNC: 105 MMOL/L (ref 100–108)
CK MB CFR SERPL CALC: 1.4 % (ref 0–4)
CK MB SERPL-MCNC: 4.1 NG/ML (ref 5–25)
CK SERPL-CCNC: 294 U/L (ref 26–192)
CO2 SERPL-SCNC: 29 MMOL/L (ref 21–32)
CREAT SERPL-MCNC: 0.81 MG/DL (ref 0.6–1.3)
DIFFERENTIAL METHOD BLD: NORMAL
EOSINOPHIL # BLD: 0.1 K/UL (ref 0–0.4)
EOSINOPHIL NFR BLD: 1 % (ref 0–5)
ERYTHROCYTE [DISTWIDTH] IN BLOOD BY AUTOMATED COUNT: 13.1 % (ref 11.6–14.5)
GLOBULIN SER CALC-MCNC: 4.6 G/DL (ref 2–4)
GLUCOSE SERPL-MCNC: 105 MG/DL (ref 74–99)
HCT VFR BLD AUTO: 39 % (ref 35–45)
HGB BLD-MCNC: 12.8 G/DL (ref 12–16)
LYMPHOCYTES # BLD: 1.8 K/UL (ref 0.9–3.6)
LYMPHOCYTES NFR BLD: 29 % (ref 21–52)
MCH RBC QN AUTO: 28.7 PG (ref 24–34)
MCHC RBC AUTO-ENTMCNC: 32.8 G/DL (ref 31–37)
MCV RBC AUTO: 87.4 FL (ref 74–97)
MONOCYTES # BLD: 0.5 K/UL (ref 0.05–1.2)
MONOCYTES NFR BLD: 8 % (ref 3–10)
NEUTS SEG # BLD: 3.9 K/UL (ref 1.8–8)
NEUTS SEG NFR BLD: 62 % (ref 40–73)
PLATELET # BLD AUTO: 258 K/UL (ref 135–420)
PMV BLD AUTO: 9.5 FL (ref 9.2–11.8)
POTASSIUM SERPL-SCNC: 3.9 MMOL/L (ref 3.5–5.5)
PROT SERPL-MCNC: 7.8 G/DL (ref 6.4–8.2)
RBC # BLD AUTO: 4.46 M/UL (ref 4.2–5.3)
SODIUM SERPL-SCNC: 140 MMOL/L (ref 136–145)
TROPONIN I SERPL-MCNC: <0.02 NG/ML (ref 0–0.04)
WBC # BLD AUTO: 6.3 K/UL (ref 4.6–13.2)

## 2019-05-21 PROCEDURE — 82550 ASSAY OF CK (CPK): CPT

## 2019-05-21 PROCEDURE — 93971 EXTREMITY STUDY: CPT

## 2019-05-21 PROCEDURE — 99284 EMERGENCY DEPT VISIT MOD MDM: CPT

## 2019-05-21 PROCEDURE — 85025 COMPLETE CBC W/AUTO DIFF WBC: CPT

## 2019-05-21 PROCEDURE — 80053 COMPREHEN METABOLIC PANEL: CPT

## 2019-05-21 PROCEDURE — 83880 ASSAY OF NATRIURETIC PEPTIDE: CPT

## 2019-05-22 ENCOUNTER — APPOINTMENT (OUTPATIENT)
Dept: GENERAL RADIOLOGY | Age: 74
End: 2019-05-22
Attending: EMERGENCY MEDICINE
Payer: MEDICARE

## 2019-05-22 VITALS
OXYGEN SATURATION: 100 % | DIASTOLIC BLOOD PRESSURE: 66 MMHG | HEART RATE: 58 BPM | SYSTOLIC BLOOD PRESSURE: 173 MMHG | RESPIRATION RATE: 14 BRPM | TEMPERATURE: 98 F

## 2019-05-22 PROCEDURE — 96374 THER/PROPH/DIAG INJ IV PUSH: CPT

## 2019-05-22 PROCEDURE — 96361 HYDRATE IV INFUSION ADD-ON: CPT

## 2019-05-22 PROCEDURE — 96375 TX/PRO/DX INJ NEW DRUG ADDON: CPT

## 2019-05-22 PROCEDURE — 73564 X-RAY EXAM KNEE 4 OR MORE: CPT

## 2019-05-22 PROCEDURE — 74011250636 HC RX REV CODE- 250/636: Performed by: EMERGENCY MEDICINE

## 2019-05-22 RX ORDER — OXYCODONE AND ACETAMINOPHEN 5; 325 MG/1; MG/1
1 TABLET ORAL
Qty: 12 TAB | Refills: 0 | Status: SHIPPED | OUTPATIENT
Start: 2019-05-22 | End: 2019-05-29

## 2019-05-22 RX ORDER — KETOROLAC TROMETHAMINE 30 MG/ML
15 INJECTION, SOLUTION INTRAMUSCULAR; INTRAVENOUS
Status: COMPLETED | OUTPATIENT
Start: 2019-05-22 | End: 2019-05-22

## 2019-05-22 RX ORDER — MORPHINE SULFATE 2 MG/ML
2 INJECTION, SOLUTION INTRAMUSCULAR; INTRAVENOUS
Status: COMPLETED | OUTPATIENT
Start: 2019-05-22 | End: 2019-05-22

## 2019-05-22 RX ORDER — DICLOFENAC SODIUM 10 MG/G
4 GEL TOPICAL 4 TIMES DAILY
Qty: 100 G | Refills: 1 | Status: SHIPPED | OUTPATIENT
Start: 2019-05-22 | End: 2021-07-29

## 2019-05-22 RX ORDER — ONDANSETRON 2 MG/ML
4 INJECTION INTRAMUSCULAR; INTRAVENOUS
Status: COMPLETED | OUTPATIENT
Start: 2019-05-22 | End: 2019-05-22

## 2019-05-22 RX ADMIN — MORPHINE SULFATE 2 MG: 2 INJECTION, SOLUTION INTRAMUSCULAR; INTRAVENOUS at 01:03

## 2019-05-22 RX ADMIN — SODIUM CHLORIDE 250 ML: 900 INJECTION, SOLUTION INTRAVENOUS at 01:02

## 2019-05-22 RX ADMIN — KETOROLAC TROMETHAMINE 15 MG: 30 INJECTION, SOLUTION INTRAMUSCULAR at 01:03

## 2019-05-22 RX ADMIN — ONDANSETRON 4 MG: 2 INJECTION INTRAMUSCULAR; INTRAVENOUS at 01:02

## 2019-05-22 NOTE — ED PROVIDER NOTES
EMERGENCY DEPARTMENT HISTORY AND PHYSICAL EXAM    Date: 5/21/2019  Patient Name: Doc Thompson    History of Presenting Illness     Chief Complaint   Patient presents with    Leg Swelling    Knee Pain         History Provided By: patient     Additional History (Context):   10:45 PM  Doc Thompson is a 72 y.o. female with PMHX of HTN, DM, CAD, previous MI who presents to the emergency department C/O bilateral leg swelling and discomfort with worsening right knee pain and swelling. Symptoms are chronic, but now worsening over the last week and now pain with simple walking. There is no trauma or other related injury. She denies any CP/SOB, cough. No belly pain, no diaphoresis. There has been no foot injury, puncture wound. No fevers, chills or body aches. She is already on Xarelto for previous DVT in LEFT leg in Jan 2019    PCP: Pastor Norton MD    Current Outpatient Medications   Medication Sig Dispense Refill    oxyCODONE-acetaminophen (PERCOCET) 5-325 mg per tablet Take 1 Tab by mouth every four (4) hours as needed for Pain for up to 7 days. Max Daily Amount: 6 Tabs. 12 Tab 0    diclofenac (VOLTAREN) 1 % gel Apply 4 g to affected area four (4) times daily. 100 g 1    rivaroxaban (XARELTO) 15 mg (42)- 20 mg (9) DsPk Take one 15 mg tablet twice a day with food for the first 21 days. Then, take one 20 mg tablet once a day with food for 9 days. 1 Dose Pack 0    traZODone (DESYREL) 50 mg tablet Take 50 mg by mouth nightly as needed for Sleep.  simvastatin (ZOCOR) 10 mg tablet Take 10 mg by mouth nightly.  metFORMIN (GLUCOPHAGE) 500 mg tablet Take 500 mg by mouth two (2) times daily (with meals).  ergocalciferol (VITAMIN D2) 50,000 unit capsule Take 50,000 Units by mouth every Monday.  insulin glargine (LANTUS U-100 INSULIN) 100 unit/mL injection 42 Units by SubCUTAneous route nightly.  allopurinol (ZYLOPRIM) 100 mg tablet Take 100 mg by mouth two (2) times a day.       gabapentin (NEURONTIN) 300 mg capsule Take 300 mg by mouth three (3) times daily.  Difluprednate (DUREZOL) 0.05 % ophthalmic emulsion Administer 1 Drop to right eye four (4) times daily. In relation to cataract surgery      ondansetron hcl (ZOFRAN) 8 mg tablet Take 1 Tab by mouth every eight (8) hours as needed for Nausea. 10 Tab 0    aspirin delayed-release 81 mg tablet Take 1 Tab by mouth daily. 30 Tab 0    atenolol (TENORMIN) 100 mg tablet Take 100 mg by mouth daily.  multivitamin, tx-iron-ca-min (THERA-M W/ IRON) 9 mg iron-400 mcg tab tablet Take 1 Tab by mouth daily. Formulary substitution for DAILY MULTIVITAMIN         Past History     Past Medical History:  Past Medical History:   Diagnosis Date    CAD (coronary artery disease)     Diabetes ([facility] [facility]     Gastrointestinal disorder     Gout     Hypertension     MI (myocardial infarction) ([facility] [address] [facility]     Reflux        Past Surgical History:  Past Surgical History:   Procedure Laterality Date    CARDIAC SURG PROCEDURE UNLIST      VASCULAR SURGERY PROCEDURE UNLIST         Family History:  History reviewed. No pertinent family history. Social History:  Social History     Tobacco Use    Smoking status: Never Smoker    Smokeless tobacco: Never Used   Substance Use Topics    Alcohol use: No    Drug use: No       Allergies: Allergies   Allergen Reactions    Lisinopril Swelling    Pcn [Penicillins] Angioedema         Review of Systems   Review of Systems   Constitutional: Negative for chills, diaphoresis, fever and unexpected weight change. HENT: Negative for congestion, drooling, ear pain, rhinorrhea, sore throat, tinnitus and trouble swallowing. Eyes: Negative for photophobia, pain, redness and visual disturbance. Respiratory: Negative for cough, choking, chest tightness, shortness of breath, wheezing and stridor. Cardiovascular: Negative for chest pain, palpitations and leg swelling.    Gastrointestinal: Negative for abdominal distention, abdominal pain, anal bleeding, blood in stool, constipation, diarrhea, nausea and vomiting. Endocrine: Negative for cold intolerance, heat intolerance, polydipsia and polyuria. Genitourinary: Negative for difficulty urinating, dysuria, flank pain, frequency, hematuria and urgency. Musculoskeletal: Positive for arthralgias and joint swelling. Negative for back pain and neck pain. Right knee swelling and increased right calf and foot swelling, (mild swelling distal to the knee)   Skin: Negative for color change, rash and wound. Allergic/Immunologic: Negative for immunocompromised state. Neurological: Negative for dizziness, seizures, syncope, speech difficulty, light-headedness and headaches. Hematological: Does not bruise/bleed easily. Psychiatric/Behavioral: Negative for agitation, behavioral problems, hallucinations, self-injury and suicidal ideas. The patient is not hyperactive. Physical Exam     Vitals:    05/21/19 2119 05/22/19 0110 05/22/19 0115   BP: 185/81 190/82 173/66   Pulse: 63 (!) 58    Resp: 16 14    Temp:  98 °F (36.7 °C)    SpO2: 99% 99% 100%     Physical Exam   Constitutional: She is oriented to person, place, and time. She appears well-developed and well-nourished. No distress. HENT:   Head: Normocephalic and atraumatic. Right Ear: External ear normal.   Left Ear: External ear normal.   Mouth/Throat: Oropharynx is clear and moist. No oropharyngeal exudate. Eyes: Pupils are equal, round, and reactive to light. Conjunctivae and EOM are normal. No scleral icterus. No pallor   Neck: Normal range of motion. Neck supple. No JVD present. No tracheal deviation present. No thyromegaly present. Cardiovascular: Normal rate, regular rhythm, S1 normal, S2 normal, normal heart sounds and intact distal pulses. Exam reveals no decreased pulses. Pulses:       Radial pulses are 2+ on the right side, and 2+ on the left side.         Femoral pulses are 2+ on the right side, and 2+ on the left side. Dorsalis pedis pulses are 2+ on the right side, and 2+ on the left side. Posterior tibial pulses are 2+ on the right side, and 2+ on the left side. Pulmonary/Chest: Effort normal and breath sounds normal. No stridor. No respiratory distress. Abdominal: Soft. Bowel sounds are normal. She exhibits no distension. There is no tenderness. There is no rebound and no guarding. Musculoskeletal: She exhibits edema and tenderness. She exhibits no deformity. Right knee: She exhibits decreased range of motion and swelling. She exhibits no effusion, no deformity, no erythema and normal alignment. Tenderness found. Medial joint line, lateral joint line, MCL and LCL tenderness noted. No patellar tendon tenderness noted. Left knee: She exhibits bony tenderness. She exhibits normal range of motion, no deformity and no erythema. Tenderness found. No other visible soft tissue injuries   Lymphadenopathy:     She has no cervical adenopathy. Neurological: She is alert and oriented to person, place, and time. She has normal reflexes. No cranial nerve deficit. Coordination normal.   Skin: Skin is warm and dry. No rash noted. She is not diaphoretic. No erythema. Psychiatric: She has a normal mood and affect. Her behavior is normal. Judgment and thought content normal.   Nursing note and vitals reviewed.         Diagnostic Study Results     Labs -     Recent Results (from the past 12 hour(s))   CBC WITH AUTOMATED DIFF    Collection Time: 05/21/19 10:40 PM   Result Value Ref Range    WBC 6.3 4.6 - 13.2 K/uL    RBC 4.46 4.20 - 5.30 M/uL    HGB 12.8 12.0 - 16.0 g/dL    HCT 39.0 35.0 - 45.0 %    MCV 87.4 74.0 - 97.0 FL    MCH 28.7 24.0 - 34.0 PG    MCHC 32.8 31.0 - 37.0 g/dL    RDW 13.1 11.6 - 14.5 %    PLATELET 544 433 - 512 K/uL    MPV 9.5 9.2 - 11.8 FL    NEUTROPHILS 62 40 - 73 %    LYMPHOCYTES 29 21 - 52 %    MONOCYTES 8 3 - 10 %    EOSINOPHILS 1 0 - 5 %    BASOPHILS 0 0 - 2 %    ABS. NEUTROPHILS 3.9 1.8 - 8.0 K/UL    ABS. LYMPHOCYTES 1.8 0.9 - 3.6 K/UL    ABS. MONOCYTES 0.5 0.05 - 1.2 K/UL    ABS. EOSINOPHILS 0.1 0.0 - 0.4 K/UL    ABS. BASOPHILS 0.0 0.0 - 0.1 K/UL    DF AUTOMATED     METABOLIC PANEL, COMPREHENSIVE    Collection Time: 05/21/19 10:40 PM   Result Value Ref Range    Sodium 140 136 - 145 mmol/L    Potassium 3.9 3.5 - 5.5 mmol/L    Chloride 105 100 - 108 mmol/L    CO2 29 21 - 32 mmol/L    Anion gap 6 3.0 - 18 mmol/L    Glucose 105 (H) 74 - 99 mg/dL    BUN 14 7.0 - 18 MG/DL    Creatinine 0.81 0.6 - 1.3 MG/DL    BUN/Creatinine ratio 17 12 - 20      GFR est AA >60 >60 ml/min/1.73m2    GFR est non-AA >60 >60 ml/min/1.73m2    Calcium 8.9 8.5 - 10.1 MG/DL    Bilirubin, total 0.2 0.2 - 1.0 MG/DL    ALT (SGPT) 21 13 - 56 U/L    AST (SGOT) 19 15 - 37 U/L    Alk. phosphatase 94 45 - 117 U/L    Protein, total 7.8 6.4 - 8.2 g/dL    Albumin 3.2 (L) 3.4 - 5.0 g/dL    Globulin 4.6 (H) 2.0 - 4.0 g/dL    A-G Ratio 0.7 (L) 0.8 - 1.7     NT-PRO BNP    Collection Time: 05/21/19 10:40 PM   Result Value Ref Range    NT pro- 0 - 900 PG/ML   CARDIAC PANEL,(CK, CKMB & TROPONIN)    Collection Time: 05/21/19 10:40 PM   Result Value Ref Range     (H) 26 - 192 U/L    CK - MB 4.1 (H) <3.6 ng/ml    CK-MB Index 1.4 0.0 - 4.0 %    Troponin-I, QT <0.02 0.0 - 0.045 NG/ML       Radiologic Studies -   Right Lower Venous     No evidence of deep vein thrombosis in the right lower extremity. The common femoral, profunda femoral, proximal femoral, mid femoral, distal femoral, popliteal, posterior tibial, peroneal and saphenous femoral junction vein(s) were imaged in the transverse and longitudinal planes. The vessels showed normal color filling and compressibility. Doppler interrogation of the veins showed phasic and spontaneous flow. Left Lower Venous     For comparison purposes, the left common femoral vein was briefly interrogated.  These vein demonstrates normal color filing and compressibility. Doppler flow was phasic and spontaneous. Procedure Staff     Technologist/Clinician: Miguelito eLe  Supporting Staff: None  Performing Physician/Midlevel: None     Exam Completion Date/Time: 5/21/19 11:26 PM         Preliminary findings -   Right knee xrays - significant DJD with surrounding soft tissue swelling, likely joint effusion  XR KNEE RT MIN 4 V    (Results Pending)     CT Results  (Last 48 hours)    None        CXR Results  (Last 48 hours)    None          Medications given in the ED-  Medications   sodium chloride 0.9 % bolus infusion 250 mL (0 mL IntraVENous IV Completed 5/22/19 0154)   ketorolac (TORADOL) injection 15 mg (15 mg IntraVENous Given 5/22/19 0103)   morphine injection 2 mg (2 mg IntraVENous Given 5/22/19 0103)   ondansetron (ZOFRAN) injection 4 mg (4 mg IntraVENous Given 5/22/19 0102)         Medical Decision Making   I am the first provider for this patient. I reviewed the vital signs, available nursing notes, past medical history, past surgical history, family history and social history. Vital Signs-Reviewed the patient's vital signs. Pulse Oximetry Analysis - 100% on room air      Records Reviewed: NURSING NOTES AND PREVIOUS MEDICAL RECORDS    Provider Notes (Medical Decision Making):   Leg swelling looks to be arthritic,likely with some inflammatory effusion, would strongly consider bleeding from Xarelto, very unlikely septic joint, another great concern would be recurrent DVT despite Xarelto, though none were found. Treat by APAP, topical NSAIDs only, rescue opiates    Procedures:  Procedures    ED Course:   10:45 PM: Initial assessment performed. The patients presenting problems have been discussed, and they are in agreement with the care plan formulated and outlined with them. I have encouraged them to ask questions as they arise throughout their visit.     Diagnosis and Disposition       DISCHARGE NOTE:  1:45 AM  Perla Ilda's  results have been reviewed with her. She has been counseled regarding her diagnosis, treatment, and plan. She verbally conveys understanding and agreement of the signs, symptoms, diagnosis, treatment and prognosis and additionally agrees to follow up as discussed. She also agrees with the care-plan and conveys that all of her questions have been answered. I have also provided discharge instructions for her that include: educational information regarding their diagnosis and treatment, and list of reasons why they would want to return to the ED prior to their follow-up appointment, should her condition change. She has been provided with education for proper emergency department utilization. CLINICAL IMPRESSION:    1. Arthritis of right knee    2. Knee effusion, left    3. Pyelonephritis        PLAN:  1. D/C Home  2. Discharge Medication List as of 5/22/2019  1:54 AM      START taking these medications    Details   diclofenac (VOLTAREN) 1 % gel Apply 4 g to affected area four (4) times daily. , Normal, Disp-100 g, R-1         CONTINUE these medications which have CHANGED    Details   oxyCODONE-acetaminophen (PERCOCET) 5-325 mg per tablet Take 1 Tab by mouth every four (4) hours as needed for Pain for up to 7 days. Max Daily Amount: 6 Tabs., Print, Disp-12 Tab, R-0         CONTINUE these medications which have NOT CHANGED    Details   rivaroxaban (XARELTO) 15 mg (42)- 20 mg (9) DsPk Take one 15 mg tablet twice a day with food for the first 21 days. Then, take one 20 mg tablet once a day with food for 9 days. , Normal, Disp-1 Dose Pack, R-0      traZODone (DESYREL) 50 mg tablet Take 50 mg by mouth nightly as needed for Sleep., Historical Med      simvastatin (ZOCOR) 10 mg tablet Take 10 mg by mouth nightly., Historical Med      metFORMIN (GLUCOPHAGE) 500 mg tablet Take 500 mg by mouth two (2) times daily (with meals). , Historical Med      ergocalciferol (VITAMIN D2) 50,000 unit capsule Take 50,000 Units by mouth every Monday., Historical Med      insulin glargine (LANTUS U-100 INSULIN) 100 unit/mL injection 42 Units by SubCUTAneous route nightly., Historical Med      allopurinol (ZYLOPRIM) 100 mg tablet Take 100 mg by mouth two (2) times a day., Historical Med      gabapentin (NEURONTIN) 300 mg capsule Take 300 mg by mouth three (3) times daily. , Historical Med      Difluprednate (DUREZOL) 0.05 % ophthalmic emulsion Administer 1 Drop to right eye four (4) times daily. In relation to cataract surgery, Historical Med      ondansetron hcl (ZOFRAN) 8 mg tablet Take 1 Tab by mouth every eight (8) hours as needed for Nausea. , Print, Disp-10 Tab, R-0      aspirin delayed-release 81 mg tablet Take 1 Tab by mouth daily. , Normal, Disp-30 Tab, R-0      atenolol (TENORMIN) 100 mg tablet Take 100 mg by mouth daily. , Historical Med      multivitamin, tx-iron-ca-min (THERA-M W/ IRON) 9 mg iron-400 mcg tab tablet Take 1 Tab by mouth daily. Formulary substitution for DAILY MULTIVITAMIN, Historical Med           3. Follow-up Information     Follow up With Specialties Details Why Contact Info    Billy Almaraz MD Family Practice In 2 weeks  [address]  [phone]      Doni Graham MD Orthopedic Surgery In 2 weeks  [address]      [facility] EMERGENCY DE[facility] Emergency Medicine  As needed [address] [place] [id number] [phone]        _______________________________    This note was partially transcribed via voice recognition software. Although efforts have been made to catch any discrepancies, it may contain sound alike words, grammatical errors, or nonsensical words.

## 2019-05-22 NOTE — DISCHARGE INSTRUCTIONS
Patient Education        Arthritis: Care Instructions  Your Care Instructions  Arthritis, also called osteoarthritis, is a breakdown of the cartilage that cushions your joints. When the cartilage wears down, your bones rub against each other. This causes pain and stiffness. Many people have some arthritis as they age. Arthritis most often affects the joints of the spine, hands, hips, knees, or feet. You can take simple measures to protect your joints, ease your pain, and help you stay active. Follow-up care is a key part of your treatment and safety. Be sure to make and go to all appointments, and call your doctor if you are having problems. It's also a good idea to know your test results and keep a list of the medicines you take. How can you care for yourself at home? · Stay at a healthy weight. Being overweight puts extra strain on your joints. · Talk to your doctor or physical therapist about exercises that will help ease joint pain. ? Stretch. You may enjoy gentle forms of yoga to help keep your joints and muscles flexible. ? Walk instead of jog. Other types of exercise that are less stressful on the joints include riding a bicycle, swimming, anuj chi, or water exercise. ? Lift weights. Strong muscles help reduce stress on your joints. Stronger thigh muscles, for example, take some of the stress off of the knees and hips. Learn the right way to lift weights so you do not make joint pain worse. · Take your medicines exactly as prescribed. Call your doctor if you think you are having a problem with your medicine. · Take pain medicines exactly as directed. ? If the doctor gave you a prescription medicine for pain, take it as prescribed. ? If you are not taking a prescription pain medicine, ask your doctor if you can take an over-the-counter medicine. · Use a cane, crutch, walker, or another device if you need help to get around. These can help rest your joints.  You also can use other things to make life easier, such as a higher toilet seat and padded handles on kitchen utensils. · Do not sit in low chairs, which can make it hard to get up. · Put heat or cold on your sore joints as needed. Use whichever helps you most. You also can take turns with hot and cold packs. ? Apply heat 2 or 3 times a day for 20 to 30 minutes--using a heating pad, hot shower, or hot pack--to relieve pain and stiffness. ? Put ice or a cold pack on your sore joint for 10 to 20 minutes at a time. Put a thin cloth between the ice and your skin. When should you call for help? Call your doctor now or seek immediate medical care if:    · You have sudden swelling, warmth, or pain in any joint.     · You have joint pain and a fever or rash.     · You have such bad pain that you cannot use a joint.    Watch closely for changes in your health, and be sure to contact your doctor if:    · You have mild joint symptoms that continue even with more than 6 weeks of care at home.     · You have stomach pain or other problems with your medicine. Where can you learn more? Go to http://ebony-marci.info/. Enter E697 in the search box to learn more about \"Arthritis: Care Instructions. \"  Current as of: Dyan 10, 2018  Content Version: 11.9  © 1070-0629 [employer]. Care instructions adapted under license by [employer] (which disclaims liability or warranty for this information). If you have questions about a medical condition or this instruction, always ask your healthcare professional. [place] [address] any warranty or liability for your use of this information. Patient Education        Knee Arthritis: Exercises  Your Care Instructions  Here are some examples of exercises for knee arthritis. Start each exercise slowly. Ease off the exercise if you start to have pain.   Your doctor or physical therapist will tell you when you can start these exercises and which ones will work best for you.  How to do the exercises  Knee flexion with heel slide    1. Lie on your back with your knees bent. 2. Slide your heel back by bending your affected knee as far as you can. Then hook your other foot around your ankle to help pull your heel even farther back. 3. Hold for about 6 seconds, then rest for up to 10 seconds. 4. Repeat 8 to 12 times. 5. Switch legs and repeat steps 1 through 4, even if only one knee is sore. Quad sets    1. Sit with your affected leg straight and supported on the floor or a firm bed. Place a small, rolled-up towel under your knee. Your other leg should be bent, with that foot flat on the floor. 2. Tighten the thigh muscles of your affected leg by pressing the back of your knee down into the towel. 3. Hold for about 6 seconds, then rest for up to 10 seconds. 4. Repeat 8 to 12 times. 5. Switch legs and repeat steps 1 through 4, even if only one knee is sore. Straight-leg raises to the front    1. Lie on your back with your good knee bent so that your foot rests flat on the floor. Your affected leg should be straight. Make sure that your low back has a normal curve. You should be able to slip your hand in between the floor and the small of your back, with your palm touching the floor and your back touching the back of your hand. 2. Tighten the thigh muscles in your affected leg by pressing the back of your knee flat down to the floor. Hold your knee straight. 3. Keeping the thigh muscles tight and your leg straight, lift your affected leg up so that your heel is about 12 inches off the floor. Hold for about 6 seconds, then lower slowly. 4. Relax for up to 10 seconds between repetitions. 5. Repeat 8 to 12 times. 6. Switch legs and repeat steps 1 through 5, even if only one knee is sore. Active knee flexion    1. Lie on your stomach with your knees straight.  If your kneecap is uncomfortable, roll up a washcloth and put it under your leg just above your kneecap. 2. Lift the foot of your affected leg by bending the knee so that you bring the foot up toward your buttock. If this motion hurts, try it without bending your knee quite as far. This may help you avoid any painful motion. 3. Slowly move your leg up and down. 4. Repeat 8 to 12 times. 5. Switch legs and repeat steps 1 through 4, even if only one knee is sore. Quadriceps stretch (facedown)    1. Lie flat on your stomach, and rest your face on the floor. 2. Wrap a towel or belt strap around the lower part of your affected leg. Then use the towel or belt strap to slowly pull your heel toward your buttock until you feel a stretch. 3. Hold for about 15 to 30 seconds, then relax your leg against the towel or belt strap. 4. Repeat 2 to 4 times. 5. Switch legs and repeat steps 1 through 4, even if only one knee is sore. Stationary exercise bike    1. If you do not have a stationary exercise bike at home, you can find one to ride at your local health club or community center. 2. Adjust the height of the bike seat so that your knee is slightly bent when your leg is extended downward. If your knee hurts when the pedal reaches the top, you can raise the seat so that your knee does not bend as much. 3. Start slowly. At first, try to do 5 to 10 minutes of cycling with little to no resistance. Then increase your time and the resistance bit by bit until you can do 20 to 30 minutes without pain. 4. If you start to have pain, rest your knee until your pain gets back to the level that is normal for you. Or cycle for less time or with less effort. Follow-up care is a key part of your treatment and safety. Be sure to make and go to all appointments, and call your doctor if you are having problems. It's also a good idea to know your test results and keep a list of the medicines you take. Where can you learn more? Go to http://ebony-marci.info/.   Enter C159 in the search box to learn more about \"Knee Arthritis: Exercises. \"  Current as of: September 20, 2018  Content Version: 11.9  © 7593-0589 [employer]. Care instructions adapted under license by [employer] (which disclaims liability or warranty for this information). If you have questions about a medical condition or this instruction, always ask your healthcare professional. [place] [address] any warranty or liability for your use of this information.

## 2019-05-22 NOTE — ED NOTES
Verbal shift change report given to this RN (oncoming nurse) by Roselee Dubin, RN (offgoing nurse). Report included the following information SBAR.

## 2019-05-22 NOTE — ED NOTES
Pt hourly rounding competed. Safety   Pt (**) resting on stretcher with side rails up and call bell in reach. () in chair    () in parents arms. Toileting   Pt offered ()Bedpan     (**)Assistance to Restroom     ()Urinal  Ongoing Updates  Updated on plan of care and status of test results.   Pain Management  Inquired as to comfort and offered comfort measures:    (**) warm blankets   (**) dimmed lights

## 2019-05-22 NOTE — ED TRIAGE NOTES
Pt c/o bilateral leg swelling and right knee pain. Leg swelling has been \"off and on\" for the past week.

## 2019-05-22 NOTE — ED NOTES
Pt hourly rounding competed. Safety   Pt (**) resting on stretcher with side rails up and call bell in reach. () in chair    () in parents arms. Toileting   Pt offered ()Bedpan     (**)Assistance to Restroom     ()Urinal  Ongoing Updates  Updated on plan of care and status of test results.   Pain Management  Inquired as to comfort and offered comfort measures:    () warm blankets   (**) dimmed lights

## 2019-05-23 ENCOUNTER — APPOINTMENT (OUTPATIENT)
Dept: GENERAL RADIOLOGY | Age: 74
End: 2019-05-23
Attending: EMERGENCY MEDICINE
Payer: MEDICARE

## 2019-05-23 ENCOUNTER — HOSPITAL ENCOUNTER (OUTPATIENT)
Age: 74
Setting detail: OBSERVATION
Discharge: HOME | End: 2019-05-26
Attending: EMERGENCY MEDICINE | Admitting: INTERNAL MEDICINE
Payer: MEDICARE

## 2019-05-23 DIAGNOSIS — Z79.4 TYPE 2 DIABETES MELLITUS WITH COMPLICATION, WITH LONG-TERM CURRENT USE OF INSULIN (HCC): ICD-10-CM

## 2019-05-23 DIAGNOSIS — R07.9 ACUTE CHEST PAIN: Primary | ICD-10-CM

## 2019-05-23 DIAGNOSIS — E11.8 TYPE 2 DIABETES MELLITUS WITH COMPLICATION, WITH LONG-TERM CURRENT USE OF INSULIN (HCC): ICD-10-CM

## 2019-05-23 DIAGNOSIS — Z79.01 ENCOUNTER FOR CURRENT LONG-TERM USE OF ANTICOAGULANTS: ICD-10-CM

## 2019-05-23 DIAGNOSIS — I25.119 CORONARY ARTERY DISEASE INVOLVING NATIVE HEART WITH ANGINA PECTORIS, UNSPECIFIED VESSEL OR LESION TYPE (HCC): ICD-10-CM

## 2019-05-23 LAB
ALBUMIN SERPL-MCNC: 3 G/DL (ref 3.4–5)
ALBUMIN/GLOB SERPL: 0.6 {RATIO} (ref 0.8–1.7)
ALP SERPL-CCNC: 84 U/L (ref 45–117)
ALT SERPL-CCNC: 21 U/L (ref 13–56)
ANION GAP SERPL CALC-SCNC: 5 MMOL/L (ref 3–18)
AST SERPL-CCNC: 27 U/L (ref 15–37)
BASOPHILS # BLD: 0 K/UL (ref 0–0.1)
BASOPHILS NFR BLD: 0 % (ref 0–2)
BILIRUB SERPL-MCNC: 0.6 MG/DL (ref 0.2–1)
BUN SERPL-MCNC: 19 MG/DL (ref 7–18)
BUN/CREAT SERPL: 21 (ref 12–20)
CALCIUM SERPL-MCNC: 8.8 MG/DL (ref 8.5–10.1)
CHLORIDE SERPL-SCNC: 103 MMOL/L (ref 100–108)
CK MB CFR SERPL CALC: 1.7 % (ref 0–4)
CK MB SERPL-MCNC: 6.5 NG/ML (ref 5–25)
CK SERPL-CCNC: 392 U/L (ref 26–192)
CO2 SERPL-SCNC: 29 MMOL/L (ref 21–32)
CREAT SERPL-MCNC: 0.89 MG/DL (ref 0.6–1.3)
DIFFERENTIAL METHOD BLD: NORMAL
EOSINOPHIL # BLD: 0.1 K/UL (ref 0–0.4)
EOSINOPHIL NFR BLD: 2 % (ref 0–5)
ERYTHROCYTE [DISTWIDTH] IN BLOOD BY AUTOMATED COUNT: 13.1 % (ref 11.6–14.5)
GLOBULIN SER CALC-MCNC: 4.7 G/DL (ref 2–4)
GLUCOSE SERPL-MCNC: 113 MG/DL (ref 74–99)
HCT VFR BLD AUTO: 39.6 % (ref 35–45)
HGB BLD-MCNC: 13 G/DL (ref 12–16)
LIPASE SERPL-CCNC: 36 U/L (ref 73–393)
LYMPHOCYTES # BLD: 2.1 K/UL (ref 0.9–3.6)
LYMPHOCYTES NFR BLD: 37 % (ref 21–52)
MAGNESIUM SERPL-MCNC: 1.9 MG/DL (ref 1.6–2.6)
MCH RBC QN AUTO: 28.4 PG (ref 24–34)
MCHC RBC AUTO-ENTMCNC: 32.8 G/DL (ref 31–37)
MCV RBC AUTO: 86.5 FL (ref 74–97)
MONOCYTES # BLD: 0.4 K/UL (ref 0.05–1.2)
MONOCYTES NFR BLD: 8 % (ref 3–10)
NEUTS SEG # BLD: 3 K/UL (ref 1.8–8)
NEUTS SEG NFR BLD: 53 % (ref 40–73)
PLATELET # BLD AUTO: 241 K/UL (ref 135–420)
PMV BLD AUTO: 9.8 FL (ref 9.2–11.8)
POTASSIUM SERPL-SCNC: 4.2 MMOL/L (ref 3.5–5.5)
PROT SERPL-MCNC: 7.7 G/DL (ref 6.4–8.2)
RBC # BLD AUTO: 4.58 M/UL (ref 4.2–5.3)
SODIUM SERPL-SCNC: 137 MMOL/L (ref 136–145)
TROPONIN I SERPL-MCNC: <0.02 NG/ML (ref 0–0.04)
WBC # BLD AUTO: 5.6 K/UL (ref 4.6–13.2)

## 2019-05-23 PROCEDURE — 96374 THER/PROPH/DIAG INJ IV PUSH: CPT

## 2019-05-23 PROCEDURE — 82550 ASSAY OF CK (CPK): CPT

## 2019-05-23 PROCEDURE — 83735 ASSAY OF MAGNESIUM: CPT

## 2019-05-23 PROCEDURE — 74011250636 HC RX REV CODE- 250/636: Performed by: EMERGENCY MEDICINE

## 2019-05-23 PROCEDURE — 93005 ELECTROCARDIOGRAM TRACING: CPT

## 2019-05-23 PROCEDURE — 83036 HEMOGLOBIN GLYCOSYLATED A1C: CPT

## 2019-05-23 PROCEDURE — 83690 ASSAY OF LIPASE: CPT

## 2019-05-23 PROCEDURE — 99285 EMERGENCY DEPT VISIT HI MDM: CPT

## 2019-05-23 PROCEDURE — 85025 COMPLETE CBC W/AUTO DIFF WBC: CPT

## 2019-05-23 PROCEDURE — 74011250637 HC RX REV CODE- 250/637: Performed by: EMERGENCY MEDICINE

## 2019-05-23 PROCEDURE — 80053 COMPREHEN METABOLIC PANEL: CPT

## 2019-05-23 PROCEDURE — 96375 TX/PRO/DX INJ NEW DRUG ADDON: CPT

## 2019-05-23 PROCEDURE — 71045 X-RAY EXAM CHEST 1 VIEW: CPT

## 2019-05-23 RX ORDER — MORPHINE SULFATE 4 MG/ML
4 INJECTION INTRAVENOUS
Status: COMPLETED | OUTPATIENT
Start: 2019-05-23 | End: 2019-05-23

## 2019-05-23 RX ORDER — DIPHENHYDRAMINE HCL 25 MG
50 CAPSULE ORAL
Status: COMPLETED | OUTPATIENT
Start: 2019-05-23 | End: 2019-05-23

## 2019-05-23 RX ORDER — METHOCARBAMOL 500 MG/1
1000 TABLET, FILM COATED ORAL ONCE
Status: COMPLETED | OUTPATIENT
Start: 2019-05-23 | End: 2019-05-23

## 2019-05-23 RX ORDER — ONDANSETRON 2 MG/ML
4 INJECTION INTRAMUSCULAR; INTRAVENOUS
Status: COMPLETED | OUTPATIENT
Start: 2019-05-23 | End: 2019-05-23

## 2019-05-23 RX ADMIN — MORPHINE SULFATE 4 MG: 4 INJECTION INTRAVENOUS at 20:49

## 2019-05-23 RX ADMIN — METHOCARBAMOL TABLETS 1000 MG: 500 TABLET, COATED ORAL at 20:49

## 2019-05-23 RX ADMIN — ONDANSETRON 4 MG: 2 INJECTION INTRAMUSCULAR; INTRAVENOUS at 20:49

## 2019-05-23 RX ADMIN — DIPHENHYDRAMINE HYDROCHLORIDE 50 MG: 25 CAPSULE ORAL at 20:49

## 2019-05-23 NOTE — ED NOTES
Pt arrives alert and oriented c/o chest pain intermittently describes as a burning x 2-3 weeks. Pt states she was treated here recently for knee pain, however didn't mention chest pain. Pt able to speak full sentences with no complications. No other complaints at this time.

## 2019-05-23 NOTE — ED TRIAGE NOTES
Patient with complaints of right sided chest pain that started about 2 weeks ago. Patient reports headache.

## 2019-05-24 ENCOUNTER — HOME HEALTH ADMISSION (OUTPATIENT)
Dept: HOME HEALTH SERVICES | Facility: HOME HEALTH | Age: 74
End: 2019-05-24

## 2019-05-24 ENCOUNTER — APPOINTMENT (OUTPATIENT)
Dept: NON INVASIVE DIAGNOSTICS | Age: 74
End: 2019-05-24
Attending: INTERNAL MEDICINE
Payer: MEDICARE

## 2019-05-24 ENCOUNTER — APPOINTMENT (OUTPATIENT)
Dept: CT IMAGING | Age: 74
End: 2019-05-24
Attending: EMERGENCY MEDICINE
Payer: MEDICARE

## 2019-05-24 PROBLEM — R07.9 ACUTE CHEST PAIN: Status: ACTIVE | Noted: 2019-05-24

## 2019-05-24 PROBLEM — E11.9 DIABETES (HCC): Status: ACTIVE | Noted: 2019-05-24

## 2019-05-24 LAB
ATRIAL RATE: 56 BPM
CALCULATED P AXIS, ECG09: 71 DEGREES
CALCULATED R AXIS, ECG10: 9 DEGREES
CALCULATED T AXIS, ECG11: -4 DEGREES
CK MB CFR SERPL CALC: 1.6 % (ref 0–4)
CK MB CFR SERPL CALC: 1.6 % (ref 0–4)
CK MB CFR SERPL CALC: 1.7 % (ref 0–4)
CK MB SERPL-MCNC: 4.8 NG/ML (ref 5–25)
CK MB SERPL-MCNC: 5.5 NG/ML (ref 5–25)
CK MB SERPL-MCNC: 5.5 NG/ML (ref 5–25)
CK SERPL-CCNC: 300 U/L (ref 26–192)
CK SERPL-CCNC: 316 U/L (ref 26–192)
CK SERPL-CCNC: 334 U/L (ref 26–192)
DIAGNOSIS, 93000: NORMAL
ECHO AO ASC DIAM: 3.2 CM
ECHO AO ROOT DIAM: 2.74 CM
ECHO AV AREA PEAK VELOCITY: 2.1 CM2
ECHO AV AREA VTI: 2.1 CM2
ECHO AV AREA/BSA PEAK VELOCITY: 0.9 CM2/M2
ECHO AV AREA/BSA VTI: 0.9 CM2/M2
ECHO AV MEAN GRADIENT: 2.9 MMHG
ECHO AV PEAK GRADIENT: 5.4 MMHG
ECHO AV PEAK VELOCITY: 116.35 CM/S
ECHO AV VTI: 25.9 CM
ECHO LA MAJOR AXIS: 4.88 CM
ECHO LA VOL 2C: 66.16 ML (ref 22–52)
ECHO LA VOL 4C: 87.56 ML (ref 22–52)
ECHO LA VOL BP: 75.97 ML (ref 22–52)
ECHO LA VOL/BSA BIPLANE: 34.63 ML/M2 (ref 16–28)
ECHO LA VOLUME INDEX A2C: 30.16 ML/M2 (ref 16–28)
ECHO LA VOLUME INDEX A4C: 39.91 ML/M2 (ref 16–28)
ECHO LV E' LATERAL VELOCITY: 8 CM/S
ECHO LV E' SEPTAL VELOCITY: 5 CM/S
ECHO LV EDV A2C: 47.8 ML
ECHO LV EDV A4C: 95.8 ML
ECHO LV EDV BP: 68.4 ML (ref 56–104)
ECHO LV EDV INDEX A4C: 43.7 ML/M2
ECHO LV EDV INDEX BP: 31.2 ML/M2
ECHO LV EDV NDEX A2C: 21.8 ML/M2
ECHO LV EJECTION FRACTION A2C: 67 %
ECHO LV EJECTION FRACTION A4C: 61 %
ECHO LV EJECTION FRACTION BIPLANE: 62.3 % (ref 55–100)
ECHO LV ESV A2C: 15.9 ML
ECHO LV ESV A4C: 37.8 ML
ECHO LV ESV BP: 25.8 ML (ref 19–49)
ECHO LV ESV INDEX A2C: 7.2 ML/M2
ECHO LV ESV INDEX A4C: 17.2 ML/M2
ECHO LV ESV INDEX BP: 11.8 ML/M2
ECHO LV INTERNAL DIMENSION DIASTOLIC: 5.16 CM (ref 3.9–5.3)
ECHO LV INTERNAL DIMENSION SYSTOLIC: 3.88 CM
ECHO LV IVSD: 1.31 CM (ref 0.6–0.9)
ECHO LV MASS 2D: 311.6 G (ref 67–162)
ECHO LV MASS INDEX 2D: 142 G/M2 (ref 43–95)
ECHO LV POSTERIOR WALL DIASTOLIC: 1.2 CM (ref 0.6–0.9)
ECHO LVOT DIAM: 1.96 CM
ECHO LVOT PEAK GRADIENT: 2.6 MMHG
ECHO LVOT PEAK VELOCITY: 80.24 CM/S
ECHO LVOT VTI: 18.26 CM
ECHO MV A VELOCITY: 77.21 CM/S
ECHO MV AREA PHT: 2.3 CM2
ECHO MV E DECELERATION TIME (DT): 326.9 MS
ECHO MV E POINT SEPTAL SEPARATION (EPSS): 9.7 CM
ECHO MV E VELOCITY: 93.93 CM/S
ECHO MV E/A RATIO: 1.22
ECHO MV E/E' LATERAL: 11.74
ECHO MV E/E' RATIO (AVERAGED): 15.26
ECHO MV E/E' SEPTAL: 18.79
ECHO MV PRESSURE HALF TIME (PHT): 94.8 MS
ECHO RA AREA 4C: 15.85 CM2
ECHO RV INTERNAL DIMENSION: 4.06 CM
ECHO TRICUSPID ANNULAR PEAK SYSTOLIC VELOCITY: 2.2 CM/S
EST. AVERAGE GLUCOSE BLD GHB EST-MCNC: 143 MG/DL
GLUCOSE BLD STRIP.AUTO-MCNC: 132 MG/DL (ref 70–110)
GLUCOSE BLD STRIP.AUTO-MCNC: 161 MG/DL (ref 70–110)
GLUCOSE BLD STRIP.AUTO-MCNC: 95 MG/DL (ref 70–110)
HBA1C MFR BLD: 6.6 % (ref 4.2–5.6)
P-R INTERVAL, ECG05: 160 MS
Q-T INTERVAL, ECG07: 414 MS
QRS DURATION, ECG06: 88 MS
QTC CALCULATION (BEZET), ECG08: 399 MS
TROPONIN I SERPL-MCNC: <0.02 NG/ML (ref 0–0.04)
VENTRICULAR RATE, ECG03: 56 BPM

## 2019-05-24 PROCEDURE — 99218 HC RM OBSERVATION: CPT

## 2019-05-24 PROCEDURE — 93306 TTE W/DOPPLER COMPLETE: CPT

## 2019-05-24 PROCEDURE — 74011636320 HC RX REV CODE- 636/320: Performed by: EMERGENCY MEDICINE

## 2019-05-24 PROCEDURE — 74011636637 HC RX REV CODE- 636/637: Performed by: INTERNAL MEDICINE

## 2019-05-24 PROCEDURE — 71275 CT ANGIOGRAPHY CHEST: CPT

## 2019-05-24 PROCEDURE — 74011250637 HC RX REV CODE- 250/637: Performed by: INTERNAL MEDICINE

## 2019-05-24 PROCEDURE — 36415 COLL VENOUS BLD VENIPUNCTURE: CPT

## 2019-05-24 PROCEDURE — 82962 GLUCOSE BLOOD TEST: CPT

## 2019-05-24 PROCEDURE — 82550 ASSAY OF CK (CPK): CPT

## 2019-05-24 RX ORDER — DIPHENHYDRAMINE HCL 25 MG
25 CAPSULE ORAL
Status: DISCONTINUED | OUTPATIENT
Start: 2019-05-24 | End: 2019-05-26 | Stop reason: HOSPADM

## 2019-05-24 RX ORDER — OXYCODONE AND ACETAMINOPHEN 5; 325 MG/1; MG/1
1 TABLET ORAL
Status: DISCONTINUED | OUTPATIENT
Start: 2019-05-24 | End: 2019-05-26 | Stop reason: HOSPADM

## 2019-05-24 RX ORDER — INSULIN GLARGINE 100 [IU]/ML
42 INJECTION, SOLUTION SUBCUTANEOUS
Status: DISCONTINUED | OUTPATIENT
Start: 2019-05-24 | End: 2019-05-26 | Stop reason: HOSPADM

## 2019-05-24 RX ORDER — SIMVASTATIN 10 MG/1
10 TABLET, FILM COATED ORAL
Status: DISCONTINUED | OUTPATIENT
Start: 2019-05-24 | End: 2019-05-26 | Stop reason: HOSPADM

## 2019-05-24 RX ORDER — ACETAMINOPHEN 325 MG/1
650 TABLET ORAL
Status: DISCONTINUED | OUTPATIENT
Start: 2019-05-24 | End: 2019-05-26 | Stop reason: HOSPADM

## 2019-05-24 RX ORDER — ATENOLOL 50 MG/1
100 TABLET ORAL DAILY
Status: DISCONTINUED | OUTPATIENT
Start: 2019-05-24 | End: 2019-05-26 | Stop reason: HOSPADM

## 2019-05-24 RX ORDER — ALLOPURINOL 100 MG/1
100 TABLET ORAL 2 TIMES DAILY
Status: DISCONTINUED | OUTPATIENT
Start: 2019-05-24 | End: 2019-05-26 | Stop reason: HOSPADM

## 2019-05-24 RX ORDER — NITROGLYCERIN 0.4 MG/1
0.4 TABLET SUBLINGUAL
Status: DISCONTINUED | OUTPATIENT
Start: 2019-05-24 | End: 2019-05-26 | Stop reason: HOSPADM

## 2019-05-24 RX ORDER — INSULIN LISPRO 100 [IU]/ML
INJECTION, SOLUTION INTRAVENOUS; SUBCUTANEOUS
Status: DISCONTINUED | OUTPATIENT
Start: 2019-05-24 | End: 2019-05-26 | Stop reason: HOSPADM

## 2019-05-24 RX ORDER — GUAIFENESIN 100 MG/5ML
81 LIQUID (ML) ORAL DAILY
Status: DISCONTINUED | OUTPATIENT
Start: 2019-05-24 | End: 2019-05-24 | Stop reason: SDUPTHER

## 2019-05-24 RX ORDER — DEXTROSE 50 % IN WATER (D50W) INTRAVENOUS SYRINGE
25-50 AS NEEDED
Status: DISCONTINUED | OUTPATIENT
Start: 2019-05-24 | End: 2019-05-26 | Stop reason: HOSPADM

## 2019-05-24 RX ORDER — AMLODIPINE BESYLATE 5 MG/1
5 TABLET ORAL DAILY
Status: DISCONTINUED | OUTPATIENT
Start: 2019-05-24 | End: 2019-05-26 | Stop reason: HOSPADM

## 2019-05-24 RX ORDER — ASPIRIN 81 MG/1
81 TABLET ORAL DAILY
Status: DISCONTINUED | OUTPATIENT
Start: 2019-05-24 | End: 2019-05-26 | Stop reason: HOSPADM

## 2019-05-24 RX ORDER — ONDANSETRON 4 MG/1
8 TABLET, FILM COATED ORAL
Status: DISCONTINUED | OUTPATIENT
Start: 2019-05-24 | End: 2019-05-26 | Stop reason: HOSPADM

## 2019-05-24 RX ORDER — TRAZODONE HYDROCHLORIDE 50 MG/1
50 TABLET ORAL
Status: DISCONTINUED | OUTPATIENT
Start: 2019-05-24 | End: 2019-05-26 | Stop reason: HOSPADM

## 2019-05-24 RX ORDER — ACYCLOVIR 200 MG/1
400 CAPSULE ORAL 3 TIMES DAILY
Status: DISCONTINUED | OUTPATIENT
Start: 2019-05-24 | End: 2019-05-26 | Stop reason: HOSPADM

## 2019-05-24 RX ORDER — AMLODIPINE BESYLATE 5 MG/1
TABLET ORAL
Status: DISPENSED
Start: 2019-05-24 | End: 2019-05-24

## 2019-05-24 RX ORDER — HYDRALAZINE HYDROCHLORIDE 20 MG/ML
10 INJECTION INTRAMUSCULAR; INTRAVENOUS
Status: DISCONTINUED | OUTPATIENT
Start: 2019-05-24 | End: 2019-05-26 | Stop reason: HOSPADM

## 2019-05-24 RX ORDER — MAGNESIUM SULFATE 100 %
4 CRYSTALS MISCELLANEOUS AS NEEDED
Status: DISCONTINUED | OUTPATIENT
Start: 2019-05-24 | End: 2019-05-26 | Stop reason: HOSPADM

## 2019-05-24 RX ORDER — GABAPENTIN 300 MG/1
300 CAPSULE ORAL 3 TIMES DAILY
Status: DISCONTINUED | OUTPATIENT
Start: 2019-05-24 | End: 2019-05-26 | Stop reason: HOSPADM

## 2019-05-24 RX ADMIN — RIVAROXABAN 15 MG: 15 TABLET, FILM COATED ORAL at 09:44

## 2019-05-24 RX ADMIN — AMLODIPINE BESYLATE 5 MG: 5 TABLET ORAL at 02:17

## 2019-05-24 RX ADMIN — DIPHENHYDRAMINE HYDROCHLORIDE 25 MG: 25 CAPSULE ORAL at 09:43

## 2019-05-24 RX ADMIN — OXYCODONE HYDROCHLORIDE AND ACETAMINOPHEN 1 TABLET: 5; 325 TABLET ORAL at 02:57

## 2019-05-24 RX ADMIN — ACYCLOVIR 400 MG: 200 CAPSULE ORAL at 16:50

## 2019-05-24 RX ADMIN — ACYCLOVIR 400 MG: 200 CAPSULE ORAL at 02:17

## 2019-05-24 RX ADMIN — ALLOPURINOL 100 MG: 100 TABLET ORAL at 09:43

## 2019-05-24 RX ADMIN — IOPAMIDOL 100 ML: 755 INJECTION, SOLUTION INTRAVENOUS at 01:01

## 2019-05-24 RX ADMIN — ASPIRIN 81 MG: 81 TABLET, COATED ORAL at 09:44

## 2019-05-24 RX ADMIN — GABAPENTIN 300 MG: 300 CAPSULE ORAL at 21:58

## 2019-05-24 RX ADMIN — NITROGLYCERIN 0.4 MG: 0.4 TABLET, ORALLY DISINTEGRATING SUBLINGUAL at 06:51

## 2019-05-24 RX ADMIN — INSULIN GLARGINE 42 UNITS: 100 INJECTION, SOLUTION SUBCUTANEOUS at 21:58

## 2019-05-24 RX ADMIN — ATENOLOL 100 MG: 50 TABLET ORAL at 09:43

## 2019-05-24 RX ADMIN — DIPHENHYDRAMINE HYDROCHLORIDE 25 MG: 25 CAPSULE ORAL at 02:57

## 2019-05-24 RX ADMIN — DIPHENHYDRAMINE HYDROCHLORIDE 25 MG: 25 CAPSULE ORAL at 17:08

## 2019-05-24 RX ADMIN — ALLOPURINOL 100 MG: 100 TABLET ORAL at 21:58

## 2019-05-24 RX ADMIN — AMLODIPINE BESYLATE 5 MG: 5 TABLET ORAL at 09:45

## 2019-05-24 RX ADMIN — ACYCLOVIR 400 MG: 200 CAPSULE ORAL at 09:43

## 2019-05-24 RX ADMIN — OXYCODONE HYDROCHLORIDE AND ACETAMINOPHEN 1 TABLET: 5; 325 TABLET ORAL at 06:50

## 2019-05-24 RX ADMIN — SIMVASTATIN 10 MG: 10 TABLET, FILM COATED ORAL at 21:58

## 2019-05-24 RX ADMIN — INSULIN LISPRO 2 UNITS: 100 INJECTION, SOLUTION INTRAVENOUS; SUBCUTANEOUS at 21:58

## 2019-05-24 RX ADMIN — ACYCLOVIR 400 MG: 200 CAPSULE ORAL at 21:58

## 2019-05-24 NOTE — CONSULTS
Cardiolology  Inpatient Consult      Patient: Hugh Roberts               Sex: female          DOA: 5/23/2019       YOB: 1945      Age:  72 y.o.        LOS:  LOS: 0 days      Hugh Roberts is a 72 y.o. female admitted for Acute chest pain [R07.9]  Diabetes ([facility] [address] [E11.9]     Recommendations:  · Serial markers  · Echo  · Follow    Impression:  · Chest pain  · Hx of MI  · DM  · Gout  · Obesity  · Othr problems as enumerated below    Patient Active Problem List    Diagnosis Date Noted    Diabetes ([facility] [address] 05/24/2019    Acute chest pain 05/24/2019    Vomiting 12/15/2018    Pyelonephritis 12/15/2018    Intractable vomiting with nausea 12/15/2018    CAD (coronary artery disease) 12/03/2018    Hypertension 12/03/2018    Insulin dependent diabetes mellitus with complications ([facility] [address] 48/31/3795    Gastrointestinal disorder 12/03/2018    Acute DVT of left tibial vein ([facility] [address] 12/03/2018    Morbid obesity with BMI of 40.0-44.9, adult ([facility] [address] 12/03/2018    Gout 12/03/2018      Macho Zuñiga MD  Past Medical History:   Diagnosis Date    CAD (coronary artery disease)     Diabetes ([facility] [address] [facility]     Gastrointestinal disorder     Gout     Hypertension     MI (myocardial infarction) ([facility] [address] [facility]     Reflux       Past Surgical History:   Procedure Laterality Date    CARDIAC SURG PROCEDURE UNLIST      VASCULAR SURGERY PROCEDURE UNLIST       Allergies   Allergen Reactions    Lisinopril Swelling    Pcn [Penicillins] Angioedema      History reviewed. No pertinent family history.    Current Facility-Administered Medications   Medication Dose Route Frequency    nitroglycerin (NITROSTAT) tablet 0.4 mg  0.4 mg SubLINGual Q5MIN PRN    acetaminophen (TYLENOL) tablet 650 mg  650 mg Oral Q4H PRN    oxyCODONE-acetaminophen (PERCOCET) 5-325 mg per tablet 1 Tab  1 Tab Oral Q4H PRN    allopurinol (ZYLOPRIM) tablet 100 mg  100 mg Oral BID    aspirin delayed-release tablet 81 mg  81 mg Oral DAILY    atenolol (TENORMIN) tablet 100 mg  100 mg Oral DAILY    gabapentin (NEURONTIN) capsule 300 mg  300 mg Oral TID    insulin glargine (LANTUS) injection 42 Units  42 Units SubCUTAneous QHS    ondansetron hcl (ZOFRAN) tablet 8 mg  8 mg Oral Q8H PRN    rivaroxaban (XARELTO) tablet 15 mg  15 mg Oral DAILY WITH BREAKFAST    oxyCODONE-acetaminophen (PERCOCET) 5-325 mg per tablet 1 Tab  1 Tab Oral Q4H PRN    simvastatin (ZOCOR) tablet 10 mg  10 mg Oral QHS    traZODone (DESYREL) tablet 50 mg  50 mg Oral QHS PRN    amLODIPine (NORVASC) tablet 5 mg  5 mg Oral DAILY    insulin lispro (HUMALOG) injection   SubCUTAneous AC&HS    glucose chewable tablet 16 g  4 Tab Oral PRN    glucagon (GLUCAGEN) injection 1 mg  1 mg IntraMUSCular PRN    dextrose (D50W) injection syrg 12.5-25 g  25-50 mL IntraVENous PRN    acyclovir (ZOVIRAX) capsule 400 mg  400 mg Oral TID    diphenhydrAMINE (BENADRYL) capsule 25 mg  25 mg Oral Q6H PRN    hydrALAZINE (APRESOLINE) 20 mg/mL injection 10 mg  10 mg IntraVENous Q6H PRN         Review of Symptoms:  Review of Systems  GENERAL: Patient alert, awake and oriented times 3, able to communicate full sentences and not in distress. HEENT: No change in vision, no earache, tinnitus, sore throat or sinus congestion. NECK: No pain or stiffness. PULMONARY: + shortness of breath, cough or wheeze. Cardiovascular: no pnd or orthopnea, + CP  GASTROINTESTINAL: No abdominal pain, nausea, vomiting or diarrhea, melena or bright red blood per rectum. GENITOURINARY: No urinary frequency, urgency, hesitancy or dysuria. MUSCULOSKELETAL: +joint or muscle pain, no back pain, no recent trauma. DERMATOLOGIC: No rash, no itching, no lesions. ENDOCRINE: No polyuria, polydipsia, no heat or cold intolerance. No recent change in weight. HEMATOLOGICAL: No anemia or easy bruising or bleeding. NEUROLOGIC: No headache, seizures, numbness, tingling or weakness. Subjective:  Traci Morales is a 72 y.o.   female who has hx of CAD cath  circumflex 100 percent stenosis, DM HTN  DVT presents to ER with intermittent chest pain and pressure associated with dyspnea progressive over the last 2 days. Her brother whom she was very close too  few weeks ago and has been stressed; her chest pain feels similar to her MI back in . Has hx of DVT and has been compliant with her Xarelto. Denies worsening lower leg edema or recent long trips. In ER given aspirin nitro with some relief but found to be hypertensive and withbradycardia. On atenolol. Had negative Nuc stress test in 2017. .  Cardiac risk factors: extant. Physical Exam    Visit Vitals  /90   Pulse (!) 59   Temp 98.6 °F (37 °C)   Resp 16   Ht 5' 4\" (1.626 m)   Wt 118.8 kg (262 lb)   SpO2 100%   BMI 44.97 kg/m²     General:  Alert, cooperative, no distress, appears stated age. Head: Normocephalic, without obvious abnormality, atraumatic. Eyes:  Conjunctivae/corneas clear. PERRL, EOMs intact. Nose: Nares normal. No drainage or sinus tenderness. Neck: Supple, symmetrical, trachea midline, no adenopathy, thyroid: no enlargement, no carotid bruit and no JVD. Lungs:   Clear to auscultation bilaterally. Heart:  Regular rate and rhythm, S1, S2 normal.     Abdomen: Soft, non-tender. Bowel sounds normal.    Extremities: Extremities normal, atraumatic, no cyanosis or edema. Pulses: 2+ and symmetric all extremities. Skin:  No rashes or lesions   Neurologic: AAOx3, No focal motor or sensory deficit.                                          Cardiographics    Telemetry: normal sinus rhythm  ECG: No acute change  Echocardiogram: pending    Recent radiology, intake/output and wt reviewed    Labs:   Recent Results (from the past 48 hour(s))   EKG, 12 LEAD, INITIAL    Collection Time: 19  6:41 PM   Result Value Ref Range    Ventricular Rate 56 BPM    Atrial Rate 56 BPM    P-R Interval 160 ms    QRS Duration 88 ms    Q-T Interval 414 ms    QTC Calculation (Bezet) 399 ms    Calculated P Axis 71 degrees    Calculated R Axis 9 degrees    Calculated T Axis -4 degrees    Diagnosis       Sinus bradycardia  Otherwise normal ECG  When compared with ECG of 15-DEC-2018 19:29,  premature supraventricular complexes are no longer present  Vent. rate has decreased BY  38 BPM  Inverted T waves have replaced nonspecific T wave abnormality in Inferior   leads  Nonspecific T wave abnormality, worse in Lateral leads  Confirmed by Celina More MD, -- ([id number]) on 5/24/2019 11:12:39 AM     CBC WITH AUTOMATED DIFF    Collection Time: 05/23/19  6:50 PM   Result Value Ref Range    WBC 5.6 4.6 - 13.2 K/uL    RBC 4.58 4.20 - 5.30 M/uL    HGB 13.0 12.0 - 16.0 g/dL    HCT 39.6 35.0 - 45.0 %    MCV 86.5 74.0 - 97.0 FL    MCH 28.4 24.0 - 34.0 PG    MCHC 32.8 31.0 - 37.0 g/dL    RDW 13.1 11.6 - 14.5 %    PLATELET 824 316 - 169 K/uL    MPV 9.8 9.2 - 11.8 FL    NEUTROPHILS 53 40 - 73 %    LYMPHOCYTES 37 21 - 52 %    MONOCYTES 8 3 - 10 %    EOSINOPHILS 2 0 - 5 %    BASOPHILS 0 0 - 2 %    ABS. NEUTROPHILS 3.0 1.8 - 8.0 K/UL    ABS. LYMPHOCYTES 2.1 0.9 - 3.6 K/UL    ABS. MONOCYTES 0.4 0.05 - 1.2 K/UL    ABS. EOSINOPHILS 0.1 0.0 - 0.4 K/UL    ABS. BASOPHILS 0.0 0.0 - 0.1 K/UL    DF AUTOMATED     METABOLIC PANEL, COMPREHENSIVE    Collection Time: 05/23/19  6:50 PM   Result Value Ref Range    Sodium 137 136 - 145 mmol/L    Potassium 4.2 3.5 - 5.5 mmol/L    Chloride 103 100 - 108 mmol/L    CO2 29 21 - 32 mmol/L    Anion gap 5 3.0 - 18 mmol/L    Glucose 113 (H) 74 - 99 mg/dL    BUN 19 (H) 7.0 - 18 MG/DL    Creatinine 0.89 0.6 - 1.3 MG/DL    BUN/Creatinine ratio 21 (H) 12 - 20      GFR est AA >60 >60 ml/min/1.73m2    GFR est non-AA >60 >60 ml/min/1.73m2    Calcium 8.8 8.5 - 10.1 MG/DL    Bilirubin, total 0.6 0.2 - 1.0 MG/DL    ALT (SGPT) 21 13 - 56 U/L    AST (SGOT) 27 15 - 37 U/L    Alk.  phosphatase 84 45 - 117 U/L    Protein, total 7.7 6.4 - 8.2 g/dL    Albumin 3.0 (L) 3.4 - 5.0 g/dL Globulin 4.7 (H) 2.0 - 4.0 g/dL    A-G Ratio 0.6 (L) 0.8 - 1.7     LIPASE    Collection Time: 05/23/19  6:50 PM   Result Value Ref Range    Lipase 36 (L) 73 - 393 U/L   MAGNESIUM    Collection Time: 05/23/19  6:50 PM   Result Value Ref Range    Magnesium 1.9 1.6 - 2.6 mg/dL   CARDIAC PANEL,(CK, CKMB & TROPONIN)    Collection Time: 05/23/19  6:50 PM   Result Value Ref Range     (H) 26 - 192 U/L    CK - MB 6.5 (H) <3.6 ng/ml    CK-MB Index 1.7 0.0 - 4.0 %    Troponin-I, QT <0.02 0.0 - 0.045 NG/ML   HEMOGLOBIN A1C WITH EAG    Collection Time: 05/23/19  6:50 PM   Result Value Ref Range    Hemoglobin A1c 6.6 (H) 4.2 - 5.6 %    Est. average glucose 143 mg/dL   CARDIAC PANEL,(CK, CKMB & TROPONIN)    Collection Time: 05/24/19  4:56 AM   Result Value Ref Range     (H) 26 - 192 U/L    CK - MB 5.5 (H) <3.6 ng/ml    CK-MB Index 1.6 0.0 - 4.0 %    Troponin-I, QT <0.02 0.0 - 0.045 NG/ML   GLUCOSE, POC    Collection Time: 05/24/19  6:22 AM   Result Value Ref Range    Glucose (POC) 95 70 - 110 mg/dL   ECHO ADULT COMPLETE    Collection Time: 05/24/19  9:53 AM   Result Value Ref Range    LA Volume 75.97 22 - 52 mL    Right Atrial Area 4C 15.85 cm2    Ao Root D 2.74 cm    AO ASC D 3.20 cm    Aortic Valve Systolic Peak Velocity 413.94 cm/s    AoV VTI 25.90 cm    Aortic Valve Area by Continuity of Peak Velocity 2.1 cm2    Aortic Valve Area by Continuity of VTI 2.1 cm2    AoV PG 5.4 mmHg    LVIDd 5.16 3.9 - 5.3 cm    LVPWd 1.20 (A) 0.6 - 0.9 cm    LVIDs 3.88 cm    IVSd 1.31 (A) 0.6 - 0.9 cm    LV ED Vol A2C 47.8 mL    LV ES Vol A4C 37.8 mL    LV ES Vol BP 25.8 19 - 49 mL    LVOT d 1.96 cm    LVOT Peak Velocity 80.24 cm/s    LVOT Peak Gradient 2.6 mmHg    LVOT VTI 18.26 cm    LV E' Septal Velocity 5.00 cm/s    LV E' Lateral Velocity 8.00 cm/s    MVA (PHT) 2.3 cm2    MV A Nikhil 77.21 cm/s    MV E Nikhil 93.93 cm/s    MV E/A 1.20     RVIDd 4.06 cm    Aortic Valve Systolic Mean Gradient 2.9 mmHg    BP EF 62.3 55 - 100 %    LV Ejection Fraction MOD 4C 61 %    LV Ejection Fraction MOD 2C 67 %    LA Vol 4C 87.56 (A) 22 - 52 mL    LA Vol 2C 66.16 (A) 22 - 52 mL    LV Mass .6 (A) 67 - 162 g    LV Mass AL Index 112.6 (A) 43 - 95 g/m2    E/E' lateral 11.74     E/E' septal 18.79     TAPSV 2.2 cm/s    E/E' ratio (averaged) 15.26     LV ES Vol A2C 15.9 mL    LVES Vol Index BP 11.8 mL/m2    LV ED Vol A4C 95.8 mL    LVED Vol Index BP 31.2 mL/m2    Mitral Valve E-point Septal Separation 9.7 cm    Mitral Valve E Wave Deceleration Time 326.9 ms    Mitral Valve Pressure Half-time 94.8 ms    Left Atrium Major Axis 4.88 cm    LV ED Vol BP 68.4 56 - 104 ml    LA Vol Index 34.63 16 - 28 ml/m2    LA Vol Index 30.16 16 - 28 ml/m2    LA Vol Index 39.91 16 - 28 ml/m2    LVED Vol Index A4C 43.7 mL/m2    LVED Vol Index A2C 21.8 mL/m2    LVES Vol Index A4C 17.2 mL/m2    LVES Vol Index A2C 7.2 mL/m2    TIN/BSA Pk Nikhil 0.9 cm2/m2    TIN/BSA VTI 0.9 cm2/m2   CARDIAC PANEL,(CK, CKMB & TROPONIN)    Collection Time: 05/24/19 11:00 AM   Result Value Ref Range     (H) 26 - 192 U/L    CK - MB 5.5 (H) <3.6 ng/ml    CK-MB Index 1.7 0.0 - 4.0 %    Troponin-I, QT <0.02 0.0 - 0.045 NG/ML   GLUCOSE, POC    Collection Time: 05/24/19 12:10 PM   Result Value Ref Range    Glucose (POC) 132 (H) 70 - 110 mg/dL           Eloise Sidhu MD

## 2019-05-24 NOTE — PROGRESS NOTES
Hospitalist Progress Note    Patient: Penelope Navarrete MRN: [id number]  [facility]: [id number]    YOB: 1945  Age: 72 y.o. Sex: female    DOA: 5/23/2019 LOS:  LOS: 0 days                Assessment/Plan     Patient Active Problem List   Diagnosis Code    CAD (coronary artery disease) I25.10    Hypertension I10    Insulin dependent diabetes mellitus with complications ([facility] Z39.3, Z79.4    Gastrointestinal disorder K92.9    Acute DVT of left tibial vein ([facility] I82.442    Morbid obesity with BMI of 40.0-44.9, adult ([facility] E66.01, Z68.41    Gout M10.9    Vomiting R11.10    Pyelonephritis N12    Intractable vomiting with nausea R11.2    Diabetes ([facility] E11.9    Acute chest pain R07.9            71 yo female with history of CAD, MI, DM, HTN, DVT admitted for chest pain. No chest pain this morning. Chest pain - cardiac enzymes in normal range. Cardiology consulted, further testing per cardiology. Continue with aspirin and betablocker. DM - continue on lantus, SSI    HTN -  added norvasc to her home regime. Hydralazine prn. History of DVT - continue with xarelto. Gout - continue allopurinol. Continue acyclovir for oral ulcer. Disposition : 1-2 days    Review of systems  General: No fevers or chills. Cardiovascular: see above. Pulmonary: No shortness of breath. Gastrointestinal: No nausea, vomiting. Physical Exam:  General: Awake, cooperative, no acute distress    HEENT: NC, Atraumatic. PERRLA, anicteric sclerae. Lungs: CTA Bilaterally. No Wheezing/Rhonchi/Rales. Heart:  S1 S2,  No murmur, No Rubs, No Gallops  Abdomen: Soft, Non distended, Non tender.  +Bowel sounds,   Extremities: No c/c/e  Psych:   Not anxious or agitated. Neurologic:  No acute neurological deficit.            Vital signs/Intake and Output:  Visit Vitals  /69   Pulse (!) 51   Temp 98.2 °F (36.8 °C)   Resp 16   Ht 5' 4\" (1.626 m)   Wt 118.8 kg (262 lb)   SpO2 100%   BMI 44.97 kg/m² Current Shift:  No intake/output data recorded. Last three shifts:  No intake/output data recorded. Labs: Results:       Chemistry Recent Labs     05/23/19  1850 05/21/19  2240   * 105*    140   K 4.2 3.9    105   CO2 29 29   BUN 19* 14   CREA 0.89 0.81   CA 8.8 8.9   AGAP 5 6   BUCR 21* 17   AP 84 94   TP 7.7 7.8   ALB 3.0* 3.2*   GLOB 4.7* 4.6*   AGRAT 0.6* 0.7*      CBC w/Diff Recent Labs     05/23/19  1850 05/21/19  2240   WBC 5.6 6.3   RBC 4.58 4.46   HGB 13.0 12.8   HCT 39.6 39.0    258   GRANS 53 62   LYMPH 37 29   EOS 2 1      Cardiac Enzymes Recent Labs     05/24/19  0456 05/23/19  1850   * 392*   CKND1 1.6 1.7      Coagulation No results for input(s): PTP, INR, APTT in the last 72 hours. No lab exists for component: INREXT    Lipid Panel No results found for: CHOL, CHOLPOCT, CHOLX, CHLST, CHOLV, 165536, HDL, LDL, LDLC, DLDLP, 165470, VLDLC, VLDL, TGLX, TRIGL, TRIGP, TGLPOCT, CHHD, CHHDX   BNP No results for input(s): BNPP in the last 72 hours.    Liver Enzymes Recent Labs     05/23/19  1850   TP 7.7   ALB 3.0*   AP 84   SGOT 27      Thyroid Studies No results found for: T4, T3U, TSH, TSHEXT     Procedures/imaging: see electronic medical records for all procedures/Xrays and details which were not copied into this note but were reviewed prior to creation of Plan

## 2019-05-24 NOTE — H&P
History & Physical    Patient: Shamika Candelario MRN: [id number]  CSN: [id number]    YOB: 1945  Age: 72 y.o. Sex: female      DOA: 2019    Chief Complaint:   Chief Complaint   Patient presents with    Chest Pain          HPI:     Shamika Candelario is a 72 y.o.  female who has hx of CAD cath  circumflex 100 percent stenosis, DM HTN  DVT presents to ER with intermittent chest pain and pressure associated with dyspnea progressive over the last 2 days  Her brother whom she was very close too  few weeks ago and has been stressed; her chest pain feels similar to her MI back in   Has hx of DVT and has been compliant with her Xarelto  Denies worsening lower leg edema or recent long trips  In ER given aspirin nitro with some relief but found to be hypertensive and bradycardia   On regimen of Atenolol       Past Medical History:   Diagnosis Date    CAD (coronary artery disease)     Diabetes ([facility] [address]     Gastrointestinal disorder     Gout     Hypertension     MI (myocardial infarction) ([facility] [address]     Reflux        Past Surgical History:   Procedure Laterality Date    CARDIAC SURG PROCEDURE UNLIST      VASCULAR SURGERY PROCEDURE UNLIST         History reviewed. No pertinent family history. Social History     Socioeconomic History    Marital status:      Spouse name: Not on file    Number of children: Not on file    Years of education: Not on file    Highest education level: Not on file   Tobacco Use    Smoking status: Never Smoker    Smokeless tobacco: Never Used   Substance and Sexual Activity    Alcohol use: No    Drug use: No       Prior to Admission medications    Medication Sig Start Date End Date Taking? Authorizing Provider   oxyCODONE-acetaminophen (PERCOCET) 5-325 mg per tablet Take 1 Tab by mouth every four (4) hours as needed for Pain for up to 7 days. Max Daily Amount: 6 Tabs.  19  Samantha Evangelista MD   diclofenac (VOLTAREN) 1 % gel Apply 4 g to affected area four (4) times daily. 5/22/19   Eric Boykin MD   ondansetron hcl [facility]) 8 mg tablet Take 1 Tab by mouth every eight (8) hours as needed for Nausea. 12/18/18   Lucinda Manzo MD   rivaroxaban (XARELTO) 15 mg (42)- 20 mg (9) DsPk Take one 15 mg tablet twice a day with food for the first 21 days. Then, take one 20 mg tablet once a day with food for 9 days. 12/6/18   Calderon Gibson MD   aspirin delayed-release 81 mg tablet Take 1 Tab by mouth daily. 12/6/18   Calderon Gibson MD   atenolol (TENORMIN) 100 mg tablet Take 100 mg by mouth daily. Provider, Historical   traZODone (DESYREL) 50 mg tablet Take 50 mg by mouth nightly as needed for Sleep. Provider, Historical   simvastatin (ZOCOR) 10 mg tablet Take 10 mg by mouth nightly. Provider, Historical   metFORMIN (GLUCOPHAGE) 500 mg tablet Take 500 mg by mouth two (2) times daily (with meals). Provider, Historical   multivitamin, tx-iron-ca-min (THERA-M W/ IRON) 9 mg iron-400 mcg tab tablet Take 1 Tab by mouth daily. Formulary substitution for DAILY MULTIVITAMIN    Provider, Historical   ergocalciferol (VITAMIN D2) 50,000 unit capsule Take 50,000 Units by mouth every Monday. Provider, Historical   insulin glargine (LANTUS U-100 INSULIN) 100 unit/mL injection 42 Units by SubCUTAneous route nightly. Provider, Historical   allopurinol (ZYLOPRIM) 100 mg tablet Take 100 mg by mouth two (2) times a day. Provider, Historical   gabapentin (NEURONTIN) 300 mg capsule Take 300 mg by mouth three (3) times daily. Provider, Historical   Difluprednate (DUREZOL) 0.05 % ophthalmic emulsion Administer 1 Drop to right eye four (4) times daily. In relation to cataract surgery    Provider, Historical       Allergies   Allergen Reactions    Lisinopril Swelling    Pcn [Penicillins] Angioedema         Review of Systems  GENERAL: Patient alert, awake and oriented times 3, able to communicate full sentences and not in distress.    HEENT: No change in vision, no earache, tinnitus, sore throat or sinus congestion. NECK: No pain or stiffness. PULMONARY: + shortness of breath, cough or wheeze. Cardiovascular: no pnd or orthopnea, + CP  GASTROINTESTINAL: No abdominal pain, nausea, vomiting or diarrhea, melena or bright red blood per rectum. GENITOURINARY: No urinary frequency, urgency, hesitancy or dysuria. MUSCULOSKELETAL: +joint or muscle pain, no back pain, no recent trauma. DERMATOLOGIC: No rash, no itching, no lesions. ENDOCRINE: No polyuria, polydipsia, no heat or cold intolerance. No recent change in weight. HEMATOLOGICAL: No anemia or easy bruising or bleeding. NEUROLOGIC: No headache, seizures, numbness, tingling or weakness. Physical Exam:     Physical Exam:  Visit Vitals  /71 (BP 1 Location: Left arm, BP Patient Position: At rest)   Pulse (!) 53   Temp 98.4 °F (36.9 °C)   Resp 16   Ht 5' 4\" (1.626 m)   Wt 118.8 kg (262 lb)   SpO2 100%   BMI 44.97 kg/m²      O2 Device: Room air    Temp (24hrs), Av.5 °F (36.9 °C), Min:98.4 °F (36.9 °C), Max:98.5 °F (36.9 °C)    No intake/output data recorded. No intake/output data recorded. General:  Alert, cooperative, no distress, appears stated age. Head: Normocephalic, without obvious abnormality, atraumatic. Eyes:  Conjunctivae/corneas clear. PERRL, EOMs intact. Nose: Nares normal. No drainage or sinus tenderness. Neck: Supple, symmetrical, trachea midline, no adenopathy, thyroid: no enlargement, no carotid bruit and no JVD. Lungs:   Clear to auscultation bilaterally. Heart:  Regular rate and rhythm, S1, S2 normal.     Abdomen: Soft, non-tender. Bowel sounds normal.    Extremities: Extremities normal, atraumatic, no cyanosis or edema. Pulses: 2+ and symmetric all extremities. Skin:  No rashes or lesions   Neurologic: AAOx3, No focal motor or sensory deficit. Labs Reviewed: All lab results for the last 24 hours reviewed.   Recent Results (from the past 8 hour(s))   EKG, 12 LEAD, INITIAL    Collection Time: 05/23/19  6:41 PM   Result Value Ref Range    Ventricular Rate 56 BPM    Atrial Rate 56 BPM    P-R Interval 160 ms    QRS Duration 88 ms    Q-T Interval 414 ms    QTC Calculation (Bezet) 399 ms    Calculated P Axis 71 degrees    Calculated R Axis 9 degrees    Calculated T Axis -4 degrees    Diagnosis       Sinus bradycardia  Otherwise normal ECG  When compared with ECG of 15-DEC-2018 19:29,  premature supraventricular complexes are no longer present  Vent. rate has decreased BY  38 BPM  Inverted T waves have replaced nonspecific T wave abnormality in Inferior   leads  Nonspecific T wave abnormality, worse in Lateral leads     CBC WITH AUTOMATED DIFF    Collection Time: 05/23/19  6:50 PM   Result Value Ref Range    WBC 5.6 4.6 - 13.2 K/uL    RBC 4.58 4.20 - 5.30 M/uL    HGB 13.0 12.0 - 16.0 g/dL    HCT 39.6 35.0 - 45.0 %    MCV 86.5 74.0 - 97.0 FL    MCH 28.4 24.0 - 34.0 PG    MCHC 32.8 31.0 - 37.0 g/dL    RDW 13.1 11.6 - 14.5 %    PLATELET 078 695 - 201 K/uL    MPV 9.8 9.2 - 11.8 FL    NEUTROPHILS 53 40 - 73 %    LYMPHOCYTES 37 21 - 52 %    MONOCYTES 8 3 - 10 %    EOSINOPHILS 2 0 - 5 %    BASOPHILS 0 0 - 2 %    ABS. NEUTROPHILS 3.0 1.8 - 8.0 K/UL    ABS. LYMPHOCYTES 2.1 0.9 - 3.6 K/UL    ABS. MONOCYTES 0.4 0.05 - 1.2 K/UL    ABS. EOSINOPHILS 0.1 0.0 - 0.4 K/UL    ABS.  BASOPHILS 0.0 0.0 - 0.1 K/UL    DF AUTOMATED     METABOLIC PANEL, COMPREHENSIVE    Collection Time: 05/23/19  6:50 PM   Result Value Ref Range    Sodium 137 136 - 145 mmol/L    Potassium 4.2 3.5 - 5.5 mmol/L    Chloride 103 100 - 108 mmol/L    CO2 29 21 - 32 mmol/L    Anion gap 5 3.0 - 18 mmol/L    Glucose 113 (H) 74 - 99 mg/dL    BUN 19 (H) 7.0 - 18 MG/DL    Creatinine 0.89 0.6 - 1.3 MG/DL    BUN/Creatinine ratio 21 (H) 12 - 20      GFR est AA >60 >60 ml/min/1.73m2    GFR est non-AA >60 >60 ml/min/1.73m2    Calcium 8.8 8.5 - 10.1 MG/DL    Bilirubin, total 0.6 0.2 - 1.0 MG/DL    ALT (SGPT) 21 13 - 56 U/L    AST (SGOT) 27 15 - 37 U/L    Alk. phosphatase 84 45 - 117 U/L    Protein, total 7.7 6.4 - 8.2 g/dL    Albumin 3.0 (L) 3.4 - 5.0 g/dL    Globulin 4.7 (H) 2.0 - 4.0 g/dL    A-G Ratio 0.6 (L) 0.8 - 1.7     LIPASE    Collection Time: 05/23/19  6:50 PM   Result Value Ref Range    Lipase 36 (L) 73 - 393 U/L   MAGNESIUM    Collection Time: 05/23/19  6:50 PM   Result Value Ref Range    Magnesium 1.9 1.6 - 2.6 mg/dL   CARDIAC PANEL,(CK, CKMB & TROPONIN)    Collection Time: 05/23/19  6:50 PM   Result Value Ref Range     (H) 26 - 192 U/L    CK - MB 6.5 (H) <3.6 ng/ml    CK-MB Index 1.7 0.0 - 4.0 %    Troponin-I, QT <0.02 0.0 - 0.045 NG/ML    and EKG    Procedures/imaging: see electronic medical records for all procedures/Xrays and details which were not copied into this note but were reviewed prior to creation of Plan  Recent Results (from the past 24 hour(s))   EKG, 12 LEAD, INITIAL    Collection Time: 05/23/19  6:41 PM   Result Value Ref Range    Ventricular Rate 56 BPM    Atrial Rate 56 BPM    P-R Interval 160 ms    QRS Duration 88 ms    Q-T Interval 414 ms    QTC Calculation (Bezet) 399 ms    Calculated P Axis 71 degrees    Calculated R Axis 9 degrees    Calculated T Axis -4 degrees    Diagnosis       Sinus bradycardia  Otherwise normal ECG  When compared with ECG of 15-DEC-2018 19:29,  premature supraventricular complexes are no longer present  Vent.  rate has decreased BY  38 BPM  Inverted T waves have replaced nonspecific T wave abnormality in Inferior   leads  Nonspecific T wave abnormality, worse in Lateral leads     CBC WITH AUTOMATED DIFF    Collection Time: 05/23/19  6:50 PM   Result Value Ref Range    WBC 5.6 4.6 - 13.2 K/uL    RBC 4.58 4.20 - 5.30 M/uL    HGB 13.0 12.0 - 16.0 g/dL    HCT 39.6 35.0 - 45.0 %    MCV 86.5 74.0 - 97.0 FL    MCH 28.4 24.0 - 34.0 PG    MCHC 32.8 31.0 - 37.0 g/dL    RDW 13.1 11.6 - 14.5 %    PLATELET 032 045 - 359 K/uL    MPV 9.8 9.2 - 11.8 FL    NEUTROPHILS 53 40 - 73 %    LYMPHOCYTES 37 21 - 52 %    MONOCYTES 8 3 - 10 %    EOSINOPHILS 2 0 - 5 %    BASOPHILS 0 0 - 2 %    ABS. NEUTROPHILS 3.0 1.8 - 8.0 K/UL    ABS. LYMPHOCYTES 2.1 0.9 - 3.6 K/UL    ABS. MONOCYTES 0.4 0.05 - 1.2 K/UL    ABS. EOSINOPHILS 0.1 0.0 - 0.4 K/UL    ABS. BASOPHILS 0.0 0.0 - 0.1 K/UL    DF AUTOMATED     METABOLIC PANEL, COMPREHENSIVE    Collection Time: 05/23/19  6:50 PM   Result Value Ref Range    Sodium 137 136 - 145 mmol/L    Potassium 4.2 3.5 - 5.5 mmol/L    Chloride 103 100 - 108 mmol/L    CO2 29 21 - 32 mmol/L    Anion gap 5 3.0 - 18 mmol/L    Glucose 113 (H) 74 - 99 mg/dL    BUN 19 (H) 7.0 - 18 MG/DL    Creatinine 0.89 0.6 - 1.3 MG/DL    BUN/Creatinine ratio 21 (H) 12 - 20      GFR est AA >60 >60 ml/min/1.73m2    GFR est non-AA >60 >60 ml/min/1.73m2    Calcium 8.8 8.5 - 10.1 MG/DL    Bilirubin, total 0.6 0.2 - 1.0 MG/DL    ALT (SGPT) 21 13 - 56 U/L    AST (SGOT) 27 15 - 37 U/L    Alk. phosphatase 84 45 - 117 U/L    Protein, total 7.7 6.4 - 8.2 g/dL    Albumin 3.0 (L) 3.4 - 5.0 g/dL    Globulin 4.7 (H) 2.0 - 4.0 g/dL    A-G Ratio 0.6 (L) 0.8 - 1.7     LIPASE    Collection Time: 05/23/19  6:50 PM   Result Value Ref Range    Lipase 36 (L) 73 - 393 U/L   MAGNESIUM    Collection Time: 05/23/19  6:50 PM   Result Value Ref Range    Magnesium 1.9 1.6 - 2.6 mg/dL   CARDIAC PANEL,(CK, CKMB & TROPONIN)    Collection Time: 05/23/19  6:50 PM   Result Value Ref Range     (H) 26 - 192 U/L    CK - MB 6.5 (H) <3.6 ng/ml    CK-MB Index 1.7 0.0 - 4.0 %    Troponin-I, QT <0.02 0.0 - 0.045 NG/ML     Assessment/Plan     1. Acute Chest pain   Hx of CAD negative stress test in 12/2018  Will obtain repeat echo  Obtain Cardio consult for possible catherization   Check CT to exclude PE   Obtaining cardiac enzymes  Aspirin/ statin/ betablocker    2. DM  Sliding scale insulin   Lantus   Hold metformin     3. Hypertension  not ideal controlled  Add amlodipine to regimen  Of Atenolol  Nitro prn   Hydralazine prn     4. Gout  Stable on Allopurinol    5. Oral ulcer  Start Acyclovir      DVT/GI Prophylaxis: Xarelto    Discussed with patient at bedside about hospital admission and my plan care, who understood and agree with my plan care.     Kristel Treviño MD  5/24/2019 11:45 PM

## 2019-05-24 NOTE — PROGRESS NOTES
Problem: Falls - Risk of  Goal: *Absence of Falls  Description  Document Jose Sierra Fall Risk and appropriate interventions in the flowsheet. Outcome: Progressing Towards Goal  Note:   Fall Risk Interventions:            Medication Interventions: Patient to call before getting OOB                   Problem: Patient Education: Go to Patient Education Activity  Goal: Patient/Family Education  Outcome: Progressing Towards Goal     Problem: Diabetes Self-Management  Goal: *Disease process and treatment process  Description  Define diabetes and identify own type of diabetes; list 3 options for treating diabetes. Outcome: Progressing Towards Goal  Goal: *Incorporating nutritional management into lifestyle  Description  Describe effect of type, amount and timing of food on blood glucose; list 3 methods for planning meals. Outcome: Progressing Towards Goal  Goal: *Incorporating physical activity into lifestyle  Description  State effect of exercise on blood glucose levels. Outcome: Progressing Towards Goal  Goal: *Developing strategies to promote health/change behavior  Description  Define the ABC's of diabetes; identify appropriate screenings, schedule and personal plan for screenings. Outcome: Progressing Towards Goal  Goal: *Using medications safely  Description  State effect of diabetes medications on diabetes; name diabetes medication taking, action and side effects. Outcome: Progressing Towards Goal  Goal: *Monitoring blood glucose, interpreting and using results  Description  Identify recommended blood glucose targets  and personal targets. Outcome: Progressing Towards Goal  Goal: *Prevention, detection, treatment of acute complications  Description  List symptoms of hyper- and hypoglycemia; describe how to treat low blood sugar and actions for lowering  high blood glucose level.   Outcome: Progressing Towards Goal  Goal: *Prevention, detection and treatment of chronic complications  Description  Define the natural course of diabetes and describe the relationship of blood glucose levels to long term complications of diabetes.   Outcome: Progressing Towards Goal  Goal: *Developing strategies to address psychosocial issues  Description  Describe feelings about living with diabetes; identify support needed and support network  Outcome: Progressing Towards Goal  Goal: *Sick day guidelines  Outcome: Progressing Towards Goal  Goal: *Patient Specific Goal (EDIT GOAL, INSERT TEXT)  Outcome: Progressing Towards Goal     Problem: Patient Education: Go to Patient Education Activity  Goal: Patient/Family Education  Outcome: Progressing Towards Goal     Problem: Patient Education: Go to Patient Education Activity  Goal: Patient/Family Education  Outcome: Progressing Towards Goal     Problem: Unstable angina/NSTEMI: Day of Admission/Day 1  Goal: Treatments/Interventions/Procedures  Outcome: Progressing Towards Goal

## 2019-05-24 NOTE — PROGRESS NOTES
DC Plan: Discharge home with MD follow up, family assistance, H2H once medically stable    Chart reviewed. Pt admitted in obs status for chest pain. No dc order noted at this time. Met with pt at bedside, no friend/family present. Pt states she lives alone, home address confirmed per face sheet. Pt states she drives. States her son, Bobbe Goodell will drive her home at discharge. States her PCP is MD Rosalia Lombardo. States she has RW, cane. Denies being active with HH. Provider, please advise if pt will need PT/OT eval.  Will recommend [facility] for HIGH RRAT score. No dc concerns identified by patient. CM will cont to follow for transition of care needs. Reason for Admission:   Per H&P, pt \"is a 72 y.o.  female who has hx of CAD cath  circumflex 100 percent stenosis, DM HTN  DVT presents to ER with intermittent chest pain and pressure associated with dyspnea progressive over the last 2 days  Her brother whom she was very close too  few weeks ago and has been stressed; her chest pain feels similar to her MI back in   Has hx of DVT and has been compliant with her Xarelto  Denies worsening lower leg edema or recent long trips  In ER given aspirin nitro with some relief but found to be hypertensive and bradycardia   On regimen of Atenolol\"     RRAT Score:     21 HIGH             Resources/supports as identified by patient/family:                   Top Challenges facing patient (as identified by patient/family and CM): Finances/Medication cost?                    Transportation? Self or family              Support system or lack thereof? Son nearby                     Living arrangements? Lives alone           Self-care/ADLs/Cognition? Alert and oriented, uses DME for ambulation          Current Advanced Directive/Advance Care Plan:  Not on file.  Provider, please consider palliative care/spiritual care consult                          Plan for utilizing home health:    [facility] Likelihood of readmission: HIGH                 Transition of Care Plan:      MD follow up, family assistance, [facility]            Care Management Interventions  PCP Verified by CM: Yes  Mode of Transport at Discharge:  Other (see comment)(son, Alfredo)  Transition of Care Consult (CM Consult): Discharge Planning  Current Support Network: Lives Alone, Family Lives Nearby  Confirm Follow Up Transport: Self(or family)  Plan discussed with Pt/Family/Caregiver: Yes  Discharge Location  Discharge Placement: Home with family assistance

## 2019-05-24 NOTE — ROUTINE PROCESS
TRANSFER - OUT REPORT: 
 
Verbal report given to Dilma(name) on Shamika Candelario  being transferred to Tele(unit) for routine progression of care Report consisted of patients Situation, Background, Assessment and  
Recommendations(SBAR). Information from the following report(s) ED Summary, MAR and Recent Results was reviewed with the receiving nurse. Lines:  
Peripheral IV 05/23/19 Right Forearm (Active) Site Assessment Clean, dry, & intact 5/23/2019  6:57 PM  
Phlebitis Assessment 0 5/23/2019  6:57 PM  
Infiltration Assessment 4 5/23/2019  6:57 PM  
Dressing Status Clean, dry, & intact 5/23/2019  6:57 PM  
Hub Color/Line Status Pink 5/23/2019  6:57 PM  
Alcohol Cap Used Yes 5/23/2019  6:57 PM  
  
 
Opportunity for questions and clarification was provided. Patient transported with: 
 [employer]

## 2019-05-24 NOTE — PROGRESS NOTES
TRANSFER - IN REPORT:    Verbal report received from 601 Ifeoma COPELAND RN (name) on Danya Arrow  being received from ED (unit) for routine progression of care      Report consisted of patients Situation, Background, Assessment and   Recommendations(SBAR). Information from the following report(s) SBAR, Kardex and MAR was reviewed with the receiving nurse. Opportunity for questions and clarification was provided. 0120 Assessment completed upon patients arrival to unit and care assumed. 0200 Patient med reconciliation not completed, patient unsure about meds that she takes. Requested pt. to ask family member to bring updated list.     6953 patient complained of chest pain nitro administered. 0700 Patient stated relief. And requested pain meds for her back. Percocet administered. Written shift change report given to KRIS Lamas RN (oncoming nurse) by Raimundo Colunga (offgoing nurse). Report included the following information SBAR, Kardex and MAR.

## 2019-05-24 NOTE — ROUTINE PROCESS
Bedside and Verbal shift change report given to Kd SKAGGS RN (oncoming nurse) by Zack Leigh (offgoing nurse). Report included the following information SBAR, Kardex, Procedure Summary, Intake/Output, MAR, Accordion, Recent Results and Med Rec Status.

## 2019-05-24 NOTE — ED PROVIDER NOTES
EMERGENCY DEPARTMENT HISTORY AND PHYSICAL EXAM    Date: 5/23/2019  Patient Name: Leia Jane    History of Presenting Illness     Chief Complaint   Patient presents with    Chest Pain         History Provided By: Patient    Additional History (Context):   8:24 PM  Leia Jane is a 72 y.o. female with PMHX of hypertension, insulin-dependent diabetes, coronary artery disease, currently anticoagulated for acute DVT who presents to the emergency department C/O burning stabbing chest pain of the right side sometimes rating down the right arm. She has a prior history of MI but the symptoms feel very different from that. She is been having constant pain dating back 3 to 4 days even same time she was here presenting with knee pain. She does not feel particularly short of breath she is nauseous but has no actual vomiting. She has a prescription for nitro but gets awful headaches and she is not choosing to use that medication. She does not take antiplatelet therapy only Xarelto as scheduled    PCP: Yuki Mcknight MD    Current Facility-Administered Medications   Medication Dose Route Frequency Provider Last Rate Last Dose    morphine injection 4 mg  4 mg IntraVENous NOW Katina Luna MD        ondansetron [facility]) injection 4 mg  4 mg IntraVENous NOW Katina Luna MD         Current Outpatient Medications   Medication Sig Dispense Refill    oxyCODONE-acetaminophen (PERCOCET) 5-325 mg per tablet Take 1 Tab by mouth every four (4) hours as needed for Pain for up to 7 days. Max Daily Amount: 6 Tabs. 12 Tab 0    diclofenac (VOLTAREN) 1 % gel Apply 4 g to affected area four (4) times daily. 100 g 1    ondansetron hcl (ZOFRAN) 8 mg tablet Take 1 Tab by mouth every eight (8) hours as needed for Nausea. 10 Tab 0    rivaroxaban (XARELTO) 15 mg (42)- 20 mg (9) DsPk Take one 15 mg tablet twice a day with food for the first 21 days. Then, take one 20 mg tablet once a day with food for 9 days.  1 Dose Pack 0    aspirin delayed-release 81 mg tablet Take 1 Tab by mouth daily. 30 Tab 0    atenolol (TENORMIN) 100 mg tablet Take 100 mg by mouth daily.  traZODone (DESYREL) 50 mg tablet Take 50 mg by mouth nightly as needed for Sleep.  simvastatin (ZOCOR) 10 mg tablet Take 10 mg by mouth nightly.  metFORMIN (GLUCOPHAGE) 500 mg tablet Take 500 mg by mouth two (2) times daily (with meals).  multivitamin, tx-iron-ca-min (THERA-M W/ IRON) 9 mg iron-400 mcg tab tablet Take 1 Tab by mouth daily. Formulary substitution for DAILY MULTIVITAMIN      ergocalciferol (VITAMIN D2) 50,000 unit capsule Take 50,000 Units by mouth every Monday.  insulin glargine (LANTUS U-100 INSULIN) 100 unit/mL injection 42 Units by SubCUTAneous route nightly.  allopurinol (ZYLOPRIM) 100 mg tablet Take 100 mg by mouth two (2) times a day.  gabapentin (NEURONTIN) 300 mg capsule Take 300 mg by mouth three (3) times daily.  Difluprednate (DUREZOL) 0.05 % ophthalmic emulsion Administer 1 Drop to right eye four (4) times daily. In relation to cataract surgery         Past History     Past Medical History:  Past Medical History:   Diagnosis Date    CAD (coronary artery disease)     Diabetes ([facility] [address]     Gastrointestinal disorder     Gout     Hypertension     MI (myocardial infarction) ([facility] [address]     Reflux        Past Surgical History:  Past Surgical History:   Procedure Laterality Date    CARDIAC SURG PROCEDURE UNLIST      VASCULAR SURGERY PROCEDURE UNLIST         Family History:  History reviewed. No pertinent family history. Social History:  Social History     Tobacco Use    Smoking status: Never Smoker    Smokeless tobacco: Never Used   Substance Use Topics    Alcohol use: No    Drug use: No       Allergies:   Allergies   Allergen Reactions    Lisinopril Swelling    Pcn [Penicillins] Angioedema         Review of Systems   Review of Systems   Constitutional: Negative for chills, diaphoresis, fever and unexpected weight change. HENT: Negative for congestion, drooling, ear pain, rhinorrhea, sore throat, tinnitus and trouble swallowing. Eyes: Negative for photophobia, pain, redness and visual disturbance. Respiratory: Negative for cough, choking, chest tightness, shortness of breath, wheezing and stridor. Cardiovascular: Negative for chest pain, palpitations and leg swelling. Gastrointestinal: Negative for abdominal distention, abdominal pain, anal bleeding, blood in stool, constipation, diarrhea, nausea and vomiting. Endocrine: Negative for cold intolerance, heat intolerance, polydipsia and polyuria. Genitourinary: Negative for difficulty urinating, dysuria, flank pain, frequency, hematuria and urgency. Musculoskeletal: Negative for arthralgias, back pain and neck pain. Skin: Negative for color change, rash and wound. Allergic/Immunologic: Negative for immunocompromised state. Neurological: Negative for dizziness, seizures, syncope, speech difficulty, light-headedness and headaches. Hematological: Does not bruise/bleed easily. Psychiatric/Behavioral: Negative for agitation, behavioral problems, hallucinations, self-injury and suicidal ideas. The patient is not hyperactive. Physical Exam     Vitals:    05/23/19 1812 05/23/19 1834   BP: (!) 166/94 189/75   Pulse: 61 62   Resp: 20 20   Temp: 98.5 °F (36.9 °C)    SpO2: 100% 100%   Weight: 118.8 kg (262 lb)    Height: 5' 4\" (1.626 m)      Physical Exam   Constitutional: She is oriented to person, place, and time. She appears well-developed and well-nourished. No distress. HENT:   Head: Normocephalic and atraumatic. Right Ear: External ear normal.   Left Ear: External ear normal.   Mouth/Throat: Oropharynx is clear and moist. No oropharyngeal exudate. Eyes: Pupils are equal, round, and reactive to light. Conjunctivae and EOM are normal. No scleral icterus. No pallor   Neck: Normal range of motion. Neck supple. No JVD present.  No tracheal deviation present. No thyromegaly present. Cardiovascular: Normal rate, regular rhythm and normal heart sounds. Pulmonary/Chest: Effort normal and breath sounds normal. No stridor. No respiratory distress. Abdominal: Soft. Bowel sounds are normal. She exhibits no distension. There is no tenderness. There is no rebound and no guarding. Musculoskeletal: Normal range of motion. She exhibits no edema or tenderness. No soft tissue injuries   Lymphadenopathy:     She has no cervical adenopathy. Neurological: She is alert and oriented to person, place, and time. She has normal reflexes. No cranial nerve deficit. Coordination normal.   Skin: Skin is warm and dry. No rash noted. She is not diaphoretic. No erythema. Psychiatric: She has a normal mood and affect. Her behavior is normal. Judgment and thought content normal.   Nursing note and vitals reviewed. Diagnostic Study Results     Labs -     Recent Results (from the past 12 hour(s))   EKG, 12 LEAD, INITIAL    Collection Time: 05/23/19  6:41 PM   Result Value Ref Range    Ventricular Rate 56 BPM    Atrial Rate 56 BPM    P-R Interval 160 ms    QRS Duration 88 ms    Q-T Interval 414 ms    QTC Calculation (Bezet) 399 ms    Calculated P Axis 71 degrees    Calculated R Axis 9 degrees    Calculated T Axis -4 degrees    Diagnosis       Sinus bradycardia  Otherwise normal ECG  When compared with ECG of 15-DEC-2018 19:29,  premature supraventricular complexes are no longer present  Vent.  rate has decreased BY  38 BPM  Inverted T waves have replaced nonspecific T wave abnormality in Inferior   leads  Nonspecific T wave abnormality, worse in Lateral leads     CBC WITH AUTOMATED DIFF    Collection Time: 05/23/19  6:50 PM   Result Value Ref Range    WBC 5.6 4.6 - 13.2 K/uL    RBC 4.58 4.20 - 5.30 M/uL    HGB 13.0 12.0 - 16.0 g/dL    HCT 39.6 35.0 - 45.0 %    MCV 86.5 74.0 - 97.0 FL    MCH 28.4 24.0 - 34.0 PG    MCHC 32.8 31.0 - 37.0 g/dL    RDW 13.1 11.6 - 14.5 %    PLATELET 109 271 - 710 K/uL    MPV 9.8 9.2 - 11.8 FL    NEUTROPHILS 53 40 - 73 %    LYMPHOCYTES 37 21 - 52 %    MONOCYTES 8 3 - 10 %    EOSINOPHILS 2 0 - 5 %    BASOPHILS 0 0 - 2 %    ABS. NEUTROPHILS 3.0 1.8 - 8.0 K/UL    ABS. LYMPHOCYTES 2.1 0.9 - 3.6 K/UL    ABS. MONOCYTES 0.4 0.05 - 1.2 K/UL    ABS. EOSINOPHILS 0.1 0.0 - 0.4 K/UL    ABS. BASOPHILS 0.0 0.0 - 0.1 K/UL    DF AUTOMATED     METABOLIC PANEL, COMPREHENSIVE    Collection Time: 05/23/19  6:50 PM   Result Value Ref Range    Sodium 137 136 - 145 mmol/L    Potassium 4.2 3.5 - 5.5 mmol/L    Chloride 103 100 - 108 mmol/L    CO2 29 21 - 32 mmol/L    Anion gap 5 3.0 - 18 mmol/L    Glucose 113 (H) 74 - 99 mg/dL    BUN 19 (H) 7.0 - 18 MG/DL    Creatinine 0.89 0.6 - 1.3 MG/DL    BUN/Creatinine ratio 21 (H) 12 - 20      GFR est AA >60 >60 ml/min/1.73m2    GFR est non-AA >60 >60 ml/min/1.73m2    Calcium 8.8 8.5 - 10.1 MG/DL    Bilirubin, total 0.6 0.2 - 1.0 MG/DL    ALT (SGPT) 21 13 - 56 U/L    AST (SGOT) 27 15 - 37 U/L    Alk.  phosphatase 84 45 - 117 U/L    Protein, total 7.7 6.4 - 8.2 g/dL    Albumin 3.0 (L) 3.4 - 5.0 g/dL    Globulin 4.7 (H) 2.0 - 4.0 g/dL    A-G Ratio 0.6 (L) 0.8 - 1.7     LIPASE    Collection Time: 05/23/19  6:50 PM   Result Value Ref Range    Lipase 36 (L) 73 - 393 U/L   MAGNESIUM    Collection Time: 05/23/19  6:50 PM   Result Value Ref Range    Magnesium 1.9 1.6 - 2.6 mg/dL   CARDIAC PANEL,(CK, CKMB & TROPONIN)    Collection Time: 05/23/19  6:50 PM   Result Value Ref Range     (H) 26 - 192 U/L    CK - MB 6.5 (H) <3.6 ng/ml    CK-MB Index 1.7 0.0 - 4.0 %    Troponin-I, QT <0.02 0.0 - 0.045 NG/ML       Radiologic Studies -   XR CHEST PORT    (Results Pending)     CT Results  (Last 48 hours)    None        CXR Results  (Last 48 hours)    None          Medications given in the ED-  Medications   morphine injection 4 mg (has no administration in time range)   ondansetron (ZOFRAN) injection 4 mg (has no administration in time range)         Medical Decision Making   I am the first provider for this patient. I reviewed the vital signs, available nursing notes, past medical history, past surgical history, family history and social history. Vital Signs-Reviewed the patient's vital signs. Pulse Oximetry Analysis -100% on room air    Cardiac Monitor:  Rate: 61 bpm  Rhythm: Sinus rhythm borderline bradycardia    EKG interpretation: (Preliminary)  Sinus bradycardia without AV block, normal ST segments, normal QTC  EKG read by Yvonne Aj MD    Records Reviewed: NURSING NOTES AND PREVIOUS MEDICAL RECORDS    Provider Notes (Medical Decision Making):   Acute chest pain and high risk cardiac patient. She is already can anticoagulate therefore heparin and aspirin not aggressively push. We discussed the patient the hospitalist who agreed to monitor her cardiac status overnight with serial enzymes and disposition according to cardiac consult in the morning. ACS is a strong consideration as of PE but none was found on CT examination. Greatest likelihood is either musculoskeletal or atypical reflux    Procedures:  Procedures    ED Course:   8:24 PM: Initial assessment performed. The patients presenting problems have been discussed, and they are in agreement with the care plan formulated and outlined with them. I have encouraged them to ask questions as they arise throughout their visit. Diagnosis and Disposition     Critical Care Time: 20 minutes    Core Measures:  For Hospitalized Patients:    1. Hospitalization Decision Time:  The decision to hospitalize the patient was made by Yvonne Aj MD   at  on 5/23/2019    Getting 2. Aspirin: Aspirin was not given because the patient is a bleeding risk as she is already anticoagulated    9:23 PM  Patient is being admitted to the hospital by Dr. Chapincito Galvan. The results of their tests and reasons for their admission have been discussed with them and/or available family.  They convey agreement and understanding for the need to be admitted and for their admission diagnosis. CONDITIONS ON ADMISSION:  Sepsis is not present at the time of admission. Deep Vein Thrombosis is not present at the time of admission. Thrombosis is not present at the time of admission. Urinary Tract Infection is not present at the time of admission. Pneumonia is not present at the time of admission. MRSA is not present at the time of admission. Wound infection is not present at the time of admission. Pressure Ulcer is not present at the time of admission. CLINICAL IMPRESSION:    1. Acute chest pain    2. Coronary artery disease involving native heart with angina pectoris, unspecified vessel or lesion type ([facility] [address]    3. Type 2 diabetes mellitus with complication, with long-term current use of insulin ([facility] [address]    4. Encounter for current long-term use of anticoagulants        _______________________________    This note was partially transcribed via voice recognition software. Although efforts have been made to catch any discrepancies, it may contain sound alike words, grammatical errors, or nonsensical words.

## 2019-05-25 LAB
ALBUMIN SERPL-MCNC: 3.1 G/DL (ref 3.4–5)
ALBUMIN/GLOB SERPL: 0.8 {RATIO} (ref 0.8–1.7)
ALP SERPL-CCNC: 80 U/L (ref 45–117)
ALT SERPL-CCNC: 19 U/L (ref 13–56)
ANION GAP SERPL CALC-SCNC: 9 MMOL/L (ref 3–18)
AST SERPL-CCNC: 18 U/L (ref 15–37)
BASOPHILS # BLD: 0.1 K/UL (ref 0–0.1)
BASOPHILS NFR BLD: 1 % (ref 0–2)
BILIRUB SERPL-MCNC: 0.3 MG/DL (ref 0.2–1)
BUN SERPL-MCNC: 16 MG/DL (ref 7–18)
BUN/CREAT SERPL: 18 (ref 12–20)
CALCIUM SERPL-MCNC: 8.9 MG/DL (ref 8.5–10.1)
CHLORIDE SERPL-SCNC: 104 MMOL/L (ref 100–108)
CHOLEST SERPL-MCNC: 136 MG/DL
CO2 SERPL-SCNC: 29 MMOL/L (ref 21–32)
CREAT SERPL-MCNC: 0.91 MG/DL (ref 0.6–1.3)
DIFFERENTIAL METHOD BLD: NORMAL
EOSINOPHIL # BLD: 0.2 K/UL (ref 0–0.4)
EOSINOPHIL NFR BLD: 3 % (ref 0–5)
ERYTHROCYTE [DISTWIDTH] IN BLOOD BY AUTOMATED COUNT: 13.1 % (ref 11.6–14.5)
GLOBULIN SER CALC-MCNC: 3.8 G/DL (ref 2–4)
GLUCOSE BLD STRIP.AUTO-MCNC: 118 MG/DL (ref 70–110)
GLUCOSE BLD STRIP.AUTO-MCNC: 157 MG/DL (ref 70–110)
GLUCOSE BLD STRIP.AUTO-MCNC: 157 MG/DL (ref 70–110)
GLUCOSE BLD STRIP.AUTO-MCNC: 95 MG/DL (ref 70–110)
GLUCOSE SERPL-MCNC: 83 MG/DL (ref 74–99)
HCT VFR BLD AUTO: 38.5 % (ref 35–45)
HDLC SERPL-MCNC: 48 MG/DL (ref 40–60)
HDLC SERPL: 2.8 {RATIO} (ref 0–5)
HGB BLD-MCNC: 12.6 G/DL (ref 12–16)
LDLC SERPL CALC-MCNC: 72.2 MG/DL (ref 0–100)
LIPID PROFILE,FLP: NORMAL
LYMPHOCYTES # BLD: 2.5 K/UL (ref 0.9–3.6)
LYMPHOCYTES NFR BLD: 46 % (ref 21–52)
MAGNESIUM SERPL-MCNC: 2.2 MG/DL (ref 1.6–2.6)
MCH RBC QN AUTO: 28.3 PG (ref 24–34)
MCHC RBC AUTO-ENTMCNC: 32.7 G/DL (ref 31–37)
MCV RBC AUTO: 86.3 FL (ref 74–97)
MONOCYTES # BLD: 0.6 K/UL (ref 0.05–1.2)
MONOCYTES NFR BLD: 10 % (ref 3–10)
NEUTS SEG # BLD: 2.3 K/UL (ref 1.8–8)
NEUTS SEG NFR BLD: 40 % (ref 40–73)
PLATELET # BLD AUTO: 231 K/UL (ref 135–420)
PLATELET COMMENTS,PCOM: NORMAL
PMV BLD AUTO: 9.8 FL (ref 9.2–11.8)
POTASSIUM SERPL-SCNC: 4.5 MMOL/L (ref 3.5–5.5)
PROT SERPL-MCNC: 6.9 G/DL (ref 6.4–8.2)
RBC # BLD AUTO: 4.46 M/UL (ref 4.2–5.3)
RBC MORPH BLD: NORMAL
SODIUM SERPL-SCNC: 142 MMOL/L (ref 136–145)
TRIGL SERPL-MCNC: 79 MG/DL (ref ?–150)
VLDLC SERPL CALC-MCNC: 15.8 MG/DL
WBC # BLD AUTO: 5.7 K/UL (ref 4.6–13.2)

## 2019-05-25 PROCEDURE — 74011250637 HC RX REV CODE- 250/637: Performed by: INTERNAL MEDICINE

## 2019-05-25 PROCEDURE — 83735 ASSAY OF MAGNESIUM: CPT

## 2019-05-25 PROCEDURE — 74011636637 HC RX REV CODE- 636/637: Performed by: INTERNAL MEDICINE

## 2019-05-25 PROCEDURE — 85025 COMPLETE CBC W/AUTO DIFF WBC: CPT

## 2019-05-25 PROCEDURE — 80053 COMPREHEN METABOLIC PANEL: CPT

## 2019-05-25 PROCEDURE — 82962 GLUCOSE BLOOD TEST: CPT

## 2019-05-25 PROCEDURE — 80061 LIPID PANEL: CPT

## 2019-05-25 PROCEDURE — 36415 COLL VENOUS BLD VENIPUNCTURE: CPT

## 2019-05-25 PROCEDURE — 99218 HC RM OBSERVATION: CPT

## 2019-05-25 RX ORDER — OMEPRAZOLE 20 MG/1
20 CAPSULE, DELAYED RELEASE ORAL 2 TIMES DAILY
Status: DISCONTINUED | OUTPATIENT
Start: 2019-05-25 | End: 2019-05-26 | Stop reason: HOSPADM

## 2019-05-25 RX ADMIN — ATENOLOL 100 MG: 50 TABLET ORAL at 09:21

## 2019-05-25 RX ADMIN — ASPIRIN 81 MG: 81 TABLET, COATED ORAL at 09:20

## 2019-05-25 RX ADMIN — OXYCODONE HYDROCHLORIDE AND ACETAMINOPHEN 1 TABLET: 5; 325 TABLET ORAL at 15:20

## 2019-05-25 RX ADMIN — ONDANSETRON HYDROCHLORIDE 8 MG: 4 TABLET, FILM COATED ORAL at 20:26

## 2019-05-25 RX ADMIN — DIPHENHYDRAMINE HYDROCHLORIDE 25 MG: 25 CAPSULE ORAL at 18:29

## 2019-05-25 RX ADMIN — INSULIN LISPRO 2 UNITS: 100 INJECTION, SOLUTION INTRAVENOUS; SUBCUTANEOUS at 18:30

## 2019-05-25 RX ADMIN — OMEPRAZOLE 20 MG: 20 CAPSULE, DELAYED RELEASE ORAL at 20:26

## 2019-05-25 RX ADMIN — ACYCLOVIR 400 MG: 200 CAPSULE ORAL at 23:01

## 2019-05-25 RX ADMIN — OXYCODONE HYDROCHLORIDE AND ACETAMINOPHEN 1 TABLET: 5; 325 TABLET ORAL at 09:21

## 2019-05-25 RX ADMIN — ACYCLOVIR 400 MG: 200 CAPSULE ORAL at 09:20

## 2019-05-25 RX ADMIN — ACYCLOVIR 400 MG: 200 CAPSULE ORAL at 15:21

## 2019-05-25 RX ADMIN — GABAPENTIN 300 MG: 300 CAPSULE ORAL at 23:02

## 2019-05-25 RX ADMIN — ALLOPURINOL 100 MG: 100 TABLET ORAL at 20:26

## 2019-05-25 RX ADMIN — OMEPRAZOLE 20 MG: 20 CAPSULE, DELAYED RELEASE ORAL at 13:03

## 2019-05-25 RX ADMIN — RIVAROXABAN 15 MG: 15 TABLET, FILM COATED ORAL at 09:21

## 2019-05-25 RX ADMIN — ALLOPURINOL 100 MG: 100 TABLET ORAL at 09:21

## 2019-05-25 RX ADMIN — DIPHENHYDRAMINE HYDROCHLORIDE 25 MG: 25 CAPSULE ORAL at 09:21

## 2019-05-25 RX ADMIN — INSULIN GLARGINE 42 UNITS: 100 INJECTION, SOLUTION SUBCUTANEOUS at 23:02

## 2019-05-25 RX ADMIN — SIMVASTATIN 10 MG: 10 TABLET, FILM COATED ORAL at 23:01

## 2019-05-25 RX ADMIN — AMLODIPINE BESYLATE 5 MG: 5 TABLET ORAL at 09:21

## 2019-05-25 RX ADMIN — OXYCODONE HYDROCHLORIDE AND ACETAMINOPHEN 1 TABLET: 5; 325 TABLET ORAL at 03:47

## 2019-05-25 RX ADMIN — INSULIN LISPRO 2 UNITS: 100 INJECTION, SOLUTION INTRAVENOUS; SUBCUTANEOUS at 23:03

## 2019-05-25 RX ADMIN — DIPHENHYDRAMINE HYDROCHLORIDE 25 MG: 25 CAPSULE ORAL at 00:20

## 2019-05-25 NOTE — PROGRESS NOTES
0740:Received verbal bedside report from off going nurse KRIS Rand R.N. Patient care received. Patient alert and oriented x 4. Patient resting in bed denies pain. Patient stable. Call light with in reach bed in lowest position. 0921:Pt received PRN Percocet for back pain. Reassessment in Doc Flow.

## 2019-05-25 NOTE — PROGRESS NOTES
Problem: Falls - Risk of  Goal: *Absence of Falls  Description  Document Sariah President Fall Risk and appropriate interventions in the flowsheet. Outcome: Progressing Towards Goal     Problem: Patient Education: Go to Patient Education Activity  Goal: Patient/Family Education  Outcome: Progressing Towards Goal     Problem: Diabetes Self-Management  Goal: *Disease process and treatment process  Description  Define diabetes and identify own type of diabetes; list 3 options for treating diabetes. Outcome: Progressing Towards Goal  Goal: *Incorporating nutritional management into lifestyle  Description  Describe effect of type, amount and timing of food on blood glucose; list 3 methods for planning meals. Outcome: Progressing Towards Goal  Goal: *Incorporating physical activity into lifestyle  Description  State effect of exercise on blood glucose levels. Outcome: Progressing Towards Goal  Goal: *Developing strategies to promote health/change behavior  Description  Define the ABC's of diabetes; identify appropriate screenings, schedule and personal plan for screenings. Outcome: Progressing Towards Goal  Goal: *Using medications safely  Description  State effect of diabetes medications on diabetes; name diabetes medication taking, action and side effects. Outcome: Progressing Towards Goal  Goal: *Monitoring blood glucose, interpreting and using results  Description  Identify recommended blood glucose targets  and personal targets. Outcome: Progressing Towards Goal  Goal: *Prevention, detection, treatment of acute complications  Description  List symptoms of hyper- and hypoglycemia; describe how to treat low blood sugar and actions for lowering  high blood glucose level.   Outcome: Progressing Towards Goal  Goal: *Prevention, detection and treatment of chronic complications  Description  Define the natural course of diabetes and describe the relationship of blood glucose levels to long term complications of diabetes.   Outcome: Progressing Towards Goal     Problem: Patient Education: Go to Patient Education Activity  Goal: Patient/Family Education  Outcome: Progressing Towards Goal     Problem: Unstable angina/NSTEMI: Day of Admission/Day 1  Goal: Treatments/Interventions/Procedures  Outcome: Progressing Towards Goal

## 2019-05-25 NOTE — PROGRESS NOTES
Cardiology Progress Note      5/25/2019 11:18 AM    Admit Date: 5/23/2019    Admit Diagnosis: Acute chest pain [R07.9]  Diabetes ([address] [E11.9]      Subjective:     Quinton Encinas denies chest pain, chest pressure/discomfort, dyspnea. Visit Vitals  /55 (BP 1 Location: Right arm, BP Patient Position: At rest;Supine; Head of bed elevated (Comment degrees))   Pulse 62   Temp 97.6 °F (36.4 °C)   Resp 16   Ht 5' 4\" (1.626 m)   Wt 118.8 kg (262 lb)   SpO2 99%   BMI 44.97 kg/m²     Current Facility-Administered Medications   Medication Dose Route Frequency    omeprazole (PRILOSEC) capsule 20 mg  20 mg Oral BID    nitroglycerin (NITROSTAT) tablet 0.4 mg  0.4 mg SubLINGual Q5MIN PRN    acetaminophen (TYLENOL) tablet 650 mg  650 mg Oral Q4H PRN    oxyCODONE-acetaminophen (PERCOCET) 5-325 mg per tablet 1 Tab  1 Tab Oral Q4H PRN    allopurinol (ZYLOPRIM) tablet 100 mg  100 mg Oral BID    aspirin delayed-release tablet 81 mg  81 mg Oral DAILY    atenolol (TENORMIN) tablet 100 mg  100 mg Oral DAILY    gabapentin (NEURONTIN) capsule 300 mg  300 mg Oral TID    insulin glargine (LANTUS) injection 42 Units  42 Units SubCUTAneous QHS    ondansetron hcl (ZOFRAN) tablet 8 mg  8 mg Oral Q8H PRN    rivaroxaban (XARELTO) tablet 15 mg  15 mg Oral DAILY WITH BREAKFAST    oxyCODONE-acetaminophen (PERCOCET) 5-325 mg per tablet 1 Tab  1 Tab Oral Q4H PRN    simvastatin (ZOCOR) tablet 10 mg  10 mg Oral QHS    traZODone (DESYREL) tablet 50 mg  50 mg Oral QHS PRN    amLODIPine (NORVASC) tablet 5 mg  5 mg Oral DAILY    insulin lispro (HUMALOG) injection   SubCUTAneous AC&HS    glucose chewable tablet 16 g  4 Tab Oral PRN    glucagon (GLUCAGEN) injection 1 mg  1 mg IntraMUSCular PRN    dextrose (D50W) injection syrg 12.5-25 g  25-50 mL IntraVENous PRN    acyclovir (ZOVIRAX) capsule 400 mg  400 mg Oral TID    diphenhydrAMINE (BENADRYL) capsule 25 mg  25 mg Oral Q6H PRN    hydrALAZINE (APRESOLINE) 20 mg/mL injection 10 mg 10 mg IntraVENous Q6H PRN         Objective:      Physical Exam:  Visit Vitals  /55 (BP 1 Location: Right arm, BP Patient Position: At rest;Supine; Head of bed elevated (Comment degrees))   Pulse 62   Temp 97.6 °F (36.4 °C)   Resp 16   Ht 5' 4\" (1.626 m)   Wt 118.8 kg (262 lb)   SpO2 99%   BMI 44.97 kg/m²     General Appearance:  Well developed, well nourished,alert and oriented x 3, and individual in no acute distress. Ears/Nose/Mouth/Throat:   Hearing grossly normal.         Neck: Supple. Chest:   Lungs clear to auscultation bilaterally. Cardiovascular:  Regular rate and rhythm, S1, S2 normal, no murmur. Abdomen:   Soft, non-tender, bowel sounds are active. Extremities: No edema bilaterally. Skin: Warm and dry. Data Review:   Labs:    Recent Results (from the past 24 hour(s))   GLUCOSE, POC    Collection Time: 05/24/19 12:10 PM   Result Value Ref Range    Glucose (POC) 132 (H) 70 - 110 mg/dL   CARDIAC PANEL,(CK, CKMB & TROPONIN)    Collection Time: 05/24/19  6:10 PM   Result Value Ref Range     (H) 26 - 192 U/L    CK - MB 4.8 (H) <3.6 ng/ml    CK-MB Index 1.6 0.0 - 4.0 %    Troponin-I, QT <0.02 0.0 - 0.045 NG/ML   GLUCOSE, POC    Collection Time: 05/24/19  9:29 PM   Result Value Ref Range    Glucose (POC) 161 (H) 70 - 585 mg/dL   METABOLIC PANEL, COMPREHENSIVE    Collection Time: 05/25/19  3:20 AM   Result Value Ref Range    Sodium 142 136 - 145 mmol/L    Potassium 4.5 3.5 - 5.5 mmol/L    Chloride 104 100 - 108 mmol/L    CO2 29 21 - 32 mmol/L    Anion gap 9 3.0 - 18 mmol/L    Glucose 83 74 - 99 mg/dL    BUN 16 7.0 - 18 MG/DL    Creatinine 0.91 0.6 - 1.3 MG/DL    BUN/Creatinine ratio 18 12 - 20      GFR est AA >60 >60 ml/min/1.73m2    GFR est non-AA >60 >60 ml/min/1.73m2    Calcium 8.9 8.5 - 10.1 MG/DL    Bilirubin, total 0.3 0.2 - 1.0 MG/DL    ALT (SGPT) 19 13 - 56 U/L    AST (SGOT) 18 15 - 37 U/L    Alk.  phosphatase 80 45 - 117 U/L    Protein, total 6.9 6.4 - 8.2 g/dL Albumin 3.1 (L) 3.4 - 5.0 g/dL    Globulin 3.8 2.0 - 4.0 g/dL    A-G Ratio 0.8 0.8 - 1.7     CBC WITH AUTOMATED DIFF    Collection Time: 05/25/19  3:20 AM   Result Value Ref Range    WBC 5.7 4.6 - 13.2 K/uL    RBC 4.46 4.20 - 5.30 M/uL    HGB 12.6 12.0 - 16.0 g/dL    HCT 38.5 35.0 - 45.0 %    MCV 86.3 74.0 - 97.0 FL    MCH 28.3 24.0 - 34.0 PG    MCHC 32.7 31.0 - 37.0 g/dL    RDW 13.1 11.6 - 14.5 %    PLATELET 725 424 - 185 K/uL    MPV 9.8 9.2 - 11.8 FL    NEUTROPHILS 40 40 - 73 %    LYMPHOCYTES 46 21 - 52 %    MONOCYTES 10 3 - 10 %    EOSINOPHILS 3 0 - 5 %    BASOPHILS 1 0 - 2 %    ABS. NEUTROPHILS 2.3 1.8 - 8.0 K/UL    ABS. LYMPHOCYTES 2.5 0.9 - 3.6 K/UL    ABS. MONOCYTES 0.6 0.05 - 1.2 K/UL    ABS. EOSINOPHILS 0.2 0.0 - 0.4 K/UL    ABS. BASOPHILS 0.1 0.0 - 0.1 K/UL    PLATELET COMMENTS LARGE PLATELETS      RBC COMMENTS NORMOCYTIC, NORMOCHROMIC      DF AUTOMATED     MAGNESIUM    Collection Time: 05/25/19  3:20 AM   Result Value Ref Range    Magnesium 2.2 1.6 - 2.6 mg/dL   LIPID PANEL    Collection Time: 05/25/19  3:20 AM   Result Value Ref Range    LIPID PROFILE          Cholesterol, total 136 <200 MG/DL    Triglyceride 79 <150 MG/DL    HDL Cholesterol 48 40 - 60 MG/DL    LDL, calculated 72.2 0 - 100 MG/DL    VLDL, calculated 15.8 MG/DL    CHOL/HDL Ratio 2.8 0 - 5.0     GLUCOSE, POC    Collection Time: 05/25/19  6:28 AM   Result Value Ref Range    Glucose (POC) 95 70 - 110 mg/dL       Telemetry: normal sinus rhythm      Assessment:     Active Problems:    Diabetes ([address] (5/24/2019)      Acute chest pain (5/24/2019)        Plan:     Cardiac markers are negative. She is not having ongoing cp. Could be worked up as outpatient. Needs Nuc stress test.but probably won't be available until Tues because of the holiday.      Edi Mane MD

## 2019-05-25 NOTE — ROUTINE PROCESS
1920- Assumed patient care from off going nurse Lenore Landeros. RN. Patient lying quietly in bed at this time and is in NAD. Patient voiced no complaints, call light within reach, and bed in lowest position. 0050- Benadryl given at 0020 for complaints of itching with effective results. 26- Percocet given at [phone] for complaint of pain with effective results. Patient resting quietly in bed at this time, NAD noted, call light is within reach, and bed is in lowest position. Will continue to monitor. 0800-Purposeful rounding throughout shift, NAD noted at this time, and patient is resting quietly in bed. No concerns or requests voiced. Bedside and Verbal shift change report given to Ina Arceo RN (oncoming nurse) by Khadijah Coto (offgoing nurse). Report included the following information SBAR, Kardex and MAR.

## 2019-05-25 NOTE — PROGRESS NOTES
Hospitalist Progress Note    Patient: Shant Carrero MRN: [id number]  [facility]: [id number]    YOB: 1945  Age: 72 y.o. Sex: female    DOA: 5/23/2019 LOS:  LOS: 0 days              IMPRESSION and Plan:    Shant Carrero is a 72 y.o. female with   Patient Active Problem List    Diagnosis Date Noted    [facility] 05/24/2019    Acute chest pain 05/24/2019    Vomiting 12/15/2018    Pyelonephritis 12/15/2018    Intractable vomiting with nausea 12/15/2018    CAD (coronary artery disease) 12/03/2018    Hypertension 12/03/2018    Insulin dependent diabetes mellitus with complications ([facility] 81/57/8608    Gastrointestinal disorder 12/03/2018    Acute DVT of left tibial vein ([facility] 12/03/2018    Morbid obesity with BMI of 40.0-44.9, adult ([facility] 12/03/2018    Gout 12/03/2018     Active Problems:    Diabetes ([facility] (5/24/2019)      Acute chest pain (5/24/2019)      Chest pain - appears to be atypical  -? GERd  cardiac enzymes in normal range. Continue with aspirin and betablocker. Echo pending    GERD -- start Omeprazole    DM - continue on lantus, SSI     HTN -  added norvasc to her home regime. Hydralazine prn.     History of DVT - continue with xarelto.     Gout - continue allopurinol.     Continue acyclovir for oral ulcer.        Okay to dc home once cleared by cardiology    Patient's condition is fair        Recommend to continue hospitalization. Discussed with patient. Chief Complaints:   Chief Complaint   Patient presents with    Chest Pain     SUBJECTIVE:  Pt is seen and examined. chart reviewed. \" I think I have more acid reflux like pain\"             Review of systems:    Review of Systems   Constitutional: Positive for malaise/fatigue. HENT: Negative. Eyes: Negative. Respiratory: Negative for sputum production and shortness of breath. Cardiovascular: Positive for chest pain and leg swelling. Negative for palpitations and orthopnea. Gastrointestinal: Positive for heartburn. Negative for abdominal pain and diarrhea. Genitourinary: Negative for dysuria and hematuria. Skin: Negative. Neurological: Positive for weakness. Psychiatric/Behavioral: Negative for depression, substance abuse and suicidal ideas. The patient is not nervous/anxious. PE:  Patient Vitals for the past 24 hrs:   BP Temp Pulse Resp SpO2   05/25/19 0719 118/55 97.6 °F (36.4 °C) 62 16 99 %   05/25/19 0347 147/74 97.3 °F (36.3 °C) 91 16 99 %   05/24/19 2031 139/80 97.9 °F (36.6 °C) 64 16 97 %   05/24/19 1213 135/90 98.6 °F (37 °C) (!) 59 16 100 %       Intake/Output Summary (Last 24 hours) at 5/25/2019 1049  Last data filed at 5/25/2019 0606  Gross per 24 hour   Intake 400 ml   Output 0 ml   Net 400 ml     Patient Vitals for the past 120 hrs:   Weight   05/23/19 1812 118.8 kg (262 lb)   05/24/19 0345 109.4 kg (241 lb 2.9 oz)   05/24/19 0947 118.8 kg (262 lb)         Physical Exam        Intake and Output:  Current Shift:  No intake/output data recorded. Last three shifts:  05/23 1901 - 05/25 0700  In: 400 [P.O.:400]  Out: 0     Lab/Data Reviewed:  Recent Results (from the past 8 hour(s))   METABOLIC PANEL, COMPREHENSIVE    Collection Time: 05/25/19  3:20 AM   Result Value Ref Range    Sodium 142 136 - 145 mmol/L    Potassium 4.5 3.5 - 5.5 mmol/L    Chloride 104 100 - 108 mmol/L    CO2 29 21 - 32 mmol/L    Anion gap 9 3.0 - 18 mmol/L    Glucose 83 74 - 99 mg/dL    BUN 16 7.0 - 18 MG/DL    Creatinine 0.91 0.6 - 1.3 MG/DL    BUN/Creatinine ratio 18 12 - 20      GFR est AA >60 >60 ml/min/1.73m2    GFR est non-AA >60 >60 ml/min/1.73m2    Calcium 8.9 8.5 - 10.1 MG/DL    Bilirubin, total 0.3 0.2 - 1.0 MG/DL    ALT (SGPT) 19 13 - 56 U/L    AST (SGOT) 18 15 - 37 U/L    Alk.  phosphatase 80 45 - 117 U/L    Protein, total 6.9 6.4 - 8.2 g/dL    Albumin 3.1 (L) 3.4 - 5.0 g/dL    Globulin 3.8 2.0 - 4.0 g/dL    A-G Ratio 0.8 0.8 - 1.7     CBC WITH AUTOMATED DIFF    Collection Time: 05/25/19  3:20 AM   Result Value Ref Range    WBC 5.7 4.6 - 13.2 K/uL    RBC 4.46 4.20 - 5.30 M/uL    HGB 12.6 12.0 - 16.0 g/dL    HCT 38.5 35.0 - 45.0 %    MCV 86.3 74.0 - 97.0 FL    MCH 28.3 24.0 - 34.0 PG    MCHC 32.7 31.0 - 37.0 g/dL    RDW 13.1 11.6 - 14.5 %    PLATELET 033 470 - 196 K/uL    MPV 9.8 9.2 - 11.8 FL    NEUTROPHILS 40 40 - 73 %    LYMPHOCYTES 46 21 - 52 %    MONOCYTES 10 3 - 10 %    EOSINOPHILS 3 0 - 5 %    BASOPHILS 1 0 - 2 %    ABS. NEUTROPHILS 2.3 1.8 - 8.0 K/UL    ABS. LYMPHOCYTES 2.5 0.9 - 3.6 K/UL    ABS. MONOCYTES 0.6 0.05 - 1.2 K/UL    ABS. EOSINOPHILS 0.2 0.0 - 0.4 K/UL    ABS.  BASOPHILS 0.1 0.0 - 0.1 K/UL    PLATELET COMMENTS LARGE PLATELETS      RBC COMMENTS NORMOCYTIC, NORMOCHROMIC      DF AUTOMATED     MAGNESIUM    Collection Time: 05/25/19  3:20 AM   Result Value Ref Range    Magnesium 2.2 1.6 - 2.6 mg/dL   LIPID PANEL    Collection Time: 05/25/19  3:20 AM   Result Value Ref Range    LIPID PROFILE          Cholesterol, total 136 <200 MG/DL    Triglyceride 79 <150 MG/DL    HDL Cholesterol 48 40 - 60 MG/DL    LDL, calculated 72.2 0 - 100 MG/DL    VLDL, calculated 15.8 MG/DL    CHOL/HDL Ratio 2.8 0 - 5.0     GLUCOSE, POC    Collection Time: 05/25/19  6:28 AM   Result Value Ref Range    Glucose (POC) 95 70 - 110 mg/dL     Medications:  Current Facility-Administered Medications   Medication Dose Route Frequency    nitroglycerin (NITROSTAT) tablet 0.4 mg  0.4 mg SubLINGual Q5MIN PRN    acetaminophen (TYLENOL) tablet 650 mg  650 mg Oral Q4H PRN    oxyCODONE-acetaminophen (PERCOCET) 5-325 mg per tablet 1 Tab  1 Tab Oral Q4H PRN    allopurinol (ZYLOPRIM) tablet 100 mg  100 mg Oral BID    aspirin delayed-release tablet 81 mg  81 mg Oral DAILY    atenolol (TENORMIN) tablet 100 mg  100 mg Oral DAILY    gabapentin (NEURONTIN) capsule 300 mg  300 mg Oral TID    insulin glargine (LANTUS) injection 42 Units  42 Units SubCUTAneous QHS    ondansetron hcl (ZOFRAN) tablet 8 mg  8 mg Oral Q8H PRN    rivaroxaban (XARELTO) tablet 15 mg  15 mg Oral DAILY WITH BREAKFAST    oxyCODONE-acetaminophen (PERCOCET) 5-325 mg per tablet 1 Tab  1 Tab Oral Q4H PRN    simvastatin (ZOCOR) tablet 10 mg  10 mg Oral QHS    traZODone (DESYREL) tablet 50 mg  50 mg Oral QHS PRN    amLODIPine (NORVASC) tablet 5 mg  5 mg Oral DAILY    insulin lispro (HUMALOG) injection   SubCUTAneous AC&HS    glucose chewable tablet 16 g  4 Tab Oral PRN    glucagon (GLUCAGEN) injection 1 mg  1 mg IntraMUSCular PRN    dextrose (D50W) injection syrg 12.5-25 g  25-50 mL IntraVENous PRN    acyclovir (ZOVIRAX) capsule 400 mg  400 mg Oral TID    diphenhydrAMINE (BENADRYL) capsule 25 mg  25 mg Oral Q6H PRN    hydrALAZINE (APRESOLINE) 20 mg/mL injection 10 mg  10 mg IntraVENous Q6H PRN       Recent Results (from the past 24 hour(s))   CARDIAC PANEL,(CK, CKMB & TROPONIN)    Collection Time: 05/24/19 11:00 AM   Result Value Ref Range     (H) 26 - 192 U/L    CK - MB 5.5 (H) <3.6 ng/ml    CK-MB Index 1.7 0.0 - 4.0 %    Troponin-I, QT <0.02 0.0 - 0.045 NG/ML   GLUCOSE, POC    Collection Time: 05/24/19 12:10 PM   Result Value Ref Range    Glucose (POC) 132 (H) 70 - 110 mg/dL   CARDIAC PANEL,(CK, CKMB & TROPONIN)    Collection Time: 05/24/19  6:10 PM   Result Value Ref Range     (H) 26 - 192 U/L    CK - MB 4.8 (H) <3.6 ng/ml    CK-MB Index 1.6 0.0 - 4.0 %    Troponin-I, QT <0.02 0.0 - 0.045 NG/ML   GLUCOSE, POC    Collection Time: 05/24/19  9:29 PM   Result Value Ref Range    Glucose (POC) 161 (H) 70 - 768 mg/dL   METABOLIC PANEL, COMPREHENSIVE    Collection Time: 05/25/19  3:20 AM   Result Value Ref Range    Sodium 142 136 - 145 mmol/L    Potassium 4.5 3.5 - 5.5 mmol/L    Chloride 104 100 - 108 mmol/L    CO2 29 21 - 32 mmol/L    Anion gap 9 3.0 - 18 mmol/L    Glucose 83 74 - 99 mg/dL    BUN 16 7.0 - 18 MG/DL    Creatinine 0.91 0.6 - 1.3 MG/DL    BUN/Creatinine ratio 18 12 - 20      GFR est AA >60 >60 ml/min/1.73m2    GFR est non-AA >60 >60 ml/min/1.73m2    Calcium 8.9 8.5 - 10.1 MG/DL    Bilirubin, total 0.3 0.2 - 1.0 MG/DL    ALT (SGPT) 19 13 - 56 U/L    AST (SGOT) 18 15 - 37 U/L    Alk. phosphatase 80 45 - 117 U/L    Protein, total 6.9 6.4 - 8.2 g/dL    Albumin 3.1 (L) 3.4 - 5.0 g/dL    Globulin 3.8 2.0 - 4.0 g/dL    A-G Ratio 0.8 0.8 - 1.7     CBC WITH AUTOMATED DIFF    Collection Time: 05/25/19  3:20 AM   Result Value Ref Range    WBC 5.7 4.6 - 13.2 K/uL    RBC 4.46 4.20 - 5.30 M/uL    HGB 12.6 12.0 - 16.0 g/dL    HCT 38.5 35.0 - 45.0 %    MCV 86.3 74.0 - 97.0 FL    MCH 28.3 24.0 - 34.0 PG    MCHC 32.7 31.0 - 37.0 g/dL    RDW 13.1 11.6 - 14.5 %    PLATELET 870 539 - 350 K/uL    MPV 9.8 9.2 - 11.8 FL    NEUTROPHILS 40 40 - 73 %    LYMPHOCYTES 46 21 - 52 %    MONOCYTES 10 3 - 10 %    EOSINOPHILS 3 0 - 5 %    BASOPHILS 1 0 - 2 %    ABS. NEUTROPHILS 2.3 1.8 - 8.0 K/UL    ABS. LYMPHOCYTES 2.5 0.9 - 3.6 K/UL    ABS. MONOCYTES 0.6 0.05 - 1.2 K/UL    ABS. EOSINOPHILS 0.2 0.0 - 0.4 K/UL    ABS.  BASOPHILS 0.1 0.0 - 0.1 K/UL    PLATELET COMMENTS LARGE PLATELETS      RBC COMMENTS NORMOCYTIC, NORMOCHROMIC      DF AUTOMATED     MAGNESIUM    Collection Time: 05/25/19  3:20 AM   Result Value Ref Range    Magnesium 2.2 1.6 - 2.6 mg/dL   LIPID PANEL    Collection Time: 05/25/19  3:20 AM   Result Value Ref Range    LIPID PROFILE          Cholesterol, total 136 <200 MG/DL    Triglyceride 79 <150 MG/DL    HDL Cholesterol 48 40 - 60 MG/DL    LDL, calculated 72.2 0 - 100 MG/DL    VLDL, calculated 15.8 MG/DL    CHOL/HDL Ratio 2.8 0 - 5.0     GLUCOSE, POC    Collection Time: 05/25/19  6:28 AM   Result Value Ref Range    Glucose (POC) 95 70 - 110 mg/dL       Procedures/imaging: see electronic medical records for all procedures/Xrays and details which were not copied into this note but were reviewed prior to creation of Adam Cuellar MD   5/25/2019, 10:49 AM

## 2019-05-26 VITALS
WEIGHT: 261.4 LBS | HEIGHT: 64 IN | RESPIRATION RATE: 17 BRPM | HEART RATE: 60 BPM | BODY MASS INDEX: 44.63 KG/M2 | SYSTOLIC BLOOD PRESSURE: 133 MMHG | DIASTOLIC BLOOD PRESSURE: 45 MMHG | OXYGEN SATURATION: 100 % | TEMPERATURE: 97.5 F

## 2019-05-26 LAB
GLUCOSE BLD STRIP.AUTO-MCNC: 141 MG/DL (ref 70–110)
GLUCOSE BLD STRIP.AUTO-MCNC: 98 MG/DL (ref 70–110)

## 2019-05-26 PROCEDURE — 82962 GLUCOSE BLOOD TEST: CPT

## 2019-05-26 PROCEDURE — 74011250637 HC RX REV CODE- 250/637: Performed by: INTERNAL MEDICINE

## 2019-05-26 PROCEDURE — 99218 HC RM OBSERVATION: CPT

## 2019-05-26 RX ORDER — OMEPRAZOLE 20 MG/1
20 CAPSULE, DELAYED RELEASE ORAL 2 TIMES DAILY
Qty: 30 CAP | Refills: 0 | Status: SHIPPED | OUTPATIENT
Start: 2019-05-26

## 2019-05-26 RX ADMIN — AMLODIPINE BESYLATE 5 MG: 5 TABLET ORAL at 09:19

## 2019-05-26 RX ADMIN — ASPIRIN 81 MG: 81 TABLET, COATED ORAL at 09:19

## 2019-05-26 RX ADMIN — ALLOPURINOL 100 MG: 100 TABLET ORAL at 09:19

## 2019-05-26 RX ADMIN — OXYCODONE HYDROCHLORIDE AND ACETAMINOPHEN 1 TABLET: 5; 325 TABLET ORAL at 04:23

## 2019-05-26 RX ADMIN — ONDANSETRON HYDROCHLORIDE 8 MG: 4 TABLET, FILM COATED ORAL at 09:18

## 2019-05-26 RX ADMIN — ATENOLOL 100 MG: 50 TABLET ORAL at 09:18

## 2019-05-26 RX ADMIN — ACYCLOVIR 400 MG: 200 CAPSULE ORAL at 09:18

## 2019-05-26 RX ADMIN — OMEPRAZOLE 20 MG: 20 CAPSULE, DELAYED RELEASE ORAL at 09:19

## 2019-05-26 RX ADMIN — OXYCODONE HYDROCHLORIDE AND ACETAMINOPHEN 1 TABLET: 5; 325 TABLET ORAL at 09:19

## 2019-05-26 RX ADMIN — RIVAROXABAN 15 MG: 15 TABLET, FILM COATED ORAL at 09:19

## 2019-05-26 RX ADMIN — DIPHENHYDRAMINE HYDROCHLORIDE 25 MG: 25 CAPSULE ORAL at 04:23

## 2019-05-26 NOTE — ROUTINE PROCESS
1932-Assumed patient care from off going nurse Cipriano Dominguez. Patient resting quietly in bed, NAD noted, bed in lowest position, and call light within reach. 2100-Patient noted to have small amount of brown emesis. Zofran given at 2026 with effective results. Patient sitting quietly in bed at this time and is in no acute distress. Will continue to monitor. 0500-Benadryl given per request at 0423 for complaints of itching and Percocet given for complaint of back pain. Both had effective results. Patient lying in bed sleeping at this time and appears to be in no acute distress. Call light is within reach and will continue to monitor. 0740-Purposeful hourly rounding throughout shift, NAD noted at this time and patient is resting quietly in bed. No concerns or requests voiced. Bedside and Verbal shift change report given to Lisa Tapia RN (oncoming nurse) by Sammy Steiner (offgoing nurse). Report included the following information SBAR, Kardex, MAR and Recent Results.

## 2019-05-26 NOTE — PROGRESS NOTES
0730:Received verbal bedside report from off going nurse KRIS Rand R.N. Patient care received. Patient alert and oriented x 4. Patient resting in bed. Patient stable. Call light with in reach bed in lowest position.

## 2019-05-26 NOTE — ROUTINE PROCESS
Dual AVS reviewed with MICHAEL Hackett R.N. All medications reviewed individually with patient. Opportunities for questions and concerns provided. Patient discharged via (mode of transport ie. Car, ambulance or air transport) Car  Patient's arm band appropriately discarded.

## 2019-05-26 NOTE — DISCHARGE SUMMARY
Discharge Summary    Patient: Jenifer Doss MRN: [id number]  CSN: [id number]    YOB: 1945  Age: 72 y.o.   Sex: female    DOA: 5/23/2019 LOS:  LOS: 0 days   Discharge Date:      Primary Care Provider:  Osmar Luna MD    Admission Diagnoses: Acute chest pain [R07.9]  Diabetes [facility]) [E11.9]    Discharge Diagnoses:    Problem List as of 5/26/2019 Date Reviewed: 12/16/2018          Codes Class Noted - Resolved    Diabetes ([facility] ICD-10-CM: E11.9  ICD-9-CM: 250.00  5/24/2019 - Present        Acute chest pain ICD-10-CM: R07.9  ICD-9-CM: 786.50  5/24/2019 - Present        Vomiting ICD-10-CM: R11.10  ICD-9-CM: 787.03  12/15/2018 - Present        Pyelonephritis ICD-10-CM: N12  ICD-9-CM: 590.80  12/15/2018 - Present        Intractable vomiting with nausea ICD-10-CM: R11.2  ICD-9-CM: 536.2  12/15/2018 - Present        CAD (coronary artery disease) ICD-10-CM: I25.10  ICD-9-CM: 414.00  12/3/2018 - Present        Hypertension ICD-10-CM: I10  ICD-9-CM: 401.9  12/3/2018 - Present        Insulin dependent diabetes mellitus with complications ([facility] [id number]: E11.8, Z79.4  ICD-9-CM: 250.90, V58.67  12/3/2018 - Present        Gastrointestinal disorder ICD-10-CM: K92.9  ICD-9-CM: 569.9  12/3/2018 - Present        Acute DVT of left tibial vein ([facility] ICD-10-CM: Y60.431  ICD-9-CM: 453.42  12/3/2018 - Present        Morbid obesity with BMI of 40.0-44.9, adult ([facility] ICD-10-CM: E66.01, Z68.41  ICD-9-CM: 278.01, V85.41  12/3/2018 - Present        Gout ICD-10-CM: M10.9  ICD-9-CM: 274.9  12/3/2018 - Present        RESOLVED: UTI (urinary tract infection) ICD-10-CM: N39.0  ICD-9-CM: 599.0  12/15/2018 - 12/15/2018        RESOLVED: Knee pain ICD-10-CM: M25.569  ICD-9-CM: 719.46  12/5/2018 - 12/15/2018        RESOLVED: Back pain ICD-10-CM: M54.9  ICD-9-CM: 724.5  12/5/2018 - 12/15/2018        RESOLVED: Chest pain ICD-10-CM: R07.9  ICD-9-CM: 786.50  12/3/2018 - 12/15/2018        RESOLVED: Acute bronchitis ICD-10-CM: J20.9  ICD-9-CM: 466.0  9/1/2012 - 12/15/2018        RESOLVED: Leg muscle spasm ICD-10-CM: B46.120  ICD-9-CM: 728.85  9/1/2012 - 12/15/2018              Discharge Medications:     Current Discharge Medication List      START taking these medications    Details   omeprazole (PRILOSEC) 20 mg capsule Take 1 Cap by mouth two (2) times a day. Qty: 30 Cap, Refills: 0         CONTINUE these medications which have NOT CHANGED    Details   rivaroxaban (XARELTO) 15 mg (42)- 20 mg (9) DsPk Take one 15 mg tablet twice a day with food for the first 21 days. Then, take one 20 mg tablet once a day with food for 9 days. Qty: 1 Dose Pack, Refills: 0      oxyCODONE-acetaminophen (PERCOCET) 5-325 mg per tablet Take 1 Tab by mouth every four (4) hours as needed for Pain for up to 7 days. Max Daily Amount: 6 Tabs. Qty: 12 Tab, Refills: 0    Associated Diagnoses: Pyelonephritis      diclofenac (VOLTAREN) 1 % gel Apply 4 g to affected area four (4) times daily. Qty: 100 g, Refills: 1      ondansetron hcl (ZOFRAN) 8 mg tablet Take 1 Tab by mouth every eight (8) hours as needed for Nausea. Qty: 10 Tab, Refills: 0      aspirin delayed-release 81 mg tablet Take 1 Tab by mouth daily. Qty: 30 Tab, Refills: 0      atenolol (TENORMIN) 100 mg tablet Take 100 mg by mouth daily. traZODone (DESYREL) 50 mg tablet Take 50 mg by mouth nightly as needed for Sleep.      simvastatin (ZOCOR) 10 mg tablet Take 10 mg by mouth nightly. metFORMIN (GLUCOPHAGE) 500 mg tablet Take 500 mg by mouth two (2) times daily (with meals). multivitamin, tx-iron-ca-min (THERA-M W/ IRON) 9 mg iron-400 mcg tab tablet Take 1 Tab by mouth daily. Formulary substitution for DAILY MULTIVITAMIN      insulin glargine (LANTUS U-100 INSULIN) 100 unit/mL injection 45 Units by SubCUTAneous route nightly. Patient states she takes 45 units and not 42      Difluprednate (DUREZOL) 0.05 % ophthalmic emulsion Administer 1 Drop to right eye four (4) times daily.  In relation to cataract surgery             Discharge Condition: Good    Procedures :      Consults: Cardiology      PHYSICAL EXAM    Visit Vitals  /45 (BP 1 Location: Right arm, BP Patient Position: At rest)   Pulse 60   Temp 97.5 °F (36.4 °C)   Resp 17   Ht 5' 4\" (1.626 m)   Wt 118.6 kg (261 lb 6.4 oz)   SpO2 100%   BMI 44.87 kg/m²     General: Awake, cooperative, no acute distress    HEENT: NC, Atraumatic. PERRLA, EOMI. Anicteric sclerae. Lungs:  CTA Bilaterally. No Wheezing/Rhonchi/Rales. Heart:  Regular  rhythm,  No murmur, No Rubs, No Gallops  Abdomen: Soft, Non distended, Non tender. +Bowel sounds,   Extremities: No c/c/e  Psych:   Not anxious or agitated. Neurologic:  No acute neurological deficits. Admission HPI :         HPI:      Shant Carrero is a 72 y.o.  female who has hx of CAD cath  circumflex 100 percent stenosis, DM HTN  DVT presents to ER with intermittent chest pain and pressure associated with dyspnea progressive over the last 2 days  Her brother whom she was very close too  few weeks ago and has been stressed; her chest pain feels similar to her MI back in   Has hx of DVT and has been compliant with her Xarelto  Denies worsening lower leg edema or recent long trips  In ER given aspirin nitro with some relief but found to be hypertensive and bradycardia   On regimen of Atenolol              Past Medical History:   Diagnosis Date    CAD (coronary artery disease)      Diabetes ([facility]      Gastrointestinal disorder      Gout      Hypertension      MI (myocardial infarction) ([facility]      Reflux                 Past Surgical History:   Procedure Laterality Date    CARDIAC SURG PROCEDURE UNLIST       [place] [facility]              [facility] Course :   Chest pain - appears to be atypical  -? GERd  cardiac enzymes in normal range. Continue with aspirin and betablocker. d/w cardiology -- will do NST as outpt.  Pt instructed to call PCP to schedule.      GERD -- cont Omeprazole -- appears to be helping     DM - continue on lantus,    HTN -  meds reviewed. Cont current rx. She will fu with pcp in 2-3 days     History of DVT - continue with xarelto.     Gout - continue allopurinol.     Continue acyclovir for oral ulcer.     Activity: Activity as tolerated    Diet: Cardiac Diet and Diabetic Diet    Follow-up:      Disposition: home  Minutes spent on discharge:  50      Labs: Results:       Chemistry Recent Labs     05/25/19  0320 05/23/19  1850   GLU 83 113*    137   K 4.5 4.2    103   CO2 29 29   BUN 16 19*   CREA 0.91 0.89   CA 8.9 8.8   AGAP 9 5   BUCR 18 21*   AP 80 84   TP 6.9 7.7   ALB 3.1* 3.0*   GLOB 3.8 4.7*   AGRAT 0.8 0.6*      CBC w/Diff Recent Labs     05/25/19  0320 05/23/19  1850   WBC 5.7 5.6   RBC 4.46 4.58   HGB 12.6 13.0   HCT 38.5 39.6    241   GRANS 40 53   LYMPH 46 37   EOS 3 2      Cardiac Enzymes Recent Labs     05/24/19  1810 05/24/19  1100   * 316*   CKND1 1.6 1.7      Coagulation No results for input(s): PTP, INR, APTT in the last 72 hours. No lab exists for component: INREXT    Lipid Panel Lab Results   Component Value Date/Time    Cholesterol, total 136 05/25/2019 03:20 AM    HDL Cholesterol 48 05/25/2019 03:20 AM    LDL, calculated 72.2 05/25/2019 03:20 AM    VLDL, calculated 15.8 05/25/2019 03:20 AM    Triglyceride 79 05/25/2019 03:20 AM    CHOL/HDL Ratio 2.8 05/25/2019 03:20 AM      BNP No results for input(s): BNPP in the last 72 hours.    Liver Enzymes Recent Labs     05/25/19  0320   TP 6.9   ALB 3.1*   AP 80   SGOT 18      Thyroid Studies No results found for: T4, T3U, TSH, TSHEXT         Significant Diagnostic Studies: Xr Knee Rt Min 4 V    Result Date: 5/22/2019  EXAM: RIGHT KNEE RADIOGRAPHS CLINICAL INDICATION/HISTORY: Right knee pain and swelling -Additional: None COMPARISON: None TECHNIQUE: 4 views of the right knee _______________ FINDINGS: BONES: Osseous alignment is as expected on the provided projections. There is tricompartmental right knee joint osteoarthritis, greatest across the medial tibiofemoral compartment. No fracture. SOFT TISSUES: Small joint effusion. No retained radiopaque foreign object. _______________     1. Tricompartmental right knee joint osteoarthritis without evidence of fracture or acute malalignment. 2. Small right knee joint effusion    Cta Chest W Or W Wo Cont    Result Date: 5/24/2019  EXAM: CTA chest CLINICAL INDICATION/HISTORY:  Chest pain. COMPARISON: 12/15/2018. TECHNIQUE: Axial CT imaging from the thoracic inlet through the diaphragm with intravenous contrast. Coronal and sagittal MIP reformats were generated. One or more dose reduction techniques were used on this CT: automated exposure control, adjustment of the mAs and/or kVp according to patient size, and iterative reconstruction techniques. The specific techniques used on this CT exam have been documented in the patient's electronic medical record. Digital Imaging and Communications in Medicine (DICOM) format image data are available to nonaffiliated external healthcare facilities or entities on a secure, media free, reciprocally searchable basis with patient authorization for at least a 12-month period after this study. _______________ FINDINGS: EXAM QUALITY: Adequate opacification of the pulmonary arteries. PULMONARY ARTERIES: No evidence of pulmonary embolism. MEDIASTINUM: Normal heart size. No evidence of right heart strain. Aorta is unremarkable. No pericardial effusion. LUNGS: No suspicious nodule or mass. Mild subsegmental atelectasis in the dependent lungs but no focal consolidative process. PLEURA: Normal. AIRWAY: Normal. LYMPH NODES: No enlarged nodes. UPPER ABDOMEN: Unremarkable. OTHER: No acute or aggressive osseous abnormalities identified. SUPERFICIAL SOFT TISSUES: Unremarkable. _______________     1. No evidence of pulmonary embolism.  2. No focal consolidative process in the chest.    Xr Chest Port    Result Date: 5/24/2019  CHEST AP PORTABLE Indication: Chest pain. Comparison: 12/03/2018. Findings: The lungs appear clear. The cardiac silhouette is slightly prominent and pulmonary vascularity appear within normal limits. No evidence for pneumothorax or pleural effusion. Impression: No acute cardiopulmonary disease. Borderline cardiomegaly. No results found for this or any previous visit.         CC: Pastor Norton MD

## 2019-05-26 NOTE — PROGRESS NOTES
Problem: Falls - Risk of  Goal: *Absence of Falls  Description  Document William Brunson Fall Risk and appropriate interventions in the flowsheet. Outcome: Progressing Towards Goal     Problem: Patient Education: Go to Patient Education Activity  Goal: Patient/Family Education  Outcome: Progressing Towards Goal     Problem: Diabetes Self-Management  Goal: *Disease process and treatment process  Description  Define diabetes and identify own type of diabetes; list 3 options for treating diabetes. Outcome: Progressing Towards Goal  Goal: *Incorporating nutritional management into lifestyle  Description  Describe effect of type, amount and timing of food on blood glucose; list 3 methods for planning meals. Outcome: Progressing Towards Goal  Goal: *Incorporating physical activity into lifestyle  Description  State effect of exercise on blood glucose levels. Outcome: Progressing Towards Goal  Goal: *Developing strategies to promote health/change behavior  Description  Define the ABC's of diabetes; identify appropriate screenings, schedule and personal plan for screenings. Outcome: Progressing Towards Goal  Goal: *Using medications safely  Description  State effect of diabetes medications on diabetes; name diabetes medication taking, action and side effects. Outcome: Progressing Towards Goal  Goal: *Monitoring blood glucose, interpreting and using results  Description  Identify recommended blood glucose targets  and personal targets. Outcome: Progressing Towards Goal  Goal: *Prevention, detection, treatment of acute complications  Description  List symptoms of hyper- and hypoglycemia; describe how to treat low blood sugar and actions for lowering  high blood glucose level.   Outcome: Progressing Towards Goal     Problem: Patient Education: Go to Patient Education Activity  Goal: Patient/Family Education  Outcome: Progressing Towards Goal     Problem: Unstable angina/NSTEMI: Day of Admission/Day 1  Goal: Treatments/Interventions/Procedures  Outcome: Progressing Towards Goal

## 2019-05-26 NOTE — DISCHARGE INSTRUCTIONS
Patient Education        Angina: Care Instructions  Your Care Instructions    You have a problem called angina. Angina happens when there is not enough blood flow to your heart muscle. Angina is a sign of coronary artery disease (CAD). CAD occurs when blood vessels that supply the heart become narrowed. Having CAD increases your risk of a heart attack. Chest pain or pressure is the most common symptom of angina. But some people have other symptoms, like:  · Pain, pressure, or a strange feeling in the back, neck, jaw, or upper belly, or in one or both shoulders or arms. · Shortness of breath. · Nausea or vomiting. · Lightheadedness or sudden weakness. · Fast or irregular heartbeat. Women are somewhat more likely than men to have angina symptoms like shortness of breath, nausea, and back or jaw pain. Angina can be dangerous. That's why it is important to pay attention to your symptoms. Know what is typical for you, learn how to control your symptoms, and understand when you need to get treatment. A change in your usual pattern of symptoms is an emergency. It may mean that you are having a heart attack. The doctor has checked you carefully, but problems can develop later. If you notice any problems or new symptoms, get medical treatment right away. Follow-up care is a key part of your treatment and safety. Be sure to make and go to all appointments, and call your doctor if you are having problems. It's also a good idea to know your test results and keep a list of the medicines you take. How can you care for yourself at home? Medicines    · If your doctor has given you nitroglycerin for angina symptoms, keep it with you at all times. If you have symptoms, sit down and rest, and take the first dose of nitroglycerin as directed. If your symptoms get worse or are not getting better within 5 minutes, call 911 right away. Stay on the phone.  The emergency  will give you further instructions.     · If your doctor advises it, take 1 low-dose aspirin a day to prevent heart attack.     · Be safe with medicines. Take your medicines exactly as prescribed. Call your doctor if you think you are having a problem with your medicine. You will get more details on the specific medicines your doctor prescribes.    Lifestyle changes    · Do not smoke. If you need help quitting, talk to your doctor about stop-smoking programs and medicines. These can increase your chances of quitting for good.     · Eat a heart-healthy diet that is low in saturated fat and salt, and is high in fiber. Talk to your doctor or a dietitian about healthy eating.     · Stay at a healthy weight. Or lose weight if you need to. Activity    · Talk to your doctor about a level of activity that is safe for you.     · If an activity causes angina symptoms, stop and rest.   When should you call for help? Call 911 anytime you think you may need emergency care. For example, call if:    · You passed out (lost consciousness).     · You have symptoms of a heart attack. These may include:  ? Chest pain or pressure, or a strange feeling in the chest.  ? Sweating. ? Shortness of breath. ? Nausea or vomiting. ? Pain, pressure, or a strange feeling in the back, neck, jaw, or upper belly or in one or both shoulders or arms. ? Lightheadedness or sudden weakness. ? A fast or irregular heartbeat. After you call 911, the  may tell you to chew 1 adult-strength or 2 to 4 low-dose aspirin. Wait for an ambulance.  Do not try to drive yourself.     · You have angina symptoms that do not go away with rest or are not getting better within 5 minutes after you take a dose of nitroglycerin.    Call your doctor now or seek immediate medical care if:    · You are having angina symptoms more often than usual, or they are different or worse than usual.     · You feel dizzy or lightheaded, or you feel like you may faint.    Watch closely for changes in your health, and be sure to contact your doctor if you have any problems. Where can you learn more? Go to http://ebony-marci.info/. Enter H129 in the search box to learn more about \"Angina: Care Instructions. \"  Current as of: July 22, 2018  Content Version: 11.9  © 2355-5062 [employer]. Care instructions adapted under license by [employer] (which disclaims liability or warranty for this information). If you have questions about a medical condition or this instruction, always ask your healthcare professional. [place] [address] any warranty or liability for your use of this information. Patient Education        Coronary Artery Disease: Care Instructions  Your Care Instructions    The heart is a muscle, and like any muscle, it needs blood to work well. Coronary artery disease occurs when the arteries that bring oxygen-rich blood to your heart have a buildup of plaque--deposits of fats and other substances. Plaque can reduce blood flow to the heart muscle. This can cause angina symptoms such as chest pain or pressure. A heart attack can happen if blood flow is completely blocked. You can do a lot to improve your health and prevent a heart attack. Eating healthy food, not smoking, getting regular exercise, and taking your medicine are the main things you can do every day to stay healthy. Follow-up care is a key part of your treatment and safety. Be sure to make and go to all appointments, and call your doctor if you are having problems. It's also a good idea to know your test results and keep a list of the medicines you take. How can you care for yourself at home? Medicines    · Be safe with medicines. Take your medicines exactly as prescribed. Call your doctor if you think you are having a problem with your medicine. You will get more details on the specific medicines your doctor prescribes. You may need several medicines. ?  Angiotensin-converting enzyme (ACE) inhibitors, angiotensin II receptor blockers (ARBs), beta-blockers, and statins can help prevent a heart attack. ACE inhibitors, ARBs, and beta-blockers help lower your blood pressure. Statins help lower cholesterol, which is a type of fat that can clog your arteries. ? Nitrates can help make chest pain happen less often. ? Aspirin and other blood thinners help prevent heart attacks and strokes.     · If your doctor has given you nitroglycerin for angina symptoms (such as chest pain or pressure) keep it with you at all times. If you have symptoms, sit down and rest, and take the first dose of nitroglycerin as directed. If your symptoms get worse or are not getting better within 5 minutes, call 911 right away. Stay on the phone. The emergency  will give you further instructions.     · Be sure to tell your doctor about any angina symptoms that you have had, even if they went away.     · Do not take any over-the-counter medicines, vitamins, or herbal products without talking to your doctor first.   [place] your doctor if a cardiac rehab program is right for you. Cardiac rehab can help you make lifestyle changes. In cardiac rehab, a team of health professionals provides education and support to help you make new, healthy habits.    · Do not smoke. Avoid secondhand smoke too. Smoking can increase your risk of a heart attack or stroke. If you need help quitting, talk to your doctor about stop-smoking programs and medicines. These can increase your chances of quitting for good.     · Eat a heart-healthy diet that is high in fiber and low in saturated fat and sodium. ? Learn what a serving is. A \"serving\" and a \"portion\" are not always the same thing. Make sure that you are not eating larger portions than recommended. For example, a serving of pasta is ½ cup. A serving size of meat is 2 to 3 ounces; a 3-ounce serving is about the size of a deck of cards. ? Eat a variety of grain products every day. Include whole-grain foods such as oats, whole wheat bread, and brown rice. ? Eat fish, skinless poultry, lean meats, and soy products such as tofu instead of high-fat meats. Cut out all visible fat when you prepare meat. Eat at least 2 servings of fish a week. ? Eat a variety of fruit and vegetables every day. They have lots of nutrients that help protect against heart disease, and they have little--if any--fat. Keep carrots, celery, and other veggies handy for snacks. Buy fruit that is in season and store it where you can see it so that you will be tempted to eat it. Cook dishes that have a lot of veggies in them, such as stir-fried dishes and soups. ? Read food labels and try to avoid saturated fat and trans fat. They increase your risk of heart disease. Bake, broil, grill, or steam foods instead of frying them. Use olive or canola oil when you cook. Try cholesterol-lowering spreads, such as Benecol or Take Control. ? Limit sodium. Your doctor may recommend that you limit sodium to less than 1,500 mg a day. ? Limit processed foods, including cookies and crackers. ? Limit drinks and foods with added sugar. ? Choose low-fat or fat-free milk and dairy products. ? Limit alcohol to 2 drinks a day for men and 1 drink a day for women. Too much alcohol can cause health problems.     · If your doctor recommends it, get more exercise. Walking is a good choice. Bit by bit, increase the amount you walk every day. Try for at least 30 minutes on most days of the week. You also may want to swim, bike, or do other activities.     · Stay at a healthy weight. Lose weight if you need to.     · Talk to your family, friends, or a therapist about your feelings. It is normal to feel upset about having this disease and to feel afraid of having a heart attack. Talking openly about your feelings can help you cope. If you think you have symptoms of depression, talk to your doctor.     · Avoid colds and flu.  Get a pneumococcal vaccine shot. If you have had one before, ask your doctor whether you need another dose. Get a flu vaccine every year. If you must be around people with colds or flu, wash your hands often. When should you call for help? Call 911 anytime you think you may need emergency care. For example, call if:    · You have symptoms of a heart attack. These may include:  ? Chest pain or pressure, or a strange feeling in the chest.  ? Sweating. ? Shortness of breath. ? Nausea or vomiting. ? Pain, pressure, or a strange feeling in the back, neck, jaw, or upper belly or in one or both shoulders or arms. ? Lightheadedness or sudden weakness. ? A fast or irregular heartbeat. After you call 911, the  may tell you to chew 1 adult-strength or 2 to 4 low-dose aspirin. Wait for an ambulance. Do not try to drive yourself.     · You have angina symptoms (such as chest pain or pressure) that do not go away with rest or are not getting better within 5 minutes after you take a dose of nitroglycerin.     · You passed out (lost consciousness).    Call your doctor now or seek immediate medical care if:    · You are having angina symptoms, such as chest pain or pressure, more often than usual, or they are different or worse than usual.     · You have new or increased shortness of breath.     · You are dizzy or lightheaded, or you feel like you may faint.    Watch closely for changes in your health, and be sure to contact your doctor if you have any problems. Where can you learn more? Go to http://ebony-marci.info/. Enter V773 in the search box to learn more about \"Coronary Artery Disease: Care Instructions. \"  Current as of: July 22, 2018  Content Version: 11.9  © 2245-0649 [employer]. Care instructions adapted under license by [employer] (which disclaims liability or warranty for this information).  If you have questions about a medical condition or this instruction, always ask your healthcare professional. [address] any warranty or liability for your use of this information. Patient Education        Learning About Meal Planning for Diabetes  Why plan your meals? Meal planning can be a key part of managing diabetes. Planning meals and snacks with the right balance of carbohydrate, protein, and fat can help you keep your blood sugar at the target level you set with your doctor. You don't have to eat special foods. You can eat what your family eats, including sweets once in a while. But you do have to pay attention to how often you eat and how much you eat of certain foods. You may want to work with a dietitian or a certified diabetes educator. He or she can give you tips and meal ideas and can answer your questions about meal planning. This health professional can also help you reach a healthy weight if that is one of your goals. What plan is right for you? Your dietitian or diabetes educator may suggest that you start with the plate format or carbohydrate counting. The plate format  The plate format is a simple way to help you manage how you eat. You plan meals by learning how much space each food should take on a plate. Using the plate format helps you spread carbohydrate throughout the day. It can make it easier to keep your blood sugar level within your target range. It also helps you see if you're eating healthy portion sizes. To use the plate format, you put non-starchy vegetables on half your plate. Add meat or meat substitutes on one-quarter of the plate. Put a grain or starchy vegetable (such as brown rice or a potato) on the final quarter of the plate. You can add a small piece of fruit and some low-fat or fat-free milk or yogurt, depending on your carbohydrate goal for each meal.  Here are some tips for using the plate format:  · Make sure that you are not using an oversized plate.  A 9-inch plate is best. Many restaurants use larger plates. · Get used to using the plate format at home. Then you can use it when you eat out. · Write down your questions about using the plate format. Talk to your doctor, a dietitian, or a diabetes educator about your concerns. Carbohydrate counting  With carbohydrate counting, you plan meals based on the amount of carbohydrate in each food. Carbohydrate raises blood sugar higher and more quickly than any other nutrient. It is found in desserts, breads and cereals, and fruit. It's also found in starchy vegetables such as potatoes and corn, grains such as rice and pasta, and milk and yogurt. Spreading carbohydrate throughout the day helps keep your blood sugar levels within your target range. Your daily amount depends on several things, including your weight, how active you are, which diabetes medicines you take, and what your goals are for your blood sugar levels. A registered dietitian or diabetes educator can help you plan how much carbohydrate to include in each meal and snack. A guideline for your daily amount of carbohydrate is:  · 45 to 60 grams at each meal. That's about the same as 3 to 4 carbohydrate servings. · 15 to 20 grams at each snack. That's about the same as 1 carbohydrate serving. The Nutrition Facts label on packaged foods tells you how much carbohydrate is in a serving of the food. First, look at the serving size on the food label. Is that the amount you eat in a serving? All of the nutrition information on a food label is based on that serving size. So if you eat more or less than that, you'll need to adjust the other numbers. Total carbohydrate is the next thing you need to look for on the label. If you count carbohydrate servings, one serving of carbohydrate is 15 grams. For foods that don't come with labels, such as fresh fruits and vegetables, you'll need a guide that lists carbohydrate in these foods.  Ask your doctor, dietitian, or diabetes educator about books or other nutrition guides you can use. If you take insulin, you need to know how many grams of carbohydrate are in a meal. This lets you know how much rapid-acting insulin to take before you eat. If you use an insulin pump, you get a constant rate of insulin during the day. So the pump must be programmed at meals to give you extra insulin to cover the rise in blood sugar after meals. When you know how much carbohydrate you will eat, you can take the right amount of insulin. Or, if you always use the same amount of insulin, you need to make sure that you eat the same amount of carbohydrate at meals. If you need more help to understand carbohydrate counting and food labels, ask your doctor, dietitian, or diabetes educator. How do you get started with meal planning? Here are some tips to get started:  · Plan your meals a week at a time. Don't forget to include snacks too. · Use cookbooks or online recipes to plan several main meals. Plan some quick meals for busy nights. You also can double some recipes that freeze well. Then you can save half for other busy nights when you don't have time to cook. · Make sure you have the ingredients you need for your recipes. If you're running low on basic items, put these items on your shopping list too. · List foods that you use to make breakfasts, lunches, and snacks. List plenty of fruits and vegetables. · Post this list on the refrigerator. Add to it as you think of more things you need. · Take the list to the store to do your weekly shopping. Follow-up care is a key part of your treatment and safety. Be sure to make and go to all appointments, and call your doctor if you are having problems. It's also a good idea to know your test results and keep a list of the medicines you take. Where can you learn more? Go to http://ebony-marci.info/. Taya Tobar in the search box to learn more about \"Learning About Meal Planning for Diabetes. \"  Current as of: July 25, 2018  Content Version: 11.9  © 0503-1660 [employer]. Care instructions adapted under license by [employer] (which disclaims liability or warranty for this information). If you have questions about a medical condition or this instruction, always ask your healthcare professional. [place] any warranty or liability for your use of this information. DISCHARGE SUMMARY from Nurse    PATIENT INSTRUCTIONS:    After general anesthesia or intravenous sedation, for 24 hours or while taking prescription Narcotics:  · Limit your activities  · Do not drive and operate hazardous machinery  · Do not make important personal or business decisions  · Do  not drink alcoholic beverages  · If you have not urinated within 8 hours after discharge, please contact your surgeon on call. Report the following to your surgeon:  · Excessive pain, swelling, redness or odor of or around the surgical area  · Temperature over 100.5  · Nausea and vomiting lasting longer than 4 hours or if unable to take medications  · Any signs of decreased circulation or nerve impairment to extremity: change in color, persistent  numbness, tingling, coldness or increase pain  · Any questions    What to do at Home:    *  Please give a list of your current medications to your Primary Care Provider. *  Please update this list whenever your medications are discontinued, doses are      changed, or new medications (including over-the-counter products) are added. *  Please carry medication information at all times in case of emergency situations. These are general instructions for a healthy lifestyle:    No smoking/ No tobacco products/ Avoid exposure to second hand smoke  Surgeon General's Warning:  Quitting smoking now greatly reduces serious risk to your health.     Obesity, smoking, and sedentary lifestyle greatly increases your risk for illness    A healthy diet, regular physical exercise & weight monitoring are important for maintaining a healthy lifestyle    You may be retaining fluid if you have a history of heart failure or if you experience any of the following symptoms:  Weight gain of 3 pounds or more overnight or 5 pounds in a week, increased swelling in our hands or feet or shortness of breath while lying flat in bed. Please call your doctor as soon as you notice any of these symptoms; do not wait until your next office visit. Recognize signs and symptoms of STROKE:    F-face looks uneven    A-arms unable to move or move unevenly    S-speech slurred or non-existent    T-time-call 911 as soon as signs and symptoms begin-DO NOT go       Back to bed or wait to see if you get better-TIME IS BRAIN. Warning Signs of HEART ATTACK     Call 911 if you have these symptoms:   Chest discomfort. Most heart attacks involve discomfort in the center of the chest that lasts more than a few minutes, or that goes away and comes back. It can feel like uncomfortable pressure, squeezing, fullness, or pain.  Discomfort in other areas of the upper body. Symptoms can include pain or discomfort in one or both arms, the back, neck, jaw, or stomach.  Shortness of breath with or without chest discomfort.  Other signs may include breaking out in a cold sweat, nausea, or lightheadedness. Don't wait more than five minutes to call 911 - MINUTES MATTER! Fast action can save your life. Calling 911 is almost always the fastest way to get lifesaving treatment. Emergency Medical Services staff can begin treatment when they arrive -- up to an hour sooner than if someone gets to the hospital by car. The discharge information has been reviewed with the patient. The patient verbalized understanding.   Discharge medications reviewed with the patient and appropriate educational materials and side effects teaching were provided.   ___________________________________________________________________________________________________________________________________

## 2019-05-27 ENCOUNTER — HOME CARE VISIT (OUTPATIENT)
Dept: HOME HEALTH SERVICES | Facility: HOME HEALTH | Age: 74
End: 2019-05-27

## 2019-05-30 ENCOUNTER — HOME CARE VISIT (OUTPATIENT)
Dept: SCHEDULING | Facility: HOME HEALTH | Age: 74
End: 2019-05-30

## 2019-05-30 PROCEDURE — G0299 HHS/HOSPICE OF RN EA 15 MIN: HCPCS

## 2019-06-11 ENCOUNTER — APPOINTMENT (OUTPATIENT)
Dept: VASCULAR SURGERY | Age: 74
End: 2019-06-11
Attending: EMERGENCY MEDICINE
Payer: MEDICARE

## 2019-06-11 ENCOUNTER — HOSPITAL ENCOUNTER (EMERGENCY)
Age: 74
Discharge: HOME | End: 2019-06-11
Attending: EMERGENCY MEDICINE
Payer: MEDICARE

## 2019-06-11 VITALS
DIASTOLIC BLOOD PRESSURE: 112 MMHG | SYSTOLIC BLOOD PRESSURE: 135 MMHG | RESPIRATION RATE: 20 BRPM | OXYGEN SATURATION: 97 % | TEMPERATURE: 98.5 F | BODY MASS INDEX: 44.39 KG/M2 | WEIGHT: 260 LBS | HEART RATE: 56 BPM | HEIGHT: 64 IN

## 2019-06-11 DIAGNOSIS — M17.11 ARTHRITIS OF RIGHT KNEE: Primary | ICD-10-CM

## 2019-06-11 DIAGNOSIS — M71.21 SYNOVIAL CYST OF RIGHT POPLITEAL SPACE: ICD-10-CM

## 2019-06-11 DIAGNOSIS — M25.461 EFFUSION OF RIGHT KNEE: ICD-10-CM

## 2019-06-11 PROCEDURE — 99282 EMERGENCY DEPT VISIT SF MDM: CPT

## 2019-06-11 PROCEDURE — 93971 EXTREMITY STUDY: CPT

## 2019-06-11 RX ORDER — HYDROCODONE BITARTRATE AND ACETAMINOPHEN 5; 325 MG/1; MG/1
1 TABLET ORAL
Qty: 16 TAB | Refills: 0 | Status: SHIPPED | OUTPATIENT
Start: 2019-06-11 | End: 2019-06-18

## 2019-06-11 NOTE — ED TRIAGE NOTES
Patient c/o bilateral lower leg swelling x 1 month. Pt reports the swelling comes and goes but now the right knee is very painful.   Pt denies injury/trauma

## 2019-06-11 NOTE — ED NOTES
Patient ambulatory to restroom unassisted with cane. Provided with a warm blanket and remote. Awaiting vascular study.

## 2019-06-11 NOTE — ED PROVIDER NOTES
EMERGENCY DEPARTMENT HISTORY AND PHYSICAL EXAM    Date: 6/11/2019  Patient Name: Wolfgang Griggs    History of Presenting Illness     Chief Complaint   Patient presents with    Ankle swelling       History Provided By: Patient    Additional History (Context):   Wolfgang Griggs is a 72 y.o. female with PMHX insulin-dependent diabetes, DVT on Xarelto, CAD, morbid obesity presents to the emergency department C/O right knee pain and right lower extremity swelling. Stat this is chronic however worsened over the last 2 days. Patient denies any trauma or direct injury to her leg. States that she is been compliant on her Xarelto. Pt denies chest pain, shortness of breath, abdominal pain, vomiting, and any other sxs or complaints. PCP: Jennifer Merino MD    Current Outpatient Medications   Medication Sig Dispense Refill    losartan (COZAAR) 100 mg tablet Take 100 mg by mouth daily. Indications: high blood pressure      rivaroxaban (XARELTO) 15 mg (42)- 20 mg (9) DsPk Take one 15 mg tablet twice a day with food for the first 21 days. Then, take one 20 mg tablet once a day with food for 9 days. 1 Dose Pack 0    atenolol (TENORMIN) 100 mg tablet Take 100 mg by mouth daily.  traZODone (DESYREL) 50 mg tablet Take 50 mg by mouth nightly as needed for Sleep.  simvastatin (ZOCOR) 10 mg tablet Take 10 mg by mouth nightly.  metFORMIN (GLUCOPHAGE) 500 mg tablet Take 500 mg by mouth two (2) times daily (with meals).  multivitamin, tx-iron-ca-min (THERA-M W/ IRON) 9 mg iron-400 mcg tab tablet Take 1 Tab by mouth daily. Formulary substitution for DAILY MULTIVITAMIN      ergocalciferol (VITAMIN D2) 50,000 unit capsule Take 50,000 Units by mouth every Monday.  insulin glargine (LANTUS U-100 INSULIN) 100 unit/mL injection 45 Units by SubCUTAneous route nightly. Patient states she takes 45 units and not 42      allopurinol (ZYLOPRIM) 100 mg tablet Take 100 mg by mouth two (2) times a day.       Difluprednate (DUREZOL) 0.05 % ophthalmic emulsion Administer 1 Drop to right eye four (4) times daily. In relation to cataract surgery      omeprazole (PRILOSEC) 20 mg capsule Take 1 Cap by mouth two (2) times a day. 30 Cap 0    diclofenac (VOLTAREN) 1 % gel Apply 4 g to affected area four (4) times daily. 100 g 1    ondansetron hcl (ZOFRAN) 8 mg tablet Take 1 Tab by mouth every eight (8) hours as needed for Nausea. 10 Tab 0    aspirin delayed-release 81 mg tablet Take 1 Tab by mouth daily. 30 Tab 0    gabapentin (NEURONTIN) 300 mg capsule Take 300 mg by mouth three (3) times daily. Past History     Past Medical History:  Past Medical History:   Diagnosis Date    CAD (coronary artery disease)     [facility] [address]     Gastrointestinal disorder     Gout     Hypertension     MI (myocardial infarction) ([facility] [address]     Reflux        Past Surgical History:  Past Surgical History:   Procedure Laterality Date    CARDIAC SURG PROCEDURE UNLIST      VASCULAR SURGERY PROCEDURE UNLIST         Family History:  History reviewed. No pertinent family history. Social History:  Social History     Tobacco Use    Smoking status: Never Smoker    Smokeless tobacco: Never Used   Substance Use Topics    Alcohol use: No    Drug use: No       Allergies: Allergies   Allergen Reactions    Gabapentin Swelling    Pcn [Penicillins] Angioedema    Lisinopril Swelling         Review of Systems   Review of Systems   Constitutional: Negative for chills and fever. HENT: Negative for congestion, ear pain, sinus pain and sore throat. Eyes: Negative for pain and visual disturbance. Respiratory: Negative for cough and shortness of breath. Cardiovascular: Negative for chest pain and leg swelling. Gastrointestinal: Negative for abdominal pain, constipation, diarrhea, nausea and vomiting. Genitourinary: Negative for dysuria, hematuria, vaginal bleeding and vaginal discharge.    Musculoskeletal: Positive for joint swelling. Negative for back pain and neck pain. Skin: Negative for rash and wound. Neurological: Negative for dizziness, tremors, weakness, light-headedness and numbness. All other systems reviewed and are negative. Physical Exam     Vitals:    06/11/19 1635 06/11/19 1817   BP: (!) 135/112    Pulse:  (!) 56   Resp: 16 20   Temp: 98.5 °F (36.9 °C)    SpO2: 100% 97%   Weight: 117.9 kg (260 lb)    Height: 5' 4\" (1.626 m)      Physical Exam    Nursing note and vitals reviewed    Constitutional: Morbidly obese elderly -American female, no acute distress  Head: Normocephalic, Atraumatic  Eyes: Pupils are equal, round, and reactive to light, EOMI  Neck: Supple, non-tender  Cardiovascular: Regular rate and rhythm, no murmurs, rubs, or gallops  Chest: Normal work of breathing and chest excursion bilaterally  Lungs: Diminished breath sounds bilaterally, no wheezes or rhonchi  Back: No evidence of trauma or deformity  Extremities: No evidence of trauma or deformity, +3 nonpitting edema of her right lower extremity from her foot extending up to her calf, +1 nonpitting edema of her left lower extremity, neurovascularly intact  Skin: Warm and dry, normal cap refill  Neuro: Alert and appropriate, CN intact, normal speech, moving all 4 extremities freely and symmetrically  Psychiatric: Normal mood and affect       Diagnostic Study Results     Labs -   No results found for this or any previous visit (from the past 12 hour(s)).     Radiologic Studies -   No orders to display     CT Results  (Last 48 hours)    None        CXR Results  (Last 48 hours)    None        Interpretation Summary     · No evidence of deep venous thrombosis in the right lower extremity veins assessed on today's exam.  · Non-vascularized structure demonstrated in the right popliteal fossa, measuring 2.45 x 1.08 cm, consistent with Baker's cyst.  · Compared to the previous right lower extremity venous duplex exam of 5/21/2019, there is no significant change. Lower Extremity Venous Findings     Right Lower Venous     Technically difficult exam due to: patient body habitus, marked edema and tissue density. The common femoral, profunda femoral, femoral, popliteal, posterior tibial, peroneal and saphenous femoral junction vein(s) were imaged in the transverse and longitudinal planes. The vessels showed normal color filling and compressibility. Doppler interrogation of the veins showed phasic and spontaneous flow. A non-vascularized structure was visualized in the popliteal fossa, measuring 2.45 x 1.08 cm, consistent with Baker's cyst.         Medical Decision Making   I am the first provider for this patient. I reviewed the vital signs, available nursing notes, past medical history, past surgical history, family history and social history. Vital Signs-Reviewed the patient's vital signs. Pulse Oximetry Analysis -100 % on room air    Records Reviewed: Nursing Notes and Old Medical Records   XR 5/22  IMPRESSION        1. Tricompartmental right knee joint osteoarthritis without evidence of fracture  or acute malalignment. 2. Small right knee joint effusion    Provider Notes:   72 y.o. female presenting with atraumatic right knee pain and right lower extremity swelling. On exam patient is afebrile with appropriate vital signs. She does not appear acutely ill or in acute distress. She has +3 nonpitting edema of her right lower extremity and +1 nonpitting edema of her left lower extremity. Neurovascularly intact. Clinical suspicion for DVT low as patient has been compliant on Xarelto. However will obtain a Doppler ultrasound to evaluate. Although the patient had a Doppler ultrasound less than 1 month ago. At that time patient also had an x-ray which showed ostial arthritis with joint effusion.   Clinical suspicion for dependent edema with arthritis causing her swelling and pain    Procedures:  Procedures    ED Course:   4:43 PM Initial assessment performed. The patients presenting problems have been discussed, and they are in agreement with the care plan formulated and outlined with them. I have encouraged them to ask questions as they arise throughout their visit. No DVT in the right lower extremity. Baker's cyst visualized. Diagnosis and Disposition       DISCHARGE NOTE:  7:14 PM    Jo Courser  results have been reviewed with her. She has been counseled regarding her diagnosis, treatment, and plan. She verbally conveys understanding and agreement of the signs, symptoms, diagnosis, treatment and prognosis and additionally agrees to follow up as discussed. She also agrees with the care-plan and conveys that all of her questions have been answered. I have also provided discharge instructions for her that include: educational information regarding their diagnosis and treatment, and list of reasons why they would want to return to the ED prior to their follow-up appointment, should her condition change. She has been provided with education for proper emergency department utilization. CLINICAL IMPRESSION:    1. Arthritis of right knee    2. Effusion of right knee    3. Synovial cyst of right popliteal space        PLAN:  1. D/C Home  2. Current Discharge Medication List        3.    Follow-up Information     Follow up With Specialties Details Why Contact Info    Linnea Fischer MD Family Practice Schedule an appointment as soon as possible for a visit in 2 days  [address] [facility]. [address] [place] [address]      Jean Strong MD Orthopedic Surgery Schedule an appointment as soon as possible for a visit in 2 days  [facility] [address] [id number] [phone] [address] [facility] DEPT Emergency Medicine  As needed if symptoms worsen [address] [id number]  [phone]        ____________________________________     Please note that this dictation was completed with [employer], the [employer] voice recognition software. Quite often unanticipated grammatical, syntax, homophones, and other interpretive errors are inadvertently transcribed by the computer software. Please disregard these errors. Please excuse any errors that have escaped final proofreading.

## 2019-06-11 NOTE — ED NOTES
Verbal shift change report given to Mel[address] (oncoming nurse) by Genia Canas (offgoing nurse). Report included the following information SBAR, ED Summary, Procedure Summary, Intake/Output and MAR   .

## 2019-06-11 NOTE — DISCHARGE INSTRUCTIONS
You were seen and evaluated in the Emergency Department. Please understand that your work up is not all encompassing and you should follow up with your primary care physician for further management and continuity of care. Please return to Emergency Department or seek medical attention immediately if you have acute worsening in your symptoms or develop chest pain, shortness of breath, repeated vomiting, fever, altered level of consciousness, coughing up blood, or start sweating and feel clammy. If you were prescribed any medicine for home, please take as prescribed by your health-care provider. If you were given any follow-up appointments or numbers to call, please do so as instructed. Avoid any tobacco products or excessive alcohol. Patient Education        Mccoy's Cyst: Care Instructions  Your Care Instructions    A Baker's cyst is a swelling behind the knee. It may cause pain or stiffness when you bend your knee or straighten it all the way. Baker's cysts are also called popliteal cysts. If you have arthritis or another condition that is the cause of the Baker's cyst, your doctor may treat that condition. A Baker's cyst may go away on its own. If not, or if it is causing a lot of discomfort, your doctor may drain the fluid that has built up behind the knee. In some cases, a Baker's cyst is removed in surgery. There are things you can do at home, such as staying off your leg, to reduce the swelling and pain. Follow-up care is a key part of your treatment and safety. Be sure to make and go to all appointments, and call your doctor if you are having problems. It's also a good idea to know your test results and keep a list of the medicines you take. How can you care for yourself at home? · Rest your knee as much as possible. · Ask your doctor if you can take an over-the-counter pain medicine, such as acetaminophen (Tylenol), ibuprofen (Advil, Motrin), or naproxen (Aleve). Be safe with medicines.  Read and follow all instructions on the label. · Use a cane, a crutch, a walker, or another device if you need help to get around. These can help rest your knees. · If you have an elastic bandage, make sure it is snug but not so tight that your leg is numb, tingles, or swells below the bandage. Ask your doctor if you can loosen the bandage if it is too tight. · Follow your doctor's instructions about how much weight you can put on your knee. · Stay at a healthy weight. Being overweight puts extra strain on your knee. When should you call for help? Call 911 anytime you think you may need emergency care. For example, call if:    · You have chest pain, are short of breath, or you cough up blood.    Call your doctor now or seek immediate medical care if:    · You have new or worse pain.     · Your foot is cool or pale or changes color.     · You have tingling, weakness, or numbness in your foot or toes.     · You have signs of a blood clot in your leg (called a deep vein thrombosis), such as:  ? Pain in your calf, back of the knee, thigh, or groin. ? Redness or swelling in your leg.    Watch closely for changes in your health, and be sure to contact your doctor if:    · You do not get better as expected. Where can you learn more? Go to http://ebony-marci.info/. Enter I576 in the search box to learn more about \"Baker's Cyst: Care Instructions. \"  Current as of: September 20, 2018  Content Version: 11.9  © 5034-6755 [employer]. Care instructions adapted under license by [employer] (which disclaims liability or warranty for this information). If you have questions about a medical condition or this instruction, always ask your healthcare professional. [place] [address] any warranty or liability for your use of this information.          Patient Education        Knee Arthritis: Care Instructions  Your Care Instructions    Knee arthritis is a breakdown of the cartilage that cushions your knee joint. When the cartilage wears down, your bones rub against each other. This causes pain and stiffness. Knee arthritis tends to get worse with time. Treatment for knee arthritis involves reducing pain, making the leg muscles stronger, and staying at a healthy body weight. The treatment usually does not improve the health of the cartilage, but it can reduce pain and improve how well your knee works. You can take simple measures to protect your knee joints, ease your pain, and help you stay active. Follow-up care is a key part of your treatment and safety. Be sure to make and go to all appointments, and call your doctor if you are having problems. It's also a good idea to know your test results and keep a list of the medicines you take. How can you care for yourself at home? · Know that knee arthritis will cause more pain on some days than on others. · Stay at a healthy weight. Lose weight if you are overweight. When you stand up, the pressure on your knees from every pound of body weight is multiplied four times. So if you lose 10 pounds, you will reduce the pressure on your knees by 40 pounds. · Talk to your doctor or physical therapist about exercises that will help ease joint pain. ? Stretch to help prevent stiffness and to prevent injury before you exercise. You may enjoy gentle forms of yoga to help keep your knee joints and muscles flexible. ? Walk instead of jog.  ? Ride a bike. This makes your thigh muscles stronger and takes pressure off your knee. ? Wear well-fitting and comfortable shoes. ? Exercise in chest-deep water. This can help you exercise longer with less pain. ? Avoid exercises that include squatting or kneeling. They can put a lot of strain on your knees. ? Talk to your doctor to make sure that the exercise you do is not making the arthritis worse. · Do not sit for long periods of time.  Try to walk once in a while to keep your knee from getting stiff.  · Ask your doctor or physical therapist whether shoe inserts may reduce your arthritis pain. · If you can afford it, get new athletic shoes at least every year. This can help reduce the strain on your knees. · Use a device to help you do everyday activities. ? A cane or walking stick can help you keep your balance when you walk. Hold the cane or walking stick in the hand opposite the painful knee. ? If you feel like you may fall when you walk, try using crutches or a front-wheeled walker. These can prevent falls that could cause more damage to your knee. ? A knee brace may help keep your knee stable and prevent pain. ? You also can use other things to make life easier, such as a higher toilet seat and handrails in the bathtub or shower. · Take pain medicines exactly as directed. ? Do not wait until you are in severe pain. You will get better results if you take it sooner. ? If you are not taking a prescription pain medicine, take an over-the-counter medicine such as acetaminophen (Tylenol), ibuprofen (Advil, Motrin), or naproxen (Aleve). Read and follow all instructions on the label. ? Do not take two or more pain medicines at the same time unless the doctor told you to. Many pain medicines have acetaminophen, which is Tylenol. Too much acetaminophen (Tylenol) can be harmful. ? Tell your doctor if you take a blood thinner, have diabetes, or have allergies to shellfish. · Ask your doctor if you might benefit from a shot of steroid medicine into your knee. This may provide pain relief for several months. · Many people take the supplements glucosamine and chondroitin for osteoarthritis. Some people feel they help, but the medical research does not show that they work. Talk to your doctor before you take these supplements. When should you call for help?   Call your doctor now or seek immediate medical care if:    · You have sudden swelling, warmth, or pain in your knee.     · You have knee pain and a fever or rash.     · You have such bad pain that you cannot use your knee.    Watch closely for changes in your health, and be sure to contact your doctor if you have any problems. Where can you learn more? Go to http://ebony-marci.info/. Enter X527 in the search box to learn more about \"Knee Arthritis: Care Instructions. \"  Current as of: Dyan 10, 2018  Content Version: 11.9  © 2773-7253 [employer], [employer]. Care instructions adapted under license by [employer] (which disclaims liability or warranty for this information). If you have questions about a medical condition or this instruction, always ask your healthcare professional. [address] any warranty or liability for your use of this information.

## 2019-06-11 NOTE — ED NOTES
Patient was seen and evaluated by Dr. Jass Sierra or diagnosed with a knee effusion as well as tricompartmental degenerative arthritis she was discharged however the patient was disappointed she was not delivered pain medications upon discharge. Nursing staff and patient came to made with ask for assistance I wrote her for Vicodin x7 days total number 16 tablets with encouragement to follow-up with her regular doctor.

## 2020-03-03 ENCOUNTER — APPOINTMENT (OUTPATIENT)
Dept: GENERAL RADIOLOGY | Age: 75
End: 2020-03-03
Attending: EMERGENCY MEDICINE
Payer: MEDICARE

## 2020-03-03 ENCOUNTER — HOSPITAL ENCOUNTER (EMERGENCY)
Age: 75
Discharge: HOME | End: 2020-03-03
Attending: EMERGENCY MEDICINE
Payer: MEDICARE

## 2020-03-03 VITALS
WEIGHT: 267 LBS | TEMPERATURE: 98.2 F | BODY MASS INDEX: 45.58 KG/M2 | DIASTOLIC BLOOD PRESSURE: 74 MMHG | SYSTOLIC BLOOD PRESSURE: 145 MMHG | OXYGEN SATURATION: 99 % | RESPIRATION RATE: 25 BRPM | HEIGHT: 64 IN | HEART RATE: 81 BPM

## 2020-03-03 DIAGNOSIS — R07.9 CHEST PAIN, UNSPECIFIED TYPE: Primary | ICD-10-CM

## 2020-03-03 DIAGNOSIS — R10.13 DYSPEPSIA: ICD-10-CM

## 2020-03-03 DIAGNOSIS — R05.9 COUGH: ICD-10-CM

## 2020-03-03 LAB
ALBUMIN SERPL-MCNC: 3.5 G/DL (ref 3.4–5)
ALBUMIN/GLOB SERPL: 0.8 {RATIO} (ref 0.8–1.7)
ALP SERPL-CCNC: 85 U/L (ref 45–117)
ALT SERPL-CCNC: 32 U/L (ref 13–56)
ANION GAP SERPL CALC-SCNC: 6 MMOL/L (ref 3–18)
AST SERPL-CCNC: 24 U/L (ref 10–38)
ATRIAL RATE: 90 BPM
BASOPHILS # BLD: 0 K/UL (ref 0–0.1)
BASOPHILS NFR BLD: 0 % (ref 0–2)
BILIRUB SERPL-MCNC: 0.2 MG/DL (ref 0.2–1)
BNP SERPL-MCNC: 484 PG/ML (ref 0–900)
BUN SERPL-MCNC: 17 MG/DL (ref 7–18)
BUN/CREAT SERPL: 18 (ref 12–20)
CALCIUM SERPL-MCNC: 9.3 MG/DL (ref 8.5–10.1)
CALCULATED P AXIS, ECG09: 88 DEGREES
CALCULATED R AXIS, ECG10: 6 DEGREES
CALCULATED T AXIS, ECG11: 49 DEGREES
CHLORIDE SERPL-SCNC: 104 MMOL/L (ref 100–111)
CO2 SERPL-SCNC: 31 MMOL/L (ref 21–32)
CREAT SERPL-MCNC: 0.97 MG/DL (ref 0.6–1.3)
DIAGNOSIS, 93000: NORMAL
DIFFERENTIAL METHOD BLD: NORMAL
EOSINOPHIL # BLD: 0.2 K/UL (ref 0–0.4)
EOSINOPHIL NFR BLD: 2 % (ref 0–5)
ERYTHROCYTE [DISTWIDTH] IN BLOOD BY AUTOMATED COUNT: 12.8 % (ref 11.6–14.5)
GLOBULIN SER CALC-MCNC: 4.4 G/DL (ref 2–4)
GLUCOSE SERPL-MCNC: 135 MG/DL (ref 74–99)
HCT VFR BLD AUTO: 41.8 % (ref 35–45)
HGB BLD-MCNC: 13.5 G/DL (ref 12–16)
LYMPHOCYTES # BLD: 2.8 K/UL (ref 0.9–3.6)
LYMPHOCYTES NFR BLD: 46 % (ref 21–52)
MCH RBC QN AUTO: 28.5 PG (ref 24–34)
MCHC RBC AUTO-ENTMCNC: 32.3 G/DL (ref 31–37)
MCV RBC AUTO: 88.4 FL (ref 74–97)
MONOCYTES # BLD: 0.5 K/UL (ref 0.05–1.2)
MONOCYTES NFR BLD: 8 % (ref 3–10)
NEUTS SEG # BLD: 2.7 K/UL (ref 1.8–8)
NEUTS SEG NFR BLD: 44 % (ref 40–73)
P-R INTERVAL, ECG05: 152 MS
PLATELET # BLD AUTO: 217 K/UL (ref 135–420)
PMV BLD AUTO: 10.5 FL (ref 9.2–11.8)
POTASSIUM SERPL-SCNC: 4.5 MMOL/L (ref 3.5–5.5)
PROT SERPL-MCNC: 7.9 G/DL (ref 6.4–8.2)
Q-T INTERVAL, ECG07: 340 MS
QRS DURATION, ECG06: 90 MS
QTC CALCULATION (BEZET), ECG08: 415 MS
RBC # BLD AUTO: 4.73 M/UL (ref 4.2–5.3)
SODIUM SERPL-SCNC: 141 MMOL/L (ref 136–145)
VENTRICULAR RATE, ECG03: 90 BPM
WBC # BLD AUTO: 6.2 K/UL (ref 4.6–13.2)

## 2020-03-03 PROCEDURE — 74011250637 HC RX REV CODE- 250/637: Performed by: EMERGENCY MEDICINE

## 2020-03-03 PROCEDURE — 85025 COMPLETE CBC W/AUTO DIFF WBC: CPT

## 2020-03-03 PROCEDURE — 74011250636 HC RX REV CODE- 250/636: Performed by: EMERGENCY MEDICINE

## 2020-03-03 PROCEDURE — 99285 EMERGENCY DEPT VISIT HI MDM: CPT

## 2020-03-03 PROCEDURE — 83880 ASSAY OF NATRIURETIC PEPTIDE: CPT

## 2020-03-03 PROCEDURE — 93005 ELECTROCARDIOGRAM TRACING: CPT

## 2020-03-03 PROCEDURE — 74011000250 HC RX REV CODE- 250: Performed by: EMERGENCY MEDICINE

## 2020-03-03 PROCEDURE — 80053 COMPREHEN METABOLIC PANEL: CPT

## 2020-03-03 PROCEDURE — 71046 X-RAY EXAM CHEST 2 VIEWS: CPT

## 2020-03-03 PROCEDURE — 96375 TX/PRO/DX INJ NEW DRUG ADDON: CPT

## 2020-03-03 PROCEDURE — 94640 AIRWAY INHALATION TREATMENT: CPT

## 2020-03-03 PROCEDURE — 96374 THER/PROPH/DIAG INJ IV PUSH: CPT

## 2020-03-03 RX ORDER — MAG HYDROX/ALUMINUM HYD/SIMETH 200-200-20
30 SUSPENSION, ORAL (FINAL DOSE FORM) ORAL
Qty: 769 ML | Refills: 0 | Status: SHIPPED | OUTPATIENT
Start: 2020-03-03 | End: 2020-09-01

## 2020-03-03 RX ORDER — FAMOTIDINE 20 MG/1
20 TABLET, FILM COATED ORAL
Qty: 20 TAB | Refills: 0 | Status: SHIPPED | OUTPATIENT
Start: 2020-03-03 | End: 2020-03-17

## 2020-03-03 RX ORDER — PHENOL/SODIUM PHENOLATE
20 AEROSOL, SPRAY (ML) MUCOUS MEMBRANE DAILY
Qty: 14 TAB | Refills: 0 | Status: SHIPPED | OUTPATIENT
Start: 2020-03-03 | End: 2020-03-17

## 2020-03-03 RX ORDER — NITROGLYCERIN 0.4 MG/1
0.4 TABLET SUBLINGUAL
Status: COMPLETED | OUTPATIENT
Start: 2020-03-03 | End: 2020-03-03

## 2020-03-03 RX ORDER — IPRATROPIUM BROMIDE AND ALBUTEROL SULFATE 2.5; .5 MG/3ML; MG/3ML
3 SOLUTION RESPIRATORY (INHALATION)
Status: COMPLETED | OUTPATIENT
Start: 2020-03-03 | End: 2020-03-03

## 2020-03-03 RX ORDER — MAG HYDROX/ALUMINUM HYD/SIMETH 200-200-20
SUSPENSION, ORAL (FINAL DOSE FORM) ORAL
Status: DISCONTINUED
Start: 2020-03-03 | End: 2020-03-03 | Stop reason: HOSPADM

## 2020-03-03 RX ORDER — KETOROLAC TROMETHAMINE 30 MG/ML
15 INJECTION, SOLUTION INTRAMUSCULAR; INTRAVENOUS ONCE
Status: COMPLETED | OUTPATIENT
Start: 2020-03-03 | End: 2020-03-03

## 2020-03-03 RX ORDER — FAMOTIDINE 10 MG/ML
20 INJECTION INTRAVENOUS
Status: COMPLETED | OUTPATIENT
Start: 2020-03-03 | End: 2020-03-03

## 2020-03-03 RX ORDER — ACETAMINOPHEN 500 MG
1000 TABLET ORAL ONCE
Status: COMPLETED | OUTPATIENT
Start: 2020-03-03 | End: 2020-03-03

## 2020-03-03 RX ADMIN — FAMOTIDINE 20 MG: 10 INJECTION, SOLUTION INTRAVENOUS at 14:39

## 2020-03-03 RX ADMIN — IPRATROPIUM BROMIDE AND ALBUTEROL SULFATE 3 ML: .5; 3 SOLUTION RESPIRATORY (INHALATION) at 12:15

## 2020-03-03 RX ADMIN — LIDOCAINE HYDROCHLORIDE 40 ML: 20 SOLUTION ORAL; TOPICAL at 14:39

## 2020-03-03 RX ADMIN — ACETAMINOPHEN 1000 MG: 500 TABLET ORAL at 15:50

## 2020-03-03 RX ADMIN — KETOROLAC TROMETHAMINE 15 MG: 30 INJECTION, SOLUTION INTRAMUSCULAR at 12:15

## 2020-03-03 RX ADMIN — NITROGLYCERIN 0.4 MG: 0.4 TABLET, ORALLY DISINTEGRATING SUBLINGUAL at 14:39

## 2020-03-03 NOTE — ED TRIAGE NOTES
Patient with complaints of right sided chest pain that started yesterday and a productive cough for 1 week.   Patient also reports shortness of breath

## 2020-03-03 NOTE — ROUTINE PROCESS
I have reviewed discharge instructions with the patient. The patient verbalized understanding and has no further questions.

## 2020-03-03 NOTE — ED NOTES
Pt reports to ED with right sided chest pain, pain in the left shoulder and right sided arm pain. She reports SOB and a cough that is \"somewhat productive. \" She sts that the sputum is yellow. She does report headaches and said she was not able to take her BP meds this morning. She is ANO X4 and is speaking in clear/full sentences.

## 2020-03-03 NOTE — ED PROVIDER NOTES
EMERGENCY DEPARTMENT HISTORY AND PHYSICAL EXAM    Date: 3/3/2020  Patient Name: Lino Fine    History of Presenting Illness     Chief Complaint   Patient presents with    Chest Pain    Shortness of Breath         History Provided By: Patient    Lino Fine is a 69 y.o. female who presents to the emergency department C/O right-sided chest pain and shortness of breath patient states the symptoms have been progressively worsening over the last week but more so bothering her over the last 2 days. She also notes a productive cough during this time with yellow sputum. Denies any fevers or chills. Niecy Hood PCP: Oneyda Miller MD    Current Facility-Administered Medications   Medication Dose Route Frequency Provider Last Rate Last Dose    alum-mag hydroxide-simeth (MYLANTA) 200-200-20 mg/5 mL oral suspension              Current Outpatient Medications   Medication Sig Dispense Refill    Omeprazole delayed release (PRILOSEC D/R) 20 mg tablet Take 1 Tab by mouth daily for 14 days. 14 Tab 0    famotidine (PEPCID) 20 mg tablet Take 1 Tab by mouth two (2) times daily as needed (heart burn) for up to 14 days. 20 Tab 0    alum-mag hydroxide-simeth (MAALOX ADVANCED) 200-200-20 mg/5 mL susp Take 30 mL by mouth every four (4) hours as needed (heart burn). 769 mL 0    losartan (COZAAR) 100 mg tablet Take 100 mg by mouth daily. Indications: high blood pressure      omeprazole (PRILOSEC) 20 mg capsule Take 1 Cap by mouth two (2) times a day. 30 Cap 0    diclofenac (VOLTAREN) 1 % gel Apply 4 g to affected area four (4) times daily. 100 g 1    ondansetron hcl (ZOFRAN) 8 mg tablet Take 1 Tab by mouth every eight (8) hours as needed for Nausea. 10 Tab 0    rivaroxaban (XARELTO) 15 mg (42)- 20 mg (9) DsPk Take one 15 mg tablet twice a day with food for the first 21 days. Then, take one 20 mg tablet once a day with food for 9 days. 1 Dose Pack 0    aspirin delayed-release 81 mg tablet Take 1 Tab by mouth daily.  30 Tab 0  atenolol (TENORMIN) 100 mg tablet Take 100 mg by mouth daily.  traZODone (DESYREL) 50 mg tablet Take 50 mg by mouth nightly as needed for Sleep.  simvastatin (ZOCOR) 10 mg tablet Take 10 mg by mouth nightly.  metFORMIN (GLUCOPHAGE) 500 mg tablet Take 500 mg by mouth two (2) times daily (with meals).  multivitamin, tx-iron-ca-min (THERA-M W/ IRON) 9 mg iron-400 mcg tab tablet Take 1 Tab by mouth daily. Formulary substitution for DAILY MULTIVITAMIN      ergocalciferol (VITAMIN D2) 50,000 unit capsule Take 50,000 Units by mouth every Monday.  insulin glargine (LANTUS U-100 INSULIN) 100 unit/mL injection 45 Units by SubCUTAneous route nightly. Patient states she takes 45 units and not 42      allopurinol (ZYLOPRIM) 100 mg tablet Take 100 mg by mouth two (2) times a day.  gabapentin (NEURONTIN) 300 mg capsule Take 300 mg by mouth three (3) times daily.  Difluprednate (DUREZOL) 0.05 % ophthalmic emulsion Administer 1 Drop to right eye four (4) times daily. In relation to cataract surgery         Past History     Past Medical History:  Past Medical History:   Diagnosis Date    CAD (coronary artery disease)     Diabetes ([address]     Gastrointestinal disorder     Gout     Hypertension     MI (myocardial infarction) ([address]     Reflux        Past Surgical History:  Past Surgical History:   Procedure Laterality Date    CARDIAC SURG PROCEDURE UNLIST      VASCULAR SURGERY PROCEDURE UNLIST         Family History:  History reviewed. No pertinent family history. Social History:  Social History     Tobacco Use    Smoking status: Never Smoker    Smokeless tobacco: Never Used   Substance Use Topics    Alcohol use: No    Drug use: No       Allergies: Allergies   Allergen Reactions    Gabapentin Swelling    Pcn [Penicillins] Angioedema    Lisinopril Swelling         Review of Systems   Review of Systems   Respiratory: Positive for cough and shortness of breath.     Cardiovascular: Positive for chest pain. All other systems reviewed and are negative.         Physical Exam     Vitals:    03/03/20 1230 03/03/20 1300 03/03/20 1432 03/03/20 1506   BP: 181/77 (!) 162/97 160/84 145/74   Pulse: 86 87 80 81   Resp: 17 25     Temp:       SpO2: 99% 97% 98% 99%   Weight:       Height:         Physical Exam    Nursing notes and vital signs reviewed    Airway: intact, speaking normally  Breathing: No apparent distress, no cyanosis  Circulation: Peripheral pulses equal    Constitutional: Non toxic appearing, no acute distress, morbidly obese, chronically ill-appearing  HEENT & Neck: Normocephalic, Atraumatic, PERRL, EOMI, No rhinorrhea, external nose normal, external ears normal, mucous membranes moist, No stridor, No JVD  Cardiovascular: Regular rate and rhythm, no murmurs  Chest: Normal work of breathing and chest excursion bilaterally  Lungs: Clear to ausculation bilaterally  Abdomen: Soft, non tender, non distended, normoactive bowel sounds, No rigidity, no peritoneal signs  Musculoskeletal: No evidence of trauma or deformity to the back or neck  Extremities: No evidence of trauma or deformity, no LE edema  Skin: Warm, No obvious rashes  Neuro: Alert and oriented x 3, CN 2-12 intact, normal speech, strength and sensation full and symmetric bilaterally, normal coordination  Psychiatric: Normal mood and affect      Diagnostic Study Results     Labs -     Recent Results (from the past 72 hour(s))   EKG, 12 LEAD, INITIAL    Collection Time: 03/03/20 12:03 PM   Result Value Ref Range    Ventricular Rate 90 BPM    Atrial Rate 90 BPM    P-R Interval 152 ms    QRS Duration 90 ms    Q-T Interval 340 ms    QTC Calculation (Bezet) 415 ms    Calculated P Axis 88 degrees    Calculated R Axis 6 degrees    Calculated T Axis 49 degrees    Diagnosis       Sinus rhythm with occasional premature ventricular complexes  Otherwise normal ECG  When compared with ECG of 23-MAY-2019 18:41,  premature ventricular complexes are now present  Vent. rate has increased BY  34 BPM  T wave inversion no longer evident in Inferior leads     CBC WITH AUTOMATED DIFF    Collection Time: 03/03/20 12:10 PM   Result Value Ref Range    WBC 6.2 4.6 - 13.2 K/uL    RBC 4.73 4.20 - 5.30 M/uL    HGB 13.5 12.0 - 16.0 g/dL    HCT 41.8 35.0 - 45.0 %    MCV 88.4 74.0 - 97.0 FL    MCH 28.5 24.0 - 34.0 PG    MCHC 32.3 31.0 - 37.0 g/dL    RDW 12.8 11.6 - 14.5 %    PLATELET 053 247 - 430 K/uL    MPV 10.5 9.2 - 11.8 FL    NEUTROPHILS 44 40 - 73 %    LYMPHOCYTES 46 21 - 52 %    MONOCYTES 8 3 - 10 %    EOSINOPHILS 2 0 - 5 %    BASOPHILS 0 0 - 2 %    ABS. NEUTROPHILS 2.7 1.8 - 8.0 K/UL    ABS. LYMPHOCYTES 2.8 0.9 - 3.6 K/UL    ABS. MONOCYTES 0.5 0.05 - 1.2 K/UL    ABS. EOSINOPHILS 0.2 0.0 - 0.4 K/UL    ABS. BASOPHILS 0.0 0.0 - 0.1 K/UL    DF AUTOMATED     METABOLIC PANEL, COMPREHENSIVE    Collection Time: 03/03/20 12:10 PM   Result Value Ref Range    Sodium 141 136 - 145 mmol/L    Potassium 4.5 3.5 - 5.5 mmol/L    Chloride 104 100 - 111 mmol/L    CO2 31 21 - 32 mmol/L    Anion gap 6 3.0 - 18 mmol/L    Glucose 135 (H) 74 - 99 mg/dL    BUN 17 7.0 - 18 MG/DL    Creatinine 0.97 0.6 - 1.3 MG/DL    BUN/Creatinine ratio 18 12 - 20      GFR est AA >60 >60 ml/min/1.73m2    GFR est non-AA 56 (L) >60 ml/min/1.73m2    Calcium 9.3 8.5 - 10.1 MG/DL    Bilirubin, total 0.2 0.2 - 1.0 MG/DL    ALT (SGPT) 32 13 - 56 U/L    AST (SGOT) 24 10 - 38 U/L    Alk. phosphatase 85 45 - 117 U/L    Protein, total 7.9 6.4 - 8.2 g/dL    Albumin 3.5 3.4 - 5.0 g/dL    Globulin 4.4 (H) 2.0 - 4.0 g/dL    A-G Ratio 0.8 0.8 - 1.7     NT-PRO BNP    Collection Time: 03/03/20 12:10 PM   Result Value Ref Range    NT pro- 0 - 900 PG/ML       Radiologic Studies -   XR CHEST PA LAT   Final Result   IMPRESSION:      No acute radiographic cardiopulmonary abnormality.         CT Results  (Last 48 hours)    None        CXR Results  (Last 48 hours)               03/03/20 1345  XR CHEST PA LAT Final result Impression:  IMPRESSION:       No acute radiographic cardiopulmonary abnormality. Narrative:  EXAM: XR CHEST PA LAT       CLINICAL INDICATION/HISTORY: cough, sob, chest pain   -Additional: None       COMPARISON: May 23, 2019       TECHNIQUE: PA and lateral views of the chest       _______________       FINDINGS:       HEART AND MEDIASTINUM: Cardiac size and mediastinal contours are within normal   limits       LUNGS AND PLEURAL SPACES: No focal pneumonic consolidation, pneumothorax or   pleural effusion       BONY THORAX AND SOFT TISSUES: No acute osseous abnormality       _______________                 Medications given in the ED-  Medications   alum-mag hydroxide-simeth (MYLANTA) 200-200-20 mg/5 mL oral suspension (  Canceled Entry 3/3/20 1444)   ketorolac (TORADOL) injection 15 mg (15 mg IntraVENous Given 3/3/20 1215)   albuterol-ipratropium (DUO-NEB) 2.5 MG-0.5 MG/3 ML (3 mL Nebulization Given 3/3/20 1215)   nitroglycerin (NITROSTAT) tablet 0.4 mg (0.4 mg SubLINGual Given 3/3/20 1439)   famotidine (PF) (PEPCID) injection 20 mg (20 mg IntraVENous Given 3/3/20 1439)   mylanta/viscous lidocaine (GI COCKTAIL) (40 mL Oral Given 3/3/20 1439)         Medical Decision Making   I am the first provider for this patient. I reviewed the vital signs, available nursing notes, past medical history, past surgical history, family history and social history. Vital Signs-Reviewed the patient's vital signs. Pulse Oximetry Analysis - 100% on Room air     Cardiac Monitor:  Rate: 98 bpm  Rhythm: sinus    EKG interpretation: (Preliminary)  EKG read by Dr. Danya Moore 12:06 PM   Rate 90 normal sinus rhythm with PVCs, normal axis, no ST changes    Records Reviewed: Nursing Notes    Procedures:  Procedures    ED Course:   Laboratory findings unremarkable, troponin negative, chest x-ray normal.  Patient given Toradol with no significant improvement of her pain.   She was given nitroglycerin with no improvement of her pain.  She was subsequently given GI cocktail and Pepcid which she notes made significant improvement. Discussed with the patient the results of their emergency department testing. I discussed with the patient that there are many causes for chest pain. Some of these causes could be serious and life threatening, and our tests obtained today shows a lower likelihood of those etiologies, but many other causes are not life threatening, such as stress/anxiety, heartburn, simple infections, bruised/inflammation to bones/muscles, etc. Recommended to return to Emergency Department or seek medical attention immediately if you have acute worsening in your chest pain or develop shortness of breath, repeated vomiting, fever, coughing up blood, or start sweating. Avoid any activities that bring on the chest pain. Also, avoid any tobacco products or excessive alcohol. Recommended to take any medications that were prescribed as directed and follow up with their primary care physician for reevaluation. They understand and agree to plan      Diagnosis and Disposition       DISCHARGE NOTE:    Yajaira Mustafa  results have been reviewed with her. She has been counseled regarding her diagnosis, treatment, and plan. She verbally conveys understanding and agreement of the signs, symptoms, diagnosis, treatment and prognosis and additionally agrees to follow up as discussed. She also agrees with the care-plan and conveys that all of her questions have been answered. I have also provided discharge instructions for her that include: educational information regarding their diagnosis and treatment, and list of reasons why they would want to return to the ED prior to their follow-up appointment, should her condition change. She has been provided with education for proper emergency department utilization. CLINICAL IMPRESSION:    1. Chest pain, unspecified type    2. Dyspepsia    3. Cough        PLAN:  1. D/C Home  2.    Current Discharge Medication List      START taking these medications    Details   Omeprazole delayed release (PRILOSEC D/R) 20 mg tablet Take 1 Tab by mouth daily for 14 days. Qty: 14 Tab, Refills: 0      famotidine (PEPCID) 20 mg tablet Take 1 Tab by mouth two (2) times daily as needed (heart burn) for up to 14 days. Qty: 20 Tab, Refills: 0      alum-mag hydroxide-simeth (MAALOX ADVANCED) 200-200-20 mg/5 mL susp Take 30 mL by mouth every four (4) hours as needed (heart burn). Qty: 769 mL, Refills: 0         CONTINUE these medications which have NOT CHANGED    Details   omeprazole (PRILOSEC) 20 mg capsule Take 1 Cap by mouth two (2) times a day. Qty: 30 Cap, Refills: 0           3. Follow-up Information     Follow up With Specialties Details Why Contact Info    Dewey Zabala MD Family Practice Schedule an appointment as soon as possible for a visit   [address] [facility]. [address] [place] [address]      Jaylene Green MD Gastroenterology Schedule an appointment as soon as possible for a visit  For gastroenterologist if endoscopy is needed [address] [facility] EMERGENCY DEPT Emergency Medicine Go to  If symptoms worsen [address] [address]  [phone]        _______________________________      Please note that this dictation was completed with [employer], the computer voice recognition software. Quite often unanticipated grammatical, syntax, homophones, and other interpretive errors are inadvertently transcribed by the computer software. Please disregard these errors. Please excuse any errors that have escaped final proofreading.

## 2020-08-24 ENCOUNTER — HOSPITAL ENCOUNTER (INPATIENT)
Age: 75
LOS: 8 days | Discharge: NURSING, REHAB OR LONG-TERM CARE | DRG: 246 | End: 2020-09-01
Attending: EMERGENCY MEDICINE | Admitting: FAMILY MEDICINE
Payer: MEDICARE

## 2020-08-24 ENCOUNTER — APPOINTMENT (OUTPATIENT)
Dept: GENERAL RADIOLOGY | Age: 75
DRG: 246 | End: 2020-08-24
Attending: EMERGENCY MEDICINE
Payer: MEDICARE

## 2020-08-24 DIAGNOSIS — I42.9 CARDIOMYOPATHY (HCC): ICD-10-CM

## 2020-08-24 DIAGNOSIS — R07.9 CHEST PAIN AT REST: ICD-10-CM

## 2020-08-24 DIAGNOSIS — R94.39 ABNORMAL NUCLEAR STRESS TEST: ICD-10-CM

## 2020-08-24 DIAGNOSIS — R07.9 CHEST PAIN, UNSPECIFIED TYPE: Primary | ICD-10-CM

## 2020-08-24 LAB
ALBUMIN SERPL-MCNC: 3.3 G/DL (ref 3.4–5)
ALBUMIN/GLOB SERPL: 0.8 {RATIO} (ref 0.8–1.7)
ALP SERPL-CCNC: 87 U/L (ref 45–117)
ALT SERPL-CCNC: 32 U/L (ref 13–56)
ANION GAP SERPL CALC-SCNC: 4 MMOL/L (ref 3–18)
APTT PPP: 32 SEC (ref 23–36.4)
AST SERPL-CCNC: 32 U/L (ref 10–38)
BASOPHILS # BLD: 0 K/UL (ref 0–0.1)
BASOPHILS NFR BLD: 1 % (ref 0–2)
BILIRUB SERPL-MCNC: 0.4 MG/DL (ref 0.2–1)
BUN SERPL-MCNC: 19 MG/DL (ref 7–18)
BUN/CREAT SERPL: 21 (ref 12–20)
CALCIUM SERPL-MCNC: 8.7 MG/DL (ref 8.5–10.1)
CHLORIDE SERPL-SCNC: 107 MMOL/L (ref 100–111)
CK MB CFR SERPL CALC: 1.8 % (ref 0–4)
CK MB SERPL-MCNC: 8.6 NG/ML (ref 5–25)
CK SERPL-CCNC: 485 U/L (ref 26–192)
CO2 SERPL-SCNC: 29 MMOL/L (ref 21–32)
CREAT SERPL-MCNC: 0.92 MG/DL (ref 0.6–1.3)
D DIMER PPP FEU-MCNC: 0.59 UG/ML(FEU)
DIFFERENTIAL METHOD BLD: NORMAL
EOSINOPHIL # BLD: 0.1 K/UL (ref 0–0.4)
EOSINOPHIL NFR BLD: 2 % (ref 0–5)
ERYTHROCYTE [DISTWIDTH] IN BLOOD BY AUTOMATED COUNT: 12.6 % (ref 11.6–14.5)
GLOBULIN SER CALC-MCNC: 4.2 G/DL (ref 2–4)
GLUCOSE SERPL-MCNC: 157 MG/DL (ref 74–99)
HCT VFR BLD AUTO: 42.5 % (ref 35–45)
HGB BLD-MCNC: 13.8 G/DL (ref 12–16)
LIPASE SERPL-CCNC: 44 U/L (ref 73–393)
LYMPHOCYTES # BLD: 2 K/UL (ref 0.9–3.6)
LYMPHOCYTES NFR BLD: 34 % (ref 21–52)
MAGNESIUM SERPL-MCNC: 1.8 MG/DL (ref 1.6–2.6)
MCH RBC QN AUTO: 29 PG (ref 24–34)
MCHC RBC AUTO-ENTMCNC: 32.5 G/DL (ref 31–37)
MCV RBC AUTO: 89.3 FL (ref 74–97)
MONOCYTES # BLD: 0.5 K/UL (ref 0.05–1.2)
MONOCYTES NFR BLD: 8 % (ref 3–10)
NEUTS SEG # BLD: 3.4 K/UL (ref 1.8–8)
NEUTS SEG NFR BLD: 55 % (ref 40–73)
PLATELET # BLD AUTO: 222 K/UL (ref 135–420)
PMV BLD AUTO: 10.1 FL (ref 9.2–11.8)
POTASSIUM SERPL-SCNC: 4.6 MMOL/L (ref 3.5–5.5)
PROT SERPL-MCNC: 7.5 G/DL (ref 6.4–8.2)
RBC # BLD AUTO: 4.76 M/UL (ref 4.2–5.3)
SODIUM SERPL-SCNC: 140 MMOL/L (ref 136–145)
TROPONIN I SERPL-MCNC: 0.02 NG/ML (ref 0–0.04)
WBC # BLD AUTO: 6 K/UL (ref 4.6–13.2)

## 2020-08-24 PROCEDURE — 82550 ASSAY OF CK (CPK): CPT

## 2020-08-24 PROCEDURE — 85379 FIBRIN DEGRADATION QUANT: CPT

## 2020-08-24 PROCEDURE — 85730 THROMBOPLASTIN TIME PARTIAL: CPT

## 2020-08-24 PROCEDURE — 99284 EMERGENCY DEPT VISIT MOD MDM: CPT

## 2020-08-24 PROCEDURE — 93005 ELECTROCARDIOGRAM TRACING: CPT

## 2020-08-24 PROCEDURE — 96374 THER/PROPH/DIAG INJ IV PUSH: CPT

## 2020-08-24 PROCEDURE — 74011250636 HC RX REV CODE- 250/636: Performed by: EMERGENCY MEDICINE

## 2020-08-24 PROCEDURE — 65270000029 HC RM PRIVATE

## 2020-08-24 PROCEDURE — 83690 ASSAY OF LIPASE: CPT

## 2020-08-24 PROCEDURE — 71045 X-RAY EXAM CHEST 1 VIEW: CPT

## 2020-08-24 PROCEDURE — 83735 ASSAY OF MAGNESIUM: CPT

## 2020-08-24 PROCEDURE — 80053 COMPREHEN METABOLIC PANEL: CPT

## 2020-08-24 PROCEDURE — 85025 COMPLETE CBC W/AUTO DIFF WBC: CPT

## 2020-08-24 RX ORDER — SODIUM CHLORIDE 0.9 % (FLUSH) 0.9 %
5-40 SYRINGE (ML) INJECTION AS NEEDED
Status: DISCONTINUED | OUTPATIENT
Start: 2020-08-24 | End: 2020-09-01 | Stop reason: HOSPADM

## 2020-08-24 RX ORDER — ACETAMINOPHEN 325 MG/1
650 TABLET ORAL
Status: DISCONTINUED | OUTPATIENT
Start: 2020-08-24 | End: 2020-09-01 | Stop reason: HOSPADM

## 2020-08-24 RX ORDER — ONDANSETRON 2 MG/ML
4 INJECTION INTRAMUSCULAR; INTRAVENOUS
Status: DISCONTINUED | OUTPATIENT
Start: 2020-08-24 | End: 2020-09-01 | Stop reason: HOSPADM

## 2020-08-24 RX ORDER — INSULIN LISPRO 100 [IU]/ML
INJECTION, SOLUTION INTRAVENOUS; SUBCUTANEOUS
COMMUNITY
Start: 2019-11-25 | End: 2020-09-01

## 2020-08-24 RX ORDER — PROMETHAZINE HYDROCHLORIDE 25 MG/1
12.5 TABLET ORAL
Status: DISCONTINUED | OUTPATIENT
Start: 2020-08-24 | End: 2020-09-01 | Stop reason: HOSPADM

## 2020-08-24 RX ORDER — FAMOTIDINE 10 MG/ML
20 INJECTION INTRAVENOUS
Status: COMPLETED | OUTPATIENT
Start: 2020-08-24 | End: 2020-08-24

## 2020-08-24 RX ORDER — HEPARIN SODIUM 10000 [USP'U]/100ML
9-25 INJECTION, SOLUTION INTRAVENOUS
Status: DISCONTINUED | OUTPATIENT
Start: 2020-08-24 | End: 2020-08-26 | Stop reason: SDUPTHER

## 2020-08-24 RX ORDER — SODIUM CHLORIDE 0.9 % (FLUSH) 0.9 %
5-40 SYRINGE (ML) INJECTION EVERY 8 HOURS
Status: DISCONTINUED | OUTPATIENT
Start: 2020-08-24 | End: 2020-09-01 | Stop reason: HOSPADM

## 2020-08-24 RX ORDER — POLYETHYLENE GLYCOL 3350 17 G/17G
17 POWDER, FOR SOLUTION ORAL DAILY PRN
Status: DISCONTINUED | OUTPATIENT
Start: 2020-08-24 | End: 2020-09-01 | Stop reason: HOSPADM

## 2020-08-24 RX ORDER — ACETAMINOPHEN 650 MG/1
650 SUPPOSITORY RECTAL
Status: DISCONTINUED | OUTPATIENT
Start: 2020-08-24 | End: 2020-09-01 | Stop reason: HOSPADM

## 2020-08-24 RX ORDER — HEPARIN SODIUM 1000 [USP'U]/ML
4000 INJECTION, SOLUTION INTRAVENOUS; SUBCUTANEOUS ONCE
Status: COMPLETED | OUTPATIENT
Start: 2020-08-24 | End: 2020-08-25

## 2020-08-24 RX ORDER — LISINOPRIL 10 MG/1
10 TABLET ORAL DAILY
COMMUNITY
Start: 2020-07-10 | End: 2021-01-06

## 2020-08-24 RX ADMIN — SODIUM CHLORIDE 500 ML: 900 INJECTION, SOLUTION INTRAVENOUS at 22:05

## 2020-08-24 RX ADMIN — FAMOTIDINE 20 MG: 10 INJECTION, SOLUTION INTRAVENOUS at 22:05

## 2020-08-24 NOTE — Clinical Note
Lesion located in the Proximal Cx. Balloon inflated using multiple inflations inflation technique. Lesion #1: Pressure = 12 albert; Duration = 6 sec. Inflation 2: Pressure = 12 albert; Duration = 10 sec. Inflation 3: Pressure = 12 albert; Duration = 11 sec.

## 2020-08-24 NOTE — Clinical Note
TRANSFER - OUT REPORT:     Verbal report given to: JESSICA. Report consisted of patient's Situation, Background, Assessment and   Recommendations(SBAR). Opportunity for questions and clarification was provided. Patient transported with a Registered Nurse and [address] [place] [address] / Patient [facility] [employer]. Patient transported to: CARE.

## 2020-08-24 NOTE — Clinical Note
Lesion: Located in the Proximal Cx. Stent inserted. Stent deployed. Multiple inflations used. First inflation pressure = 12 albert; inflation time: 12 sec. Second inflation pressure: 13 albert; inflation time: 11 sec.

## 2020-08-24 NOTE — Clinical Note
Bilateral groin and right radial prepped with ChloraPrep and draped. Wet prep elapsed drying time: 5 mins.

## 2020-08-24 NOTE — Clinical Note
TRANSFER - IN REPORT:     Verbal report received from: Wallace. Report consisted of patient's Situation, Background, Assessment and   Recommendations(SBAR). Opportunity for questions and clarification was provided. Assessment completed upon patient's arrival to unit and care assumed. Patient transported with a Registered Nurse and [address] [place] [address] / Patient [facility].

## 2020-08-25 ENCOUNTER — APPOINTMENT (OUTPATIENT)
Dept: NON INVASIVE DIAGNOSTICS | Age: 75
DRG: 246 | End: 2020-08-25
Attending: FAMILY MEDICINE
Payer: MEDICARE

## 2020-08-25 ENCOUNTER — HOME HEALTH ADMISSION (OUTPATIENT)
Dept: HOME HEALTH SERVICES | Facility: HOME HEALTH | Age: 75
End: 2020-08-25

## 2020-08-25 ENCOUNTER — APPOINTMENT (OUTPATIENT)
Dept: NUCLEAR MEDICINE | Age: 75
DRG: 246 | End: 2020-08-25
Attending: FAMILY MEDICINE
Payer: MEDICARE

## 2020-08-25 PROBLEM — I42.9 CARDIOMYOPATHY (HCC): Status: ACTIVE | Noted: 2020-08-24

## 2020-08-25 PROBLEM — R07.9 CHEST PAIN AT REST: Status: ACTIVE | Noted: 2020-08-24

## 2020-08-25 PROBLEM — R94.39 ABNORMAL NUCLEAR STRESS TEST: Status: ACTIVE | Noted: 2020-08-24

## 2020-08-25 PROBLEM — Z79.01 ANTICOAGULATED: Status: ACTIVE | Noted: 2020-08-25

## 2020-08-25 LAB
ALBUMIN SERPL-MCNC: 3 G/DL (ref 3.4–5)
ALBUMIN/GLOB SERPL: 0.8 {RATIO} (ref 0.8–1.7)
ALP SERPL-CCNC: 84 U/L (ref 45–117)
ALT SERPL-CCNC: 27 U/L (ref 13–56)
ANION GAP SERPL CALC-SCNC: 2 MMOL/L (ref 3–18)
APTT PPP: 56.6 SEC (ref 23–36.4)
APTT PPP: 91.3 SEC (ref 23–36.4)
AST SERPL-CCNC: 23 U/L (ref 10–38)
AV VELOCITY RATIO: 0.67
AV VTI RATIO: 0.7
BASOPHILS # BLD: 0 K/UL (ref 0–0.1)
BASOPHILS NFR BLD: 0 % (ref 0–2)
BILIRUB SERPL-MCNC: 0.4 MG/DL (ref 0.2–1)
BUN SERPL-MCNC: 17 MG/DL (ref 7–18)
BUN/CREAT SERPL: 20 (ref 12–20)
CALCIUM SERPL-MCNC: 8.6 MG/DL (ref 8.5–10.1)
CHLORIDE SERPL-SCNC: 107 MMOL/L (ref 100–111)
CHOLEST SERPL-MCNC: 139 MG/DL
CK MB CFR SERPL CALC: 1.6 % (ref 0–4)
CK MB CFR SERPL CALC: 1.7 % (ref 0–4)
CK MB CFR SERPL CALC: 1.7 % (ref 0–4)
CK MB SERPL-MCNC: 7.1 NG/ML (ref 5–25)
CK MB SERPL-MCNC: 7.9 NG/ML (ref 5–25)
CK MB SERPL-MCNC: 8.2 NG/ML (ref 5–25)
CK SERPL-CCNC: 434 U/L (ref 26–192)
CK SERPL-CCNC: 454 U/L (ref 26–192)
CK SERPL-CCNC: 482 U/L (ref 26–192)
CO2 SERPL-SCNC: 31 MMOL/L (ref 21–32)
CREAT SERPL-MCNC: 0.84 MG/DL (ref 0.6–1.3)
DIFFERENTIAL METHOD BLD: NORMAL
ECHO AV ANNULUS DIAM: 3.3 CM
ECHO AV AREA PEAK VELOCITY: 2.03 CM2
ECHO AV AREA VTI: 2.2 CM2
ECHO AV AREA/BSA PEAK VELOCITY: 1 CM2/M2
ECHO AV AREA/BSA VTI: 1 CM2/M2
ECHO AV MEAN GRADIENT: 2.18 MMHG
ECHO AV MEAN VELOCITY: 0.69 M/S
ECHO AV PEAK GRADIENT: 3.79 MMHG
ECHO AV PEAK VELOCITY: 97 CM/S
ECHO AV VTI: 19.93 CM
ECHO IVC PROX: 1.42 CM
ECHO LA AREA 4C: 14.5 CM2
ECHO LA MAJOR AXIS: 3.39 CM
ECHO LA MINOR AXIS: 1.59 CM
ECHO LA VOL 4C: 38.63 ML (ref 22–52)
ECHO LV E' LATERAL VELOCITY: 8 CM/S
ECHO LV E' SEPTAL VELOCITY: 4 CM/S
ECHO LV EDV A2C: 130.59 ML
ECHO LV EDV A4C: 120.63 ML
ECHO LV EDV BP: 125.66 ML (ref 56–104)
ECHO LV EDV TEICHHOLZ: 57.51 ML
ECHO LV EJECTION FRACTION A2C: 51 %
ECHO LV EJECTION FRACTION A4C: 38 %
ECHO LV EJECTION FRACTION BIPLANE: 42.6 % (ref 55–100)
ECHO LV ESV A2C: 64.62 ML
ECHO LV ESV A4C: 74.39 ML
ECHO LV ESV BP: 72.13 ML (ref 19–49)
ECHO LV ESV TEICHHOLZ: 40.78 ML
ECHO LV INTERNAL DIMENSION DIASTOLIC: 5.19 CM (ref 3.9–5.3)
ECHO LV INTERNAL DIMENSION SYSTOLIC: 4.48 CM
ECHO LV IVSD: 1.18 CM (ref 0.6–0.9)
ECHO LV MASS 2D: 202.7 G (ref 67–162)
ECHO LV MASS INDEX 2D: 95 G/M2 (ref 43–95)
ECHO LV POSTERIOR WALL DIASTOLIC: 0.89 CM (ref 0.6–0.9)
ECHO LV POSTERIOR WALL SYSTOLIC: 0 CM
ECHO LVOT CARDIAC OUTPUT: 2.95 L/MIN
ECHO LVOT DIAM: 1.97 CM
ECHO LVOT PEAK GRADIENT: 1.7 MMHG
ECHO LVOT PEAK VELOCITY: 65 CM/S
ECHO LVOT SV: 43.9 ML
ECHO LVOT VTI: 14.45 CM
ECHO MV A VELOCITY: 91 CM/S
ECHO MV AREA PHT: 4.72 CM2
ECHO MV E DECELERATION TIME (DT): 0.16 S
ECHO MV E VELOCITY: 62 CM/S
ECHO MV E/A RATIO: 0.68
ECHO MV E/E' LATERAL: 7.75
ECHO MV E/E' RATIO (AVERAGED): 11.63
ECHO MV E/E' SEPTAL: 15.5
ECHO MV PRESSURE HALF TIME (PHT): 0.05 S
ECHO RA AREA 4C: 12.15 CM2
ECHO RA VOLUME: 29.3 ML
ECHO RV INTERNAL DIMENSION: 3.34 CM
ECHO RV TAPSE: 2 CM (ref 1.5–2)
EOSINOPHIL # BLD: 0.2 K/UL (ref 0–0.4)
EOSINOPHIL NFR BLD: 2 % (ref 0–5)
ERYTHROCYTE [DISTWIDTH] IN BLOOD BY AUTOMATED COUNT: 12.5 % (ref 11.6–14.5)
GLOBULIN SER CALC-MCNC: 3.7 G/DL (ref 2–4)
GLUCOSE BLD STRIP.AUTO-MCNC: 121 MG/DL (ref 70–110)
GLUCOSE BLD STRIP.AUTO-MCNC: 124 MG/DL (ref 70–110)
GLUCOSE BLD STRIP.AUTO-MCNC: 152 MG/DL (ref 70–110)
GLUCOSE BLD STRIP.AUTO-MCNC: 186 MG/DL (ref 70–110)
GLUCOSE BLD STRIP.AUTO-MCNC: 192 MG/DL (ref 70–110)
GLUCOSE BLD STRIP.AUTO-MCNC: 205 MG/DL (ref 70–110)
GLUCOSE SERPL-MCNC: 120 MG/DL (ref 74–99)
HBA1C MFR BLD: 8.3 % (ref 4.2–5.6)
HCT VFR BLD AUTO: 40 % (ref 35–45)
HDLC SERPL-MCNC: 49 MG/DL (ref 40–60)
HDLC SERPL: 2.8 {RATIO} (ref 0–5)
HGB BLD-MCNC: 12.8 G/DL (ref 12–16)
LDLC SERPL CALC-MCNC: 58.6 MG/DL (ref 0–100)
LIPID PROFILE,FLP: ABNORMAL
LVFS 2D: 13.71 %
LVOT MG: 0.87 MMHG
LVOT MV: 0.43 CM/S
LVSV (MOD BI): 23.86 ML
LVSV (MOD SINGLE 4C): 20.61 ML
LVSV (MOD SINGLE): 29.41 ML
LVSV (TEICH): 16.74 ML
LYMPHOCYTES # BLD: 3.2 K/UL (ref 0.9–3.6)
LYMPHOCYTES NFR BLD: 48 % (ref 21–52)
MCH RBC QN AUTO: 28.4 PG (ref 24–34)
MCHC RBC AUTO-ENTMCNC: 32 G/DL (ref 31–37)
MCV RBC AUTO: 88.7 FL (ref 74–97)
MONOCYTES # BLD: 0.5 K/UL (ref 0.05–1.2)
MONOCYTES NFR BLD: 7 % (ref 3–10)
MV DEC SLOPE: 3.84
NEUTS SEG # BLD: 3 K/UL (ref 1.8–8)
NEUTS SEG NFR BLD: 43 % (ref 40–73)
PLATELET # BLD AUTO: 216 K/UL (ref 135–420)
PMV BLD AUTO: 10 FL (ref 9.2–11.8)
POTASSIUM SERPL-SCNC: 3.9 MMOL/L (ref 3.5–5.5)
PROT SERPL-MCNC: 6.7 G/DL (ref 6.4–8.2)
RBC # BLD AUTO: 4.51 M/UL (ref 4.2–5.3)
SODIUM SERPL-SCNC: 140 MMOL/L (ref 136–145)
STRESS BASELINE HR: 75 BPM
STRESS ESTIMATED WORKLOAD: 1 METS
STRESS EXERCISE DUR MIN: NORMAL
STRESS PEAK DIAS BP: 89 MMHG
STRESS PEAK SYS BP: 160 MMHG
STRESS PERCENT HR ACHIEVED: 66 %
STRESS POST PEAK HR: 98 BPM
STRESS RATE PRESSURE PRODUCT: NORMAL BPM*MMHG
STRESS ST DEPRESSION: 0 MM
STRESS ST ELEVATION: 0 MM
STRESS TARGET HR: 148 BPM
TRIGL SERPL-MCNC: 157 MG/DL (ref ?–150)
TROPONIN I SERPL-MCNC: 0.02 NG/ML (ref 0–0.04)
TROPONIN I SERPL-MCNC: <0.02 NG/ML (ref 0–0.04)
TROPONIN I SERPL-MCNC: <0.02 NG/ML (ref 0–0.04)
VLDLC SERPL CALC-MCNC: 31.4 MG/DL
WBC # BLD AUTO: 6.9 K/UL (ref 4.6–13.2)

## 2020-08-25 PROCEDURE — 93017 CV STRESS TEST TRACING ONLY: CPT

## 2020-08-25 PROCEDURE — 74011000250 HC RX REV CODE- 250: Performed by: FAMILY MEDICINE

## 2020-08-25 PROCEDURE — 85730 THROMBOPLASTIN TIME PARTIAL: CPT

## 2020-08-25 PROCEDURE — A9500 TC99M SESTAMIBI: HCPCS

## 2020-08-25 PROCEDURE — C9113 INJ PANTOPRAZOLE SODIUM, VIA: HCPCS | Performed by: FAMILY MEDICINE

## 2020-08-25 PROCEDURE — 86677 HELICOBACTER PYLORI ANTIBODY: CPT

## 2020-08-25 PROCEDURE — C8929 TTE W OR WO FOL WCON,DOPPLER: HCPCS

## 2020-08-25 PROCEDURE — 74011250636 HC RX REV CODE- 250/636: Performed by: FAMILY MEDICINE

## 2020-08-25 PROCEDURE — 80061 LIPID PANEL: CPT

## 2020-08-25 PROCEDURE — 74011636637 HC RX REV CODE- 636/637: Performed by: FAMILY MEDICINE

## 2020-08-25 PROCEDURE — 82550 ASSAY OF CK (CPK): CPT

## 2020-08-25 PROCEDURE — 74011636637 HC RX REV CODE- 636/637: Performed by: HOSPITALIST

## 2020-08-25 PROCEDURE — 74011250637 HC RX REV CODE- 250/637: Performed by: FAMILY MEDICINE

## 2020-08-25 PROCEDURE — 74011250636 HC RX REV CODE- 250/636: Performed by: EMERGENCY MEDICINE

## 2020-08-25 PROCEDURE — 65660000000 HC RM CCU STEPDOWN

## 2020-08-25 PROCEDURE — 36415 COLL VENOUS BLD VENIPUNCTURE: CPT

## 2020-08-25 PROCEDURE — 74011250637 HC RX REV CODE- 250/637: Performed by: HOSPITALIST

## 2020-08-25 PROCEDURE — 74011000250 HC RX REV CODE- 250: Performed by: HOSPITALIST

## 2020-08-25 PROCEDURE — 82962 GLUCOSE BLOOD TEST: CPT

## 2020-08-25 PROCEDURE — 80053 COMPREHEN METABOLIC PANEL: CPT

## 2020-08-25 PROCEDURE — 85025 COMPLETE CBC W/AUTO DIFF WBC: CPT

## 2020-08-25 PROCEDURE — 83036 HEMOGLOBIN GLYCOSYLATED A1C: CPT

## 2020-08-25 RX ORDER — ALBUTEROL SULFATE 0.83 MG/ML
2.5 SOLUTION RESPIRATORY (INHALATION)
Status: DISCONTINUED | OUTPATIENT
Start: 2020-08-25 | End: 2020-09-01 | Stop reason: HOSPADM

## 2020-08-25 RX ORDER — LISINOPRIL 5 MG/1
10 TABLET ORAL DAILY
Status: DISCONTINUED | OUTPATIENT
Start: 2020-08-25 | End: 2020-08-25

## 2020-08-25 RX ORDER — INSULIN LISPRO 100 [IU]/ML
INJECTION, SOLUTION INTRAVENOUS; SUBCUTANEOUS
Status: DISCONTINUED | OUTPATIENT
Start: 2020-08-25 | End: 2020-09-01 | Stop reason: HOSPADM

## 2020-08-25 RX ORDER — METOPROLOL TARTRATE 25 MG/1
25 TABLET, FILM COATED ORAL EVERY 12 HOURS
Status: DISCONTINUED | OUTPATIENT
Start: 2020-08-25 | End: 2020-09-01 | Stop reason: HOSPADM

## 2020-08-25 RX ORDER — ATORVASTATIN CALCIUM 10 MG/1
10 TABLET, FILM COATED ORAL
Status: DISCONTINUED | OUTPATIENT
Start: 2020-08-25 | End: 2020-09-01 | Stop reason: HOSPADM

## 2020-08-25 RX ORDER — ATENOLOL 50 MG/1
100 TABLET ORAL DAILY
Status: DISCONTINUED | OUTPATIENT
Start: 2020-08-25 | End: 2020-08-25

## 2020-08-25 RX ORDER — HEPARIN SODIUM 1000 [USP'U]/ML
3000 INJECTION, SOLUTION INTRAVENOUS; SUBCUTANEOUS ONCE
Status: COMPLETED | OUTPATIENT
Start: 2020-08-25 | End: 2020-08-25

## 2020-08-25 RX ORDER — ASPIRIN 81 MG/1
81 TABLET ORAL DAILY
Status: DISCONTINUED | OUTPATIENT
Start: 2020-08-25 | End: 2020-09-01 | Stop reason: HOSPADM

## 2020-08-25 RX ORDER — MAGNESIUM SULFATE 100 %
16 CRYSTALS MISCELLANEOUS AS NEEDED
Status: DISCONTINUED | OUTPATIENT
Start: 2020-08-25 | End: 2020-09-01 | Stop reason: HOSPADM

## 2020-08-25 RX ORDER — DEXTROSE MONOHYDRATE 100 MG/ML
125-250 INJECTION, SOLUTION INTRAVENOUS AS NEEDED
Status: DISCONTINUED | OUTPATIENT
Start: 2020-08-25 | End: 2020-09-01 | Stop reason: HOSPADM

## 2020-08-25 RX ORDER — GABAPENTIN 300 MG/1
300 CAPSULE ORAL 3 TIMES DAILY
Status: DISCONTINUED | OUTPATIENT
Start: 2020-08-25 | End: 2020-09-01 | Stop reason: HOSPADM

## 2020-08-25 RX ORDER — INSULIN GLARGINE 100 [IU]/ML
10 INJECTION, SOLUTION SUBCUTANEOUS
Status: DISCONTINUED | OUTPATIENT
Start: 2020-08-25 | End: 2020-08-28

## 2020-08-25 RX ORDER — MORPHINE SULFATE 2 MG/ML
2 INJECTION, SOLUTION INTRAMUSCULAR; INTRAVENOUS
Status: DISCONTINUED | OUTPATIENT
Start: 2020-08-25 | End: 2020-09-01 | Stop reason: HOSPADM

## 2020-08-25 RX ADMIN — NITROGLYCERIN 1 INCH: 20 OINTMENT TOPICAL at 22:11

## 2020-08-25 RX ADMIN — SODIUM CHLORIDE 40 MG: 9 INJECTION, SOLUTION INTRAMUSCULAR; INTRAVENOUS; SUBCUTANEOUS at 03:36

## 2020-08-25 RX ADMIN — HEPARIN SODIUM 3000 UNITS: 1000 INJECTION INTRAVENOUS; SUBCUTANEOUS at 07:26

## 2020-08-25 RX ADMIN — INSULIN GLARGINE 10 UNITS: 100 INJECTION, SOLUTION SUBCUTANEOUS at 22:11

## 2020-08-25 RX ADMIN — ATORVASTATIN CALCIUM 10 MG: 10 TABLET, FILM COATED ORAL at 03:36

## 2020-08-25 RX ADMIN — ATENOLOL 100 MG: 50 TABLET ORAL at 14:32

## 2020-08-25 RX ADMIN — NITROGLYCERIN 1 INCH: 20 OINTMENT TOPICAL at 09:31

## 2020-08-25 RX ADMIN — INSULIN LISPRO 2 UNITS: 100 INJECTION, SOLUTION INTRAVENOUS; SUBCUTANEOUS at 17:24

## 2020-08-25 RX ADMIN — ACETAMINOPHEN 650 MG: 325 TABLET ORAL at 02:15

## 2020-08-25 RX ADMIN — Medication 10 ML: at 22:12

## 2020-08-25 RX ADMIN — NITROGLYCERIN 1 INCH: 20 OINTMENT TOPICAL at 02:11

## 2020-08-25 RX ADMIN — FAMOTIDINE 20 MG: 10 INJECTION, SOLUTION INTRAVENOUS at 22:11

## 2020-08-25 RX ADMIN — Medication 10 ML: at 14:17

## 2020-08-25 RX ADMIN — LIDOCAINE HYDROCHLORIDE 40 ML: 20 SOLUTION ORAL; TOPICAL at 09:39

## 2020-08-25 RX ADMIN — GABAPENTIN 300 MG: 300 CAPSULE ORAL at 17:24

## 2020-08-25 RX ADMIN — ATORVASTATIN CALCIUM 10 MG: 10 TABLET, FILM COATED ORAL at 22:11

## 2020-08-25 RX ADMIN — Medication 10 ML: at 01:00

## 2020-08-25 RX ADMIN — ASPIRIN 81 MG: 81 TABLET, COATED ORAL at 09:30

## 2020-08-25 RX ADMIN — HEPARIN SODIUM 9 UNITS/KG/HR: 10000 INJECTION, SOLUTION INTRAVENOUS at 00:04

## 2020-08-25 RX ADMIN — MORPHINE SULFATE 2 MG: 2 INJECTION, SOLUTION INTRAMUSCULAR; INTRAVENOUS at 14:37

## 2020-08-25 RX ADMIN — METOPROLOL TARTRATE 25 MG: 25 TABLET, FILM COATED ORAL at 22:11

## 2020-08-25 RX ADMIN — METOPROLOL TARTRATE 25 MG: 25 TABLET, FILM COATED ORAL at 03:36

## 2020-08-25 RX ADMIN — GABAPENTIN 300 MG: 300 CAPSULE ORAL at 09:31

## 2020-08-25 RX ADMIN — REGADENOSON 0.4 MG: 0.08 INJECTION, SOLUTION INTRAVENOUS at 12:34

## 2020-08-25 RX ADMIN — PERFLUTREN 1 ML: 6.52 INJECTION, SUSPENSION INTRAVENOUS at 08:42

## 2020-08-25 RX ADMIN — INSULIN LISPRO 4 UNITS: 100 INJECTION, SOLUTION INTRAVENOUS; SUBCUTANEOUS at 22:11

## 2020-08-25 RX ADMIN — HEPARIN SODIUM 4000 UNITS: 1000 INJECTION INTRAVENOUS; SUBCUTANEOUS at 00:00

## 2020-08-25 RX ADMIN — MORPHINE SULFATE 2 MG: 2 INJECTION, SOLUTION INTRAMUSCULAR; INTRAVENOUS at 06:02

## 2020-08-25 RX ADMIN — MULTIPLE VITAMINS W/ MINERALS TAB 1 TABLET: TAB at 09:32

## 2020-08-25 RX ADMIN — FAMOTIDINE 20 MG: 10 INJECTION, SOLUTION INTRAVENOUS at 09:30

## 2020-08-25 RX ADMIN — HEPARIN SODIUM 11 UNITS/KG/HR: 10000 INJECTION, SOLUTION INTRAVENOUS at 07:26

## 2020-08-25 RX ADMIN — MORPHINE SULFATE 2 MG: 2 INJECTION, SOLUTION INTRAMUSCULAR; INTRAVENOUS at 09:56

## 2020-08-25 RX ADMIN — Medication 10 ML: at 07:28

## 2020-08-25 RX ADMIN — LIDOCAINE HYDROCHLORIDE 40 ML: 20 SOLUTION ORAL; TOPICAL at 03:36

## 2020-08-25 RX ADMIN — ONDANSETRON 4 MG: 2 INJECTION INTRAMUSCULAR; INTRAVENOUS at 14:23

## 2020-08-25 RX ADMIN — GABAPENTIN 300 MG: 300 CAPSULE ORAL at 22:12

## 2020-08-25 NOTE — DIABETES MGMT
GLYCEMIC CONTROL PROGRESS NOTE:    - discussed in rounds, known h/o DM2, HbA1C ordered per protocol, basal/bolus home regimen  - BG in target range non-ICU: < 180 mg/dL  NPO, recommend continue with - Humalog Normal Insulin Sensitivity Corrective Coverage    Addendum- GC attempted to see pt, off floor for procedure will follow up this afternoon    Recent Glucose Results:   Lab Results   Component Value Date/Time     (H) 08/25/2020 05:30 AM     (H) 08/24/2020 09:35 PM    GLUCPOC 124 (H) 08/25/2020 06:16 AM    GLUCPOC 121 (H) 08/25/2020 01:14 AM     Ousmane Dominguez MS, RN, CDE  Glycemic Control Team  [phone]  Pager [phone] (M-TH 8:00-4:30P)  *After Hours pager [phone]

## 2020-08-25 NOTE — ROUTINE PROCESS
0215 Pt c/o pain 8/10. Nurse notified MD that the pt only has tylenol. Order received for Nitropaste.  
 
0602 Pt c/o chest pain 7/10. Jhonny RN assisted with administering 2mg of morphine. 0700 Bedside and Verbal shift change report given to  JOSE CRUZ Fountain RN (oncoming nurse) by Josette Rollins. Adia Hernandez RN (offgoing nurse). Report included the following information SBAR, Kardex, MAR, Accordion and Recent Results.

## 2020-08-25 NOTE — H&P
History & Physical    Patient: Maddy Santana MRN: [id number]  CSN: [id number]    YOB: 1945  Age: 69 y.o. Sex: female      DOA: 8/24/2020  Primary Care Provider:  Severo Stamp, MD      Assessment/Plan     Patient Active Problem List   Diagnosis Code    Chest pain R07.9    CAD (coronary artery disease) I25.10    Hypertension I10    Insulin dependent diabetes mellitus with complications [id number]    Gastrointestinal disorder K92.9    Acute DVT of left tibial vein ([facility]) I82.442    Morbid obesity with BMI of 40.0-44.9, adult ([facility]) E66.01, Z68.41    Gout M10.9    Vomiting R11.10    Pyelonephritis N12    Intractable vomiting with nausea R11.2    Diabetes ([address] E11.9    Acute chest pain R07.9    Dyspepsia R10.13    Cough R05    Anticoagulated Z79.01     70 y/o female with CAD and prior stent placement, morbid obesity, DVT on anticoagulation, HTN, and DMT2 is admitted for chest pain.     Chest pain-suspect gastritis as the source of her pain but she does have new LBBB  -telemetry  -trend troponins  -heparin drip started in the ED  -nitropaste twice daily  -morphine as needed  -cardiology consult  -echocardiogram  -nuclear medicine stress test  -IV protonix and pepcid  -check H pylori testing  -trial of GI cocktail    CAD-prior stent placement in 2007  -follow up lipid panel    HTN  -switch to lopressor 25 twice daily from atenolol once daily    DMT2  -sliding scale insulin  -follow up A1C  -hold evening lantus dose as NPO for stress test    Morbid obesity-BMI 42  -aggressive lifestyle intervention needed    Full code    Prophylaxis: protonix IV, heparin drip    Estimated length of stay : 2 nights    Yamila Romo MD  Nocturnist  ---------------------------------------------------------------------------------------------------------------------------------------------------------------------  CC: chest pain       HPI:     Maddy Santana is a 69 y.o. female who has CAD with stent placed in 2007, prior DVT on chronic anticoagulation, morbid obesity, HTN, and DMT2 presents to the ED with intermittent chest pain for the past 5 days. It lasts for hours and feels like a burning. She thinks it's her acid reflux. However, her pain felt similar to her MI in 2007. No fever, cough, or leg swelling. Past Medical History:   Diagnosis Date    CAD (coronary artery disease)     [facility]     Gastrointestinal disorder     Gout     Hypertension     MI (myocardial infarction) ([facility] [facility]     Reflux        Past Surgical History:   Procedure Laterality Date    CARDIAC SURG PROCEDURE UNLIST      PCI 15 years ago   [facility] VASCULAR SURGERY PROCEDURE UNLIST         Family History   Problem Relation Age of Onset    Heart Disease Mother     Stroke Father        Social History     Socioeconomic History    Marital status:      Spouse name: Not on file    Number of children: Not on file    Years of education: Not on file    Highest education level: Not on file   Tobacco Use    Smoking status: Never Smoker    Smokeless tobacco: Never Used   Substance and Sexual Activity    Alcohol use: No    Drug use: No   Other Topics Concern       Prior to Admission medications    Medication Sig Start Date End Date Taking? Authorizing Provider   lisinopriL (PRINIVIL, ZESTRIL) 10 mg tablet Take 10 mg by mouth daily. 7/10/20 1/6/21 Yes Other, MD Santiago   insulin lispro (HumaLOG KwikPen Insulin) 100 unit/mL kwikpen  = 10 u   101-150 = 12 u   151-200 = 14 u   201-250 = 16 u          251-300 = 18 u      301-350 = 20 u  351-400= 22 u  >400 = 24 u  premeal BG 11/25/19  Yes Other, MD Santiago   albuterol sulfate (PROAIR RESPICLICK) 90 mcg/actuation breath activated inhaler Take 2 Puffs by inhalation. 6/22/20  Yes Other, MD Santiago   omeprazole (PRILOSEC) 20 mg capsule Take 1 Cap by mouth two (2) times a day.  5/26/19  Yes Mendoza Armendariz MD   rivaroxaban (XARELTO) 15 mg (42)- 20 mg (9) DsPk Take one 15 mg tablet twice a day with food for the first 21 days. Then, take one 20 mg tablet once a day with food for 9 days. 12/6/18  Yes Theresa Dunbar MD   aspirin delayed-release 81 mg tablet Take 1 Tab by mouth daily. 12/6/18  Yes Theresa Dunbar MD   simvastatin (ZOCOR) 10 mg tablet Take 10 mg by mouth nightly. Yes Provider, Historical   metFORMIN (GLUCOPHAGE) 500 mg tablet Take 500 mg by mouth two (2) times daily (with meals). Yes Provider, Historical   allopurinol (ZYLOPRIM) 100 mg tablet Take 100 mg by mouth two (2) times a day. Yes Provider, Historical   gabapentin (NEURONTIN) 300 mg capsule Take 300 mg by mouth three (3) times daily. Yes Provider, Historical   alum-mag hydroxide-simeth (MAALOX ADVANCED) 200-200-20 mg/5 mL susp Take 30 mL by mouth every four (4) hours as needed (heart burn). 3/3/20   Sindy ALLEN, DO   losartan (COZAAR) 100 mg tablet Take 100 mg by mouth daily. Indications: high blood pressure    Provider, Historical   diclofenac (VOLTAREN) 1 % gel Apply 4 g to affected area four (4) times daily. 5/22/19   Larue Krabbe, MD   ondansetron hcl [facility]) 8 mg tablet Take 1 Tab by mouth every eight (8) hours as needed for Nausea. 12/18/18   Kavitha Emery MD   atenolol (TENORMIN) 100 mg tablet Take 100 mg by mouth daily. Provider, Historical   traZODone (DESYREL) 50 mg tablet Take 50 mg by mouth nightly as needed for Sleep. Provider, Historical   multivitamin, tx-iron-ca-min (THERA-M W/ IRON) 9 mg iron-400 mcg tab tablet Take 1 Tab by mouth daily. Formulary substitution for DAILY MULTIVITAMIN    Provider, Historical   ergocalciferol (VITAMIN D2) 50,000 unit capsule Take 50,000 Units by mouth every Monday. Provider, Historical   insulin glargine (LANTUS U-100 INSULIN) 100 unit/mL injection 45 Units by SubCUTAneous route nightly. Patient states she takes 45 units and not 42    Provider, Historical   Difluprednate (DUREZOL) 0.05 % ophthalmic emulsion Administer 1 Drop to right eye four (4) times daily.  In relation to cataract surgery    Provider, Historical       Allergies   Allergen Reactions    Gabapentin Swelling     \"I'm back on it, they figured out it wasn't this medication causing me to swell up\"    Pcn [Penicillins] Angioedema    Lisinopril Swelling       Review of Systems  Gen: No fever, chills, malaise, weight loss/gain. Heent: No headache, sore throat. Heart: +chest pain, no palpitations, DEMARCO, pnd, or orthopnea. Resp: No cough, hemoptysis, wheezing and shortness of breath. GI: No nausea, vomiting, diarrhea, constipation, melena or hematochezia. : No urinary obstruction, dysuria or hematuria. Derm: No rash, new skin lesion or pruritis. Musc/skeletal: no bone or joint complaints. Vasc: No edema, cyanosis or claudication. Endo: No heat/cold intolerance, no polyuria,polydipsia or polyphagia. Neuro: No unilateral weakness, numbness, tingling. No seizures. Heme: No easy bruising or bleeding. Physical Exam:     Physical Exam:  Visit Vitals  /90 (BP 1 Location: Right arm, BP Patient Position: Sitting)   Pulse 83   Temp 98.8 °F (37.1 °C)   Resp 18   Ht 5' 4\" (1.626 m)   Wt 111.1 kg (245 lb)   SpO2 97%   BMI 42.05 kg/m²      O2 Device: Room air    Temp (24hrs), Av.3 °F (36.8 °C), Min:97.7 °F (36.5 °C), Max:98.8 °F (37.1 °C)    No intake/output data recorded. No intake/output data recorded. General:  Awake, cooperative, no distress. Head:  Normocephalic, without obvious abnormality, atraumatic. Eyes:  Conjunctivae/corneas clear, sclera anicteric. Neck: Supple, symmetrical, trachea midline. Lungs:   Clear to auscultation bilaterally. Heart:  Regular rate and rhythm, S1, S2 normal, no murmur, click, rub or gallop. Abdomen: Soft, obese, reproducible chest pain when palpating over epigastrium. Bowel sounds normal. No masses,  No organomegaly. Extremities: Extremities normal, atraumatic, no cyanosis or edema.  Capillary refill normal.   Pulses: 2+ and symmetric all extremities. Skin: Skin color pink, turgor normal. No rashes or lesions   Neurologic: CNII-XII intact. No focal motor or sensory deficit. Labs Reviewed: All lab results for the last 24 hours reviewed. Recent Results (from the past 24 hour(s))   EKG, 12 LEAD, INITIAL    Collection Time: 08/24/20  9:00 PM   Result Value Ref Range    Ventricular Rate 94 BPM    Atrial Rate 94 BPM    P-R Interval 150 ms    QRS Duration 156 ms    Q-T Interval 396 ms    QTC Calculation (Bezet) 495 ms    Calculated P Axis 116 degrees    Calculated R Axis -132 degrees    Calculated T Axis 73 degrees    Diagnosis       Suspect arm lead reversal, interpretation assumes no reversal  Sinus rhythm with frequent premature ventricular complexes and fusion   complexes  Nonspecific intraventricular block  Lateral infarct , age undetermined  Abnormal ECG  When compared with ECG of 03-MAR-2020 12:03,  fusion complexes are now present  Questionable change in QRS duration  Lateral infarct is now present     CBC WITH AUTOMATED DIFF    Collection Time: 08/24/20  9:35 PM   Result Value Ref Range    WBC 6.0 4.6 - 13.2 K/uL    RBC 4.76 4.20 - 5.30 M/uL    HGB 13.8 12.0 - 16.0 g/dL    HCT 42.5 35.0 - 45.0 %    MCV 89.3 74.0 - 97.0 FL    MCH 29.0 24.0 - 34.0 PG    MCHC 32.5 31.0 - 37.0 g/dL    RDW 12.6 11.6 - 14.5 %    PLATELET 322 711 - 942 K/uL    MPV 10.1 9.2 - 11.8 FL    NEUTROPHILS 55 40 - 73 %    LYMPHOCYTES 34 21 - 52 %    MONOCYTES 8 3 - 10 %    EOSINOPHILS 2 0 - 5 %    BASOPHILS 1 0 - 2 %    ABS. NEUTROPHILS 3.4 1.8 - 8.0 K/UL    ABS. LYMPHOCYTES 2.0 0.9 - 3.6 K/UL    ABS. MONOCYTES 0.5 0.05 - 1.2 K/UL    ABS. EOSINOPHILS 0.1 0.0 - 0.4 K/UL    ABS.  BASOPHILS 0.0 0.0 - 0.1 K/UL    DF AUTOMATED     METABOLIC PANEL, COMPREHENSIVE    Collection Time: 08/24/20  9:35 PM   Result Value Ref Range    Sodium 140 136 - 145 mmol/L    Potassium 4.6 3.5 - 5.5 mmol/L    Chloride 107 100 - 111 mmol/L    CO2 29 21 - 32 mmol/L    Anion gap 4 3.0 - 18 mmol/L    Glucose 157 (H) 74 - 99 mg/dL    BUN 19 (H) 7.0 - 18 MG/DL    Creatinine 0.92 0.6 - 1.3 MG/DL    BUN/Creatinine ratio 21 (H) 12 - 20      GFR est AA >60 >60 ml/min/1.73m2    GFR est non-AA >60 >60 ml/min/1.73m2    Calcium 8.7 8.5 - 10.1 MG/DL    Bilirubin, total 0.4 0.2 - 1.0 MG/DL    ALT (SGPT) 32 13 - 56 U/L    AST (SGOT) 32 10 - 38 U/L    Alk. phosphatase 87 45 - 117 U/L    Protein, total 7.5 6.4 - 8.2 g/dL    Albumin 3.3 (L) 3.4 - 5.0 g/dL    Globulin 4.2 (H) 2.0 - 4.0 g/dL    A-G Ratio 0.8 0.8 - 1.7     MAGNESIUM    Collection Time: 08/24/20  9:35 PM   Result Value Ref Range    Magnesium 1.8 1.6 - 2.6 mg/dL   LIPASE    Collection Time: 08/24/20  9:35 PM   Result Value Ref Range    Lipase 44 (L) 73 - 393 U/L   D DIMER    Collection Time: 08/24/20  9:35 PM   Result Value Ref Range    D DIMER 0.59 (H) <0.46 ug/ml(FEU)   CARDIAC PANEL,(CK, CKMB & TROPONIN)    Collection Time: 08/24/20  9:35 PM   Result Value Ref Range    CK - MB 8.6 (H) <3.6 ng/ml    CK-MB Index 1.8 0.0 - 4.0 %     (H) 26 - 192 U/L    Troponin-I, QT 0.02 0.0 - 0.045 NG/ML   PTT    Collection Time: 08/24/20  9:35 PM   Result Value Ref Range    aPTT 32.0 23.0 - 36.4 SEC   GLUCOSE, POC    Collection Time: 08/25/20  1:14 AM   Result Value Ref Range    Glucose (POC) 121 (H) 70 - 110 mg/dL       Results   XR CHEST PORT (Accession [id number]) (Order [id number])   Allergies      High: Gabapentin;  Pcn [Penicillins]    Not Specified: Lisinopril    Exam Information     Status  Exam Begun   Exam Ended     Final [99]  8/24/2020  21:05  8/24/2020  21:16    Result Information     Status: Final result (Exam End: 8/24/2020 21:16)  Provider Status: Open    Study Result     Portable Chest       History: Chest pain     Comparison: PA and lateral chest 03/03/2020     Portable view of the chest demonstrates stable cardiomediastinal silhouette  given differences in technique. There is no focal consolidation, pleural  effusion, or pneumothorax. Minimal degenerative changes are present in the  spine.      IMPRESSION  IMPRESSION:     1. No significant change or acute cardiopulmonary process.

## 2020-08-25 NOTE — ROUTINE PROCESS
0034 TRANSFER - IN REPORT: 
 
Verbal report received from SOO Stafford RN(name) on Anastacio Indigo  being received from Jodi Mckeon RN(unit) for routine progression of care Report consisted of patients Situation, Background, Assessment and  
Recommendations(SBAR). Information from the following report(s) SBAR, Kardex, MAR, Accordion, Recent Results and Cardiac Rhythm ST BBB was reviewed with the receiving nurse. Opportunity for questions and clarification was provided. Assessment completed upon patients arrival to unit and care assumed.

## 2020-08-25 NOTE — ED PROVIDER NOTES
EMERGENCY DEPARTMENT HISTORY AND PHYSICAL EXAM    Date: 8/24/2020  Patient Name: Sonia Alatorre    History of Presenting Illness     Chief Complaint   Patient presents with    Chest Pain         History Provided By: Patient    Additional History (Context):   Sonia Alatorre is a 69 y.o. female with PMHX CAD from prior stents, presents to the emergency department C/O 1 week of a burning right-sided chest pain with associated shortness of breath. Patient reports that the chest pain comes and goes. Does not notice any exacerbation with exercise, no improvement with rest.  Patient reports that the pain is not similar to her GERD. Pt denies abdominal pain, nausea, vomiting, and any other sxs or complaints. States that she does not have a cardiologist to follow-up with. PCP: Belle Bailey MD    Current Facility-Administered Medications   Medication Dose Route Frequency Provider Last Rate Last Dose    heparin (porcine) 1,000 unit/mL injection 4,000 Units  4,000 Units IntraVENous ONCE Jin Chan DO        heparin 25,000 units in D5W 250 ml infusion  12-25 Units/kg/hr IntraVENous TITRATE Jin Chan, DO         Current Outpatient Medications   Medication Sig Dispense Refill    lisinopriL (PRINIVIL, ZESTRIL) 10 mg tablet Take 10 mg by mouth daily.  insulin lispro (HumaLOG KwikPen Insulin) 100 unit/mL kwikpen  = 10 u   101-150 = 12 u   151-200 = 14 u   201-250 = 16 u          251-300 = 18 u      301-350 = 20 u  351-400= 22 u  >400 = 24 u  premeal BG      albuterol sulfate (PROAIR RESPICLICK) 90 mcg/actuation breath activated inhaler Take 2 Puffs by inhalation.  omeprazole (PRILOSEC) 20 mg capsule Take 1 Cap by mouth two (2) times a day. 30 Cap 0    rivaroxaban (XARELTO) 15 mg (42)- 20 mg (9) DsPk Take one 15 mg tablet twice a day with food for the first 21 days. Then, take one 20 mg tablet once a day with food for 9 days.  1 Dose Pack 0    aspirin delayed-release 81 mg tablet Take 1 Tab by mouth daily. 30 Tab 0    metFORMIN (GLUCOPHAGE) 500 mg tablet Take 500 mg by mouth two (2) times daily (with meals).  insulin glargine (LANTUS U-100 INSULIN) 100 unit/mL injection 45 Units by SubCUTAneous route nightly. Patient states she takes 45 units and not 42      alum-mag hydroxide-simeth (MAALOX ADVANCED) 200-200-20 mg/5 mL susp Take 30 mL by mouth every four (4) hours as needed (heart burn). 769 mL 0    losartan (COZAAR) 100 mg tablet Take 100 mg by mouth daily. Indications: high blood pressure      diclofenac (VOLTAREN) 1 % gel Apply 4 g to affected area four (4) times daily. 100 g 1    ondansetron hcl (ZOFRAN) 8 mg tablet Take 1 Tab by mouth every eight (8) hours as needed for Nausea. 10 Tab 0    atenolol (TENORMIN) 100 mg tablet Take 100 mg by mouth daily.  traZODone (DESYREL) 50 mg tablet Take 50 mg by mouth nightly as needed for Sleep.  simvastatin (ZOCOR) 10 mg tablet Take 10 mg by mouth nightly.  multivitamin, tx-iron-ca-min (THERA-M W/ IRON) 9 mg iron-400 mcg tab tablet Take 1 Tab by mouth daily. Formulary substitution for DAILY MULTIVITAMIN      ergocalciferol (VITAMIN D2) 50,000 unit capsule Take 50,000 Units by mouth every Monday.  allopurinol (ZYLOPRIM) 100 mg tablet Take 100 mg by mouth two (2) times a day.  gabapentin (NEURONTIN) 300 mg capsule Take 300 mg by mouth three (3) times daily.  Difluprednate (DUREZOL) 0.05 % ophthalmic emulsion Administer 1 Drop to right eye four (4) times daily.  In relation to cataract surgery         Past History     Past Medical History:  Past Medical History:   Diagnosis Date    CAD (coronary artery disease)     Diabetes ([facility] [address]     Gastrointestinal disorder     Gout     Hypertension     MI (myocardial infarction) ([facility] [address]     Reflux        Past Surgical History:  Past Surgical History:   Procedure Laterality Date    CARDIAC SURG PROCEDURE UNLIST      VASCULAR SURGERY PROCEDURE UNLIST         Family History:  History reviewed. No pertinent family history. Social History:  Social History     Tobacco Use    Smoking status: Never Smoker    Smokeless tobacco: Never Used   Substance Use Topics    Alcohol use: No    Drug use: No       Allergies: Allergies   Allergen Reactions    Gabapentin Swelling     \"I'm back on it, they figured out it wasn't this medication causing me to swell up\"    Pcn [Penicillins] Angioedema    Lisinopril Swelling         Review of Systems   Review of Systems   Constitutional: Negative for chills and fever. HENT: Negative for congestion, ear pain, sinus pain and sore throat. Eyes: Negative for pain and visual disturbance. Respiratory: Positive for shortness of breath. Negative for cough. Cardiovascular: Positive for chest pain. Negative for leg swelling. Gastrointestinal: Negative for abdominal pain, constipation, diarrhea, nausea and vomiting. Genitourinary: Negative for dysuria, hematuria, vaginal bleeding and vaginal discharge. Musculoskeletal: Negative for back pain and neck pain. Skin: Negative for rash and wound. Neurological: Negative for dizziness, tremors, weakness, light-headedness and numbness. All other systems reviewed and are negative.       Physical Exam     Vitals:    08/24/20 2038   BP: 158/90   Pulse: 60   Resp: 18   Temp: 97.7 °F (36.5 °C)   SpO2: 100%   Weight: 111.1 kg (245 lb)   Height: 5' 4\" (1.626 m)     Physical Exam    Nursing note and vitals reviewed    Constitutional: Morbidly obese elderly -American female, no acute distress  Head: Normocephalic, Atraumatic  Eyes: Pupils are equal, round, and reactive to light, EOMI  Neck: Supple, non-tender  Cardiovascular: Regular rate and rhythm, no murmurs, rubs, or gallops, + 2 radial pulses bilaterally  Chest: Normal work of breathing and chest excursion bilaterally  Lungs: Clear to ausculation bilaterally, no wheezes, no rhonchi  Abdomen: Soft, non tender, non distended, normoactive bowel sounds  Back: No evidence of trauma or deformity  Extremities: No evidence of trauma or deformity, no LE edema. No streaking erythema, vesicular lesions, ulcerations or bulla  Skin: Warm and dry, normal cap refill  Neuro: Alert and appropriate, CN intact, normal speech, moving all 4 extremities freely and symmetrically  Psychiatric: Normal mood and affect       Diagnostic Study Results     Labs -     Recent Results (from the past 12 hour(s))   EKG, 12 LEAD, INITIAL    Collection Time: 08/24/20  9:00 PM   Result Value Ref Range    Ventricular Rate 94 BPM    Atrial Rate 94 BPM    P-R Interval 150 ms    QRS Duration 156 ms    Q-T Interval 396 ms    QTC Calculation (Bezet) 495 ms    Calculated P Axis 116 degrees    Calculated R Axis -132 degrees    Calculated T Axis 73 degrees    Diagnosis       Suspect arm lead reversal, interpretation assumes no reversal  Sinus rhythm with frequent premature ventricular complexes and fusion   complexes  Nonspecific intraventricular block  Lateral infarct , age undetermined  Abnormal ECG  When compared with ECG of 03-MAR-2020 12:03,  fusion complexes are now present  Questionable change in QRS duration  Lateral infarct is now present     CBC WITH AUTOMATED DIFF    Collection Time: 08/24/20  9:35 PM   Result Value Ref Range    WBC 6.0 4.6 - 13.2 K/uL    RBC 4.76 4.20 - 5.30 M/uL    HGB 13.8 12.0 - 16.0 g/dL    HCT 42.5 35.0 - 45.0 %    MCV 89.3 74.0 - 97.0 FL    MCH 29.0 24.0 - 34.0 PG    MCHC 32.5 31.0 - 37.0 g/dL    RDW 12.6 11.6 - 14.5 %    PLATELET 342 527 - 819 K/uL    MPV 10.1 9.2 - 11.8 FL    NEUTROPHILS 55 40 - 73 %    LYMPHOCYTES 34 21 - 52 %    MONOCYTES 8 3 - 10 %    EOSINOPHILS 2 0 - 5 %    BASOPHILS 1 0 - 2 %    ABS. NEUTROPHILS 3.4 1.8 - 8.0 K/UL    ABS. LYMPHOCYTES 2.0 0.9 - 3.6 K/UL    ABS. MONOCYTES 0.5 0.05 - 1.2 K/UL    ABS. EOSINOPHILS 0.1 0.0 - 0.4 K/UL    ABS.  BASOPHILS 0.0 0.0 - 0.1 K/UL    DF AUTOMATED     METABOLIC PANEL, COMPREHENSIVE    Collection Time: 08/24/20  9:35 PM   Result Value Ref Range    Sodium 140 136 - 145 mmol/L    Potassium 4.6 3.5 - 5.5 mmol/L    Chloride 107 100 - 111 mmol/L    CO2 29 21 - 32 mmol/L    Anion gap 4 3.0 - 18 mmol/L    Glucose 157 (H) 74 - 99 mg/dL    BUN 19 (H) 7.0 - 18 MG/DL    Creatinine 0.92 0.6 - 1.3 MG/DL    BUN/Creatinine ratio 21 (H) 12 - 20      GFR est AA >60 >60 ml/min/1.73m2    GFR est non-AA >60 >60 ml/min/1.73m2    Calcium 8.7 8.5 - 10.1 MG/DL    Bilirubin, total 0.4 0.2 - 1.0 MG/DL    ALT (SGPT) 32 13 - 56 U/L    AST (SGOT) 32 10 - 38 U/L    Alk. phosphatase 87 45 - 117 U/L    Protein, total 7.5 6.4 - 8.2 g/dL    Albumin 3.3 (L) 3.4 - 5.0 g/dL    Globulin 4.2 (H) 2.0 - 4.0 g/dL    A-G Ratio 0.8 0.8 - 1.7     MAGNESIUM    Collection Time: 08/24/20  9:35 PM   Result Value Ref Range    Magnesium 1.8 1.6 - 2.6 mg/dL   LIPASE    Collection Time: 08/24/20  9:35 PM   Result Value Ref Range    Lipase 44 (L) 73 - 393 U/L   D DIMER    Collection Time: 08/24/20  9:35 PM   Result Value Ref Range    D DIMER 0.59 (H) <0.46 ug/ml(FEU)   CARDIAC PANEL,(CK, CKMB & TROPONIN)    Collection Time: 08/24/20  9:35 PM   Result Value Ref Range    CK - MB 8.6 (H) <3.6 ng/ml    CK-MB Index 1.8 0.0 - 4.0 %     (H) 26 - 192 U/L    Troponin-I, QT 0.02 0.0 - 0.045 NG/ML       Radiologic Studies -   XR CHEST PORT   Final Result   IMPRESSION:      1. No significant change or acute cardiopulmonary process. CT Results  (Last 48 hours)    None        CXR Results  (Last 48 hours)               08/24/20 2116  XR CHEST PORT Final result    Impression:  IMPRESSION:       1. No significant change or acute cardiopulmonary process. Narrative:  Portable Chest         History: Chest pain       Comparison: PA and lateral chest 03/03/2020       Portable view of the chest demonstrates stable cardiomediastinal silhouette   given differences in technique.  There is no focal consolidation, pleural   effusion, or pneumothorax. Minimal degenerative changes are present in the   spine. Medical Decision Making   I am the first provider for this patient. I reviewed the vital signs, available nursing notes, past medical history, past surgical history, family history and social history. Vital Signs-Reviewed the patient's vital signs. Pulse Oximetry Analysis -100 % on room air    Cardiac Monitor:  Rate: 60 bpm  Rhythm: Regular    EKG interpretation: (Preliminary)  8:55 PM   Sinus rhythm with PACs. Left bundle branch block. 99 beats minute. NH interval 146 ms.  ms. No Sgarbossa. Records Reviewed: Nursing Notes and Old Medical Records    Provider Notes:   69 y.o. female who presents with a history of CAD status post stents with chest pain and shortness of breath. On presentation, patient is not tachycardic. She does not appear toxic. However her EKG showing a left bundle branch block which is new when compared to her EKG in March 2020. Patient with significant cardiac risk factors with a history of CAD status post PCI. Will evaluate for ACS. As the patient cannot be PERC out, secondary to age, will evaluate with a d-dimer. Ultimately with her history of CAD, with new left bundle branch block, will require admission. Procedures:  Procedures    ED Course:   8:55 PM   Initial assessment performed. The patients presenting problems have been discussed, and they are in agreement with the care plan formulated and outlined with them. I have encouraged them to ask questions as they arise throughout their visit. 11:09 PM d-dimer negative when age-adjusted. For set of troponin is negative. Discussed patient's history, exam, and available diagnostics results with Cheryl Collazo MD, cardiology, who agree with admission and heparin  .            Diagnosis and Disposition       11:09 PM  I have spent 75 minutes of critical care time involved in lab review, consultations with specialist, family decision-making, and documentation. During this entire length of time I was immediately available to the patient. Critical Care: The reason for providing this level of medical care for this critically ill patient was due a critical illness that impaired one or more vital organ systems such that there was a high probability of imminent or life threatening deterioration in the patients condition. This care involved high complexity decision making to assess, manipulate, and support vital system functions, to treat this degreee vital organ system failure and to prevent further life threatening deterioration of the patients condition. Core Measures:  For Hospitalized Patients:    1. Hospitalization Decision Time:  The decision to hospitalize the patient was made by Kwaku Naylor DO at 11:10 PM on 8/24/2020    2. Aspirin: Aspirin was not given because the patient did not present with a stroke at the time of their Emergency Department evaluation    11:09 PM  Patient is being admitted to the hospital by Dr. Susannah Hernandez. The results of their tests and reasons for their admission have been discussed with them and/or available family. They convey agreement and understanding for the need to be admitted and for their admission diagnosis. CONDITIONS ON ADMISSION:  Sepsis is not present at the time of admission. Pneumonia is not present at the time of admission. MRSA is not present at the time of admission. Wound infection is not present at the time of admission. Pressure Ulcer is not present at the time of admission. CLINICAL IMPRESSION:    1. Chest pain, unspecified type      ____________________________________     Please note that this dictation was completed with [employer], the [employer] voice recognition software. Quite often unanticipated grammatical, syntax, homophones, and other interpretive errors are inadvertently transcribed by the computer software.   Please disregard these errors. Please excuse any errors that have escaped final proofreading.

## 2020-08-25 NOTE — ED TRIAGE NOTES
Pt co CP x 1wk, has been progressively worse. Pt describes pain as intermittent burning, and pressure, denies radiation. Pt reports N, V, SOB x 1wk. Pt reports V x 2 today, reports this pain feels different than her reflux.  Hx of MI, and cardiac stents

## 2020-08-25 NOTE — ED NOTES
Report given to [facility] RN, NISHA, MAR, sinus tach with pac and left bundle branch block, pt A/O x4

## 2020-08-25 NOTE — PROGRESS NOTES
Chart reviewed. Pt admitted to tele by hospitalist for chest pain. Attempted to call into pts room and no answer. Per chart review, pt lives alone with son nearby for assistance. Per chart reviewed, pt has cane and RW. Will recommend [facility]. Provider please advise if PT/OT eval needed. CM will cont to follow for transition of care needs [phone]    Reason for Admission:   Per H&P pt is \"is a 69 y.o. female who has CAD with stent placed in 2007, prior DVT on chronic anticoagulation, morbid obesity, HTN, and DMT2 presents to the ED with intermittent chest pain for the past 5 days. It lasts for hours and feels like a burning. She thinks it's her acid reflux. However, her pain felt similar to her MI in 2007. No fever, cough, or leg swelling. \"                   RUR Score:         14%            Plan for utilizing home health:      TBD, H2H?    PCP: First and Last name:  Listed as MD Yadira Hilton   Name of Practice:    Are you a current patient: Yes/No:    Approximate date of last visit:    Can you participate in a virtual visit with your PCP:                     Current Advanced Directive/Advance Care Plan: not on file                         Transition of Care Plan:           TBD           Care Management Interventions  Transition of Care Consult (CM Consult): Discharge Planning General

## 2020-08-25 NOTE — PROGRESS NOTES
Hospitalist Progress Note-critical care note     Patient: Qi Skinner MRN: [id number]  CSN: [id number]    YOB: 1945  Age: 69 y.o. Sex: female    DOA: 8/24/2020 LOS:  LOS: 1 day            Chief complaint:chest pain, dm, htn, obesity     Assessment/Plan         Hospital Problems  Date Reviewed: 8/25/2020          Codes Class Noted POA    Anticoagulated ICD-10-CM: Z79.01  ICD-9-CM: V58.61  8/25/2020 Yes        Diabetes ([address] ICD-10-CM: E11.9  ICD-9-CM: 250.00  5/24/2019 Yes        * (Principal) Chest pain ICD-10-CM: R07.9  ICD-9-CM: 786.50  12/3/2018 Yes        CAD (coronary artery disease) ICD-10-CM: I25.10  ICD-9-CM: 414.00  12/3/2018 Yes        Hypertension ICD-10-CM: I10  ICD-9-CM: 401.9  12/3/2018 Yes        Morbid obesity with BMI of 40.0-44.9, adult [facility]) ICD-10-CM: E66.01, Z68.41  ICD-9-CM: 278.01, V85.41  12/3/2018 Yes              Chest pain  No acs at this point   ce wnl   -heparin drip now    -nitropaste ,stress test today, results pending   -morphine as needed  -cardiology consult  Echo   Continue aspirin, bbbm        CAD-prior stent placement in 2007  -follow up lipid panel     HTN  On metoprolol now  Will d/c atenolol      DMT2  -sliding scale insulin  -add lantus-last lantus use last Sunday   -diabetic consult      Morbid obesity-BMI 42  -aggressive lifestyle intervention needed      Subjective: chest pain   Rn: stable   Disposition :tbd,   Review of systems:    General: No fevers or chills. Cardiovascular: +chest pain, no  pressure. No palpitations. Pulmonary: No shortness of breath. Gastrointestinal: No nausea, vomiting. Vital signs/Intake and Output:  Visit Vitals  /77 (BP 1 Location: Right arm, BP Patient Position: At rest)   Pulse 80   Temp 97.9 °F (36.6 °C)   Resp 16   Ht 5' 4\" (1.626 m)   Wt 111.1 kg (245 lb)   SpO2 98%   BMI 42.05 kg/m²     Current Shift:  No intake/output data recorded. Last three shifts:  No intake/output data recorded.     Physical Exam:  General: WD, WN. Alert, cooperative, no acute distress    HEENT: NC, Atraumatic. PERRLA, anicteric sclerae. Lungs: CTA Bilaterally. No Wheezing/Rhonchi/Rales. Heart:  Regular  rhythm,  No murmur, No Rubs, No Gallops  Abdomen: Soft, Non distended, Non tender. +Bowel sounds,   Extremities: No c/c/e  Psych:   Not anxious or agitated. Neurologic:  No acute neurological deficit. Labs: Results:       Chemistry Recent Labs     08/25/20  0530 08/24/20  2135   * 157*    140   K 3.9 4.6    107   CO2 31 29   BUN 17 19*   CREA 0.84 0.92   CA 8.6 8.7   AGAP 2* 4   BUCR 20 21*   AP 84 87   TP 6.7 7.5   ALB 3.0* 3.3*   GLOB 3.7 4.2*   AGRAT 0.8 0.8      CBC w/Diff Recent Labs     08/25/20  0530 08/24/20  2135   WBC 6.9 6.0   RBC 4.51 4.76   HGB 12.8 13.8   HCT 40.0 42.5    222   GRANS 43 55   LYMPH 48 34   EOS 2 2      Cardiac Enzymes Recent Labs     08/25/20  1110 08/25/20  0530   * 434*   CKND1 1.7 1.6      Coagulation Recent Labs     08/25/20  1100 08/25/20  0530   APTT 91.3* 56.6*       Lipid Panel Lab Results   Component Value Date/Time    Cholesterol, total 139 08/25/2020 05:30 AM    HDL Cholesterol 49 08/25/2020 05:30 AM    LDL, calculated 58.6 08/25/2020 05:30 AM    VLDL, calculated 31.4 08/25/2020 05:30 AM    Triglyceride 157 (H) 08/25/2020 05:30 AM    CHOL/HDL Ratio 2.8 08/25/2020 05:30 AM      BNP No results for input(s): BNPP in the last 72 hours.    Liver Enzymes Recent Labs     08/25/20  0530   TP 6.7   ALB 3.0*   AP 84      Thyroid Studies No results found for: T4, T3U, TSH, TSHEXT     Procedures/imaging: see electronic medical records for all procedures/Xrays and details which were not copied into this note but were reviewed prior to creation of Plan    Xr Chest Port    Result Date: 8/24/2020  Portable Chest  History: Chest pain Comparison: PA and lateral chest 03/03/2020 Portable view of the chest demonstrates stable cardiomediastinal silhouette given differences in technique. There is no focal consolidation, pleural effusion, or pneumothorax. Minimal degenerative changes are present in the spine. IMPRESSION: 1. No significant change or acute cardiopulmonary process.        Vitor Lopez MD

## 2020-08-25 NOTE — CONSULTS
[facility] Consult Note      Patient: Dawood Le MRN: [id number]  SSN: [id number]    YOB: 1945  Age: 69 y.o. Sex: female        Date of Consultation: 08/25/2020  Referring Physician: Dr. Elaine Larkin  Reason for Consultation:  Chest pain    HPI:  I was asked by Dr. Elaine Larkin to see this pleasant patient for chest pain. Dawood Le is a 69years old very pleasant female with PMH of CAD,  DM,  Morbid obesity,HTN, Hyperlipidemia, Sleep apnea, arthritis. Patient came to the hospital with central chest pain moderate intensity symptoms are mixed including arthritis, reflux disease and patient is diagnosed with new onset of left bundle-branch block EKG. Patient have history of left circumflex angioplasty and stenting in 2007. No recent echocardiogram or stress test done. Patient also have history of DVT on anticoagulation. Chest pain is with or without any physical activity, associated with mild shortness of breath without palpitation or dizziness or sweating.           Past Medical History:   Diagnosis Date    CAD (coronary artery disease)     Diabetes ([facility] [address]     Gastrointestinal disorder     Gout     Hypertension     MI (myocardial infarction) ([facility] [address]     Reflux      Past Surgical History:   Procedure Laterality Date    CARDIAC SURG PROCEDURE UNLIST      PCI 15 years ago    VASCULAR SURGERY PROCEDURE UNLIST       Current Facility-Administered Medications   Medication Dose Route Frequency    nitroglycerin (NITROBID) 2 % ointment 1 Inch  1 Inch Topical BID    morphine injection 2 mg  2 mg IntraVENous Q4H PRN    insulin lispro (HUMALOG) injection   SubCUTAneous AC&HS    glucose chewable tablet 16 g  16 g Oral PRN    glucagon (GLUCAGEN) injection 1 mg  1 mg IntraMUSCular PRN    dextrose 10% infusion 125-250 mL  125-250 mL IntraVENous PRN    albuterol (PROVENTIL VENTOLIN) nebulizer solution 2.5 mg  2.5 mg Nebulization Q4H PRN    aspirin delayed-release tablet 81 mg  81 mg Oral DAILY    gabapentin (NEURONTIN) capsule 300 mg  300 mg Oral TID    multivitamin, tx-iron-ca-min (THERA-M w/ IRON) tablet 1 Tab  1 Tab Oral DAILY    atorvastatin (LIPITOR) tablet 10 mg  10 mg Oral QHS    metoprolol tartrate (LOPRESSOR) tablet 25 mg  25 mg Oral Q12H    pantoprazole (PROTONIX) 40 mg in 0.9% sodium chloride 10 mL injection  40 mg IntraVENous Q24H    famotidine (PF) (PEPCID) 20 mg in 0.9% sodium chloride 10 mL injection  20 mg IntraVENous Q12H    insulin glargine (LANTUS) injection 10 Units  10 Units SubCUTAneous QHS    heparin 25,000 units in D5W 250 ml infusion  9-25 Units/kg/hr IntraVENous TITRATE    sodium chloride (NS) flush 5-40 mL  5-40 mL IntraVENous Q8H    sodium chloride (NS) flush 5-40 mL  5-40 mL IntraVENous PRN    acetaminophen (TYLENOL) tablet 650 mg  650 mg Oral Q6H PRN    Or    acetaminophen (TYLENOL) suppository 650 mg  650 mg Rectal Q6H PRN    polyethylene glycol (MIRALAX) packet 17 g  17 g Oral DAILY PRN    promethazine (PHENERGAN) tablet 12.5 mg  12.5 mg Oral Q6H PRN    Or    ondansetron (ZOFRAN) injection 4 mg  4 mg IntraVENous Q6H PRN       Allergies and Intolerances: Allergies   Allergen Reactions    Gabapentin Swelling     \"I'm back on it, they figured out it wasn't this medication causing me to swell up\"    Pcn [Penicillins] Angioedema    Lisinopril Swelling       Family History:   Family History   Problem Relation Age of Onset    Heart Disease Mother     Stroke Father        Social History:   She  reports that she has never smoked. She has never used smokeless tobacco.  She  reports no history of alcohol use. Review of Systems:     Review of Systems  Gen: No fever, chills, malaise, weight loss/gain. Heent: No headache, rhinorrhea, epistaxis, ear pain, hearing loss, sinus pain, neck pain/stiffness, sore throat. Heart: + chest pain, palpitations, +DEMARCO, pnd, or orthopnea. Resp: No cough, hemoptysis, wheezing and shortness of breath.    GI: No nausea, vomiting, diarrhea, constipation, melena or hematochezia. : No urinary obstruction, dysuria or hematuria. Derm: No rash, new skin lesion or pruritis. Musc/skeletal: no bone or joint complains. Vasc: No edema, cyanosis or claudication. Endo: No heat/cold intolerance, no polyuria,polydipsia or polyphagia. Neuro: No unilateral weakness, numbness, tingling. No seizures. Heme: No easy bruising or bleeding. Physical:   Patient Vitals for the past 6 hrs:   Temp Pulse Resp BP SpO2   08/25/20 1537 97.6 °F (36.4 °C) 71 16 111/75 99 %   08/25/20 1430 97.9 °F (36.6 °C) 80 16 146/77 98 %         Exam:   General Appearance: Comfortable, not using accessory muscles of respiration. HEENT: JUAN. HEAD: Atraumatic  NECK: No JVD, no thyroidomeglay. CAROTIDS: clear  LUNGS: Clear bilaterally. HEART: S1+S2     ABD: Non-tender, BS Audible    EXT: No edema, and no cysnosis. VASCULAR EXAM: Pulses are intact. PSYCHIATRIC EXAM: Mood is appropriate. MUSCULOSKELETAL: Grossly no joint deformity. NEUROLOGICAL: Motor and sensory sytem intact and Cranial nerves II-XII intact.     Review of Data:   LABS:   Lab Results   Component Value Date/Time    WBC 6.9 08/25/2020 05:30 AM    HGB 12.8 08/25/2020 05:30 AM    HCT 40.0 08/25/2020 05:30 AM    PLATELET 544 48/41/4376 05:30 AM     Lab Results   Component Value Date/Time    Sodium 140 08/25/2020 05:30 AM    Potassium 3.9 08/25/2020 05:30 AM    Chloride 107 08/25/2020 05:30 AM    CO2 31 08/25/2020 05:30 AM    Glucose 120 (H) 08/25/2020 05:30 AM    BUN 17 08/25/2020 05:30 AM    Creatinine 0.84 08/25/2020 05:30 AM     Lab Results   Component Value Date/Time    Cholesterol, total 139 08/25/2020 05:30 AM    HDL Cholesterol 49 08/25/2020 05:30 AM    LDL, calculated 58.6 08/25/2020 05:30 AM    Triglyceride 157 (H) 08/25/2020 05:30 AM     No components found for: GPT  Lab Results   Component Value Date/Time    Hemoglobin A1c 8.3 (H) 08/25/2020 05:30 AM       RADIOLOGY:  CT Results  (Last 48 hours) None        CXR Results  (Last 48 hours)               08/24/20 2116  XR CHEST PORT Final result    Impression:  IMPRESSION:       1. No significant change or acute cardiopulmonary process. Narrative:  Portable Chest         History: Chest pain       Comparison: PA and lateral chest 03/03/2020       Portable view of the chest demonstrates stable cardiomediastinal silhouette   given differences in technique. There is no focal consolidation, pleural   effusion, or pneumothorax. Minimal degenerative changes are present in the   spine.                     Cardiology Procedures:   Results for orders placed or performed during the hospital encounter of 08/24/20   EKG, 12 LEAD, INITIAL   Result Value Ref Range    Ventricular Rate 99 BPM    Atrial Rate 99 BPM    P-R Interval 146 ms    QRS Duration 158 ms    Q-T Interval 402 ms    QTC Calculation (Bezet) 515 ms    Calculated P Axis 72 degrees    Calculated R Axis -23 degrees    Calculated T Axis 108 degrees    Diagnosis       Sinus rhythm with premature atrial complexes with aberrant conduction  Left bundle branch block  Abnormal ECG  When compared with ECG of 03-MAR-2020 12:03,  premature ventricular complexes are no longer present  aberrant conduction is now present  Left bundle branch block is now present        Echo Results  (Last 48 hours)    None       Cardiolite (Tc-99m Sestamibi) stress test        Impression / Plan:    Patient Active Problem List   Diagnosis Code    Chest pain R07.9    CAD (coronary artery disease) I25.10    Hypertension I10    Insulin dependent diabetes mellitus with complications [id number]    Gastrointestinal disorder K92.9    Acute DVT of left tibial vein ([facility]) I82.442    Morbid obesity with BMI of 40.0-44.9, adult ([facility]) E66.01, Z68.41    Gout M10.9    Vomiting R11.10    Pyelonephritis N12    Intractable vomiting with nausea R11.2    Diabetes ([facility]) E11.9    Acute chest pain R07.9    Dyspepsia R10.13    Cough R05  Anticoagulated Z79.01    Chest pain at rest R07.9    Abnormal nuclear stress test R94.39    Cardiomyopathy (HCC) I42.9       Assessment and plan       chest pain   new onset of LBBB   CAD, status post PCI in 2007   morbid obesity   hypertension   sleep apnea   diabetes mellitus   history of DVT     plan. patient's chest pain is multifactorial in etiology including musculoskeleton, GI and possible  Ischemic symptoms. Patient have new onset of left bundle-branch block and new onset cardiomyopathy ejection fraction around 40% and stress test is abnormal.  Because of multiple risk factor, know CAD, new onset of LBBB, cardiomyopathy will proceed with cardiac catheterization possible PCI. Risk, benefits and alternatives were discussed in detail with the patient patient agreed to proceed.         Signed By: Krista Babcock MD     August 25, 2020

## 2020-08-26 LAB
ACT BLD: 367 SECS (ref 79–138)
ALBUMIN SERPL-MCNC: 3 G/DL (ref 3.4–5)
ALBUMIN/GLOB SERPL: 0.8 {RATIO} (ref 0.8–1.7)
ALP SERPL-CCNC: 80 U/L (ref 45–117)
ALT SERPL-CCNC: 30 U/L (ref 13–56)
ANION GAP SERPL CALC-SCNC: 4 MMOL/L (ref 3–18)
APTT PPP: 36.3 SEC (ref 23–36.4)
AST SERPL-CCNC: 27 U/L (ref 10–38)
ATRIAL RATE: 99 BPM
BASOPHILS # BLD: 0 K/UL (ref 0–0.1)
BASOPHILS NFR BLD: 0 % (ref 0–2)
BILIRUB SERPL-MCNC: 0.2 MG/DL (ref 0.2–1)
BUN SERPL-MCNC: 23 MG/DL (ref 7–18)
BUN/CREAT SERPL: 20 (ref 12–20)
CALCIUM SERPL-MCNC: 8.3 MG/DL (ref 8.5–10.1)
CALCULATED P AXIS, ECG09: 72 DEGREES
CALCULATED R AXIS, ECG10: -23 DEGREES
CALCULATED T AXIS, ECG11: 108 DEGREES
CHLORIDE SERPL-SCNC: 108 MMOL/L (ref 100–111)
CO2 SERPL-SCNC: 28 MMOL/L (ref 21–32)
CREAT SERPL-MCNC: 1.13 MG/DL (ref 0.6–1.3)
DIAGNOSIS, 93000: NORMAL
DIFFERENTIAL METHOD BLD: ABNORMAL
EOSINOPHIL # BLD: 0.2 K/UL (ref 0–0.4)
EOSINOPHIL NFR BLD: 3 % (ref 0–5)
ERYTHROCYTE [DISTWIDTH] IN BLOOD BY AUTOMATED COUNT: 13 % (ref 11.6–14.5)
GLOBULIN SER CALC-MCNC: 3.7 G/DL (ref 2–4)
GLUCOSE BLD STRIP.AUTO-MCNC: 137 MG/DL (ref 70–110)
GLUCOSE BLD STRIP.AUTO-MCNC: 175 MG/DL (ref 70–110)
GLUCOSE BLD STRIP.AUTO-MCNC: 236 MG/DL (ref 70–110)
GLUCOSE BLD STRIP.AUTO-MCNC: 241 MG/DL (ref 70–110)
GLUCOSE SERPL-MCNC: 149 MG/DL (ref 74–99)
H PYLORI IGA SER-ACNC: 13 UNITS (ref 0–8.9)
H PYLORI IGG SER IA-ACNC: 0.1 INDEX VALUE (ref 0–0.79)
H PYLORI IGM SER-ACNC: <9 UNITS (ref 0–8.9)
HCT VFR BLD AUTO: 36.1 % (ref 35–45)
HCT VFR BLD AUTO: 37.4 % (ref 35–45)
HGB BLD-MCNC: 11.8 G/DL (ref 12–16)
HGB BLD-MCNC: 12 G/DL (ref 12–16)
INR PPP: 1 (ref 0.8–1.2)
LYMPHOCYTES # BLD: 2.8 K/UL (ref 0.9–3.6)
LYMPHOCYTES NFR BLD: 46 % (ref 21–52)
MAGNESIUM SERPL-MCNC: 2.1 MG/DL (ref 1.6–2.6)
MCH RBC QN AUTO: 28.6 PG (ref 24–34)
MCHC RBC AUTO-ENTMCNC: 32.1 G/DL (ref 31–37)
MCV RBC AUTO: 89.3 FL (ref 74–97)
MONOCYTES # BLD: 0.5 K/UL (ref 0.05–1.2)
MONOCYTES NFR BLD: 8 % (ref 3–10)
NEUTS SEG # BLD: 2.7 K/UL (ref 1.8–8)
NEUTS SEG NFR BLD: 43 % (ref 40–73)
P-R INTERVAL, ECG05: 146 MS
PLATELET # BLD AUTO: 202 K/UL (ref 135–420)
PMV BLD AUTO: 9.8 FL (ref 9.2–11.8)
POTASSIUM SERPL-SCNC: 4.7 MMOL/L (ref 3.5–5.5)
PROT SERPL-MCNC: 6.7 G/DL (ref 6.4–8.2)
PROTHROMBIN TIME: 13.4 SEC (ref 11.5–15.2)
Q-T INTERVAL, ECG07: 402 MS
QRS DURATION, ECG06: 158 MS
QTC CALCULATION (BEZET), ECG08: 515 MS
RBC # BLD AUTO: 4.19 M/UL (ref 4.2–5.3)
SODIUM SERPL-SCNC: 140 MMOL/L (ref 136–145)
VENTRICULAR RATE, ECG03: 99 BPM
WBC # BLD AUTO: 6.3 K/UL (ref 4.6–13.2)

## 2020-08-26 PROCEDURE — 77030008543 HC TBNG MON PRSS MRTM -A: Performed by: INTERNAL MEDICINE

## 2020-08-26 PROCEDURE — 77030029997 HC DEV COM RDL R BND TELE -B: Performed by: INTERNAL MEDICINE

## 2020-08-26 PROCEDURE — 36415 COLL VENOUS BLD VENIPUNCTURE: CPT

## 2020-08-26 PROCEDURE — 82962 GLUCOSE BLOOD TEST: CPT

## 2020-08-26 PROCEDURE — 65660000000 HC RM CCU STEPDOWN

## 2020-08-26 PROCEDURE — 77030004522 HC CATH ANGI DX EXPO BSC -A: Performed by: INTERNAL MEDICINE

## 2020-08-26 PROCEDURE — 77030013519 HC DEV INFL BASIX MRTM -B: Performed by: INTERNAL MEDICINE

## 2020-08-26 PROCEDURE — C1725 CATH, TRANSLUMIN NON-LASER: HCPCS | Performed by: INTERNAL MEDICINE

## 2020-08-26 PROCEDURE — C9113 INJ PANTOPRAZOLE SODIUM, VIA: HCPCS | Performed by: FAMILY MEDICINE

## 2020-08-26 PROCEDURE — C1887 CATHETER, GUIDING: HCPCS | Performed by: INTERNAL MEDICINE

## 2020-08-26 PROCEDURE — C1769 GUIDE WIRE: HCPCS | Performed by: INTERNAL MEDICINE

## 2020-08-26 PROCEDURE — C1874 STENT, COATED/COV W/DEL SYS: HCPCS | Performed by: INTERNAL MEDICINE

## 2020-08-26 PROCEDURE — 74011000250 HC RX REV CODE- 250: Performed by: FAMILY MEDICINE

## 2020-08-26 PROCEDURE — 93458 L HRT ARTERY/VENTRICLE ANGIO: CPT | Performed by: INTERNAL MEDICINE

## 2020-08-26 PROCEDURE — 77030004521 HC CATH ANGI DX COOK -B: Performed by: INTERNAL MEDICINE

## 2020-08-26 PROCEDURE — 85018 HEMOGLOBIN: CPT

## 2020-08-26 PROCEDURE — 92928 PRQ TCAT PLMT NTRAC ST 1 LES: CPT | Performed by: INTERNAL MEDICINE

## 2020-08-26 PROCEDURE — 74011250637 HC RX REV CODE- 250/637: Performed by: INTERNAL MEDICINE

## 2020-08-26 PROCEDURE — 74011000250 HC RX REV CODE- 250: Performed by: INTERNAL MEDICINE

## 2020-08-26 PROCEDURE — 74011636637 HC RX REV CODE- 636/637: Performed by: HOSPITALIST

## 2020-08-26 PROCEDURE — 83735 ASSAY OF MAGNESIUM: CPT

## 2020-08-26 PROCEDURE — 77030016699 HC CATH ANGI DX INFN1 CARD -A: Performed by: INTERNAL MEDICINE

## 2020-08-26 PROCEDURE — 77030013797 HC KT TRNSDUC PRSSR EDWD -A: Performed by: INTERNAL MEDICINE

## 2020-08-26 PROCEDURE — 74011250637 HC RX REV CODE- 250/637: Performed by: FAMILY MEDICINE

## 2020-08-26 PROCEDURE — 99153 MOD SED SAME PHYS/QHP EA: CPT | Performed by: INTERNAL MEDICINE

## 2020-08-26 PROCEDURE — 85610 PROTHROMBIN TIME: CPT

## 2020-08-26 PROCEDURE — 85730 THROMBOPLASTIN TIME PARTIAL: CPT

## 2020-08-26 PROCEDURE — 74011250636 HC RX REV CODE- 250/636: Performed by: INTERNAL MEDICINE

## 2020-08-26 PROCEDURE — B2111ZZ FLUOROSCOPY OF MULTIPLE CORONARY ARTERIES USING LOW OSMOLAR CONTRAST: ICD-10-PCS | Performed by: INTERNAL MEDICINE

## 2020-08-26 PROCEDURE — C1894 INTRO/SHEATH, NON-LASER: HCPCS | Performed by: INTERNAL MEDICINE

## 2020-08-26 PROCEDURE — 99152 MOD SED SAME PHYS/QHP 5/>YRS: CPT | Performed by: INTERNAL MEDICINE

## 2020-08-26 PROCEDURE — 027034Z DILATION OF CORONARY ARTERY, ONE ARTERY WITH DRUG-ELUTING INTRALUMINAL DEVICE, PERCUTANEOUS APPROACH: ICD-10-PCS | Performed by: INTERNAL MEDICINE

## 2020-08-26 PROCEDURE — 4A023N7 MEASUREMENT OF CARDIAC SAMPLING AND PRESSURE, LEFT HEART, PERCUTANEOUS APPROACH: ICD-10-PCS | Performed by: INTERNAL MEDICINE

## 2020-08-26 PROCEDURE — 74011000636 HC RX REV CODE- 636: Performed by: INTERNAL MEDICINE

## 2020-08-26 PROCEDURE — 80053 COMPREHEN METABOLIC PANEL: CPT

## 2020-08-26 PROCEDURE — 74011000258 HC RX REV CODE- 258: Performed by: INTERNAL MEDICINE

## 2020-08-26 PROCEDURE — 85347 COAGULATION TIME ACTIVATED: CPT

## 2020-08-26 PROCEDURE — 85025 COMPLETE CBC W/AUTO DIFF WBC: CPT

## 2020-08-26 PROCEDURE — 74011250636 HC RX REV CODE- 250/636: Performed by: FAMILY MEDICINE

## 2020-08-26 PROCEDURE — 74011636637 HC RX REV CODE- 636/637: Performed by: FAMILY MEDICINE

## 2020-08-26 PROCEDURE — 77030012468 HC VLV BLEEDBK CNTRL ABBT -B: Performed by: INTERNAL MEDICINE

## 2020-08-26 DEVICE — XIENCE SIERRA™ EVEROLIMUS ELUTING CORONARY STENT SYSTEM 2.75 MM X 23 MM / RAPID-EXCHANGE
Type: IMPLANTABLE DEVICE | Status: FUNCTIONAL
Brand: XIENCE SIERRA™

## 2020-08-26 RX ORDER — SODIUM CHLORIDE 9 MG/ML
50 INJECTION, SOLUTION INTRAVENOUS CONTINUOUS
Status: DISPENSED | OUTPATIENT
Start: 2020-08-26 | End: 2020-08-26

## 2020-08-26 RX ORDER — FENTANYL CITRATE 50 UG/ML
INJECTION, SOLUTION INTRAMUSCULAR; INTRAVENOUS AS NEEDED
Status: DISCONTINUED | OUTPATIENT
Start: 2020-08-26 | End: 2020-08-26 | Stop reason: HOSPADM

## 2020-08-26 RX ORDER — VERAPAMIL HYDROCHLORIDE 2.5 MG/ML
INJECTION, SOLUTION INTRAVENOUS AS NEEDED
Status: DISCONTINUED | OUTPATIENT
Start: 2020-08-26 | End: 2020-08-26 | Stop reason: HOSPADM

## 2020-08-26 RX ORDER — CLOPIDOGREL BISULFATE 75 MG/1
75 TABLET ORAL DAILY
Status: DISCONTINUED | OUTPATIENT
Start: 2020-08-27 | End: 2020-09-01 | Stop reason: HOSPADM

## 2020-08-26 RX ORDER — ENOXAPARIN SODIUM 100 MG/ML
40 INJECTION SUBCUTANEOUS ONCE
Status: COMPLETED | OUTPATIENT
Start: 2020-08-26 | End: 2020-08-26

## 2020-08-26 RX ORDER — CLOPIDOGREL 300 MG/1
TABLET, FILM COATED ORAL AS NEEDED
Status: DISCONTINUED | OUTPATIENT
Start: 2020-08-26 | End: 2020-08-26 | Stop reason: HOSPADM

## 2020-08-26 RX ORDER — HEPARIN SODIUM 10000 [USP'U]/100ML
9-25 INJECTION, SOLUTION INTRAVENOUS
Status: DISCONTINUED | OUTPATIENT
Start: 2020-08-26 | End: 2020-08-26

## 2020-08-26 RX ORDER — HEPARIN SODIUM 1000 [USP'U]/ML
3000 INJECTION, SOLUTION INTRAVENOUS; SUBCUTANEOUS ONCE
Status: COMPLETED | OUTPATIENT
Start: 2020-08-26 | End: 2020-08-26

## 2020-08-26 RX ORDER — MIDAZOLAM HYDROCHLORIDE 1 MG/ML
INJECTION, SOLUTION INTRAMUSCULAR; INTRAVENOUS AS NEEDED
Status: DISCONTINUED | OUTPATIENT
Start: 2020-08-26 | End: 2020-08-26 | Stop reason: HOSPADM

## 2020-08-26 RX ORDER — SODIUM CHLORIDE 0.9 % (FLUSH) 0.9 %
5-40 SYRINGE (ML) INJECTION AS NEEDED
Status: DISCONTINUED | OUTPATIENT
Start: 2020-08-26 | End: 2020-09-01 | Stop reason: HOSPADM

## 2020-08-26 RX ORDER — LIDOCAINE HYDROCHLORIDE 10 MG/ML
INJECTION INFILTRATION; PERINEURAL AS NEEDED
Status: DISCONTINUED | OUTPATIENT
Start: 2020-08-26 | End: 2020-08-26 | Stop reason: HOSPADM

## 2020-08-26 RX ORDER — HEPARIN SODIUM 200 [USP'U]/100ML
INJECTION, SOLUTION INTRAVENOUS
Status: COMPLETED | OUTPATIENT
Start: 2020-08-26 | End: 2020-08-26

## 2020-08-26 RX ORDER — SODIUM CHLORIDE 0.9 % (FLUSH) 0.9 %
5-40 SYRINGE (ML) INJECTION EVERY 8 HOURS
Status: DISCONTINUED | OUTPATIENT
Start: 2020-08-26 | End: 2020-09-01 | Stop reason: HOSPADM

## 2020-08-26 RX ADMIN — HEPARIN SODIUM 3000 UNITS: 1000 INJECTION INTRAVENOUS; SUBCUTANEOUS at 06:10

## 2020-08-26 RX ADMIN — GABAPENTIN 300 MG: 300 CAPSULE ORAL at 21:40

## 2020-08-26 RX ADMIN — FAMOTIDINE 20 MG: 10 INJECTION, SOLUTION INTRAVENOUS at 08:31

## 2020-08-26 RX ADMIN — SODIUM CHLORIDE 50 ML/HR: 900 INJECTION, SOLUTION INTRAVENOUS at 15:00

## 2020-08-26 RX ADMIN — INSULIN GLARGINE 10 UNITS: 100 INJECTION, SOLUTION SUBCUTANEOUS at 21:35

## 2020-08-26 RX ADMIN — SODIUM CHLORIDE 40 MG: 9 INJECTION, SOLUTION INTRAMUSCULAR; INTRAVENOUS; SUBCUTANEOUS at 03:08

## 2020-08-26 RX ADMIN — GABAPENTIN 300 MG: 300 CAPSULE ORAL at 16:32

## 2020-08-26 RX ADMIN — Medication 10 ML: at 16:10

## 2020-08-26 RX ADMIN — ACETAMINOPHEN 650 MG: 325 TABLET ORAL at 14:25

## 2020-08-26 RX ADMIN — MORPHINE SULFATE 2 MG: 2 INJECTION, SOLUTION INTRAMUSCULAR; INTRAVENOUS at 06:48

## 2020-08-26 RX ADMIN — FAMOTIDINE 20 MG: 10 INJECTION, SOLUTION INTRAVENOUS at 20:53

## 2020-08-26 RX ADMIN — ENOXAPARIN SODIUM 40 MG: 40 INJECTION SUBCUTANEOUS at 21:38

## 2020-08-26 RX ADMIN — MORPHINE SULFATE 2 MG: 2 INJECTION, SOLUTION INTRAMUSCULAR; INTRAVENOUS at 21:39

## 2020-08-26 RX ADMIN — NITROGLYCERIN 1 INCH: 20 OINTMENT TOPICAL at 08:31

## 2020-08-26 RX ADMIN — INSULIN LISPRO 2 UNITS: 100 INJECTION, SOLUTION INTRAVENOUS; SUBCUTANEOUS at 06:28

## 2020-08-26 RX ADMIN — MULTIPLE VITAMINS W/ MINERALS TAB 1 TABLET: TAB at 08:30

## 2020-08-26 RX ADMIN — METOPROLOL TARTRATE 25 MG: 25 TABLET, FILM COATED ORAL at 20:51

## 2020-08-26 RX ADMIN — ASPIRIN 81 MG: 81 TABLET, COATED ORAL at 08:30

## 2020-08-26 RX ADMIN — GABAPENTIN 300 MG: 300 CAPSULE ORAL at 08:30

## 2020-08-26 RX ADMIN — Medication 10 ML: at 21:40

## 2020-08-26 RX ADMIN — METOPROLOL TARTRATE 25 MG: 25 TABLET, FILM COATED ORAL at 08:30

## 2020-08-26 RX ADMIN — ATORVASTATIN CALCIUM 10 MG: 10 TABLET, FILM COATED ORAL at 21:40

## 2020-08-26 RX ADMIN — HEPARIN SODIUM 11 UNITS/KG/HR: 10000 INJECTION, SOLUTION INTRAVENOUS at 03:01

## 2020-08-26 RX ADMIN — INSULIN LISPRO 4 UNITS: 100 INJECTION, SOLUTION INTRAVENOUS; SUBCUTANEOUS at 16:32

## 2020-08-26 RX ADMIN — MORPHINE SULFATE 2 MG: 2 INJECTION, SOLUTION INTRAMUSCULAR; INTRAVENOUS at 00:00

## 2020-08-26 RX ADMIN — NITROGLYCERIN 1 INCH: 20 OINTMENT TOPICAL at 20:53

## 2020-08-26 RX ADMIN — INSULIN LISPRO 4 UNITS: 100 INJECTION, SOLUTION INTRAVENOUS; SUBCUTANEOUS at 21:36

## 2020-08-26 RX ADMIN — Medication 10 ML: at 06:48

## 2020-08-26 NOTE — DIABETES MGMT
Diabetes Patient/Family Education Record  Factors That  May Influence Patients Ability  to Learn or  Comply with Recommendations   []   Language barrier    []   Cultural needs   [x]   Motivation    []   Cognitive limitation    [x]   Physical   []   Education    []   Physiological factors   []   Hearing/vision/speaking impairment   []   Religion beliefs    []   Financial factors   []  Other:   []  No factors identified at this time.      Person Instructed:   [x]   Patient   []   Family   []  Other     Preference for Learning:   [x]   Verbal   []   Written   []  Demonstration     Level of Comprehension & Competence:    []  Good                                      [x] Fair                                     []  Poor                             [x]  Needs Reinforcement   []  Teachback completed    Education Component:   [x]  Medication management, including how to administer insulin (if appropriate) and potential medication interactions: confirmation of home regimen; pt does NOT take SSI daily, reports only takes if BG \"really high\"   []  Nutritional management -obtain usual meal pattern   []  Exercise   [x]  Signs, symptoms, and treatment of hyperglycemia and hypoglycemia   [x] Prevention, recognition and treatment of hyperglycemia and hypoglycemia; reports hypos ~ 1x/month   [x]  Importance of blood glucose monitoring; pt SMBG daily instructed to SMBG at least BID in AM & prior to dinner   []  Instruction on use of the blood glucose meter   [x]  Discuss the importance of HbA1C monitoring    []  Sick day guidelines   []  Proper use and disposal of lancets, needles, syringes or insulin pens (if appropriate)   [x]  Potential long-term complications (retinopathy, kidney disease, neuropathy, foot care)   [] Information about whom to contact in case of emergency or for more information    [x]  Goal:  Patient/family will demonstrate understanding of Diabetes Self Management Skills by: (date) __10/31_____  Plan for post-discharge education or self-management support:    [] Outpatient class schedule provided            [] Patient Declined    [] Scheduled for outpatient classes (date) _______  Verify:  Does patient understand how diabetes medications work? ____________yes________________  Does patient know what their most recent A1c is? _________________________yes__________  Does patient monitor glucose at home? _________________________yes__________________  Does patient have difficulty obtaining diabetes medications or testing supplies? _______no__________         Sadie Parkinson MS, RN, CDE  Glycemic Control Team  [phone]  Pager [phone] (M-TH 8:00-4:30P)  *After Hours pager [phone]

## 2020-08-26 NOTE — DIABETES MGMT
GLYCEMIC CONTROL PROGRESS NOTE:    - known h/o DM2, HbA1C not within recommended range for age + comorbids on basal/bolus/oral home regimen  - NPO for procedure most of day  - pt states insulin initiated ~ one year ago  - pt told this writer she does NOT take SSI on a daily basis, \"only if my BG is really high\"  - do believe pt would benefit from adjustment to SSI regimen r/t high doses ( 10 units) + Metformin 500 mg BID  - recommend monitor IP BG trends for adjustment to home regimen    Recent Glucose Results:   Lab Results   Component Value Date/Time     (H) 08/26/2020 04:14 AM    GLUCPOC 175 (H) 08/26/2020 06:14 AM    GLUCPOC 205 (H) 08/25/2020 09:21 PM    GLUCPOC 186 (H) 08/25/2020 05:23 PM     Jazmin Shields MS, RN, CDE  Glycemic Control Team  [phone]  Pager [phone] (M-TH 8:00-4:30P)  *After Hours pager [phone]

## 2020-08-26 NOTE — PROGRESS NOTES
Bedside shift change report given to [address] [place] [address] (oncoming nurse) by Melinda Tobar RN (offgoing nurse). Report included the following information SBAR, Kardex, ED Summary, Intake/Output and MAR.

## 2020-08-26 NOTE — PROGRESS NOTES
Cardiology Progress Note        Patient: Jose Hernandez        Sex: female          DOA: 8/24/2020  YOB: 1945      Age:  69 y.o.        LOS:  LOS: 2 days   Assessment/Plan     Principal Problem:    Chest pain (12/3/2018)    Active Problems:    CAD (coronary artery disease) (12/3/2018)      Hypertension (12/3/2018)      Morbid obesity with BMI of 40.0-44.9, adult ([address] (12/3/2018)      Diabetes ([facility] [address] (5/24/2019)      Anticoagulated (8/25/2020)      Chest pain at rest (8/24/2020)      Overview: Added automatically from request for surgery [id number]      Abnormal nuclear stress test (8/24/2020)      Overview: Added automatically from request for surgery [id number]      Cardiomyopathy [facility]) (8/24/2020)      Overview: Added automatically from request for surgery [id number]        Plan:      Chest pain   CAD   new LBBB   cardiomyopathy   abnormal stress test        Cardiac catheterization possible PCI today. Risk, benefits and alternatives were discussed in detail with the patient. Patient agreed to proceed with the procedure rest of the recommendation after cardiac catheterization. Subjective:    cc:  Chest pain   CAD   new LBBB   cardiomyopathy   abnormal stress test        REVIEW OF SYSTEMS:     General: No fevers or chills. Cardiovascular: No chest pain or pressure. No palpitations. No ankle swelling  Pulmonary: No SOB, orthopnea, PND  Gastrointestinal: No nausea, vomiting or diarrhea      Objective:      Visit Vitals  /59 (BP 1 Location: Left arm, BP Patient Position: At rest)   Pulse 67   Temp 99 °F (37.2 °C)   Resp 20   Ht 5' 4\" (1.626 m)   Wt 126.1 kg (277 lb 14.4 oz)   SpO2 96%   BMI 47.70 kg/m²     Body mass index is 47.7 kg/m². Physical Exam:  General Appearance: Comfortable, not using accessory muscles of respiration. NECK: No JVD, no thyroidomeglay. LUNGS: Clear bilaterally.    HEART: S1+S2 audible,    ABD: Non-tender, BS Audible    EXT: No edema, and no cysnosis. VASCULAR EXAM: Pulses are intact. PSYCHIATRIC EXAM: Mood is appropriate.     Medication:  Current Facility-Administered Medications   Medication Dose Route Frequency    heparin (porcine) 25,000 units in 0.45% saline 250 ml infusion  9-25 Units/kg/hr IntraVENous TITRATE    nitroglycerin (NITROBID) 2 % ointment 1 Inch  1 Inch Topical BID    morphine injection 2 mg  2 mg IntraVENous Q4H PRN    insulin lispro (HUMALOG) injection   SubCUTAneous AC&HS    glucose chewable tablet 16 g  16 g Oral PRN    glucagon (GLUCAGEN) injection 1 mg  1 mg IntraMUSCular PRN    dextrose 10% infusion 125-250 mL  125-250 mL IntraVENous PRN    albuterol (PROVENTIL VENTOLIN) nebulizer solution 2.5 mg  2.5 mg Nebulization Q4H PRN    aspirin delayed-release tablet 81 mg  81 mg Oral DAILY    gabapentin (NEURONTIN) capsule 300 mg  300 mg Oral TID    multivitamin, tx-iron-ca-min (THERA-M w/ IRON) tablet 1 Tab  1 Tab Oral DAILY    atorvastatin (LIPITOR) tablet 10 mg  10 mg Oral QHS    metoprolol tartrate (LOPRESSOR) tablet 25 mg  25 mg Oral Q12H    pantoprazole (PROTONIX) 40 mg in 0.9% sodium chloride 10 mL injection  40 mg IntraVENous Q24H    famotidine (PF) (PEPCID) 20 mg in 0.9% sodium chloride 10 mL injection  20 mg IntraVENous Q12H    insulin glargine (LANTUS) injection 10 Units  10 Units SubCUTAneous QHS    sodium chloride (NS) flush 5-40 mL  5-40 mL IntraVENous Q8H    sodium chloride (NS) flush 5-40 mL  5-40 mL IntraVENous PRN    acetaminophen (TYLENOL) tablet 650 mg  650 mg Oral Q6H PRN    Or    acetaminophen (TYLENOL) suppository 650 mg  650 mg Rectal Q6H PRN    polyethylene glycol (MIRALAX) packet 17 g  17 g Oral DAILY PRN    promethazine (PHENERGAN) tablet 12.5 mg  12.5 mg Oral Q6H PRN    Or    ondansetron (ZOFRAN) injection 4 mg  4 mg IntraVENous Q6H PRN               Lab/Data Reviewed:  Procedures/imaging: see electronic medical records for all procedures/Xrays   and details which were not copied into this note but were reviewed prior to creation of Plan       All lab results for the last 24 hours reviewed. Recent Labs     08/26/20  0414 08/25/20  0530 08/24/20  2135   WBC 6.3 6.9 6.0   HGB 12.0 12.8 13.8   HCT 37.4 40.0 42.5    216 222     Recent Labs     08/26/20  0414 08/25/20  0530 08/24/20  2135    140 140   K 4.7 3.9 4.6    107 107   CO2 28 31 29   * 120* 157*   BUN 23* 17 19*   CREA 1.13 0.84 0.92   CA 8.3* 8.6 8.7       RADIOLOGY:  CT Results  (Last 48 hours)    None        CXR Results  (Last 48 hours)               08/24/20 2116  XR CHEST PORT Final result    Impression:  IMPRESSION:       1. No significant change or acute cardiopulmonary process. Narrative:  Portable Chest         History: Chest pain       Comparison: PA and lateral chest 03/03/2020       Portable view of the chest demonstrates stable cardiomediastinal silhouette   given differences in technique. There is no focal consolidation, pleural   effusion, or pneumothorax. Minimal degenerative changes are present in the   spine.                     Cardiology Procedures:   Results for orders placed or performed during the hospital encounter of 08/24/20   EKG, 12 LEAD, INITIAL   Result Value Ref Range    Ventricular Rate 99 BPM    Atrial Rate 99 BPM    P-R Interval 146 ms    QRS Duration 158 ms    Q-T Interval 402 ms    QTC Calculation (Bezet) 515 ms    Calculated P Axis 72 degrees    Calculated R Axis -23 degrees    Calculated T Axis 108 degrees    Diagnosis       Sinus rhythm with with aberrant conduction  Left bundle branch block  Abnormal ECG  When compared with ECG of 03-MAR-2020 12:03,  premature ventricular complexes are no longer present  aberrant conduction is now present  Left bundle branch block is now present  Confirmed by Lisa Cardoza MD, -- ([id number]) on 8/26/2020 10:43:40 AM        Echo Results  (Last 48 hours)    None       Cardiolite (Tc-99m Sestamibi) stress test    Signed By: Francoise Meredith MD     August 26, 2020

## 2020-08-26 NOTE — PROGRESS NOTES
Pt is all prepped and ready for procedure. 1200 Pt picked up for procedure to the [facility] lab    1400 Pt back to care unit S/P cardiac cath with stent placement. Pt awake and alert and tolerated procedure well. Tr band to rt wrist with immobilizer in place. Site with no bleeding nor hematoma. Angiomax infusing well, to continue for 2 hours. .     1425 Pt c/o rt arm pain post cath procedure 5/10. Tylenol 650 mg adm    1500 Pt resting with eyes closed. .    1547 Angiomax drip completed  1600 Pt taken back to room. Tr band still in place. direction reinforced to primary nurse on how taking out Tr band. T start @ 5pm  Understanding verbalized.

## 2020-08-26 NOTE — PROGRESS NOTES
Hospitalist Progress Note    Patient: Faisal Reed MRN: [id number]  CSN: [id number]    YOB: 1945  Age: 69 y.o. Sex: female    DOA: 8/24/2020 LOS:  LOS: 2 days                Assessment/Plan     Patient Active Problem List   Diagnosis Code    Chest pain R07.9    CAD (coronary artery disease) I25.10    Hypertension I10    Insulin dependent diabetes mellitus with complications [id number]    Gastrointestinal disorder K92.9    Acute DVT of left tibial vein ([facility]) I82.442    Morbid obesity with BMI of 40.0-44.9, adult ([facility]) E66.01, Z68.41    Gout M10.9    Vomiting R11.10    Pyelonephritis N12    Intractable vomiting with nausea R11.2    Diabetes (HCC) E11.9    Acute chest pain R07.9    Dyspepsia R10.13    Cough R05    Anticoagulated Z79.01    Chest pain at rest R07.9    Abnormal nuclear stress test R94.39    Cardiomyopathy [facility]) I42.9            70 y/o female with CAD and prior stent placement, morbid obesity, DVT on anticoagulation, HTN, and DMT2 is admitted for chest pain. Chest pain-  In a patient with CAD, status post PCI in 2007. Cardiac enzymes x3 in normal range. Given new onset of left bundle branch block, new onset cardiomyopathy with ejection fraction of 40% and abnormal stress test plan for coronary angiogram and possible PCI today. Continue with aspirin, Plavix, beta-blocker, Lipitor. systolic CHF-  Well compensated monitor volume status. Echo with EF of 35 to 40% moderately reduced systolic function. DM-  Continue on Lantus, sliding scale insulin, diabetic diet. Morbid obesity with BMI of 47. History of DVT on Xarelto. Disposition : 1-2 days    Review of systems  General: No fevers or chills. Cardiovascular: No chest pain or pressure. No palpitations. Pulmonary: No shortness of breath. Gastrointestinal: No nausea, vomiting. Physical Exam:  General: Awake, cooperative, no acute distress    HEENT: NC, Atraumatic.   PERRLA, anicteric sclerae. Lungs: CTA Bilaterally. No Wheezing/Rhonchi/Rales. Heart:  S1 S2,  No murmur, No Rubs, No Gallops  Abdomen: Soft, Non distended, Non tender.  +Bowel sounds,   Extremities: No c/c/e  Psych:   Not anxious or agitated. Neurologic:  No acute neurological deficit. Vital signs/Intake and Output:  Visit Vitals  /61 (BP 1 Location: Right leg, BP Patient Position: At rest)   Pulse 65   Temp 98.9 °F (37.2 °C)   Resp 20   Ht 5' 4\" (1.626 m)   Wt 126.1 kg (277 lb 14.4 oz)   SpO2 94%   BMI 47.70 kg/m²     Current Shift:  08/26 0701 - 08/26 1900  In: -   Out: 300 [Urine:300]  Last three shifts:  No intake/output data recorded. Labs: Results:       Chemistry Recent Labs     08/26/20  0414 08/25/20  0530 08/24/20  2135   * 120* 157*    140 140   K 4.7 3.9 4.6    107 107   CO2 28 31 29   BUN 23* 17 19*   CREA 1.13 0.84 0.92   CA 8.3* 8.6 8.7   AGAP 4 2* 4   BUCR 20 20 21*   AP 80 84 87   TP 6.7 6.7 7.5   ALB 3.0* 3.0* 3.3*   GLOB 3.7 3.7 4.2*   AGRAT 0.8 0.8 0.8      CBC w/Diff Recent Labs     08/26/20  1240 08/26/20  0414 08/25/20  0530 08/24/20  2135   WBC  --  6.3 6.9 6.0   RBC  --  4.19* 4.51 4.76   HGB 11.8* 12.0 12.8 13.8   HCT 36.1 37.4 40.0 42.5   PLT  --  202 216 222   GRANS  --  43 43 55   LYMPH  --  46 48 34   EOS  --  3 2 2      Cardiac Enzymes Recent Labs     08/25/20  1635 08/25/20  1110   * 454*   CKND1 1.7 1.7      Coagulation Recent Labs     08/26/20  0414 08/25/20  1100   PTP 13.4  --    INR 1.0  --    APTT 36.3 91.3*       Lipid Panel Lab Results   Component Value Date/Time    Cholesterol, total 139 08/25/2020 05:30 AM    HDL Cholesterol 49 08/25/2020 05:30 AM    LDL, calculated 58.6 08/25/2020 05:30 AM    VLDL, calculated 31.4 08/25/2020 05:30 AM    Triglyceride 157 (H) 08/25/2020 05:30 AM    CHOL/HDL Ratio 2.8 08/25/2020 05:30 AM      BNP No results for input(s): BNPP in the last 72 hours.    Liver Enzymes Recent Labs     08/26/20  0414   TP 6.7 ALB 3.0*   AP 80      Thyroid Studies No results found for: T4, T3U, TSH, TSHEXT     Procedures/imaging: see electronic medical records for all procedures/Xrays and details which were not copied into this note but were reviewed prior to creation of Plan

## 2020-08-26 NOTE — ROUTINE PROCESS
Cardiac Cath Lab:  Pre Procedure Chart Check Patients chart was accessed and reviewed for possible and/or scheduled procedure. Creatinine Clearance: 
Serum creatinine: 1.13 mg/dL 08/26/20 0414 Estimated creatinine clearance: 59.2 mL/min Total Contrast  Load: 
3 x estimated clearance amount=  177.6ml 
 
75% of Contrast Load: 0.75 x Total Contrast Load=    133.2ml Recent Labs  
  08/26/20 
0414 08/25/20 
1635 WBC 6.3  --   
RBC 4.19*  --   
HCT 37.4  --   
HGB 12.0  --   
  --   
INR 1.0  --   
APTT 36.3  --   
PTP 13.4  --   
  --   
K 4.7  --   
BUN 23*  --   
CREA 1.13  --   
GFRAA 57*  --   
GFRNA 47*  --   
CA 8.3*  --   
CPK  --  482* CKMB  --  8.2* CKND1  --  1.7  
TROIQ  --  <0.02  
 
 
BMI: Body mass index is 47.7 kg/m². ALLERGIES:  
Allergies Allergen Reactions  Gabapentin Swelling \"I'm back on it, they figured out it wasn't this medication causing me to swell up\"  Pcn [Penicillins] Angioedema  Lisinopril Swelling Lines: 
  
  
Peripheral IV 08/26/20 Posterior;Right Forearm (Active) Site Assessment Clean, dry, & intact 08/26/20 0830 Phlebitis Assessment 0 08/26/20 0830 Infiltration Assessment 0 08/26/20 0830 Dressing Status Clean, dry, & intact 08/26/20 0830 Dressing Type Tape;Transparent 08/26/20 0830 Hub Color/Line Status Infusing 08/26/20 0830 Action Taken Open ports on tubing capped 08/26/20 0830 Alcohol Cap Used Yes 08/26/20 0830 History: 
 
Past Medical History:  
Diagnosis Date  CAD (coronary artery disease)  Diabetes ([facility] [address]  Gastrointestinal disorder  Gout  Hypertension  MI (myocardial infarction) ([facility] [address]  Reflux Past Surgical History:  
Procedure Laterality Date  CARDIAC SURG PROCEDURE UNLIST    
 PCI 15 years ago  VASCULAR SURGERY PROCEDURE UNLIST Patient Active Problem List  
Diagnosis Code  Chest pain R07.9  CAD (coronary artery disease) I25.10  Hypertension I10  
  Insulin dependent diabetes mellitus with complications [id number]  Gastrointestinal disorder K92.9  Acute DVT of left tibial vein (HCC) I82.442  Morbid obesity with BMI of 40.0-44.9, adult ([facility]) E66.01, Z68.41  
 Gout M10.9  Vomiting R11.10  Pyelonephritis N12  
 Intractable vomiting with nausea R11.2  Diabetes ([address] E11.9  Acute chest pain R07.9  Dyspepsia R10.13  
 Cough R05  Anticoagulated Z79.01  
 Chest pain at rest R07.9  Abnormal nuclear stress test R94.39  
 Cardiomyopathy ([address] I42.9

## 2020-08-26 NOTE — PROGRESS NOTES
Problem: Falls - Risk of  Goal: *Absence of Falls  Description: Document Hugh Taylor Fall Risk and appropriate interventions in the flowsheet.   Outcome: Progressing Towards Goal  Note: Fall Risk Interventions:  Mobility Interventions: Assess mobility with egress test, Communicate number of staff needed for ambulation/transfer, OT consult for ADLs, Patient to call before getting OOB, PT Consult for mobility concerns, PT Consult for assist device competence, Utilize walker, cane, or other assistive device         Medication Interventions: Teach patient to arise slowly, Patient to call before getting OOB         History of Falls Interventions: Door open when patient unattended, Investigate reason for fall, Room close to nurse's station         Problem: Patient Education: Go to Patient Education Activity  Goal: Patient/Family Education  Outcome: Progressing Towards Goal     Problem: General Medical Care Plan  Goal: *Vital signs within specified parameters  Outcome: Progressing Towards Goal  Goal: *Labs within defined limits  Outcome: Progressing Towards Goal  Goal: *Absence of infection signs and symptoms  Outcome: Progressing Towards Goal  Goal: *Optimal pain control at patient's stated goal  Outcome: Progressing Towards Goal  Goal: *Skin integrity maintained  Outcome: Progressing Towards Goal  Goal: *Fluid volume balance  Outcome: Progressing Towards Goal  Goal: *Optimize nutritional status  Outcome: Progressing Towards Goal  Goal: *Anxiety reduced or absent  Outcome: Progressing Towards Goal  Goal: *Progressive mobility and function (eg: ADL's)  Outcome: Progressing Towards Goal     Problem: Patient Education: Go to Patient Education Activity  Goal: Patient/Family Education  Outcome: Progressing Towards Goal     Problem: Pain  Goal: *Control of Pain  Outcome: Progressing Towards Goal  Goal: *PALLIATIVE CARE:  Alleviation of Pain  Outcome: Progressing Towards Goal     Problem: Patient Education: Go to Patient Education Activity  Goal: Patient/Family Education  Outcome: Progressing Towards Goal     Problem: Diabetes Self-Management  Goal: *Disease process and treatment process  Description: Define diabetes and identify own type of diabetes; list 3 options for treating diabetes. Outcome: Progressing Towards Goal  Goal: *Incorporating nutritional management into lifestyle  Description: Describe effect of type, amount and timing of food on blood glucose; list 3 methods for planning meals. Outcome: Progressing Towards Goal  Goal: *Incorporating physical activity into lifestyle  Description: State effect of exercise on blood glucose levels. Outcome: Progressing Towards Goal  Goal: *Developing strategies to promote health/change behavior  Description: Define the ABC's of diabetes; identify appropriate screenings, schedule and personal plan for screenings. Outcome: Progressing Towards Goal  Goal: *Using medications safely  Description: State effect of diabetes medications on diabetes; name diabetes medication taking, action and side effects. Outcome: Progressing Towards Goal  Goal: *Monitoring blood glucose, interpreting and using results  Description: Identify recommended blood glucose targets  and personal targets. Outcome: Progressing Towards Goal  Goal: *Prevention, detection, treatment of acute complications  Description: List symptoms of hyper- and hypoglycemia; describe how to treat low blood sugar and actions for lowering  high blood glucose level. Outcome: Progressing Towards Goal  Goal: *Prevention, detection and treatment of chronic complications  Description: Define the natural course of diabetes and describe the relationship of blood glucose levels to long term complications of diabetes.   Outcome: Progressing Towards Goal  Goal: *Developing strategies to address psychosocial issues  Description: Describe feelings about living with diabetes; identify support needed and support network  Outcome: Progressing Towards Goal  Goal: *Insulin pump training  Outcome: Progressing Towards Goal  Goal: *Sick day guidelines  Outcome: Progressing Towards Goal  Goal: *Patient Specific Goal (EDIT GOAL, INSERT TEXT)  Outcome: Progressing Towards Goal     Problem: Patient Education: Go to Patient Education Activity  Goal: Patient/Family Education  Outcome: Progressing Towards Goal

## 2020-08-26 NOTE — ROUTINE PROCESS
Bedside and Verbal shift change report given to SOO Mckeon R.N. (oncoming nurse) by JOSE CRUZ Gay R.N. (offgoing nurse). Report included the following information SBAR, Kardex, Intake/Output, MAR, Accordion, Recent Results and Med Rec Status.

## 2020-08-26 NOTE — ROUTINE PROCESS
TRANSFER - OUT REPORT: 
 
Verbal report given to JANE Hackett(name) on Dawn Crandall  being transferred to [facility] Cardiac(unit) for routine progression of care Report consisted of patients Situation, Background, Assessment and  
Recommendations(SBAR). Information from the following report(s) SBAR, Kardex, Procedure Summary, Intake/Output, MAR, Recent Results and Cardiac Rhythm SR/BBB was reviewed with the receiving nurse. Lines:  
Peripheral IV 08/26/20 Left Forearm (Active) Site Assessment Clean, dry, & intact 08/26/20 1117 Phlebitis Assessment 0 08/26/20 1117 Infiltration Assessment 0 08/26/20 1117 Dressing Status Clean, dry, & intact 08/26/20 1117 Dressing Type Tape;Transparent 08/26/20 1117 Hub Color/Line Status Blue; Infusing;Flushed 08/26/20 1117 Action Taken Open ports on tubing capped 08/26/20 1117 Opportunity for questions and clarification was provided. Patient transported with: 
 Monitor Registered Nurse

## 2020-08-26 NOTE — PROGRESS NOTES
DC Plan: TBD, H2H? HH? Provider please consider PT/OT eval orders to assist with dc planning    Chart reviewed. Pt admitted to tele by hospitalist.  Met with pt at bedside to discuss dc plan, CM role explained. Pt states she lives alone, but has family nearby. Pt has son and daughter in law to assist as needed. Family to drive home at discharge. Home address confirmed per face sheet. Pt states she drives on occasion. Pt has RW, cane. Pt confirms PCP as MD Yadira Hilton, last saw around 1 month ago. Denies seeing cardiologist outside hospital. CMS will assist with needed follow up appts at time of discharge. Discussed HH option, pt wants to think about it, FOC was offered. Will suggest H2H at minimum. No other dc concerns identified. CM will cont to follow for transition of care needs [phone]    Care Management Interventions  PCP Verified by CM: Yes  Mode of Transport at Discharge:  Other (see comment)(family)  Transition of Care Consult (CM Consult): Discharge Planning  Current Support Network: Lives Alone, Family Lives Nearby  Confirm Follow Up Transport: Self(or family)  Discharge Location  Discharge Placement: Home with family assistance

## 2020-08-26 NOTE — DIABETES MGMT
Diabetes Patient/Family Education Record  Factors That  May Influence Patients Ability  to Learn or  Comply with Recommendations   []   Language barrier    []   Cultural needs   [x]   Motivation    []   Cognitive limitation    []   Physical   []   Education    [x]   Physiological factors   []   Hearing/vision/speaking impairment   []   Yazidism beliefs    []   Financial factors   []  Other:   []  No factors identified at this time.      Person Instructed:   [x]   Patient   []   Family   []  Other     Preference for Learning:   [x]   Verbal   []   Written   []  Demonstration     Level of Comprehension & Competence:    [x]  Good                                      [] Fair                                     []  Poor                             []  Needs Reinforcement   [x]  Teachback completed    Education Component:   [x]  Medication management, including how to administer insulin (if appropriate) and potential medication interactions    []  Nutritional management -obtain usual meal pattern   []  Exercise   []  Signs, symptoms, and treatment of hyperglycemia and hypoglycemia   [] Prevention, recognition and treatment of hyperglycemia and hypoglycemia   []  Importance of blood glucose monitoring and how to obtain a blood glucose meter    []  Instruction on use of the blood glucose meter   []  Discuss the importance of HbA1C monitoring    []  Sick day guidelines   []  Proper use and disposal of lancets, needles, syringes or insulin pens (if appropriate)   []  Potential long-term complications (retinopathy, kidney disease, neuropathy, foot care)   [] Information about whom to contact in case of emergency or for more information    []  Goal:  Patient/family will demonstrate understanding of Diabetes Self Management Skills by: (date) _______  Plan for post-discharge education or self-management support:    [] Outpatient class schedule provided            [] Patient Declined    [] Scheduled for outpatient classes (date) _______  Verify:  Does patient understand how diabetes medications work? ____________________________  Does patient know what their most recent A1c is? ___________________________________  Does patient monitor glucose at home? ___________________________________________  Does patient have difficulty obtaining diabetes medications or testing supplies? _________________         [facility] MS, RN, CDE  Glycemic Control Team  [phone]  Pager [phone] (M- 8:00-4:30P)  *After Hours pager [phone]

## 2020-08-26 NOTE — PROGRESS NOTES
1920: Assumed care of the patient at this time. 0430: [facility] Card[facility]/Medical Night Shift Chart Audit    Chart Audit completed? YES    0730: Shift uneventful. Bedside and Verbal shift change report given to Caren Chu RN (oncoming nurse) by Sisi Parker RN (offgoing nurse). Report included the following information SBAR, Kardex, Intake/Output, MAR, Accordion, Recent Results and Med Rec Status.

## 2020-08-26 NOTE — PROGRESS NOTES
Problem: Falls - Risk of  Goal: *Absence of Falls  Description: Document Iwona White Fall Risk and appropriate interventions in the flowsheet.   Outcome: Progressing Towards Goal  Note: Fall Risk Interventions:  Mobility Interventions: Communicate number of staff needed for ambulation/transfer, Patient to call before getting OOB, OT consult for ADLs, PT Consult for mobility concerns, PT Consult for assist device competence         Medication Interventions: Patient to call before getting OOB, Teach patient to arise slowly         History of Falls Interventions: Door open when patient unattended, Investigate reason for fall         Problem: Patient Education: Go to Patient Education Activity  Goal: Patient/Family Education  Outcome: Progressing Towards Goal     Problem: Pain  Goal: *Control of Pain  Outcome: Progressing Towards Goal  Goal: *PALLIATIVE CARE:  Alleviation of Pain  Outcome: Progressing Towards Goal

## 2020-08-26 NOTE — ROUTINE PROCESS
46 Notified MD that the patient had a 6 beat run of VTach. She is not in any distress. Orders received to recheck magnesium in the morning.  
 
0350 Pt's IV infiltrated. The Heparin drip is on paused at this time until IV access is achieved. 0415 Heparin was restarted. Bedside and Verbal shift change report given to  MICHAEL Thomas RN (oncoming nurse) by Carmelita Zhu. Andrews Goode RN (offgoing nurse). Report included the following information SBAR, Kardex, MAR, Accordion and Recent Results. Neyda Merino

## 2020-08-26 NOTE — PROGRESS NOTES
Problem: Falls - Risk of  Goal: *Absence of Falls  Description: Document Marco Antonio Hernandez Fall Risk and appropriate interventions in the flowsheet.   Outcome: Progressing Towards Goal  Note: Fall Risk Interventions:  Mobility Interventions: Assess mobility with egress test, Bed/chair exit alarm, Communicate number of staff needed for ambulation/transfer, OT consult for ADLs         Medication Interventions: Assess postural VS orthostatic hypotension, Evaluate medications/consider consulting pharmacy, Patient to call before getting OOB, Teach patient to arise slowly         History of Falls Interventions: Bed/chair exit alarm, Consult care management for discharge planning, Evaluate medications/consider consulting pharmacy, Door open when patient unattended         Problem: Patient Education: Go to Patient Education Activity  Goal: Patient/Family Education  Outcome: Progressing Towards Goal     Problem: General Medical Care Plan  Goal: *Vital signs within specified parameters  Outcome: Progressing Towards Goal  Goal: *Labs within defined limits  Outcome: Progressing Towards Goal  Goal: *Absence of infection signs and symptoms  Outcome: Progressing Towards Goal  Goal: *Optimal pain control at patient's stated goal  Outcome: Progressing Towards Goal  Goal: *Skin integrity maintained  Outcome: Progressing Towards Goal  Goal: *Fluid volume balance  Outcome: Progressing Towards Goal  Goal: *Optimize nutritional status  Outcome: Progressing Towards Goal  Goal: *Anxiety reduced or absent  Outcome: Progressing Towards Goal  Goal: *Progressive mobility and function (eg: ADL's)  Outcome: Progressing Towards Goal     Problem: Patient Education: Go to Patient Education Activity  Goal: Patient/Family Education  Outcome: Progressing Towards Goal     Problem: Pain  Goal: *Control of Pain  Outcome: Progressing Towards Goal  Goal: *PALLIATIVE CARE:  Alleviation of Pain  Outcome: Progressing Towards Goal     Problem: Patient Education: Go to Patient Education Activity  Goal: Patient/Family Education  Outcome: Progressing Towards Goal     Problem: Diabetes Self-Management  Goal: *Disease process and treatment process  Description: Define diabetes and identify own type of diabetes; list 3 options for treating diabetes. Outcome: Progressing Towards Goal  Goal: *Incorporating nutritional management into lifestyle  Description: Describe effect of type, amount and timing of food on blood glucose; list 3 methods for planning meals. Outcome: Progressing Towards Goal  Goal: *Incorporating physical activity into lifestyle  Description: State effect of exercise on blood glucose levels. Outcome: Progressing Towards Goal  Goal: *Developing strategies to promote health/change behavior  Description: Define the ABC's of diabetes; identify appropriate screenings, schedule and personal plan for screenings. Outcome: Progressing Towards Goal  Goal: *Using medications safely  Description: State effect of diabetes medications on diabetes; name diabetes medication taking, action and side effects. Outcome: Progressing Towards Goal  Goal: *Monitoring blood glucose, interpreting and using results  Description: Identify recommended blood glucose targets  and personal targets. Outcome: Progressing Towards Goal  Goal: *Prevention, detection, treatment of acute complications  Description: List symptoms of hyper- and hypoglycemia; describe how to treat low blood sugar and actions for lowering  high blood glucose level. Outcome: Progressing Towards Goal  Goal: *Prevention, detection and treatment of chronic complications  Description: Define the natural course of diabetes and describe the relationship of blood glucose levels to long term complications of diabetes.   Outcome: Progressing Towards Goal  Goal: *Developing strategies to address psychosocial issues  Description: Describe feelings about living with diabetes; identify support needed and support network  Outcome: Progressing Towards Goal  Goal: *Insulin pump training  Outcome: Progressing Towards Goal  Goal: *Sick day guidelines  Outcome: Progressing Towards Goal  Goal: *Patient Specific Goal (EDIT GOAL, INSERT TEXT)  Outcome: Progressing Towards Goal     Problem: Patient Education: Go to Patient Education Activity  Goal: Patient/Family Education  Outcome: Progressing Towards Goal     Problem:  Moderate Sedation (Adult)  Goal: *Patent airway  Outcome: Progressing Towards Goal  Goal: *Adequate oxygenation  Outcome: Progressing Towards Goal  Goal: *Absence of aspiration  Outcome: Progressing Towards Goal  Goal: *Hemodynamically stable  Outcome: Progressing Towards Goal  Goal: *Optimal pain control at patient's stated goal  Outcome: Progressing Towards Goal  Goal: *Absence of nausea/vomiting  Outcome: Progressing Towards Goal  Goal: *Anxiety reduced or absent  Outcome: Progressing Towards Goal  Goal: *Absence of injury  Outcome: Progressing Towards Goal  Goal: *Level of consciousness returns to baseline  Outcome: Progressing Towards Goal  Goal: Interventions  Outcome: Progressing Towards Goal     Problem: Patient Education: Go to Patient Education Activity  Goal: Patient/Family Education  Outcome: Progressing Towards Goal     Problem: Patient Education: Go to Patient Education Activity  Goal: Patient/Family Education  Outcome: Progressing Towards Goal     Problem: Cath Lab Procedures: Pre-Procedure  Goal: Off Pathway (Use only if patient is Off Pathway)  Outcome: Progressing Towards Goal  Goal: Activity/Safety  Outcome: Progressing Towards Goal  Goal: Consults, if ordered  Outcome: Progressing Towards Goal  Goal: Diagnostic Test/Procedures  Outcome: Progressing Towards Goal  Goal: Nutrition/Diet  Outcome: Progressing Towards Goal  Goal: Discharge Planning  Outcome: Progressing Towards Goal  Goal: Medications  Outcome: Progressing Towards Goal  Goal: Respiratory  Outcome: Progressing Towards Goal  Goal: Treatments/Interventions/Procedures  Outcome: Progressing Towards Goal  Goal: Psychosocial  Outcome: Progressing Towards Goal  Goal: *Verbalize description of procedure  Outcome: Progressing Towards Goal  Goal: *Consent signed  Outcome: Progressing Towards Goal     Problem: Cath Lab Procedures: Post-Cath Day of Procedure (Initiate SCIP Measures for Post-Op Care)  Goal: Off Pathway (Use only if patient is Off Pathway)  Outcome: Progressing Towards Goal  Goal: Activity/Safety  Outcome: Progressing Towards Goal  Goal: Consults, if ordered  Outcome: Progressing Towards Goal  Goal: Diagnostic Test/Procedures  Outcome: Progressing Towards Goal  Goal: Nutrition/Diet  Outcome: Progressing Towards Goal  Goal: Discharge Planning  Outcome: Progressing Towards Goal  Goal: Medications  Outcome: Progressing Towards Goal  Goal: Respiratory  Outcome: Progressing Towards Goal  Goal: Treatments/Interventions/Procedures  Outcome: Progressing Towards Goal  Goal: Psychosocial  Outcome: Progressing Towards Goal  Goal: *Procedure site is without bleeding and signs of infection six hours post sheath removal  Outcome: Progressing Towards Goal  Goal: *Hemodynamically stable  Outcome: Progressing Towards Goal  Goal: *Optimal pain control at patient's stated goal  Outcome: Progressing Towards Goal     Problem: Cath Lab Procedures: Post-Cath Day 1  Goal: Off Pathway (Use only if patient is Off Pathway)  Outcome: Progressing Towards Goal  Goal: Activity/Safety  Outcome: Progressing Towards Goal  Goal: Diagnostic Test/Procedures  Outcome: Progressing Towards Goal  Goal: Nutrition/Diet  Outcome: Progressing Towards Goal  Goal: Discharge Planning  Outcome: Progressing Towards Goal  Goal: Medications  Outcome: Progressing Towards Goal  Goal: Respiratory  Outcome: Progressing Towards Goal  Goal: Treatments/Interventions/Procedures  Outcome: Progressing Towards Goal  Goal: Psychosocial  Outcome: Progressing Towards Goal     Problem: Cath Lab Procedures: Discharge Outcomes  Goal: *Stable cardiac rhythm  Outcome: Progressing Towards Goal  Goal: *Hemodynamically stable  Outcome: Progressing Towards Goal  Goal: *Optimal pain control at patient's stated goal  Outcome: Progressing Towards Goal  Goal: *Pulses palpable, skin color within defined limits, skin temperature warm  Outcome: Progressing Towards Goal  Goal: *Lungs clear or at baseline  Outcome: Progressing Towards Goal  Goal: *Demonstrates ability to perform prescribed activity without shortness of breath or discomfort  Outcome: Progressing Towards Goal  Goal: *Verbalizes home exercise program, activity guidelines, cardiac precautions  Outcome: Progressing Towards Goal  Goal: *Verbalizes understanding and describes prescribed diet  Outcome: Progressing Towards Goal  Goal: *Verbalizes understanding and describes medication purposes and frequencies  Outcome: Progressing Towards Goal  Goal: *Identifies cardiac risk factors  Outcome: Progressing Towards Goal  Goal: *No signs and symptoms of infection or wound complications  Outcome: Progressing Towards Goal  Goal: *Anxiety reduced or absent  Outcome: Progressing Towards Goal  Goal: *Verbalizes and demonstrates incision care  Outcome: Progressing Towards Goal  Goal: *Understands and describes signs and symptoms to report to providers(Stroke Metric)  Outcome: Progressing Towards Goal  Goal: *Describes follow-up/return visits to physicians  Outcome: Progressing Towards Goal  Goal: *Describes available resources and support systems  Outcome: Progressing Towards Goal  Goal: *Influenza immunization  Outcome: Progressing Towards Goal  Goal: *Pneumococcal immunization  Outcome: Progressing Towards Goal

## 2020-08-26 NOTE — PROGRESS NOTES
Bedside shift change report given to [address] [place] [address] (oncoming nurse) by Ruddy Overton RN (offgoing nurse). Report included the following information SBAR, Kardex, ED Summary and Intake/Output. 0830 Shift assessment complete. Shift Summary: Shift uneventful. No complaints of chest pain or shortness of breath. Call light is within reach.

## 2020-08-26 NOTE — PROGRESS NOTES
0759:Received verbal bedside report from off going nurse SOO Mckeon R.N. Patient care received. Patient alert and oriented x 4. Patient resting in bed denies pain. Patient stable. Call light with in reach bed in lowest position.

## 2020-08-27 LAB
ALBUMIN SERPL-MCNC: 2.9 G/DL (ref 3.4–5)
ALBUMIN/GLOB SERPL: 0.8 {RATIO} (ref 0.8–1.7)
ALP SERPL-CCNC: 82 U/L (ref 45–117)
ALT SERPL-CCNC: 30 U/L (ref 13–56)
ANION GAP SERPL CALC-SCNC: 4 MMOL/L (ref 3–18)
AST SERPL-CCNC: 24 U/L (ref 10–38)
BASOPHILS # BLD: 0 K/UL (ref 0–0.1)
BASOPHILS NFR BLD: 0 % (ref 0–2)
BILIRUB SERPL-MCNC: 0.2 MG/DL (ref 0.2–1)
BUN SERPL-MCNC: 17 MG/DL (ref 7–18)
BUN/CREAT SERPL: 19 (ref 12–20)
CALCIUM SERPL-MCNC: 8.1 MG/DL (ref 8.5–10.1)
CHLORIDE SERPL-SCNC: 105 MMOL/L (ref 100–111)
CO2 SERPL-SCNC: 29 MMOL/L (ref 21–32)
CRD SYSTOLIC BP: 145
CREAT SERPL-MCNC: 0.88 MG/DL (ref 0.6–1.3)
DIFFERENTIAL METHOD BLD: ABNORMAL
EOSINOPHIL # BLD: 0.2 K/UL (ref 0–0.4)
EOSINOPHIL NFR BLD: 3 % (ref 0–5)
ERYTHROCYTE [DISTWIDTH] IN BLOOD BY AUTOMATED COUNT: 12.9 % (ref 11.6–14.5)
GLOBULIN SER CALC-MCNC: 3.8 G/DL (ref 2–4)
GLUCOSE BLD STRIP.AUTO-MCNC: 148 MG/DL (ref 70–110)
GLUCOSE BLD STRIP.AUTO-MCNC: 158 MG/DL (ref 70–110)
GLUCOSE BLD STRIP.AUTO-MCNC: 178 MG/DL (ref 70–110)
GLUCOSE BLD STRIP.AUTO-MCNC: 217 MG/DL (ref 70–110)
GLUCOSE BLD STRIP.AUTO-MCNC: 309 MG/DL (ref 70–110)
GLUCOSE SERPL-MCNC: 185 MG/DL (ref 74–99)
HCT VFR BLD AUTO: 37.4 % (ref 35–45)
HGB BLD-MCNC: 12 G/DL (ref 12–16)
LYMPHOCYTES # BLD: 2.2 K/UL (ref 0.9–3.6)
LYMPHOCYTES NFR BLD: 38 % (ref 21–52)
MCH RBC QN AUTO: 29.9 PG (ref 24–34)
MCHC RBC AUTO-ENTMCNC: 32.1 G/DL (ref 31–37)
MCV RBC AUTO: 93.3 FL (ref 74–97)
MONOCYTES # BLD: 0.5 K/UL (ref 0.05–1.2)
MONOCYTES NFR BLD: 9 % (ref 3–10)
NEUTS SEG # BLD: 2.9 K/UL (ref 1.8–8)
NEUTS SEG NFR BLD: 50 % (ref 40–73)
PLATELET # BLD AUTO: 194 K/UL (ref 135–420)
PMV BLD AUTO: 10.4 FL (ref 9.2–11.8)
POTASSIUM SERPL-SCNC: 4.8 MMOL/L (ref 3.5–5.5)
PROT SERPL-MCNC: 6.7 G/DL (ref 6.4–8.2)
RBC # BLD AUTO: 4.01 M/UL (ref 4.2–5.3)
SODIUM SERPL-SCNC: 138 MMOL/L (ref 136–145)
WBC # BLD AUTO: 5.8 K/UL (ref 4.6–13.2)

## 2020-08-27 PROCEDURE — 74011250637 HC RX REV CODE- 250/637: Performed by: INTERNAL MEDICINE

## 2020-08-27 PROCEDURE — 74011636637 HC RX REV CODE- 636/637: Performed by: HOSPITALIST

## 2020-08-27 PROCEDURE — C9113 INJ PANTOPRAZOLE SODIUM, VIA: HCPCS | Performed by: FAMILY MEDICINE

## 2020-08-27 PROCEDURE — 85025 COMPLETE CBC W/AUTO DIFF WBC: CPT

## 2020-08-27 PROCEDURE — 74011250636 HC RX REV CODE- 250/636: Performed by: FAMILY MEDICINE

## 2020-08-27 PROCEDURE — 65660000000 HC RM CCU STEPDOWN

## 2020-08-27 PROCEDURE — 74011636637 HC RX REV CODE- 636/637: Performed by: FAMILY MEDICINE

## 2020-08-27 PROCEDURE — 82962 GLUCOSE BLOOD TEST: CPT

## 2020-08-27 PROCEDURE — 80053 COMPREHEN METABOLIC PANEL: CPT

## 2020-08-27 PROCEDURE — 74011000250 HC RX REV CODE- 250: Performed by: FAMILY MEDICINE

## 2020-08-27 PROCEDURE — 74011250637 HC RX REV CODE- 250/637: Performed by: FAMILY MEDICINE

## 2020-08-27 PROCEDURE — 97530 THERAPEUTIC ACTIVITIES: CPT

## 2020-08-27 PROCEDURE — 36415 COLL VENOUS BLD VENIPUNCTURE: CPT

## 2020-08-27 PROCEDURE — 97162 PT EVAL MOD COMPLEX 30 MIN: CPT

## 2020-08-27 RX ADMIN — INSULIN LISPRO 2 UNITS: 100 INJECTION, SOLUTION INTRAVENOUS; SUBCUTANEOUS at 23:44

## 2020-08-27 RX ADMIN — Medication 10 ML: at 22:00

## 2020-08-27 RX ADMIN — ACETAMINOPHEN 650 MG: 325 TABLET ORAL at 05:36

## 2020-08-27 RX ADMIN — GABAPENTIN 300 MG: 300 CAPSULE ORAL at 23:35

## 2020-08-27 RX ADMIN — INSULIN GLARGINE 10 UNITS: 100 INJECTION, SOLUTION SUBCUTANEOUS at 23:35

## 2020-08-27 RX ADMIN — SODIUM CHLORIDE 40 MG: 9 INJECTION, SOLUTION INTRAMUSCULAR; INTRAVENOUS; SUBCUTANEOUS at 05:36

## 2020-08-27 RX ADMIN — ATORVASTATIN CALCIUM 10 MG: 10 TABLET, FILM COATED ORAL at 23:35

## 2020-08-27 RX ADMIN — INSULIN LISPRO 4 UNITS: 100 INJECTION, SOLUTION INTRAVENOUS; SUBCUTANEOUS at 11:53

## 2020-08-27 RX ADMIN — NITROGLYCERIN 1 INCH: 20 OINTMENT TOPICAL at 08:29

## 2020-08-27 RX ADMIN — MULTIPLE VITAMINS W/ MINERALS TAB 1 TABLET: TAB at 08:29

## 2020-08-27 RX ADMIN — FAMOTIDINE 20 MG: 10 INJECTION, SOLUTION INTRAVENOUS at 08:30

## 2020-08-27 RX ADMIN — Medication 10 ML: at 05:39

## 2020-08-27 RX ADMIN — INSULIN LISPRO 8 UNITS: 100 INJECTION, SOLUTION INTRAVENOUS; SUBCUTANEOUS at 16:41

## 2020-08-27 RX ADMIN — ACETAMINOPHEN 650 MG: 325 TABLET ORAL at 11:59

## 2020-08-27 RX ADMIN — METOPROLOL TARTRATE 25 MG: 25 TABLET, FILM COATED ORAL at 23:35

## 2020-08-27 RX ADMIN — CLOPIDOGREL BISULFATE 75 MG: 75 TABLET ORAL at 08:30

## 2020-08-27 RX ADMIN — GABAPENTIN 300 MG: 300 CAPSULE ORAL at 16:40

## 2020-08-27 RX ADMIN — MORPHINE SULFATE 2 MG: 2 INJECTION, SOLUTION INTRAMUSCULAR; INTRAVENOUS at 16:40

## 2020-08-27 RX ADMIN — RIVAROXABAN 15 MG: 15 TABLET, FILM COATED ORAL at 11:54

## 2020-08-27 RX ADMIN — ASPIRIN 81 MG: 81 TABLET, COATED ORAL at 08:29

## 2020-08-27 RX ADMIN — GABAPENTIN 300 MG: 300 CAPSULE ORAL at 08:29

## 2020-08-27 RX ADMIN — METOPROLOL TARTRATE 25 MG: 25 TABLET, FILM COATED ORAL at 08:30

## 2020-08-27 RX ADMIN — FAMOTIDINE 20 MG: 10 INJECTION, SOLUTION INTRAVENOUS at 23:35

## 2020-08-27 RX ADMIN — Medication 10 ML: at 14:00

## 2020-08-27 RX ADMIN — NITROGLYCERIN 1 INCH: 20 OINTMENT TOPICAL at 23:34

## 2020-08-27 NOTE — PROGRESS NOTES
Problem: Mobility Impaired (Adult and Pediatric)  Goal: *Acute Goals and Plan of Care (Insert Text)  Description: Physical Therapy Goals  Initiated 8/27/2020 and to be accomplished within 3-7 day(s)  1. Patient will move from supine to sit and sit to supine  in bed with supervision/set-up. 2.  Patient will transfer from bed to chair and chair to bed with supervision/set-up using the least restrictive device. 3.  Patient will perform sit to stand with supervision/set-up. 4.  Patient will ambulate with supervision/set-up for 50 feet with the least restrictive device. 5.  Patient will ascend/descend 2 stairs with handrail(s) with minimal assistance/contact guard assist.     Note:   PHYSICAL THERAPY EVALUATION    Patient: Michael Gallagher (75 y.o. female)  Date: 8/27/2020  Primary Diagnosis: Chest pain [R07.9]  Procedure(s) (LRB):  LEFT HEART CATH / CORONARY ANGIOGRAPHY (N/A)  Percutaneous Coronary Intervention (N/A) 1 Day Post-Op   Precautions:   Fall    ASSESSMENT :  Based on the objective data described below, the patient presents with lower extremity weakness, decreased gait quality and endurance, impaired bed mobility and transfers, and overall limitations in functional mobility. Pt reporting high pain levels in medial R knee, dorsum of R foot and area surrounding R medial malleolus. Pt performed supine to sit with CGA and extra time, sit to stand with ModA however unable to maintain standing or progress to ambulation due to R LE pain. RN notified of pain and inability to ambulate. Patient would benefit from skilled inpatient physical therapy to address deficits, progress as tolerated to achieve long term goals and allow safe discharge. Pt reports she lives alone however has family to assist as needed. At this time pt unsafe to discharge home.  If pt able to progress ambulation may be able to recommend home health however at this time further assessment is needed for safest discharge location recommendation. Patient will benefit from skilled intervention to address the above impairments. Patients rehabilitation potential is considered to be Good  Factors which may influence rehabilitation potential include:   []         None noted  []         Mental ability/status  []         Medical condition  [x]         Home/family situation and support systems  []         Safety awareness  [x]         Pain tolerance/management  []         Other:      PLAN :  Recommendations and Planned Interventions:  [x]           Bed Mobility Training             []    Neuromuscular Re-Education  [x]           Transfer Training                   []    Orthotic/Prosthetic Training  [x]           Gait Training                          []    Modalities  [x]           Therapeutic Exercises          []    Edema Management/Control  [x]           Therapeutic Activities            [x]    Patient and Family Training/Education  []           Other (comment):    Frequency/Duration: Patient will be followed by physical therapy 1-2 times per day to address goals. Discharge Recommendations: To Be Determined  Further Equipment Recommendations for Discharge: N/A     SUBJECTIVE:   Patient stated This leg is really hurting.     OBJECTIVE DATA SUMMARY:     Past Medical History:   Diagnosis Date    CAD (coronary artery disease)     Diabetes ([facility] [address]     Gastrointestinal disorder     Gout     Hypertension     MI (myocardial infarction) ([facility] [address]     Reflux      Past Surgical History:   Procedure Laterality Date    CARDIAC SURG PROCEDURE UNLIST      PCI 15 years ago/ with stent placement    HX CATARACT REMOVAL Bilateral 2019    VASCULAR SURGERY PROCEDURE UNLIST       Barriers to Learning/Limitations: None  Compensate with: Visual Cues, Verbal Cues, and Tactile Cues  Prior Level of Function/Home Situation: Independent amb c/SPC  Home Situation  Home Environment: Apartment  # Steps to Enter: 2  Rails to Enter: Yes  One/Two Story Residence: Other (Comment)  Living Alone: Yes  Support Systems: Child(sonali)  Patient Expects to be Discharged to[de-identified] Private residence  Current DME Used/Available at Home: marvin Jones  Critical Behavior:  Psychosocial  Purposeful Interaction: Yes  Pt Identified Daily Priority: Clinical issues (comment)  Caritas Process: Nurture loving kindness; Teaching/learning; Attend basic human needs  Caring Interventions: Reassure; Therapeutic modalities  Reassure: Therapeutic listening; Acceptance; Informing  Therapeutic Modalities: Intentional therapeutic touch  Skin Condition/Temp: Dry;Warm   Skin Integrity: Intact  Skin Integumentary  Skin Color: Appropriate for ethnicity  Skin Condition/Temp: Dry;Warm  Skin Integrity: Intact   Strength:    Strength: Generally decreased, functional  Tone & Sensation:   Tone: Normal  Sensation: Intact  Range Of Motion:  AROM: Generally decreased, functional  PROM: Generally decreased, functional  Functional Mobility:  Bed Mobility:   Supine to Sit: Contact guard assistance; Additional time  Transfers:  Sit to Stand: Moderate assistance  Stand to Sit: Moderate assistance  Balance:   Sitting: Intact  Standing: Impaired; With support  Standing - Static: Fair  Pain:  Pain Scale 1: Numeric (0 - 10)  Pain Intensity 1: 0  Activity Tolerance:   Good  Please refer to the flowsheet for vital signs taken during this treatment. After treatment:   []         Patient left in no apparent distress sitting up in chair  [x]         Patient left in no apparent distress in bed  [x]         Call bell left within reach  [x]         Nursing notified  []         Caregiver present  []         Bed alarm activated    COMMUNICATION/EDUCATION:   [x]         Fall prevention education was provided and the patient/caregiver indicated understanding. [x]         Patient/family have participated as able in goal setting and plan of care. [x]         Patient/family agree to work toward stated goals and plan of care.   []         Patient understands intent and goals of therapy, but is neutral about his/her participation. []         Patient is unable to participate in goal setting and plan of care.     Thank you for this referral.  Lenny Jhaveri   Time Calculation: 25 mins   Eval Complexity: History: MEDIUM  Complexity : 1-2 comorbidities / personal factors will impact the outcome/ POC Exam:MEDIUM Complexity : 3 Standardized tests and measures addressing body structure, function, activity limitation and / or participation in recreation  Presentation: MEDIUM Complexity : Evolving with changing characteristics  Clinical Decision Making:Medium Complexity    Overall Complexity:MEDIUM

## 2020-08-27 NOTE — PROGRESS NOTES
Problem: Falls - Risk of  Goal: *Absence of Falls  Description: Document Willi Jackson Fall Risk and appropriate interventions in the flowsheet.   Outcome: Progressing Towards Goal  Note: Fall Risk Interventions:  Mobility Interventions: Communicate number of staff needed for ambulation/transfer, Patient to call before getting OOB, Utilize walker, cane, or other assistive device         Medication Interventions: Teach patient to arise slowly, Patient to call before getting OOB    Elimination Interventions: Call light in reach, Patient to call for help with toileting needs, Toileting schedule/hourly rounds    History of Falls Interventions: Door open when patient unattended, Room close to nurse's station, Investigate reason for fall         Problem: Patient Education: Go to Patient Education Activity  Goal: Patient/Family Education  Outcome: Progressing Towards Goal     Problem: Pain  Goal: *Control of Pain  Outcome: Progressing Towards Goal

## 2020-08-27 NOTE — DIABETES MGMT
Diabetes Patient/Family Education Record  Factors That  May Influence Patients Ability  to Learn or  Comply with Recommendations   []   Language barrier    []   Cultural needs   [x]   Motivation    []   Cognitive limitation    []   Physical   []   Education    []   Physiological factors   []   Hearing/vision/speaking impairment   []   Oriental orthodox beliefs    []   Financial factors   []  Other:   []  No factors identified at this time.      Person Instructed:   [x]   Patient   []   Family   []  Other     Preference for Learning:   [x]   Verbal   []   Written   []  Demonstration     Level of Comprehension & Competence:    [x]  Good                                      [] Fair                                     []  Poor                             []  Needs Reinforcement   [x]  Teachback completed    Education Component:   [x]  Medication management, including how to administer insulin (if appropriate) and potential medication interactions; encouraged pt to follow SSI regimen qac once (recommend modification of SSI regimen atc/ )   []  Nutritional management -obtain usual meal pattern   []  Exercise   []  Signs, symptoms, and treatment of hyperglycemia and hypoglycemia   [] Prevention, recognition and treatment of hyperglycemia and hypoglycemia   [x]  Importance of blood glucose monitoring and how to obtain a blood glucose meter; provided pt with Freestyle lex info   []  Instruction on use of the blood glucose meter   [x]  Discuss the importance of HbA1C monitoring    []  Sick day guidelines   []  Proper use and disposal of lancets, needles, syringes or insulin pens (if appropriate)   []  Potential long-term complications (retinopathy, kidney disease, neuropathy, foot care)   [] Information about whom to contact in case of emergency or for more information    []  Goal:  Patient/family will demonstrate understanding of Diabetes Self Management Skills by: (date) _______  Plan for post-discharge education or self-management support:    [] Outpatient class schedule provided            [] Patient Declined    [] Scheduled for outpatient classes (date) _______  Verify:  Does patient understand how diabetes medications work? ____________________________  Does patient know what their most recent A1c is? ___________________________________  Does patient monitor glucose at home? ___________________________________________  Does patient have difficulty obtaining diabetes medications or testing supplies? _________________         Sofia Youssef MS, RN, CDE  Glycemic Control Team  [phone]  Pager [phone] (M-TH 8:00-4:30P)  *After Hours pager [phone]

## 2020-08-27 NOTE — HOME CARE
Spoke with patient via phone. Verified address, telephone number. Explained hospital to home program. Orders noted and arranged.      Rosalva Bates LPN
none

## 2020-08-27 NOTE — DIABETES MGMT
GLYCEMIC CONTROL PROGRESS NOTE:    - discussed in rounds, known h/o DM2, HbA1C not within recommended range for age + comorbids on basal/bolus/oral home regimen  - NPO for procedure most of day yesterday, BG trends may not be reflective of home glycemic management  - pt states insulin initiated ~ one year ago  - pt told this writer she does NOT take SSI on a daily basis, \"only if my BG is really high\"  - do believe pt would benefit from adjustment to SSI regimen r/t high doses ( 10 units) + Metformin 500 mg BID  - TDD = 20 (10 _ 10 units - Humalog Normal Insulin Sensitivity Corrective Coverage)  - recommended home regimen    *Lantus 20 units   *- Humalog Normal Insulin Sensitivity Corrective Coverage      Recent Glucose Results:   Lab Results   Component Value Date/Time     (H) 08/27/2020 02:25 AM    GLUCPOC 148 (H) 08/27/2020 06:12 AM    GLUCPOC 241 (H) 08/26/2020 09:27 PM    GLUCPOC 236 (H) 08/26/2020 04:17 PM     Cecilio Camacho MS, RN, CDE  Glycemic Control Team  [phone]  Pager [phone] (M-TH 8:00-4:30P)  *After Hours pager [phone]

## 2020-08-27 NOTE — PROGRESS NOTES
DC Plan: Discharge home with [facility], MD follow up, family assistance. Provider please consider PT/OT eval orders to assist with dc planning     Chart reviewed. Pt admitted to tele by hospitalist.  Anticipate dc today v tomorrow. Met with pt at bedside to discuss dc plan earlier today, CM role explained. Pt states she lives alone, but has family nearby. Pt has son and daughter in law to assist as needed. Family to drive home at discharge. Home address confirmed per face sheet. Pt states she drives on occasion. Pt has RW, cane. Pt confirms PCP as MD Iman Medina, last saw around 1 month ago. Denies seeing cardiologist outside hospital. CMS will assist with needed follow up appts at time of discharge. FOC was offered for [facility]. Pt chose [facility], referral placed. No other dc concerns identified.  CM will cont to follow for transition of care needs [phone]

## 2020-08-27 NOTE — PROGRESS NOTES
Hospitalist Progress Note    Patient: Ag Armando MRN: [id number]  CSN: [id number]    YOB: 1945  Age: 69 y.o. Sex: female    DOA: 8/24/2020 LOS:  LOS: 3 days                Assessment/Plan     Patient Active Problem List   Diagnosis Code    Chest pain R07.9    CAD (coronary artery disease) I25.10    Hypertension I10    Insulin dependent diabetes mellitus with complications [id number]    Gastrointestinal disorder K92.9    Acute DVT of left tibial vein (HCC) I82.442    Morbid obesity with BMI of 40.0-44.9, adult (HCC) E66.01, Z68.41    Gout M10.9    Vomiting R11.10    Pyelonephritis N12    Intractable vomiting with nausea R11.2    Diabetes (HCC) E11.9    Acute chest pain R07.9    Dyspepsia R10.13    Cough R05    Anticoagulated Z79.01    Chest pain at rest R07.9    Abnormal nuclear stress test R94.39    Cardiomyopathy [facility]) I42.9            70 y/o female with CAD and prior stent placement, morbid obesity, DVT on anticoagulation, HTN, and DMT2 is admitted for chest pain. Chest pain-  In a patient with CAD, status post PCI in 2007. S/p coronary angiogram and PCI  Continue with aspirin, Plavix, beta-blocker, Lipitor. systolic CHF-  Well compensated monitor volume status. Echo with EF of 35 to 40% moderately reduced systolic function. DM-  Continue on Lantus, sliding scale insulin, diabetic diet. Morbid obesity with BMI of 47. History of DVT on Xarelto. She lives alone, PT/OT eval    Disposition : awaiting PT/OT eval    Review of systems  General: No fevers or chills. Cardiovascular: No chest pain or pressure. No palpitations. Pulmonary: No shortness of breath. Gastrointestinal: No nausea, vomiting. Physical Exam:  General: Awake, cooperative, no acute distress    HEENT: NC, Atraumatic. PERRLA, anicteric sclerae. Lungs: CTA Bilaterally. No Wheezing/Rhonchi/Rales. Heart:  S1 S2,  No murmur, No Rubs, No Gallops  Abdomen: Soft, Non distended, Non tender.  +Bowel sounds,   Extremities: No c/c/e  Psych:   Not anxious or agitated. Neurologic:  No acute neurological deficit. Vital signs/Intake and Output:  Visit Vitals  /61   Pulse 64   Temp 98.2 °F (36.8 °C)   Resp 18   Ht 5' 4\" (1.626 m)   Wt 126.1 kg (277 lb 14.4 oz)   SpO2 98%   BMI 47.70 kg/m²     Current Shift:  08/27 0701 - 08/27 1900  In: 720 [P.O.:720]  Out: 1 [Urine:1]  Last three shifts:  08/25 1901 - 08/27 0700  In: 1000 [P.O.:1000]  Out: 1200 [Urine:1200]            Labs: Results:       Chemistry Recent Labs     08/27/20 0225 08/26/20 0414 08/25/20  0530   * 149* 120*    140 140   K 4.8 4.7 3.9    108 107   CO2 29 28 31   BUN 17 23* 17   CREA 0.88 1.13 0.84   CA 8.1* 8.3* 8.6   AGAP 4 4 2*   BUCR 19 20 20   AP 82 80 84   TP 6.7 6.7 6.7   ALB 2.9* 3.0* 3.0*   GLOB 3.8 3.7 3.7   AGRAT 0.8 0.8 0.8      CBC w/Diff Recent Labs     08/27/20 0225 08/26/20  1240 08/26/20  0414 08/25/20  0530   WBC 5.8  --  6.3 6.9   RBC 4.01*  --  4.19* 4.51   HGB 12.0 11.8* 12.0 12.8   HCT 37.4 36.1 37.4 40.0     --  202 216   GRANS 50  --  43 43   LYMPH 38  --  46 48   EOS 3  --  3 2      Cardiac Enzymes Recent Labs     08/25/20  1635 08/25/20  1110   * 454*   CKND1 1.7 1.7      Coagulation Recent Labs     08/26/20  0414 08/25/20  1100   PTP 13.4  --    INR 1.0  --    APTT 36.3 91.3*       Lipid Panel Lab Results   Component Value Date/Time    Cholesterol, total 139 08/25/2020 05:30 AM    HDL Cholesterol 49 08/25/2020 05:30 AM    LDL, calculated 58.6 08/25/2020 05:30 AM    VLDL, calculated 31.4 08/25/2020 05:30 AM    Triglyceride 157 (H) 08/25/2020 05:30 AM    CHOL/HDL Ratio 2.8 08/25/2020 05:30 AM      BNP No results for input(s): BNPP in the last 72 hours.    Liver Enzymes Recent Labs     08/27/20  0225   TP 6.7   ALB 2.9*   AP 82      Thyroid Studies No results found for: T4, T3U, TSH, TSHEXT, TSHEXT     Procedures/imaging: see electronic medical records for all procedures/Xrays and details which were not copied into this note but were reviewed prior to creation of Plan

## 2020-08-27 NOTE — PROGRESS NOTES
Assumed care of pt from [address] [facility].  1048: Rounded with  and team need to walk patient to see if ready for discharge.

## 2020-08-27 NOTE — PROGRESS NOTES
Cardiology Progress Note        Patient: Denver Nixon        Sex: female          DOA: 8/24/2020  YOB: 1945      Age:  69 y.o.        LOS:  LOS: 3 days   Assessment/Plan     Principal Problem:    Chest pain (12/3/2018)    Active Problems:    CAD (coronary artery disease) (12/3/2018)      Hypertension (12/3/2018)      Morbid obesity with BMI of 40.0-44.9, adult ([address] (12/3/2018)      Diabetes ([facility] [address] (5/24/2019)      Anticoagulated (8/25/2020)      Chest pain at rest (8/24/2020)      Overview: Added automatically from request for surgery [id number]      Abnormal nuclear stress test (8/24/2020)      Overview: Added automatically from request for surgery [id number]      Cardiomyopathy [facility]) (8/24/2020)      Overview: Added automatically from request for surgery [id number]        Plan:    Chest pain  LBBB  Cardiomyopathy  Abnormal stress test  CAD    Patient is status post PCI to the left circumflex artery. Patient is feeling much better. Patient have right-sided body aches and pains with normal motor and sensory function. Start losartan 25 mg daily if patient has no contraindication continue rest of cardiac medication including aspirin, Plavix and metoprolol and statins. Monitor the patient in the hospital if stable tomorrow can be discharged. Discussed with the patient and also with Dr. Maryam Swift. Subjective:    cc:  Chest pain  LBBB  Cardiomyopathy  Abnormal stress test  CAD        REVIEW OF SYSTEMS:     General: No fevers or chills. Cardiovascular: No chest pain or pressure. No palpitations. No ankle swelling  Pulmonary: No SOB, orthopnea, PND  Gastrointestinal: No nausea, vomiting or diarrhea      Objective:      Visit Vitals  /62   Pulse 69   Temp 98.2 °F (36.8 °C)   Resp 18   Ht 5' 4\" (1.626 m)   Wt 126.1 kg (277 lb 14.4 oz)   SpO2 96%   BMI 47.70 kg/m²     Body mass index is 47.7 kg/m².     Physical Exam:  General Appearance: Comfortable, not using accessory muscles of respiration. NECK: No JVD, no thyroidomeglay. LUNGS: Clear bilaterally. HEART: S1+S2 audible,    ABD: Non-tender, BS Audible    EXT: No edema, and no cysnosis. VASCULAR EXAM: Pulses are intact. PSYCHIATRIC EXAM: Mood is appropriate. Right radial artery area is stable without any hematoma good pulse.   Medication:  Current Facility-Administered Medications   Medication Dose Route Frequency    sodium chloride (NS) flush 5-40 mL  5-40 mL IntraVENous Q8H    sodium chloride (NS) flush 5-40 mL  5-40 mL IntraVENous PRN    clopidogreL (PLAVIX) tablet 75 mg  75 mg Oral DAILY    rivaroxaban (XARELTO) tablet 15 mg  15 mg Oral DAILY WITH LUNCH    nitroglycerin (NITROBID) 2 % ointment 1 Inch  1 Inch Topical BID    morphine injection 2 mg  2 mg IntraVENous Q4H PRN    insulin lispro (HUMALOG) injection   SubCUTAneous AC&HS    glucose chewable tablet 16 g  16 g Oral PRN    glucagon (GLUCAGEN) injection 1 mg  1 mg IntraMUSCular PRN    dextrose 10% infusion 125-250 mL  125-250 mL IntraVENous PRN    albuterol (PROVENTIL VENTOLIN) nebulizer solution 2.5 mg  2.5 mg Nebulization Q4H PRN    aspirin delayed-release tablet 81 mg  81 mg Oral DAILY    gabapentin (NEURONTIN) capsule 300 mg  300 mg Oral TID    multivitamin, tx-iron-ca-min (THERA-M w/ IRON) tablet 1 Tab  1 Tab Oral DAILY    atorvastatin (LIPITOR) tablet 10 mg  10 mg Oral QHS    metoprolol tartrate (LOPRESSOR) tablet 25 mg  25 mg Oral Q12H    pantoprazole (PROTONIX) 40 mg in 0.9% sodium chloride 10 mL injection  40 mg IntraVENous Q24H    famotidine (PF) (PEPCID) 20 mg in 0.9% sodium chloride 10 mL injection  20 mg IntraVENous Q12H    insulin glargine (LANTUS) injection 10 Units  10 Units SubCUTAneous QHS    sodium chloride (NS) flush 5-40 mL  5-40 mL IntraVENous Q8H    sodium chloride (NS) flush 5-40 mL  5-40 mL IntraVENous PRN    acetaminophen (TYLENOL) tablet 650 mg  650 mg Oral Q6H PRN    Or    acetaminophen (TYLENOL) suppository 650 mg  650 mg Rectal Q6H PRN    polyethylene glycol (MIRALAX) packet 17 g  17 g Oral DAILY PRN    promethazine (PHENERGAN) tablet 12.5 mg  12.5 mg Oral Q6H PRN    Or    ondansetron (ZOFRAN) injection 4 mg  4 mg IntraVENous Q6H PRN               Lab/Data Reviewed:  Procedures/imaging: see electronic medical records for all procedures/Xrays   and details which were not copied into this note but were reviewed prior to creation of Plan       All lab results for the last 24 hours reviewed.      Recent Labs     08/27/20 0225 08/26/20  1240 08/26/20  0414 08/25/20  0530   WBC 5.8  --  6.3 6.9   HGB 12.0 11.8* 12.0 12.8   HCT 37.4 36.1 37.4 40.0     --  202 216     Recent Labs     08/27/20 0225 08/26/20  0414 08/25/20  0530    140 140   K 4.8 4.7 3.9    108 107   CO2 29 28 31   * 149* 120*   BUN 17 23* 17   CREA 0.88 1.13 0.84   CA 8.1* 8.3* 8.6       RADIOLOGY:  CT Results  (Last 48 hours)    None        CXR Results  (Last 48 hours)    None            Cardiology Procedures:   Results for orders placed or performed during the hospital encounter of 08/24/20   EKG, 12 LEAD, INITIAL   Result Value Ref Range    Ventricular Rate 99 BPM    Atrial Rate 99 BPM    P-R Interval 146 ms    QRS Duration 158 ms    Q-T Interval 402 ms    QTC Calculation (Bezet) 515 ms    Calculated P Axis 72 degrees    Calculated R Axis -23 degrees    Calculated T Axis 108 degrees    Diagnosis       Sinus rhythm with with aberrant conduction  Left bundle branch block  Abnormal ECG  When compared with ECG of 03-MAR-2020 12:03,  premature ventricular complexes are no longer present  aberrant conduction is now present  Left bundle branch block is now present  Confirmed by Lucio Galdamez MD, -- ([id number]) on 8/26/2020 10:43:40 AM        Echo Results  (Last 48 hours)    None       Cardiolite (Tc-99m Sestamibi) stress test    Signed By: aL Mcgovern MD     August 27, 2020

## 2020-08-28 ENCOUNTER — APPOINTMENT (OUTPATIENT)
Dept: MRI IMAGING | Age: 75
DRG: 246 | End: 2020-08-28
Attending: HOSPITALIST
Payer: MEDICARE

## 2020-08-28 ENCOUNTER — APPOINTMENT (OUTPATIENT)
Dept: VASCULAR SURGERY | Age: 75
DRG: 246 | End: 2020-08-28
Attending: HOSPITALIST
Payer: MEDICARE

## 2020-08-28 PROBLEM — R11.2 INTRACTABLE VOMITING WITH NAUSEA: Status: RESOLVED | Noted: 2018-12-15 | Resolved: 2020-08-28

## 2020-08-28 PROBLEM — K92.9 GASTROINTESTINAL DISORDER: Status: RESOLVED | Noted: 2018-12-03 | Resolved: 2020-08-28

## 2020-08-28 PROBLEM — R07.9 ACUTE CHEST PAIN: Status: RESOLVED | Noted: 2019-05-24 | Resolved: 2020-08-28

## 2020-08-28 PROBLEM — R10.13 DYSPEPSIA: Status: RESOLVED | Noted: 2020-03-03 | Resolved: 2020-08-28

## 2020-08-28 PROBLEM — R07.9 CHEST PAIN AT REST: Status: RESOLVED | Noted: 2020-08-24 | Resolved: 2020-08-28

## 2020-08-28 PROBLEM — E11.9 DIABETES (HCC): Status: RESOLVED | Noted: 2019-05-24 | Resolved: 2020-08-28

## 2020-08-28 PROBLEM — I82.442 ACUTE DVT OF LEFT TIBIAL VEIN (HCC): Status: RESOLVED | Noted: 2018-12-03 | Resolved: 2020-08-28

## 2020-08-28 PROBLEM — I25.10 CAD S/P PERCUTANEOUS CORONARY ANGIOPLASTY: Status: ACTIVE | Noted: 2020-08-28

## 2020-08-28 PROBLEM — N12 PYELONEPHRITIS: Status: RESOLVED | Noted: 2018-12-15 | Resolved: 2020-08-28

## 2020-08-28 PROBLEM — R11.10 VOMITING: Status: RESOLVED | Noted: 2018-12-15 | Resolved: 2020-08-28

## 2020-08-28 PROBLEM — R29.898 RIGHT LEG WEAKNESS: Status: ACTIVE | Noted: 2020-08-28

## 2020-08-28 PROBLEM — R05.9 COUGH: Status: RESOLVED | Noted: 2020-03-03 | Resolved: 2020-08-28

## 2020-08-28 PROBLEM — Z98.61 CAD S/P PERCUTANEOUS CORONARY ANGIOPLASTY: Status: ACTIVE | Noted: 2020-08-28

## 2020-08-28 LAB
GLUCOSE BLD STRIP.AUTO-MCNC: 134 MG/DL (ref 70–110)
GLUCOSE BLD STRIP.AUTO-MCNC: 225 MG/DL (ref 70–110)
GLUCOSE BLD STRIP.AUTO-MCNC: 291 MG/DL (ref 70–110)

## 2020-08-28 PROCEDURE — 93970 EXTREMITY STUDY: CPT

## 2020-08-28 PROCEDURE — 74011636637 HC RX REV CODE- 636/637: Performed by: FAMILY MEDICINE

## 2020-08-28 PROCEDURE — 97116 GAIT TRAINING THERAPY: CPT

## 2020-08-28 PROCEDURE — 74011250637 HC RX REV CODE- 250/637: Performed by: FAMILY MEDICINE

## 2020-08-28 PROCEDURE — 72148 MRI LUMBAR SPINE W/O DYE: CPT

## 2020-08-28 PROCEDURE — 65660000000 HC RM CCU STEPDOWN

## 2020-08-28 PROCEDURE — 74011000250 HC RX REV CODE- 250: Performed by: FAMILY MEDICINE

## 2020-08-28 PROCEDURE — 74011250636 HC RX REV CODE- 250/636: Performed by: FAMILY MEDICINE

## 2020-08-28 PROCEDURE — C9113 INJ PANTOPRAZOLE SODIUM, VIA: HCPCS | Performed by: FAMILY MEDICINE

## 2020-08-28 PROCEDURE — 70551 MRI BRAIN STEM W/O DYE: CPT

## 2020-08-28 PROCEDURE — 82962 GLUCOSE BLOOD TEST: CPT

## 2020-08-28 PROCEDURE — 74011250637 HC RX REV CODE- 250/637: Performed by: INTERNAL MEDICINE

## 2020-08-28 RX ORDER — ALLOPURINOL 100 MG/1
100 TABLET ORAL DAILY
Status: DISCONTINUED | OUTPATIENT
Start: 2020-08-29 | End: 2020-09-01 | Stop reason: HOSPADM

## 2020-08-28 RX ORDER — INSULIN GLARGINE 100 [IU]/ML
13 INJECTION, SOLUTION SUBCUTANEOUS
Status: DISCONTINUED | OUTPATIENT
Start: 2020-08-28 | End: 2020-08-28

## 2020-08-28 RX ORDER — INSULIN GLARGINE 100 [IU]/ML
20 INJECTION, SOLUTION SUBCUTANEOUS
Status: DISCONTINUED | OUTPATIENT
Start: 2020-08-28 | End: 2020-08-30

## 2020-08-28 RX ORDER — PANTOPRAZOLE SODIUM 40 MG/1
40 TABLET, DELAYED RELEASE ORAL
Status: DISCONTINUED | OUTPATIENT
Start: 2020-08-29 | End: 2020-09-01 | Stop reason: HOSPADM

## 2020-08-28 RX ADMIN — MORPHINE SULFATE 2 MG: 2 INJECTION, SOLUTION INTRAMUSCULAR; INTRAVENOUS at 02:51

## 2020-08-28 RX ADMIN — FAMOTIDINE 20 MG: 10 INJECTION, SOLUTION INTRAVENOUS at 10:14

## 2020-08-28 RX ADMIN — GABAPENTIN 300 MG: 300 CAPSULE ORAL at 10:14

## 2020-08-28 RX ADMIN — RIVAROXABAN 15 MG: 15 TABLET, FILM COATED ORAL at 12:00

## 2020-08-28 RX ADMIN — Medication 10 ML: at 06:00

## 2020-08-28 RX ADMIN — INSULIN LISPRO 6 UNITS: 100 INJECTION, SOLUTION INTRAVENOUS; SUBCUTANEOUS at 11:30

## 2020-08-28 RX ADMIN — METOPROLOL TARTRATE 25 MG: 25 TABLET, FILM COATED ORAL at 10:14

## 2020-08-28 RX ADMIN — CLOPIDOGREL BISULFATE 75 MG: 75 TABLET ORAL at 10:14

## 2020-08-28 RX ADMIN — SODIUM CHLORIDE 40 MG: 9 INJECTION, SOLUTION INTRAMUSCULAR; INTRAVENOUS; SUBCUTANEOUS at 04:32

## 2020-08-28 RX ADMIN — MULTIPLE VITAMINS W/ MINERALS TAB 1 TABLET: TAB at 10:14

## 2020-08-28 RX ADMIN — ASPIRIN 81 MG: 81 TABLET, COATED ORAL at 10:13

## 2020-08-28 NOTE — PROGRESS NOTES
DC Plan: SNF, Will need insurance auth and neg covid     1227  Chart reviewed. Pt admitted to tele by hospitalist.  Anticipate dc today. Pt to go to MRI today and work with therapy.  Pt states she lives alone, but has family nearby. Pt has son and daughter in law to assist as needed. Family to drive home at discharge. [facility] address confirmed per face sheet.  Pt states she drives on occasion.  Pt has RW, cane.  Pt confirms PCP as MD Bia Samuel, last saw around 1 month ago. [facility] seeing cardiologist outside hospital. CMS will assist with needed follow up appts at time of discharge. FOC was offered for [place]. Pt chose [facility], referral placed.   No other dc concerns identified. CM will cont to follow for transition of care needs 21 [phone]  Received call from [address] [facility], now recommending SNF. Called pt and informed her of recommendation. FOC offered, pt unsure of which facility should would like to go to for rehab. States she has never been to a rehab. Pt hesitant to agree. Pt will discuss with son and get back with CM. Will send local referrals, will need neg covid and insurance auth    1520  Attempted to call pt again in room, no answer.  Several facilities have accepting pending insurance auth and neg covid

## 2020-08-28 NOTE — PROGRESS NOTES
Problem: Falls - Risk of  Goal: *Absence of Falls  Description: Document Devere Gackle Fall Risk and appropriate interventions in the flowsheet.   Outcome: Progressing Towards Goal  Note: Fall Risk Interventions:  Mobility Interventions: Communicate number of staff needed for ambulation/transfer, Patient to call before getting OOB, PT Consult for mobility concerns, PT Consult for assist device competence         Medication Interventions: Patient to call before getting OOB, Teach patient to arise slowly    Elimination Interventions: Call light in reach, Patient to call for help with toileting needs    History of Falls Interventions: Vital signs minimum Q4HRs X 24 hrs (comment for end date)         Problem: Patient Education: Go to Patient Education Activity  Goal: Patient/Family Education  Outcome: Progressing Towards Goal     Problem: General Medical Care Plan  Goal: *Vital signs within specified parameters  Outcome: Progressing Towards Goal  Goal: *Labs within defined limits  Outcome: Progressing Towards Goal  Goal: *Absence of infection signs and symptoms  Outcome: Progressing Towards Goal  Goal: *Optimal pain control at patient's stated goal  Outcome: Progressing Towards Goal  Goal: *Skin integrity maintained  Outcome: Progressing Towards Goal  Goal: *Fluid volume balance  Outcome: Progressing Towards Goal  Goal: *Optimize nutritional status  Outcome: Progressing Towards Goal  Goal: *Anxiety reduced or absent  Outcome: Progressing Towards Goal  Goal: *Progressive mobility and function (eg: ADL's)  Outcome: Progressing Towards Goal     Problem: Patient Education: Go to Patient Education Activity  Goal: Patient/Family Education  Outcome: Progressing Towards Goal     Problem: Pain  Goal: *Control of Pain  Outcome: Progressing Towards Goal  Goal: *PALLIATIVE CARE:  Alleviation of Pain  Outcome: Progressing Towards Goal     Problem: Patient Education: Go to Patient Education Activity  Goal: Patient/Family Education  Outcome: Progressing Towards Goal     Problem: Diabetes Self-Management  Goal: *Disease process and treatment process  Description: Define diabetes and identify own type of diabetes; list 3 options for treating diabetes. Outcome: Progressing Towards Goal  Goal: *Incorporating nutritional management into lifestyle  Description: Describe effect of type, amount and timing of food on blood glucose; list 3 methods for planning meals. Outcome: Progressing Towards Goal  Goal: *Incorporating physical activity into lifestyle  Description: State effect of exercise on blood glucose levels. Outcome: Progressing Towards Goal  Goal: *Developing strategies to promote health/change behavior  Description: Define the ABC's of diabetes; identify appropriate screenings, schedule and personal plan for screenings. Outcome: Progressing Towards Goal  Goal: *Using medications safely  Description: State effect of diabetes medications on diabetes; name diabetes medication taking, action and side effects. Outcome: Progressing Towards Goal  Goal: *Monitoring blood glucose, interpreting and using results  Description: Identify recommended blood glucose targets  and personal targets. Outcome: Progressing Towards Goal  Goal: *Prevention, detection, treatment of acute complications  Description: List symptoms of hyper- and hypoglycemia; describe how to treat low blood sugar and actions for lowering  high blood glucose level. Outcome: Progressing Towards Goal  Goal: *Prevention, detection and treatment of chronic complications  Description: Define the natural course of diabetes and describe the relationship of blood glucose levels to long term complications of diabetes.   Outcome: Progressing Towards Goal  Goal: *Developing strategies to address psychosocial issues  Description: Describe feelings about living with diabetes; identify support needed and support network  Outcome: Progressing Towards Goal  Goal: *Insulin pump training  Outcome: Progressing Towards Goal  Goal: *Sick day guidelines  Outcome: Progressing Towards Goal  Goal: *Patient Specific Goal (EDIT GOAL, INSERT TEXT)  Outcome: Progressing Towards Goal     Problem: Patient Education: Go to Patient Education Activity  Goal: Patient/Family Education  Outcome: Progressing Towards Goal     Problem: Patient Education: Go to Patient Education Activity  Goal: Patient/Family Education  Outcome: Progressing Towards Goal     Problem: Patient Education: Go to Patient Education Activity  Goal: Patient/Family Education  Outcome: Progressing Towards Goal     Problem: Cath Lab Procedures: Discharge Outcomes  Goal: *Stable cardiac rhythm  Outcome: Progressing Towards Goal  Goal: *Hemodynamically stable  Outcome: Progressing Towards Goal  Goal: *Optimal pain control at patient's stated goal  Outcome: Progressing Towards Goal  Goal: *Pulses palpable, skin color within defined limits, skin temperature warm  Outcome: Progressing Towards Goal  Goal: *Lungs clear or at baseline  Outcome: Progressing Towards Goal  Goal: *Demonstrates ability to perform prescribed activity without shortness of breath or discomfort  Outcome: Progressing Towards Goal  Goal: *Verbalizes home exercise program, activity guidelines, cardiac precautions  Outcome: Progressing Towards Goal  Goal: *Verbalizes understanding and describes prescribed diet  Outcome: Progressing Towards Goal  Goal: *Verbalizes understanding and describes medication purposes and frequencies  Outcome: Progressing Towards Goal  Goal: *Identifies cardiac risk factors  Outcome: Progressing Towards Goal  Goal: *No signs and symptoms of infection or wound complications  Outcome: Progressing Towards Goal  Goal: *Anxiety reduced or absent  Outcome: Progressing Towards Goal  Goal: *Verbalizes and demonstrates incision care  Outcome: Progressing Towards Goal  Goal: *Understands and describes signs and symptoms to report to providers(Stroke Metric)  Outcome: Progressing Towards Goal  Goal: *Describes follow-up/return visits to physicians  Outcome: Progressing Towards Goal  Goal: *Describes available resources and support systems  Outcome: Progressing Towards Goal  Goal: *Influenza immunization  Outcome: Progressing Towards Goal  Goal: *Pneumococcal immunization  Outcome: Progressing Towards Goal

## 2020-08-28 NOTE — ROUTINE PROCESS
Bedside shift change report given to Lam Yan RN (oncoming nurse) by Carmella Goncalves RN (offgoing nurse). Report included the following information Intake/Output and MAR.

## 2020-08-28 NOTE — PROGRESS NOTES
Physician Progress Note      PATIENT:               Frederick Diallo  CSN #:                  [id number]  :                       1945  ADMIT DATE:       2020 8:53 PM  [address] [place] DATE:  RESPONDING  PROVIDER #:        Jeff Moeller MD          QUERY TEXT:    Dear Hospitalist:    Patient admitted with CP. Noted documentation of systolic CHF by hospitalist in progress note on   and acute on chronic systolic CHF by hospitalist in progress note on  . If possible, please document in progress notes and discharge summary if you are evaluating and /or treating any of the following: The medical record reflects the following:  Risk Factors: CHF  Clinical Indicators: Well compensated, Echo with EF of 35 to 40% moderately reduced systolic function.   Treatment: ECHO    Thank- You  Scott Nguyen RN -[phone]  Options provided:  -- Systolic CHF confirmed and Acute on Chronic Systolic CHF ruled out  -- Acute on Chronic Systolic CHF is confirmed and Systolic CHF is ruled out  -- Other - I will add my own diagnosis  -- Disagree - Not applicable / Not valid  -- Disagree - Clinically unable to determine / Unknown  -- Refer to Clinical Documentation Reviewer    PROVIDER RESPONSE TEXT:    Acute on chronic systolic chf    Query created by: Suzanne Mcnair on 2020 2:23 PM      Electronically signed by:  Jeff Moeller MD 2020 3:13 PM

## 2020-08-28 NOTE — PROGRESS NOTES
Called for MRI SAFETY SCREENING FORM to be completed at 09:25 am  Will scan patient when form is completed and signed.  [address]

## 2020-08-28 NOTE — PROGRESS NOTES
2 attempts for OT evaluation. 1527: pt currently SHU  1550: pt yelling out in pain; RN present, will follow up.     Vi Cope, MS OTR/L

## 2020-08-28 NOTE — PROGRESS NOTES
Cardiology Progress Note        Patient: Enrico Moreno        Sex: female          DOA: 8/24/2020  YOB: 1945      Age:  69 y.o.        LOS:  LOS: 4 days   Assessment/Plan     Principal Problem:    Chest pain (12/3/2018)    Active Problems:    CAD (coronary artery disease) (12/3/2018)      Hypertension (12/3/2018)      Morbid obesity with BMI of 40.0-44.9, adult ([address] (12/3/2018)      Anticoagulated (8/25/2020)      Abnormal nuclear stress test (8/24/2020)      Overview: Added automatically from request for surgery [id number]      Cardiomyopathy [facility]) (8/24/2020)      Overview: Added automatically from request for surgery [id number]      Right leg weakness (8/28/2020)      CAD S/P percutaneous coronary angioplasty (8/28/2020)      Insulin dependent type 2 diabetes mellitus, uncontrolled ([facility] (8/28/2020)        Plan:  Telemetry reviewed stable occasional PVC. Chest pain  LBBB  Cardiomyopathy  Abnormal stress test  CAD    Patient is undergoing further testing for right lower extremity weakness and pain    Duplex venous study is negative for DVT, MRI scan is pending  Continue with current treatment. Subjective:    cc:      Chest pain  LBBB  Cardiomyopathy  Abnormal stress test  CAD    REVIEW OF SYSTEMS:     General: No fevers or chills. Cardiovascular: No chest pain or pressure. No palpitations. No ankle swelling  Pulmonary: No SOB, orthopnea, PND  Gastrointestinal: No nausea, vomiting or diarrhea      Objective:      Visit Vitals  /73   Pulse 75   Temp 98.5 °F (36.9 °C)   Resp 17   Ht 5' 4\" (1.626 m)   Wt 126.1 kg (277 lb 14.4 oz)   SpO2 99%   BMI 47.70 kg/m²     Body mass index is 47.7 kg/m². Physical Exam:  General Appearance: Comfortable, not using accessory muscles of respiration. NECK: No JVD, no thyroidomeglay. LUNGS: Clear bilaterally. HEART: S1+S2 audible,    ABD: Non-tender, BS Audible    EXT: No edema, and no cysnosis.   VASCULAR EXAM: Pulses are intact. PSYCHIATRIC EXAM: Mood is appropriate.     Medication:  Current Facility-Administered Medications   Medication Dose Route Frequency    insulin glargine (LANTUS) injection 20 Units  20 Units SubCUTAneous QHS    [START ON 8/29/2020] allopurinoL (ZYLOPRIM) tablet 100 mg  100 mg Oral DAILY    [START ON 8/29/2020] pantoprazole (PROTONIX) tablet 40 mg  40 mg Oral ACB    sodium chloride (NS) flush 5-40 mL  5-40 mL IntraVENous Q8H    sodium chloride (NS) flush 5-40 mL  5-40 mL IntraVENous PRN    clopidogreL (PLAVIX) tablet 75 mg  75 mg Oral DAILY    rivaroxaban (XARELTO) tablet 15 mg  15 mg Oral DAILY WITH LUNCH    morphine injection 2 mg  2 mg IntraVENous Q4H PRN    insulin lispro (HUMALOG) injection   SubCUTAneous AC&HS    glucose chewable tablet 16 g  16 g Oral PRN    glucagon (GLUCAGEN) injection 1 mg  1 mg IntraMUSCular PRN    dextrose 10% infusion 125-250 mL  125-250 mL IntraVENous PRN    albuterol (PROVENTIL VENTOLIN) nebulizer solution 2.5 mg  2.5 mg Nebulization Q4H PRN    aspirin delayed-release tablet 81 mg  81 mg Oral DAILY    gabapentin (NEURONTIN) capsule 300 mg  300 mg Oral TID    multivitamin, tx-iron-ca-min (THERA-M w/ IRON) tablet 1 Tab  1 Tab Oral DAILY    atorvastatin (LIPITOR) tablet 10 mg  10 mg Oral QHS    metoprolol tartrate (LOPRESSOR) tablet 25 mg  25 mg Oral Q12H    sodium chloride (NS) flush 5-40 mL  5-40 mL IntraVENous Q8H    sodium chloride (NS) flush 5-40 mL  5-40 mL IntraVENous PRN    acetaminophen (TYLENOL) tablet 650 mg  650 mg Oral Q6H PRN    Or    acetaminophen (TYLENOL) suppository 650 mg  650 mg Rectal Q6H PRN    polyethylene glycol (MIRALAX) packet 17 g  17 g Oral DAILY PRN    promethazine (PHENERGAN) tablet 12.5 mg  12.5 mg Oral Q6H PRN    Or    ondansetron (ZOFRAN) injection 4 mg  4 mg IntraVENous Q6H PRN               Lab/Data Reviewed:  Procedures/imaging: see electronic medical records for all procedures/Xrays   and details which were not copied into this note but were reviewed prior to creation of Plan       All lab results for the last 24 hours reviewed.      Recent Labs     08/27/20  0225 08/26/20  1240 08/26/20  0414   WBC 5.8  --  6.3   HGB 12.0 11.8* 12.0   HCT 37.4 36.1 37.4     --  202     Recent Labs     08/27/20  0225 08/26/20  0414    140   K 4.8 4.7    108   CO2 29 28   * 149*   BUN 17 23*   CREA 0.88 1.13   CA 8.1* 8.3*       RADIOLOGY:  CT Results  (Last 48 hours)    None        CXR Results  (Last 48 hours)    None            Cardiology Procedures:   Results for orders placed or performed during the hospital encounter of 08/24/20   EKG, 12 LEAD, INITIAL   Result Value Ref Range    Ventricular Rate 99 BPM    Atrial Rate 99 BPM    P-R Interval 146 ms    QRS Duration 158 ms    Q-T Interval 402 ms    QTC Calculation (Bezet) 515 ms    Calculated P Axis 72 degrees    Calculated R Axis -23 degrees    Calculated T Axis 108 degrees    Diagnosis       Sinus rhythm with with aberrant conduction  Left bundle branch block  Abnormal ECG  When compared with ECG of 03-MAR-2020 12:03,  premature ventricular complexes are no longer present  aberrant conduction is now present  Left bundle branch block is now present  Confirmed by Stacie Medina MD, -- ([id number]) on 8/26/2020 10:43:40 AM        Echo Results  (Last 48 hours)    None       Cardiolite (Tc-99m Sestamibi) stress test    Signed By: Francoise Meredith MD     August 28, 2020

## 2020-08-28 NOTE — ADT AUTH CERT NOTES
CARDIAC PROCEDURE by Norm Newton  
 
   
Review Status  Review Entered In Primary  8/27/2020 16:30   
   
Criteria Review Procedures  
  
   
LEFT HEART CATH / CORONARY ANGIOGRAPHY Percutaneous Coronary Intervention Pre Procedure Diagnosis  
  
Chest pain at rest [R07. 9]Abnormal nuclear stress test [R94.39]Cardiomyopathy ([address] [I42.9] Post Procedure Diagnosis  
  
Single-vessel CAD.  Status post PCI to the proximal left circumflex artery. Indications  
  
Chest pain at rest [R07.9 (ICD-10-CM)] Abnormal nuclear stress test [R94.39 (ICD-10-CM)] Cardiomyopathy ([address] [I42.9 (ICD-10-CM)] Conclusion  
  
  
single-vessel occlusive CAD. 
  
Proximal left circumflex artery have 90% stenosis just proximal to the previously deployed mid left circumflex artery stent.  This lesion is treated by 2.75x23 mm Xience Audrey drug-eluting stent with excellent results. 
  
Rest of coronary anatomy is patent with minimal luminal irregularity.  . 
  
LVEDP 30 mmHg.  
   
   
CARDIOLOGY 08/26 by Norm Newton  
 
   
Review Status  Review Entered In Primary  8/27/2020 14:46   
   
Criteria Review 08/26 
  
Procedure note still pending -  
  
  
  98.4 °F (36.9 °C)  57Abnormal    161/96Abnormal     18  98 %   
  
Labs: glu 149, bun 23, ca 8.3 Meds: aspirin 81 mg po daily, Lipitor 10 mg po hs, Pepcid 20 mg iv q12h, gabapentin 300 mg po TID, lantus 10 u benny s, metoprolol 25 mg po q12h, morphine 2 mg iv x3, protonix 40 mg iv q24h Cardiology - Plan: 
  
  
Chest pain 
 CAD 
 new LBBB 
 cardiomyopathy 
 abnormal stress test 
  
  
  
Cardiac catheterization possible PCI today.   
  
Ecg -  
  Sinus rhythm with with aberrant conduction Left bundle branch block Abnormal ECG When compared with ECG of 03-MAR-2020 12:03, 
premature ventricular complexes are no longer present 
aberrant conduction is now present Left bundle branch block is now present

## 2020-08-28 NOTE — PROGRESS NOTES
Problem: Mobility Impaired (Adult and Pediatric)  Goal: *Acute Goals and Plan of Care (Insert Text)  Description: Physical Therapy Goals  Initiated 8/27/2020 and to be accomplished within 3-7 day(s)  1. Patient will move from supine to sit and sit to supine  in bed with supervision/set-up. 2.  Patient will transfer from bed to chair and chair to bed with supervision/set-up using the least restrictive device. 3.  Patient will perform sit to stand with supervision/set-up. 4.  Patient will ambulate with supervision/set-up for 50 feet with the least restrictive device. 5.  Patient will ascend/descend 2 stairs with handrail(s) with minimal assistance/contact guard assist.     Outcome: Progressing Towards Goal   PHYSICAL THERAPY TREATMENT    Patient: Zonia Salinas (75 y.o. female)  Date: 8/28/2020  Diagnosis: Chest pain [R07.9]   Chest pain  Procedure(s) (LRB):  LEFT HEART CATH / CORONARY ANGIOGRAPHY (N/A)  Percutaneous Coronary Intervention (N/A) 2 Days Post-Op  Precautions: Fall  PLOF: lives alone, ambulatory with a cane and furniture walking    ASSESSMENT:  Pt sitting EOB upon entering room. Adelaida and increased time for sit to stand. Leans over onto forearms on RW, (B)LEs weak and shaking, took a couple steps but pt is unstable, returned to sitting EOB. SNF is recommended due to pt living alone and making minimal progress with ambulation. Not safe to d/c home today  Progression toward goals:   []      Improving appropriately and progressing toward goals  [x]      Improving slowly and progressing toward goals  []      Not making progress toward goals and plan of care will be adjusted     PLAN:  Patient continues to benefit from skilled intervention to address the above impairments. Continue treatment per established plan of care.   Discharge Recommendations:  Skilled Nursing Facility  Further Equipment Recommendations for Discharge:  rolling walker     SUBJECTIVE:   Patient stated I don't know why my legs are doing this.     OBJECTIVE DATA SUMMARY:   Functional Mobility Training:  Transfers:  Sit to Stand: Minimum assistance  Stand to Sit: Contact guard assistance  Balance:  Sitting: Intact  Standing: Impaired; With support  Standing - Static: Fair  Standing - Dynamic : Poor   Ambulation/Gait Training:  Distance (ft): 4 Feet (ft)  Assistive Device: Gait belt;Walker, rolling  Ambulation - Level of Assistance: Minimal assistance; Moderate assistance  Gait Abnormalities: Decreased step clearance  Base of Support: Center of gravity altered; Widened  Speed/Debora: Delayed; Slow  Step Length: Right shortened;Left shortened    Pain:  Pain level pre-treatment: 0/10  Pain level post-treatment: 0/10   Pain Intervention(s): Medication (see MAR); Rest, Ice, Repositioning   Response to intervention: Nurse notified, See doc flow    Activity Tolerance:   Poor  Please refer to the flowsheet for vital signs taken during this treatment. After treatment:   [] Patient left in no apparent distress sitting up in chair  [x] Patient left in no apparent distress in bed  [x] Call bell left within reach  [x] Nursing notified  [] Caregiver present  [] Bed alarm activated  [] SCDs applied      COMMUNICATION/EDUCATION:   [x]         Role of Physical Therapy in the acute care setting. [x]         Fall prevention education was provided and the patient/caregiver indicated understanding. [x]         Patient/family have participated as able in working toward goals and plan of care. [x]         Patient/family agree to work toward stated goals and plan of care. []         Patient understands intent and goals of therapy, but is neutral about his/her participation.   []         Patient is unable to participate in stated goals/plan of care: ongoing with therapy staff.  []         Other:        Melvin Schuster, PTA   Time Calculation: 17 mins

## 2020-08-28 NOTE — PROGRESS NOTES
1915  Assumed care of pt   2030  Shift assessment complete   0030  Reassessment complete   0430  Reassessment complete    Shift uneventful     [facility] [place] Night Shift Chart Audit    Chart Audit completed? YES    Bedside and Verbal shift change report given to Sam Hargrove RN (oncoming nurse) by Sirena Schreiber RN (offgoing nurse).  Report included the following information SBAR, Kardex, ED Summary, Intake/Output, MAR, Recent Results, Med Rec Status and Cardiac Rhythm nSR; BBB.

## 2020-08-28 NOTE — PROGRESS NOTES
Hospitalist Progress Note    Patient: Brea Jefferson MRN: [id number]  CSN: [id number]    YOB: 1945  Age: 69 y.o. Sex: female    DOA: 8/24/2020 LOS:  LOS: 4 days          Chief Complaint:    CAD      Assessment/Plan       70 y/o female with CAD and prior stent placement, morbid obesity, DVT on anticoagulation, HTN, and DMT2 is admitted for chest pain.     Chest pain-resolved  In a patient with CAD, status post PCI in 2007. S/p coronary angiogram and PCI  Continue with aspirin, Plavix, beta-blocker, Lipitor.       acute on chronic systolic CHF-  Well compensated   Echo with EF of 35 to 40% moderately reduced systolic function.     Ins dep diabetes with complications=  Inc lantus to 20 units, continue SSI PRN     Morbid obesity with BMI of 47.     History of DVT on Xarelto. Repeat duplex negative    New issue-Right leg weakness-PT to see again, get MRI brain and L spine    PT recommends SNF, will send COvid test       Disposition :  Patient Active Problem List   Diagnosis Code    Chest pain R07.9    CAD (coronary artery disease) I25.10    Hypertension I10    Morbid obesity with BMI of 40.0-44.9, adult ([facility]) E66.01, Z68.41    Gout M10.9    Anticoagulated Z79.01    Abnormal nuclear stress test R94.39    Cardiomyopathy ([facility] I42.9    Right leg weakness R29.898    CAD S/P percutaneous coronary angioplasty I25.10, Z98.61    Insulin dependent type 2 diabetes mellitus, uncontrolled ([facility]) E11.65, Z79.4       Subjective:    My right leg isnt working well  I could not do much with PT yesterday  I dont know what happened to me!     Review of systems:    Constitutional: denies fevers, chills  Respiratory: denies SOB  Cardiovascular: denies chest pain, palpitations  Gastrointestinal: denies nausea, vomiting, diarrhea      Vital signs/Intake and Output:  Visit Vitals  /70   Pulse (P) 75   Temp 98.1 °F (36.7 °C)   Resp 16   Ht 5' 4\" (1.626 m)   Wt 126.1 kg (277 lb 14.4 oz)   SpO2 99%   BMI 47.70 kg/m²     Current Shift:  No intake/output data recorded. Last three shifts:  08/26 1901 - 08/28 0700  In: 1240 [P.O.:1240]  Out: 901 [Urine:901]    Exam:    Obese elderly AAF alert, NAD, OX3  CVS:Regular rate and rhythm, no M/R/G, S1/S2 heard, no thrill  Lungs:Clear to auscultation bilaterally, no wheezes, rhonchi, or rales  Abdomen: Soft, Nontender, No distention, Normal Bowel sounds, No hepatomegaly  Extremities: No C/C/E, pulses palpable 2+  Neuro:grossly normal , follows commands  Psych:appropriate                Labs: Results:       Chemistry Recent Labs     08/27/20 0225 08/26/20 0414   * 149*    140   K 4.8 4.7    108   CO2 29 28   BUN 17 23*   CREA 0.88 1.13   CA 8.1* 8.3*   AGAP 4 4   BUCR 19 20   AP 82 80   TP 6.7 6.7   ALB 2.9* 3.0*   GLOB 3.8 3.7   AGRAT 0.8 0.8      CBC w/Diff Recent Labs     08/27/20 0225 08/26/20  1240 08/26/20  0414   WBC 5.8  --  6.3   RBC 4.01*  --  4.19*   HGB 12.0 11.8* 12.0   HCT 37.4 36.1 37.4     --  202   GRANS 50  --  43   LYMPH 38  --  46   EOS 3  --  3      Cardiac Enzymes Recent Labs     08/25/20  1635   *   CKND1 1.7      Coagulation Recent Labs     08/26/20  0414   PTP 13.4   INR 1.0   APTT 36.3       Lipid Panel Lab Results   Component Value Date/Time    Cholesterol, total 139 08/25/2020 05:30 AM    HDL Cholesterol 49 08/25/2020 05:30 AM    LDL, calculated 58.6 08/25/2020 05:30 AM    VLDL, calculated 31.4 08/25/2020 05:30 AM    Triglyceride 157 (H) 08/25/2020 05:30 AM    CHOL/HDL Ratio 2.8 08/25/2020 05:30 AM      BNP No results for input(s): BNPP in the last 72 hours.    Liver Enzymes Recent Labs     08/27/20 0225   TP 6.7   ALB 2.9*   AP 82      Thyroid Studies No results found for: T4, T3U, TSH, TSHEXT, TSHEXT     Procedures/imaging: see electronic medical records for all procedures/Xrays and details which were not copied into this note but were reviewed prior to creation of Chanelle Post MD

## 2020-08-29 LAB
GLUCOSE BLD STRIP.AUTO-MCNC: 173 MG/DL (ref 70–110)
GLUCOSE BLD STRIP.AUTO-MCNC: 204 MG/DL (ref 70–110)
GLUCOSE BLD STRIP.AUTO-MCNC: 224 MG/DL (ref 70–110)
GLUCOSE BLD STRIP.AUTO-MCNC: 258 MG/DL (ref 70–110)
GLUCOSE BLD STRIP.AUTO-MCNC: 299 MG/DL (ref 70–110)

## 2020-08-29 PROCEDURE — 74011250637 HC RX REV CODE- 250/637: Performed by: FAMILY MEDICINE

## 2020-08-29 PROCEDURE — 82962 GLUCOSE BLOOD TEST: CPT

## 2020-08-29 PROCEDURE — 97116 GAIT TRAINING THERAPY: CPT

## 2020-08-29 PROCEDURE — 74011250637 HC RX REV CODE- 250/637: Performed by: HOSPITALIST

## 2020-08-29 PROCEDURE — 74011636637 HC RX REV CODE- 636/637: Performed by: INTERNAL MEDICINE

## 2020-08-29 PROCEDURE — 65660000000 HC RM CCU STEPDOWN

## 2020-08-29 PROCEDURE — 74011636637 HC RX REV CODE- 636/637: Performed by: HOSPITALIST

## 2020-08-29 PROCEDURE — 74011250637 HC RX REV CODE- 250/637: Performed by: INTERNAL MEDICINE

## 2020-08-29 PROCEDURE — 74011250636 HC RX REV CODE- 250/636: Performed by: FAMILY MEDICINE

## 2020-08-29 PROCEDURE — 74011636637 HC RX REV CODE- 636/637: Performed by: FAMILY MEDICINE

## 2020-08-29 PROCEDURE — 87635 SARS-COV-2 COVID-19 AMP PRB: CPT

## 2020-08-29 RX ORDER — LOSARTAN POTASSIUM 25 MG/1
25 TABLET ORAL DAILY
Status: DISCONTINUED | OUTPATIENT
Start: 2020-08-29 | End: 2020-08-30

## 2020-08-29 RX ORDER — PREDNISONE 20 MG/1
40 TABLET ORAL
Status: DISCONTINUED | OUTPATIENT
Start: 2020-08-29 | End: 2020-08-31

## 2020-08-29 RX ADMIN — MULTIPLE VITAMINS W/ MINERALS TAB 1 TABLET: TAB at 09:48

## 2020-08-29 RX ADMIN — METOPROLOL TARTRATE 25 MG: 25 TABLET, FILM COATED ORAL at 00:11

## 2020-08-29 RX ADMIN — MORPHINE SULFATE 2 MG: 2 INJECTION, SOLUTION INTRAMUSCULAR; INTRAVENOUS at 20:39

## 2020-08-29 RX ADMIN — INSULIN LISPRO 6 UNITS: 100 INJECTION, SOLUTION INTRAVENOUS; SUBCUTANEOUS at 21:42

## 2020-08-29 RX ADMIN — LOSARTAN POTASSIUM 25 MG: 25 TABLET ORAL at 18:37

## 2020-08-29 RX ADMIN — PREDNISONE 40 MG: 20 TABLET ORAL at 12:38

## 2020-08-29 RX ADMIN — Medication 10 ML: at 13:01

## 2020-08-29 RX ADMIN — INSULIN GLARGINE 20 UNITS: 100 INJECTION, SOLUTION SUBCUTANEOUS at 00:11

## 2020-08-29 RX ADMIN — ASPIRIN 81 MG: 81 TABLET, COATED ORAL at 09:48

## 2020-08-29 RX ADMIN — INSULIN GLARGINE 20 UNITS: 100 INJECTION, SOLUTION SUBCUTANEOUS at 21:38

## 2020-08-29 RX ADMIN — INSULIN LISPRO 6 UNITS: 100 INJECTION, SOLUTION INTRAVENOUS; SUBCUTANEOUS at 11:30

## 2020-08-29 RX ADMIN — Medication 10 ML: at 00:13

## 2020-08-29 RX ADMIN — ATORVASTATIN CALCIUM 10 MG: 10 TABLET, FILM COATED ORAL at 21:38

## 2020-08-29 RX ADMIN — INSULIN LISPRO 2 UNITS: 100 INJECTION, SOLUTION INTRAVENOUS; SUBCUTANEOUS at 07:30

## 2020-08-29 RX ADMIN — GABAPENTIN 300 MG: 300 CAPSULE ORAL at 09:47

## 2020-08-29 RX ADMIN — PANTOPRAZOLE SODIUM 40 MG: 40 TABLET, DELAYED RELEASE ORAL at 06:46

## 2020-08-29 RX ADMIN — ATORVASTATIN CALCIUM 10 MG: 10 TABLET, FILM COATED ORAL at 00:11

## 2020-08-29 RX ADMIN — Medication 10 ML: at 09:48

## 2020-08-29 RX ADMIN — CLOPIDOGREL BISULFATE 75 MG: 75 TABLET ORAL at 09:48

## 2020-08-29 RX ADMIN — INSULIN LISPRO 4 UNITS: 100 INJECTION, SOLUTION INTRAVENOUS; SUBCUTANEOUS at 16:30

## 2020-08-29 RX ADMIN — ALLOPURINOL 100 MG: 100 TABLET ORAL at 09:48

## 2020-08-29 RX ADMIN — RIVAROXABAN 15 MG: 15 TABLET, FILM COATED ORAL at 12:38

## 2020-08-29 RX ADMIN — GABAPENTIN 300 MG: 300 CAPSULE ORAL at 18:37

## 2020-08-29 RX ADMIN — GABAPENTIN 300 MG: 300 CAPSULE ORAL at 00:11

## 2020-08-29 RX ADMIN — Medication 10 ML: at 21:40

## 2020-08-29 RX ADMIN — INSULIN LISPRO 4 UNITS: 100 INJECTION, SOLUTION INTRAVENOUS; SUBCUTANEOUS at 00:12

## 2020-08-29 RX ADMIN — METOPROLOL TARTRATE 25 MG: 25 TABLET, FILM COATED ORAL at 20:39

## 2020-08-29 RX ADMIN — MORPHINE SULFATE 2 MG: 2 INJECTION, SOLUTION INTRAMUSCULAR; INTRAVENOUS at 00:12

## 2020-08-29 RX ADMIN — METOPROLOL TARTRATE 25 MG: 25 TABLET, FILM COATED ORAL at 09:48

## 2020-08-29 RX ADMIN — GABAPENTIN 300 MG: 300 CAPSULE ORAL at 21:38

## 2020-08-29 NOTE — PROGRESS NOTES
Problem: Mobility Impaired (Adult and Pediatric)  Goal: *Acute Goals and Plan of Care (Insert Text)  Description: Physical Therapy Goals  Initiated 8/27/2020 and to be accomplished within 3-7 day(s)  1. Patient will move from supine to sit and sit to supine  in bed with supervision/set-up. 2.  Patient will transfer from bed to chair and chair to bed with supervision/set-up using the least restrictive device. 3.  Patient will perform sit to stand with supervision/set-up. 4.  Patient will ambulate with supervision/set-up for 50 feet with the least restrictive device. 5.  Patient will ascend/descend 2 stairs with handrail(s) with minimal assistance/contact guard assist.     Outcome: Progressing Towards Goal     PHYSICAL THERAPY TREATMENT    Patient: Sonia Alatorre (75 y.o. female)  Date: 8/29/2020  Diagnosis: Chest pain [R07.9]   Chest pain  Procedure(s) (LRB):  LEFT HEART CATH / CORONARY ANGIOGRAPHY (N/A)  Percutaneous Coronary Intervention (N/A) 3 Days Post-Op  Precautions: Fall   Chart, physical therapy assessment, plan of care and goals were reviewed. ASSESSMENT:  C/o of R LE Pain and weakness with all OOB. R knee has palpable and visible swelling compared to L. Pt requires max encouragement to advance ambulation beyond bedside. 2 trials of amb to [place] and back, with RW. Pt require 1-2 min leaning rest against wall. Pt's [place] will need to sharply increase, before considering home aas a safe D/c. Pt is currently using lisy-wick external urinary cath, instead of ambulating to rest room. Armed chair left in room and pt is encourage to consume dinner upright.  RN and CNA were informed of goal.     Progression toward goals:  [x]      Improving appropriately and progressing toward goals  []      Improving slowly and progressing toward goals  []      Not making progress toward goals and plan of care will be adjusted     PLAN:  Patient continues to benefit from skilled intervention to address the above impairments. Continue treatment per established plan of care. Discharge Recommendations:  Trung Santos  Further Equipment Recommendations for Discharge:  rolling walker     SUBJECTIVE:   Patient stated I'm gonna walk. Can't with this leg acting like this.     OBJECTIVE DATA SUMMARY:     Functional Mobility Training:  Bed Mobility:  Rolling: Stand-by assistance  Supine to Sit: Stand-by assistance  Scooting: Stand-by assistance  Transfers:  Sit to Stand: Minimum assistance  Stand to Sit: Contact guard assistance  Balance:  Sitting: Intact  Standing: Impaired; With support  Standing - Static: Fair  Standing - Dynamic : Fair  Ambulation/Gait Training:  Distance (ft): 60 Feet (YS)(50+14+99+76)  Assistive Device: Gait belt;Walker, rolling  Ambulation - Level of Assistance: Minimal assistance  Gait Abnormalities: Decreased step clearance  Base of Support: Center of gravity altered  Speed/Debora: Delayed; Slow  Step Length: Right shortened;Left shortened  Pain:  Pain Scale 1: Numeric (0 - 10)  Pain Intensity 1: 0  Pain out: 0  Activity Tolerance:   Fair-  Please refer to the flowsheet for vital signs taken during this treatment.   After treatment:   [x] Patient left in no apparent distress sitting up in chair  [] Patient left in no apparent distress in bed  [x] Call bell left within reach  [x] Nursing notified  [] Caregiver present  [] Bed alarm activated      Pastor Oscar PTA   Time Calculation: 28 mins

## 2020-08-29 NOTE — PROGRESS NOTES
Problem: Falls - Risk of  Goal: *Absence of Falls  Description: Document Sonia Cruz Fall Risk and appropriate interventions in the flowsheet.   Outcome: Progressing Towards Goal  Note: Fall Risk Interventions:  Mobility Interventions: Communicate number of staff needed for ambulation/transfer, Patient to call before getting OOB         Medication Interventions: Patient to call before getting OOB, Teach patient to arise slowly    Elimination Interventions: Call light in reach, Patient to call for help with toileting needs    History of Falls Interventions: Vital signs minimum Q4HRs X 24 hrs (comment for end date)         Problem: Patient Education: Go to Patient Education Activity  Goal: Patient/Family Education  Outcome: Progressing Towards Goal     Problem: General Medical Care Plan  Goal: *Vital signs within specified parameters  Outcome: Progressing Towards Goal  Goal: *Labs within defined limits  Outcome: Progressing Towards Goal  Goal: *Absence of infection signs and symptoms  Outcome: Progressing Towards Goal  Goal: *Optimal pain control at patient's stated goal  Outcome: Progressing Towards Goal  Goal: *Skin integrity maintained  Outcome: Progressing Towards Goal  Goal: *Fluid volume balance  Outcome: Progressing Towards Goal  Goal: *Optimize nutritional status  Outcome: Progressing Towards Goal  Goal: *Anxiety reduced or absent  Outcome: Progressing Towards Goal  Goal: *Progressive mobility and function (eg: ADL's)  Outcome: Progressing Towards Goal     Problem: Patient Education: Go to Patient Education Activity  Goal: Patient/Family Education  Outcome: Progressing Towards Goal     Problem: Pain  Goal: *Control of Pain  Outcome: Progressing Towards Goal  Goal: *PALLIATIVE CARE:  Alleviation of Pain  Outcome: Progressing Towards Goal     Problem: Diabetes Self-Management  Goal: *Disease process and treatment process  Description: Define diabetes and identify own type of diabetes; list 3 options for treating diabetes. Outcome: Progressing Towards Goal  Goal: *Incorporating nutritional management into lifestyle  Description: Describe effect of type, amount and timing of food on blood glucose; list 3 methods for planning meals. Outcome: Progressing Towards Goal  Goal: *Incorporating physical activity into lifestyle  Description: State effect of exercise on blood glucose levels. Outcome: Progressing Towards Goal  Goal: *Developing strategies to promote health/change behavior  Description: Define the ABC's of diabetes; identify appropriate screenings, schedule and personal plan for screenings. Outcome: Progressing Towards Goal  Goal: *Using medications safely  Description: State effect of diabetes medications on diabetes; name diabetes medication taking, action and side effects. Outcome: Progressing Towards Goal  Goal: *Monitoring blood glucose, interpreting and using results  Description: Identify recommended blood glucose targets  and personal targets. Outcome: Progressing Towards Goal  Goal: *Prevention, detection, treatment of acute complications  Description: List symptoms of hyper- and hypoglycemia; describe how to treat low blood sugar and actions for lowering  high blood glucose level. Outcome: Progressing Towards Goal  Goal: *Prevention, detection and treatment of chronic complications  Description: Define the natural course of diabetes and describe the relationship of blood glucose levels to long term complications of diabetes.   Outcome: Progressing Towards Goal  Goal: *Developing strategies to address psychosocial issues  Description: Describe feelings about living with diabetes; identify support needed and support network  Outcome: Progressing Towards Goal  Goal: *Insulin pump training  Outcome: Progressing Towards Goal  Goal: *Sick day guidelines  Outcome: Progressing Towards Goal  Goal: *Patient Specific Goal (EDIT GOAL, INSERT TEXT)  Outcome: Progressing Towards Goal     Problem: Patient Education: Go to Patient Education Activity  Goal: Patient/Family Education  Outcome: Progressing Towards Goal     Problem: Patient Education: Go to Patient Education Activity  Goal: Patient/Family Education  Outcome: Progressing Towards Goal     Problem: Patient Education: Go to Patient Education Activity  Goal: Patient/Family Education  Outcome: Progressing Towards Goal     Problem: Cath Lab Procedures: Discharge Outcomes  Goal: *Stable cardiac rhythm  Outcome: Progressing Towards Goal  Goal: *Hemodynamically stable  Outcome: Progressing Towards Goal  Goal: *Optimal pain control at patient's stated goal  Outcome: Progressing Towards Goal  Goal: *Pulses palpable, skin color within defined limits, skin temperature warm  Outcome: Progressing Towards Goal  Goal: *Lungs clear or at baseline  Outcome: Progressing Towards Goal  Goal: *Demonstrates ability to perform prescribed activity without shortness of breath or discomfort  Outcome: Progressing Towards Goal  Goal: *Verbalizes home exercise program, activity guidelines, cardiac precautions  Outcome: Progressing Towards Goal  Goal: *Verbalizes understanding and describes prescribed diet  Outcome: Progressing Towards Goal  Goal: *Verbalizes understanding and describes medication purposes and frequencies  Outcome: Progressing Towards Goal  Goal: *Identifies cardiac risk factors  Outcome: Progressing Towards Goal  Goal: *No signs and symptoms of infection or wound complications  Outcome: Progressing Towards Goal  Goal: *Anxiety reduced or absent  Outcome: Progressing Towards Goal  Goal: *Verbalizes and demonstrates incision care  Outcome: Progressing Towards Goal  Goal: *Understands and describes signs and symptoms to report to providers(Stroke Metric)  Outcome: Progressing Towards Goal  Goal: *Describes follow-up/return visits to physicians  Outcome: Progressing Towards Goal  Goal: *Describes available resources and support systems  Outcome: Progressing Towards Goal  Goal: *Influenza immunization  Outcome: Progressing Towards Goal  Goal: *Pneumococcal immunization  Outcome: Progressing Towards Goal

## 2020-08-29 NOTE — PROGRESS NOTES
Hospitalist Progress Note    Patient: Ankit Barrientos MRN: [id number]  CSN: [id number]    YOB: 1945  Age: 69 y.o. Sex: female    DOA: 8/24/2020 LOS:  LOS: 5 days                Assessment and Plan:    Principal Problem:    Chest pain (12/3/2018)    Active Problems:    CAD (coronary artery disease) (12/3/2018)      Hypertension (12/3/2018)      Morbid obesity with BMI of 40.0-44.9, adult ([facility] [address] (12/3/2018)      Anticoagulated (8/25/2020)      Abnormal nuclear stress test (8/24/2020)      Overview: Added automatically from request for surgery [id number]      Cardiomyopathy [facility]) (8/24/2020)      Overview: Added automatically from request for surgery [id number]      Right leg weakness (8/28/2020)      CAD S/P percutaneous coronary angioplasty (8/28/2020)      Insulin dependent type 2 diabetes mellitus, uncontrolled ([facility] [address] (8/28/2020)          Chest pain-resolved  In a patient with CAD, status post PCI in 2007. S/p coronary angiogram and PCI  Continue with aspirin, Plavix, beta-blocker, Lipitor.       acute on chronic systolic CHF-  Well compensated   Echo with EF of 35 to 40% moderately reduced systolic function.     Ins dep diabetes with complications= increased LAntus has improved numbers a little  . Will reassess tomorrow    , continue SSI PRN     Morbid obesity with BMI of 47.     History of DVT on Xarelto. Repeat duplex negative    Right Leg weakness and pain : MRI brain essential Overall  for acute stroke    But does have disk bulge at l5 S1 . Consider trial of steroids, she is not a good operative candidate     PT recommends SNF, will send COvid test  We are working on her accepting       Chief complaint:  68 y/o female with CAD and prior stent placement, morbid obesity, DVT on anticoagulation, HTN, and DMT2 is admitted for chest pain.         Subjective:    Complains of leg pain      Review of systems:    General: No fevers or chills. Cardiovascular: No chest pain or pressure. No palpitations. Pulmonary: No shortness of breath. Gastrointestinal: No nausea, vomiting. Objective:    Vital signs/Intake and Output:  Visit Vitals  /74   Pulse 72   Temp 98.3 °F (36.8 °C)   Resp 16   Ht 5' 4\" (1.626 m)   Wt 123.7 kg (272 lb 12.8 oz)   SpO2 100%   BMI 46.83 kg/m²     Current Shift:  No intake/output data recorded. Last three shifts:  08/27 1901 - 08/29 0700  In: -   Out: 1000 [Urine:1000]    Physical Exam:  General: NAD, AAOx3. Non-toxic. HEENT: NC/AT. PERRLA, EOMI.  MMM. Lungs: Nml inspection. CTA B/L. No wheezing, rales or rhonchi. Heart:  S1S2 RRR,  PMI mid 5th IC space. No M/RG. Abdomen: Soft, NT/ND.  BS+. No peritoneal signs. Extremities: No C/C/E. Psych:   Nml affect. Neurologic:  2-12 intact. Strength 5/5 throughout. Sensation symmetrical.          Labs: Results:       Chemistry Recent Labs     08/27/20 0225   *      K 4.8      CO2 29   BUN 17   CREA 0.88   CA 8.1*   AGAP 4   BUCR 19   AP 82   TP 6.7   ALB 2.9*   GLOB 3.8   AGRAT 0.8      CBC w/Diff Recent Labs     08/27/20 0225 08/26/20  1240   WBC 5.8  --    RBC 4.01*  --    HGB 12.0 11.8*   HCT 37.4 36.1     --    GRANS 50  --    LYMPH 38  --    EOS 3  --       Cardiac Enzymes No results for input(s): CPK, CKND1, SARWAT in the last 72 hours. No lab exists for component: CKRMB, TROIP   Coagulation No results for input(s): PTP, INR, APTT, INREXT in the last 72 hours. Lipid Panel Lab Results   Component Value Date/Time    Cholesterol, total 139 08/25/2020 05:30 AM    HDL Cholesterol 49 08/25/2020 05:30 AM    LDL, calculated 58.6 08/25/2020 05:30 AM    VLDL, calculated 31.4 08/25/2020 05:30 AM    Triglyceride 157 (H) 08/25/2020 05:30 AM    CHOL/HDL Ratio 2.8 08/25/2020 05:30 AM      BNP No results for input(s): BNPP in the last 72 hours.    Liver Enzymes Recent Labs     08/27/20  0225   TP 6.7   ALB 2.9*   AP 82      Thyroid Studies No results found for: T4, T3U, TSH, TSHEXT     Procedures/imaging: see electronic medical records for all procedures/Xrays and details which were not copied into this note but were reviewed prior to creation of Plan

## 2020-08-29 NOTE — PROGRESS NOTES
Cardiology Progress Note        Patient: Mich Moon        Sex: female          DOA: 8/24/2020  YOB: 1945      Age:  69 y.o.        LOS:  LOS: 5 days   Assessment/Plan     Principal Problem:    Chest pain (12/3/2018)    Active Problems:    CAD (coronary artery disease) (12/3/2018)      Hypertension (12/3/2018)      Morbid obesity with BMI of 40.0-44.9, adult ([facility] [address] (12/3/2018)      Anticoagulated (8/25/2020)      Abnormal nuclear stress test (8/24/2020)      Overview: Added automatically from request for surgery [id number]      Cardiomyopathy [facility]) (8/24/2020)      Overview: Added automatically from request for surgery [id number]      Right leg weakness (8/28/2020)      CAD S/P percutaneous coronary angioplasty (8/28/2020)      Insulin dependent type 2 diabetes mellitus, uncontrolled ([facility] [address] (8/28/2020)        Plan:      Chest pains   CAD  LBBB  Cardiomyopathy-start losartan 25 mg daily and titrate with blood pressure. Status post PCI to the left circumflex artery and chest pain have resolved. Right leg pain and weakness has slight improvement after steroid injection. Discussed with patient. Subjective:    cc:  Chest pains   CAD  LBBB  Cardiomyopathy    REVIEW OF SYSTEMS:     General: No fevers or chills. Cardiovascular: No chest pain or pressure. No palpitations. No ankle swelling  Pulmonary: No SOB, orthopnea, PND  Gastrointestinal: No nausea, vomiting or diarrhea      Objective:      Visit Vitals  /74   Pulse 72   Temp 98.3 °F (36.8 °C)   Resp 16   Ht 5' 4\" (1.626 m)   Wt 123.7 kg (272 lb 12.8 oz)   SpO2 100%   BMI 46.83 kg/m²     Body mass index is 46.83 kg/m². Physical Exam:  General Appearance: Comfortable, not using accessory muscles of respiration. NECK: No JVD, no thyroidomeglay. LUNGS: Clear bilaterally. HEART: S1+S2 audible,    ABD: Non-tender, BS Audible    EXT: No edema, and no cysnosis. VASCULAR EXAM: Pulses are intact.     PSYCHIATRIC EXAM: Mood is appropriate.     Medication:  Current Facility-Administered Medications   Medication Dose Route Frequency    predniSONE (DELTASONE) tablet 40 mg  40 mg Oral DAILY WITH LUNCH    insulin glargine (LANTUS) injection 20 Units  20 Units SubCUTAneous QHS    allopurinoL (ZYLOPRIM) tablet 100 mg  100 mg Oral DAILY    pantoprazole (PROTONIX) tablet 40 mg  40 mg Oral ACB    sodium chloride (NS) flush 5-40 mL  5-40 mL IntraVENous Q8H    sodium chloride (NS) flush 5-40 mL  5-40 mL IntraVENous PRN    clopidogreL (PLAVIX) tablet 75 mg  75 mg Oral DAILY    rivaroxaban (XARELTO) tablet 15 mg  15 mg Oral DAILY WITH LUNCH    morphine injection 2 mg  2 mg IntraVENous Q4H PRN    insulin lispro (HUMALOG) injection   SubCUTAneous AC&HS    glucose chewable tablet 16 g  16 g Oral PRN    glucagon (GLUCAGEN) injection 1 mg  1 mg IntraMUSCular PRN    dextrose 10% infusion 125-250 mL  125-250 mL IntraVENous PRN    albuterol (PROVENTIL VENTOLIN) nebulizer solution 2.5 mg  2.5 mg Nebulization Q4H PRN    aspirin delayed-release tablet 81 mg  81 mg Oral DAILY    gabapentin (NEURONTIN) capsule 300 mg  300 mg Oral TID    multivitamin, tx-iron-ca-min (THERA-M w/ IRON) tablet 1 Tab  1 Tab Oral DAILY    atorvastatin (LIPITOR) tablet 10 mg  10 mg Oral QHS    metoprolol tartrate (LOPRESSOR) tablet 25 mg  25 mg Oral Q12H    sodium chloride (NS) flush 5-40 mL  5-40 mL IntraVENous Q8H    sodium chloride (NS) flush 5-40 mL  5-40 mL IntraVENous PRN    acetaminophen (TYLENOL) tablet 650 mg  650 mg Oral Q6H PRN    Or    acetaminophen (TYLENOL) suppository 650 mg  650 mg Rectal Q6H PRN    polyethylene glycol (MIRALAX) packet 17 g  17 g Oral DAILY PRN    promethazine (PHENERGAN) tablet 12.5 mg  12.5 mg Oral Q6H PRN    Or    ondansetron (ZOFRAN) injection 4 mg  4 mg IntraVENous Q6H PRN               Lab/Data Reviewed:  Procedures/imaging: see electronic medical records for all procedures/Xrays   and details which were not copied into this note but were reviewed prior to creation of Plan       All lab results for the last 24 hours reviewed.      Recent Labs     08/27/20 0225   WBC 5.8   HGB 12.0   HCT 37.4        Recent Labs     08/27/20 0225      K 4.8      CO2 29   *   BUN 17   CREA 0.88   CA 8.1*       RADIOLOGY:  CT Results  (Last 48 hours)    None        CXR Results  (Last 48 hours)    None            Cardiology Procedures:   Results for orders placed or performed during the hospital encounter of 08/24/20   EKG, 12 LEAD, INITIAL   Result Value Ref Range    Ventricular Rate 99 BPM    Atrial Rate 99 BPM    P-R Interval 146 ms    QRS Duration 158 ms    Q-T Interval 402 ms    QTC Calculation (Bezet) 515 ms    Calculated P Axis 72 degrees    Calculated R Axis -23 degrees    Calculated T Axis 108 degrees    Diagnosis       Sinus rhythm with with aberrant conduction  Left bundle branch block  Abnormal ECG  When compared with ECG of 03-MAR-2020 12:03,  premature ventricular complexes are no longer present  aberrant conduction is now present  Left bundle branch block is now present  Confirmed by Ana Wadsworth MD, -- ([id number]) on 8/26/2020 10:43:40 AM        Echo Results  (Last 48 hours)    None       Cardiolite (Tc-99m Sestamibi) stress test    Signed By: Tommy Harley MD     August 29, 2020

## 2020-08-29 NOTE — PROGRESS NOTES
1915  Assumed care of pt   2030  Shift assessment complete   0045  Reassessment complete   0301  Reassessment complete     [facility] [place] Cardiac[facility]Medical Night Shift Chart Audit    Chart Audit completed? YES    Bedside and Verbal shift change report given to Elvia Patel RN ([place]) (oncoming nurse) by Grey Mensah RN (offgoing nurse).  Report included the following information SBAR, Kardex, ED Summary, Intake/Output, MAR, Recent Results, Med Rec Status and Cardiac Rhythm Nsr.

## 2020-08-30 LAB
ANION GAP SERPL CALC-SCNC: 5 MMOL/L (ref 3–18)
BASOPHILS # BLD: 0 K/UL (ref 0–0.1)
BASOPHILS NFR BLD: 0 % (ref 0–2)
BUN SERPL-MCNC: 14 MG/DL (ref 7–18)
BUN/CREAT SERPL: 15 (ref 12–20)
CALCIUM SERPL-MCNC: 9.2 MG/DL (ref 8.5–10.1)
CHLORIDE SERPL-SCNC: 103 MMOL/L (ref 100–111)
CO2 SERPL-SCNC: 29 MMOL/L (ref 21–32)
CREAT SERPL-MCNC: 0.91 MG/DL (ref 0.6–1.3)
DIFFERENTIAL METHOD BLD: ABNORMAL
EOSINOPHIL # BLD: 0 K/UL (ref 0–0.4)
EOSINOPHIL NFR BLD: 0 % (ref 0–5)
ERYTHROCYTE [DISTWIDTH] IN BLOOD BY AUTOMATED COUNT: 12.6 % (ref 11.6–14.5)
GLUCOSE BLD STRIP.AUTO-MCNC: 187 MG/DL (ref 70–110)
GLUCOSE BLD STRIP.AUTO-MCNC: 206 MG/DL (ref 70–110)
GLUCOSE BLD STRIP.AUTO-MCNC: 232 MG/DL (ref 70–110)
GLUCOSE BLD STRIP.AUTO-MCNC: 316 MG/DL (ref 70–110)
GLUCOSE BLD STRIP.AUTO-MCNC: 457 MG/DL (ref 70–110)
GLUCOSE SERPL-MCNC: 257 MG/DL (ref 74–99)
HCT VFR BLD AUTO: 43.8 % (ref 35–45)
HGB BLD-MCNC: 13.8 G/DL (ref 12–16)
LYMPHOCYTES # BLD: 0.9 K/UL (ref 0.9–3.6)
LYMPHOCYTES NFR BLD: 14 % (ref 21–52)
MCH RBC QN AUTO: 29.2 PG (ref 24–34)
MCHC RBC AUTO-ENTMCNC: 31.5 G/DL (ref 31–37)
MCV RBC AUTO: 92.6 FL (ref 74–97)
MONOCYTES # BLD: 0.2 K/UL (ref 0.05–1.2)
MONOCYTES NFR BLD: 2 % (ref 3–10)
NEUTS SEG # BLD: 5.2 K/UL (ref 1.8–8)
NEUTS SEG NFR BLD: 84 % (ref 40–73)
PLATELET # BLD AUTO: 261 K/UL (ref 135–420)
PMV BLD AUTO: 10.6 FL (ref 9.2–11.8)
POTASSIUM SERPL-SCNC: 4.6 MMOL/L (ref 3.5–5.5)
RBC # BLD AUTO: 4.73 M/UL (ref 4.2–5.3)
SODIUM SERPL-SCNC: 137 MMOL/L (ref 136–145)
WBC # BLD AUTO: 6.3 K/UL (ref 4.6–13.2)

## 2020-08-30 PROCEDURE — 80048 BASIC METABOLIC PNL TOTAL CA: CPT

## 2020-08-30 PROCEDURE — 74011250636 HC RX REV CODE- 250/636: Performed by: FAMILY MEDICINE

## 2020-08-30 PROCEDURE — 74011250637 HC RX REV CODE- 250/637: Performed by: HOSPITALIST

## 2020-08-30 PROCEDURE — 82962 GLUCOSE BLOOD TEST: CPT

## 2020-08-30 PROCEDURE — 74011250637 HC RX REV CODE- 250/637: Performed by: FAMILY MEDICINE

## 2020-08-30 PROCEDURE — 65660000000 HC RM CCU STEPDOWN

## 2020-08-30 PROCEDURE — 74011636637 HC RX REV CODE- 636/637: Performed by: INTERNAL MEDICINE

## 2020-08-30 PROCEDURE — 97535 SELF CARE MNGMENT TRAINING: CPT

## 2020-08-30 PROCEDURE — 97530 THERAPEUTIC ACTIVITIES: CPT

## 2020-08-30 PROCEDURE — 97116 GAIT TRAINING THERAPY: CPT

## 2020-08-30 PROCEDURE — 97166 OT EVAL MOD COMPLEX 45 MIN: CPT

## 2020-08-30 PROCEDURE — 74011250636 HC RX REV CODE- 250/636: Performed by: INTERNAL MEDICINE

## 2020-08-30 PROCEDURE — 85025 COMPLETE CBC W/AUTO DIFF WBC: CPT

## 2020-08-30 PROCEDURE — 74011250637 HC RX REV CODE- 250/637: Performed by: INTERNAL MEDICINE

## 2020-08-30 PROCEDURE — 74011636637 HC RX REV CODE- 636/637: Performed by: FAMILY MEDICINE

## 2020-08-30 PROCEDURE — 36415 COLL VENOUS BLD VENIPUNCTURE: CPT

## 2020-08-30 RX ORDER — INSULIN GLARGINE 100 [IU]/ML
24 INJECTION, SOLUTION SUBCUTANEOUS
Status: DISCONTINUED | OUTPATIENT
Start: 2020-08-30 | End: 2020-08-31

## 2020-08-30 RX ORDER — HYDRALAZINE HYDROCHLORIDE 20 MG/ML
10 INJECTION INTRAMUSCULAR; INTRAVENOUS
Status: DISCONTINUED | OUTPATIENT
Start: 2020-08-30 | End: 2020-09-01 | Stop reason: HOSPADM

## 2020-08-30 RX ORDER — LOSARTAN POTASSIUM 50 MG/1
50 TABLET ORAL DAILY
Status: DISCONTINUED | OUTPATIENT
Start: 2020-08-31 | End: 2020-09-01 | Stop reason: HOSPADM

## 2020-08-30 RX ADMIN — MORPHINE SULFATE 2 MG: 2 INJECTION, SOLUTION INTRAMUSCULAR; INTRAVENOUS at 08:19

## 2020-08-30 RX ADMIN — ALLOPURINOL 100 MG: 100 TABLET ORAL at 10:34

## 2020-08-30 RX ADMIN — Medication 10 ML: at 06:34

## 2020-08-30 RX ADMIN — HYDRALAZINE HYDROCHLORIDE 10 MG: 20 INJECTION INTRAMUSCULAR; INTRAVENOUS at 14:31

## 2020-08-30 RX ADMIN — GABAPENTIN 300 MG: 300 CAPSULE ORAL at 08:13

## 2020-08-30 RX ADMIN — LOSARTAN POTASSIUM 25 MG: 25 TABLET ORAL at 08:13

## 2020-08-30 RX ADMIN — GABAPENTIN 300 MG: 300 CAPSULE ORAL at 21:30

## 2020-08-30 RX ADMIN — MORPHINE SULFATE 2 MG: 2 INJECTION, SOLUTION INTRAMUSCULAR; INTRAVENOUS at 04:22

## 2020-08-30 RX ADMIN — MULTIPLE VITAMINS W/ MINERALS TAB 1 TABLET: TAB at 08:13

## 2020-08-30 RX ADMIN — Medication 10 ML: at 21:32

## 2020-08-30 RX ADMIN — INSULIN LISPRO 2 UNITS: 100 INJECTION, SOLUTION INTRAVENOUS; SUBCUTANEOUS at 06:34

## 2020-08-30 RX ADMIN — RIVAROXABAN 15 MG: 15 TABLET, FILM COATED ORAL at 12:20

## 2020-08-30 RX ADMIN — PREDNISONE 40 MG: 20 TABLET ORAL at 12:20

## 2020-08-30 RX ADMIN — ATORVASTATIN CALCIUM 10 MG: 10 TABLET, FILM COATED ORAL at 21:31

## 2020-08-30 RX ADMIN — INSULIN LISPRO 12 UNITS: 100 INJECTION, SOLUTION INTRAVENOUS; SUBCUTANEOUS at 21:31

## 2020-08-30 RX ADMIN — Medication 10 ML: at 17:13

## 2020-08-30 RX ADMIN — INSULIN LISPRO 6 UNITS: 100 INJECTION, SOLUTION INTRAVENOUS; SUBCUTANEOUS at 17:12

## 2020-08-30 RX ADMIN — ASPIRIN 81 MG: 81 TABLET, COATED ORAL at 08:13

## 2020-08-30 RX ADMIN — METOPROLOL TARTRATE 25 MG: 25 TABLET, FILM COATED ORAL at 21:31

## 2020-08-30 RX ADMIN — PANTOPRAZOLE SODIUM 40 MG: 40 TABLET, DELAYED RELEASE ORAL at 06:34

## 2020-08-30 RX ADMIN — METOPROLOL TARTRATE 25 MG: 25 TABLET, FILM COATED ORAL at 08:13

## 2020-08-30 RX ADMIN — GABAPENTIN 300 MG: 300 CAPSULE ORAL at 17:12

## 2020-08-30 RX ADMIN — CLOPIDOGREL BISULFATE 75 MG: 75 TABLET ORAL at 08:13

## 2020-08-30 RX ADMIN — INSULIN GLARGINE 24 UNITS: 100 INJECTION, SOLUTION SUBCUTANEOUS at 21:31

## 2020-08-30 RX ADMIN — HYDRALAZINE HYDROCHLORIDE 10 MG: 20 INJECTION INTRAMUSCULAR; INTRAVENOUS at 00:45

## 2020-08-30 RX ADMIN — INSULIN LISPRO 4 UNITS: 100 INJECTION, SOLUTION INTRAVENOUS; SUBCUTANEOUS at 12:20

## 2020-08-30 NOTE — PROGRESS NOTES
OCCUPATIONAL THERAPY EVALUATION  Initial Occupational Therapy Goals (8/30/2020) Within 7 day(s):    1. Patient will perform perform grooming standing sink level with no safety for 5 minutes for increased independence in ADLs. 2. Patient will perform UB dressing with mod I seated EOB for increased independence with ADLs. 3. Patient will perform LB dressing with mod I & A/E PRN for increased independence with ADLs. 4. Patient will perform all aspects of toileting with mod I for increased independence with ADLs. 5. Patient will perform LE ADLs utilizing body mechanics & adaptive strategies with 1 verbal cue for increased safety in ADLs. 6. Patient will independently apply energy conservation techniques with 1 verbal cue(s) for increased independence with ADLs. Patient: Mónica Otto (75 y.o. female)  Date: 8/30/2020  Primary Diagnosis: Chest pain [R07.9]  Procedure(s) (LRB):  LEFT HEART CATH / CORONARY ANGIOGRAPHY (N/A)  Percutaneous Coronary Intervention (N/A) 4 Days Post-Op   Precautions:   Fall  PLOF: Pt reports self care activities with independence at home, has son near by who helps her with IADL activities, she does report performing some ADL activities unsafely such as showering, she would like to look into getting a shower chair or tub transfer bench as she had been sitting on the edge of the tub to perform bathing activities     ASSESSMENT :  Based on the objective data described below, the patient presents with decreased independence in ADL/IADLs due to chest pain, weakness in R LE. Received supine in bed, reached EOB with no assistance. Able to don/doff socks with extra time given, crossed leg method used. Sit to stand using FWW with CGA, needed verbal cues for safe sit to stand position, tends to have legs outside width of walker, tries to stand before reaching edge of the bed.  While ambulating to bathroom pt attempts to rest long R side against wall with no warning, she is educated on how to rest properly while standing. She also attempts to back into bathroom and walk backwards the whole way, pt was educated how this is unsafe, demonstrated proper bathroom mobility in order to increase safety. Needs min A with grab bars after voiding on toilet to reach standing. Attempts to bend 90 degrees at hib to reach sink to wash hands instead of walking up close to sink. Educated on safety in this position as well. She is able to return to sit EOB with no rest breaks. Left sitting upright. Education: Pt educated on role of OT in acute setting, safety with bathroom mobility and grooming at sink level     Patient will benefit from skilled intervention to address the above impairments. Patient's rehabilitation potential is considered to be Good  Factors which may influence rehabilitation potential include:   []             None noted  []             Mental ability/status  [x]             Medical condition  []             Home/family situation and support systems  [x]             Safety awareness  [x]             Pain tolerance/management  []             Other:      PLAN : Pt will see skilled OT daily to address functional deficits   Recommendations and Planned Interventions:   [x]               Self Care Training                  [x]      Therapeutic Activities  [x]               Functional Mobility Training   []      Cognitive Retraining  [x]               Therapeutic Exercises           [x]      Endurance Activities  [x]               Balance Training                    [x]      Neuromuscular Re-Education  []               Visual/Perceptual Training     [x]      Home Safety Training  [x]               Patient Education                   [x]      Family Training/Education  []               Other (comment):    Frequency/Duration: Patient will be followed by occupational therapy daily to address goals.   Discharge Recommendations: Rehab  Further Equipment Recommendations for Discharge: N/A     SUBJECTIVE:   Patient stated I can do all that.     OBJECTIVE DATA SUMMARY:     Past Medical History:   Diagnosis Date    CAD (coronary artery disease)     Diabetes ([facility] [address]     Gastrointestinal disorder     Gout     Hypertension     MI (myocardial infarction) ([facility] [address]     Reflux      Past Surgical History:   Procedure Laterality Date    CARDIAC SURG PROCEDURE UNLIST      PCI 15 years ago/ with stent placement    HX CATARACT REMOVAL Bilateral 2019    VASCULAR SURGERY PROCEDURE UNLIST       Barriers to Learning/Limitations: None  Compensate with: visual, verbal, tactile, kinesthetic cues/model    Home Situation:   Home Situation  Home Environment: Apartment  # Steps to Enter: 2  Rails to Enter: Yes  One/Two Story Residence: (Lives 2nd floor, elevator to get up, one level in apartment )  Living Alone: Yes  Support Systems: Child(sonali)  Patient Expects to be Discharged to[de-identified] Skilled nursing facility  Current DME Used/Available at Home: Cane, straight, Walker, rolling  Tub or Shower Type: Tub/Shower combination  [x]  Right hand dominant   []  Left hand dominant    Cognitive/Behavioral Status:  Neurologic State: Alert; Appropriate for age  Orientation Level: Oriented X4  Cognition: Poor safety awareness  Safety/Judgement: Decreased awareness of need for safety    Skin: intact  Edema: none noted     Vision/Perceptual:    Tracking: Able to track stimulus in all quadrants w/o difficulty    Saccades: Within Defined Limits    Convergence: Normal (within 6 inches from nose)       Diplopia: No      Coordination: BUE  Coordination: Within functional limits  Fine Motor Skills-Upper: Left Intact; Right Intact    Gross Motor Skills-Upper: Left Intact; Right Intact    Balance:  Sitting: Intact  Standing: Impaired; With support  Standing - Static: Fair  Standing - Dynamic : Occasional    Strength: BUE  Strength: Generally decreased, functional      Tone & Sensation: BUE  Tone: Normal  Sensation: Intact    Range of Motion: BUE  AROM: Generally decreased, functional      Functional Mobility and Transfers for ADLs:  Bed Mobility:  Rolling: Stand-by assistance  Supine to Sit: Stand-by assistance  Sit to Supine: Stand-by assistance  Scooting: Stand-by assistance  Transfers:  Sit to Stand: Contact guard assistance     Toilet Transfer : Minimum assistance      Bathroom Mobility: Minimum assistance    ADL Assessment:   Feeding: Contact guard assistance    Oral Facial Hygiene/Grooming: Minimum assistance    Bathing: Minimum assistance    Upper Body Dressing: Contact guard assistance    Lower Body Dressing: Contact guard assistance    Toileting: Contact guard assistance; Adaptive equipment    ADL Intervention:      Lower Body Dressing Assistance  Dressing Assistance: Contact guard assistance  Socks: Contact guard assistance  Position Performed: Seated edge of bed    Toileting  Toileting Assistance: Contact guard assistance  Bladder Hygiene: Contact guard assistance  Adaptive Equipment: Grab bars    Cognitive Retraining  Orientation Retraining: Awareness of environment  Problem Solving: Awareness of environment; Identifying the problem  Executive Functions: Executing cognitive plans  Organizing/Sequencing: Breaking task down  Attention to Task: Multi-task  Maintains Attention For (Time): Greater than 10 minutes  Following Commands: Follows multi-step complex commands/directions  Safety/Judgement: Decreased awareness of need for safety  Cues: Verbal cues provided; Tactile cues provided    Pain:  Pain level pre-treatment: 3/10   Pain level post-treatment: 3/10   Pain Intervention(s): Medication provided by Nursing (see MAR); Rest, Repositioning  Response to intervention: Nurse notified, See doc flow sheet    Activity Tolerance:   Fair, endurance low, needs rest breaks to complete ADL. Patient able to stand 5 minute(s). Patient able to complete ADLs with rest breaks. Patient requires intermittent rest breaks. Patient limited by weakness, safety awareness.  Patient while up using FWW.     Please refer to the flowsheet for vital signs taken during this treatment. After treatment:   [] Patient left in no apparent distress sitting up in chair  [] Patient left in no apparent distress in bed  [x] Call bell left within reach  [x] Nursing notified  [] Caregiver present  [] Bed alarm activated    COMMUNICATION/EDUCATION:   [x] Role of Occupational Therapy in the acute care setting  [x] Home safety education was provided and the patient/caregiver indicated understanding. [x] Patient/family have participated as able in goal setting and plan of care. [x] Patient/family agree to work toward stated goals and plan of care. [] Patient understands intent and goals of therapy, but is neutral about his/her participation. [] Patient is unable to participate in goal setting and plan of care. Thank you for this referral.  Jenny DELGADO, OTR/L   Time Calculation: 26 mins    Eval Complexity: History: LOW Complexity : Brief history review ; Examination: MEDIUM Complexity : 3-5 performance deficits relating to physical, cognitive , or psychosocial skils that result in activity limitations and / or participation restrictions; Decision Making:MEDIUM Complexity : Patient may present with comorbidities that affect occupational performnce.  Miniml to moderate modification of tasks or assistance (eg, physical or verbal ) with assesment(s) is necessary to enable patient to complete evaluation

## 2020-08-30 NOTE — PROGRESS NOTES
Hospitalist Progress Note    Patient: Jovanny Middleton MRN: [id number]  CSN: [id number]    YOB: 1945  Age: 69 y.o. Sex: female    DOA: 8/24/2020 LOS:  LOS: 6 days                Assessment and Plan:    Principal Problem:    Chest pain (12/3/2018)    Active Problems:    CAD (coronary artery disease) (12/3/2018)      Hypertension (12/3/2018)      Morbid obesity with BMI of 40.0-44.9, adult ([facility] [address] (12/3/2018)      Anticoagulated (8/25/2020)      Abnormal nuclear stress test (8/24/2020)      Overview: Added automatically from request for surgery [id number]      Cardiomyopathy [facility]) (8/24/2020)      Overview: Added automatically from request for surgery [id number]      Right leg weakness (8/28/2020)      CAD S/P percutaneous coronary angioplasty (8/28/2020)      Insulin dependent type 2 diabetes mellitus, uncontrolled ([facility] [address] (8/28/2020)        Chest pain-resolved  In a patient with CAD, status post PCI in 2007. S/p coronary angiogram and PCI  Continue with aspirin, Plavix, beta-blocker, Lipitor.       acute on chronic systolic CHF-  Well compensated   Echo with EF of 35 to 40% moderately reduced systolic function.     Ins dep diabetes with complications= will increase the Lantus again as increased sugars with steroids  , continue SSI PRN     Morbid obesity with BMI of 47.     History of DVT on Xarelto. Repeat duplex negative     Right Leg weakness and pain :  Started steroids yesterday  And only minor relief. Will give it another day      PT recommends SNF, will send COvid test  We are working on her accepting       Chief complaint:  68 y/o female with CAD and prior stent placement, morbid obesity, DVT on anticoagulation, HTN, and DMT2 is admitted for chest pain.         Subjective:    Leg feels a little better but still with knee pain      Review of systems:    General: No fevers or chills. Cardiovascular: No chest pain or pressure. No palpitations. Pulmonary: No shortness of breath.    Gastrointestinal: No nausea, vomiting. Objective:    Vital signs/Intake and Output:  Visit Vitals  BP (!) 160/97   Pulse 74   Temp 97.9 °F (36.6 °C)   Resp 16   Ht 5' 4\" (1.626 m)   Wt 123.7 kg (272 lb 12.8 oz)   SpO2 95%   BMI 46.83 kg/m²     Current Shift:  No intake/output data recorded. Last three shifts:  08/28 1901 - 08/30 0700  In: -   Out: 1700 [Urine:1700]    Physical Exam:  General: NAD, AAOx3. Non-toxic. HEENT: NC/AT. PERRLA, EOMI.  MMM. Lungs: Nml inspection. CTA B/L. No wheezing, rales or rhonchi. Heart:  S1S2 RRR,  PMI mid 5th IC space. No M/RG. Abdomen: Soft, NT/ND.  BS+. No peritoneal signs. Extremities: No C/C/E. Psych:   Nml affect. Neurologic:  2-12 intact. Strength 5/5 throughout. Sensation symmetrical.          Labs: Results:       Chemistry Recent Labs     08/30/20  0115   *      K 4.6      CO2 29   BUN 14   CREA 0.91   CA 9.2   AGAP 5   BUCR 15      CBC w/Diff Recent Labs     08/30/20  0115   WBC 6.3   RBC 4.73   HGB 13.8   HCT 43.8      GRANS 84*   LYMPH 14*   EOS 0      Cardiac Enzymes No results for input(s): CPK, CKND1, SARWAT in the last 72 hours. No lab exists for component: CKRMB, TROIP   Coagulation No results for input(s): PTP, INR, APTT, INREXT in the last 72 hours. Lipid Panel Lab Results   Component Value Date/Time    Cholesterol, total 139 08/25/2020 05:30 AM    HDL Cholesterol 49 08/25/2020 05:30 AM    LDL, calculated 58.6 08/25/2020 05:30 AM    VLDL, calculated 31.4 08/25/2020 05:30 AM    Triglyceride 157 (H) 08/25/2020 05:30 AM    CHOL/HDL Ratio 2.8 08/25/2020 05:30 AM      BNP No results for input(s): BNPP in the last 72 hours. Liver Enzymes No results for input(s): TP, ALB, TBIL, AP in the last 72 hours.     No lab exists for component: SGOT, GPT, DBIL   Thyroid Studies No results found for: T4, T3U, TSH, TSHEXT     Procedures/imaging: see electronic medical records for all procedures/Xrays and details which were not copied into this note but were reviewed prior to creation of Plan

## 2020-08-30 NOTE — ROUTINE PROCESS
Pt had an uneventful night. Bedside and Verbal shift change report given to Nga Ayala RN (oncoming nurse) by Melinda Ferguson RN (offgoing nurse). Report included the following information SBAR, Kardex, Accordion, Recent Results and Cardiac Rhythm SR w/ BBB .

## 2020-08-30 NOTE — PROGRESS NOTES
Problem: Falls - Risk of  Goal: *Absence of Falls  Description: Document Armando Andujar Fall Risk and appropriate interventions in the flowsheet. Outcome: Progressing Towards Goal  Note: Fall Risk Interventions:  Mobility Interventions: Assess mobility with egress test, Bed/chair exit alarm, OT consult for ADLs, Patient to call before getting OOB, PT Consult for mobility concerns, PT Consult for assist device competence, Utilize walker, cane, or other assistive device         Medication Interventions: Teach patient to arise slowly, Patient to call before getting OOB, Evaluate medications/consider consulting pharmacy    Elimination Interventions: Call light in reach, Patient to call for help with toileting needs, Toileting schedule/hourly rounds    History of Falls Interventions:  Investigate reason for fall, Door open when patient unattended, Room close to nurse's station, Evaluate medications/consider consulting pharmacy, Consult care management for discharge planning         Problem: Patient Education: Go to Patient Education Activity  Goal: Patient/Family Education  Outcome: Progressing Towards Goal     Problem: General Medical Care Plan  Goal: *Vital signs within specified parameters  Outcome: Progressing Towards Goal  Goal: *Labs within defined limits  Outcome: Progressing Towards Goal  Goal: *Absence of infection signs and symptoms  Outcome: Progressing Towards Goal  Goal: *Optimal pain control at patient's stated goal  Outcome: Progressing Towards Goal  Goal: *Skin integrity maintained  Outcome: Progressing Towards Goal  Goal: *Fluid volume balance  Outcome: Progressing Towards Goal  Goal: *Optimize nutritional status  Outcome: Progressing Towards Goal  Goal: *Anxiety reduced or absent  Outcome: Progressing Towards Goal  Goal: *Progressive mobility and function (eg: ADL's)  Outcome: Progressing Towards Goal     Problem: General Medical Care Plan  Goal: *Vital signs within specified parameters  Outcome: Progressing Towards Goal  Goal: *Labs within defined limits  Outcome: Progressing Towards Goal  Goal: *Absence of infection signs and symptoms  Outcome: Progressing Towards Goal  Goal: *Optimal pain control at patient's stated goal  Outcome: Progressing Towards Goal  Goal: *Skin integrity maintained  Outcome: Progressing Towards Goal  Goal: *Fluid volume balance  Outcome: Progressing Towards Goal  Goal: *Optimize nutritional status  Outcome: Progressing Towards Goal  Goal: *Anxiety reduced or absent  Outcome: Progressing Towards Goal  Goal: *Progressive mobility and function (eg: ADL's)  Outcome: Progressing Towards Goal     Problem: Patient Education: Go to Patient Education Activity  Goal: Patient/Family Education  Outcome: Progressing Towards Goal     Problem: Pain  Goal: *Control of Pain  Outcome: Progressing Towards Goal  Goal: *PALLIATIVE CARE:  Alleviation of Pain  Outcome: Progressing Towards Goal     Problem: Patient Education: Go to Patient Education Activity  Goal: Patient/Family Education  Outcome: Progressing Towards Goal     Problem: Diabetes Self-Management  Goal: *Disease process and treatment process  Description: Define diabetes and identify own type of diabetes; list 3 options for treating diabetes. Outcome: Progressing Towards Goal  Goal: *Incorporating nutritional management into lifestyle  Description: Describe effect of type, amount and timing of food on blood glucose; list 3 methods for planning meals. Outcome: Progressing Towards Goal  Goal: *Incorporating physical activity into lifestyle  Description: State effect of exercise on blood glucose levels. Outcome: Progressing Towards Goal  Goal: *Developing strategies to promote health/change behavior  Description: Define the ABC's of diabetes; identify appropriate screenings, schedule and personal plan for screenings.   Outcome: Progressing Towards Goal  Goal: *Using medications safely  Description: State effect of diabetes medications on diabetes; name diabetes medication taking, action and side effects. Outcome: Progressing Towards Goal  Goal: *Monitoring blood glucose, interpreting and using results  Description: Identify recommended blood glucose targets  and personal targets. Outcome: Progressing Towards Goal  Goal: *Prevention, detection, treatment of acute complications  Description: List symptoms of hyper- and hypoglycemia; describe how to treat low blood sugar and actions for lowering  high blood glucose level. Outcome: Progressing Towards Goal  Goal: *Prevention, detection and treatment of chronic complications  Description: Define the natural course of diabetes and describe the relationship of blood glucose levels to long term complications of diabetes.   Outcome: Progressing Towards Goal  Goal: *Developing strategies to address psychosocial issues  Description: Describe feelings about living with diabetes; identify support needed and support network  Outcome: Progressing Towards Goal     Problem: Patient Education: Go to Patient Education Activity  Goal: Patient/Family Education  Outcome: Progressing Towards Goal     Problem: Patient Education: Go to Patient Education Activity  Goal: Patient/Family Education  Outcome: Progressing Towards Goal

## 2020-08-30 NOTE — PROGRESS NOTES
Cardiology Progress Note        Patient: Brea Jefferson        Sex: female          DOA: 8/24/2020  YOB: 1945      Age:  69 y.o.        LOS:  LOS: 6 days   Assessment/Plan     Principal Problem:    Chest pain (12/3/2018)    Active Problems:    CAD (coronary artery disease) (12/3/2018)      Hypertension (12/3/2018)      Morbid obesity with BMI of 40.0-44.9, adult ([facility] (12/3/2018)      Anticoagulated (8/25/2020)      Abnormal nuclear stress test (8/24/2020)      Overview: Added automatically from request for surgery [id number]      Cardiomyopathy [facility]) (8/24/2020)      Overview: Added automatically from request for surgery [id number]      Right leg weakness (8/28/2020)      CAD S/P percutaneous coronary angioplasty (8/28/2020)      Insulin dependent type 2 diabetes mellitus, uncontrolled ([facility] (8/28/2020)        Plan:      Chest pain improved  CAD, status post stent to left circumflex artery  LBBB  Cardiomyopathy    Blood pressure is a little bit suboptimal will increase the losartan from 25 mg daily to 50 mg daily. Continue with metoprolol tartrate 25 mg twice daily  Continue with aspirin and clopidogrel  Continue with treatment of sleep apnea  Right leg pain is improving and no evidence of any stroke following physical therapy. Subjective:    cc:  Chest pain  CAD  LBBB  Cardiomyopathy        REVIEW OF SYSTEMS:     General: No fevers or chills. Cardiovascular: No chest pain or pressure. No palpitations. No ankle swelling  Pulmonary: No SOB, orthopnea, PND  Gastrointestinal: No nausea, vomiting or diarrhea      Objective:      Visit Vitals  BP (!) 169/97   Pulse 71   Temp 98 °F (36.7 °C)   Resp 16   Ht 5' 4\" (1.626 m)   Wt 123.7 kg (272 lb 12.8 oz)   SpO2 98%   BMI 46.83 kg/m²     Body mass index is 46.83 kg/m². Physical Exam:  General Appearance: Comfortable, not using accessory muscles of respiration. NECK: No JVD, no thyroidomeglay.    LUNGS: Clear bilaterally. HEART: S1+S2 audible,    ABD: Non-tender, BS Audible    EXT: trace edema, and no cysnosis. VASCULAR EXAM: Pulses are intact. PSYCHIATRIC EXAM: Mood is appropriate.      Medication:  Current Facility-Administered Medications   Medication Dose Route Frequency    hydrALAZINE (APRESOLINE) 20 mg/mL injection 10 mg  10 mg IntraVENous Q6H PRN    insulin glargine (LANTUS) injection 24 Units  24 Units SubCUTAneous QHS    [START ON 8/31/2020] losartan (COZAAR) tablet 50 mg  50 mg Oral DAILY    predniSONE (DELTASONE) tablet 40 mg  40 mg Oral DAILY WITH LUNCH    allopurinoL (ZYLOPRIM) tablet 100 mg  100 mg Oral DAILY    pantoprazole (PROTONIX) tablet 40 mg  40 mg Oral ACB    sodium chloride (NS) flush 5-40 mL  5-40 mL IntraVENous Q8H    sodium chloride (NS) flush 5-40 mL  5-40 mL IntraVENous PRN    clopidogreL (PLAVIX) tablet 75 mg  75 mg Oral DAILY    rivaroxaban (XARELTO) tablet 15 mg  15 mg Oral DAILY WITH LUNCH    morphine injection 2 mg  2 mg IntraVENous Q4H PRN    insulin lispro (HUMALOG) injection   SubCUTAneous AC&HS    glucose chewable tablet 16 g  16 g Oral PRN    glucagon (GLUCAGEN) injection 1 mg  1 mg IntraMUSCular PRN    dextrose 10% infusion 125-250 mL  125-250 mL IntraVENous PRN    albuterol (PROVENTIL VENTOLIN) nebulizer solution 2.5 mg  2.5 mg Nebulization Q4H PRN    aspirin delayed-release tablet 81 mg  81 mg Oral DAILY    gabapentin (NEURONTIN) capsule 300 mg  300 mg Oral TID    multivitamin, tx-iron-ca-min (THERA-M w/ IRON) tablet 1 Tab  1 Tab Oral DAILY    atorvastatin (LIPITOR) tablet 10 mg  10 mg Oral QHS    metoprolol tartrate (LOPRESSOR) tablet 25 mg  25 mg Oral Q12H    sodium chloride (NS) flush 5-40 mL  5-40 mL IntraVENous Q8H    sodium chloride (NS) flush 5-40 mL  5-40 mL IntraVENous PRN    acetaminophen (TYLENOL) tablet 650 mg  650 mg Oral Q6H PRN    Or    acetaminophen (TYLENOL) suppository 650 mg  650 mg Rectal Q6H PRN    polyethylene glycol (MIRALAX) packet 17 g  17 g Oral DAILY PRN    promethazine (PHENERGAN) tablet 12.5 mg  12.5 mg Oral Q6H PRN    Or    ondansetron (ZOFRAN) injection 4 mg  4 mg IntraVENous Q6H PRN               Lab/Data Reviewed:  Procedures/imaging: see electronic medical records for all procedures/Xrays   and details which were not copied into this note but were reviewed prior to creation of Plan       All lab results for the last 24 hours reviewed.      Recent Labs     08/30/20  0115   WBC 6.3   HGB 13.8   HCT 43.8        Recent Labs     08/30/20  0115      K 4.6      CO2 29   *   BUN 14   CREA 0.91   CA 9.2       RADIOLOGY:  CT Results  (Last 48 hours)    None        CXR Results  (Last 48 hours)    None            Cardiology Procedures:   Results for orders placed or performed during the hospital encounter of 08/24/20   EKG, 12 LEAD, INITIAL   Result Value Ref Range    Ventricular Rate 99 BPM    Atrial Rate 99 BPM    P-R Interval 146 ms    QRS Duration 158 ms    Q-T Interval 402 ms    QTC Calculation (Bezet) 515 ms    Calculated P Axis 72 degrees    Calculated R Axis -23 degrees    Calculated T Axis 108 degrees    Diagnosis       Sinus rhythm with with aberrant conduction  Left bundle branch block  Abnormal ECG  When compared with ECG of 03-MAR-2020 12:03,  premature ventricular complexes are no longer present  aberrant conduction is now present  Left bundle branch block is now present  Confirmed by Marbin Allen MD, -- ([id number]) on 8/26/2020 10:43:40 AM        Echo Results  (Last 48 hours)    None       Cardiolite (Tc-99m Sestamibi) stress test    Signed By: Balbina Reina MD     August 30, 2020

## 2020-08-30 NOTE — PROGRESS NOTES
Problem: Falls - Risk of  Goal: *Absence of Falls  Description: Document Lay Fisher Fall Risk and appropriate interventions in the flowsheet. Outcome: Progressing Towards Goal  Note: Fall Risk Interventions:  Mobility Interventions: Patient to call before getting OOB         Medication Interventions: Teach patient to arise slowly    Elimination Interventions: Call light in reach    History of Falls Interventions: Door open when patient unattended         Problem: Patient Education: Go to Patient Education Activity  Goal: Patient/Family Education  Outcome: Progressing Towards Goal     Problem: General Medical Care Plan  Goal: *Vital signs within specified parameters  Outcome: Progressing Towards Goal  Goal: *Labs within defined limits  Outcome: Progressing Towards Goal  Goal: *Absence of infection signs and symptoms  Outcome: Progressing Towards Goal  Goal: *Optimal pain control at patient's stated goal  Outcome: Progressing Towards Goal  Goal: *Skin integrity maintained  Outcome: Progressing Towards Goal  Goal: *Fluid volume balance  Outcome: Progressing Towards Goal  Goal: *Optimize nutritional status  Outcome: Progressing Towards Goal  Goal: *Anxiety reduced or absent  Outcome: Progressing Towards Goal  Goal: *Progressive mobility and function (eg: ADL's)  Outcome: Progressing Towards Goal     Problem: Pain  Goal: *Control of Pain  Outcome: Progressing Towards Goal  Goal: *PALLIATIVE CARE:  Alleviation of Pain  Outcome: Progressing Towards Goal     Problem:  Moderate Sedation (Adult)  Goal: *Patent airway  Outcome: Progressing Towards Goal  Goal: *Adequate oxygenation  Outcome: Progressing Towards Goal  Goal: *Absence of aspiration  Outcome: Progressing Towards Goal  Goal: *Hemodynamically stable  Outcome: Progressing Towards Goal  Goal: *Optimal pain control at patient's stated goal  Outcome: Progressing Towards Goal  Goal: *Absence of nausea/vomiting  Outcome: Progressing Towards Goal  Goal: *Anxiety reduced or absent  Outcome: Progressing Towards Goal  Goal: *Absence of injury  Outcome: Progressing Towards Goal  Goal: *Level of consciousness returns to baseline  Outcome: Progressing Towards Goal  Goal: Interventions  Outcome: Progressing Towards Goal

## 2020-08-30 NOTE — PROGRESS NOTES
DC Plan: [facility] pending insurance auth and neg covid    Chart reviewed as CM on call. Met with pt at bedside to discuss dc plan. Pt agrees to rehab. FOC offered and rehab list provided to pt. Pt defers to son Brittney Riojas and spoke with son Quirino Hastings on phone, he too agrees to rehab. FOC offered, he chose [facility]. Pt will need insurance auth and neg covid. Covid pending 8/29. Asked pt about LTSS/UAI, pt declines. Pts son would like to speak with someone in admissions at the rehab regarding policies on visitation and getting belongings to pt. CM will cont to follow and will be available via hospital  on weekends.

## 2020-08-30 NOTE — ROUTINE PROCESS
Bedside and Verbal shift change report given to [address] (oncoming nurse) by Ben Arthur RN (offgoing nurse). Report included the following information SBAR and Kardex.

## 2020-08-30 NOTE — PROGRESS NOTES
Problem: Mobility Impaired (Adult and Pediatric)  Goal: *Acute Goals and Plan of Care (Insert Text)  Description: Physical Therapy Goals  Initiated 8/27/2020 and to be accomplished within 3-7 day(s)  1. Patient will move from supine to sit and sit to supine  in bed with supervision/set-up. 2.  Patient will transfer from bed to chair and chair to bed with supervision/set-up using the least restrictive device. 3.  Patient will perform sit to stand with supervision/set-up. 4.  Patient will ambulate with supervision/set-up for 50 feet with the least restrictive device. 5.  Patient will ascend/descend 2 stairs with handrail(s) with minimal assistance/contact guard assist.     Outcome: Progressing Towards Goal   PHYSICAL THERAPY TREATMENT    Patient: Pau Hargrove (75 y.o. female)  Date: 8/30/2020  Diagnosis: Chest pain [R07.9]   Chest pain  Procedure(s) (LRB):  LEFT HEART CATH / CORONARY ANGIOGRAPHY (N/A)  Percutaneous Coronary Intervention (N/A) 4 Days Post-Op  Precautions: Fall   Chart, physical therapy assessment, plan of care and goals were reviewed. ASSESSMENT:  Pt is found relaxing and talking on telephone. Pt is initially resistant to mobility, noting having been OOB with OT recently. Pt is able to increase to 60 of amb, using RW, with no break. Pt with significant SOB and poor posture for final 10'. Placement still suggested to further improve independence and endurance. Progression toward goals:  []      Improving appropriately and progressing toward goals  [x]      Improving slowly and progressing toward goals  []      Not making progress toward goals and plan of care will be adjusted     PLAN:  Patient continues to benefit from skilled intervention to address the above impairments. Continue treatment per established plan of care.   Discharge Recommendations:  Skilled Nursing Facility  Further Equipment Recommendations for Discharge:  rolling walker     SUBJECTIVE:   Patient stated I don't want to go .     OBJECTIVE DATA SUMMARY:   Critical Behavior:  Neurologic State: Alert, Appropriate for age  Orientation Level: Oriented X4  Cognition: Poor safety awareness  Safety/Judgement: Decreased awareness of need for safety  Functional Mobility Training:  Bed Mobility:  Rolling: Supervision  Supine to Sit: Supervision  Sit to Supine: Supervision  Scooting: Stand-by assistance  Transfers:  Sit to Stand: Contact guard assistance  Stand to Sit: Contact guard assistance  Balance:  Sitting: Intact  Standing: Impaired  Standing - Static: Good  Standing - Dynamic : Fair  Ambulation/Gait Training:  Distance (ft): 60 Feet (ft)  Assistive Device: Gait belt;Walker, rolling  Ambulation - Level of Assistance: Minimal assistance; Moderate assistance  Gait Abnormalities: Decreased step clearance  Base of Support: Center of gravity altered  Speed/Debora: Delayed  Step Length: Right shortened;Left shortened  Pain:  Pain Scale 1: Numeric (0 - 10)  Pain Intensity 1: 0  Pain Location 1: Knee  Pain Orientation 1: Right  Pain Description 1: Sharp  Pain Intervention(s) 1: Medication (see MAR)  Activity Tolerance:   Fair  Please refer to the flowsheet for vital signs taken during this treatment.   After treatment:   [x] Patient left in no apparent distress sitting EOB  [] Patient left in no apparent distress in bed  [x] Call bell left within reach  [x] Nursing notified  [] Caregiver present  [] Bed alarm activated      Sylvie Mcgee PTA   Time Calculation: 50 mins

## 2020-08-31 LAB
COVID-19 RAPID TEST, COVR: NOT DETECTED
GLUCOSE BLD STRIP.AUTO-MCNC: 161 MG/DL (ref 70–110)
GLUCOSE BLD STRIP.AUTO-MCNC: 190 MG/DL (ref 70–110)
GLUCOSE BLD STRIP.AUTO-MCNC: 302 MG/DL (ref 70–110)
GLUCOSE BLD STRIP.AUTO-MCNC: 370 MG/DL (ref 70–110)
HEALTH STATUS, XMCV2T: NORMAL
SOURCE, COVRS: NORMAL
SPECIMEN TYPE, XMCV1T: NORMAL

## 2020-08-31 PROCEDURE — 97116 GAIT TRAINING THERAPY: CPT

## 2020-08-31 PROCEDURE — 74011250636 HC RX REV CODE- 250/636: Performed by: FAMILY MEDICINE

## 2020-08-31 PROCEDURE — 74011636637 HC RX REV CODE- 636/637: Performed by: INTERNAL MEDICINE

## 2020-08-31 PROCEDURE — 74011250637 HC RX REV CODE- 250/637: Performed by: INTERNAL MEDICINE

## 2020-08-31 PROCEDURE — 74011250637 HC RX REV CODE- 250/637: Performed by: FAMILY MEDICINE

## 2020-08-31 PROCEDURE — 97530 THERAPEUTIC ACTIVITIES: CPT

## 2020-08-31 PROCEDURE — 74011636637 HC RX REV CODE- 636/637: Performed by: HOSPITALIST

## 2020-08-31 PROCEDURE — 74011250637 HC RX REV CODE- 250/637: Performed by: HOSPITALIST

## 2020-08-31 PROCEDURE — 87635 SARS-COV-2 COVID-19 AMP PRB: CPT

## 2020-08-31 PROCEDURE — 65660000000 HC RM CCU STEPDOWN

## 2020-08-31 PROCEDURE — 82962 GLUCOSE BLOOD TEST: CPT

## 2020-08-31 RX ORDER — INSULIN GLARGINE 100 [IU]/ML
26 INJECTION, SOLUTION SUBCUTANEOUS
Status: DISCONTINUED | OUTPATIENT
Start: 2020-08-31 | End: 2020-09-01 | Stop reason: HOSPADM

## 2020-08-31 RX ORDER — CYCLOBENZAPRINE HCL 10 MG
5 TABLET ORAL
Status: DISCONTINUED | OUTPATIENT
Start: 2020-08-31 | End: 2020-09-01 | Stop reason: HOSPADM

## 2020-08-31 RX ORDER — PREDNISONE 20 MG/1
20 TABLET ORAL
Status: DISCONTINUED | OUTPATIENT
Start: 2020-08-31 | End: 2020-09-01 | Stop reason: HOSPADM

## 2020-08-31 RX ORDER — INSULIN LISPRO 100 [IU]/ML
5 INJECTION, SOLUTION INTRAVENOUS; SUBCUTANEOUS
Status: DISCONTINUED | OUTPATIENT
Start: 2020-08-31 | End: 2020-09-01 | Stop reason: HOSPADM

## 2020-08-31 RX ADMIN — MORPHINE SULFATE 2 MG: 2 INJECTION, SOLUTION INTRAMUSCULAR; INTRAVENOUS at 02:27

## 2020-08-31 RX ADMIN — ATORVASTATIN CALCIUM 10 MG: 10 TABLET, FILM COATED ORAL at 22:19

## 2020-08-31 RX ADMIN — MULTIPLE VITAMINS W/ MINERALS TAB 1 TABLET: TAB at 08:01

## 2020-08-31 RX ADMIN — GABAPENTIN 300 MG: 300 CAPSULE ORAL at 22:19

## 2020-08-31 RX ADMIN — ALLOPURINOL 100 MG: 100 TABLET ORAL at 08:01

## 2020-08-31 RX ADMIN — INSULIN LISPRO 5 UNITS: 100 INJECTION, SOLUTION INTRAVENOUS; SUBCUTANEOUS at 16:00

## 2020-08-31 RX ADMIN — MORPHINE SULFATE 2 MG: 2 INJECTION, SOLUTION INTRAMUSCULAR; INTRAVENOUS at 22:20

## 2020-08-31 RX ADMIN — Medication 10 ML: at 06:52

## 2020-08-31 RX ADMIN — INSULIN GLARGINE 26 UNITS: 100 INJECTION, SOLUTION SUBCUTANEOUS at 22:20

## 2020-08-31 RX ADMIN — ASPIRIN 81 MG: 81 TABLET, COATED ORAL at 08:01

## 2020-08-31 RX ADMIN — METOPROLOL TARTRATE 25 MG: 25 TABLET, FILM COATED ORAL at 08:01

## 2020-08-31 RX ADMIN — INSULIN LISPRO 15 UNITS: 100 INJECTION, SOLUTION INTRAVENOUS; SUBCUTANEOUS at 16:00

## 2020-08-31 RX ADMIN — INSULIN LISPRO 15 UNITS: 100 INJECTION, SOLUTION INTRAVENOUS; SUBCUTANEOUS at 22:20

## 2020-08-31 RX ADMIN — LOSARTAN POTASSIUM 50 MG: 50 TABLET, FILM COATED ORAL at 15:07

## 2020-08-31 RX ADMIN — GABAPENTIN 300 MG: 300 CAPSULE ORAL at 08:01

## 2020-08-31 RX ADMIN — CYCLOBENZAPRINE HYDROCHLORIDE 5 MG: 10 TABLET, FILM COATED ORAL at 22:20

## 2020-08-31 RX ADMIN — GABAPENTIN 300 MG: 300 CAPSULE ORAL at 15:07

## 2020-08-31 RX ADMIN — CLOPIDOGREL BISULFATE 75 MG: 75 TABLET ORAL at 08:01

## 2020-08-31 RX ADMIN — METOPROLOL TARTRATE 25 MG: 25 TABLET, FILM COATED ORAL at 22:19

## 2020-08-31 RX ADMIN — INSULIN LISPRO 3 UNITS: 100 INJECTION, SOLUTION INTRAVENOUS; SUBCUTANEOUS at 06:49

## 2020-08-31 RX ADMIN — Medication 10 ML: at 14:00

## 2020-08-31 RX ADMIN — CYCLOBENZAPRINE HYDROCHLORIDE 5 MG: 10 TABLET, FILM COATED ORAL at 15:55

## 2020-08-31 RX ADMIN — RIVAROXABAN 15 MG: 15 TABLET, FILM COATED ORAL at 15:07

## 2020-08-31 RX ADMIN — PREDNISONE 20 MG: 20 TABLET ORAL at 15:07

## 2020-08-31 RX ADMIN — MORPHINE SULFATE 2 MG: 2 INJECTION, SOLUTION INTRAMUSCULAR; INTRAVENOUS at 08:01

## 2020-08-31 RX ADMIN — Medication 10 ML: at 06:51

## 2020-08-31 RX ADMIN — PANTOPRAZOLE SODIUM 40 MG: 40 TABLET, DELAYED RELEASE ORAL at 06:49

## 2020-08-31 NOTE — PROGRESS NOTES
Problem: Falls - Risk of  Goal: *Absence of Falls  Description: Document Barbara Simmons Fall Risk and appropriate interventions in the flowsheet.   Outcome: Progressing Towards Goal  Note: Fall Risk Interventions:  Mobility Interventions: OT consult for ADLs, Patient to call before getting OOB, PT Consult for mobility concerns, PT Consult for assist device competence, Assess mobility with egress test, Utilize walker, cane, or other assistive device         Medication Interventions: Teach patient to arise slowly, Patient to call before getting OOB, Evaluate medications/consider consulting pharmacy    Elimination Interventions: Call light in reach, Patient to call for help with toileting needs, Toileting schedule/hourly rounds    History of Falls Interventions: Door open when patient unattended, Investigate reason for fall, Room close to nurse's station, Evaluate medications/consider consulting pharmacy         Problem: Patient Education: Go to Patient Education Activity  Goal: Patient/Family Education  Outcome: Progressing Towards Goal     Problem: General Medical Care Plan  Goal: *Vital signs within specified parameters  Outcome: Progressing Towards Goal  Goal: *Labs within defined limits  Outcome: Progressing Towards Goal  Goal: *Absence of infection signs and symptoms  Outcome: Progressing Towards Goal  Goal: *Optimal pain control at patient's stated goal  Outcome: Progressing Towards Goal  Goal: *Skin integrity maintained  Outcome: Progressing Towards Goal  Goal: *Fluid volume balance  Outcome: Progressing Towards Goal  Goal: *Optimize nutritional status  Outcome: Progressing Towards Goal  Goal: *Anxiety reduced or absent  Outcome: Progressing Towards Goal  Goal: *Progressive mobility and function (eg: ADL's)  Outcome: Progressing Towards Goal     Problem: Patient Education: Go to Patient Education Activity  Goal: Patient/Family Education  Outcome: Progressing Towards Goal     Problem: Pain  Goal: *Control of Pain  Outcome: Progressing Towards Goal  Goal: *PALLIATIVE CARE:  Alleviation of Pain  Outcome: Progressing Towards Goal     Problem: Patient Education: Go to Patient Education Activity  Goal: Patient/Family Education  Outcome: Progressing Towards Goal     Problem: Diabetes Self-Management  Goal: *Disease process and treatment process  Description: Define diabetes and identify own type of diabetes; list 3 options for treating diabetes. Outcome: Progressing Towards Goal  Goal: *Incorporating nutritional management into lifestyle  Description: Describe effect of type, amount and timing of food on blood glucose; list 3 methods for planning meals. Outcome: Progressing Towards Goal  Goal: *Incorporating physical activity into lifestyle  Description: State effect of exercise on blood glucose levels. Outcome: Progressing Towards Goal  Goal: *Developing strategies to promote health/change behavior  Description: Define the ABC's of diabetes; identify appropriate screenings, schedule and personal plan for screenings. Outcome: Progressing Towards Goal  Goal: *Using medications safely  Description: State effect of diabetes medications on diabetes; name diabetes medication taking, action and side effects. Outcome: Progressing Towards Goal  Goal: *Monitoring blood glucose, interpreting and using results  Description: Identify recommended blood glucose targets  and personal targets.   Outcome: Progressing Towards Goal     Problem: Patient Education: Go to Patient Education Activity  Goal: Patient/Family Education  Outcome: Progressing Towards Goal     Problem: Patient Education: Go to Patient Education Activity  Goal: Patient/Family Education  Outcome: Progressing Towards Goal

## 2020-08-31 NOTE — PROGRESS NOTES
Problem: Mobility Impaired (Adult and Pediatric)  Goal: *Acute Goals and Plan of Care (Insert Text)  Description: Physical Therapy Goals  Initiated 8/27/2020 and to be accomplished within 3-7 day(s)  1. Patient will move from supine to sit and sit to supine  in bed with supervision/set-up. 2.  Patient will transfer from bed to chair and chair to bed with supervision/set-up using the least restrictive device. 3.  Patient will perform sit to stand with supervision/set-up. 4.  Patient will ambulate with supervision/set-up for 50 feet with the least restrictive device. 5.  Patient will ascend/descend 2 stairs with handrail(s) with minimal assistance/contact guard assist.     Outcome: Progressing Towards Goal     PHYSICAL THERAPY TREATMENT    Patient: Enrico Moreno (75 y.o. female)  Date: 8/31/2020  Diagnosis: Chest pain [R07.9]   Chest pain  Procedure(s) (LRB):  LEFT HEART CATH / CORONARY ANGIOGRAPHY (N/A)  Percutaneous Coronary Intervention (N/A) 5 Days Post-Op  Precautions: Fall   Chart, physical therapy assessment, plan of care and goals were reviewed. ASSESSMENT:  Pt is improving in endurance, but safety is still lacking due to Knee pain (R). Pt shifts weight heavily to right side, causing near LOB conditions. Pt will benefit for placement and address of RR LE swelling. Progression toward goals:  []      Improving appropriately and progressing toward goals  [x]      Improving slowly and progressing toward goals  []      Not making progress toward goals and plan of care will be adjusted     PLAN:  Patient continues to benefit from skilled intervention to address the above impairments. Continue treatment per established plan of care.   Discharge Recommendations:  SNF  Further Equipment Recommendations for Discharge:  bedside commode and rolling walker     SUBJECTIVE:   Patient stated Wilber Allison make me do therapy and I'm going to rehab?    OBJECTIVE DATA SUMMARY:   Critical Behavior:  Neurologic State: Alert  Orientation Level: Oriented X4  Cognition: Follows commands  Safety/Judgement: Decreased awareness of need for safety  Functional Mobility Training:  Bed Mobility:  Rolling: Supervision  Supine to Sit: Supervision  Sit to Supine: Supervision  Transfers:  Sit to Stand: Contact guard assistance  Stand to Sit: Contact guard assistance  Balance:  Sitting: Intact  Standing: Impaired; With support  Standing - Static: Good  Standing - Dynamic : Fair  Ambulation/Gait Training:  Distance (ft): 100 Feet (ft)  Assistive Device: Gait belt;Walker, rolling  Ambulation - Level of Assistance: Stand-by assistance;Contact guard assistance  Gait Abnormalities: Decreased step clearance  Base of Support: Center of gravity altered; Widened  Speed/Debora: Delayed; Slow  Step Length: Right shortened;Left shortened  Pain:  Pain Scale 1: Numeric (0 - 10)  Pain Intensity 1: 8  Pain out: 9  Pain Location 1: Knee  Pain Orientation 1: Right  Pain Description 1: Aching  Pain Intervention(s) 1: Medication (see MAR)  Activity Tolerance:   Fair+  Please refer to the flowsheet for vital signs taken during this treatment.   After treatment:   [x] Patient left in no apparent distress sitting EOB  [] Patient left in no apparent distress in bed  [x] Call bell left within reach  [x] Nursing notified  [] Caregiver present  [] Bed alarm activated      Ricco Sotelo PTA   Time Calculation: 26 mins

## 2020-08-31 NOTE — PROGRESS NOTES
Problem: Falls - Risk of  Goal: *Absence of Falls  Description: Document William Silverio Fall Risk and appropriate interventions in the flowsheet.   Outcome: Progressing Towards Goal  Note: Fall Risk Interventions:  Mobility Interventions: Utilize walker, cane, or other assistive device, Patient to call before getting OOB         Medication Interventions: Teach patient to arise slowly, Patient to call before getting OOB    Elimination Interventions: Call light in reach    History of Falls Interventions: Door open when patient unattended

## 2020-08-31 NOTE — PROGRESS NOTES
Hospitalist Progress Note    Patient: Mich Moon MRN: [id number]  CSN: [id number]    YOB: 1945  Age: 69 y.o.   Sex: female    DOA: 8/24/2020 LOS:  LOS: 7 days          Chief Complaint:    Chest pain      Assessment/Plan     CAD and chest pain  S/p coronary angiogram and PCI  Continue with aspirin, Plavix, beta-blocker, Lipitor.      acute on chronic systolic CHF-  Well compensated now  Echo with EF of 35 to 40%/ moderately reduced systolic function.     Insulin dep diabetes with hyperg;ycemia-adjust up lantus and add lispro as steroids will worsen hyperglycemia     Morbid obesity with BMI of 47.     History of DVT on Xarelto. Repeat duplex negative-continue maintenance dose     Right Leg weakness and pain :likely from L spine disease-BETTER-working ok with PT, continue plans for rehab/SNF-MRI brain NO stroke  PO prednisone short course, flexeril PRN     Auth for rehab pending  covid result pending  Discussed with son by phone     Disposition :  Patient Active Problem List   Diagnosis Code    Chest pain R07.9    CAD (coronary artery disease) I25.10    Hypertension I10    Morbid obesity with BMI of 40.0-44.9, adult ([facility] E66.01, Z68.41    Gout M10.9    Anticoagulated Z79.01    Abnormal nuclear stress test R94.39    Cardiomyopathy ([facility] I42.9    Right leg weakness R29.898    CAD S/P percutaneous coronary angioplasty I25.10, Z98.61    Insulin dependent type 2 diabetes mellitus, uncontrolled ([facility] E11.65, Z79.4       Subjective:    I am \doing better  I am trying to get my rehab done  Denies chest pain, SOb, palp  Right leg stronger with yesterdays PT  No paresthesias    Review of systems:      Respiratory: denies SOB, cough  Cardiovascular: denies chest pain, palpitations  Gastrointestinal: denies nausea, vomiting, diarrhea      Vital signs/Intake and Output:  Visit Vitals  /73   Pulse 74   Temp 97.9 °F (36.6 °C)   Resp 16   Ht 5' 4\" (1.626 m)   Wt 123.5 kg (272 lb 3.2 oz)   SpO2 99%   BMI 46.72 kg/m²     Current Shift:  No intake/output data recorded. Last three shifts:  08/29 1901 - 08/31 0700  In: 840 [P.O.:840]  Out: 1860 [Urine:1860]    Exam:    General: obese elderly AAF, alert, NAD, OX3  CVS:Regular rate and rhythm, no M/R/G, S1/S2 heard, no thrill  Lungs:Clear to auscultation bilaterally, no wheezes, rhonchi, or rales  Abdomen: Soft, Nontender, No distention, Normal Bowel sounds, No hepatomegaly  Extremities: No C/C/E, pulses palpable 2+  Neuro:grossly normal , follows commands  Psych:appropriate                Labs: Results:       Chemistry Recent Labs     08/30/20  0115   *      K 4.6      CO2 29   BUN 14   CREA 0.91   CA 9.2   AGAP 5   BUCR 15      CBC w/Diff Recent Labs     08/30/20 0115   WBC 6.3   RBC 4.73   HGB 13.8   HCT 43.8      GRANS 84*   LYMPH 14*   EOS 0      Cardiac Enzymes No results for input(s): CPK, CKND1, SARWAT in the last 72 hours. No lab exists for component: CKRMB, TROIP   Coagulation No results for input(s): PTP, INR, APTT, INREXT in the last 72 hours. Lipid Panel Lab Results   Component Value Date/Time    Cholesterol, total 139 08/25/2020 05:30 AM    HDL Cholesterol 49 08/25/2020 05:30 AM    LDL, calculated 58.6 08/25/2020 05:30 AM    VLDL, calculated 31.4 08/25/2020 05:30 AM    Triglyceride 157 (H) 08/25/2020 05:30 AM    CHOL/HDL Ratio 2.8 08/25/2020 05:30 AM      BNP No results for input(s): BNPP in the last 72 hours. Liver Enzymes No results for input(s): TP, ALB, TBIL, AP in the last 72 hours.     No lab exists for component: SGOT, GPT, DBIL   Thyroid Studies No results found for: T4, T3U, TSH, TSHEXT     Procedures/imaging: see electronic medical records for all procedures/Xrays and details which were not copied into this note but were reviewed prior to creation of Carolina Mitchell MD

## 2020-08-31 NOTE — DIABETES MGMT
Diabetes Patient/Family Education Record  Factors That  May Influence Patients Ability  to Learn or  Comply with Recommendations   []   Language barrier    []   Cultural needs   [x]   Motivation    []   Cognitive limitation    []   Physical   []   Education    []   Physiological factors   []   Hearing/vision/speaking impairment   []   Judaism beliefs    []   Financial factors   []  Other:   []  No factors identified at this time.      Person Instructed:   [x]   Patient   []   Family   []  Other     Preference for Learning:   [x]   Verbal   []   Written   []  Demonstration     Level of Comprehension & Competence:    [x]  Good                                      [] Fair                                     []  Poor                             []  Needs Reinforcement   [x]  Teachback completed    Education Component:   [x]  Medication management, including how to administer insulin (if appropriate) and potential medication interactions    []  Nutritional management -obtain usual meal pattern   []  Exercise   []  Signs, symptoms, and treatment of hyperglycemia and hypoglycemia   [x] Prevention, recognition and treatment of hyperglycemia and hypoglycemia   [x]  Importance of blood glucose monitoring     []  Instruction on use of the blood glucose meter   [x]  Discuss the importance of HbA1C monitoring    []  Sick day guidelines   []  Proper use and disposal of lancets, needles, syringes or insulin pens (if appropriate)   []  Potential long-term complications (retinopathy, kidney disease, neuropathy, foot care)   [] Information about whom to contact in case of emergency or for more information    []  Goal:  Patient/family will demonstrate understanding of Diabetes Self Management Skills by: (date) _______  Plan for post-discharge education or self-management support:    [] Outpatient class schedule provided            [] Patient Declined    [] Scheduled for outpatient classes (date) _______  Verify:  Does patient understand how diabetes medications work? ____________________________  Does patient know what their most recent A1c is? ___________________________________  Does patient monitor glucose at home? ___________________________________________  Does patient have difficulty obtaining diabetes medications or testing supplies? _________________         Laney Erickson MS, RN, CDE  Glycemic Control Team  [phone]  Pager [phone] (M-TH 8:00-4:30P)  *After Hours pager [phone]

## 2020-08-31 NOTE — PROGRESS NOTES
0700: Assumed patient care from off going nurse [address]: Shift assessment completed at this time. Patient has c/o pain, pain medication given at this time. Will recheck pain level. Call bell left in reach, bed locked in lowest position. Shift summary: Uneventful shift, patient rested well throughout shift.

## 2020-08-31 NOTE — ROUTINE PROCESS
Bedside and Verbal shift change report given to Tamy Chiang (oncoming nurse) by Tara Vernon (offgoing nurse). Report included the following information SBAR, Kardex, ED Summary, Procedure Summary, Intake/Output, MAR and Recent Results.

## 2020-08-31 NOTE — PROGRESS NOTES
Cardiology Progress Note        Patient: Amber Valdez        Sex: female          DOA: 8/24/2020  YOB: 1945      Age:  69 y.o.        LOS:  LOS: 7 days   Assessment/Plan     Principal Problem:    Chest pain (12/3/2018)    Active Problems:    CAD (coronary artery disease) (12/3/2018)      Hypertension (12/3/2018)      Morbid obesity with BMI of 40.0-44.9, adult ([address] (12/3/2018)      Anticoagulated (8/25/2020)      Abnormal nuclear stress test (8/24/2020)      Overview: Added automatically from request for surgery [id number]      Cardiomyopathy [facility]) (8/24/2020)      Overview: Added automatically from request for surgery [id number]      Right leg weakness (8/28/2020)      CAD S/P percutaneous coronary angioplasty (8/28/2020)      Insulin dependent type 2 diabetes mellitus, uncontrolled ([facility] (8/28/2020)        Plan: Telemetry sinus rhythm with isolated PVCs and left bundle branch block. CAD  Cardiomyopathy  LBBB  Hypertension  Sleep apnea    Patient is doing well from cardiac standpoint and she is on optimized medical treatment including losartan, metoprolol, aspirin and Plavix. I will sign off. Follow-up with me in 1 month, discussed with  patient. Subjective:    cc:  CAD  Cardiomyopathy  LBBB  Hypertension  Sleep apnea        REVIEW OF SYSTEMS:     General: No fevers or chills. Cardiovascular: No chest pain or pressure. No palpitations. No ankle swelling  Pulmonary: No SOB, orthopnea, PND  Gastrointestinal: No nausea, vomiting or diarrhea      Objective:      Visit Vitals  /70   Pulse 71   Temp 97.9 °F (36.6 °C)   Resp 16   Ht 5' 4\" (1.626 m)   Wt 123.5 kg (272 lb 3.2 oz)   SpO2 100%   BMI 46.72 kg/m²     Body mass index is 46.72 kg/m². Physical Exam:  General Appearance: Comfortable, not using accessory muscles of respiration. NECK: No JVD, no thyroidomeglay. LUNGS: Clear bilaterally.    HEART: S1+S2 audible,    ABD: Non-tender, BS Audible    EXT: No edema, and no cysnosis. VASCULAR EXAM: Pulses are intact. PSYCHIATRIC EXAM: Mood is appropriate.     Medication:  Current Facility-Administered Medications   Medication Dose Route Frequency    predniSONE (DELTASONE) tablet 20 mg  20 mg Oral DAILY WITH LUNCH    cyclobenzaprine (FLEXERIL) tablet 5 mg  5 mg Oral TID PRN    insulin glargine (LANTUS) injection 26 Units  26 Units SubCUTAneous QHS    insulin lispro (HUMALOG) injection 5 Units  5 Units SubCUTAneous TIDAC    hydrALAZINE (APRESOLINE) 20 mg/mL injection 10 mg  10 mg IntraVENous Q6H PRN    losartan (COZAAR) tablet 50 mg  50 mg Oral DAILY    allopurinoL (ZYLOPRIM) tablet 100 mg  100 mg Oral DAILY    pantoprazole (PROTONIX) tablet 40 mg  40 mg Oral ACB    sodium chloride (NS) flush 5-40 mL  5-40 mL IntraVENous Q8H    sodium chloride (NS) flush 5-40 mL  5-40 mL IntraVENous PRN    clopidogreL (PLAVIX) tablet 75 mg  75 mg Oral DAILY    rivaroxaban (XARELTO) tablet 15 mg  15 mg Oral DAILY WITH LUNCH    morphine injection 2 mg  2 mg IntraVENous Q4H PRN    insulin lispro (HUMALOG) injection   SubCUTAneous AC&HS    glucose chewable tablet 16 g  16 g Oral PRN    glucagon (GLUCAGEN) injection 1 mg  1 mg IntraMUSCular PRN    dextrose 10% infusion 125-250 mL  125-250 mL IntraVENous PRN    albuterol (PROVENTIL VENTOLIN) nebulizer solution 2.5 mg  2.5 mg Nebulization Q4H PRN    aspirin delayed-release tablet 81 mg  81 mg Oral DAILY    gabapentin (NEURONTIN) capsule 300 mg  300 mg Oral TID    multivitamin, tx-iron-ca-min (THERA-M w/ IRON) tablet 1 Tab  1 Tab Oral DAILY    atorvastatin (LIPITOR) tablet 10 mg  10 mg Oral QHS    metoprolol tartrate (LOPRESSOR) tablet 25 mg  25 mg Oral Q12H    sodium chloride (NS) flush 5-40 mL  5-40 mL IntraVENous Q8H    sodium chloride (NS) flush 5-40 mL  5-40 mL IntraVENous PRN    acetaminophen (TYLENOL) tablet 650 mg  650 mg Oral Q6H PRN    Or    acetaminophen (TYLENOL) suppository 650 mg  650 mg Rectal Q6H PRN  polyethylene glycol (MIRALAX) packet 17 g  17 g Oral DAILY PRN    promethazine (PHENERGAN) tablet 12.5 mg  12.5 mg Oral Q6H PRN    Or    ondansetron (ZOFRAN) injection 4 mg  4 mg IntraVENous Q6H PRN               Lab/Data Reviewed:  Procedures/imaging: see electronic medical records for all procedures/Xrays   and details which were not copied into this note but were reviewed prior to creation of Plan       All lab results for the last 24 hours reviewed.      Recent Labs     08/30/20  0115   WBC 6.3   HGB 13.8   HCT 43.8        Recent Labs     08/30/20  0115      K 4.6      CO2 29   *   BUN 14   CREA 0.91   CA 9.2       RADIOLOGY:  CT Results  (Last 48 hours)    None        CXR Results  (Last 48 hours)    None            Cardiology Procedures:   Results for orders placed or performed during the hospital encounter of 08/24/20   EKG, 12 LEAD, INITIAL   Result Value Ref Range    Ventricular Rate 99 BPM    Atrial Rate 99 BPM    P-R Interval 146 ms    QRS Duration 158 ms    Q-T Interval 402 ms    QTC Calculation (Bezet) 515 ms    Calculated P Axis 72 degrees    Calculated R Axis -23 degrees    Calculated T Axis 108 degrees    Diagnosis       Sinus rhythm with with aberrant conduction  Left bundle branch block  Abnormal ECG  When compared with ECG of 03-MAR-2020 12:03,  premature ventricular complexes are no longer present  aberrant conduction is now present  Left bundle branch block is now present  Confirmed by iKng Meade MD, -- ([id number]) on 8/26/2020 10:43:40 AM        Echo Results  (Last 48 hours)    None       Cardiolite (Tc-99m Sestamibi) stress test    Signed By: Kj Jay MD     August 31, 2020

## 2020-08-31 NOTE — DIABETES MGMT
GLYCEMIC CONTROL PROGRESS NOTE:    - discussed in rounds, known h/o DM2, HbA1C not within recommended range for age + comorbids on basal/bolus/oral home regimen  - BG out of target range non-ICU: < 180 mg/dL  - TDD = 48 (24 Lantus + 24 units - Humalog Very Insulin Resistant Corrective Coverage)  - Prednisone 40 mg daily, steroid associated hyperglycemia  - both FGB & PPG out of target range recommend initiate mealtime insulin if prednisone to continue   *Humalog 6 units qac   *Lantus 26 units daily    Recent Glucose Results:   Lab Results   Component Value Date/Time    GLUCPOC 190 (H) 08/31/2020 06:25 AM    GLUCPOC 316 (H) 08/30/2020 09:10 PM    GLUCPOC 457 ([facility]) 08/30/2020 09:08 PM     Mario Enriquez MS, RN, CDE  Glycemic Control Team  [phone]  Pager [phone] (M-TH 8:00-4:30P)  *After Hours pager [phone]

## 2020-08-31 NOTE — ROUTINE PROCESS
Pt had a uneventful night. Bedside and Verbal shift change report given to MICHAEL Melton RN (oncoming nurse) by Lorne Pak RN (offgoing nurse). Report included the following information SBAR, Kardex, MAR, Accordion, Recent Results and Cardiac Rhythm SR BBB. Chart check completed.

## 2020-09-01 VITALS
DIASTOLIC BLOOD PRESSURE: 60 MMHG | SYSTOLIC BLOOD PRESSURE: 144 MMHG | TEMPERATURE: 98.1 F | HEIGHT: 64 IN | RESPIRATION RATE: 16 BRPM | BODY MASS INDEX: 46.47 KG/M2 | WEIGHT: 272.2 LBS | HEART RATE: 71 BPM | OXYGEN SATURATION: 100 %

## 2020-09-01 LAB
GLUCOSE BLD STRIP.AUTO-MCNC: 175 MG/DL (ref 70–110)
GLUCOSE BLD STRIP.AUTO-MCNC: 185 MG/DL (ref 70–110)

## 2020-09-01 PROCEDURE — 74011636637 HC RX REV CODE- 636/637: Performed by: INTERNAL MEDICINE

## 2020-09-01 PROCEDURE — 82962 GLUCOSE BLOOD TEST: CPT

## 2020-09-01 PROCEDURE — 74011250637 HC RX REV CODE- 250/637: Performed by: HOSPITALIST

## 2020-09-01 PROCEDURE — 74011636637 HC RX REV CODE- 636/637: Performed by: HOSPITALIST

## 2020-09-01 PROCEDURE — 74011250637 HC RX REV CODE- 250/637: Performed by: INTERNAL MEDICINE

## 2020-09-01 PROCEDURE — 74011250636 HC RX REV CODE- 250/636: Performed by: FAMILY MEDICINE

## 2020-09-01 PROCEDURE — 74011250637 HC RX REV CODE- 250/637: Performed by: FAMILY MEDICINE

## 2020-09-01 PROCEDURE — 74011250636 HC RX REV CODE- 250/636: Performed by: INTERNAL MEDICINE

## 2020-09-01 RX ORDER — CLOPIDOGREL BISULFATE 75 MG/1
75 TABLET ORAL DAILY
Qty: 1 TAB | Refills: 0 | Status: SHIPPED
Start: 2020-09-01 | End: 2021-07-29

## 2020-09-01 RX ORDER — CYCLOBENZAPRINE HCL 10 MG
5 TABLET ORAL
Qty: 10 TAB | Refills: 0 | Status: SHIPPED | OUTPATIENT
Start: 2020-09-01 | End: 2021-07-29

## 2020-09-01 RX ORDER — INSULIN LISPRO 100 [IU]/ML
INJECTION, SOLUTION INTRAVENOUS; SUBCUTANEOUS
Qty: 1 VIAL | Refills: 0 | Status: SHIPPED
Start: 2020-09-01

## 2020-09-01 RX ORDER — PREDNISONE 20 MG/1
20 TABLET ORAL
Qty: 4 TAB | Refills: 0 | Status: SHIPPED
Start: 2020-09-01 | End: 2020-09-05

## 2020-09-01 RX ORDER — POLYETHYLENE GLYCOL 3350 17 G/17G
17 POWDER, FOR SOLUTION ORAL DAILY
Qty: 1 PACKET | Refills: 0 | Status: SHIPPED
Start: 2020-09-01

## 2020-09-01 RX ORDER — ATORVASTATIN CALCIUM 10 MG/1
10 TABLET, FILM COATED ORAL
Qty: 30 TAB | Refills: 0 | Status: SHIPPED
Start: 2020-09-01

## 2020-09-01 RX ORDER — LOSARTAN POTASSIUM 50 MG/1
50 TABLET ORAL DAILY
Qty: 1 TAB | Refills: 0 | Status: SHIPPED
Start: 2020-09-01 | End: 2021-11-06

## 2020-09-01 RX ORDER — INSULIN GLARGINE 100 [IU]/ML
26 INJECTION, SOLUTION SUBCUTANEOUS
Qty: 1 VIAL | Refills: 0 | Status: SHIPPED | OUTPATIENT
Start: 2020-09-01

## 2020-09-01 RX ORDER — METOPROLOL TARTRATE 25 MG/1
25 TABLET, FILM COATED ORAL EVERY 12 HOURS
Qty: 2 TAB | Refills: 0 | Status: SHIPPED
Start: 2020-09-01 | End: 2021-07-29

## 2020-09-01 RX ADMIN — INSULIN LISPRO 3 UNITS: 100 INJECTION, SOLUTION INTRAVENOUS; SUBCUTANEOUS at 06:44

## 2020-09-01 RX ADMIN — GABAPENTIN 300 MG: 300 CAPSULE ORAL at 10:10

## 2020-09-01 RX ADMIN — CLOPIDOGREL BISULFATE 75 MG: 75 TABLET ORAL at 10:10

## 2020-09-01 RX ADMIN — HYDRALAZINE HYDROCHLORIDE 10 MG: 20 INJECTION INTRAMUSCULAR; INTRAVENOUS at 05:24

## 2020-09-01 RX ADMIN — LOSARTAN POTASSIUM 50 MG: 50 TABLET, FILM COATED ORAL at 10:10

## 2020-09-01 RX ADMIN — PANTOPRAZOLE SODIUM 40 MG: 40 TABLET, DELAYED RELEASE ORAL at 06:49

## 2020-09-01 RX ADMIN — MORPHINE SULFATE 2 MG: 2 INJECTION, SOLUTION INTRAMUSCULAR; INTRAVENOUS at 05:30

## 2020-09-01 RX ADMIN — INSULIN LISPRO 3 UNITS: 100 INJECTION, SOLUTION INTRAVENOUS; SUBCUTANEOUS at 13:04

## 2020-09-01 RX ADMIN — ALLOPURINOL 100 MG: 100 TABLET ORAL at 10:10

## 2020-09-01 RX ADMIN — METOPROLOL TARTRATE 25 MG: 25 TABLET, FILM COATED ORAL at 10:10

## 2020-09-01 RX ADMIN — ASPIRIN 81 MG: 81 TABLET, COATED ORAL at 10:10

## 2020-09-01 RX ADMIN — PREDNISONE 20 MG: 20 TABLET ORAL at 13:03

## 2020-09-01 RX ADMIN — INSULIN LISPRO 5 UNITS: 100 INJECTION, SOLUTION INTRAVENOUS; SUBCUTANEOUS at 10:11

## 2020-09-01 RX ADMIN — INSULIN LISPRO 5 UNITS: 100 INJECTION, SOLUTION INTRAVENOUS; SUBCUTANEOUS at 13:04

## 2020-09-01 RX ADMIN — RIVAROXABAN 15 MG: 15 TABLET, FILM COATED ORAL at 13:03

## 2020-09-01 RX ADMIN — MULTIPLE VITAMINS W/ MINERALS TAB 1 TABLET: TAB at 10:10

## 2020-09-01 RX ADMIN — Medication 10 ML: at 05:38

## 2020-09-01 NOTE — PROGRESS NOTES
DC Plan: To [facility] today via medical transport at 1p    Please include all hard scripts for controlled substances, med rec and dc summary in packet. Please medicate for pain prior to dc if possible and needed to help offset delay when patient first arrives to facility. Transition of Care to SNF: [facility]  Communication to Patient/Family:  Met with patient and family, and they are agreeable to the transition plan. The Plan for Transition of Care is related to the following treatment goals: SNF    The Patient and/or patient representative son Melly Hartley was provided with a choice of provider and agrees   with the discharge plan. Yes [x] No []    Freedom of choice list was provided with basic dialogue that supports the patient's individualized plan of care/goals and shares the quality data associated with the providers. Yes [x] No []    SNF/Rehab Transition:  Patient has been accepted to [facility] at [address]. and meets criteria for admission. Patient will transported by medical transport and expected to leave at 1p. Communication to SNF/Rehab:  Bedside RN, [facility], has been notified to update the transition plan to the facility and call report [phone]    Discharge information has been updated on the AVS and sent via [facility] and or CC Link. Discharge instructions to be fax'd to facility at [phone]     Please include all hard scripts for controlled substances, med rec , AVS, and dc summary in packet. Please medicate for pain prior to dc if possible and needed to help offset delay when patient first arrives to facility. Reviewed and confirmed with facility, [facility] can manage the patient care needs for the following:     Nikia Chisholm with (X) only those applicable:  Medication:  [x]Medications are available at the facility  []IV Antibiotics    [x]Controlled Substance  hard copies available sent.   []Weekly Labs Equipment:  []CPAP/BiPAP  []Wound Vacuum  []Arriaza or Urinary Device  []PICC/Central Line  []Nebulizer  []Ventilator    Treatment:  []Isolation (for MRSA, VRE, etc.)  []Surgical Drain Management  []Tracheostomy Care  []Dressing Changes  []Dialysis with transportation  []PEG Care  []Oxygen  []Daily Weights for Heart Failure    Dietary:  []Any diet limitations  []Tube Feedings   []Total Parenteral Management (TPN)    Financial Resources:  []Medicaid Application Completed    []UAI Completed and copy given to pt/family    []A screening has previously been completed. []Level II Completed    [] Private pay individual who will not become   financially eligible for Medicaid within 6 months from admission to a [address] [place] [address] facility. [x] Individual refused to have screening conducted. []Medicaid Application Completed    []The screening denied because it was determined individual did not need/did not qualify for nursing facility level of care. [] Out of state residents seeking direct admission to a [address] facility. [] Individuals who are inpatients of an out of state hospital, or in state or out of state veterans/ hospital and seek direct admission to a [address] facility  [] Individuals who are pateints or residents of a state owned/operated facility that is licensed by Department of [employer] ([facility]) and seek direct admission to [address] [place] [address]  [] A screening not required for enrollment in [facility] services as set out in [address] [place] [address] 35-  [] [facility]) staff shall perform screenings of the [facility] clients. Advanced Care Plan:  []Surrogate Decision Maker of Care  []POA  []Communicated Code Status and copy sent.     Other:

## 2020-09-01 NOTE — DISCHARGE SUMMARY
[address]    Name:  Meghan Delarosa  MR#:   [id number]  :  1945  ACCOUNT #:  [de-identified]  ADMIT DATE:  2020  DISCHARGE DATE:  2020      DISPOSITION:  The patient is going to skilled nursing facility today as her disposition. DIET:  Currently low-sodium diabetic. ACTIVITY:  Her activity is with a rolling walker and with PT and OT to continue. MEDS FOR DISCHARGE:  Include,  1. Albuterol 2 puffs every 4 hours as needed for shortness of breath. 2.  Allopurinol 100 mg twice daily. 3.  Aspirin 81 mg daily. 4.  Lipitor 10 mg at night. 5.  Plavix 75 mg daily. 6.  Flexeril 5 mg three times a day as needed for muscle spasm. 7.  Diclofenac gel to affected areas four times daily as needed. 8.  Durezol ophthalmic drops, 1 drop to right eye four times daily. 9.  Neurontin 300 mg three times daily. 10.  Humalog KwikPen sliding scale per protocol. 11.  Lantus 26 units subcu at night. 12.  Lisinopril 10 mg daily. 13.  Losartan 50 mg daily. 14.  Metformin 500 mg twice daily. 15.  Lopressor 25 mg twice daily. 16.  Multivitamin once a day. 17.  Prilosec 20 mg daily. 18.  MiraLax 17 g daily as needed. 19.  Prednisone 20 mg daily for 4 days. 20.  Xarelto 15 mg daily. 21.  Vitamin D 50,000 units weekly. CONSULTANTS:  Dr. Trisha Baez, Cardiology. PROCEDURES PERFORMED:  Cardiac stent placement. DISCHARGE DIAGNOSES:  1. Coronary artery disease status post stent placement. 2.  Morbid obesity. 3.  Acute-on-chronic systolic congestive heart failure. 4.  Insulin-dependent diabetes with hyperglycemia. 5.  History of deep venous thrombosis, on Xarelto. 6.  Right leg weakness and pain due to lumbar spine disease. 7.  Hyperlipidemia. 8.  Hypertension. 9.  History of gout. 10.  Diabetic neuropathy. HOSPITAL SUMMARY:  This 82-year-old [facility] female came into the emergency room on the aforementioned date due to chest pain.   She was admitted to the telemetry unit. She had a nuclear stress test on 08/25, but it could not exclude inferolateral ischemia. She subsequently went for cardiac cath procedure on 08/26. A single stent was placed. It was drug-eluting in the proximal circumflex lesion. She tolerated that well. Her echocardiogram shows a depressed ejection fraction 35%-40%. She has been monitored for her blood sugars here. They have been between 185 and 370 in the last 24 hours. Her hemoglobin A1c is 8.3%. Her cardiac markers were normal.  There is no evidence for acute MI or non-STEMI. Her BUN and creatinine are stable at 14 and 0.91 with a GFR greater than 60. She developed some right leg pain and what she felt was weakness, so she had an MRI of her brain on 08/28 that showed mild atrophy, no acute infarction, extra-axial mass superolateral right side of the posterior fossa, likely a meningioma. MRI of the lumbar spine was completed as well that showed multilevel lumbar spondylosis with greatest conspicuity across the L3-S1 segment, disk bulge with right paracentral protrusion at L5-S1, moderate severity bilateral neuroforaminal narrowing at L4-L5 and L5-S1. No high-grade spinal canal stenosis, multilevel facet joint osteoarthritis, and no signal to suggest fracture. With physical therapy and low-dose steroids, her right leg pain and weakness have been getting better. She is progressing with physical therapy. She has been approved to go to skilled nursing to continue her work there. The patient is awake and alert today, in no acute distress. She is in good spirits. Her vital signs are stable. Blood pressure is 148/74, pulse 58, temp 97.4, respiratory rate of 16, SaO2 is 100% on room air. Morbidly obese  elderly female, who is pleasant and cooperative, oriented x3. Lungs are clear bilaterally. Cardiac exam, regular rate and rhythm. No murmur. Abdomen is obese, soft, nontender.   Lower extremities, no pitting edema. Plan transition to the skilled nursing facility today. Meds as noted above. Follow up with her primary care physician, Dr. Vi Montiel in 2 weeks after discharge from rehab and with Dr. Aurea Oviedo, Cardiology due to her stent placement in 2 weeks as well. Forty five minutes on discharge time.       Scar Argueta MD      [id number]  D:  09/01/2020 10:07  T:  09/01/2020 11:09  JOB #:  [id number]

## 2020-09-01 NOTE — DISCHARGE INSTRUCTIONS
Cardiac Catheterization/Angiography Discharge Instructions    *Check the puncture site frequently for swelling or bleeding. If you see any bleeding, lie down and apply pressure over the area with a clean town or washcloth. Notify your doctor for any redness, swelling, drainage or oozing from the puncture site. Notify your doctor for any fever or chills. *If the leg or arm with the puncture becomes cold, numb or painful, call Dr Suzanne Gallardo     *Activity should be limited for the next 48 hours. Climb stairs as little as possible and avoid any stooping, bending or strenuous activity for 48 hours. No heavy lifting (anything over 10 pounds) for three days. *Do not drive for 48 hours. *You may resume your usual diet. Drink more fluids than usual.    *Have a responsible person drive you home and stay with you for at least 24 hours after your heart catheterization/angiography. *You may remove the bandage from your {ARM/GROIN:08599} in 24 hours. You may shower in 24 hours. No tub baths, hot tubs or swimming for one week. Do not place any lotions, creams, powders, ointments over the puncture site for one week. You may place a clean band-aid over the puncture site each day for 5 days. Change this daily.

## 2020-09-01 NOTE — PROGRESS NOTES
2029:  Notified by other MICHAEL Mast Rn that patient was found on floor. Went to patient room and found patient sitting on floor. She stated, \"I fell, I tried to use that thing (pointing to walker) and it slipped away from me. \"  Patient denies injury, states she did not hit her head. Patient was able to get herself up into a chair with minimal assistance. Patient is alert and oriented, no obvious injury noted. Will notify MD.  Patient declines notification of family members at this time. 2110:  Notified MD re: patient fall. New new orders received. Post Fall Documentation      Saran Sanchez unwitnessed fall occurred on 08/31/20 (Date) at 2029 (Time). The answers to the following questions summarize the fall: In the patient's own words,:  · What were you attempting to do when you fell? \"I was trying to get on the potty\"  · Do you know why you fell? \"I tried to use that thing (pointing at walker) and it slipped away from me\"  · Do you have any pain/discomfort or any other complaints? \"No, I'm fine, just embarrassed\"  · Which part of your body made contact with the floor or other object? \"I landed on my booty\"    Nurse:  [facility] Was this an assisted fall? no   Was fall witnessed? No   If witnessed, what part of the body made contact with the floor or other object?  n/a   Patients mental status after the fall/when found: Alert and oriented   Any apparent injury:  No apparent injury   Immediate interventions for injury/suspected injury? No interventions needed   Patient assisted back to bed? No assistance needed/moved self to bed (patient got herself to chair with no assistance)   Name of provider notified and time, any comments? Dr. Elizabeth Arceo notified 08/31/20 at 2110. No new orders received.  Name of family member notified and time: patient declined      Immediate VS and physical assessment documented in flow sheets.     Neuro assessment every hour x 4 (for potential head injury or unwitnessed fall) documented in flow sheets. Ashley Sandoval RN     0005:  Reassessment completed. Patient resting quietly in bed with no needs noted. 0200:  Bedside and Verbal shift change report given to Malathi Harris RN (oncoming nurse) by KRIS Olivas 310, [address] [facility] (offgoing nurse). Report included the following information SBAR, Kardex, Intake/Output, MAR and Recent Results.

## 2020-09-01 NOTE — PROGRESS NOTES
BP elevated through the night, asymptomatic Hydralazine 10 mg IV given  Will monitor. 0630 - BP gradually decreased to WNL. Patient received Morphine 2 mg IV for pain to right knee rating at 8/10 which provided relief. BS this a.m 185. Patient Vitals for the past 8 hrs:   Temp Pulse Resp BP SpO2   09/01/20 0647 97.4 °F (36.3 °C) 68 16 148/74    09/01/20 0525  68  154/73    09/01/20 0448 97.8 °F (36.6 °C) 65 16 169/71 100 %   09/01/20 0013 98 °F (36.7 °C) 68 16 164/79 100 %         0730  - Bedside and Verbal shift change report given to JOSE CRUZ Sheth RN (oncoming nurse) by Alma Gill (offgoing nurse). Report included the following information SBAR, Kardex, Intake/Output and MAR.

## 2020-09-01 NOTE — DIABETES MGMT
GLYCEMIC CONTROL PROGRESS NOTE:    - discussed in rounds, known h/o DM2, HbA1C not within recommended range for age + comorbids on basal/bolus/oral home regimen  - BG out of target range non-ICU: < 180 mg/dL  - TDD = 64 (26 Lantus + 5 (MTI) + 33 units - Humalog Very Insulin Resistant Corrective Coverage)  - decrease in Prednisone 20 mg daily, steroid associated hyperglycemia  - both FGB & PPG out of target range recommend increase mealtime insulin if prednisone to continue   *Humalog 9 units qac    Addendum:  - pt educated on home changes to home insulin regimen & importance of consistency   - verbalized understanding    Recent Glucose Results:   Lab Results   Component Value Date/Time    GLUCPOC 185 (H) 09/01/2020 06:44 AM    GLUCPOC 302 (H) 08/31/2020 09:45 PM    GLUCPOC 370 (H) 08/31/2020 03:53 PM     Lucrecia Jain MS, RN, CDE  Glycemic Control Team  [phone]  Pager [phone] (- 8:00-4:30P)  *After Hours pager [phone]

## 2020-09-01 NOTE — PROGRESS NOTES
Problem: Falls - Risk of  Goal: *Absence of Falls  Description: Document Naren Hughes Fall Risk and appropriate interventions in the flowsheet.   Outcome: Resolved/Met  Note: Fall Risk Interventions:  Mobility Interventions: Assess mobility with egress test, Bed/chair exit alarm, OT consult for ADLs, Patient to call before getting OOB, PT Consult for mobility concerns, PT Consult for assist device competence, Strengthening exercises (ROM-active/passive)         Medication Interventions: Assess postural VS orthostatic hypotension, Patient to call before getting OOB, Teach patient to arise slowly    Elimination Interventions: Bed/chair exit alarm, Call light in reach, Stay With Me (per policy), Toilet paper/wipes in reach, Toileting schedule/hourly rounds    History of Falls Interventions: Bed/chair exit alarm, Investigate reason for fall, Room close to nurse's station, Vital signs minimum Q4HRs X 24 hrs (comment for end date)         Problem: Patient Education: Go to Patient Education Activity  Goal: Patient/Family Education  Outcome: Resolved/Met     Problem: General Medical Care Plan  Goal: *Vital signs within specified parameters  Outcome: Resolved/Met  Goal: *Labs within defined limits  Outcome: Resolved/Met  Goal: *Absence of infection signs and symptoms  Outcome: Resolved/Met  Goal: *Optimal pain control at patient's stated goal  Outcome: Resolved/Met  Goal: *Skin integrity maintained  Outcome: Resolved/Met  Goal: *Fluid volume balance  Outcome: Resolved/Met  Goal: *Optimize nutritional status  Outcome: Resolved/Met  Goal: *Anxiety reduced or absent  Outcome: Resolved/Met  Goal: *Progressive mobility and function (eg: ADL's)  Outcome: Resolved/Met     Problem: Patient Education: Go to Patient Education Activity  Goal: Patient/Family Education  Outcome: Resolved/Met     Problem: Pain  Goal: *Control of Pain  Outcome: Resolved/Met  Goal: *PALLIATIVE CARE:  Alleviation of Pain  Outcome: Resolved/Met     Problem: Patient Education: Go to Patient Education Activity  Goal: Patient/Family Education  Outcome: Resolved/Met     Problem: Diabetes Self-Management  Goal: *Disease process and treatment process  Description: Define diabetes and identify own type of diabetes; list 3 options for treating diabetes. Outcome: Resolved/Met  Goal: *Incorporating nutritional management into lifestyle  Description: Describe effect of type, amount and timing of food on blood glucose; list 3 methods for planning meals. Outcome: Resolved/Met  Goal: *Incorporating physical activity into lifestyle  Description: State effect of exercise on blood glucose levels. Outcome: Resolved/Met  Goal: *Developing strategies to promote health/change behavior  Description: Define the ABC's of diabetes; identify appropriate screenings, schedule and personal plan for screenings. Outcome: Resolved/Met  Goal: *Using medications safely  Description: State effect of diabetes medications on diabetes; name diabetes medication taking, action and side effects. Outcome: Resolved/Met  Goal: *Monitoring blood glucose, interpreting and using results  Description: Identify recommended blood glucose targets  and personal targets. Outcome: Resolved/Met  Goal: *Prevention, detection, treatment of acute complications  Description: List symptoms of hyper- and hypoglycemia; describe how to treat low blood sugar and actions for lowering  high blood glucose level. Outcome: Resolved/Met  Goal: *Prevention, detection and treatment of chronic complications  Description: Define the natural course of diabetes and describe the relationship of blood glucose levels to long term complications of diabetes.   Outcome: Resolved/Met  Goal: *Developing strategies to address psychosocial issues  Description: Describe feelings about living with diabetes; identify support needed and support network  Outcome: Resolved/Met  Goal: *Insulin pump training  Outcome: Resolved/Met  Goal: *Sick day guidelines  Outcome: Resolved/Met  Goal: *Patient Specific Goal (EDIT GOAL, INSERT TEXT)  Outcome: Resolved/Met     Problem: Patient Education: Go to Patient Education Activity  Goal: Patient/Family Education  Outcome: Resolved/Met     Problem:  Moderate Sedation (Adult)  Goal: *Patent airway  Outcome: Resolved/Met  Goal: *Adequate oxygenation  Outcome: Resolved/Met  Goal: *Absence of aspiration  Outcome: Resolved/Met  Goal: *Hemodynamically stable  Outcome: Resolved/Met  Goal: *Optimal pain control at patient's stated goal  Outcome: Resolved/Met  Goal: *Absence of nausea/vomiting  Outcome: Resolved/Met  Goal: *Anxiety reduced or absent  Outcome: Resolved/Met  Goal: *Absence of injury  Outcome: Resolved/Met  Goal: *Level of consciousness returns to baseline  Outcome: Resolved/Met  Goal: Interventions  Outcome: Resolved/Met     Problem: Patient Education: Go to Patient Education Activity  Goal: Patient/Family Education  Outcome: Resolved/Met     Problem: Patient Education: Go to Patient Education Activity  Goal: Patient/Family Education  Outcome: Resolved/Met     Problem: Cath Lab Procedures: Pre-Procedure  Goal: Off Pathway (Use only if patient is Off Pathway)  Outcome: Resolved/Met  Goal: Activity/Safety  Outcome: Resolved/Met  Goal: Consults, if ordered  Outcome: Resolved/Met  Goal: Diagnostic Test/Procedures  Outcome: Resolved/Met  Goal: Nutrition/Diet  Outcome: Resolved/Met  Goal: Discharge Planning  Outcome: Resolved/Met  Goal: Medications  Outcome: Resolved/Met  Goal: Respiratory  Outcome: Resolved/Met  Goal: Treatments/Interventions/Procedures  Outcome: Resolved/Met  Goal: Psychosocial  Outcome: Resolved/Met  Goal: *Verbalize description of procedure  Outcome: Resolved/Met  Goal: *Consent signed  Outcome: Resolved/Met     Problem: Cath Lab Procedures: Post-Cath Day of Procedure (Initiate SCIP Measures for Post-Op Care)  Goal: Off Pathway (Use only if patient is Off Pathway)  Outcome: Resolved/Met  Goal: Activity/Safety  Outcome: Resolved/Met  Goal: Consults, if ordered  Outcome: Resolved/Met  Goal: Diagnostic Test/Procedures  Outcome: Resolved/Met  Goal: Nutrition/Diet  Outcome: Resolved/Met  Goal: Discharge Planning  Outcome: Resolved/Met  Goal: Medications  Outcome: Resolved/Met  Goal: Respiratory  Outcome: Resolved/Met  Goal: Treatments/Interventions/Procedures  Outcome: Resolved/Met  Goal: Psychosocial  Outcome: Resolved/Met  Goal: *Procedure site is without bleeding and signs of infection six hours post sheath removal  Outcome: Resolved/Met  Goal: *Hemodynamically stable  Outcome: Resolved/Met  Goal: *Optimal pain control at patient's stated goal  Outcome: Resolved/Met     Problem: Cath Lab Procedures: Post-Cath Day 1  Goal: Off Pathway (Use only if patient is Off Pathway)  Outcome: Resolved/Met  Goal: Activity/Safety  Outcome: Resolved/Met  Goal: Diagnostic Test/Procedures  Outcome: Resolved/Met  Goal: Nutrition/Diet  Outcome: Resolved/Met  Goal: Discharge Planning  Outcome: Resolved/Met  Goal: Medications  Outcome: Resolved/Met  Goal: Respiratory  Outcome: Resolved/Met  Goal: Treatments/Interventions/Procedures  Outcome: Resolved/Met  Goal: Psychosocial  Outcome: Resolved/Met     Problem: Cath Lab Procedures: Discharge Outcomes  Goal: *Stable cardiac rhythm  Outcome: Resolved/Met  Goal: *Hemodynamically stable  Outcome: Resolved/Met  Goal: *Optimal pain control at patient's stated goal  Outcome: Resolved/Met  Goal: *Pulses palpable, skin color within defined limits, skin temperature warm  Outcome: Resolved/Met  Goal: *Lungs clear or at baseline  Outcome: Resolved/Met  Goal: *Demonstrates ability to perform prescribed activity without shortness of breath or discomfort  Outcome: Resolved/Met  Goal: *Verbalizes home exercise program, activity guidelines, cardiac precautions  Outcome: Resolved/Met  Goal: *Verbalizes understanding and describes prescribed diet  Outcome: Resolved/Met  Goal: *Lius understanding and describes medication purposes and frequencies  Outcome: Resolved/Met  Goal: *Identifies cardiac risk factors  Outcome: Resolved/Met  Goal: *No signs and symptoms of infection or wound complications  Outcome: Resolved/Met  Goal: *Anxiety reduced or absent  Outcome: Resolved/Met  Goal: *Verbalizes and demonstrates incision care  Outcome: Resolved/Met  Goal: *Understands and describes signs and symptoms to report to providers(Stroke Metric)  Outcome: Resolved/Met  Goal: *Describes follow-up/return visits to physicians  Outcome: Resolved/Met  Goal: *Describes available resources and support systems  Outcome: Resolved/Met  Goal: *Influenza immunization  Outcome: Resolved/Met  Goal: *Pneumococcal immunization  Outcome: Resolved/Met     Problem: Patient Education: Go to Patient Education Activity  Goal: Patient/Family Education  Outcome: Resolved/Met     Problem: Patient Education: Go to Patient Education Activity  Goal: Patient/Family Education  Outcome: Resolved/Met     Cammie Sykes RN

## 2020-09-01 NOTE — PROGRESS NOTES
Chart reviewed. Pt admitted to tele. Attended morning IDRs, per MD Shad Braxton, pt can dc to SNF today. Spoke with pts son Don Harrison x2 today via phone, he is aware pt will dc to [facility] today via medical transport at 1p. He agrees with plan. No concerns identified.     CM will cont to follow as needed [phone]

## 2020-09-01 NOTE — ROUTINE PROCESS
0700: received bedside shift report from Neymar Stacy RN (offgoing nurse) and assumed care of the pt. Updated white board, assessed all current needs, left bed in lowest position with wheels locked and call light within reach. 0800: received discharge orders from Dr. Daryl Barry, discharge to rehab planned. [phone]: report called to Annis Lundborg, [address] [place] [address] ([facility]), all questions answered, call back number provided, and most recent set of vitals shared. Transport team to arrive for a 1300  time. 1315: [facility] transport team here to bring pt to [facility].   
 
Joelyvette Navas RN

## 2020-09-02 LAB
HEALTH STATUS, XMCV2T: NORMAL
SARS-COV-2, COV2NT: NOT DETECTED
SOURCE, COVRS: NORMAL
SPECIMEN TYPE, XMCV1T: NORMAL

## 2020-10-09 ENCOUNTER — HOSPITAL ENCOUNTER (EMERGENCY)
Age: 75
Discharge: HOME | End: 2020-10-09
Attending: EMERGENCY MEDICINE | Admitting: EMERGENCY MEDICINE
Payer: MEDICARE

## 2020-10-09 VITALS
RESPIRATION RATE: 18 BRPM | HEART RATE: 94 BPM | BODY MASS INDEX: 44.9 KG/M2 | WEIGHT: 263 LBS | DIASTOLIC BLOOD PRESSURE: 88 MMHG | TEMPERATURE: 97.4 F | HEIGHT: 64 IN | OXYGEN SATURATION: 100 % | SYSTOLIC BLOOD PRESSURE: 154 MMHG

## 2020-10-09 DIAGNOSIS — N30.00 ACUTE CYSTITIS WITHOUT HEMATURIA: Primary | ICD-10-CM

## 2020-10-09 LAB
APPEARANCE UR: ABNORMAL
BILIRUB UR QL: ABNORMAL
COLOR UR: YELLOW
GLUCOSE UR STRIP.AUTO-MCNC: 100 MG/DL
HGB UR QL STRIP: ABNORMAL
KETONES UR QL STRIP.AUTO: ABNORMAL MG/DL
LEUKOCYTE ESTERASE UR QL STRIP.AUTO: ABNORMAL
NITRITE UR QL STRIP.AUTO: POSITIVE
PH UR STRIP: 5.5 [PH] (ref 5–8)
PROT UR STRIP-MCNC: 100 MG/DL
SP GR UR REFRACTOMETRY: 1.03 (ref 1–1.03)
UROBILINOGEN UR QL STRIP.AUTO: 1 EU/DL (ref 0.2–1)

## 2020-10-09 PROCEDURE — 87086 URINE CULTURE/COLONY COUNT: CPT

## 2020-10-09 PROCEDURE — 99284 EMERGENCY DEPT VISIT MOD MDM: CPT

## 2020-10-09 PROCEDURE — 81003 URINALYSIS AUTO W/O SCOPE: CPT

## 2020-10-09 RX ORDER — CIPROFLOXACIN 500 MG/1
500 TABLET ORAL 2 TIMES DAILY
Qty: 14 TAB | Refills: 0 | Status: SHIPPED | OUTPATIENT
Start: 2020-10-09 | End: 2020-10-16

## 2020-10-09 NOTE — ED TRIAGE NOTES
Pt c/o dysuria for 2 days;   Pt sts she attempted to go to pcp but was 10 min late for her appt and refused to see pt;  Next appt 11/5;  Denies any hematuria

## 2020-10-10 NOTE — ED NOTES
I have reviewed discharge instructions with the patient. The patient verbalized understanding. Discharge medications reviewed with patient and appropriate educational materials and side effects teaching were provided. Patient armband removed and shredded. Pt escorted to waiting room via wheelchair at this time.

## 2020-10-10 NOTE — ED PROVIDER NOTES
EMERGENCY DEPARTMENT HISTORY AND PHYSICAL EXAM    Date: 10/9/2020  Patient Name: Jaleel Wiley    History of Presenting Illness     Chief Complaint   Patient presents with    Urinary Pain          History Provided By: Patient    Additional History (Context):   Jaleel Wiley is a 70 y.o. female with PMHX Beatties, hypertension, hyperlipidemia presents to the emergency department via private vehicle C/O diarrhea x2 days. Pt denies fever, abdominal pain, nausea, vomiting, and any other sxs or complaints. PCP: Cele Shirley MD    Current Outpatient Medications   Medication Sig Dispense Refill    ciprofloxacin HCl (Cipro) 500 mg tablet Take 1 Tab by mouth two (2) times a day for 7 days. 14 Tab 0    polyethylene glycol (MIRALAX) 17 gram packet Take 1 Packet by mouth daily. 1 Packet 0    metoprolol tartrate (LOPRESSOR) 25 mg tablet Take 1 Tab by mouth every twelve (12) hours. 2 Tab 0    clopidogreL (PLAVIX) 75 mg tab Take 1 Tab by mouth daily. 1 Tab 0    cyclobenzaprine (FLEXERIL) 10 mg tablet Take 0.5 Tabs by mouth three (3) times daily as needed for Muscle Spasm(s). 10 Tab 0    atorvastatin (LIPITOR) 10 mg tablet Take 1 Tab by mouth nightly. 30 Tab 0    losartan (COZAAR) 50 mg tablet Take 1 Tab by mouth daily. 1 Tab 0    rivaroxaban (XARELTO) 15 mg tab tablet Take 1 Tab by mouth daily (with lunch). Indications: start back in am as per DR lee 1 Tab 0    insulin glargine (LANTUS) 100 unit/mL injection 26 Units by SubCUTAneous route nightly. 1 Vial 0    insulin lispro (HUMALOG) 100 unit/mL injection Use above sliding scale, start at 150-199 with 3 units if needed 1 Vial 0    lisinopriL (PRINIVIL, ZESTRIL) 10 mg tablet Take 10 mg by mouth daily.  albuterol sulfate (PROAIR RESPICLICK) 90 mcg/actuation breath activated inhaler Take 2 Puffs by inhalation.  omeprazole (PRILOSEC) 20 mg capsule Take 1 Cap by mouth two (2) times a day.  30 Cap 0    diclofenac (VOLTAREN) 1 % gel Apply 4 g to affected area four (4) times daily. 100 g 1    aspirin delayed-release 81 mg tablet Take 1 Tab by mouth daily. 30 Tab 0    metFORMIN (GLUCOPHAGE) 500 mg tablet Take 500 mg by mouth two (2) times daily (with meals). Indications: type 2 diabetes mellitus, pt taking differently once a day      multivitamin, tx-iron-ca-min (THERA-M W/ IRON) 9 mg iron-400 mcg tab tablet Take 1 Tab by mouth daily. Formulary substitution for DAILY MULTIVITAMIN      ergocalciferol (VITAMIN D2) 50,000 unit capsule Take 50,000 Units by mouth every Monday.  allopurinol (ZYLOPRIM) 100 mg tablet Take 100 mg by mouth two (2) times a day.  gabapentin (NEURONTIN) 300 mg capsule Take 300 mg by mouth three (3) times daily.  Difluprednate (DUREZOL) 0.05 % ophthalmic emulsion Administer 1 Drop to right eye four (4) times daily. In relation to cataract surgery         Past History     Past Medical History:  Past Medical History:   Diagnosis Date    CAD (coronary artery disease)     Diabetes [facility] [address]     Gastrointestinal disorder     Gout     Hypertension     MI (myocardial infarction) ([facility] [address]     Reflux        Past Surgical History:  Past Surgical History:   Procedure Laterality Date    CARDIAC SURG PROCEDURE UNLIST      PCI 15 years ago/ with stent placement    HX CATARACT REMOVAL Bilateral 2019    VASCULAR SURGERY PROCEDURE UNLIST         Family History:  Family History   Problem Relation Age of Onset    Heart Disease Mother     Stroke Father        Social History:  Social History     Tobacco Use    Smoking status: Former Smoker    Smokeless tobacco: Never Used   Substance Use Topics    Alcohol use: No    Drug use: No       Allergies:   Allergies   Allergen Reactions    Gabapentin Swelling     \"I'm back on it, they figured out it wasn't this medication causing me to swell up\"    Pcn [Penicillins] Angioedema    Lisinopril Swelling         Review of Systems   Review of Systems   Constitutional: Negative for chills and fever.   HENT: Negative for congestion, ear pain, sinus pain and sore throat. Eyes: Negative for pain and visual disturbance. Respiratory: Negative for cough and shortness of breath. Cardiovascular: Negative for chest pain and leg swelling. Gastrointestinal: Negative for abdominal pain, constipation, diarrhea, nausea and vomiting. Genitourinary: Positive for dysuria. Negative for hematuria, vaginal bleeding and vaginal discharge. Musculoskeletal: Negative for back pain and neck pain. Skin: Negative for rash and wound. Neurological: Negative for dizziness, tremors, weakness, light-headedness and numbness. All other systems reviewed and are negative. Physical Exam     Vitals:    10/09/20 1958   BP: (!) 154/88   Pulse: 94   Resp: 18   Temp: 97.4 °F (36.3 °C)   SpO2: 100%   Weight: 119.3 kg (263 lb)   Height: 5' 3.5\" (1.613 m)     Physical Exam    Nursing note and vitals reviewed    Constitutional: Elderly -American female, no acute distress  Head: Normocephalic, Atraumatic  Eyes: Pupils are equal, round, and reactive to light, EOMI  Neck: Supple, non-tender  Chest: Normal work of breathing and chest excursion bilaterally  Abdomen: Soft, non tender, non distended, normoactive bowel sounds  Back: No evidence of trauma or deformity  Extremities: No evidence of trauma or deformity, no LE edema.  No streaking erythema, vesicular lesions, ulcerations or bulla  Skin: Warm and dry, normal cap refill  Neuro: Alert and appropriate, CN intact, normal speech, moving all 4 extremities freely and symmetrically  Psychiatric: Normal mood and affect       Diagnostic Study Results     Labs -     Recent Results (from the past 12 hour(s))   URINALYSIS W/ RFLX MICROSCOPIC    Collection Time: 10/09/20  8:15 PM   Result Value Ref Range    Color YELLOW      Appearance CLOUDY      Specific gravity 1.030 1.003 - 1.030      pH (UA) 5.5 5.0 - 8.0      Protein 100 (A) NEG mg/dL    Glucose 100 (A) NEG mg/dL    Ketone TRACE (A) NEG mg/dL    Bilirubin SMALL (A) NEG      Blood MODERATE (A) NEG      Urobilinogen 1.0 0.2 - 1.0 EU/dL    Nitrites Positive (A) NEG      Leukocyte Esterase MODERATE (A) NEG         Radiologic Studies -   No orders to display     CT Results  (Last 48 hours)    None        CXR Results  (Last 48 hours)    None            Medical Decision Making   I am the first provider for this patient. I reviewed the vital signs, available nursing notes, past medical history, past surgical history, family history and social history. Vital Signs-Reviewed the patient's vital signs. Pulse Oximetry Analysis -100% on room air    Cardiac Monitor:  Rate: 94 bpm  Rhythm: Regular    Records Reviewed: Nursing Notes and Old Medical Records    Provider Notes:   70 y.o. female presenting with dysuria x2 days. Exam patient does not appear acutely ill or toxic. She has benign abdominal exam.  Will check UA and send urine culture. Procedures:  Procedures    ED Course:   8:09 PM   Initial assessment performed. The patients presenting problems have been discussed, and they are in agreement with the care plan formulated and outlined with them. I have encouraged them to ask questions as they arise throughout their visit. 8:35 PM  Patient's UA is consistent with a UTI. Patient is penicillin allergic. Will discharge with ciprofloxacin for coverage. Diagnosis and Disposition       DISCHARGE NOTE:  8:35 PM    Oneyda Garvey  results have been reviewed with her. She has been counseled regarding her diagnosis, treatment, and plan. She verbally conveys understanding and agreement of the signs, symptoms, diagnosis, treatment and prognosis and additionally agrees to follow up as discussed. She also agrees with the care-plan and conveys that all of her questions have been answered.   I have also provided discharge instructions for her that include: educational information regarding their diagnosis and treatment, and list of reasons why they would want to return to the ED prior to their follow-up appointment, should her condition change. She has been provided with education for proper emergency department utilization. CLINICAL IMPRESSION:    1. Acute cystitis without hematuria        PLAN:  1. D/C Home  2. Current Discharge Medication List      START taking these medications    Details   ciprofloxacin HCl (Cipro) 500 mg tablet Take 1 Tab by mouth two (2) times a day for 7 days. Qty: 14 Tab, Refills: 0           3. Follow-up Information     Follow up With Specialties Details Why Contact Info    Jesi Moore MD Family Medicine Schedule an appointment as soon as possible for a visit in 2 days  [facility] [id number]  [phone]      [facility] EMERGENCY DEPT Emergency Medicine  As needed if symptoms worsen [address] [address]  [phone]        ____________________________________     Please note that this dictation was completed with [employer], the computer voice recognition software. Quite often unanticipated grammatical, syntax, homophones, and other interpretive errors are inadvertently transcribed by the computer software. Please disregard these errors. Please excuse any errors that have escaped final proofreading.

## 2020-10-10 NOTE — DISCHARGE INSTRUCTIONS
You were seen and evaluated in the Emergency Department. Please understand that your work up is not all encompassing and you should follow up with your primary care physician for further management and continuity of care. Please return to Emergency Department or seek medical attention immediately if you have acute worsening in your symptoms or develop chest pain, shortness of breath, repeated vomiting, fever, altered level of consciousness, coughing up blood, or start sweating and feel clammy. If you were prescribed any medicine for home, please take as prescribed by your health-care provider. If you were given any follow-up appointments or numbers to call, please do so as instructed. Avoid any tobacco products or excessive alcohol. Patient Education        Urinary Tract Infection in Women: Care Instructions  Your Care Instructions     A urinary tract infection, or UTI, is a general term for an infection anywhere between the kidneys and the urethra (where urine comes out). Most UTIs are bladder infections. They often cause pain or burning when you urinate. UTIs are caused by bacteria and can be cured with antibiotics. Be sure to complete your treatment so that the infection goes away. Follow-up care is a key part of your treatment and safety. Be sure to make and go to all appointments, and call your doctor if you are having problems. It's also a good idea to know your test results and keep a list of the medicines you take. How can you care for yourself at home? · Take your antibiotics as directed. Do not stop taking them just because you feel better. You need to take the full course of antibiotics. · Drink extra water and other fluids for the next day or two. This may help wash out the bacteria that are causing the infection.  (If you have kidney, heart, or liver disease and have to limit fluids, talk with your doctor before you increase your fluid intake.)  · Avoid drinks that are carbonated or have caffeine. They can irritate the bladder. · Urinate often. Try to empty your bladder each time. · To relieve pain, take a hot bath or lay a heating pad set on low over your lower belly or genital area. Never go to sleep with a heating pad in place. To prevent UTIs  · Drink plenty of water each day. This helps you urinate often, which clears bacteria from your system. (If you have kidney, heart, or liver disease and have to limit fluids, talk with your doctor before you increase your fluid intake.)  · Urinate when you need to. · Urinate right after you have sex. · Change sanitary pads often. · Avoid douches, bubble baths, feminine hygiene sprays, and other feminine hygiene products that have deodorants. · After going to the bathroom, wipe from front to back. When should you call for help? Call your doctor now or seek immediate medical care if:    · Symptoms such as fever, chills, nausea, or vomiting get worse or appear for the first time.     · You have new pain in your back just below your rib cage. This is called flank pain.     · There is new blood or pus in your urine.     · You have any problems with your antibiotic medicine. Watch closely for changes in your health, and be sure to contact your doctor if:    · You are not getting better after taking an antibiotic for 2 days.     · Your symptoms go away but then come back. Where can you learn more? Go to http://www.gray.com/  Enter L664 in the search box to learn more about \"Urinary Tract Infection in Women: Care Instructions. \"  Current as of: June 29, 2020               Content Version: 12.6  © 9884-0404 [employer]. Care instructions adapted under license by [employer] (which disclaims liability or warranty for this information).  If you have questions about a medical condition or this instruction, always ask your healthcare professional. Maribeth Cruz disclaims any warranty or liability for your use of this information.

## 2020-10-11 LAB
BACTERIA SPEC CULT: NORMAL
CC UR VC: NORMAL
SERVICE CMNT-IMP: NORMAL

## 2021-07-20 ENCOUNTER — APPOINTMENT (OUTPATIENT)
Dept: GENERAL RADIOLOGY | Age: 76
DRG: 291 | End: 2021-07-20
Attending: EMERGENCY MEDICINE
Payer: MEDICARE

## 2021-07-20 ENCOUNTER — HOSPITAL ENCOUNTER (INPATIENT)
Age: 76
LOS: 3 days | Discharge: HOME | DRG: 291 | End: 2021-07-29
Attending: EMERGENCY MEDICINE | Admitting: HOSPITALIST
Payer: MEDICARE

## 2021-07-20 DIAGNOSIS — I50.9 CONGESTIVE HEART FAILURE, UNSPECIFIED HF CHRONICITY, UNSPECIFIED HEART FAILURE TYPE (HCC): Primary | ICD-10-CM

## 2021-07-20 DIAGNOSIS — R06.02 SOBOE (SHORTNESS OF BREATH ON EXERTION): ICD-10-CM

## 2021-07-20 DIAGNOSIS — I50.23 ACUTE ON CHRONIC SYSTOLIC (CONGESTIVE) HEART FAILURE (HCC): ICD-10-CM

## 2021-07-20 LAB
ALBUMIN SERPL-MCNC: 3.3 G/DL (ref 3.4–5)
ALBUMIN/GLOB SERPL: 0.8 {RATIO} (ref 0.8–1.7)
ALP SERPL-CCNC: 82 U/L (ref 45–117)
ALT SERPL-CCNC: 27 U/L (ref 13–56)
ANION GAP SERPL CALC-SCNC: 6 MMOL/L (ref 3–18)
AST SERPL-CCNC: 16 U/L (ref 10–38)
BASE EXCESS BLD CALC-SCNC: 1 MMOL/L
BASOPHILS # BLD: 0 K/UL (ref 0–0.1)
BASOPHILS NFR BLD: 1 % (ref 0–2)
BILIRUB SERPL-MCNC: 0.4 MG/DL (ref 0.2–1)
BNP SERPL-MCNC: 4181 PG/ML (ref 0–900)
BUN SERPL-MCNC: 16 MG/DL (ref 7–18)
BUN/CREAT SERPL: 17 (ref 12–20)
CALCIUM SERPL-MCNC: 8.8 MG/DL (ref 8.5–10.1)
CHLORIDE SERPL-SCNC: 106 MMOL/L (ref 100–111)
CK MB CFR SERPL CALC: 1.7 % (ref 0–4)
CK MB SERPL-MCNC: 4.3 NG/ML (ref 5–25)
CK SERPL-CCNC: 251 U/L (ref 26–192)
CO2 SERPL-SCNC: 29 MMOL/L (ref 21–32)
COVID-19 RAPID TEST, COVR: NOT DETECTED
CREAT SERPL-MCNC: 0.96 MG/DL (ref 0.6–1.3)
DIFFERENTIAL METHOD BLD: NORMAL
EOSINOPHIL # BLD: 0.1 K/UL (ref 0–0.4)
EOSINOPHIL NFR BLD: 1 % (ref 0–5)
ERYTHROCYTE [DISTWIDTH] IN BLOOD BY AUTOMATED COUNT: 13.1 % (ref 11.6–14.5)
GLOBULIN SER CALC-MCNC: 4.1 G/DL (ref 2–4)
GLUCOSE SERPL-MCNC: 201 MG/DL (ref 74–99)
HCO3 BLD-SCNC: 26.6 MMOL/L (ref 22–26)
HCT VFR BLD AUTO: 40.4 % (ref 35–45)
HGB BLD-MCNC: 13.2 G/DL (ref 12–16)
LIPASE SERPL-CCNC: 46 U/L (ref 73–393)
LYMPHOCYTES # BLD: 1.6 K/UL (ref 0.9–3.6)
LYMPHOCYTES NFR BLD: 27 % (ref 21–52)
MCH RBC QN AUTO: 29.3 PG (ref 24–34)
MCHC RBC AUTO-ENTMCNC: 32.7 G/DL (ref 31–37)
MCV RBC AUTO: 89.8 FL (ref 74–97)
MONOCYTES # BLD: 0.5 K/UL (ref 0.05–1.2)
MONOCYTES NFR BLD: 8 % (ref 3–10)
NEUTS SEG # BLD: 3.8 K/UL (ref 1.8–8)
NEUTS SEG NFR BLD: 63 % (ref 40–73)
PCO2 BLD: 44.9 MMHG (ref 35–45)
PH BLD: 7.38 [PH] (ref 7.35–7.45)
PLATELET # BLD AUTO: 228 K/UL (ref 135–420)
PMV BLD AUTO: 10.7 FL (ref 9.2–11.8)
PO2 BLD: 74 MMHG (ref 80–100)
POTASSIUM SERPL-SCNC: 4 MMOL/L (ref 3.5–5.5)
PROT SERPL-MCNC: 7.4 G/DL (ref 6.4–8.2)
RBC # BLD AUTO: 4.5 M/UL (ref 4.2–5.3)
SAO2 % BLD: 94.3 % (ref 92–97)
SERVICE CMNT-IMP: ABNORMAL
SODIUM SERPL-SCNC: 141 MMOL/L (ref 136–145)
SOURCE, COVRS: NORMAL
SPECIMEN TYPE: ABNORMAL
TROPONIN I SERPL-MCNC: 0.02 NG/ML (ref 0–0.04)
WBC # BLD AUTO: 6 K/UL (ref 4.6–13.2)

## 2021-07-20 PROCEDURE — 96374 THER/PROPH/DIAG INJ IV PUSH: CPT

## 2021-07-20 PROCEDURE — 99285 EMERGENCY DEPT VISIT HI MDM: CPT

## 2021-07-20 PROCEDURE — 93005 ELECTROCARDIOGRAM TRACING: CPT

## 2021-07-20 PROCEDURE — 80053 COMPREHEN METABOLIC PANEL: CPT

## 2021-07-20 PROCEDURE — 83690 ASSAY OF LIPASE: CPT

## 2021-07-20 PROCEDURE — 99218 HC RM OBSERVATION: CPT

## 2021-07-20 PROCEDURE — 87635 SARS-COV-2 COVID-19 AMP PRB: CPT

## 2021-07-20 PROCEDURE — 74011250636 HC RX REV CODE- 250/636: Performed by: EMERGENCY MEDICINE

## 2021-07-20 PROCEDURE — 85025 COMPLETE CBC W/AUTO DIFF WBC: CPT

## 2021-07-20 PROCEDURE — 82803 BLOOD GASES ANY COMBINATION: CPT

## 2021-07-20 PROCEDURE — 82553 CREATINE MB FRACTION: CPT

## 2021-07-20 PROCEDURE — 83880 ASSAY OF NATRIURETIC PEPTIDE: CPT

## 2021-07-20 PROCEDURE — 71045 X-RAY EXAM CHEST 1 VIEW: CPT

## 2021-07-20 RX ORDER — FAMOTIDINE 20 MG/1
20 TABLET, FILM COATED ORAL 2 TIMES DAILY
Status: DISCONTINUED | OUTPATIENT
Start: 2021-07-20 | End: 2021-07-29 | Stop reason: HOSPADM

## 2021-07-20 RX ORDER — POLYETHYLENE GLYCOL 3350 17 G/17G
17 POWDER, FOR SOLUTION ORAL DAILY PRN
Status: DISCONTINUED | OUTPATIENT
Start: 2021-07-20 | End: 2021-07-29 | Stop reason: HOSPADM

## 2021-07-20 RX ORDER — ACETAMINOPHEN 650 MG/1
650 SUPPOSITORY RECTAL
Status: DISCONTINUED | OUTPATIENT
Start: 2021-07-20 | End: 2021-07-29 | Stop reason: HOSPADM

## 2021-07-20 RX ORDER — SODIUM CHLORIDE 0.9 % (FLUSH) 0.9 %
5-40 SYRINGE (ML) INJECTION AS NEEDED
Status: DISCONTINUED | OUTPATIENT
Start: 2021-07-20 | End: 2021-07-29 | Stop reason: HOSPADM

## 2021-07-20 RX ORDER — MAGNESIUM SULFATE 100 %
16 CRYSTALS MISCELLANEOUS AS NEEDED
Status: DISCONTINUED | OUTPATIENT
Start: 2021-07-20 | End: 2021-07-29 | Stop reason: HOSPADM

## 2021-07-20 RX ORDER — FUROSEMIDE 10 MG/ML
40 INJECTION INTRAMUSCULAR; INTRAVENOUS 2 TIMES DAILY
Status: DISCONTINUED | OUTPATIENT
Start: 2021-07-21 | End: 2021-07-22

## 2021-07-20 RX ORDER — SODIUM CHLORIDE 0.9 % (FLUSH) 0.9 %
5-40 SYRINGE (ML) INJECTION EVERY 8 HOURS
Status: DISCONTINUED | OUTPATIENT
Start: 2021-07-20 | End: 2021-07-29 | Stop reason: HOSPADM

## 2021-07-20 RX ORDER — ACETAMINOPHEN 325 MG/1
650 TABLET ORAL
Status: DISCONTINUED | OUTPATIENT
Start: 2021-07-20 | End: 2021-07-29 | Stop reason: HOSPADM

## 2021-07-20 RX ORDER — DEXTROSE 50 % IN WATER (D50W) INTRAVENOUS SYRINGE
25-50 AS NEEDED
Status: DISCONTINUED | OUTPATIENT
Start: 2021-07-20 | End: 2021-07-29 | Stop reason: HOSPADM

## 2021-07-20 RX ORDER — ONDANSETRON 2 MG/ML
4 INJECTION INTRAMUSCULAR; INTRAVENOUS
Status: DISCONTINUED | OUTPATIENT
Start: 2021-07-20 | End: 2021-07-29 | Stop reason: HOSPADM

## 2021-07-20 RX ORDER — ONDANSETRON 4 MG/1
4 TABLET, ORALLY DISINTEGRATING ORAL
Status: DISCONTINUED | OUTPATIENT
Start: 2021-07-20 | End: 2021-07-29 | Stop reason: HOSPADM

## 2021-07-20 RX ORDER — FUROSEMIDE 10 MG/ML
40 INJECTION INTRAMUSCULAR; INTRAVENOUS
Status: COMPLETED | OUTPATIENT
Start: 2021-07-20 | End: 2021-07-20

## 2021-07-20 RX ORDER — INSULIN LISPRO 100 [IU]/ML
INJECTION, SOLUTION INTRAVENOUS; SUBCUTANEOUS
Status: DISCONTINUED | OUTPATIENT
Start: 2021-07-21 | End: 2021-07-29 | Stop reason: HOSPADM

## 2021-07-20 RX ADMIN — FUROSEMIDE 40 MG: 10 INJECTION, SOLUTION INTRAMUSCULAR; INTRAVENOUS at 22:21

## 2021-07-20 NOTE — ED PROVIDER NOTES
EMERGENCY DEPARTMENT HISTORY AND PHYSICAL EXAM      Date: 7/20/2021  Patient Name: Lorie Chang      History of Presenting Illness     Chief Complaint   Patient presents with    Shortness of Breath       History Provided By: Patient and Patient's Daughter    HPI: Lorie Chang, 70 y.o. female with a past medical history significant diabetes, hypertension and Juju Sarairow, coronary artery disease including MI, presents to the ED with cc of shortness of breath. Patient reports progressively worsening shortness of breath over several months. Shortness of breath is worse with exertion. This is even worsened over the last 5days including swelling of feet. She also has had a cough with occasional greenish sputum and wheezing over the last 5 days. She also reports some mild abdomen pain which she has difficulty describing. She denies fever or chills, she has no sore throat. She denies chest pain. Seems symptoms have worsened since her sleep apnea machine broke. There are no other complaints, changes, or physical findings at this time.     PCP: Paulette Celis MD    Current Facility-Administered Medications   Medication Dose Route Frequency Provider Last Rate Last Admin    allopurinoL (ZYLOPRIM) tablet 100 mg  100 mg Oral BID Andrés Brown MD        aspirin delayed-release tablet 81 mg  81 mg Oral DAILY Andrés Brown MD        atorvastatin (LIPITOR) tablet 10 mg  10 mg Oral QHS Andrés Brown MD   10 mg at 07/21/21 0120    gabapentin (NEURONTIN) capsule 300 mg  300 mg Oral TID Andrés Brown MD   300 mg at 07/21/21 0120    rivaroxaban (XARELTO) tablet 15 mg  15 mg Oral DAILY Andrés Brown MD        losartan (COZAAR) tablet 50 mg  50 mg Oral DAILY Andrés Brown MD        carvediloL (COREG) tablet 12.5 mg  12.5 mg Oral BID WITH MEALS Andrés Brown MD   12.5 mg at 07/21/21 0119    albuterol-ipratropium (DUO-NEB) 2.5 MG-0.5 MG/3 ML  3 mL Nebulization Q4H PRN Zach Gant MD        benzonatate (TESSALON) capsule 100 mg  100 mg Oral TID PRN Zach Gant MD   100 mg at 07/21/21 0704    oxyCODONE IR (ROXICODONE) tablet 5 mg  5 mg Oral Q4H PRN Juan Luis SKAGGS MD   5 mg at 07/21/21 0704    perflutren lipid microspheres (DEFINITY) in NS bolus IV  1 mL IntraVENous RAD ONCE Zach Gant MD        sodium chloride (NS) flush 5-40 mL  5-40 mL IntraVENous Q8H Zach Gant MD   10 mL at 07/21/21 0640    sodium chloride (NS) flush 5-40 mL  5-40 mL IntraVENous PRN Zach Gant MD        acetaminophen (TYLENOL) tablet 650 mg  650 mg Oral Q6H PRN Zach Gant MD        Or    acetaminophen (TYLENOL) suppository 650 mg  650 mg Rectal Q6H PRN Zach Gant MD        polyethylene glycol (MIRALAX) packet 17 g  17 g Oral DAILY PRN Zach Gant MD        ondansetron (ZOFRAN ODT) tablet 4 mg  4 mg Oral Q8H PRN Zach Gant MD        Or    [facility] injection 4 mg  4 mg IntraVENous Q6H PRN Zach Gant MD        famotidine (PEPCID) tablet 20 mg  20 mg Oral BID Zach Gant MD   20 mg at 07/21/21 0119    insulin lispro (HUMALOG) injection   SubCUTAneous AC&HS Zach Gant MD   2 Units at 07/21/21 4353    glucose chewable tablet 16 g  16 g Oral PRN Zach Gant MD        glucagon (GLUCAGEN) injection 1 mg  1 mg IntraMUSCular PRN Zach Gant MD        dextrose (D50W) injection syrg 12.5-25 g  25-50 mL IntraVENous PRN Zach Gant MD        furosemide (LASIX) injection 40 mg  40 mg IntraVENous BID Zach Gant MD        azithromycin (ZITHROMAX) 500 mg in 0.9% sodium chloride 250 mL (VIAL-MATE)  500 mg IntraVENous Q24H Zach Gant  mL/hr at 07/21/21 0120 500 mg at 07/21/21 0120       Past History     Past Medical History:  Past Medical History:   Diagnosis Date    CAD (coronary artery disease)     Diabetes [facility] [address] [facility]     Gastrointestinal disorder     Gout     Hypertension     MI [facility]ocardial infarction) ([facility]     Morbid obesity with BMI of 45.0-49.9, adult ([facility] [address] [facility]     Reflux        Past Surgical History:  Past Surgical History:   Procedure Laterality Date    HX CATARACT REMOVAL Bilateral 2019    AR CARDIAC SURG PROCEDURE UNLIST      PCI 15 years ago/ with stent placement    VASCULAR SURGERY PROCEDURE UNLIST         Family History:  Family History   Problem Relation Age of Onset    Heart Disease Mother     Stroke Father        Social History:  Social History     Tobacco Use    Smoking status: Former Smoker    Smokeless tobacco: Never Used   Substance Use Topics    Alcohol use: No    Drug use: No       Allergies: Allergies   Allergen Reactions    Gabapentin Swelling     \"I'm back on it, they figured out it wasn't this medication causing me to swell up\"    Pcn [Penicillins] Angioedema    Lisinopril Swelling         Review of Systems     Review of Systems   Constitutional: Negative for chills and fever. HENT: Negative for congestion and sore throat. Respiratory: Positive for cough, shortness of breath and wheezing. Cardiovascular: Negative for chest pain. Gastrointestinal: Negative for abdominal distention, nausea and vomiting. Genitourinary: Negative for difficulty urinating, dysuria, flank pain, frequency, hematuria, urgency, vaginal bleeding and vaginal discharge. Musculoskeletal: Positive for joint swelling. Negative for arthralgias. Skin: Negative for rash and wound. Neurological: Negative for dizziness, weakness, light-headedness and headaches. Hematological: Negative for adenopathy. Physical Exam     Physical Exam  Vitals and nursing note reviewed. Constitutional:       General: She is not in acute distress. Appearance: She is well-developed. She is obese. She is not diaphoretic. HENT:      Head: Normocephalic and atraumatic. Jaw: No trismus.       Right Ear: External ear normal. No swelling or tenderness. Tympanic membrane is not perforated, erythematous or bulging. Left Ear: External ear normal. No swelling or tenderness. Tympanic membrane is not perforated, erythematous or bulging. Nose: Nose normal. No mucosal edema or rhinorrhea. Right Sinus: No maxillary sinus tenderness or frontal sinus tenderness. Left Sinus: No maxillary sinus tenderness or frontal sinus tenderness. Mouth/Throat:      Mouth: No oral lesions. Dentition: No dental abscesses. Pharynx: Uvula midline. No oropharyngeal exudate, posterior oropharyngeal erythema or uvula swelling. Tonsils: No tonsillar abscesses. Eyes:      General: No scleral icterus. Right eye: No discharge. Left eye: No discharge. Conjunctiva/sclera: Conjunctivae normal.   Cardiovascular:      Rate and Rhythm: Normal rate and regular rhythm. Heart sounds: Normal heart sounds. No murmur heard. No friction rub. No gallop. Pulmonary:      Effort: Pulmonary effort is normal. No tachypnea, accessory muscle usage or respiratory distress. Breath sounds: Normal breath sounds. No decreased breath sounds, wheezing, rhonchi or rales. Comments: Decreased breath sounds both bases, no wheezing  Abdominal:      Palpations: Abdomen is soft. Musculoskeletal:         General: No tenderness. Normal range of motion. Cervical back: Normal range of motion and neck supple. Right lower leg: Edema present. Left lower leg: Edema present. Comments: 1+ edema both legs. Lymphadenopathy:      Cervical: No cervical adenopathy. Skin:     General: Skin is warm and dry. Findings: No erythema or rash. Neurological:      Mental Status: She is alert and oriented to person, place, and time.    Psychiatric:         Judgment: Judgment normal.         Lab and Diagnostic Study Results     Labs -     Recent Results (from the past 12 hour(s))   COVID-19 RAPID TEST    Collection Time: 07/20/21  8:12 PM   Result Value Ref Range    Specimen source Nasopharyngeal      COVID-19 rapid test Not detected NOTD     BLOOD GAS, ARTERIAL POC    Collection Time: 07/20/21  8:27 PM   Result Value Ref Range    pH (POC) 7.38 7.35 - 7.45      pCO2 (POC) 44.9 35.0 - 45.0 MMHG    pO2 (POC) 74 (L) 80 - 100 MMHG    HCO3 (POC) 26.6 (H) 22 - 26 MMOL/L    sO2 (POC) 94.3 92 - 97 %    Base excess (POC) 1.0 mmol/L    Specimen type (POC) ARTERIAL      Performed by Westley Camacho    CARDIAC PANEL,(CK, CKMB & TROPONIN)    Collection Time: 07/21/21  3:00 AM   Result Value Ref Range    CK - MB 3.7 (H) <3.6 ng/ml    CK-MB Index 1.4 0.0 - 4.0 %     (H) 26 - 192 U/L    Troponin-I, QT 0.02 0.0 - 3.345 NG/ML   METABOLIC PANEL, COMPREHENSIVE    Collection Time: 07/21/21  3:00 AM   Result Value Ref Range    Sodium 139 136 - 145 mmol/L    Potassium 3.8 3.5 - 5.5 mmol/L    Chloride 104 100 - 111 mmol/L    CO2 31 21 - 32 mmol/L    Anion gap 4 3.0 - 18 mmol/L    Glucose 220 (H) 74 - 99 mg/dL    BUN 16 7.0 - 18 MG/DL    Creatinine 0.96 0.6 - 1.3 MG/DL    BUN/Creatinine ratio 17 12 - 20      GFR est AA >60 >60 ml/min/1.73m2    GFR est non-AA 57 (L) >60 ml/min/1.73m2    Calcium 9.1 8.5 - 10.1 MG/DL    Bilirubin, total 0.5 0.2 - 1.0 MG/DL    ALT (SGPT) 24 13 - 56 U/L    AST (SGOT) 17 10 - 38 U/L    Alk.  phosphatase 80 45 - 117 U/L    Protein, total 7.3 6.4 - 8.2 g/dL    Albumin 3.2 (L) 3.4 - 5.0 g/dL    Globulin 4.1 (H) 2.0 - 4.0 g/dL    A-G Ratio 0.8 0.8 - 1.7     CBC W/O DIFF    Collection Time: 07/21/21  3:00 AM   Result Value Ref Range    WBC 6.2 4.6 - 13.2 K/uL    RBC 4.45 4.20 - 5.30 M/uL    HGB 12.6 12.0 - 16.0 g/dL    HCT 40.1 35.0 - 45.0 %    MCV 90.1 74.0 - 97.0 FL    MCH 28.3 24.0 - 34.0 PG    MCHC 31.4 31.0 - 37.0 g/dL    RDW 13.1 11.6 - 14.5 %    PLATELET 763 568 - 956 K/uL    MPV 10.7 9.2 - 11.8 FL   GLUCOSE, POC    Collection Time: 07/21/21  6:35 AM   Result Value Ref Range    Glucose (POC) 176 (H) 70 - 110 mg/dL       Radiologic Studies -   [unfilled]  CT Results  (Last 48 hours)    None        CXR Results  (Last 48 hours)               07/20/21 1930  XR CHEST PORT Final result    Impression:  Interstitial infiltrate at the right lung base suggesting pneumonia   or pulmonary edema. Narrative:  EXAM:  AP Portable Chest X-ray 1 view        INDICATION: Shortness of breath       COMPARISON: August 24, 2020 and CT dated May 24, 2019       _______________       FINDINGS:  Heart size is enlarged partly due to AP magnification and mediastinal   contours are within normal limits for portable radiograph. There is interstitial   infiltrate at the right lung base. There are no pleural effusions. No acute   osseous findings. ________________                     Medical Decision Making and ED Course   - I am the first and primary provider for this patient AND AM THE PRIMARY PROVIDER OF RECORD. - I reviewed the vital signs, available nursing notes, past medical history, past surgical history, family history and social history. - Initial assessment performed. The patients presenting problems have been discussed, and the staff are in agreement with the care plan formulated and outlined with them. I have encouraged them to ask questions as they arise throughout their visit. Vital Signs-Reviewed the patient's vital signs. Patient Vitals for the past 12 hrs:   Temp Pulse Resp BP SpO2   07/21/21 0350 98.1 °F (36.7 °C) 63 24 130/67 100 %   07/21/21 0212     96 %   07/21/21 0039 98.3 °F (36.8 °C) 77 20 (!) 154/101 96 %   07/20/21 2239  92 21 (!) 182/103 95 %   07/20/21 2230  92 (!) 31  94 %   07/20/21 2100  91 23 (!) 151/108 90 %   07/20/21 2030  95 23 (!) 151/96    07/20/21 2000  97 17 (!) 174/129 93 %       EKG interpretation: (Preliminary): Performed at 19:17, and read at 19:17  Rhythm: normal sinus rhythm; and regular .  Rate (approx.): 97; Axis: normal; TX interval: normal; QRS interval: normal ; ST/T wave: non-specific changes; Other findings: unchanged from previous ekg. Records Reviewed: Nursing Notes and Old Medical Records    The patient presents with tautness of breath with a differential diagnosis of CHF, COPD, PE, MI, pleural effusions, pneumonia, pneumothorax, medication reaction    ED Course:   Patient describing progressive worsening dyspnea on exertion and now with shortness of breath constantly over the last few days. Signs symptom complex consistent with CHF. Last echo August last year with EF 35 to 40%. She at that time had a stent. She is on Xarelto. Reviewed her medications and she is not on any diuretics. Chest x-ray is consistent with CHF. Patient given 40 mg Lasix in ED. Consult with hospitalist admission for further diuresis. Provider Notes (Medical Decision Making):               Consultations:       Consultations:         Procedures and Critical Care                 Disposition     Disposition: Admitted to telemetry the case was discussed with the admitting physician Chris    Diagnosis:   1. Congestive heart failure, unspecified HF chronicity, unspecified heart failure type (HCC)                Clinical Impression:   1. Congestive heart failure, unspecified HF chronicity, unspecified heart failure type [facility])        Attestations:    Rhina George MD    Please note that this dictation was completed with [employer], the computer voice recognition software. Quite often unanticipated grammatical, syntax, homophones, and other interpretive errors are inadvertently transcribed by the computer software. Please disregard these errors. Please excuse any errors that have escaped final proofreading. Thank you.

## 2021-07-20 NOTE — ED TRIAGE NOTES
Patient reports short of breath more upon exertion and swollen MARVIN feet x5 days    Denies vaccination

## 2021-07-21 ENCOUNTER — APPOINTMENT (OUTPATIENT)
Dept: NON INVASIVE DIAGNOSTICS | Age: 76
DRG: 291 | End: 2021-07-21
Attending: FAMILY MEDICINE
Payer: MEDICARE

## 2021-07-21 PROBLEM — G47.33 OSA (OBSTRUCTIVE SLEEP APNEA): Status: ACTIVE | Noted: 2021-07-21

## 2021-07-21 PROBLEM — Z79.4 INSULIN DEPENDENT TYPE 2 DIABETES MELLITUS (HCC): Status: ACTIVE | Noted: 2020-08-28

## 2021-07-21 PROBLEM — J18.9 CAP (COMMUNITY ACQUIRED PNEUMONIA): Status: ACTIVE | Noted: 2021-07-21

## 2021-07-21 PROBLEM — E11.9 INSULIN DEPENDENT TYPE 2 DIABETES MELLITUS (HCC): Status: ACTIVE | Noted: 2020-08-28

## 2021-07-21 PROBLEM — Z95.5 S/P DRUG ELUTING CORONARY STENT PLACEMENT: Status: ACTIVE | Noted: 2021-07-21

## 2021-07-21 LAB
ALBUMIN SERPL-MCNC: 3.2 G/DL (ref 3.4–5)
ALBUMIN/GLOB SERPL: 0.8 {RATIO} (ref 0.8–1.7)
ALP SERPL-CCNC: 80 U/L (ref 45–117)
ALT SERPL-CCNC: 24 U/L (ref 13–56)
ANION GAP SERPL CALC-SCNC: 4 MMOL/L (ref 3–18)
AST SERPL-CCNC: 17 U/L (ref 10–38)
BILIRUB SERPL-MCNC: 0.5 MG/DL (ref 0.2–1)
BUN SERPL-MCNC: 16 MG/DL (ref 7–18)
BUN/CREAT SERPL: 17 (ref 12–20)
CALCIUM SERPL-MCNC: 9.1 MG/DL (ref 8.5–10.1)
CHLORIDE SERPL-SCNC: 104 MMOL/L (ref 100–111)
CK MB CFR SERPL CALC: 1.3 % (ref 0–4)
CK MB CFR SERPL CALC: 1.3 % (ref 0–4)
CK MB CFR SERPL CALC: 1.4 % (ref 0–4)
CK MB SERPL-MCNC: 2.9 NG/ML (ref 5–25)
CK MB SERPL-MCNC: 2.9 NG/ML (ref 5–25)
CK MB SERPL-MCNC: 3.7 NG/ML (ref 5–25)
CK SERPL-CCNC: 222 U/L (ref 26–192)
CK SERPL-CCNC: 227 U/L (ref 26–192)
CK SERPL-CCNC: 257 U/L (ref 26–192)
CO2 SERPL-SCNC: 31 MMOL/L (ref 21–32)
CREAT SERPL-MCNC: 0.96 MG/DL (ref 0.6–1.3)
ECHO AO ASC DIAM: 3.15 CM
ECHO AO ROOT DIAM: 2.98 CM
ECHO AV AREA PEAK VELOCITY: 1.85 CM2
ECHO AV AREA VTI: 1.87 CM2
ECHO AV AREA/BSA PEAK VELOCITY: 0.9 CM2/M2
ECHO AV AREA/BSA VTI: 0.9 CM2/M2
ECHO AV MEAN GRADIENT: 2.81 MMHG
ECHO AV PEAK GRADIENT: 5.94 MMHG
ECHO AV PEAK VELOCITY: 121.86 CM/S
ECHO AV VTI: 21.66 CM
ECHO IVC PROX: 1.72 CM
ECHO LA MAJOR AXIS: 4.42 CM
ECHO LA MINOR AXIS: 2.04 CM
ECHO LA VOL 2C: 72.8 ML (ref 22–52)
ECHO LA VOL 4C: 67.73 ML (ref 22–52)
ECHO LA VOL BP: 75.25 ML (ref 22–52)
ECHO LA VOL/BSA BIPLANE: 34.68 ML/M2 (ref 16–28)
ECHO LA VOLUME INDEX A2C: 33.55 ML/M2 (ref 16–28)
ECHO LA VOLUME INDEX A4C: 31.21 ML/M2 (ref 16–28)
ECHO LV E' LATERAL VELOCITY: 4 CM/S
ECHO LV E' SEPTAL VELOCITY: 3 CM/S
ECHO LV EDV A2C: 197.18 ML
ECHO LV EDV A4C: 138.63 ML
ECHO LV EDV BP: 167.67 ML (ref 56–104)
ECHO LV EDV INDEX A4C: 63.9 ML/M2
ECHO LV EDV INDEX BP: 77.3 ML/M2
ECHO LV EDV NDEX A2C: 90.9 ML/M2
ECHO LV EJECTION FRACTION A2C: 42 PERCENT
ECHO LV EJECTION FRACTION A4C: 11 PERCENT
ECHO LV EJECTION FRACTION BIPLANE: 27.3 PERCENT (ref 55–100)
ECHO LV ESV A2C: 115.24 ML
ECHO LV ESV A4C: 123.81 ML
ECHO LV ESV BP: 121.96 ML (ref 19–49)
ECHO LV ESV INDEX A2C: 53.1 ML/M2
ECHO LV ESV INDEX A4C: 57.1 ML/M2
ECHO LV ESV INDEX BP: 56.2 ML/M2
ECHO LV INTERNAL DIMENSION DIASTOLIC: 6 CM (ref 3.9–5.3)
ECHO LV INTERNAL DIMENSION SYSTOLIC: 5.55 CM
ECHO LV IVSD: 1.1 CM (ref 0.6–0.9)
ECHO LV MASS 2D: 283 G (ref 67–162)
ECHO LV MASS INDEX 2D: 130.4 G/M2 (ref 43–95)
ECHO LV POSTERIOR WALL DIASTOLIC: 1.12 CM (ref 0.6–0.9)
ECHO LVOT DIAM: 2.12 CM
ECHO LVOT PEAK GRADIENT: 1.64 MMHG
ECHO LVOT PEAK VELOCITY: 64.08 CM/S
ECHO LVOT SV: 40.5 ML
ECHO LVOT SV: 81.9 ML
ECHO LVOT VTI: 11.49 CM
ECHO MV A VELOCITY: 78.82 CM/S
ECHO MV AREA PHT: 3.57 CM2
ECHO MV E DECELERATION TIME (DT): 212.48 MS
ECHO MV E VELOCITY: 88.17 CM/S
ECHO MV E/A RATIO: 1.12
ECHO MV E/E' LATERAL: 22.04
ECHO MV E/E' RATIO (AVERAGED): 25.72
ECHO MV E/E' SEPTAL: 29.39
ECHO MV PRESSURE HALF TIME (PHT): 61.62 MS
ECHO RA AREA 4C: 18.84 CM2
ECHO RV INTERNAL DIMENSION: 4.24 CM
ERYTHROCYTE [DISTWIDTH] IN BLOOD BY AUTOMATED COUNT: 13.1 % (ref 11.6–14.5)
GLOBULIN SER CALC-MCNC: 4.1 G/DL (ref 2–4)
GLUCOSE BLD STRIP.AUTO-MCNC: 161 MG/DL (ref 70–110)
GLUCOSE BLD STRIP.AUTO-MCNC: 168 MG/DL (ref 70–110)
GLUCOSE BLD STRIP.AUTO-MCNC: 176 MG/DL (ref 70–110)
GLUCOSE BLD STRIP.AUTO-MCNC: 193 MG/DL (ref 70–110)
GLUCOSE BLD STRIP.AUTO-MCNC: 205 MG/DL (ref 70–110)
GLUCOSE BLD STRIP.AUTO-MCNC: 210 MG/DL (ref 70–110)
GLUCOSE BLD STRIP.AUTO-MCNC: 465 MG/DL (ref 70–110)
GLUCOSE SERPL-MCNC: 181 MG/DL (ref 74–99)
GLUCOSE SERPL-MCNC: 220 MG/DL (ref 74–99)
HBA1C MFR BLD: 7.9 % (ref 4.2–5.6)
HCT VFR BLD AUTO: 40.1 % (ref 35–45)
HGB BLD-MCNC: 12.6 G/DL (ref 12–16)
LVOT MG: 0.7 MMHG
MCH RBC QN AUTO: 28.3 PG (ref 24–34)
MCHC RBC AUTO-ENTMCNC: 31.4 G/DL (ref 31–37)
MCV RBC AUTO: 90.1 FL (ref 74–97)
PLATELET # BLD AUTO: 225 K/UL (ref 135–420)
PMV BLD AUTO: 10.7 FL (ref 9.2–11.8)
POTASSIUM SERPL-SCNC: 3.8 MMOL/L (ref 3.5–5.5)
PROT SERPL-MCNC: 7.3 G/DL (ref 6.4–8.2)
RBC # BLD AUTO: 4.45 M/UL (ref 4.2–5.3)
SODIUM SERPL-SCNC: 139 MMOL/L (ref 136–145)
TROPONIN I SERPL-MCNC: 0.02 NG/ML (ref 0–0.04)
TROPONIN I SERPL-MCNC: 0.03 NG/ML (ref 0–0.04)
TROPONIN I SERPL-MCNC: <0.02 NG/ML (ref 0–0.04)
WBC # BLD AUTO: 6.2 K/UL (ref 4.6–13.2)

## 2021-07-21 PROCEDURE — 96372 THER/PROPH/DIAG INJ SC/IM: CPT

## 2021-07-21 PROCEDURE — 82553 CREATINE MB FRACTION: CPT

## 2021-07-21 PROCEDURE — 94660 CPAP INITIATION&MGMT: CPT

## 2021-07-21 PROCEDURE — 82962 GLUCOSE BLOOD TEST: CPT

## 2021-07-21 PROCEDURE — 74011250636 HC RX REV CODE- 250/636: Performed by: FAMILY MEDICINE

## 2021-07-21 PROCEDURE — 74011636637 HC RX REV CODE- 636/637: Performed by: FAMILY MEDICINE

## 2021-07-21 PROCEDURE — 96375 TX/PRO/DX INJ NEW DRUG ADDON: CPT

## 2021-07-21 PROCEDURE — 74011250637 HC RX REV CODE- 250/637: Performed by: FAMILY MEDICINE

## 2021-07-21 PROCEDURE — 2709999900 HC NON-CHARGEABLE SUPPLY

## 2021-07-21 PROCEDURE — C8929 TTE W OR WO FOL WCON,DOPPLER: HCPCS

## 2021-07-21 PROCEDURE — 77030022645 HC CRCT CPAP/BPAP RESM -B

## 2021-07-21 PROCEDURE — 99218 HC RM OBSERVATION: CPT

## 2021-07-21 PROCEDURE — 96376 TX/PRO/DX INJ SAME DRUG ADON: CPT

## 2021-07-21 PROCEDURE — 82947 ASSAY GLUCOSE BLOOD QUANT: CPT

## 2021-07-21 PROCEDURE — 85027 COMPLETE CBC AUTOMATED: CPT

## 2021-07-21 PROCEDURE — 36415 COLL VENOUS BLD VENIPUNCTURE: CPT

## 2021-07-21 PROCEDURE — 80053 COMPREHEN METABOLIC PANEL: CPT

## 2021-07-21 PROCEDURE — 83036 HEMOGLOBIN GLYCOSYLATED A1C: CPT

## 2021-07-21 PROCEDURE — 77030035694 HC MSK BIPAP FLL FAC PERFMAX PHIL -B

## 2021-07-21 RX ORDER — GABAPENTIN 300 MG/1
300 CAPSULE ORAL 3 TIMES DAILY
Status: DISCONTINUED | OUTPATIENT
Start: 2021-07-21 | End: 2021-07-25

## 2021-07-21 RX ORDER — ASPIRIN 81 MG/1
81 TABLET ORAL DAILY
Status: DISCONTINUED | OUTPATIENT
Start: 2021-07-21 | End: 2021-07-29 | Stop reason: HOSPADM

## 2021-07-21 RX ORDER — ATORVASTATIN CALCIUM 10 MG/1
10 TABLET, FILM COATED ORAL
Status: DISCONTINUED | OUTPATIENT
Start: 2021-07-21 | End: 2021-07-29 | Stop reason: HOSPADM

## 2021-07-21 RX ORDER — ALLOPURINOL 100 MG/1
100 TABLET ORAL 2 TIMES DAILY
Status: DISCONTINUED | OUTPATIENT
Start: 2021-07-21 | End: 2021-07-29 | Stop reason: HOSPADM

## 2021-07-21 RX ORDER — IPRATROPIUM BROMIDE AND ALBUTEROL SULFATE 2.5; .5 MG/3ML; MG/3ML
3 SOLUTION RESPIRATORY (INHALATION)
Status: DISCONTINUED | OUTPATIENT
Start: 2021-07-21 | End: 2021-07-29 | Stop reason: HOSPADM

## 2021-07-21 RX ORDER — LOSARTAN POTASSIUM 50 MG/1
50 TABLET ORAL DAILY
Status: DISCONTINUED | OUTPATIENT
Start: 2021-07-21 | End: 2021-07-29 | Stop reason: HOSPADM

## 2021-07-21 RX ORDER — OXYCODONE HYDROCHLORIDE 5 MG/1
5 TABLET ORAL
Status: DISCONTINUED | OUTPATIENT
Start: 2021-07-21 | End: 2021-07-26

## 2021-07-21 RX ORDER — BENZONATATE 100 MG/1
100 CAPSULE ORAL
Status: DISCONTINUED | OUTPATIENT
Start: 2021-07-21 | End: 2021-07-29 | Stop reason: HOSPADM

## 2021-07-21 RX ORDER — CARVEDILOL 12.5 MG/1
12.5 TABLET ORAL 2 TIMES DAILY WITH MEALS
Status: DISCONTINUED | OUTPATIENT
Start: 2021-07-21 | End: 2021-07-29 | Stop reason: HOSPADM

## 2021-07-21 RX ADMIN — ALLOPURINOL 100 MG: 100 TABLET ORAL at 22:16

## 2021-07-21 RX ADMIN — CARVEDILOL 12.5 MG: 12.5 TABLET, FILM COATED ORAL at 01:19

## 2021-07-21 RX ADMIN — INSULIN LISPRO 2 UNITS: 100 INJECTION, SOLUTION INTRAVENOUS; SUBCUTANEOUS at 07:04

## 2021-07-21 RX ADMIN — GABAPENTIN 300 MG: 300 CAPSULE ORAL at 09:48

## 2021-07-21 RX ADMIN — AZITHROMYCIN MONOHYDRATE 500 MG: 500 INJECTION, POWDER, LYOPHILIZED, FOR SOLUTION INTRAVENOUS at 01:20

## 2021-07-21 RX ADMIN — CARVEDILOL 12.5 MG: 12.5 TABLET, FILM COATED ORAL at 20:24

## 2021-07-21 RX ADMIN — INSULIN LISPRO 2 UNITS: 100 INJECTION, SOLUTION INTRAVENOUS; SUBCUTANEOUS at 17:26

## 2021-07-21 RX ADMIN — ALLOPURINOL 100 MG: 100 TABLET ORAL at 09:47

## 2021-07-21 RX ADMIN — ATORVASTATIN CALCIUM 10 MG: 10 TABLET, FILM COATED ORAL at 22:16

## 2021-07-21 RX ADMIN — AZITHROMYCIN MONOHYDRATE 500 MG: 500 INJECTION, POWDER, LYOPHILIZED, FOR SOLUTION INTRAVENOUS at 23:20

## 2021-07-21 RX ADMIN — OXYCODONE 5 MG: 5 TABLET ORAL at 07:04

## 2021-07-21 RX ADMIN — FAMOTIDINE 20 MG: 20 TABLET ORAL at 01:19

## 2021-07-21 RX ADMIN — PERFLUTREN 1 ML: 6.52 INJECTION, SUSPENSION INTRAVENOUS at 12:55

## 2021-07-21 RX ADMIN — GABAPENTIN 300 MG: 300 CAPSULE ORAL at 22:16

## 2021-07-21 RX ADMIN — GABAPENTIN 300 MG: 300 CAPSULE ORAL at 01:20

## 2021-07-21 RX ADMIN — GABAPENTIN 300 MG: 300 CAPSULE ORAL at 16:28

## 2021-07-21 RX ADMIN — FUROSEMIDE 40 MG: 10 INJECTION, SOLUTION INTRAMUSCULAR; INTRAVENOUS at 09:48

## 2021-07-21 RX ADMIN — Medication 10 ML: at 22:22

## 2021-07-21 RX ADMIN — INSULIN LISPRO 2 UNITS: 100 INJECTION, SOLUTION INTRAVENOUS; SUBCUTANEOUS at 23:13

## 2021-07-21 RX ADMIN — BENZONATATE 100 MG: 100 CAPSULE ORAL at 07:04

## 2021-07-21 RX ADMIN — LOSARTAN POTASSIUM 50 MG: 50 TABLET, FILM COATED ORAL at 09:48

## 2021-07-21 RX ADMIN — FUROSEMIDE 40 MG: 10 INJECTION, SOLUTION INTRAMUSCULAR; INTRAVENOUS at 22:16

## 2021-07-21 RX ADMIN — OXYCODONE 5 MG: 5 TABLET ORAL at 16:28

## 2021-07-21 RX ADMIN — INSULIN LISPRO 4 UNITS: 100 INJECTION, SOLUTION INTRAVENOUS; SUBCUTANEOUS at 11:35

## 2021-07-21 RX ADMIN — Medication 10 ML: at 06:40

## 2021-07-21 RX ADMIN — RIVAROXABAN 15 MG: 15 TABLET, FILM COATED ORAL at 09:47

## 2021-07-21 RX ADMIN — ASPIRIN 81 MG: 81 TABLET, COATED ORAL at 09:47

## 2021-07-21 RX ADMIN — CARVEDILOL 12.5 MG: 12.5 TABLET, FILM COATED ORAL at 09:48

## 2021-07-21 RX ADMIN — FAMOTIDINE 20 MG: 20 TABLET ORAL at 09:48

## 2021-07-21 RX ADMIN — ATORVASTATIN CALCIUM 10 MG: 10 TABLET, FILM COATED ORAL at 01:20

## 2021-07-21 RX ADMIN — FAMOTIDINE 20 MG: 20 TABLET ORAL at 22:16

## 2021-07-21 RX ADMIN — Medication 10 ML: at 14:00

## 2021-07-21 NOTE — PROGRESS NOTES
Reason for Admission:  Leg swelling, shortness of breath                   RUR Score:    N/A                 Plan for utilizing home health:      TBD    PCP: First and Last name:  Juan Javier MD     Name of Practice:    Are you a current patient: Yes/No:    Approximate date of last visit:    Can you participate in a virtual visit with your PCP:                     Current Advanced Directive/Advance Care Plan: Full Code      Healthcare Decision Maker:   Click here to complete [employer] including selection of the Healthcare Decision Maker Relationship (ie \"Primary\")                             Transition of Care Plan:     [place] and physician follow up vs  Home with physician follow up              Chart reviewed. Per H&P \"Dianne Severo Stager is a 70 y.o. female with insulin-dependent type 2 diabetes mellitus, morbid obesity, hypertension, CAD with drug-eluting stent and on anticoagulation, and cardiomyopathy with EF of 35 to 40% presents to the ED for worsening shortness of breath (especially with exertion) and bilateral leg swelling over the past 5 days. She had her cardiac stent replaced last year due to occlusion. She was continued on Xarelto and aspirin rather than aspirin and Plavix after this. She has also noticed increased sputum production and cough. Her home CPAP has broken and that has made her symptoms worse. She denies any chest pain, nausea, vomiting, diarrhea, or fever. \"    Please encourage ambulation or order therapy services as appropriate to assist with identifying a transition of care. Noted CM consult regarding a broken Cpap. CM will discuss this with pt/family. CM to continue to follow and assist.    1105:  CM met with pt at bedside to discuss transition of care. Pt lives alone and her son, Guilherme Barriga ([phone]), lives in the area and will assist as needed. Pt has returned to her son's home in the past and is open to returning his home if needed.   CM discussed pt's cpap and CM notified pt that we will have to know the name of the DME company that supplied the cpap and they can assist with either fixing or replacing pt's home cpap. Pt verbalized an understanding. Pt gave permission for CM to contact her son to further discuss transition of care. CM to continue to follow and assist.    1610:  CM spoke with pt's son, Mariya Guzmán, to discuss transition of care. Pt's son feels pt will need HH upon discharge. CM notified pt that he will have to speak with his mother because she will have to be agreeable to [facility]. Pt's son has indicated pt does not have her own cpap and has been using a friends cpap that no longer works. Pt's son has indicated pt does not have a pulmonologist in the community. Pt may benefit from pulmonology follow up to assist with determining need for a cpap. Pt's son will speak with pt regarding HH. CM to continue to follow and assist.     Care Management Interventions  Mode of Transport at Discharge:  Other (see comment) (Family)  Transition of Care Consult (CM Consult): Discharge Planning  Health Maintenance Reviewed: Yes  Current Support Network: Family Lives Nearby, Lives Alone  The Plan for Transition of Care is Related to the Following Treatment Goals :  [facility] with physician follow up vs [facility]          The Patient and/or Patient Representative was Provided with a Choice of Provider and Agrees with the Discharge Plan?: Yes  Freedom of Choice List was Provided with Basic Dialogue that Supports the Patient's Individualized Plan of Care/Goals, Treatment Preferences and Shares the Quality Data Associated with the Providers?: Yes  Discharge Location  Discharge Placement: Home with home health (vs [facility])

## 2021-07-21 NOTE — H&P
History & Physical    Patient: Kwadwo Alonso MRN: [id number]  CSN: [id number]    YOB: 1945  Age: 70 y.o. Sex: female      DOA: 7/20/2021  Primary Care Provider:  Adam Stevens MD      Assessment/Plan     Patient Active Problem List   Diagnosis Code    Chest pain R07.9    CAD (coronary artery disease) I25.10    Hypertension I10    Gout M10.9    Anticoagulated Z79.01    Abnormal nuclear stress test R94.39    Cardiomyopathy ([address] I42.9    Right leg weakness R29.898    CAD S/P percutaneous coronary angioplasty I25.10, Z98.61    Insulin dependent type 2 diabetes mellitus (HCC) E11.9, Z79.4    Acute exacerbation of CHF (congestive heart failure) ([facility]) I50.9    Morbid obesity with BMI of 45.0-49.9, adult ([facility]) E66.01, Z68.42    S/P drug eluting coronary stent placement Z95.5    EDGAR (obstructive sleep apnea) G47.33    CAP (community acquired pneumonia) J259     81-year-old female with insulin-dependent type 2 diabetes mellitus, morbid obesity, hypertension, CAD with drug-eluting stent and on anticoagulation, and cardiomyopathy with EF of 35 to 40% is admitted for acute CHF exacerbation. Acute CHF exacerbationshe had a depressed EF noted last year but has not been on Lasix. She has had no prior CHF exacerbations.    Telemetry  Trend troponins  Echocardiogram  Lasix diuresis  Switch Lopressor for [employer]  Cardiology consult  Low-salt diet with 1.5 L fluid restriction    Community-acquired pneumonia  Azithromycin 500 mg IV daily for 5 days  Duo nebs as needed    CAD with drug-eluting stent and anticoagulation  Continue aspirin  Continue Xarelto    Hypertensionelevated on admission  Monitor with Lasix diuresis    Insulin-dependent type 2 diabetes mellitus  Hold Lantus for now as her blood sugar is not significantly elevated on admission and she is unsure of home dose  Sliding scale insulin  Diabetic diet  Follow-up hemoglobin A1c    Morbid obesityBMI 46  Aggressive lifestyle modification needed    EDGARhome CPAP broken  Case management consult to obtain new DME    Full code    Prophylaxis: Pepcid p.o., Xarelto p.o. Estimated length of stay : 2 nights    Fredi Chen MD  Nocturnist  ----------------------------------------------------------------------------------------------------------------------------------------------------------------------------------------------------------------  CC: Leg swelling, shortness of breath       HPI:     Tee Valencia is a 70 y.o. female with insulin-dependent type 2 diabetes mellitus, morbid obesity, hypertension, CAD with drug-eluting stent and on anticoagulation, and cardiomyopathy with EF of 35 to 40% presents to the ED for worsening shortness of breath (especially with exertion) and bilateral leg swelling over the past 5 days. She had her cardiac stent replaced last year due to occlusion. She was continued on Xarelto and aspirin rather than aspirin and Plavix after this. She has also noticed increased sputum production and cough. Her home CPAP has broken and that has made her symptoms worse. She denies any chest pain, nausea, vomiting, diarrhea, or fever.     Past Medical History:   Diagnosis Date    CAD (coronary artery disease)     [facility]     Gastrointestinal disorder     Gout     Hypertension     MI (myocardial infarction) [facility]     Morbid obesity with BMI of 45.0-49.9, adult ([facility]     Reflux        Past Surgical History:   Procedure Laterality Date    HX CATARACT REMOVAL Bilateral 2019    NM CARDIAC SURG PROCEDURE UNLIST      PCI 15 years ago/ with stent placement    VASCULAR SURGERY PROCEDURE UNLIST         Family History   Problem Relation Age of Onset    Heart Disease Mother     Stroke Father        Social History     Socioeconomic History    Marital status:      Spouse name: Not on file    Number of children: Not on file    Years of education: Not on file    Highest education level: Not on file   Tobacco Use    Smoking status: Former Smoker    Smokeless tobacco: Never Used   Substance and Sexual Activity    Alcohol use: No    Drug use: No   Other Topics Concern     Social Determinants of Health     Financial Resource Strain:     Difficulty of Paying Living Expenses:    Food Insecurity:     Worried About Running Out of Food in the Last Year:     Ran Out of Food in the Last Year:    Transportation Needs:     Lack of Transportation (Medical):  Lack of Transportation (Non-Medical):    Physical Activity:     Days of Exercise per Week:     Minutes of Exercise per Session:    Stress:     Feeling of Stress :    Social Connections:     Frequency of Communication with Friends and Family:     Frequency of Social Gatherings with Friends and Family:     Attends Buddhism Services:     Active Member of Clubs or Organizations:     Attends Club or Organization Meetings:     Marital Status:        Prior to Admission medications    Medication Sig Start Date End Date Taking? Authorizing Provider   metoprolol tartrate (LOPRESSOR) 25 mg tablet Take 1 Tab by mouth every twelve (12) hours. 9/1/20  Yes Ben Olivia MD   atorvastatin (LIPITOR) 10 mg tablet Take 1 Tab by mouth nightly. 9/1/20  Yes Ben Olivia MD   rivaroxaban Gema Mower) 15 mg tab tablet Take 1 Tab by mouth daily (with lunch). Indications: start back in am as per DR lee 9/1/20  Yes Ben Olivia MD   insulin glargine (LANTUS) 100 unit/mL injection 26 Units by SubCUTAneous route nightly. 9/1/20  Yes Ben Olivia MD   insulin lispro (HUMALOG) 100 unit/mL injection Use above sliding scale, start at 150-199 with 3 units if needed 9/1/20  Yes Ben Olivia MD   albuterol sulfate (Samuels Johnson) 90 mcg/actuation breath activated inhaler Take 2 Puffs by inhalation. 6/22/20  Yes Other, MD Santiago   omeprazole (PRILOSEC) 20 mg capsule Take 1 Cap by mouth two (2) times a day.  5/26/19  Yes Parker Yip MD   aspirin delayed-release 81 mg tablet Take 1 Tab by mouth daily. 12/6/18  Yes Rashad Spicer MD   metFORMIN (GLUCOPHAGE) 500 mg tablet Take 500 mg by mouth two (2) times daily (with meals). Indications: type 2 diabetes mellitus, pt taking differently once a day   Yes Provider, Historical   multivitamin, tx-iron-ca-min (THERA-M W/ IRON) 9 mg iron-400 mcg tab tablet Take 1 Tab by mouth daily. Formulary substitution for DAILY MULTIVITAMIN   Yes Provider, Historical   allopurinol (ZYLOPRIM) 100 mg tablet Take 100 mg by mouth two (2) times a day. Yes Provider, Historical   gabapentin (NEURONTIN) 300 mg capsule Take 300 mg by mouth three (3) times daily. Yes Provider, Historical   polyethylene glycol (MIRALAX) 17 gram packet Take 1 Packet by mouth daily. Patient not taking: Reported on 7/21/2021 9/1/20   Tomas Gotti MD   clopidogreL (PLAVIX) 75 mg tab Take 1 Tab by mouth daily. Patient not taking: Reported on 7/21/2021 9/1/20   Tomas Gotti MD   cyclobenzaprine (FLEXERIL) 10 mg tablet Take 0.5 Tabs by mouth three (3) times daily as needed for Muscle Spasm(s). Patient not taking: Reported on 7/21/2021 9/1/20   Tomas Gotti MD   losartan (COZAAR) 50 mg tablet Take 1 Tab by mouth daily. Patient not taking: Reported on 7/21/2021 9/1/20   Tomas Gotti MD   diclofenac (VOLTAREN) 1 % gel Apply 4 g to affected area four (4) times daily. Patient not taking: Reported on 7/21/2021 5/22/19   Cara Pino MD   ergocalciferol (VITAMIN D2) 50,000 unit capsule Take 50,000 Units by mouth every Monday. Patient not taking: Reported on 7/21/2021    Provider, Historical   Difluprednate (DUREZOL) 0.05 % ophthalmic emulsion Administer 1 Drop to right eye four (4) times daily.  In relation to cataract surgery  Patient not taking: Reported on 7/21/2021    Provider, Historical       Allergies   Allergen Reactions    Gabapentin Swelling     \"I'm back on it, they figured out it wasn't this medication causing me to swell up\"    Pcn [Penicillins] Angioedema    Lisinopril Swelling       Review of Systems  Gen: No fever, chills, malaise, +weight gain. Heent: No headache, rhinorrhea, sore throat. Heart: No chest pain, palpitations, +DEMARCO, pnd, and orthopnea. Resp: +cough, wheezing and shortness of breath. GI: No nausea, vomiting, diarrhea, constipation, melena or hematochezia. : No urinary obstruction, dysuria or hematuria. Derm: No rash, new skin lesion or pruritis. Musc/skeletal: no bone or joint complaints. Vasc: +bilateral lower extremity edema, no cyanosis or claudication. Endo: No heat/cold intolerance, no polyuria,polydipsia or polyphagia. Neuro: No unilateral weakness, numbness, tingling. No seizures. Heme: No easy bruising or bleeding. Physical Exam:     Physical Exam:  Visit Vitals  BP (!) 154/101 (BP 1 Location: Left upper arm, BP Patient Position: Sitting)   Pulse 77   Temp 98.3 °F (36.8 °C)   Resp 20   SpO2 96%      O2 Device: None (Room air)    Temp (24hrs), Av °F (37.2 °C), Min:98.3 °F (36.8 °C), Max:99.6 °F (37.6 °C)    No intake/output data recorded. No intake/output data recorded. General:  Awake, cooperative, no distress. Head:  Normocephalic, without obvious abnormality, atraumatic. Eyes:  Conjunctivae/corneas clear, sclera anicteric. Neck: Supple, symmetrical, trachea midline. Lungs:   Clear to auscultation bilaterally. Heart:  Regular rate and rhythm, S1, S2 normal, no murmur, click, rub or gallop. Abdomen: Soft, obese, non-tender. Bowel sounds normal. No masses,  No organomegaly. Extremities: Extremities normal, atraumatic, no cyanosis. 2+ pitting edema of the lower extremities. Capillary refill normal.   Pulses: 2+ and symmetric all extremities. Skin: Skin color pink, turgor normal. No rashes or lesions   Neurologic: No focal motor or sensory deficit. Labs Reviewed: All lab results for the last 24 hours reviewed.   Recent Results (from the past 24 hour(s)) EKG, 12 LEAD, INITIAL    Collection Time: 07/20/21  7:17 PM   Result Value Ref Range    Ventricular Rate 97 BPM    Atrial Rate 97 BPM    P-R Interval 140 ms    QRS Duration 160 ms    Q-T Interval 396 ms    QTC Calculation (Bezet) 502 ms    Calculated R Axis -21 degrees    Calculated T Axis 160 degrees    Diagnosis       Normal sinus rhythm  Left bundle branch block  Abnormal ECG  When compared with ECG of 24-AUG-2020 21:02,  aberrant conduction is no longer present  Nonspecific T wave abnormality now evident in Inferior leads     CBC WITH AUTOMATED DIFF    Collection Time: 07/20/21  7:20 PM   Result Value Ref Range    WBC 6.0 4.6 - 13.2 K/uL    RBC 4.50 4. 20 - 5.30 M/uL    HGB 13.2 12.0 - 16.0 g/dL    HCT 40.4 35.0 - 45.0 %    MCV 89.8 74.0 - 97.0 FL    MCH 29.3 24.0 - 34.0 PG    MCHC 32.7 31.0 - 37.0 g/dL    RDW 13.1 11.6 - 14.5 %    PLATELET 460 200 - 517 K/uL    MPV 10.7 9.2 - 11.8 FL    NEUTROPHILS 63 40 - 73 %    LYMPHOCYTES 27 21 - 52 %    MONOCYTES 8 3 - 10 %    EOSINOPHILS 1 0 - 5 %    BASOPHILS 1 0 - 2 %    ABS. NEUTROPHILS 3.8 1.8 - 8.0 K/UL    ABS. LYMPHOCYTES 1.6 0.9 - 3.6 K/UL    ABS. MONOCYTES 0.5 0.05 - 1.2 K/UL    ABS. EOSINOPHILS 0.1 0.0 - 0.4 K/UL    ABS.  BASOPHILS 0.0 0.0 - 0.1 K/UL    DF AUTOMATED     CARDIAC PANEL,(CK, CKMB & TROPONIN)    Collection Time: 07/20/21  7:20 PM   Result Value Ref Range    CK - MB 4.3 (H) <3.6 ng/ml    CK-MB Index 1.7 0.0 - 4.0 %     (H) 26 - 192 U/L    Troponin-I, QT 0.02 0.0 - 3.316 NG/ML   METABOLIC PANEL, COMPREHENSIVE    Collection Time: 07/20/21  7:20 PM   Result Value Ref Range    Sodium 141 136 - 145 mmol/L    Potassium 4.0 3.5 - 5.5 mmol/L    Chloride 106 100 - 111 mmol/L    CO2 29 21 - 32 mmol/L    Anion gap 6 3.0 - 18 mmol/L    Glucose 201 (H) 74 - 99 mg/dL    BUN 16 7.0 - 18 MG/DL    Creatinine 0.96 0.6 - 1.3 MG/DL    BUN/Creatinine ratio 17 12 - 20      GFR est AA >60 >60 ml/min/1.73m2    GFR est non-AA 57 (L) >60 ml/min/1.73m2    Calcium 8.8 8.5 - 10.1 MG/DL    Bilirubin, total 0.4 0.2 - 1.0 MG/DL    ALT (SGPT) 27 13 - 56 U/L    AST (SGOT) 16 10 - 38 U/L    Alk. phosphatase 82 45 - 117 U/L    Protein, total 7.4 6.4 - 8.2 g/dL    Albumin 3.3 (L) 3.4 - 5.0 g/dL    Globulin 4.1 (H) 2.0 - 4.0 g/dL    A-G Ratio 0.8 0.8 - 1.7     LIPASE    Collection Time: 07/20/21  7:20 PM   Result Value Ref Range    Lipase 46 (L) 73 - 393 U/L   NT-PRO BNP    Collection Time: 07/20/21  7:20 PM   Result Value Ref Range    NT pro-BNP 4,181 (H) 0 - 900 PG/ML   COVID-19 RAPID TEST    Collection Time: 07/20/21  8:12 PM   Result Value Ref Range    Specimen source Nasopharyngeal      COVID-19 rapid test Not detected NOTD     BLOOD GAS, ARTERIAL POC    Collection Time: 07/20/21  8:27 PM   Result Value Ref Range    pH (POC) 7.38 7.35 - 7.45      pCO2 (POC) 44.9 35.0 - 45.0 MMHG    pO2 (POC) 74 (L) 80 - 100 MMHG    HCO3 (POC) 26.6 (H) 22 - 26 MMOL/L    sO2 (POC) 94.3 92 - 97 %    Base excess (POC) 1.0 mmol/L    Specimen type (POC) ARTERIAL      Performed by Roula Monroy      Results  XR CHEST PORT (Accession [id number]) (Order [id number])  Allergies     High: Gabapentin;  Pcn [Penicillins]   Not Specified: Lisinopril   Exam Information    Status Exam Begun  Exam Ended    Final [99] 7/20/2021 19:10 7/20/2021  7:30 PM [id number]  7:30 PM   Result Information    Status: Final result (Exam End: 7/20/2021 19:30) Provider Status: Open   Study Result    Narrative & Impression   EXAM:  AP Portable Chest X-ray 1 view      INDICATION: Shortness of breath     COMPARISON: August 24, 2020 and CT dated May 24, 2019     _______________     FINDINGS:  Heart size is enlarged partly due to AP magnification and mediastinal  contours are within normal limits for portable radiograph. There is interstitial  infiltrate at the right lung base. There are no pleural effusions.  No acute  osseous findings.     ________________        IMPRESSION  Interstitial infiltrate at the right lung base suggesting pneumonia  or pulmonary edema.

## 2021-07-21 NOTE — PROGRESS NOTES
Pt placed on hospital-issued cpap for HS at this time:       07/21/21 0212   Oxygen Therapy   O2 Sat (%) 96 %   Pulse via Oximetry 77 beats per minute   O2 Device CPAP mask   FIO2 (%) 21 %   CPAP/BIPAP   CPAP/BIPAP Start/Stop On   Device Mode CPAP, auto-titrating   $$ CPAP Daily Yes   [employer] ID # [id number]   Mask Type and Size Full face; Medium   PIP Observed 5 cm H20   EPAP (cm H2O)   (5-20)   Total RR (Spontaneous) 18 breaths per minute   Pt's Home Machine No   Biomedical Check Performed Yes   Settings Verified Yes

## 2021-07-21 NOTE — PROGRESS NOTES
Problem: Patient Education: Go to Patient Education Activity  Goal: Patient/Family Education  Outcome: Not Progressing Towards Goal  Goal: Patient/Caregiver will demonstrate knowledge of congestive heart failure  Outcome: Not Progressing Towards Goal     Problem: Heart Failure: Day 1  Goal: Off Pathway (Use only if patient is Off Pathway)  Outcome: Not Progressing Towards Goal  Goal: Activity/Safety  Outcome: Not Progressing Towards Goal  Goal: Nutrition/Diet  Outcome: Not Progressing Towards Goal  Goal: Discharge Planning  Outcome: Not Progressing Towards Goal

## 2021-07-21 NOTE — ROUTINE PROCESS
Bedside shift change report given to Romana Smith RN (oncoming nurse) by Jeremiah Rose RN (offgoing nurse).  Report included the following information SBAR, Kardex, Intake/Output, MAR, Recent Results and Cardiac Rhythm SR.

## 2021-07-21 NOTE — ED NOTES
TRANSFER - OUT REPORT:    Verbal report given to LINDA Sorenson(name) on Justyn Braga  being transferred to Tele(unit) for routine progression of care       Report consisted of patients Situation, Background, Assessment and   Recommendations(SBAR). Information from the following report(s) SBAR, ED Summary and MAR was reviewed with the receiving nurse. Lines:   Peripheral IV 07/20/21 Right Antecubital (Active)   Site Assessment Clean, dry, & intact 07/20/21 1930   Phlebitis Assessment 0 07/20/21 1930   Infiltration Assessment 0 07/20/21 1930   Dressing Status Clean, dry, & intact 07/20/21 1930   Dressing Type Transparent 07/20/21 1930   Hub Color/Line Status Pink 07/20/21 1930        Opportunity for questions and clarification was provided.       Patient transported with:   Monitor  Registered Nurse  Tech

## 2021-07-22 LAB
ALBUMIN SERPL-MCNC: 2.9 G/DL (ref 3.4–5)
ALBUMIN/GLOB SERPL: 0.7 {RATIO} (ref 0.8–1.7)
ALP SERPL-CCNC: 79 U/L (ref 45–117)
ALT SERPL-CCNC: 21 U/L (ref 13–56)
ANION GAP SERPL CALC-SCNC: 6 MMOL/L (ref 3–18)
AST SERPL-CCNC: 11 U/L (ref 10–38)
ATRIAL RATE: 97 BPM
BILIRUB SERPL-MCNC: 0.4 MG/DL (ref 0.2–1)
BUN SERPL-MCNC: 21 MG/DL (ref 7–18)
BUN/CREAT SERPL: 19 (ref 12–20)
CALCIUM SERPL-MCNC: 8.5 MG/DL (ref 8.5–10.1)
CALCULATED R AXIS, ECG10: -21 DEGREES
CALCULATED T AXIS, ECG11: 160 DEGREES
CHLORIDE SERPL-SCNC: 102 MMOL/L (ref 100–111)
CO2 SERPL-SCNC: 32 MMOL/L (ref 21–32)
CREAT SERPL-MCNC: 1.12 MG/DL (ref 0.6–1.3)
DIAGNOSIS, 93000: NORMAL
ERYTHROCYTE [DISTWIDTH] IN BLOOD BY AUTOMATED COUNT: 13.2 % (ref 11.6–14.5)
GLOBULIN SER CALC-MCNC: 3.9 G/DL (ref 2–4)
GLUCOSE BLD STRIP.AUTO-MCNC: 160 MG/DL (ref 70–110)
GLUCOSE BLD STRIP.AUTO-MCNC: 171 MG/DL (ref 70–110)
GLUCOSE BLD STRIP.AUTO-MCNC: 229 MG/DL (ref 70–110)
GLUCOSE BLD STRIP.AUTO-MCNC: 232 MG/DL (ref 70–110)
GLUCOSE SERPL-MCNC: 181 MG/DL (ref 74–99)
HCT VFR BLD AUTO: 38.4 % (ref 35–45)
HGB BLD-MCNC: 12.4 G/DL (ref 12–16)
MCH RBC QN AUTO: 29.5 PG (ref 24–34)
MCHC RBC AUTO-ENTMCNC: 32.3 G/DL (ref 31–37)
MCV RBC AUTO: 91.2 FL (ref 74–97)
P-R INTERVAL, ECG05: 140 MS
PLATELET # BLD AUTO: 219 K/UL (ref 135–420)
PMV BLD AUTO: 10.9 FL (ref 9.2–11.8)
POTASSIUM SERPL-SCNC: 4 MMOL/L (ref 3.5–5.5)
PROT SERPL-MCNC: 6.8 G/DL (ref 6.4–8.2)
Q-T INTERVAL, ECG07: 396 MS
QRS DURATION, ECG06: 160 MS
QTC CALCULATION (BEZET), ECG08: 502 MS
RBC # BLD AUTO: 4.21 M/UL (ref 4.2–5.3)
SODIUM SERPL-SCNC: 140 MMOL/L (ref 136–145)
VENTRICULAR RATE, ECG03: 97 BPM
WBC # BLD AUTO: 6.6 K/UL (ref 4.6–13.2)

## 2021-07-22 PROCEDURE — 96376 TX/PRO/DX INJ SAME DRUG ADON: CPT

## 2021-07-22 PROCEDURE — 74011250636 HC RX REV CODE- 250/636: Performed by: INTERNAL MEDICINE

## 2021-07-22 PROCEDURE — 94660 CPAP INITIATION&MGMT: CPT

## 2021-07-22 PROCEDURE — 99232 SBSQ HOSP IP/OBS MODERATE 35: CPT | Performed by: INTERNAL MEDICINE

## 2021-07-22 PROCEDURE — 99218 HC RM OBSERVATION: CPT

## 2021-07-22 PROCEDURE — 74011250636 HC RX REV CODE- 250/636: Performed by: FAMILY MEDICINE

## 2021-07-22 PROCEDURE — 36415 COLL VENOUS BLD VENIPUNCTURE: CPT

## 2021-07-22 PROCEDURE — 85027 COMPLETE CBC AUTOMATED: CPT

## 2021-07-22 PROCEDURE — 80053 COMPREHEN METABOLIC PANEL: CPT

## 2021-07-22 PROCEDURE — 74011250637 HC RX REV CODE- 250/637: Performed by: FAMILY MEDICINE

## 2021-07-22 PROCEDURE — 82962 GLUCOSE BLOOD TEST: CPT

## 2021-07-22 PROCEDURE — 74011636637 HC RX REV CODE- 636/637: Performed by: FAMILY MEDICINE

## 2021-07-22 RX ORDER — FUROSEMIDE 10 MG/ML
40 INJECTION INTRAMUSCULAR; INTRAVENOUS DAILY
Status: DISCONTINUED | OUTPATIENT
Start: 2021-07-22 | End: 2021-07-28

## 2021-07-22 RX ADMIN — OXYCODONE 5 MG: 5 TABLET ORAL at 14:47

## 2021-07-22 RX ADMIN — ALLOPURINOL 100 MG: 100 TABLET ORAL at 21:52

## 2021-07-22 RX ADMIN — INSULIN LISPRO 4 UNITS: 100 INJECTION, SOLUTION INTRAVENOUS; SUBCUTANEOUS at 11:30

## 2021-07-22 RX ADMIN — Medication 10 ML: at 21:53

## 2021-07-22 RX ADMIN — INSULIN LISPRO 2 UNITS: 100 INJECTION, SOLUTION INTRAVENOUS; SUBCUTANEOUS at 06:55

## 2021-07-22 RX ADMIN — OXYCODONE 5 MG: 5 TABLET ORAL at 04:28

## 2021-07-22 RX ADMIN — FUROSEMIDE 40 MG: 10 INJECTION, SOLUTION INTRAMUSCULAR; INTRAVENOUS at 09:32

## 2021-07-22 RX ADMIN — FAMOTIDINE 20 MG: 20 TABLET ORAL at 09:32

## 2021-07-22 RX ADMIN — RIVAROXABAN 15 MG: 15 TABLET, FILM COATED ORAL at 09:32

## 2021-07-22 RX ADMIN — FAMOTIDINE 20 MG: 20 TABLET ORAL at 21:51

## 2021-07-22 RX ADMIN — ATORVASTATIN CALCIUM 10 MG: 10 TABLET, FILM COATED ORAL at 21:51

## 2021-07-22 RX ADMIN — AZITHROMYCIN MONOHYDRATE 500 MG: 500 INJECTION, POWDER, LYOPHILIZED, FOR SOLUTION INTRAVENOUS at 21:52

## 2021-07-22 RX ADMIN — ASPIRIN 81 MG: 81 TABLET, COATED ORAL at 09:32

## 2021-07-22 RX ADMIN — LOSARTAN POTASSIUM 50 MG: 50 TABLET, FILM COATED ORAL at 09:32

## 2021-07-22 RX ADMIN — GABAPENTIN 300 MG: 300 CAPSULE ORAL at 16:52

## 2021-07-22 RX ADMIN — ALLOPURINOL 100 MG: 100 TABLET ORAL at 09:32

## 2021-07-22 RX ADMIN — INSULIN LISPRO 4 UNITS: 100 INJECTION, SOLUTION INTRAVENOUS; SUBCUTANEOUS at 16:52

## 2021-07-22 RX ADMIN — CARVEDILOL 12.5 MG: 12.5 TABLET, FILM COATED ORAL at 09:32

## 2021-07-22 RX ADMIN — GABAPENTIN 300 MG: 300 CAPSULE ORAL at 09:32

## 2021-07-22 RX ADMIN — Medication 10 ML: at 06:04

## 2021-07-22 RX ADMIN — INSULIN LISPRO 2 UNITS: 100 INJECTION, SOLUTION INTRAVENOUS; SUBCUTANEOUS at 21:52

## 2021-07-22 RX ADMIN — GABAPENTIN 300 MG: 300 CAPSULE ORAL at 21:51

## 2021-07-22 RX ADMIN — CARVEDILOL 12.5 MG: 12.5 TABLET, FILM COATED ORAL at 16:52

## 2021-07-22 NOTE — DIABETES MGMT
GLYCEMIC CONTROL PLAN OF CARE        Diabetes Management:      Assessment: known h/o T2DM, HbA1C not within recommended range for age + comorbids basal/bolus/oral home regimen  admitted for acute CHF exacerbationRecommend: monitor BG trends, pt might benefit from scheduled dose of insulin if TDD > 10 units    BG in target range (non-ICU\" < 180 ; -180):  [] Yes  [x] No      Steroids:no    Current Insulin regimen: - Humalog Normal Insulin Sensitivity Corrective Coverage      TDD previous day =     Most recent blood glucose results:   Lab Results   Component Value Date/Time     (H) 07/22/2021 02:33 AM     (H) 07/21/2021 10:08 PM    GLUCPOC 160 (H) 07/22/2021 06:23 AM    GLUCPOC 210 (H) 07/21/2021 10:39 PM    GLUCPOC 193 (H) 07/21/2021 10:38 PM         Hypo: no    HbA1C: equivalent  to ave BGlucose of 177 mg/dl for 2-3 months prior to admission  Lab Results   Component Value Date/Time    Hemoglobin A1c 7.9 (H) 07/21/2021 03:00 AM       Adequate glycemic control PTA:  [] Yes  [x] No      Home diabetes medications:  Key Antihyperglycemic Medications             insulin glargine (LANTUS) 100 unit/mL injection (Taking) 26 Units by SubCUTAneous route nightly. insulin lispro (HUMALOG) 100 unit/mL injection (Taking) Use above sliding scale, start at 150-199 with 3 units if needed    metFORMIN (GLUCOPHAGE) 500 mg tablet (Taking) Take 500 mg by mouth two (2) times daily (with meals). Indications: type 2 diabetes mellitus, pt taking differently once a day        Goals:  Blood glucose will be within target range of 70 - 180 mg/dL by: 7/31    Diet:   Active Orders   Diet    ADULT DIET Regular; 3 carb choices (45 gm/meal);  Low Fat/Low Chol/High Fiber/NALINI; Low Sodium (2 gm); 1500 ml       Patient Vitals for the past 100 hrs:   % Diet Eaten   07/21/21 1859 76 - 100%        Education:  _____ Refer to Diabetes Education Record                       __X___ Education not indicated at this time    Gael Caraballo MS, RN, JASIEL  Glycemic Control Team  [phone]  Pager [phone] (M-TH 8:00-4:30P)  *After Hours pager [phone]

## 2021-07-22 NOTE — PROGRESS NOTES
Hospitalist Progress Note    Patient: Jessica Suggs MRN: [id number]  CSN: [id number]    YOB: 1945  Age: 70 y.o. Sex: female    DOA: 7/20/2021 LOS:  LOS: 0 days                Assessment/Plan     Patient Active Problem List   Diagnosis Code    Chest pain R07.9    CAD (coronary artery disease) I25.10    Hypertension I10    Gout M10.9    Anticoagulated Z79.01    Abnormal nuclear stress test R94.39    Cardiomyopathy ([address] I42.9    Right leg weakness R29.898    CAD S/P percutaneous coronary angioplasty I25.10, Z98.61    Insulin dependent type 2 diabetes mellitus (HCC) E11.9, Z79.4    Acute exacerbation of CHF (congestive heart failure) ([facility]) I50.9    Morbid obesity with BMI of 45.0-49.9, adult ([facility]) E66.01, Z68.42    S/P drug eluting coronary stent placement Z95.5    EDGAR (obstructive sleep apnea) G47.33    CAP (community acquired pneumonia) J18.9            72-year-old female with insulin-dependent type 2 diabetes mellitus, morbid obesity, hypertension, CAD with drug-eluting stent and on anticoagulation, and cardiomyopathy with EF of 35 to 40% is admitted for acute CHF exacerbation. CHF exacerbation -  Continue with diuresis  Follow echo  Cardiology consulted  Salt restriction to 2g daily  Fluid restriction to 1.5L    CAD -  Continue with aspirin. On xarelto    Pneumonia -  On azithromycin    DM -  On SSI    EDGAR -  CPAP at night. Morbid obesity    Disposition : 1-2 days    Review of systems  General: No fevers or chills. Cardiovascular: No chest pain or pressure. No palpitations. Pulmonary: No shortness of breath. Gastrointestinal: No nausea, vomiting. Physical Exam:  General: Awake, cooperative, no acute distress    HEENT: NC, Atraumatic. PERRLA, anicteric sclerae. Lungs: CTA Bilaterally. No Wheezing/Rhonchi/Rales.   Heart:  S1 S2,  No murmur, No Rubs, No Gallops  Abdomen: Soft, Non distended, Non tender.  +Bowel sounds,   Extremities: Edema bilaterally  Psych:   Not anxious or agitated. Neurologic:  No acute neurological deficit. Vital signs/Intake and Output:  Visit Vitals  /72   Pulse 72   Temp 97.5 °F (36.4 °C)   Resp 20   Ht 5' 3\" (1.6 m)   Wt 119.3 kg (263 lb)   SpO2 98%   BMI 46.59 kg/m²     Current Shift:  No intake/output data recorded. Last three shifts:  07/20 0701 - 07/21 1900  In: -   Out: 1250 [Urine:1250]            Labs: Results:       Chemistry Recent Labs     07/21/21  2208 07/21/21  0300 07/20/21 1920   * 220* 201*   NA  --  139 141   K  --  3.8 4.0   CL  --  104 106   CO2  --  31 29   BUN  --  16 16   CREA  --  0.96 0.96   CA  --  9.1 8.8   AGAP  --  4 6   BUCR  --  17 17   AP  --  80 82   TP  --  7.3 7.4   ALB  --  3.2* 3.3*   GLOB  --  4.1* 4.1*   AGRAT  --  0.8 0.8      CBC w/Diff Recent Labs     07/21/21 0300 07/20/21 1920   WBC 6.2 6.0   RBC 4.45 4.50   HGB 12.6 13.2   HCT 40.1 40.4    228   GRANS  --  63   LYMPH  --  27   EOS  --  1      Cardiac Enzymes Recent Labs     07/21/21  1500 07/21/21  0855   * 227*   CKND1 1.3 1.3      Coagulation No results for input(s): PTP, INR, APTT, INREXT in the last 72 hours. Lipid Panel Lab Results   Component Value Date/Time    Cholesterol, total 139 08/25/2020 05:30 AM    HDL Cholesterol 49 08/25/2020 05:30 AM    LDL, calculated 58.6 08/25/2020 05:30 AM    VLDL, calculated 31.4 08/25/2020 05:30 AM    Triglyceride 157 (H) 08/25/2020 05:30 AM    CHOL/HDL Ratio 2.8 08/25/2020 05:30 AM      BNP No results for input(s): BNPP in the last 72 hours.    Liver Enzymes Recent Labs     07/21/21  0300   TP 7.3   ALB 3.2*   AP 80      Thyroid Studies No results found for: T4, T3U, TSH, TSHEXT     Procedures/imaging: see electronic medical records for all procedures/Xrays and details which were not copied into this note but were reviewed prior to creation of Plan

## 2021-07-22 NOTE — PROGRESS NOTES
Physical Therapy Evaluation/Treatment Attempt     Chart reviewed. Attempted Physical Therapy Evaluation, however, patient unable to be seen due to:  []  Nausea/vomiting  []  Eating  [x]  Pain: pt reporting \"12/10\" pain in B knees, declining physical therapy at this time. Pt educated on benefits of mobility to help with stiffness and ice to decr pain. Pt continues to decline. []  Patient too lethargic  []  Off Unit for testing/procedure  []  Dialysis treatment in progress   []  Telemetry Results  []  Other     Will follow up later as patient's schedule allows.    Thank you for this referral.    Clari Osorio, PT, DPT

## 2021-07-22 NOTE — ROUTINE PROCESS
Bedside and Verbal shift change report given to Raffi Donahue RN (oncoming nurse) by Tony Briceno RN (offgoing nurse). Report included the following information SBAR and Kardex.

## 2021-07-22 NOTE — PROGRESS NOTES
Occupational Therapy Evaluation Attempt     OT eval orders received. Chart reviewed. Attempted Occupational Therapy Evaluation, however, patient unable to be seen due to:  []  Nausea/vomiting  []  Eating  []  Pain  [x]  Patient too lethargic  []  Off Unit for testing/procedure  []  Dialysis treatment in progress  []  Telemetry Results  []  Other:      Will f/u later as patient's schedule allows.   Santino Degroot, OTR/L

## 2021-07-22 NOTE — PROGRESS NOTES
Discharge Planning: Home with family assist and MD follow-up vs home with home health    CM met with the patient at the bedside to further discuss plans for discharge. CM discussed with the patient the possibility of having home health services upon discharge. The patient states that if therapy recommends home health she will be agreeable. PT/OT evaluations currently pending, CM to follow for recommendations and assist with discharge planning as needed. CM left a list of home health agencies for the patient to review should home health be indicated upon discharge. Care Management Interventions  Mode of Transport at Discharge:  Other (see comment) (Family)  Transition of Care Consult (CM Consult): Discharge Planning  Health Maintenance Reviewed: Yes  Current Support Network: Family Lives Nearby, Lives Alone  The Plan for Transition of Care is Related to the Following Treatment Goals :  [facility] with physician follow up vs [facility]          The Patient and/or Patient Representative was Provided with a Choice of Provider and Agrees with the Discharge Plan?: Yes  Freedom of Choice List was Provided with Basic Dialogue that Supports the Patient's Individualized Plan of Care/Goals, Treatment Preferences and Shares the Quality Data Associated with the Providers?: Yes  Discharge Location  Discharge Placement: Home with home health (vs [facility])

## 2021-07-22 NOTE — PROGRESS NOTES
1935 - Bedside report received from [facility], [address] [place] [address]. Patient in bed. Pain 0/10.     2020 - Patient in bed at this time. IV to RFA  intact and patent. + CMS. Pt A & O x 4. LS clear, on RA. Abdomen soft, NT and ND. + BS to all 4 quadrants. Denies nausea. Pain 0/10. Call light within reach. Pt had an uneventful shift. Got Neb Txs as needed. Remains stable. No issues/concerns at this time.  Call bell within reach

## 2021-07-22 NOTE — CONSULTS
[facility] Consult Note      Patient: Jossue Avila MRN: [id number]  SSN: [id number]    YOB: 1945  Age: 70 y.o. Sex: female    Date of Consultation: 07/21/2021  Referring Physician: Pippa Romero   Reason for Consultation: CHF    Chief complain: Shortness of breath     HPI:   81-year-old female came to emergency room with complaining of shortness of breath for last 4-5 days. She is complaining of shortness of breath on minimal exertion. She developed orthopnea and PND for last 2 days. She was also complaining of leg swelling. As shortness of breath got worse she came to emergency room. She denies any chest pain. She denies any dizziness, palpitation, presyncope or syncope. She denies any smoking or alcohol abuse. She denies any excessive fluid intake. She is compliant with salt restriction.   She is not following with Cardiologist.  Cardiology consult called for evaluation of CHF      Past Medical History:   Diagnosis Date    CAD (coronary artery disease)     Diabetes ([facility]     Gastrointestinal disorder     Gout     Hypertension     MI (myocardial infarction) ([facility]     Morbid obesity with BMI of 45.0-49.9, adult ([facility]     Reflux      Past Surgical History:   Procedure Laterality Date    HX CATARACT REMOVAL Bilateral 2019    CO CARDIAC SURG PROCEDURE UNLIST      PCI 15 years ago/ with stent placement    VASCULAR SURGERY PROCEDURE UNLIST       Current Facility-Administered Medications   Medication Dose Route Frequency    furosemide (LASIX) injection 40 mg  40 mg IntraVENous DAILY    allopurinoL (ZYLOPRIM) tablet 100 mg  100 mg Oral BID    aspirin delayed-release tablet 81 mg  81 mg Oral DAILY    atorvastatin (LIPITOR) tablet 10 mg  10 mg Oral QHS    gabapentin (NEURONTIN) capsule 300 mg  300 mg Oral TID    rivaroxaban (XARELTO) tablet 15 mg  15 mg Oral DAILY    losartan (COZAAR) tablet 50 mg  50 mg Oral DAILY    carvediloL (COREG) tablet 12.5 mg  12.5 mg Oral BID WITH MEALS  albuterol-ipratropium (DUO-NEB) 2.5 MG-0.5 MG/3 ML  3 mL Nebulization Q4H PRN    benzonatate (TESSALON) capsule 100 mg  100 mg Oral TID PRN    oxyCODONE IR (ROXICODONE) tablet 5 mg  5 mg Oral Q4H PRN    sodium chloride (NS) flush 5-40 mL  5-40 mL IntraVENous Q8H    sodium chloride (NS) flush 5-40 mL  5-40 mL IntraVENous PRN    acetaminophen (TYLENOL) tablet 650 mg  650 mg Oral Q6H PRN    Or    acetaminophen (TYLENOL) suppository 650 mg  650 mg Rectal Q6H PRN    polyethylene glycol (MIRALAX) packet 17 g  17 g Oral DAILY PRN    ondansetron (ZOFRAN ODT) tablet 4 mg  4 mg Oral Q8H PRN    Or    ondansetron (ZOFRAN) injection 4 mg  4 mg IntraVENous Q6H PRN    famotidine (PEPCID) tablet 20 mg  20 mg Oral BID    insulin lispro (HUMALOG) injection   SubCUTAneous AC&HS    glucose chewable tablet 16 g  16 g Oral PRN    glucagon (GLUCAGEN) injection 1 mg  1 mg IntraMUSCular PRN    dextrose (D50W) injection syrg 12.5-25 g  25-50 mL IntraVENous PRN    azithromycin (ZITHROMAX) 500 mg in 0.9% sodium chloride 250 mL (VIAL-MATE)  500 mg IntraVENous Q24H       Allergies and Intolerances: Allergies   Allergen Reactions    Gabapentin Swelling     \"I'm back on it, they figured out it wasn't this medication causing me to swell up\"    Pcn [Penicillins] Angioedema    Lisinopril Swelling       Family History:   Family History   Problem Relation Age of Onset    Heart Disease Mother     Stroke Father        Social History:   She  reports that she has quit smoking. She has never used smokeless tobacco.  She  reports no history of alcohol use. Review of Systems:     Gen: No fever, chills, malaise, weight loss/gain. Heent: No headache, rhinorrhea, epistaxis, ear pain, hearing loss, sinus pain, neck pain/stiffness, sore throat. Heart: No chest pain, palpitations,  Positive shortness of breath on exertion, pnd, orthopnea. Resp: No cough, hemoptysis, wheezing and dyspnea.    GI: No nausea, vomiting, diarrhea, constipation, melena or hematochezia. : No urinary obstruction, dysuria or hematuria. Derm: No rash, new skin lesion or pruritis. Musc/skeletal:  positive bone or joint complains. Vasc:  positive edema, no cyanosis or claudication. Endo: No heat/cold intolerance, no polyuria,polydipsia or polyphagia. Neuro: No unilateral weakness, numbness, tingling. No seizures. Heme: No easy bruising or bleeding. Physical:   Patient Vitals for the past 6 hrs:   Temp Pulse Resp BP SpO2   07/22/21 0040 97.6 °F (36.4 °C) 74 20 116/65 100 %   07/21/21 2018 97.5 °F (36.4 °C) 72 20 127/72 98 %         Exam:   General Appearance: Comfortable, not using accessory muscles of respiration. HEENT: JUAN. HEAD: Atraumatic  NECK: No JVD, no thyroidomeglay. CAROTIDS: no bruit  LUNGS: Clear bilaterally. HEART: S1+S2 audible, no murmur, no pericardial rub. ABD: Non-tender, BS Audible    EXT: No edema, and no cyanosis. VASCULAR EXAM: Pulses are intact. PSYCHIATRIC EXAM: Mood is appropriate. MUSCULOSKELETAL: Grossly no joint deformity.   NEUROLOGICAL: AAO times 3, Motor and sensory sytem intact     Review of Data:   LABS:   Lab Results   Component Value Date/Time    WBC 6.2 07/21/2021 03:00 AM    HGB 12.6 07/21/2021 03:00 AM    HCT 40.1 07/21/2021 03:00 AM    PLATELET 343 20/96/7868 03:00 AM     Lab Results   Component Value Date/Time    Sodium 139 07/21/2021 03:00 AM    Potassium 3.8 07/21/2021 03:00 AM    Chloride 104 07/21/2021 03:00 AM    CO2 31 07/21/2021 03:00 AM    Glucose 181 (H) 07/21/2021 10:08 PM    BUN 16 07/21/2021 03:00 AM    Creatinine 0.96 07/21/2021 03:00 AM     Lab Results   Component Value Date/Time    Cholesterol, total 139 08/25/2020 05:30 AM    HDL Cholesterol 49 08/25/2020 05:30 AM    LDL, calculated 58.6 08/25/2020 05:30 AM    Triglyceride 157 (H) 08/25/2020 05:30 AM     No components found for: GPT  Lab Results   Component Value Date/Time    Hemoglobin A1c 7.9 (H) 07/21/2021 03:00 AM Cardiology Procedures:   Results for orders placed or performed during the hospital encounter of 07/20/21   EKG, 12 LEAD, INITIAL   Result Value Ref Range    Ventricular Rate 97 BPM    Atrial Rate 97 BPM    P-R Interval 140 ms    QRS Duration 160 ms    Q-T Interval 396 ms    QTC Calculation (Bezet) 502 ms    Calculated R Axis -21 degrees    Calculated T Axis 160 degrees    Diagnosis       Normal sinus rhythm  Left bundle branch block  Abnormal ECG               Impression / Plan:    Patient Active Problem List   Diagnosis Code         CAD (coronary artery disease) I25.10    Hypertension I10    Gout M10.9    Anticoagulated Z79.01    Abnormal nuclear stress test R94.39    Cardiomyopathy ([facility]) I42.9    Right leg weakness R29.898    CAD S/P percutaneous coronary angioplasty I25.10, Z98.61    Insulin dependent type 2 diabetes mellitus ([facility]) E11.9, Z79.4    Acute exacerbation of CHF (congestive heart failure) ([facility]) I50.9    Morbid obesity with BMI of 45.0-49.9, adult ([facility]) E66.01, Z68.42    S/P drug eluting coronary stent placement Z95.5    EDGAR (obstructive sleep apnea) G47.33    CAP (community acquired pneumonia) J18.9     Acute on chronic systolic heart failure   CAD  H/o DVT    She underwent cardiac catheterization in 08/2020 reported    single-vessel occlusive CAD.     Proximal left circumflex artery have 90% stenosis just proximal to the previously deployed mid left circumflex artery stent. This lesion is treated by 2.75x23 mm Xience Audrey drug-eluting stent with excellent results.     Rest of coronary anatomy is patent with minimal luminal irregularity. .     LVEDP 30 mmHg. Echocardiogram revealed    · Image quality for this study was technically difficult. Contrast used: DEFINITY. · Left Ventricle: Mildly dilated left ventricle. Mild concentric hypertrophy. The estimated EF is 25 - 30%. Severely reduced systolic function.  There is moderate (grade 2) left ventricular diastolic dysfunction E/E' ratio is 25.72. Wall Scoring: The left ventricular wall motion is globally hypokinetic. · Left Atrium: Mildly dilated left atrium. · Right Ventricle: Mildly dilated right ventricle. Mildly reduced systolic function. Assessment of RV function: TAPSE = 18 mm. · Right Atrium: Mildly dilated right atrium. · Tricuspid Valve: Tricuspid valve not well visualized. No stenosis. Tricuspid regurgitation is inadequate for estimation of right ventricular systolic pressure. 28-year-old female came with worsening of shortness of breath and being managed per decompensated systolic heart failure. Serial cardiac enzymes are negative. EKG revealed left bundle branch block which is old. Continue aspirin, carvedilol, losartan and atorvastatin. Change IV Lasix to 40 mg once a day. Continue antibiotic. Salt restriction up to 2 g per day and fluid restriction up to 1.2 L per day.     Patient will be seen by Dr. Lisa Power from tomorrow    Signed By: Stephen Madrid MD     July 22, 2021

## 2021-07-22 NOTE — PROGRESS NOTES
Problem: Patient Education: Go to Patient Education Activity  Goal: Patient/Family Education  Outcome: Progressing Towards Goal  Goal: Patient/Caregiver will demonstrate knowledge of congestive heart failure  Outcome: Progressing Towards Goal

## 2021-07-22 NOTE — PROGRESS NOTES
Cardiology Progress Note      7/22/2021 11:28 AM    Admit Date: 7/20/2021    Admit Diagnosis: CHF (congestive heart failure) ([facility] [I50.9]      Subjective:     Tee Valencia denies chest pain.     Visit Vitals  BP (!) 143/82   Pulse 79   Temp 97.8 °F (36.6 °C)   Resp 20   Ht 5' 3\" (1.6 m)   Wt 119.3 kg (263 lb)   SpO2 100%   BMI 46.59 kg/m²     Current Facility-Administered Medications   Medication Dose Route Frequency    furosemide (LASIX) injection 40 mg  40 mg IntraVENous DAILY    allopurinoL (ZYLOPRIM) tablet 100 mg  100 mg Oral BID    aspirin delayed-release tablet 81 mg  81 mg Oral DAILY    atorvastatin (LIPITOR) tablet 10 mg  10 mg Oral QHS    gabapentin (NEURONTIN) capsule 300 mg  300 mg Oral TID    rivaroxaban (XARELTO) tablet 15 mg  15 mg Oral DAILY    losartan (COZAAR) tablet 50 mg  50 mg Oral DAILY    carvediloL (COREG) tablet 12.5 mg  12.5 mg Oral BID WITH MEALS    albuterol-ipratropium (DUO-NEB) 2.5 MG-0.5 MG/3 ML  3 mL Nebulization Q4H PRN    benzonatate (TESSALON) capsule 100 mg  100 mg Oral TID PRN    oxyCODONE IR (ROXICODONE) tablet 5 mg  5 mg Oral Q4H PRN    sodium chloride (NS) flush 5-40 mL  5-40 mL IntraVENous Q8H    sodium chloride (NS) flush 5-40 mL  5-40 mL IntraVENous PRN    acetaminophen (TYLENOL) tablet 650 mg  650 mg Oral Q6H PRN    Or    acetaminophen (TYLENOL) suppository 650 mg  650 mg Rectal Q6H PRN    polyethylene glycol (MIRALAX) packet 17 g  17 g Oral DAILY PRN    ondansetron (ZOFRAN ODT) tablet 4 mg  4 mg Oral Q8H PRN    Or    ondansetron (ZOFRAN) injection 4 mg  4 mg IntraVENous Q6H PRN    famotidine (PEPCID) tablet 20 mg  20 mg Oral BID    insulin lispro (HUMALOG) injection   SubCUTAneous AC&HS    glucose chewable tablet 16 g  16 g Oral PRN    glucagon (GLUCAGEN) injection 1 mg  1 mg IntraMUSCular PRN    dextrose (D50W) injection syrg 12.5-25 g  25-50 mL IntraVENous PRN    azithromycin (ZITHROMAX) 500 mg in 0.9% sodium chloride 250 mL (VIAL-MATE)  500 mg IntraVENous Q24H         Objective:      Physical Exam:  General Appearance: Comfortable, not using accessory muscles of respiration. HEENT: JUAN. HEAD: Atraumatic  NECK: No JVD, no thyroidomeglay. CAROTIDS: no bruit  LUNGS: Clear bilaterally. HEART: S1+S2 audible, no murmur, no pericardial rub. ABD: Non-tender, BS Audible                          EXT: No edema, and no cyanosis. VASCULAR EXAM: Pulses are intact. PSYCHIATRIC EXAM: Mood is appropriate. MUSCULOSKELETAL: Grossly no joint deformity. NEUROLOGICAL: AAO times 3, Motor and sensory sytem intact        Data Review:   Labs:    Recent Results (from the past 24 hour(s))   ECHO ADULT COMPLETE    Collection Time: 07/21/21 12:54 PM   Result Value Ref Range    LV E' Septal Velocity 3.00 cm/s    LV E' Lateral Velocity 4.00 cm/s    MV E/A 1.12     LV Mass .0 67.0 - 162.0 g    LV Mass AL Index 130.4 43.0 - 95.0 g/m2    E/E' lateral 22.04     E/E' septal 29.39     E/E' ratio (averaged) 25.72     LVES Vol Index BP 56.2 mL/m2    LVED Vol Index BP 77.3 mL/m2    Left Atrium Minor Axis 2.04 cm    LA Vol Index 34.68 16.00 - 28.00 ml/m2    LA Vol Index 33.55 16.00 - 28.00 ml/m2    LA Vol Index 31.21 16.00 - 28.00 ml/m2    LVED Vol Index A4C 63.9 mL/m2    LVED Vol Index A2C 90.9 mL/m2    LVES Vol Index A4C 57.1 mL/m2    LVES Vol Index A2C 53.1 mL/m2    TIN/BSA Pk Nikhil 0.9 cm2/m2    TIN/BSA VTI 0.9 cm2/m2    IVSd 1.10 (A) 0.60 - 0.90 cm    LVIDd 6.00 (A) 3.90 - 5.30 cm    LVIDs 5.55 cm    LVOT d 2.12 cm    LVPWd 1.12 (A) 0.60 - 0.90 cm    BP EF 27.3 (A) 55.0 - 100.0 percent    LV Ejection Fraction MOD 2C 42 percent    LV Ejection Fraction MOD 4C 11 percent    LV ED Vol A2C 197.18 mL    LV ED Vol A4C 138. 63 mL    LV ED Vol .67 (A) 56.0 - 104.0 mL    LV ES Vol A2C 115.24 mL    LV ES Vol A4C 123.81 mL    LV ES Vol .96 (A) 19.0 - 49.0 mL    LVOT SV 81.9 mL    LVOT Peak Gradient 1.64 mmHg    Left Ventricular Outflow Tract Mean Gradient 0.70 mmHg    LVOT SV 40.5 mL    LVOT Peak Velocity 64.08 cm/s    LVOT VTI 11.49 cm    RVIDd 4.24 cm    Left Atrium Major Axis 4.42 cm    LA Volume 75.25 22.0 - 52.0 mL    LA Vol 2C 72.80 (A) 22.00 - 52.00 mL    LA Vol 4C 67.73 (A) 22.00 - 52.00 mL    Right Atrial Area 4C 18.84 cm2    Aortic Valve Area by Continuity of Peak Velocity 1.85 cm2    Aortic Valve Area by Continuity of VTI 1.87 cm2    AoV PG 5.94 mmHg    Aortic Valve Systolic Mean Gradient 1.89 mmHg    Aortic Valve Systolic Peak Velocity 933.15 cm/s    AoV VTI 21.66 cm    MV A Nikhil 78.82 cm/s    Mitral Valve E Wave Deceleration Time 212.48 ms    MV E Nikhil 88.17 cm/s    Mitral Valve Pressure Half-time 61.62 ms    MVA (PHT) 3.57 cm2    AO ASC D 3.15 cm    Ao Root D 2.98 cm    IVC proximal 1.72 cm   CARDIAC PANEL,(CK, CKMB & TROPONIN)    Collection Time: 07/21/21  3:00 PM   Result Value Ref Range    CK - MB 2.9 <3.6 ng/ml    CK-MB Index 1.3 0.0 - 4.0 %     (H) 26 - 192 U/L    Troponin-I, QT <0.02 0.0 - 0.045 NG/ML   GLUCOSE, POC    Collection Time: 07/21/21  4:10 PM   Result Value Ref Range    Glucose (POC) 168 (H) 70 - 110 mg/dL   GLUCOSE, POC    Collection Time: 07/21/21  9:42 PM   Result Value Ref Range    Glucose (POC) 465 (HH) 70 - 110 mg/dL   GLUCOSE, POC    Collection Time: 07/21/21  9:44 PM   Result Value Ref Range    Glucose (POC) 161 (H) 70 - 110 mg/dL   GLUCOSE, RANDOM    Collection Time: 07/21/21 10:08 PM   Result Value Ref Range    Glucose 181 (H) 74 - 99 mg/dL   GLUCOSE, POC    Collection Time: 07/21/21 10:38 PM   Result Value Ref Range    Glucose (POC) 193 (H) 70 - 110 mg/dL   GLUCOSE, POC    Collection Time: 07/21/21 10:39 PM   Result Value Ref Range    Glucose (POC) 210 (H) 70 - 300 mg/dL   METABOLIC PANEL, COMPREHENSIVE    Collection Time: 07/22/21  2:33 AM   Result Value Ref Range    Sodium 140 136 - 145 mmol/L    Potassium 4.0 3.5 - 5.5 mmol/L    Chloride 102 100 - 111 mmol/L    CO2 32 21 - 32 mmol/L    Anion gap 6 3.0 - 18 mmol/L    Glucose 181 (H) 74 - 99 mg/dL    BUN 21 (H) 7.0 - 18 MG/DL    Creatinine 1.12 0.6 - 1.3 MG/DL    BUN/Creatinine ratio 19 12 - 20      GFR est AA 58 (L) >60 ml/min/1.73m2    GFR est non-AA 48 (L) >60 ml/min/1.73m2    Calcium 8.5 8.5 - 10.1 MG/DL    Bilirubin, total 0.4 0.2 - 1.0 MG/DL    ALT (SGPT) 21 13 - 56 U/L    AST (SGOT) 11 10 - 38 U/L    Alk. phosphatase 79 45 - 117 U/L    Protein, total 6.8 6.4 - 8.2 g/dL    Albumin 2.9 (L) 3.4 - 5.0 g/dL    Globulin 3.9 2.0 - 4.0 g/dL    A-G Ratio 0.7 (L) 0.8 - 1.7     CBC W/O DIFF    Collection Time: 07/22/21  2:33 AM   Result Value Ref Range    WBC 6.6 4.6 - 13.2 K/uL    RBC 4.21 4.20 - 5.30 M/uL    HGB 12.4 12.0 - 16.0 g/dL    HCT 38.4 35.0 - 45.0 %    MCV 91.2 74.0 - 97.0 FL    MCH 29.5 24.0 - 34.0 PG    MCHC 32.3 31.0 - 37.0 g/dL    RDW 13.2 11.6 - 14.5 %    PLATELET 520 000 - 210 K/uL    MPV 10.9 9.2 - 11.8 FL   GLUCOSE, POC    Collection Time: 07/22/21  6:23 AM   Result Value Ref Range    Glucose (POC) 160 (H) 70 - 110 mg/dL       Telemetry: normal sinus rhythm      Assessment:     Principal Problem:    Acute exacerbation of CHF (congestive heart failure) ([facility] (7/20/2021)    Active Problems:    CAD (coronary artery disease) (12/3/2018)      Hypertension (12/3/2018)      Anticoagulated (8/25/2020)      Insulin dependent type 2 diabetes mellitus ([facility] (8/28/2020)      Morbid obesity with BMI of 45.0-49.9, adult (HCC) ()      S/P drug eluting coronary stent placement (7/21/2021)      EDGAR (obstructive sleep apnea) (7/21/2021)      CAP (community acquired pneumonia) (7/21/2021)        Plan:     Continue current measures. Pt c/o dry mouth. Follow.        Lilliam Medina MD

## 2021-07-22 NOTE — ROUTINE PROCESS
Bedside and Verbal shift change report given to Juan Luis Shahid (oncoming nurse) by Kalpana Flaherty (offgoing nurse). Report included the following information SBAR, Kardex and MAR.

## 2021-07-22 NOTE — ROUTINE PROCESS
Bedside and Verbal shift change report given to LINDA De Guzman (oncoming nurse) by Dirk Santana RN (offgoing nurse). Report included the following information SBAR and Kardex.

## 2021-07-22 NOTE — PROGRESS NOTES
0007-Stable, resting quietly in bed. White board updated. Amber Cheli in place; patent, draining. CPAP at bedside. Call light and personal items within reach. No complaints voiced at this time. Tele-box 12 in place. Assessment complete. 0415-CPAP in place, stable. Resting in bed. No change from initial assessment complete. 0428-Medicated for pain at request.  Stable, resting in bed.    0600-Resting in bed, eyes closed, arouses easily. Stable. 0820-Stable at shift change report, no complaints voiced. Pleasant.

## 2021-07-22 NOTE — WOUND CARE
Wound Care Note:    Chart audited for low padmini score, patient with high risk for skin breakdown, no documented wounds at time of audit.      Skin Care & Pressure Relief Recommendations  Minimize layers of linen  Pads under patient to optimize support surface  Turn/reposition approximately every 2 hours  Pillow wedges  Manage incontinence   Promote continence; Skin Protective lotion/cream to buttocks and sacrum daily and as needed with incontinence care  Offload heels pillows    Consult wound care if any wounds/ skin care needs noted during admission

## 2021-07-23 LAB
ALBUMIN SERPL-MCNC: 3.2 G/DL (ref 3.4–5)
ALBUMIN/GLOB SERPL: 0.8 {RATIO} (ref 0.8–1.7)
ALP SERPL-CCNC: 87 U/L (ref 45–117)
ALT SERPL-CCNC: 21 U/L (ref 13–56)
ANION GAP SERPL CALC-SCNC: 2 MMOL/L (ref 3–18)
AST SERPL-CCNC: 12 U/L (ref 10–38)
BILIRUB SERPL-MCNC: 0.7 MG/DL (ref 0.2–1)
BUN SERPL-MCNC: 21 MG/DL (ref 7–18)
BUN/CREAT SERPL: 20 (ref 12–20)
CALCIUM SERPL-MCNC: 8.8 MG/DL (ref 8.5–10.1)
CHLORIDE SERPL-SCNC: 102 MMOL/L (ref 100–111)
CO2 SERPL-SCNC: 33 MMOL/L (ref 21–32)
CREAT SERPL-MCNC: 1.07 MG/DL (ref 0.6–1.3)
ERYTHROCYTE [DISTWIDTH] IN BLOOD BY AUTOMATED COUNT: 12.9 % (ref 11.6–14.5)
GLOBULIN SER CALC-MCNC: 4 G/DL (ref 2–4)
GLUCOSE BLD STRIP.AUTO-MCNC: 159 MG/DL (ref 70–110)
GLUCOSE BLD STRIP.AUTO-MCNC: 164 MG/DL (ref 70–110)
GLUCOSE BLD STRIP.AUTO-MCNC: 180 MG/DL (ref 70–110)
GLUCOSE BLD STRIP.AUTO-MCNC: 182 MG/DL (ref 70–110)
GLUCOSE SERPL-MCNC: 164 MG/DL (ref 74–99)
HCT VFR BLD AUTO: 40.9 % (ref 35–45)
HGB BLD-MCNC: 13.1 G/DL (ref 12–16)
MCH RBC QN AUTO: 29.1 PG (ref 24–34)
MCHC RBC AUTO-ENTMCNC: 32 G/DL (ref 31–37)
MCV RBC AUTO: 90.9 FL (ref 74–97)
PLATELET # BLD AUTO: 243 K/UL (ref 135–420)
PMV BLD AUTO: 10.7 FL (ref 9.2–11.8)
POTASSIUM SERPL-SCNC: 4.5 MMOL/L (ref 3.5–5.5)
PROT SERPL-MCNC: 7.2 G/DL (ref 6.4–8.2)
RBC # BLD AUTO: 4.5 M/UL (ref 4.2–5.3)
SODIUM SERPL-SCNC: 137 MMOL/L (ref 136–145)
WBC # BLD AUTO: 7.9 K/UL (ref 4.6–13.2)

## 2021-07-23 PROCEDURE — 74011000250 HC RX REV CODE- 250: Performed by: FAMILY MEDICINE

## 2021-07-23 PROCEDURE — 74011250637 HC RX REV CODE- 250/637: Performed by: FAMILY MEDICINE

## 2021-07-23 PROCEDURE — 80053 COMPREHEN METABOLIC PANEL: CPT

## 2021-07-23 PROCEDURE — 74011636637 HC RX REV CODE- 636/637: Performed by: FAMILY MEDICINE

## 2021-07-23 PROCEDURE — 94660 CPAP INITIATION&MGMT: CPT

## 2021-07-23 PROCEDURE — 82962 GLUCOSE BLOOD TEST: CPT

## 2021-07-23 PROCEDURE — 85027 COMPLETE CBC AUTOMATED: CPT

## 2021-07-23 PROCEDURE — 36415 COLL VENOUS BLD VENIPUNCTURE: CPT

## 2021-07-23 PROCEDURE — 99218 HC RM OBSERVATION: CPT

## 2021-07-23 PROCEDURE — 74011250636 HC RX REV CODE- 250/636: Performed by: INTERNAL MEDICINE

## 2021-07-23 PROCEDURE — 94640 AIRWAY INHALATION TREATMENT: CPT

## 2021-07-23 PROCEDURE — 99232 SBSQ HOSP IP/OBS MODERATE 35: CPT | Performed by: INTERNAL MEDICINE

## 2021-07-23 PROCEDURE — 96376 TX/PRO/DX INJ SAME DRUG ADON: CPT

## 2021-07-23 RX ORDER — SPIRONOLACTONE 25 MG/1
25 TABLET ORAL DAILY
Status: DISCONTINUED | OUTPATIENT
Start: 2021-07-24 | End: 2021-07-29 | Stop reason: HOSPADM

## 2021-07-23 RX ORDER — AZITHROMYCIN 250 MG/1
500 TABLET, FILM COATED ORAL DAILY
Status: COMPLETED | OUTPATIENT
Start: 2021-07-23 | End: 2021-07-24

## 2021-07-23 RX ADMIN — Medication 10 ML: at 06:56

## 2021-07-23 RX ADMIN — CARVEDILOL 12.5 MG: 12.5 TABLET, FILM COATED ORAL at 08:38

## 2021-07-23 RX ADMIN — INSULIN LISPRO 2 UNITS: 100 INJECTION, SOLUTION INTRAVENOUS; SUBCUTANEOUS at 12:28

## 2021-07-23 RX ADMIN — GABAPENTIN 300 MG: 300 CAPSULE ORAL at 21:27

## 2021-07-23 RX ADMIN — ALLOPURINOL 100 MG: 100 TABLET ORAL at 21:27

## 2021-07-23 RX ADMIN — LOSARTAN POTASSIUM 50 MG: 50 TABLET, FILM COATED ORAL at 08:38

## 2021-07-23 RX ADMIN — GABAPENTIN 300 MG: 300 CAPSULE ORAL at 18:56

## 2021-07-23 RX ADMIN — CARVEDILOL 12.5 MG: 12.5 TABLET, FILM COATED ORAL at 18:56

## 2021-07-23 RX ADMIN — Medication 10 ML: at 18:56

## 2021-07-23 RX ADMIN — RIVAROXABAN 15 MG: 15 TABLET, FILM COATED ORAL at 08:38

## 2021-07-23 RX ADMIN — ASPIRIN 81 MG: 81 TABLET, COATED ORAL at 08:37

## 2021-07-23 RX ADMIN — BENZONATATE 100 MG: 100 CAPSULE ORAL at 21:39

## 2021-07-23 RX ADMIN — Medication 10 ML: at 21:28

## 2021-07-23 RX ADMIN — IPRATROPIUM BROMIDE AND ALBUTEROL SULFATE 3 ML: .5; 3 SOLUTION RESPIRATORY (INHALATION) at 10:33

## 2021-07-23 RX ADMIN — INSULIN LISPRO 2 UNITS: 100 INJECTION, SOLUTION INTRAVENOUS; SUBCUTANEOUS at 18:56

## 2021-07-23 RX ADMIN — AZITHROMYCIN MONOHYDRATE 500 MG: 250 TABLET ORAL at 21:27

## 2021-07-23 RX ADMIN — GABAPENTIN 300 MG: 300 CAPSULE ORAL at 08:37

## 2021-07-23 RX ADMIN — FAMOTIDINE 20 MG: 20 TABLET ORAL at 08:38

## 2021-07-23 RX ADMIN — ALLOPURINOL 100 MG: 100 TABLET ORAL at 08:38

## 2021-07-23 RX ADMIN — INSULIN LISPRO 2 UNITS: 100 INJECTION, SOLUTION INTRAVENOUS; SUBCUTANEOUS at 21:27

## 2021-07-23 RX ADMIN — FAMOTIDINE 20 MG: 20 TABLET ORAL at 21:27

## 2021-07-23 RX ADMIN — FUROSEMIDE 40 MG: 10 INJECTION, SOLUTION INTRAMUSCULAR; INTRAVENOUS at 08:38

## 2021-07-23 RX ADMIN — ATORVASTATIN CALCIUM 10 MG: 10 TABLET, FILM COATED ORAL at 21:27

## 2021-07-23 RX ADMIN — IPRATROPIUM BROMIDE AND ALBUTEROL SULFATE 3 ML: .5; 3 SOLUTION RESPIRATORY (INHALATION) at 05:10

## 2021-07-23 RX ADMIN — INSULIN LISPRO 2 UNITS: 100 INJECTION, SOLUTION INTRAVENOUS; SUBCUTANEOUS at 06:55

## 2021-07-23 NOTE — PROGRESS NOTES
Hospitalist Progress Note    Patient: Lance Marlow MRN: [id number]  CSN: [id number]    YOB: 1945  Age: 70 y.o. Sex: female    DOA: 7/20/2021 LOS:  LOS: 0 days                Assessment/Plan     Patient Active Problem List   Diagnosis Code    Chest pain R07.9    CAD (coronary artery disease) I25.10    Hypertension I10    Gout M10.9    Anticoagulated Z79.01    Abnormal nuclear stress test R94.39    Cardiomyopathy ([address] I42.9    Right leg weakness R29.898    CAD S/P percutaneous coronary angioplasty I25.10, Z98.61    Insulin dependent type 2 diabetes mellitus (HCC) E11.9, Z79.4    Acute exacerbation of CHF (congestive heart failure) ([facility]) I50.9    Morbid obesity with BMI of 45.0-49.9, adult ([facility]) E66.01, Z68.42    S/P drug eluting coronary stent placement Z95.5    EDGAR (obstructive sleep apnea) G47.33    CAP (community acquired pneumonia) J18.9            81-year-old female with insulin-dependent type 2 diabetes mellitus, morbid obesity, hypertension, CAD with drug-eluting stent and on anticoagulation, and cardiomyopathy with EF of 35 to 40% is admitted for acute CHF exacerbation. CHF exacerbation -  Continue with diuresis  Follow echo  Cardiology consulted  Salt restriction to 2g daily  Fluid restriction to 1.5L    CAD -  Continue with aspirin. On xarelto    Pneumonia -  On azithromycin    DM -  On SSI    EDGAR -  CPAP at night. Morbid obesity    Disposition : 1-2 days    Review of systems  General: No fevers or chills. Cardiovascular: No chest pain or pressure. No palpitations. Pulmonary: No shortness of breath. Gastrointestinal: No nausea, vomiting. Physical Exam:  General: Awake, cooperative, no acute distress    HEENT: NC, Atraumatic. PERRLA, anicteric sclerae. Lungs: CTA Bilaterally. No Wheezing/Rhonchi/Rales.   Heart:  S1 S2,  No murmur, No Rubs, No Gallops  Abdomen: Soft, Non distended, Non tender.  +Bowel sounds,   Extremities: Edema bilaterally  Psych:   Not anxious or agitated. Neurologic:  No acute neurological deficit. Vital signs/Intake and Output:  Visit Vitals  /64   Pulse 79   Temp 98 °F (36.7 °C)   Resp 17   Ht 5' 3\" (1.6 m)   Wt 119.3 kg (263 lb)   SpO2 97%   BMI 46.59 kg/m²     Current Shift:  No intake/output data recorded. Last three shifts:  07/21 0701 - 07/22 1900  In: 480 [P.O.:480]  Out: 1310 [Urine:1310]            Labs: Results:       Chemistry Recent Labs     07/22/21  0233 07/21/21  2208 07/21/21  0300 07/20/21 1920 07/20/21 1920   * 181* 220*   < > 201*     --  139  --  141   K 4.0  --  3.8  --  4.0     --  104  --  106   CO2 32  --  31  --  29   BUN 21*  --  16  --  16   CREA 1.12  --  0.96  --  0.96   CA 8.5  --  9.1  --  8.8   AGAP 6  --  4  --  6   BUCR 19  --  17  --  17   AP 79  --  80  --  82   TP 6.8  --  7.3  --  7.4   ALB 2.9*  --  3.2*  --  3.3*   GLOB 3.9  --  4.1*  --  4.1*   AGRAT 0.7*  --  0.8  --  0.8    < > = values in this interval not displayed. CBC w/Diff Recent Labs     07/22/21 0233 07/21/21  0300 07/20/21 1920   WBC 6.6 6.2 6.0   RBC 4.21 4.45 4.50   HGB 12.4 12.6 13.2   HCT 38.4 40.1 40.4    225 228   GRANS  --   --  63   LYMPH  --   --  27   EOS  --   --  1      Cardiac Enzymes Recent Labs     07/21/21  1500 07/21/21  0855   * 227*   CKND1 1.3 1.3      Coagulation No results for input(s): PTP, INR, APTT, INREXT, INREXT in the last 72 hours. Lipid Panel Lab Results   Component Value Date/Time    Cholesterol, total 139 08/25/2020 05:30 AM    HDL Cholesterol 49 08/25/2020 05:30 AM    LDL, calculated 58.6 08/25/2020 05:30 AM    VLDL, calculated 31.4 08/25/2020 05:30 AM    Triglyceride 157 (H) 08/25/2020 05:30 AM    CHOL/HDL Ratio 2.8 08/25/2020 05:30 AM      BNP No results for input(s): BNPP in the last 72 hours.    Liver Enzymes Recent Labs     07/22/21  0233   TP 6.8   ALB 2.9*   AP 79      Thyroid Studies No results found for: T4, T3U, TSH, TSHEXT, TSHEXT Procedures/imaging: see electronic medical records for all procedures/Xrays and details which were not copied into this note but were reviewed prior to creation of Plan

## 2021-07-23 NOTE — PROGRESS NOTES
7/23/2021 PT note: consult received and chart reviewed. Evaluation attempted. Pt sleeping soundly currently. Will f/u at later time as pt schedule allows for PT evaluation. Thank you for this referral.   Alin Phelps, PT      14:27 PT evaluation attempted. Pt reclined in bed with covers off and eyes open; does not respond to verbalizations until asked whether pt wanted covers on legs (quietly responded \"no ma'am\"). Pt closed eyes and began snoring. Pt not remaining wakeful for participation with PT at this time. Will notify nurse Carlos Hutchison and f/up tomorrow.   Thank you for this referral.  Alin Phelps, PT

## 2021-07-23 NOTE — PROGRESS NOTES
Occupational Therapy Evaluation Attempt     OT eval orders received. Chart reviewed. Attempted Occupational Therapy Evaluation, however, patient unable to be seen due to:  []  Nausea/vomiting  []  Eating  []  Pain  []  Patient too lethargic  []  Off Unit for testing/procedure  []  Dialysis treatment in progress  []  Telemetry Results  [x]  Other:  Pt confused this am; becomes agitated at times when insisted to participate with therapy and OOB activity. Refusing participation at this time. Will f/u later as patient's schedule allows.   Bernie Spivey, OTR/L

## 2021-07-23 NOTE — PROGRESS NOTES
Discharge Planning: Home with home health vs home with family assist and MD follow-up    CM called the patient's room and spoke with her son, Alma Day, regarding plans for discharge. Alma Day states that he would like the patient to have home health services upon discharge if possible. CM informed Alfredo that PT/OT evaluations are pending and that the patient will need to participate with therapy in order for therapy to place their recommendations for discharge. Alma Day verbalized understanding and states that he will encourage the patient to work with therapy if she is feeling able. Alma Day states that he has the list of home health agencies that this CM left at the bedside and he will select their top choices in the event that home health is indicated upon discharge. Alma Day asked this CM about what home health entails and this CM explained home health services and how home health works. Alma Day verbalized understanding and denies any additional questions or concerns at this time. Care Management Interventions  Mode of Transport at Discharge:  Other (see comment) (Family)  Transition of Care Consult (CM Consult): Discharge Planning  Health Maintenance Reviewed: Yes  Current Support Network: Family Lives Nearby, Lives Alone  The Plan for Transition of Care is Related to the Following Treatment Goals :  [facility] with physician follow up vs [facility]          The Patient and/or Patient Representative was Provided with a Choice of Provider and Agrees with the Discharge Plan?: Yes  Freedom of Choice List was Provided with Basic Dialogue that Supports the Patient's Individualized Plan of Care/Goals, Treatment Preferences and Shares the Quality Data Associated with the Providers?: Yes  Discharge Location  Discharge Placement: Home with home health (vs [facility])

## 2021-07-23 NOTE — PROGRESS NOTES
Called for PRN tx,  noted audible wheezing. Acknowledges relief post neb tx. Also replaced RES-MED CPAP. (found off).

## 2021-07-23 NOTE — PROGRESS NOTES
Cardiology Progress Note      7/23/2021 12:32 PM    Admit Date: 7/20/2021    Admit Diagnosis: CHF (congestive heart failure) ([facility] [I50.9]      Subjective:     Irma Rodríguez denies chest pain, chest pressure/discomfort.     Visit Vitals  /80   Pulse 73   Temp 99 °F (37.2 °C)   Resp 16   Ht 5' 3\" (1.6 m)   Wt 119.3 kg (263 lb)   SpO2 95%   BMI 46.59 kg/m²     Current Facility-Administered Medications   Medication Dose Route Frequency    azithromycin (ZITHROMAX) tablet 500 mg  500 mg Oral DAILY    furosemide (LASIX) injection 40 mg  40 mg IntraVENous DAILY    allopurinoL (ZYLOPRIM) tablet 100 mg  100 mg Oral BID    aspirin delayed-release tablet 81 mg  81 mg Oral DAILY    atorvastatin (LIPITOR) tablet 10 mg  10 mg Oral QHS    gabapentin (NEURONTIN) capsule 300 mg  300 mg Oral TID    rivaroxaban (XARELTO) tablet 15 mg  15 mg Oral DAILY    losartan (COZAAR) tablet 50 mg  50 mg Oral DAILY    carvediloL (COREG) tablet 12.5 mg  12.5 mg Oral BID WITH MEALS    albuterol-ipratropium (DUO-NEB) 2.5 MG-0.5 MG/3 ML  3 mL Nebulization Q4H PRN    benzonatate (TESSALON) capsule 100 mg  100 mg Oral TID PRN    oxyCODONE IR (ROXICODONE) tablet 5 mg  5 mg Oral Q4H PRN    sodium chloride (NS) flush 5-40 mL  5-40 mL IntraVENous Q8H    sodium chloride (NS) flush 5-40 mL  5-40 mL IntraVENous PRN    acetaminophen (TYLENOL) tablet 650 mg  650 mg Oral Q6H PRN    Or    acetaminophen (TYLENOL) suppository 650 mg  650 mg Rectal Q6H PRN    polyethylene glycol (MIRALAX) packet 17 g  17 g Oral DAILY PRN    ondansetron (ZOFRAN ODT) tablet 4 mg  4 mg Oral Q8H PRN    Or    ondansetron (ZOFRAN) injection 4 mg  4 mg IntraVENous Q6H PRN    famotidine (PEPCID) tablet 20 mg  20 mg Oral BID    insulin lispro (HUMALOG) injection   SubCUTAneous AC&HS    glucose chewable tablet 16 g  16 g Oral PRN    glucagon (GLUCAGEN) injection 1 mg  1 mg IntraMUSCular PRN    dextrose (D50W) injection syrg 12.5-25 g  25-50 mL IntraVENous PRN Objective:      Physical Exam:  General Appearance: Comfortable, not using accessory muscles of respiration. HEENT: JUAN. HEAD: Atraumatic  NECK: No JVD, no thyroidomeglay. CAROTIDS: no bruit  LUNGS: Clear bilaterally.         HEART: S1+S2 audible, no murmur, no pericardial rub.                     ABD: Non-tender, BS Audible                          EXT: No edema, and no cyanosis. VASCULAR EXAM: Pulses are intact.                         PSYCHIATRIC EXAM: Mood is appropriate. MUSCULOSKELETAL: Grossly no joint deformity. NEUROLOGICAL: AAO times 3, Motor and sensory sytem intact     Data Review:   Labs:    Recent Results (from the past 24 hour(s))   GLUCOSE, POC    Collection Time: 07/22/21  4:32 PM   Result Value Ref Range    Glucose (POC) 229 (H) 70 - 110 mg/dL   GLUCOSE, POC    Collection Time: 07/22/21  9:12 PM   Result Value Ref Range    Glucose (POC) 171 (H) 70 - 094 mg/dL   METABOLIC PANEL, COMPREHENSIVE    Collection Time: 07/23/21  4:45 AM   Result Value Ref Range    Sodium 137 136 - 145 mmol/L    Potassium 4.5 3.5 - 5.5 mmol/L    Chloride 102 100 - 111 mmol/L    CO2 33 (H) 21 - 32 mmol/L    Anion gap 2 (L) 3.0 - 18 mmol/L    Glucose 164 (H) 74 - 99 mg/dL    BUN 21 (H) 7.0 - 18 MG/DL    Creatinine 1.07 0.6 - 1.3 MG/DL    BUN/Creatinine ratio 20 12 - 20      GFR est AA >60 >60 ml/min/1.73m2    GFR est non-AA 50 (L) >60 ml/min/1.73m2    Calcium 8.8 8.5 - 10.1 MG/DL    Bilirubin, total 0.7 0.2 - 1.0 MG/DL    ALT (SGPT) 21 13 - 56 U/L    AST (SGOT) 12 10 - 38 U/L    Alk. phosphatase 87 45 - 117 U/L    Protein, total 7.2 6.4 - 8.2 g/dL    Albumin 3.2 (L) 3.4 - 5.0 g/dL    Globulin 4.0 2.0 - 4.0 g/dL    A-G Ratio 0.8 0.8 - 1.7     CBC W/O DIFF    Collection Time: 07/23/21  4:45 AM   Result Value Ref Range    WBC 7.9 4.6 - 13.2 K/uL    RBC 4.50 4. 20 - 5.30 M/uL    HGB 13.1 12.0 - 16.0 g/dL    HCT 40.9 35.0 - 45.0 %    MCV 90.9 74.0 - 97.0 FL    MCH 29.1 24.0 - 34.0 PG    MCHC 32.0 31.0 - 37.0 g/dL    RDW 12.9 11.6 - 14.5 %    PLATELET 453 222 - 915 K/uL    MPV 10.7 9.2 - 11.8 FL   GLUCOSE, POC    Collection Time: 07/23/21  6:29 AM   Result Value Ref Range    Glucose (POC) 182 (H) 70 - 110 mg/dL   GLUCOSE, POC    Collection Time: 07/23/21 11:43 AM   Result Value Ref Range    Glucose (POC) 180 (H) 70 - 110 mg/dL       Telemetry: normal sinus rhythm      Assessment:     Principal Problem:    Acute exacerbation of CHF (congestive heart failure) ([facility] (7/20/2021)    Active Problems:    CAD (coronary artery disease) (12/3/2018)      Hypertension (12/3/2018)      Anticoagulated (8/25/2020)      Insulin dependent type 2 diabetes mellitus ([facility] (8/28/2020)      Morbid obesity with BMI of 45.0-49.9, adult ([facility] ()      S/P drug eluting coronary stent placement (7/21/2021)      EDGAR (obstructive sleep apnea) (7/21/2021)      CAP (community acquired pneumonia) (7/21/2021)        Plan:     On guideline directed medical therapy. HFrEF   EF 25-30%. Might add spironolactone 25 mg daily. Still on IV Lasix. Follow.        Jordin Moore MD

## 2021-07-23 NOTE — PROGRESS NOTES
IV to PO Conversion Recommendation     Patient: Jossue Avila   MRN#: [id number]   Admission Date: 072021     IV TO PO  Medication(s) Azithromycin   Oral Meds  Yes   Diet:     Active Orders   Diet    ADULT DIET Regular; 3 carb choices (45 gm/meal); Low Fat/Low Chol/High Fiber/NALINI; Low Sodium (2 gm); 1500 ml      TEMP 99 °F (37.2 °C)   WBC Lab Results   Component Value Date/Time    WBC 7.9 07/23/2021 04:45 AM           Pharmacy Dosing Services:  Summary:   Does the patient meet all criteria for IV to PO switch as listed below? Yes     Is the patient excluded from IV to PO automatic switch based on criteria listed below? No       Criteria for IV to PO switch - Antibiotics   Received IV therapy for at least 24 hours   2. Functioning GI tract   Taking scheduled oral medications  Tolerating diet more advanced than clear liquids   3. No signs/symptoms of shock  No vasopressor support    Criteria for IV to PO switch - Nonantibiotics   Functioning GI tract   Taking scheduled oral medications  Toleratind duet more advanced than clear liquids  2. No signs/symptoms of shock  No vasopressor support        3. No seizures for >72 hours (antiepileptic medications only)    Exclusion Criteria   Patient is being treated for an infection that requires IV therapy such as: endocarditis, osteomyelitis, meningitis, pancreatitis (antibiotics only)   NG tube with continous suction   Nausea and/or vomiting or severe diarrhea within past 24 hours  Malabsorption syndromes, partial or total gastrectomy, ileus, gastric outlet or bowel obstruction  Active GI bleeding   Significant painful oral ulceration  Unable to swallow  On total parenteral nutrition with a NPO order  NPO  Febrile in last 24 hours (antibiotics only)  Clinically deteriorating or unstable (antibiotics only)      Assessment/Recommendation: This IV to PO conversion is based on the [facility] [employer] approved automatic conversion policy for eligible patients.       Please call with questions.     Jake Hathawayva [id number] [phone]

## 2021-07-23 NOTE — ROUTINE PROCESS
Bedside and Verbal shift change report given to Avis Moraes RN by Julius Castro. Report included the following information SBAR, Kardex, OR Summary, Intake/Output and MAR.

## 2021-07-24 LAB
ANION GAP SERPL CALC-SCNC: 4 MMOL/L (ref 3–18)
BASE EXCESS BLD CALC-SCNC: 3.8 MMOL/L
BUN SERPL-MCNC: 24 MG/DL (ref 7–18)
BUN/CREAT SERPL: 23 (ref 12–20)
CALCIUM SERPL-MCNC: 8.6 MG/DL (ref 8.5–10.1)
CHLORIDE SERPL-SCNC: 102 MMOL/L (ref 100–111)
CO2 SERPL-SCNC: 31 MMOL/L (ref 21–32)
CREAT SERPL-MCNC: 1.03 MG/DL (ref 0.6–1.3)
GLUCOSE BLD STRIP.AUTO-MCNC: 154 MG/DL (ref 70–110)
GLUCOSE BLD STRIP.AUTO-MCNC: 166 MG/DL (ref 70–110)
GLUCOSE BLD STRIP.AUTO-MCNC: 176 MG/DL (ref 70–110)
GLUCOSE BLD STRIP.AUTO-MCNC: 219 MG/DL (ref 70–110)
GLUCOSE BLD STRIP.AUTO-MCNC: 276 MG/DL (ref 70–110)
GLUCOSE SERPL-MCNC: 183 MG/DL (ref 74–99)
HCO3 BLD-SCNC: 28.6 MMOL/L (ref 22–26)
PCO2 BLD: 42.7 MMHG (ref 35–45)
PH BLD: 7.43 [PH] (ref 7.35–7.45)
PO2 BLD: 79 MMHG (ref 80–100)
POTASSIUM SERPL-SCNC: 4.1 MMOL/L (ref 3.5–5.5)
SAO2 % BLD: 95.9 % (ref 92–97)
SERVICE CMNT-IMP: ABNORMAL
SODIUM SERPL-SCNC: 137 MMOL/L (ref 136–145)
SPECIMEN TYPE: ABNORMAL

## 2021-07-24 PROCEDURE — 96376 TX/PRO/DX INJ SAME DRUG ADON: CPT

## 2021-07-24 PROCEDURE — 74011636637 HC RX REV CODE- 636/637: Performed by: FAMILY MEDICINE

## 2021-07-24 PROCEDURE — 80048 BASIC METABOLIC PNL TOTAL CA: CPT

## 2021-07-24 PROCEDURE — 36415 COLL VENOUS BLD VENIPUNCTURE: CPT

## 2021-07-24 PROCEDURE — 74011250636 HC RX REV CODE- 250/636: Performed by: INTERNAL MEDICINE

## 2021-07-24 PROCEDURE — 74011250637 HC RX REV CODE- 250/637: Performed by: INTERNAL MEDICINE

## 2021-07-24 PROCEDURE — 99232 SBSQ HOSP IP/OBS MODERATE 35: CPT | Performed by: INTERNAL MEDICINE

## 2021-07-24 PROCEDURE — 77010033678 HC OXYGEN DAILY

## 2021-07-24 PROCEDURE — 74011250637 HC RX REV CODE- 250/637: Performed by: FAMILY MEDICINE

## 2021-07-24 PROCEDURE — 82962 GLUCOSE BLOOD TEST: CPT

## 2021-07-24 PROCEDURE — 82803 BLOOD GASES ANY COMBINATION: CPT

## 2021-07-24 PROCEDURE — 99218 HC RM OBSERVATION: CPT

## 2021-07-24 RX ADMIN — AZITHROMYCIN MONOHYDRATE 500 MG: 250 TABLET ORAL at 22:43

## 2021-07-24 RX ADMIN — CARVEDILOL 12.5 MG: 12.5 TABLET, FILM COATED ORAL at 08:40

## 2021-07-24 RX ADMIN — FAMOTIDINE 20 MG: 20 TABLET ORAL at 22:43

## 2021-07-24 RX ADMIN — Medication 10 ML: at 14:00

## 2021-07-24 RX ADMIN — INSULIN LISPRO 2 UNITS: 100 INJECTION, SOLUTION INTRAVENOUS; SUBCUTANEOUS at 22:43

## 2021-07-24 RX ADMIN — INSULIN LISPRO 6 UNITS: 100 INJECTION, SOLUTION INTRAVENOUS; SUBCUTANEOUS at 11:13

## 2021-07-24 RX ADMIN — Medication 10 ML: at 22:44

## 2021-07-24 RX ADMIN — ATORVASTATIN CALCIUM 10 MG: 10 TABLET, FILM COATED ORAL at 22:43

## 2021-07-24 RX ADMIN — ALLOPURINOL 100 MG: 100 TABLET ORAL at 08:39

## 2021-07-24 RX ADMIN — FAMOTIDINE 20 MG: 20 TABLET ORAL at 08:40

## 2021-07-24 RX ADMIN — INSULIN LISPRO 2 UNITS: 100 INJECTION, SOLUTION INTRAVENOUS; SUBCUTANEOUS at 06:30

## 2021-07-24 RX ADMIN — FUROSEMIDE 40 MG: 10 INJECTION, SOLUTION INTRAMUSCULAR; INTRAVENOUS at 08:40

## 2021-07-24 RX ADMIN — RIVAROXABAN 15 MG: 15 TABLET, FILM COATED ORAL at 08:41

## 2021-07-24 RX ADMIN — ALLOPURINOL 100 MG: 100 TABLET ORAL at 22:43

## 2021-07-24 RX ADMIN — SPIRONOLACTONE 25 MG: 25 TABLET ORAL at 08:41

## 2021-07-24 RX ADMIN — Medication 10 ML: at 06:29

## 2021-07-24 RX ADMIN — ASPIRIN 81 MG: 81 TABLET, COATED ORAL at 08:38

## 2021-07-24 RX ADMIN — INSULIN LISPRO 2 UNITS: 100 INJECTION, SOLUTION INTRAVENOUS; SUBCUTANEOUS at 16:59

## 2021-07-24 RX ADMIN — GABAPENTIN 300 MG: 300 CAPSULE ORAL at 08:41

## 2021-07-24 RX ADMIN — LOSARTAN POTASSIUM 50 MG: 50 TABLET, FILM COATED ORAL at 08:41

## 2021-07-24 NOTE — ROUTINE PROCESS
Bedside and Verbal shift change report given to Jac Taylor RN  (oncoming nurse) by Saranya Tyson RN (offgoing nurse). Report included the following information SBAR, Procedure Summary, Intake/Output and Recent Results.

## 2021-07-24 NOTE — PROGRESS NOTES
Problem: Patient Education: Go to Patient Education Activity  Goal: Patient/Family Education  Outcome: Progressing Towards Goal  Goal: Patient/Caregiver will demonstrate knowledge of congestive heart failure  Outcome: Progressing Towards Goal     Problem: Heart Failure: Day 1  Goal: Off Pathway (Use only if patient is Off Pathway)  Outcome: Progressing Towards Goal  Goal: Activity/Safety  Outcome: Progressing Towards Goal  Goal: Nutrition/Diet  Outcome: Progressing Towards Goal  Goal: Discharge Planning  Outcome: Progressing Towards Goal     Problem: Diabetes Self-Management  Goal: *Disease process and treatment process  Description: Define diabetes and identify own type of diabetes; list 3 options for treating diabetes. Outcome: Progressing Towards Goal  Goal: *Incorporating nutritional management into lifestyle  Description: Describe effect of type, amount and timing of food on blood glucose; list 3 methods for planning meals. Outcome: Progressing Towards Goal  Goal: *Incorporating physical activity into lifestyle  Description: State effect of exercise on blood glucose levels. Outcome: Progressing Towards Goal  Goal: *Developing strategies to promote health/change behavior  Description: Define the ABC's of diabetes; identify appropriate screenings, schedule and personal plan for screenings. Outcome: Progressing Towards Goal  Goal: *Using medications safely  Description: State effect of diabetes medications on diabetes; name diabetes medication taking, action and side effects. Outcome: Progressing Towards Goal  Goal: *Monitoring blood glucose, interpreting and using results  Description: Identify recommended blood glucose targets  and personal targets. Outcome: Progressing Towards Goal  Goal: *Prevention, detection, treatment of acute complications  Description: List symptoms of hyper- and hypoglycemia; describe how to treat low blood sugar and actions for lowering  high blood glucose level.   Outcome: Progressing Towards Goal  Goal: *Prevention, detection and treatment of chronic complications  Description: Define the natural course of diabetes and describe the relationship of blood glucose levels to long term complications of diabetes. Outcome: Progressing Towards Goal  Goal: *Developing strategies to address psychosocial issues  Description: Describe feelings about living with diabetes; identify support needed and support network  Outcome: Progressing Towards Goal  Goal: *Sick day guidelines  Outcome: Progressing Towards Goal  Goal: *Patient Specific Goal (EDIT GOAL, INSERT TEXT)  Outcome: Progressing Towards Goal     Problem: Patient Education: Go to Patient Education Activity  Goal: Patient/Family Education  Outcome: Progressing Towards Goal     Problem: Falls - Risk of  Goal: *Absence of Falls  Description: Document Aldo Fall Risk and appropriate interventions in the flowsheet. Outcome: Progressing Towards Goal  Note: Fall Risk Interventions:  Mobility Interventions: Bed/chair exit alarm, Patient to call before getting OOB    Mentation Interventions: Bed/chair exit alarm    Medication Interventions: Bed/chair exit alarm, Patient to call before getting OOB, Teach patient to arise slowly    Elimination Interventions: Bed/chair exit alarm, Call light in reach, Patient to call for help with toileting needs              Problem: Patient Education: Go to Patient Education Activity  Goal: Patient/Family Education  Outcome: Progressing Towards Goal     Problem: Pressure Injury - Risk of  Goal: *Prevention of pressure injury  Description: Document Sheldon Scale and appropriate interventions in the flowsheet.   Outcome: Progressing Towards Goal  Note: Pressure Injury Interventions:       Moisture Interventions: Absorbent underpads, Apply protective barrier, creams and emollients, Moisture barrier, Minimize layers    Activity Interventions: Assess need for specialty bed, Increase time out of bed, PT/OT evaluation    Mobility Interventions: HOB 30 degrees or less, PT/OT evaluation    Nutrition Interventions: Document food/fluid/supplement intake, Offer support with meals,snacks and hydration    Friction and Shear Interventions: Apply protective barrier, creams and emollients, HOB 30 degrees or less, Transferring/repositioning devices                Problem: Patient Education: Go to Patient Education Activity  Goal: Patient/Family Education  Outcome: Progressing Towards Goal

## 2021-07-24 NOTE — PROGRESS NOTES
0730: Assumed patient care from off going nurse [address]     2999: Notified by PT patient appearing too lethargic. Upon Assessment patients mental status has changed. Patient disorientated to time place and situation, will open eyes when told but unable to keep eyes open. Paged  pertaining to patients change in condition. Received a call back stating she will come see patient on unit. 1700: Spoke with family at bedside, requesting patient not receive Gabapentin at this time. Son stated \" she was on gabapentin when she was staying with us and started to notice that she would get loopy, confused and sleepy\". Held gabapentin this evening and will translate the families request to night shift nurse. Informed  of information obtained by family.

## 2021-07-24 NOTE — PROGRESS NOTES
Cardiology Progress Note      7/24/2021 2:08 PM    Admit Date: 7/20/2021    Admit Diagnosis: CHF (congestive heart failure) ([facility] [I50.9]      Subjective:     Alejandra Andres denies chest pain, chest pressure/discomfort.     Visit Vitals  BP (!) 109/50   Pulse 73   Temp 99 °F (37.2 °C)   Resp 22   Ht 5' 3\" (1.6 m)   Wt 120.9 kg (266 lb 8 oz)   SpO2 97%   BMI 47.21 kg/m²     Current Facility-Administered Medications   Medication Dose Route Frequency    azithromycin (ZITHROMAX) tablet 500 mg  500 mg Oral DAILY    spironolactone (ALDACTONE) tablet 25 mg  25 mg Oral DAILY    furosemide (LASIX) injection 40 mg  40 mg IntraVENous DAILY    allopurinoL (ZYLOPRIM) tablet 100 mg  100 mg Oral BID    aspirin delayed-release tablet 81 mg  81 mg Oral DAILY    atorvastatin (LIPITOR) tablet 10 mg  10 mg Oral QHS    gabapentin (NEURONTIN) capsule 300 mg  300 mg Oral TID    rivaroxaban (XARELTO) tablet 15 mg  15 mg Oral DAILY    losartan (COZAAR) tablet 50 mg  50 mg Oral DAILY    carvediloL (COREG) tablet 12.5 mg  12.5 mg Oral BID WITH MEALS    albuterol-ipratropium (DUO-NEB) 2.5 MG-0.5 MG/3 ML  3 mL Nebulization Q4H PRN    benzonatate (TESSALON) capsule 100 mg  100 mg Oral TID PRN    oxyCODONE IR (ROXICODONE) tablet 5 mg  5 mg Oral Q4H PRN    sodium chloride (NS) flush 5-40 mL  5-40 mL IntraVENous Q8H    sodium chloride (NS) flush 5-40 mL  5-40 mL IntraVENous PRN    acetaminophen (TYLENOL) tablet 650 mg  650 mg Oral Q6H PRN    Or    acetaminophen (TYLENOL) suppository 650 mg  650 mg Rectal Q6H PRN    polyethylene glycol (MIRALAX) packet 17 g  17 g Oral DAILY PRN    ondansetron (ZOFRAN ODT) tablet 4 mg  4 mg Oral Q8H PRN    Or    ondansetron (ZOFRAN) injection 4 mg  4 mg IntraVENous Q6H PRN    famotidine (PEPCID) tablet 20 mg  20 mg Oral BID    insulin lispro (HUMALOG) injection   SubCUTAneous AC&HS    glucose chewable tablet 16 g  16 g Oral PRN    glucagon (GLUCAGEN) injection 1 mg  1 mg IntraMUSCular PRN    dextrose (D50W) injection syrg 12.5-25 g  25-50 mL IntraVENous PRN         Objective:      Physical Exam:  General Appearance: Comfortable, not using accessory muscles of respiration. HEENT: JUAN. HEAD: Atraumatic  NECK: No JVD, no thyroidomeglay. CAROTIDS: no bruit  LUNGS: Clear bilaterally.         HEART: S1+S2 audible, no murmur, no pericardial rub.                     ABD: Non-tender, BS Audible                          EXT: No edema, and no cyanosis. VASCULAR EXAM: Pulses are intact.                         PSYCHIATRIC EXAM: Mood is appropriate. MUSCULOSKELETAL: Grossly no joint deformity. [place]; poorly arousible;  Motor and sensory seem intact     Data Review:   Labs:    Recent Results (from the past 24 hour(s))   GLUCOSE, POC    Collection Time: 07/23/21  4:57 PM   Result Value Ref Range    Glucose (POC) 164 (H) 70 - 110 mg/dL   GLUCOSE, POC    Collection Time: 07/23/21  9:15 PM   Result Value Ref Range    Glucose (POC) 159 (H) 70 - 110 mg/dL   GLUCOSE, POC    Collection Time: 07/24/21  6:19 AM   Result Value Ref Range    Glucose (POC) 154 (H) 70 - 110 mg/dL   GLUCOSE, POC    Collection Time: 07/24/21 11:08 AM   Result Value Ref Range    Glucose (POC) 276 (H) 70 - 110 mg/dL   GLUCOSE, POC    Collection Time: 07/24/21 11:39 AM   Result Value Ref Range    Glucose (POC) 219 (H) 70 - 110 mg/dL       Telemetry: normal sinus rhythm      Assessment:     Principal Problem:    Acute exacerbation of CHF (congestive heart failure) ([facility] (7/20/2021)    Active Problems:    CAD (coronary artery disease) (12/3/2018)      Hypertension (12/3/2018)      Anticoagulated (8/25/2020)      Insulin dependent type 2 diabetes mellitus ([facility] (8/28/2020)      Morbid obesity with BMI of 45.0-49.9, adult ([facility] ()      S/P drug eluting coronary stent placement (7/21/2021)      EDGAR (obstructive sleep apnea) (7/21/2021)      CAP (community acquired pneumonia) (7/21/2021)        Plan:     AMS to be investigated. Continue cardiac meds. Follow.       Bren Heard MD

## 2021-07-24 NOTE — PROGRESS NOTES
Hospitalist Progress Note    Patient: Aby Finley MRN: [id number]  CSN: [id number]    YOB: 1945  Age: 70 y.o. Sex: female    DOA: 7/20/2021 LOS:  LOS: 0 days                Assessment and Plan:    Principal Problem:    Acute exacerbation of CHF (congestive heart failure) ([facility] (7/20/2021)    Active Problems:    CAD (coronary artery disease) (12/3/2018)      Hypertension (12/3/2018)      Anticoagulated (8/25/2020)      Insulin dependent type 2 diabetes mellitus ([facility] (8/28/2020)      Morbid obesity with BMI of 45.0-49.9, adult ([facility]) ()      S/P drug eluting coronary stent placement (7/21/2021)      EDGAR (obstructive sleep apnea) (7/21/2021)      CAP (community acquired pneumonia) (7/21/2021)          CHF exacerbation -  Continue with diuresis, lasix and spironolactone    Eco with EF of 25-30 percent    Cardiology note reviewed    Salt restriction to 2g daily  Fluid restriction to 1.5L     CAD -  Continue with aspirin. On xarelto        DM -  On SSI, doing well       EDGAR -  CPAP at night.     Morbid obesity       Chief complaint:     30-year-old female with insulin-dependent type 2 diabetes mellitus, morbid obesity, hypertension, CAD with drug-eluting stent and on anticoagulation, and cardiomyopathy with EF of 25 to 30% is admitted for acute CHF exacerbation. Subjective:    She feels a little better overall      Review of systems:    General: No fevers or chills. Cardiovascular: No chest pain or pressure. No palpitations. Pulmonary: No shortness of breath. Gastrointestinal: No nausea, vomiting. Objective:    Vital signs/Intake and Output:  Visit Vitals  /69 (BP 1 Location: Left upper arm, BP Patient Position: At rest;Supine)   Pulse 78   Temp 98.8 °F (37.1 °C)   Resp 22   Ht 5' 3\" (1.6 m)   Wt 120.9 kg (266 lb 8 oz)   SpO2 100%   BMI 47.21 kg/m²     Current Shift:  No intake/output data recorded.   Last three shifts:  07/22 1901 - 07/24 0700  In: 300 [P.O.:300]  Out: 2100 [[id number]]    Physical Exam:  General: NAD, AAOx3. Non-toxic. HEENT: NC/AT. PERRLA, EOMI.  MMM. Lungs: Nml inspection. CTA B/L. No wheezing, rales or rhonchi. Heart:  S1S2 RRR,  PMI mid 5th IC space. No M/RG. Abdomen: Soft, NT/ND.  BS+. No peritoneal signs. Extremities: No C/C/E. Psych:   Nml affect. Neurologic:  2-12 intact. Strength 5/5 throughout. Sensation symmetrical.          Labs: Results:       Chemistry Recent Labs     07/23/21 0445 07/22/21  0233 07/21/21  2208   * 181* 181*    140  --    K 4.5 4.0  --     102  --    CO2 33* 32  --    BUN 21* 21*  --    CREA 1.07 1.12  --    CA 8.8 8.5  --    AGAP 2* 6  --    BUCR 20 19  --    AP 87 79  --    TP 7.2 6.8  --    ALB 3.2* 2.9*  --    GLOB 4.0 3.9  --    AGRAT 0.8 0.7*  --       CBC w/Diff Recent Labs     07/23/21 0445 07/22/21 0233   WBC 7.9 6.6   RBC 4.50 4.21   HGB 13.1 12.4   HCT 40.9 38.4    219      Cardiac Enzymes Recent Labs     07/21/21  1500   *   CKND1 1.3      Coagulation No results for input(s): PTP, INR, APTT, INREXT in the last 72 hours. Lipid Panel Lab Results   Component Value Date/Time    Cholesterol, total 139 08/25/2020 05:30 AM    HDL Cholesterol 49 08/25/2020 05:30 AM    LDL, calculated 58.6 08/25/2020 05:30 AM    VLDL, calculated 31.4 08/25/2020 05:30 AM    Triglyceride 157 (H) 08/25/2020 05:30 AM    CHOL/HDL Ratio 2.8 08/25/2020 05:30 AM      BNP No results for input(s): BNPP in the last 72 hours.    Liver Enzymes Recent Labs     07/23/21 0445   TP 7.2   ALB 3.2*   AP 87      Thyroid Studies No results found for: T4, T3U, TSH, TSHEXT     Procedures/imaging: see electronic medical records for all procedures/Xrays and details which were not copied into this note but were reviewed prior to creation of Plan

## 2021-07-24 NOTE — PROGRESS NOTES
Bedside and Verbal shift change report given to Lotus Hensley RN (oncoming nurse) by Christos Nelson RN (offgoing nurse). Report included the following information SBAR, Kardex, ED Summary, Intake/Output, MAR, Recent Results, Med Rec Status and Cardiac Rhythm NSR.     1950 Patient shift assessment completed. Patient is resting quietly with eyes wide open and chest rising and falling evenly. No signs of pain or complaints of discomfort. Shift was uneventful    Bedside and Verbal shift change report given to [address] [place] [address] (oncoming nurse) by Lotus Hensley RN (offgoing nurse). Report included the following information SBAR, Kardex, ED Summary, Intake/Output, MAR, Recent Results, Med Rec Status and Cardiac Rhythm NSR.

## 2021-07-24 NOTE — PROGRESS NOTES
Occupational Therapy Evaluation Attempt     OT eval orders received. Chart reviewed. Attempted Occupational Therapy Evaluation, however, patient unable to be seen due to:  []  Nausea/vomiting  []  Eating  []  Pain  [x]  Patient too lethargic; unable to rouse with max verbal cues. []  Off Unit for testing/procedure  []  Dialysis treatment in progress  []  Telemetry Results  []  Other:      Will f/u later as patient's schedule allows.   Altaf Sheth, OTR/L

## 2021-07-24 NOTE — PROGRESS NOTES
Hospitalist Progress Note    Patient: Leonid Castañeda MRN: [id number]  CSN: [id number]    YOB: 1945  Age: 70 y.o. Sex: female    DOA: 7/20/2021 LOS:  LOS: 0 days                Assessment/Plan     Patient Active Problem List   Diagnosis Code    Chest pain R07.9    CAD (coronary artery disease) I25.10    Hypertension I10    Gout M10.9    Anticoagulated Z79.01    Abnormal nuclear stress test R94.39    Cardiomyopathy ([address] I42.9    Right leg weakness R29.898    CAD S/P percutaneous coronary angioplasty I25.10, Z98.61    Insulin dependent type 2 diabetes mellitus (HCC) E11.9, Z79.4    Acute exacerbation of CHF (congestive heart failure) ([facility]) I50.9    Morbid obesity with BMI of 45.0-49.9, adult ([facility]) E66.01, Z68.42    S/P drug eluting coronary stent placement Z95.5    EDGAR (obstructive sleep apnea) G47.33    CAP (community acquired pneumonia) J18.9            37-year-old female with insulin-dependent type 2 diabetes mellitus, morbid obesity, hypertension, CAD with drug-eluting stent and on anticoagulation, and cardiomyopathy with EF of 35 to 40% is admitted for acute CHF exacerbation. CHF exacerbation -  Continue with diuresis  Follow echo  Cardiology consulted  Salt restriction to 2g daily  Fluid restriction to 1.5L    CAD -  Continue with aspirin. On xarelto    Pneumonia -  On azithromycin    DM -  On SSI    EDGAR -  CPAP at night. Morbid obesity    Discussed with son at bedside. Disposition : 1-2 days    Review of systems  General: No fevers or chills. Cardiovascular: No chest pain or pressure. No palpitations. Pulmonary: No shortness of breath. Gastrointestinal: No nausea, vomiting. Physical Exam:  General: Awake, cooperative, no acute distress    HEENT: NC, Atraumatic. PERRLA, anicteric sclerae. Lungs: CTA Bilaterally. No Wheezing/Rhonchi/Rales.   Heart:  S1 S2,  No murmur, No Rubs, No Gallops  Abdomen: Soft, Non distended, Non tender.  +Bowel sounds, Extremities: Edema bilaterally  Psych:   Not anxious or agitated. Neurologic:  No acute neurological deficit. Vital signs/Intake and Output:  Visit Vitals  /69   Pulse 74   Temp 98 °F (36.7 °C)   Resp 20   Ht 5' 3\" (1.6 m)   Wt 120.9 kg (266 lb 8 oz)   SpO2 98%   BMI 47.21 kg/m²     Current Shift:  No intake/output data recorded. Last three shifts:  07/22 0701 - 07/23 1900  In: 240 [P.O.:240]  Out: 1600 [Urine:1600]            Labs: Results:       Chemistry Recent Labs     07/23/21 0445 07/22/21  0233 07/21/21  2208 07/21/21  0300 07/21/21  0300   * 181* 181*   < > 220*    140  --   --  139   K 4.5 4.0  --   --  3.8    102  --   --  104   CO2 33* 32  --   --  31   BUN 21* 21*  --   --  16   CREA 1.07 1.12  --   --  0.96   CA 8.8 8.5  --   --  9.1   AGAP 2* 6  --   --  4   BUCR 20 19  --   --  17   AP 87 79  --   --  80   TP 7.2 6.8  --   --  7.3   ALB 3.2* 2.9*  --   --  3.2*   GLOB 4.0 3.9  --   --  4.1*   AGRAT 0.8 0.7*  --   --  0.8    < > = values in this interval not displayed. CBC w/Diff Recent Labs     07/23/21 0445 07/22/21  0233 07/21/21  0300   WBC 7.9 6.6 6.2   RBC 4.50 4.21 4.45   HGB 13.1 12.4 12.6   HCT 40.9 38.4 40.1    219 225      Cardiac Enzymes Recent Labs     07/21/21  1500 07/21/21  0855   * 227*   CKND1 1.3 1.3      Coagulation No results for input(s): PTP, INR, APTT, INREXT, INREXT in the last 72 hours. Lipid Panel Lab Results   Component Value Date/Time    Cholesterol, total 139 08/25/2020 05:30 AM    HDL Cholesterol 49 08/25/2020 05:30 AM    LDL, calculated 58.6 08/25/2020 05:30 AM    VLDL, calculated 31.4 08/25/2020 05:30 AM    Triglyceride 157 (H) 08/25/2020 05:30 AM    CHOL/HDL Ratio 2.8 08/25/2020 05:30 AM      BNP No results for input(s): BNPP in the last 72 hours.    Liver Enzymes Recent Labs     07/23/21  0445   TP 7.2   ALB 3.2*   AP 87      Thyroid Studies No results found for: T4, T3U, TSH, TSHEXT, TSHEXT Procedures/imaging: see electronic medical records for all procedures/Xrays and details which were not copied into this note but were reviewed prior to creation of Plan

## 2021-07-24 NOTE — PROGRESS NOTES
0730 Bedside and Verbal shift change report given to M. Marland Cowden (oncoming nurse) by Payam Lieberman, LINDA (offgoing nurse). Report included the following information SBAR, Kardex, Intake/Output, and MAR. Pt in bed, call light in reach. 6998 Pt assessed. No signs of acute distress. Pt in bed. 1025 Pt wheezing, called respiratory to given duoneb    1228 Pt assessed. No signs of acute distress. Pt in bed.    1657 Pt assessed. No signs of acute distress. Pt in bed. 1930 Bedside and Verbal shift change report given to MICHAEL Capellan RN (oncoming nurse) by Sona Thompson. Maile Villar RN (offgoing nurse). Report included the following information SBAR, Kardex, Intake/Output and MAR. Pt in bed, call light in reach.     Shift uneventful

## 2021-07-24 NOTE — PROGRESS NOTES
7/24/2021 PT evaluation attempted. Pt found up in recliner chair on arrival.  Pt lethargic, not remaining wakeful for participation with PT consult. Opens eyes briefly, but does not maintain wakeful. Nurse [address] notified and in to assess pt. Will f/u at later time as pt schedule allows for PT evaluation.  Thank you for this referral.   Deena Lopez, PT

## 2021-07-25 LAB
GLUCOSE BLD STRIP.AUTO-MCNC: 180 MG/DL (ref 70–110)
GLUCOSE BLD STRIP.AUTO-MCNC: 182 MG/DL (ref 70–110)
GLUCOSE BLD STRIP.AUTO-MCNC: 191 MG/DL (ref 70–110)

## 2021-07-25 PROCEDURE — 97530 THERAPEUTIC ACTIVITIES: CPT

## 2021-07-25 PROCEDURE — 99232 SBSQ HOSP IP/OBS MODERATE 35: CPT | Performed by: INTERNAL MEDICINE

## 2021-07-25 PROCEDURE — 74011250637 HC RX REV CODE- 250/637: Performed by: FAMILY MEDICINE

## 2021-07-25 PROCEDURE — 99218 HC RM OBSERVATION: CPT

## 2021-07-25 PROCEDURE — 2709999900 HC NON-CHARGEABLE SUPPLY

## 2021-07-25 PROCEDURE — 74011250636 HC RX REV CODE- 250/636: Performed by: INTERNAL MEDICINE

## 2021-07-25 PROCEDURE — 82962 GLUCOSE BLOOD TEST: CPT

## 2021-07-25 PROCEDURE — 97166 OT EVAL MOD COMPLEX 45 MIN: CPT

## 2021-07-25 PROCEDURE — 96376 TX/PRO/DX INJ SAME DRUG ADON: CPT

## 2021-07-25 PROCEDURE — 97535 SELF CARE MNGMENT TRAINING: CPT

## 2021-07-25 PROCEDURE — 97162 PT EVAL MOD COMPLEX 30 MIN: CPT

## 2021-07-25 PROCEDURE — 74011250637 HC RX REV CODE- 250/637: Performed by: INTERNAL MEDICINE

## 2021-07-25 PROCEDURE — 74011636637 HC RX REV CODE- 636/637: Performed by: FAMILY MEDICINE

## 2021-07-25 PROCEDURE — 94660 CPAP INITIATION&MGMT: CPT

## 2021-07-25 RX ADMIN — LOSARTAN POTASSIUM 50 MG: 50 TABLET, FILM COATED ORAL at 08:13

## 2021-07-25 RX ADMIN — INSULIN LISPRO 2 UNITS: 100 INJECTION, SOLUTION INTRAVENOUS; SUBCUTANEOUS at 21:45

## 2021-07-25 RX ADMIN — SPIRONOLACTONE 25 MG: 25 TABLET ORAL at 08:13

## 2021-07-25 RX ADMIN — ASPIRIN 81 MG: 81 TABLET, COATED ORAL at 08:12

## 2021-07-25 RX ADMIN — FAMOTIDINE 20 MG: 20 TABLET ORAL at 20:39

## 2021-07-25 RX ADMIN — INSULIN LISPRO 2 UNITS: 100 INJECTION, SOLUTION INTRAVENOUS; SUBCUTANEOUS at 11:30

## 2021-07-25 RX ADMIN — CARVEDILOL 12.5 MG: 12.5 TABLET, FILM COATED ORAL at 08:13

## 2021-07-25 RX ADMIN — INSULIN LISPRO 2 UNITS: 100 INJECTION, SOLUTION INTRAVENOUS; SUBCUTANEOUS at 06:40

## 2021-07-25 RX ADMIN — Medication 10 ML: at 23:47

## 2021-07-25 RX ADMIN — RIVAROXABAN 15 MG: 15 TABLET, FILM COATED ORAL at 08:12

## 2021-07-25 RX ADMIN — FUROSEMIDE 40 MG: 10 INJECTION, SOLUTION INTRAMUSCULAR; INTRAVENOUS at 08:13

## 2021-07-25 RX ADMIN — FAMOTIDINE 20 MG: 20 TABLET ORAL at 08:13

## 2021-07-25 RX ADMIN — ATORVASTATIN CALCIUM 10 MG: 10 TABLET, FILM COATED ORAL at 21:45

## 2021-07-25 NOTE — PROGRESS NOTES
Cardiology Progress Note      7/25/2021 3:06 PM    Admit Date: 7/20/2021    Admit Diagnosis: CHF (congestive heart failure) ([facility] [I50.9]      Subjective:     Nia Amin denies chest pain, chest pressure/discomfort, dyspnea.     Visit Vitals  /60 (BP 1 Location: Left upper arm, BP Patient Position: At rest;Supine)   Pulse 77   Temp 98.4 °F (36.9 °C)   Resp 20   Ht 5' 3\" (1.6 m)   Wt 117.3 kg (258 lb 11 oz)   SpO2 97%   BMI 45.82 kg/m²     Current Facility-Administered Medications   Medication Dose Route Frequency    spironolactone (ALDACTONE) tablet 25 mg  25 mg Oral DAILY    furosemide (LASIX) injection 40 mg  40 mg IntraVENous DAILY    allopurinoL (ZYLOPRIM) tablet 100 mg  100 mg Oral BID    aspirin delayed-release tablet 81 mg  81 mg Oral DAILY    atorvastatin (LIPITOR) tablet 10 mg  10 mg Oral QHS    rivaroxaban (XARELTO) tablet 15 mg  15 mg Oral DAILY    losartan (COZAAR) tablet 50 mg  50 mg Oral DAILY    carvediloL (COREG) tablet 12.5 mg  12.5 mg Oral BID WITH MEALS    albuterol-ipratropium (DUO-NEB) 2.5 MG-0.5 MG/3 ML  3 mL Nebulization Q4H PRN    benzonatate (TESSALON) capsule 100 mg  100 mg Oral TID PRN    oxyCODONE IR (ROXICODONE) tablet 5 mg  5 mg Oral Q4H PRN    sodium chloride (NS) flush 5-40 mL  5-40 mL IntraVENous Q8H    sodium chloride (NS) flush 5-40 mL  5-40 mL IntraVENous PRN    acetaminophen (TYLENOL) tablet 650 mg  650 mg Oral Q6H PRN    Or    acetaminophen (TYLENOL) suppository 650 mg  650 mg Rectal Q6H PRN    polyethylene glycol (MIRALAX) packet 17 g  17 g Oral DAILY PRN    ondansetron (ZOFRAN ODT) tablet 4 mg  4 mg Oral Q8H PRN    Or    ondansetron (ZOFRAN) injection 4 mg  4 mg IntraVENous Q6H PRN    famotidine (PEPCID) tablet 20 mg  20 mg Oral BID    insulin lispro (HUMALOG) injection   SubCUTAneous AC&HS    glucose chewable tablet 16 g  16 g Oral PRN    glucagon (GLUCAGEN) injection 1 mg  1 mg IntraMUSCular PRN    dextrose (D50W) injection syrg 12.5-25 g 25-50 mL IntraVENous PRN         Objective:      Physical Exam:  Visit Vitals  /60 (BP 1 Location: Left upper arm, BP Patient Position: At rest;Supine)   Pulse 77   Temp 98.4 °F (36.9 °C)   Resp 20   Ht 5' 3\" (1.6 m)   Wt 117.3 kg (258 lb 11 oz)   SpO2 97%   BMI 45.82 kg/m²     General Appearance:  Well developed, well nourished,alert and oriented x 3, and individual in no acute distress. Ears/Nose/Mouth/Throat:   Hearing grossly normal.         Neck: Supple. Chest:   Lungs clear to auscultation bilaterally. Cardiovascular:  Regular rate and rhythm, S1, S2 normal, no murmur. Abdomen:   Soft, non-tender, bowel sounds are active. Extremities: No edema bilaterally. Skin: Warm and dry. Data Review:   Labs:    Recent Results (from the past 24 hour(s))   GLUCOSE, POC    Collection Time: 07/24/21  9:13 PM   Result Value Ref Range    Glucose (POC) 166 (H) 70 - 110 mg/dL   GLUCOSE, POC    Collection Time: 07/25/21  6:05 AM   Result Value Ref Range    Glucose (POC) 180 (H) 70 - 110 mg/dL   GLUCOSE, POC    Collection Time: 07/25/21 11:38 AM   Result Value Ref Range    Glucose (POC) 182 (H) 70 - 110 mg/dL       Telemetry: normal sinus rhythm      Assessment:     Principal Problem:    Acute exacerbation of CHF (congestive heart failure) ([facility] (7/20/2021)    Active Problems:    CAD (coronary artery disease) (12/3/2018)      Hypertension (12/3/2018)      Anticoagulated (8/25/2020)      Insulin dependent type 2 diabetes mellitus ([facility] (8/28/2020)      Morbid obesity with BMI of 45.0-49.9, adult ([facility] ()      S/P drug eluting coronary stent placement (7/21/2021)      EDGAR (obstructive sleep apnea) (7/21/2021)      CAP (community acquired pneumonia) (7/21/2021)        Plan: Today she is awake and alert. OD of meds yesterday. Continue cardiac meds. Dr. Marilyn Oconnor returns tomorrow.        Barbara Hernández MD

## 2021-07-25 NOTE — PROGRESS NOTES
DC Plan: SNF for rehab vs home with [facility]    Met with patient who had just participated for first time in 5 days with PT; discussed recommendation for SNF but patient states she wants to go home; has walker at home; [facility] done for [facility] but no orders have been placed yet; patient is agreeable for CM to send info out to SNFs to evaluate for rehab. Care Management Interventions  Mode of Transport at Discharge:  Other (see comment) (Family)  Transition of Care Consult (CM Consult): Discharge Planning  Health Maintenance Reviewed: Yes  Current Support Network: Family Lives Nearby, Lives Alone  The Plan for Transition of Care is Related to the Following Treatment Goals :  [facility] with physician follow up vs [facility]          The Patient and/or Patient Representative was Provided with a Choice of Provider and Agrees with the Discharge Plan?: Yes  Freedom of Choice List was Provided with Basic Dialogue that Supports the Patient's Individualized Plan of Care/Goals, Treatment Preferences and Shares the Quality Data Associated with the Providers?: Yes  Discharge Location  Discharge Placement: Home with home health (vs [facility])

## 2021-07-25 NOTE — PROGRESS NOTES
07/25/21 0032   CPAP/BIPAP   CPAP/BIPAP Start/Stop On   Device Mode CPAP, auto-titrating   $$ CPAP Daily Yes   [employer] ID # [id number]   Mask Type and Size Full face   PIP Observed   (5-20)   EPAP (cm H2O) 5.1 cm H2O   Total RR (Spontaneous) 22 breaths per minute   Pt's Home Machine No   Biomedical Check Performed Yes   RN notified.

## 2021-07-25 NOTE — PROGRESS NOTES
1945 Assumed patient care at this time. Report received from Alejandra Azevedo RN.     2040 Shift assessment completed. 2152 Patient ambulated to bedside commode. 2245 Patient returned to bed. Linen, gown, and bed pad changed. New purewick applied. New electrodes placed. 1712 Bedside and verbal shift change report given to Austin Hoover RN (oncoming nurse) by Arminda Cornell RN (offgoing nurse). Report included the following information: SBAR, Kardex, MAR, and recent results.

## 2021-07-25 NOTE — PROGRESS NOTES
Hospitalist Progress Note    Patient: Tana Vogt MRN: [id number]  CSN: [id number]    YOB: 1945  Age: 70 y.o. Sex: female    DOA: 7/20/2021 LOS:  LOS: 0 days                Assessment and Plan:    Principal Problem:    Acute exacerbation of CHF (congestive heart failure) ([facility] (7/20/2021)    Active Problems:    CAD (coronary artery disease) (12/3/2018)      Hypertension (12/3/2018)      Anticoagulated (8/25/2020)      Insulin dependent type 2 diabetes mellitus ([facility] (8/28/2020)      Morbid obesity with BMI of 45.0-49.9, adult ([facility] ()      S/P drug eluting coronary stent placement (7/21/2021)      EDGAR (obstructive sleep apnea) (7/21/2021)      CAP (community acquired pneumonia) (7/21/2021)      CHF exacerbation -  Continue with diuresis, lasix and spironolactone  . Electrolytes are ok overall      Echo with EF of 25-30 percent    Cardiology note reviewed    Salt restriction to 2g daily  Fluid restriction to 1.5L     CAD -  Continue with aspirin. On xarelto        DM -  On SSI, doing well       EDGAR -  CPAP at night. Altered mental status: is better this morning. I wonder if it ist related to the gabapentin. timothy says she needs it for headaches. Will restart lower dose if needed       Morbid obesity        Chief complaint:     16-year-old female with insulin-dependent type 2 diabetes mellitus, morbid obesity, hypertension, CAD with drug-eluting stent and on anticoagulation, and cardiomyopathy with EF of 25 to 30% is admitted for acute CHF exacerbation. Subjective:    Alert again and oriented times three. Her breathing she says is a little better      Review of systems:    General: No fevers or chills. Cardiovascular: No chest pain or pressure. No palpitations. Pulmonary: No shortness of breath. Gastrointestinal: No nausea, vomiting.      Objective:    Vital signs/Intake and Output:  Visit Vitals  BP (!) 140/64 (BP 1 Location: Left upper arm, BP Patient Position: At rest;Supine) Pulse 74   Temp 98 °F (36.7 °C)   Resp 22   Ht 5' 3\" (1.6 m)   Wt 117.3 kg (258 lb 11 oz)   SpO2 98%   BMI 45.82 kg/m²     Current Shift:  No intake/output data recorded. Last three shifts:  07/23 1901 - 07/25 0700  In: 300 [P.O.:300]  Out: 1200 [Urine:1200]    Physical Exam:  General: NAD, AAOx3. Non-toxic. HEENT: NC/AT. PERRLA, EOMI.  MMM. Lungs: Nml inspection. CTA B/L. No wheezing, rales or rhonchi. Heart:  S1S2 RRR,  PMI mid 5th IC space. No M/RG. Abdomen: Soft, NT/ND.  BS+. No peritoneal signs. Extremities: No C/C/E. Psych:   Nml affect. Neurologic:  2-12 intact. Strength 5/5 throughout. Sensation symmetrical.          Labs: Results:       Chemistry Recent Labs     07/24/21  1444 07/23/21  0445   * 164*    137   K 4.1 4.5    102   CO2 31 33*   BUN 24* 21*   CREA 1.03 1.07   CA 8.6 8.8   AGAP 4 2*   BUCR 23* 20   AP  --  87   TP  --  7.2   ALB  --  3.2*   GLOB  --  4.0   AGRAT  --  0.8      CBC w/Diff Recent Labs     07/23/21  0445   WBC 7.9   RBC 4.50   HGB 13.1   HCT 40.9         Cardiac Enzymes No results for input(s): CPK, CKND1, SARWAT in the last 72 hours. No lab exists for component: CKRMB, TROIP   Coagulation No results for input(s): PTP, INR, APTT, INREXT in the last 72 hours. Lipid Panel Lab Results   Component Value Date/Time    Cholesterol, total 139 08/25/2020 05:30 AM    HDL Cholesterol 49 08/25/2020 05:30 AM    LDL, calculated 58.6 08/25/2020 05:30 AM    VLDL, calculated 31.4 08/25/2020 05:30 AM    Triglyceride 157 (H) 08/25/2020 05:30 AM    CHOL/HDL Ratio 2.8 08/25/2020 05:30 AM      BNP No results for input(s): BNPP in the last 72 hours.    Liver Enzymes Recent Labs     07/23/21  0445   TP 7.2   ALB 3.2*   AP 87      Thyroid Studies No results found for: T4, T3U, TSH, TSHEXT     Procedures/imaging: see electronic medical records for all procedures/Xrays and details which were not copied into this note but were reviewed prior to creation of Plan

## 2021-07-25 NOTE — PROGRESS NOTES
Problem: Mobility Impaired (Adult and Pediatric)  Goal: *Acute Goals and Plan of Care (Insert Text)  Description: Physical Therapy short term goals initiated 07/25/21, to be achieved in 4-7 days. Pt will:   1. Perform bed mobility with S in prep for OOB activity. 2. Perform sit <> stand transfers with LRAD and S in prep for functional mobility and ambulation. 3. Ambulate 100 ft with LRAD and S in prep for household and community mobility. 4. Transfer bed <> chair with LRAD and S in prep for ADL completion and OOB activity. Note:     PHYSICAL THERAPY EVALUATION    Patient: Jossue Avila (80 y.o. female)  Date: 7/25/2021  Primary Diagnosis: CHF (congestive heart failure) ([facility] [I50.9]        Precautions:  Fall  PLOF: Pt was independent with household and community mobility PTA. ASSESSMENT :  Based on the objective data described below, the patient presents with decr B UE and LE strength, decr activity tolerance, decr balance, and R knee pain resulting in deficits in bed mobility, transfers, and strength. Pt sitting EOB with OT at bedside. Pt requiring brief change, agreeable to attempt to stand. Pt able to stand on second attempt with maxA x2 and RW, vc for LE activation instead of pulling with Ues. Pt able to stand ~30 seconds to allow for brief and pad change before sitting. Pt limited by LE weakness and R knee pain. Pt returned to supine, but once in bed, reported being incontinent of urine. Pt able to roll L and R supine in bed to allow for brief change. Pt left supine in bed with all needs met and all questions answered. Patient will benefit from skilled intervention to address the above impairments.   Patient's rehabilitation potential is considered to be Fair  Factors which may influence rehabilitation potential include:   []         None noted  []         Mental ability/status  [x]         Medical condition  []         Home/family situation and support systems  []         Safety awareness  [x] Pain tolerance/management  []         Other:      PLAN :  Recommendations and Planned Interventions:   [x]           Bed Mobility Training             []    Neuromuscular Re-Education  [x]           Transfer Training                   []    Orthotic/Prosthetic Training  [x]           Gait Training                          [x]    Modalities  [x]           Therapeutic Exercises           []    Edema Management/Control  [x]           Therapeutic Activities            [x]    Family Training/Education  [x]           Patient Education  []           Other (comment):    Frequency/Duration: Patient will be followed by physical therapy 1-2 times per day/4-7 days per week to address goals. Discharge Recommendations: Rehab  Further Equipment Recommendations for Discharge: TBD     SUBJECTIVE:   Patient stated Ismael Maxwell is so much harder than before. That bed made me weak.     OBJECTIVE DATA SUMMARY:     Past Medical History:   Diagnosis Date    CAD (coronary artery disease)     Diabetes ([facility]     Gastrointestinal disorder     Gout     Hypertension     MI (myocardial infarction) ([facility]     Morbid obesity with BMI of 45.0-49.9, adult ([facility]     Reflux      Past Surgical History:   Procedure Laterality Date    HX CATARACT REMOVAL Bilateral 2019    [place] CARDIAC SURG PROCEDURE UNLIST      PCI 15 years ago/ with stent placement    VASCULAR SURGERY PROCEDURE UNLIST       Barriers to Learning/Limitations: none  Compensate with: N/A  Home Situation:  Home Situation  Home Environment: Apartment  # Steps to Enter: 0  Rails to Enter: No  One/Two Story Residence: One story  Living Alone: Yes  Support Systems: Family member(s), Friends \ neighbors  Patient Expects to be Discharged to[de-identified] Apartment  Current DME Used/Available at Home: None  Tub or Shower Type: Shower  Critical Behavior:  Neurologic State: Appropriate for age;Drowsy  Orientation Level: Oriented X4  Cognition: Appropriate for age attention/concentration; Appropriate decision making  Safety/Judgement: Decreased awareness of need for safety;Decreased awareness of need for assistance  Psychosocial  Patient Behaviors: Calm; Cooperative  Purposeful Interaction: Yes  Pt Identified Daily Priority: Clinical issues (comment)  Yazmins Process: Nurture loving kindness; Teaching/learning; Attend basic human needs  Caring Interventions: Reassure  Reassure: Therapeutic listening  Therapeutic Modalities: Intentional therapeutic touch;Humor  Strength:    Strength: Generally decreased, functional  Tone & Sensation:   Tone: Normal  Sensation: Intact  Range Of Motion:  AROM: Generally decreased, functional  Functional Mobility:  Bed Mobility:  Rolling: Minimum assistance; Additional time  Supine to Sit: Contact guard assistance; Additional time  Sit to Supine: Minimum assistance; Additional time  Scooting: Moderate assistance  Transfers:  Sit to Stand: Assist x2;Maximum assistance  Stand to Sit: Minimum assistance  Balance:   Sitting: Intact  Standing: Impaired; With support  Standing - Static: Fair  Standing - Dynamic : Poor  Pain:  Pain level pre-treatment: 5/10   Pain level post-treatment: 5/10   Pain Intervention(s) : Medication (see MAR); Rest, Ice, Repositioning  Response to intervention: Nurse notified, See doc flow  Activity Tolerance:   Pt tolerated 30 second standing trial, 2 attempts and maxA x2 to stand. Pt with decr tolerance to OOB mobility d/t weakness and R knee pain rated 5/10 t/o session. Please refer to the flowsheet for vital signs taken during this treatment. After treatment:   []         Patient left in no apparent distress sitting up in chair  [x]         Patient left in no apparent distress in bed  [x]         Call bell left within reach  [x]         Nursing notified  []         Caregiver present  []         Bed alarm activated  []         SCDs applied    COMMUNICATION/EDUCATION:   [x]         Role of Physical Therapy in the acute care setting.   [x]         Fall prevention education was provided and the patient/caregiver indicated understanding. [x]         Patient/family have participated as able in goal setting and plan of care. [x]         Patient/family agree to work toward stated goals and plan of care. []         Patient understands intent and goals of therapy, but is neutral about his/her participation. []         Patient is unable to participate in goal setting/plan of care: ongoing with therapy staff.  []         Other:     Thank you for this referral.  Delores Mitchell   Time Calculation: 23 mins      Eval Complexity: History: MEDIUM  Complexity : 1-2 comorbidities / personal factors will impact the outcome/ POC Exam:MEDIUM Complexity : 3 Standardized tests and measures addressing body structure, function, activity limitation and / or participation in recreation  Presentation: MEDIUM Complexity : Evolving with changing characteristics  Clinical Decision Making:Medium Complexity    Overall Complexity:MEDIUM

## 2021-07-25 NOTE — PROGRESS NOTES
Bedside and Verbal shift change report given to Brett Beth RN (oncoming nurse) by Neal Rocha RN (offgoing nurse). Report included the following information SBAR, Kardex, ED Summary, Intake/Output, MAR, Recent Results, Med Rec Status and Cardiac Rhythm NSR.       1935  Shift assessment complete  Pt resting quietly with eyes open and chest rising and falling evenly  No c/o pain or signs of distress  Bed alarm on and bed locked and in lowest position   Call bell within reach     Shift uneventful     3 [place] [facility] Night Shift Chart Audit    Chart Audit completed? YES            Bedside and Verbal shift change report given to Neal Rocha RN (oncoming nurse) by Brett Beth RN (offgoing nurse). Report included the following information SBAR, Kardex, ED Summary, Intake/Output, MAR, Recent Results, Med Rec Status and Cardiac Rhythm NSR.

## 2021-07-25 NOTE — PROGRESS NOTES
OCCUPATIONAL THERAPY EVALUATION    Problem: Self Care Deficits Care Plan (Adult)  Goal: *Acute Goals and Plan of Care (Insert Text)  Note:   Initial Occupational Therapy Goals (7/25/2021) Within 7 day(s):    1. Patient will perform perform grooming standing sink level for 5 minutes for increased independence in ADLs. 2. Patient will perform UB dressing with I  seated EOB for increased independence with ADLs. 3. Patient will perform LB dressing with mod I & A/E PRN for increased independence with ADLs. 4. Patient will perform all aspects of toileting with mod I for increased independence with ADLs. 5. Patient will perform LE ADLs utilizing body mechanics & adaptive strategies with 1 verbal cue for increased safety in ADLs. 6. Patient will independently apply energy conservation techniques with no verbal cue(s) for increased independence with ADLs. Patient: Annie Steele (80 y.o. female)  Date: 7/25/2021  Primary Diagnosis: CHF (congestive heart failure) ([facility] [I50.9]        Precautions:   Fall  PLOF: Pt reports independence with all self care tasks and IADLs prior to hospital stay, pt lives alone, has family in the area that is able to help if needed. Pt reports no use of assistive device for ambulation. ASSESSMENT :  Based on the objective data described below, the patient presents with decreased independence in ADL and self care tasks 2/2 generalized weakness, decreased safety awareness, decreased activity tolerance. Pt received supine in bed, is awake and oriented, pt has was very drowsy yesterday, spoke with physician who attributed this to gabapentin medication, in which pt will have decreased dosage. Pt is able to achieve supine to sit EOB with CGA and extra time given, pt needs occasional repeat of instructions in order for understanding. Upon sitting EOB pt is able to don/doff socks with no LOB.  Sit to stand attempted with walker, first 2 trials require max A with no success, about 6 inch hip clearance off of bed. Upon second trial pt voids urine onto floor. Pt is changed with fresh brief, pt is able to complete lisy care with set up and good functional reach with lateral lean. PT presents for assistance and second set of skilled hands, able to achieve standing fro 30 seconds with max Ax 2 using FWW. Pt again voids urine needs another brief change. Able to perform supine in bed with rolling with min A. Pt left sitting upright in bed with all needs in reach. Pt will most likely require rehab vs SNF as functional mobility is not safe at this time. .    Education: Pt educated on role of OT in acute setting, safety techniques when standing and using FWW, energy conservation strategies when pt is SOB. Patient will benefit from skilled intervention to address the above impairments. Patient's rehabilitation potential is considered to be Good  Factors which may influence rehabilitation potential include:   []             None noted  []             Mental ability/status  [x]             Medical condition  [x]             Home/family situation and support systems  [x]             Safety awareness  [x]             Pain tolerance/management  []             Other:      PLAN : Pt will be seen by skilled OT daily   Recommendations and Planned Interventions:   [x]               Self Care Training                  [x]      Therapeutic Activities  [x]               Functional Mobility Training   [x]      Cognitive Retraining  [x]               Therapeutic Exercises           [x]      Endurance Activities  [x]               Balance Training                    [x]      Neuromuscular Re-Education  []               Visual/Perceptual Training     [x]      Home Safety Training  [x]               Patient Education                   [x]      Family Training/Education  []               Other (comment):    Frequency/Duration: Patient will be followed by occupational therapy daily to address goals.   Discharge Recommendations: Rehab and [facility]  Further Equipment Recommendations for Discharge: shower chair and FWW vs rollator     SUBJECTIVE:   Patient stated I think I am doing better than yesterday I was real sleepy.     OBJECTIVE DATA SUMMARY:     Past Medical History:   Diagnosis Date    CAD (coronary artery disease)     Diabetes ([facility]     Gastrointestinal disorder     Gout     Hypertension     MI (myocardial infarction) ([facility]     Morbid obesity with BMI of 45.0-49.9, adult ([facility]     Reflux      Past Surgical History:   Procedure Laterality Date    HX CATARACT REMOVAL Bilateral 2019    [place] CARDIAC SURG PROCEDURE UNLIST      PCI 15 years ago/ with stent placement    VASCULAR SURGERY PROCEDURE UNLIST       Barriers to Learning/Limitations: yes;  physical  Compensate with: visual, verbal, tactile, kinesthetic cues/model    Home Situation:   Home Situation  Home Environment: Apartment  # Steps to Enter: 0  Rails to Enter: No  One/Two Story Residence: One story  Living Alone: Yes  Support Systems: Family member(s), Friends \ neighbors  Patient Expects to be Discharged to[de-identified] Apartment  Current DME Used/Available at Home: None  Tub or Shower Type: Shower  [x]  Right hand dominant   []  Left hand dominant    Cognitive/Behavioral Status:  Neurologic State: Appropriate for age;Drowsy  Orientation Level: Oriented X4  Cognition: Appropriate for age attention/concentration; Appropriate decision making  Safety/Judgement: Decreased awareness of need for safety;Decreased awareness of need for assistance    Skin: Intact  Edema: none noted     Vision/Perceptual:    Tracking: Able to track stimulus in all quadrants w/o difficulty    Saccades: Within Defined Limits       Acuity: Able to read clock/calendar on wall without difficulty; Able to read employee name badge without difficulty         Coordination: BUE  Coordination: Within functional limits  Fine Motor Skills-Upper: Left Intact; Right Intact    Gross Motor Skills-Upper: Left Intact; Right Intact    Balance:  Sitting: Intact  Standing: With support; Impaired  Standing - Static: Fair  Standing - Dynamic : Poor    Strength: BUE  Strength: Generally decreased, functional       Tone & Sensation: BUE  Tone: Normal  Sensation: Intact     Range of Motion: BUE  AROM: Generally decreased, functional      Functional Mobility and Transfers for ADLs:  Bed Mobility:  Rolling: Minimum assistance; Additional time  Supine to Sit: Contact guard assistance; Additional time  Sit to Supine: Minimum assistance; Additional time  Scooting: Moderate assistance  Transfers:  Sit to Stand: Assist x2;Maximum assistance    ADL Assessment:   Feeding: Contact guard assistance    Oral Facial Hygiene/Grooming: Setup    Bathing: Moderate assistance    Upper Body Dressing: Setup    Lower Body Dressing: Moderate assistance    Toileting: Maximum assistance      ADL Intervention:      Lower Body Dressing Assistance  Dressing Assistance: Moderate assistance  Socks: Set-up  Leg Crossed Method Used: Yes  Position Performed: Seated edge of bed    Cognitive Retraining  Problem Solving: Identifying the problem; Identifying the task;General alternative solution  Executive Functions: Managing time;Regulating behavior  Safety/Judgement: Decreased awareness of need for safety;Decreased awareness of need for assistance    Pain:  Pain level pre-treatment: 4/10   Pain level post-treatment: 4/10   Pain Intervention(s): Medication provided by Nursing (see MAR); Rest, Repositioning   Response to intervention: Nurse notified, See doc flow sheet    Activity Tolerance:   Fair, pt able to complete tasks with extra time, however unable to perform standing, non fatigued following long session however. Pt able to sit EOB fro 20 minutes. ks. Patient requires intermittent rest breaks. Patient limited by weakness and knee pain. Patient unsteady when  upright      Please refer to the flowsheet for vital signs taken during this treatment.   After treatment: [] Patient left in no apparent distress sitting up in chair  [x] Patient left in no apparent distress in bed  [x] Call bell left within reach  [x] Nursing notified  [] Caregiver present  [] Bed alarm activated    COMMUNICATION/EDUCATION:   [x] Role of Occupational Therapy in the acute care setting  [x] Home safety education was provided and the patient/caregiver indicated understanding. [x] Patient/family have participated as able in goal setting and plan of care. [x] Patient/family agree to work toward stated goals and plan of care. [] Patient understands intent and goals of therapy, but is neutral about his/her participation. [] Patient is unable to participate in goal setting and plan of care. Thank you for this referral.  Cindi Bumpers OTD, OTR/L   Time Calculation: 53 mins    Eval Complexity: History: MEDIUM Complexity : Expanded review of history including physical, cognitive and psychosocial  history ; Examination: MEDIUM Complexity : 3-5 performance deficits relating to physical, cognitive , or psychosocial skils that result in activity limitations and / or participation restrictions; Decision Making:MEDIUM Complexity : Patient may present with comorbidities that affect occupational performnce.  Miniml to moderate modification of tasks or assistance (eg, physical or verbal ) with assesment(s) is necessary to enable patient to complete evaluation

## 2021-07-25 NOTE — PROGRESS NOTES
Problem: Patient Education: Go to Patient Education Activity  Goal: Patient/Family Education  Outcome: Progressing Towards Goal  Goal: Patient/Caregiver will demonstrate knowledge of congestive heart failure  Outcome: Progressing Towards Goal     Problem: Heart Failure: Day 1  Goal: Off Pathway (Use only if patient is Off Pathway)  Outcome: Progressing Towards Goal  Goal: Activity/Safety  Outcome: Progressing Towards Goal  Goal: Nutrition/Diet  Outcome: Progressing Towards Goal  Goal: Discharge Planning  Outcome: Progressing Towards Goal     Problem: Diabetes Self-Management  Goal: *Disease process and treatment process  Description: Define diabetes and identify own type of diabetes; list 3 options for treating diabetes. Outcome: Progressing Towards Goal  Goal: *Incorporating nutritional management into lifestyle  Description: Describe effect of type, amount and timing of food on blood glucose; list 3 methods for planning meals. Outcome: Progressing Towards Goal  Goal: *Incorporating physical activity into lifestyle  Description: State effect of exercise on blood glucose levels. Outcome: Progressing Towards Goal  Goal: *Developing strategies to promote health/change behavior  Description: Define the ABC's of diabetes; identify appropriate screenings, schedule and personal plan for screenings. Outcome: Progressing Towards Goal  Goal: *Using medications safely  Description: State effect of diabetes medications on diabetes; name diabetes medication taking, action and side effects. Outcome: Progressing Towards Goal  Goal: *Monitoring blood glucose, interpreting and using results  Description: Identify recommended blood glucose targets  and personal targets. Outcome: Progressing Towards Goal  Goal: *Prevention, detection, treatment of acute complications  Description: List symptoms of hyper- and hypoglycemia; describe how to treat low blood sugar and actions for lowering  high blood glucose level.   Outcome: Progressing Towards Goal  Goal: *Prevention, detection and treatment of chronic complications  Description: Define the natural course of diabetes and describe the relationship of blood glucose levels to long term complications of diabetes. Outcome: Progressing Towards Goal  Goal: *Developing strategies to address psychosocial issues  Description: Describe feelings about living with diabetes; identify support needed and support network  Outcome: Progressing Towards Goal  Goal: *Sick day guidelines  Outcome: Progressing Towards Goal  Goal: *Patient Specific Goal (EDIT GOAL, INSERT TEXT)  Outcome: Progressing Towards Goal     Problem: Falls - Risk of  Goal: *Absence of Falls  Description: Document Aldo Fall Risk and appropriate interventions in the flowsheet. Outcome: Progressing Towards Goal  Note: Fall Risk Interventions:  Mobility Interventions: Communicate number of staff needed for ambulation/transfer, Assess mobility with egress test, Patient to call before getting OOB    Mentation Interventions: Adequate sleep, hydration, pain control, Door open when patient unattended    Medication Interventions: Teach patient to arise slowly, Patient to call before getting OOB    Elimination Interventions: Call light in reach, Patient to call for help with toileting needs              Problem: Patient Education: Go to Patient Education Activity  Goal: Patient/Family Education  Outcome: Progressing Towards Goal     Problem: Pressure Injury - Risk of  Goal: *Prevention of pressure injury  Description: Document Sheldon Scale and appropriate interventions in the flowsheet.   Outcome: Progressing Towards Goal  Note: Pressure Injury Interventions:       Moisture Interventions: Absorbent underpads, Limit adult briefs, Minimize layers    Activity Interventions: Pressure redistribution bed/mattress(bed type), Increase time out of bed, PT/OT evaluation    Mobility Interventions: HOB 30 degrees or less, PT/OT evaluation, Pressure redistribution bed/mattress (bed type)    Nutrition Interventions: Document food/fluid/supplement intake    Friction and Shear Interventions: HOB 30 degrees or less, Lift team/patient mobility team                Problem: Patient Education: Go to Patient Education Activity  Goal: Patient/Family Education  Outcome: Progressing Towards Goal

## 2021-07-26 PROBLEM — I50.9 CHF (CONGESTIVE HEART FAILURE) (HCC): Status: ACTIVE | Noted: 2021-07-26

## 2021-07-26 LAB
GLUCOSE BLD STRIP.AUTO-MCNC: 173 MG/DL (ref 70–110)
GLUCOSE BLD STRIP.AUTO-MCNC: 188 MG/DL (ref 70–110)
GLUCOSE BLD STRIP.AUTO-MCNC: 191 MG/DL (ref 70–110)
GLUCOSE BLD STRIP.AUTO-MCNC: 230 MG/DL (ref 70–110)

## 2021-07-26 PROCEDURE — 82962 GLUCOSE BLOOD TEST: CPT

## 2021-07-26 PROCEDURE — 74011250636 HC RX REV CODE- 250/636: Performed by: INTERNAL MEDICINE

## 2021-07-26 PROCEDURE — 65270000029 HC RM PRIVATE

## 2021-07-26 PROCEDURE — 74011250637 HC RX REV CODE- 250/637: Performed by: INTERNAL MEDICINE

## 2021-07-26 PROCEDURE — 74011250637 HC RX REV CODE- 250/637: Performed by: FAMILY MEDICINE

## 2021-07-26 PROCEDURE — 99218 HC RM OBSERVATION: CPT

## 2021-07-26 PROCEDURE — 97530 THERAPEUTIC ACTIVITIES: CPT

## 2021-07-26 PROCEDURE — 74011636637 HC RX REV CODE- 636/637: Performed by: FAMILY MEDICINE

## 2021-07-26 PROCEDURE — 96376 TX/PRO/DX INJ SAME DRUG ADON: CPT

## 2021-07-26 RX ADMIN — RIVAROXABAN 15 MG: 15 TABLET, FILM COATED ORAL at 10:29

## 2021-07-26 RX ADMIN — LOSARTAN POTASSIUM 50 MG: 50 TABLET, FILM COATED ORAL at 10:28

## 2021-07-26 RX ADMIN — ALLOPURINOL 100 MG: 100 TABLET ORAL at 22:05

## 2021-07-26 RX ADMIN — ATORVASTATIN CALCIUM 10 MG: 10 TABLET, FILM COATED ORAL at 22:05

## 2021-07-26 RX ADMIN — INSULIN LISPRO 4 UNITS: 100 INJECTION, SOLUTION INTRAVENOUS; SUBCUTANEOUS at 22:06

## 2021-07-26 RX ADMIN — INSULIN LISPRO 2 UNITS: 100 INJECTION, SOLUTION INTRAVENOUS; SUBCUTANEOUS at 12:53

## 2021-07-26 RX ADMIN — CARVEDILOL 12.5 MG: 12.5 TABLET, FILM COATED ORAL at 10:29

## 2021-07-26 RX ADMIN — Medication 10 ML: at 16:56

## 2021-07-26 RX ADMIN — FAMOTIDINE 20 MG: 20 TABLET ORAL at 10:29

## 2021-07-26 RX ADMIN — SPIRONOLACTONE 25 MG: 25 TABLET ORAL at 10:29

## 2021-07-26 RX ADMIN — ALLOPURINOL 100 MG: 100 TABLET ORAL at 10:29

## 2021-07-26 RX ADMIN — OXYCODONE 5 MG: 5 TABLET ORAL at 15:57

## 2021-07-26 RX ADMIN — Medication 10 ML: at 05:18

## 2021-07-26 RX ADMIN — CARVEDILOL 12.5 MG: 12.5 TABLET, FILM COATED ORAL at 16:56

## 2021-07-26 RX ADMIN — INSULIN LISPRO 2 UNITS: 100 INJECTION, SOLUTION INTRAVENOUS; SUBCUTANEOUS at 16:55

## 2021-07-26 RX ADMIN — FAMOTIDINE 20 MG: 20 TABLET ORAL at 22:05

## 2021-07-26 RX ADMIN — FUROSEMIDE 40 MG: 10 INJECTION, SOLUTION INTRAMUSCULAR; INTRAVENOUS at 10:29

## 2021-07-26 RX ADMIN — INSULIN LISPRO 2 UNITS: 100 INJECTION, SOLUTION INTRAVENOUS; SUBCUTANEOUS at 06:39

## 2021-07-26 RX ADMIN — ASPIRIN 81 MG: 81 TABLET, COATED ORAL at 10:28

## 2021-07-26 RX ADMIN — Medication 10 ML: at 22:16

## 2021-07-26 NOTE — ROUTINE PROCESS
Bedside and Verbal shift change report given to Deb Balderas  (oncoming nurse) by Roland Stanford  (offgoing nurse). Report included the following information SBAR, Kardex, Procedure Summary, Intake/Output, MAR and Recent Results.

## 2021-07-26 NOTE — PROGRESS NOTES
Problem: Falls - Risk of  Goal: *Absence of Falls  Description: Document Mikie Petty Fall Risk and appropriate interventions in the flowsheet.   Outcome: Progressing Towards Goal  Note: Fall Risk Interventions:  Mobility Interventions: Communicate number of staff needed for ambulation/transfer, PT Consult for mobility concerns    Mentation Interventions: Adequate sleep, hydration, pain control    Medication Interventions: Patient to call before getting OOB    Elimination Interventions: Call light in reach

## 2021-07-26 NOTE — PROGRESS NOTES
Cm contacted [facility] Sammie Reyes regarding OBS status after speaking with Hospitalist  Dr.Amer Kayla agreeable with making pt inpatient per criteria, cm waiting on return call back from [facility] assigned worker.

## 2021-07-26 NOTE — PROGRESS NOTES
0730   Awake, alert, oriented x4. Resting in bed. 1034   Denies SOB. Lungs are clear. Abdomen is obese with active BS. Breathing comfortably. 1255   Pt sitting at edge of bed. Eating lunch. 1558   Pt is awake, alert, oriented x4. Pt stood and did few steps at bedside. Pain level to right leg is 9/10. Medicated with oxycodone 5 mg po.   1650  Pt sitting at edge of bed. Eating dinner.

## 2021-07-26 NOTE — DIABETES MGMT
GLYCEMIC CONTROL PLAN OF CARE     Assessment:  Pt admitted with acute exacerbation of CHF, CAP. Past Medical Hx includes T2DM (A1C - 7.9%, taking basal, sliding scale insulin and metformin PTA), CAD - stent. Yesterday's blood glucose readings slightly above target range. Recommendations:  1. Could consider adding very low dose of basal insulin starting with 10 units Lantus/day   2. Continue corrective lispro, normal insulin sensitivity ACHS. 1240 Addendum:  Pt confirmed that she takes 26 units Lantus/day (noted pt needed prompting to remember the dose) and metformin 500 mg/d. She uses insulin pens  States she does not take sliding scale Humalog. Has a meter and checks her BG 2-3 times/day. Is not able to recall her usual readings. States she lives alone. Started DM education. Most recent blood glucose values:  7/26: 173  7/25: 180, 182, 191          Current A1C of 7.9 % is equivalent to average blood glucose of 177 mg/dl over the past 2-3 months. Lab Results   Component Value Date/Time    Hemoglobin A1c 7.9 (H) 07/21/2021 03:00 AM     Current hospital diabetes medications: corrective lispro, normal insulin sensitivity ACHS    Previous day's insulin requirements: 6 units (corrective lispro)    Home diabetes medications:  Key Antihyperglycemic Medications             insulin glargine (LANTUS) 100 unit/mL injection (Taking) 26 Units by SubCUTAneous route nightly. insulin lispro (HUMALOG) 100 unit/mL injection (Taking) Use above sliding scale, start at 150-199 with 3 units if needed    metFORMIN (GLUCOPHAGE) 500 mg tablet (Taking) Take 500 mg by mouth two (2) times daily (with meals). Indications: type 2 diabetes mellitus, pt taking differently once a day          Diet:  ADULT DIET Regular; 3 carb choices (45 gm/meal);  Low Fat/Low Chol/High Fiber/NALINI; Low Sodium (2 gm); 1500 ml   Meal Intake:   Patient Vitals for the past 168 hrs:   % Diet Eaten   07/25/21 1749 76 - 100%   07/25/21 1139 76 - 100% 07/22/21 1216 76 - 100%   07/21/21 1859 76 - 100%     Supplement Intake:  No data found.     Education:  __x__Refer to Diabetes Education Record             ___Education not indicated at this time    Evelyn Contreras [address], [address] [place] [address], [address] [place] [address]  Diabetes and Glycemic Control   [employer]  Office:  [phone]  Pager:  [phone]

## 2021-07-26 NOTE — PROGRESS NOTES
1930 - Bedside report received from [place]. Patient in bed. Pain 0/10.     2020 - Patient in bed at this time. IV to L FA  intact and patent + CMS. Pt A & O x 4. LS clear, on 2l NC. Abdomen soft, NT and ND. + BS to all 4 quadrants. Denies nausea. Pure wick on. Pain 0/10. Call light within reach. 2044-Pt appears disoriented, son called her, son reports to me that pt reacted same way with gabapentin which has since been d/c'd. Pt got oxycodone earlier, son agrees that it might be the cause of the disorientation. VSs, see flow sheets. 2315-Talked to Dr Calos Rivera via perfect [employer]. MD said to put pt on O2 NC, hold off narcs,  and watch her for now. Bed alarm initiated for safety. 2320-CPAP applied, O2 hooked to it. 0640-Pt is now A & O x4. Slept well. Pt had an episode of disorientation, now back to baseline. Pt is to stay off Narcs per MD. The son, Maile Mercado said not to do  the stress test until he consents. Says that last time they did a stress test and put stents on the mum and he was not made aware. Pt has been NPO after MN. No other  issues/concerns at this time.  Call bell within reach

## 2021-07-26 NOTE — PROGRESS NOTES
Problem: Mobility Impaired (Adult and Pediatric)  Goal: *Acute Goals and Plan of Care (Insert Text)  Description: Physical Therapy short term goals initiated 07/25/21, to be achieved in 4-7 days. Pt will:   1. Perform bed mobility with S in prep for OOB activity. 2. Perform sit <> stand transfers with LRAD and S in prep for functional mobility and ambulation. 3. Ambulate 100 ft with LRAD and S in prep for household and community mobility. 4. Transfer bed <> chair with LRAD and S in prep for ADL completion and OOB activity. Outcome: Progressing Towards Goal  physical Therapy TREATMENT    Patient: Irma Rodríguez (89 y.o. female)  Date: 7/26/2021  Diagnosis: CHF (congestive heart failure) ([facility]) [I50.9] Acute exacerbation of CHF (congestive heart failure) ([facility]  Precautions: Fall   Chart, physical therapy assessment, plan of care and goals were reviewed. ASSESSMENT:  Pt seated EOB on arrival, alert and oriented to person, place. Decreased insight into deficits noted. Reports pain 10/10 primarily R hip/knee, but reports feeling great upon arrival.  Pt gown and bed pad wet. Pt sit to stand with RW/min A. Chelsea Pickler removed. Pt able to take small steps laterally toward [facility] with min A.  Balance decr'd, foot clearance and step length decr'd. Pt requesting to bath in bathroom. Encouraged to bath while seated EOB for max safety and pt complied. Given warm water, wash cloths, no rinse wash and left seated EOB with CNA's present to assist with bathing. Advised pt of limited mobility at this time and need for constant supervision; rec SNF. Pt adamantly refuses SNF for rehab. States her son will take care of her.   Progression toward goals:  []      Improving appropriately and progressing toward goals  [x]      Improving slowly and progressing toward goals  []      Not making progress toward goals and plan of care will be adjusted     PLAN:  Patient continues to benefit from skilled intervention to address the above impairments. Continue treatment per established plan of care. Discharge Recommendations: [address] with 24 hr supervision  Further Equipment Recommendations for Discharge:  N/A     SUBJECTIVE:   Patient stated I feel great.     OBJECTIVE DATA SUMMARY:   Critical Behavior:  Neurologic State: Alert, Appropriate for age  Orientation Level: Oriented X4  Cognition: Follows commands  Safety/Judgement: Decreased awareness of need for safety, Decreased awareness of need for assistance  Functional Mobility Training:  Transfers:  Sit to Stand: Minimum assistance  Stand to Sit: Minimum assistance;Contact guard assistance  Balance:  Sitting: Intact  Standing: Impaired; With support  Standing - Static: Fair  Standing - Dynamic : Fair  Ambulation/Gait Training:  Distance (ft): 2 Feet (ft)  Assistive Device: Gait belt;Walker, rolling (lateral side steps along side of bed)  Ambulation - Level of Assistance: Minimal assistance  Gait Abnormalities: Decreased step clearance  Base of Support: Widened  Speed/Debora: Slow  Pain:  Pain Scale 1: Numeric (0 - 10)  Pain Intensity 1: 10  Pain Location 1: Leg  Pain Orientation 1: Right;Upper  Pain Description 1: Sharp  Activity Tolerance:   Fair   Please refer to the flowsheet for vital signs taken during this treatment.   After treatment:   [x] Patient left in no apparent distress sitting up EOB  [] Patient left in no apparent distress in bed  [x] Call bell left within reach  [x] Nursing notified-CNA's present assisting with bath  [] Caregiver present  [] Bed alarm activated      Napoleon Delacruz, PT   Time Calculation: 26 mins

## 2021-07-26 NOTE — PROGRESS NOTES
7275-Return call received from MICHAEL Quiros regarding OBS status, issac provided B. Ilfeld contact information. Email sent to  rep Lucy Quiros regarding obs, issac [employer] not working at this time, issac can also be contacted at [phone], [phone].

## 2021-07-26 NOTE — DIABETES MGMT
Diabetes Patient/Family Education Record    Factors That May Influence Patients Ability to Learn or Comply with Recommendations   []   Language barrier    []   Cultural needs   []   Motivation    [x]   Cognitive limitation - noted difficulty remembering details regarding home DM care such as insulin dose and usual BG readings    []   Physical   []   Education    []   Physiological factors   []   Hearing/vision/speaking impairment   []   Religion beliefs    []   Financial factors   []  Other:   []  No factors identified at this time. Person Instructed:   [x]   Patient   []   Family   []  Other     Preference for Learning:   [x]   Verbal   [x]   Written  Target A1c and BG  What is diabetes   []  Demonstration     Level of Comprehension & Competence:    []  Good                                      [x] Fair                                     []  Poor                             [x]  Needs Reinforcement   []  Teach back completed    Education Component:   [x]  Medication management, including how to administer insulin (if appropriate) and potential medication interactions    [x]  Nutritional management - [x] Obtained usual meal pattern   []   Basic carbohydrate counting  []  Plate method  [x]  Limit concentrated sweets and avoid sweetened beverages  []  Portion control  []    Avoid skipping meals  2 gram Na, appropriate beverage choices. States she often gets take out food. Discussed lower Na alternatives.    []  Exercise   []  Signs, symptoms, and treatment of hyperglycemia and hypoglycemia   [] Prevention, recognition and treatment of hyperglycemia and hypoglycemia   [x]  Importance of blood glucose monitoring  [x] Blood Glucose targets   []   Provided patient with blood glucose meter  [x]  Has glucometer and supplies at home   [x]  Instruction on use of the blood glucose meter and recommended monitoring schedule   [x]  Discuss the importance of HbA1C monitoring. Patients A1c is 7.9 %.  This is equivalent to average glucose of 177 mg/dl for the past 2-3 months.   []  Sick day guidelines   []  Proper use and disposal of lancets, needles, syringes or insulin pens (if appropriate)   []  Potential long-term complications (retinopathy, kidney disease, neuropathy, foot care)   [] Information about whom to contact in case of emergency or for more information    [x]  Goal:  Patient/family will demonstrate understanding of Diabetes Self- Management Skills by: (date) ___8/15/21____  Plan for post-discharge education or self-management support:    [] Outpatient class schedule provided            [] Patient Declined    [] Scheduled for outpatient classes (date) _______    [x] Written information provided  Verify: [x] Prior to admission Diabetes medications    Does patient understand how diabetes medications work? _____fair_______________________  Does patient have difficulty obtaining diabetes medications or testing supplies? __no_______________     Gracy Calle  MS, RD, [facility]  Diabetes and Glycemic Control   [employer]  Office:  [phone]  Pager:  [phone]

## 2021-07-26 NOTE — PROGRESS NOTES
Hospitalist Progress Note    Patient: Tana Vogt MRN: [id number]  CSN: [id number]    YOB: 1945  Age: 70 y.o. Sex: female    DOA: 7/20/2021 LOS:  LOS: 0 days                Assessment/Plan     Patient Active Problem List   Diagnosis Code    Chest pain R07.9    CAD (coronary artery disease) I25.10    Hypertension I10    Gout M10.9    Anticoagulated Z79.01    Abnormal nuclear stress test R94.39    Cardiomyopathy ([address] I42.9    Right leg weakness R29.898    CAD S/P percutaneous coronary angioplasty I25.10, Z98.61    Insulin dependent type 2 diabetes mellitus (HCC) E11.9, Z79.4    Acute exacerbation of CHF (congestive heart failure) ([facility]) I50.9    Morbid obesity with BMI of 45.0-49.9, adult ([facility]) E66.01, Z68.42    S/P drug eluting coronary stent placement Z95.5    EDGAR (obstructive sleep apnea) G47.33    CAP (community acquired pneumonia) J18.9            68-year-old female with insulin-dependent type 2 diabetes mellitus, morbid obesity, hypertension, CAD with drug-eluting stent and on anticoagulation, and cardiomyopathy with EF of 35 to 40% is admitted for acute CHF exacerbation. CHF exacerbation -  Continue with diuresis  Follow echo  Cardiology consulted  Salt restriction to 2g daily  Fluid restriction to 1.5L    CAD -  Continue with aspirin. On xarelto    Pneumonia -  On azithromycin    DM -  On SSI    EDGAR -  CPAP at night. Morbid obesity      Disposition : tomorrow    Review of systems  General: No fevers or chills. Cardiovascular: No chest pain or pressure. No palpitations. Pulmonary: No shortness of breath. Gastrointestinal: No nausea, vomiting. Physical Exam:  General: Awake, cooperative, no acute distress    HEENT: NC, Atraumatic. PERRLA, anicteric sclerae. Lungs: CTA Bilaterally. No Wheezing/Rhonchi/Rales.   Heart:  S1 S2,  No murmur, No Rubs, No Gallops  Abdomen: Soft, Non distended, Non tender.  +Bowel sounds,   Extremities: Edema bilaterally  Psych:   Not anxious or agitated. Neurologic:  No acute neurological deficit. Vital signs/Intake and Output:  Visit Vitals  /69   Pulse 70   Temp 98.5 °F (36.9 °C)   Resp 18   Ht 5' 3\" (1.6 m)   Wt 117.3 kg (258 lb 11 oz)   SpO2 98%   BMI 45.82 kg/m²     Current Shift:  No intake/output data recorded. Last three shifts:  07/25 0701 - 07/26 1900  In: 962 [P.O.:962]  Out: 1200 [Urine:1200]            Labs: Results:       Chemistry Recent Labs     07/24/21  1444   *      K 4.1      CO2 31   BUN 24*   CREA 1.03   CA 8.6   AGAP 4   BUCR 23*      CBC w/Diff No results for input(s): WBC, RBC, HGB, HCT, PLT, GRANS, LYMPH, EOS, HGBEXT, HCTEXT, PLTEXT, HGBEXT, HCTEXT, PLTEXT in the last 72 hours. Cardiac Enzymes No results for input(s): CPK, CKND1, SARWAT in the last 72 hours. No lab exists for component: CKRMB, TROIP   Coagulation No results for input(s): PTP, INR, APTT, INREXT, INREXT in the last 72 hours. Lipid Panel Lab Results   Component Value Date/Time    Cholesterol, total 139 08/25/2020 05:30 AM    HDL Cholesterol 49 08/25/2020 05:30 AM    LDL, calculated 58.6 08/25/2020 05:30 AM    VLDL, calculated 31.4 08/25/2020 05:30 AM    Triglyceride 157 (H) 08/25/2020 05:30 AM    CHOL/HDL Ratio 2.8 08/25/2020 05:30 AM      BNP No results for input(s): BNPP in the last 72 hours. Liver Enzymes No results for input(s): TP, ALB, TBIL, AP in the last 72 hours.     No lab exists for component: SGOT, GPT, DBIL   Thyroid Studies No results found for: T4, T3U, TSH, TSHEXT, TSHEXT     Procedures/imaging: see electronic medical records for all procedures/Xrays and details which were not copied into this note but were reviewed prior to creation of Plan

## 2021-07-27 ENCOUNTER — APPOINTMENT (OUTPATIENT)
Dept: NON INVASIVE DIAGNOSTICS | Age: 76
DRG: 291 | End: 2021-07-27
Attending: INTERNAL MEDICINE
Payer: MEDICARE

## 2021-07-27 ENCOUNTER — APPOINTMENT (OUTPATIENT)
Dept: NUCLEAR MEDICINE | Age: 76
DRG: 291 | End: 2021-07-27
Attending: INTERNAL MEDICINE
Payer: MEDICARE

## 2021-07-27 LAB
ALBUMIN SERPL-MCNC: 2.7 G/DL (ref 3.4–5)
ALBUMIN/GLOB SERPL: 0.7 {RATIO} (ref 0.8–1.7)
ALP SERPL-CCNC: 72 U/L (ref 45–117)
ALT SERPL-CCNC: 15 U/L (ref 13–56)
ANION GAP SERPL CALC-SCNC: 6 MMOL/L (ref 3–18)
AST SERPL-CCNC: 16 U/L (ref 10–38)
BASOPHILS # BLD: 0 K/UL (ref 0–0.1)
BASOPHILS NFR BLD: 1 % (ref 0–2)
BILIRUB SERPL-MCNC: 0.4 MG/DL (ref 0.2–1)
BUN SERPL-MCNC: 21 MG/DL (ref 7–18)
BUN/CREAT SERPL: 24 (ref 12–20)
CALCIUM SERPL-MCNC: 8.7 MG/DL (ref 8.5–10.1)
CHLORIDE SERPL-SCNC: 104 MMOL/L (ref 100–111)
CO2 SERPL-SCNC: 28 MMOL/L (ref 21–32)
CREAT SERPL-MCNC: 0.88 MG/DL (ref 0.6–1.3)
DIFFERENTIAL METHOD BLD: NORMAL
EOSINOPHIL # BLD: 0.1 K/UL (ref 0–0.4)
EOSINOPHIL NFR BLD: 2 % (ref 0–5)
ERYTHROCYTE [DISTWIDTH] IN BLOOD BY AUTOMATED COUNT: 12.6 % (ref 11.6–14.5)
GLOBULIN SER CALC-MCNC: 4.1 G/DL (ref 2–4)
GLUCOSE BLD STRIP.AUTO-MCNC: 164 MG/DL (ref 70–110)
GLUCOSE BLD STRIP.AUTO-MCNC: 184 MG/DL (ref 70–110)
GLUCOSE BLD STRIP.AUTO-MCNC: 185 MG/DL (ref 70–110)
GLUCOSE BLD STRIP.AUTO-MCNC: 340 MG/DL (ref 70–110)
GLUCOSE SERPL-MCNC: 165 MG/DL (ref 74–99)
HCT VFR BLD AUTO: 38.7 % (ref 35–45)
HGB BLD-MCNC: 12.1 G/DL (ref 12–16)
LYMPHOCYTES # BLD: 2.1 K/UL (ref 0.9–3.6)
LYMPHOCYTES NFR BLD: 41 % (ref 21–52)
MCH RBC QN AUTO: 28.3 PG (ref 24–34)
MCHC RBC AUTO-ENTMCNC: 31.3 G/DL (ref 31–37)
MCV RBC AUTO: 90.6 FL (ref 74–97)
MONOCYTES # BLD: 0.4 K/UL (ref 0.05–1.2)
MONOCYTES NFR BLD: 8 % (ref 3–10)
NEUTS SEG # BLD: 2.4 K/UL (ref 1.8–8)
NEUTS SEG NFR BLD: 47 % (ref 40–73)
PLATELET # BLD AUTO: 262 K/UL (ref 135–420)
PMV BLD AUTO: 10.7 FL (ref 9.2–11.8)
POTASSIUM SERPL-SCNC: 4 MMOL/L (ref 3.5–5.5)
PROT SERPL-MCNC: 6.8 G/DL (ref 6.4–8.2)
RBC # BLD AUTO: 4.27 M/UL (ref 4.2–5.3)
SODIUM SERPL-SCNC: 138 MMOL/L (ref 136–145)
WBC # BLD AUTO: 5.1 K/UL (ref 4.6–13.2)

## 2021-07-27 PROCEDURE — 85025 COMPLETE CBC W/AUTO DIFF WBC: CPT

## 2021-07-27 PROCEDURE — 74011250636 HC RX REV CODE- 250/636: Performed by: INTERNAL MEDICINE

## 2021-07-27 PROCEDURE — 74011636637 HC RX REV CODE- 636/637: Performed by: FAMILY MEDICINE

## 2021-07-27 PROCEDURE — 36415 COLL VENOUS BLD VENIPUNCTURE: CPT

## 2021-07-27 PROCEDURE — 82962 GLUCOSE BLOOD TEST: CPT

## 2021-07-27 PROCEDURE — 94660 CPAP INITIATION&MGMT: CPT

## 2021-07-27 PROCEDURE — 80053 COMPREHEN METABOLIC PANEL: CPT

## 2021-07-27 PROCEDURE — 65270000029 HC RM PRIVATE

## 2021-07-27 PROCEDURE — 74011250637 HC RX REV CODE- 250/637: Performed by: INTERNAL MEDICINE

## 2021-07-27 PROCEDURE — 74011250637 HC RX REV CODE- 250/637: Performed by: FAMILY MEDICINE

## 2021-07-27 RX ADMIN — RIVAROXABAN 15 MG: 15 TABLET, FILM COATED ORAL at 10:18

## 2021-07-27 RX ADMIN — ALLOPURINOL 100 MG: 100 TABLET ORAL at 20:40

## 2021-07-27 RX ADMIN — ALLOPURINOL 100 MG: 100 TABLET ORAL at 10:18

## 2021-07-27 RX ADMIN — CARVEDILOL 12.5 MG: 12.5 TABLET, FILM COATED ORAL at 16:36

## 2021-07-27 RX ADMIN — LOSARTAN POTASSIUM 50 MG: 50 TABLET, FILM COATED ORAL at 10:19

## 2021-07-27 RX ADMIN — CARVEDILOL 12.5 MG: 12.5 TABLET, FILM COATED ORAL at 10:17

## 2021-07-27 RX ADMIN — Medication 10 ML: at 21:34

## 2021-07-27 RX ADMIN — INSULIN LISPRO 2 UNITS: 100 INJECTION, SOLUTION INTRAVENOUS; SUBCUTANEOUS at 16:37

## 2021-07-27 RX ADMIN — FUROSEMIDE 40 MG: 10 INJECTION, SOLUTION INTRAMUSCULAR; INTRAVENOUS at 10:17

## 2021-07-27 RX ADMIN — Medication 10 ML: at 16:01

## 2021-07-27 RX ADMIN — INSULIN LISPRO 8 UNITS: 100 INJECTION, SOLUTION INTRAVENOUS; SUBCUTANEOUS at 21:34

## 2021-07-27 RX ADMIN — INSULIN LISPRO 2 UNITS: 100 INJECTION, SOLUTION INTRAVENOUS; SUBCUTANEOUS at 12:52

## 2021-07-27 RX ADMIN — SPIRONOLACTONE 25 MG: 25 TABLET ORAL at 10:18

## 2021-07-27 RX ADMIN — INSULIN LISPRO 2 UNITS: 100 INJECTION, SOLUTION INTRAVENOUS; SUBCUTANEOUS at 06:35

## 2021-07-27 RX ADMIN — FAMOTIDINE 20 MG: 20 TABLET ORAL at 20:40

## 2021-07-27 RX ADMIN — Medication 10 ML: at 06:35

## 2021-07-27 RX ADMIN — FAMOTIDINE 20 MG: 20 TABLET ORAL at 10:18

## 2021-07-27 RX ADMIN — ATORVASTATIN CALCIUM 10 MG: 10 TABLET, FILM COATED ORAL at 21:34

## 2021-07-27 RX ADMIN — ASPIRIN 81 MG: 81 TABLET, COATED ORAL at 10:19

## 2021-07-27 NOTE — PROGRESS NOTES
Cardiology Progress Note        Patient: David Hammonds        Sex: female          DOA: 7/20/2021  YOB: 1945      Age:  70 y.o.        LOS:  LOS: 0 days    Patient seen and examined, chart reviewed. Assessment/Plan     Patient Active Problem List   Diagnosis Code    Chest pain R07.9    CAD (coronary artery disease) I25.10    Hypertension I10    Gout M10.9    Anticoagulated Z79.01    Abnormal nuclear stress test R94.39    Cardiomyopathy ([address] I42.9    Right leg weakness R29.898    CAD S/P percutaneous coronary angioplasty I25.10, Z98.61    Insulin dependent type 2 diabetes mellitus ([facility]) E11.9, Z79.4    Acute exacerbation of CHF (congestive heart failure) ([facility]) I50.9    Morbid obesity with BMI of 45.0-49.9, adult ([facility]) E66.01, Z68.42    S/P drug eluting coronary stent placement Z95.5    EDGAR (obstructive sleep apnea) G47.33    CAP (community acquired pneumonia) J18.9    CHF (congestive heart failure) ([facility]) I50.9      Acute on chronic systolic heart failure   CAD  H/o DVT     She underwent cardiac catheterization in 08/2020 reported     single-vessel occlusive CAD.     Proximal left circumflex artery have 90% stenosis just proximal to the previously deployed mid left circumflex artery stent.  This lesion is treated by 2.75x23 mm Xience Audrey drug-eluting stent with excellent results.     Rest of coronary anatomy is patent with minimal luminal irregularity.  .     LVEDP 30 mmHg.     Echocardiogram revealed     · Image quality for this study was technically difficult. Contrast used: DEFINITY. · Left Ventricle: Mildly dilated left ventricle. Mild concentric hypertrophy. The estimated EF is 25 - 30%. Severely reduced systolic function. There is moderate (grade 2) left ventricular diastolic dysfunction E/E' ratio is 25.72. Wall Scoring: The left ventricular wall motion is globally hypokinetic. · Left Atrium: Mildly dilated left atrium.   · Right Ventricle: Mildly dilated right ventricle. Mildly reduced systolic function. Assessment of RV function: TAPSE = 18 mm. · Right Atrium: Mildly dilated right atrium. · Tricuspid Valve: Tricuspid valve not well visualized. No stenosis. Tricuspid regurgitation is inadequate for estimation of right ventricular systolic pressure.      77-OYHZ-CHANELLE female came with worsening of shortness of breath and being managed per decompensated systolic heart failure. Serial cardiac enzymes are negative. EKG revealed left bundle branch block which is old.       Plan:    Continue aspirin, carvedilol, losartan, spironolactone and atorvastatin. Continue IV Lasix to 40 mg once a day. On xarelto for H/o DVT  Schedule for Lexiscan stress test to evaluate ischemic substrate for CHF  Salt restriction up to 2 g per day and fluid restriction up to 1.2 L per day. NPO after midnight               Subjective:    cc:   my breathing is better I am concerned about blockages in artery of heart. I never had chest pain prior to stent placement. I always had shortness of breath prior to stent placement       REVIEW OF SYSTEMS:     General: No fevers or chills. Cardiovascular:  Positive shortness of breath,No chest pain,No palpitations, No orthopnea, No PND, No leg swelling, No claudication  Pulmonary: No  dyspnea. Gastrointestinal: No nausea, vomiting, bleeding  Neurology: No Dizziness    Objective:      Visit Vitals  BP (!) 126/56 (BP 1 Location: Right upper arm, BP Patient Position: At rest)   Pulse 67   Temp 97.5 °F (36.4 °C)   Resp 18   Ht 5' 3\" (1.6 m)   Wt 117.3 kg (258 lb 11 oz)   SpO2 99%   BMI 45.82 kg/m²     Body mass index is 45.82 kg/m². Physical Exam:  General Appearance: Comfortable, not using accessory muscles of respiration. HEENT: JUAN. HEAD: Atraumatic  NECK: No JVD, no thyroidomeglay. CAROTIDS: no bruit  LUNGS: Clear bilaterally. HEART: S1+S2 audible, no murmur, no pericardial rub.      ABD: Non-tender, BS Audible    EXT: No edema, and no cyanosis. VASCULAR EXAM: Pulses are intact. PSYCHIATRIC EXAM: Mood is appropriate. MUSCULOSKELETAL: Grossly no joint deformity. NEUROLOGICAL: AAO times 3, No motor and sensory deficit,    Medication:  Current Facility-Administered Medications   Medication Dose Route Frequency    spironolactone (ALDACTONE) tablet 25 mg  25 mg Oral DAILY    furosemide (LASIX) injection 40 mg  40 mg IntraVENous DAILY    allopurinoL (ZYLOPRIM) tablet 100 mg  100 mg Oral BID    aspirin delayed-release tablet 81 mg  81 mg Oral DAILY    atorvastatin (LIPITOR) tablet 10 mg  10 mg Oral QHS    rivaroxaban (XARELTO) tablet 15 mg  15 mg Oral DAILY    losartan (COZAAR) tablet 50 mg  50 mg Oral DAILY    carvediloL (COREG) tablet 12.5 mg  12.5 mg Oral BID WITH MEALS    albuterol-ipratropium (DUO-NEB) 2.5 MG-0.5 MG/3 ML  3 mL Nebulization Q4H PRN    benzonatate (TESSALON) capsule 100 mg  100 mg Oral TID PRN    oxyCODONE IR (ROXICODONE) tablet 5 mg  5 mg Oral Q4H PRN    sodium chloride (NS) flush 5-40 mL  5-40 mL IntraVENous Q8H    sodium chloride (NS) flush 5-40 mL  5-40 mL IntraVENous PRN    acetaminophen (TYLENOL) tablet 650 mg  650 mg Oral Q6H PRN    Or    acetaminophen (TYLENOL) suppository 650 mg  650 mg Rectal Q6H PRN    polyethylene glycol (MIRALAX) packet 17 g  17 g Oral DAILY PRN    ondansetron (ZOFRAN ODT) tablet 4 mg  4 mg Oral Q8H PRN    Or    ondansetron (ZOFRAN) injection 4 mg  4 mg IntraVENous Q6H PRN    famotidine (PEPCID) tablet 20 mg  20 mg Oral BID    insulin lispro (HUMALOG) injection   SubCUTAneous AC&HS    glucose chewable tablet 16 g  16 g Oral PRN    glucagon (GLUCAGEN) injection 1 mg  1 mg IntraMUSCular PRN    dextrose (D50W) injection syrg 12.5-25 g  25-50 mL IntraVENous PRN               Lab/Data Reviewed:       No results for input(s): WBC, HGB, HCT, PLT, HGBEXT, HCTEXT, PLTEXT in the last 72 hours.   Recent Labs     07/24/21  1444      K 4.1    CO2 31   *   BUN 24*   CREA 1.03   CA 8.6       Signed By: Cesar Melvin MD     July 26, 2021

## 2021-07-27 NOTE — ROUTINE PROCESS
Verbal shift change report given to Morgan Mendezvard by Ariel Barry RN. Report included the following information SBAR, Kardex, OR Summary, Intake/Output and MAR.

## 2021-07-27 NOTE — PROGRESS NOTES
Comprehensive Nutrition Assessment    Type and Reason for Visit: Initial (LOS)    Nutrition Recommendations/Plan: Other: continue w/ POC    Nutrition Assessment:  Pt admitted d/t CHF exacerbation, PNA, T2DM, EDGAR, h/o obseity    Estimated Daily Nutrient Needs:  Energy (kcal): 3073-9330; Weight Used for Energy Requirements: Ideal  Protein (g): 104; Weight Used for Protein Requirements: Ideal (2g/kg)  Fluid (ml/day): 2774-9322; Method Used for Fluid Requirements: 1 ml/kcal    Nutrition Related Findings:  Med: lasix, humalog, pepcid. +BM 7/25. Noted PO per nursing documentation- varies between %. Wounds:    None       Current Nutrition Therapies:  ADULT DIET Regular; 3 carb choices (45 gm/meal); Low Fat/Low Chol/High Fiber/2 gm Na    Anthropometric Measures:  · Height:  5' 3\" (160 cm)  · Current Body Wt:  117.1 kg (258 lb 2.5 oz)   · Usual Body Wt:  119.3 kg (263 lb) (10/2020)     · Ideal Body Wt:  115 lbs:  224.5 %   · BMI Category:  Obese class 3 (BMI 40.0 or greater)       Nutrition Diagnosis:   · Overweight/obesity related to excessive energy intake as evidenced by BMI (45.7)    Nutrition Interventions:   Food and/or Nutrient Delivery: Continue current diet  Nutrition Education and Counseling: No recommendations at this time  Coordination of Nutrition Care: Continue to monitor while inpatient    Goals:  Maintain weight or loss 1-2 lbs of weight throughout th enext 7 days.        Nutrition Monitoring and Evaluation:   Behavioral-Environmental Outcomes: None identified  Food/Nutrient Intake Outcomes: Food and nutrient intake  Physical Signs/Symptoms Outcomes: Biochemical data, Meal time behavior, Weight, GI status, Skin    Discharge Planning:    Continue current diet     Electronically signed by Monty Reed RD on 7/27/2021 at 11:41 AM

## 2021-07-27 NOTE — PROGRESS NOTES
1929  Bedside and Verbal shift change report given to Linda Hunter RN (oncoming nurse) by Zacarias Carter RN (offgoing nurse). Report included the following information SBAR, Kardex and MAR.

## 2021-07-27 NOTE — ROUTINE PROCESS
Bedside and Verbal shift change report given to Ashley Pineda RN by Toney Garcia. Report included the following information SBAR, Kardex, OR Summary, Intake/Output and MAR.

## 2021-07-27 NOTE — PROGRESS NOTES
1036-Asked MD if pt can resume her diet because she refused to do her stress test this morning he said yes. 9260-Informed Md Jana Grimm that pt took off her heart monitor and \"said I'm done with that. \" MD said ok.

## 2021-07-27 NOTE — PROGRESS NOTES
7/27/2021  PT treatment not completed due to:  [] Off Unit for testing/procedure  [] Pain  [] Eating  [] Patient too lethargic  [] Nausea/vomiting  [] Dialysis treatment in progress   [x] Other: pt sleeping soundly and loudly snoring. Pt not disturbed at this time. Will f/u at later time as pt schedule allows. Thank you.    Colton Max, PT

## 2021-07-27 NOTE — PROGRESS NOTES
1919 Assumed patient care at this time. Report received from Morena Nix RN. Patient in bed in lowest position with wheels locked. Call light within reach. 2040 Shift assessment completed. 2135 CPAP on.     0710 Bedside and verbal shift change report given to Bernadette Gomez RN (oncoming nurse) by William Ag RN (offgoing nurse). Report included the following information: SBAR, Kardex, MAR, and recent results.

## 2021-07-27 NOTE — DIABETES MGMT
GLYCEMIC CONTROL PLAN OF CARE     Assessment:  Pt admitted with acute exacerbation of CHF, CAP. Past Medical Hx includes T2DM (A1C - 7.9%, taking basal, sliding scale insulin and metformin PTA), CAD - stent. Having stress test in am.  Last 4 out of 6 blood glucose readings above target range (> 180 mg/dL) and pt required 10 units corrective lispro yesterday. Recommendations:  1. Suggest adding very low dose of basal insulin starting with 10 units Lantus/day     Most recent blood glucose values:  7/27:  Lab - 165;  POC - 164, 184, 185  7/26:  POC - 173, 188, 191, 230           Current A1C of 7.9 % is equivalent to average blood glucose of 177 mg/dl over the past 2-3 months. Lab Results   Component Value Date/Time    Hemoglobin A1c 7.9 (H) 07/21/2021 03:00 AM     Current hospital diabetes medications: corrective lispro, normal insulin sensitivity ACHS    Previous day's insulin requirements: 10 units (corrective lispro)    Home diabetes medications:  Key Antihyperglycemic Medications             insulin glargine (LANTUS) 100 unit/mL injection (Taking) 26 Units by SubCUTAneous route nightly. insulin lispro (HUMALOG) 100 unit/mL injection (Taking) Use above sliding scale, start at 150-199 with 3 units if needed    metFORMIN (GLUCOPHAGE) 500 mg tablet (Taking) Take 500 mg by mouth two (2) times daily (with meals). Indications: type 2 diabetes mellitus, pt taking differently once a day          Diet:  ADULT DIET Regular; 3 carb choices (45 gm/meal); Low Fat/Low Chol/High Fiber/2 gm Na   Meal Intake:   Patient Vitals for the past 168 hrs:   % Diet Eaten   07/27/21 1204 76 - 100%   07/26/21 1910 26 - 50%   07/25/21 1749 76 - 100%   07/25/21 1139 76 - 100%   07/22/21 1216 76 - 100%   07/21/21 1859 76 - 100%     Supplement Intake:  No data found.     Education:  __x__Refer to Diabetes Education Record             ___Education not indicated at this time  Pt confirmed that she takes 26 units Lantus/day (noted pt needed prompting to remember the dose) and metformin 500 mg/d. She uses insulin pens  States she does not take sliding scale Humalog. Has a meter and checks her BG 2-3 times/day. Is not able to recall her usual readings. States she lives alone.   Jayce Sanderson MS, RD, [facility] [place]  Diabetes and Glycemic Control   [employer]  Office:  [phone]  Pager:  [phone]

## 2021-07-27 NOTE — PROGRESS NOTES
Hospitalist Progress Note    Patient: Tee Valencia MRN: [id number]  CSN: [id number]    YOB: 1945  Age: 70 y.o. Sex: female    DOA: 7/20/2021 LOS:  LOS: 1 day                Assessment/Plan     Patient Active Problem List   Diagnosis Code    Chest pain R07.9    CAD (coronary artery disease) I25.10    Hypertension I10    Gout M10.9    Anticoagulated Z79.01    Abnormal nuclear stress test R94.39    Cardiomyopathy ([facility] I42.9    Right leg weakness R29.898    CAD S/P percutaneous coronary angioplasty I25.10, Z98.61    Insulin dependent type 2 diabetes mellitus ([facility]) E11.9, Z79.4    Acute exacerbation of CHF (congestive heart failure) ([facility]) I50.9    Morbid obesity with BMI of 45.0-49.9, adult ([facility]) E66.01, Z68.42    S/P drug eluting coronary stent placement Z95.5    EDGAR (obstructive sleep apnea) G47.33    CAP (community acquired pneumonia) J18.9    CHF (congestive heart failure) ([facility] I50.9            70-year-old female with insulin-dependent type 2 diabetes mellitus, morbid obesity, hypertension, CAD with drug-eluting stent and on anticoagulation, and cardiomyopathy with EF of 35 to 40% is admitted for acute CHF exacerbation. CHF exacerbation -  Continue with diuresis  Follow echo  Cardiology consulted  Salt restriction to 2g daily  Fluid restriction to 1.5L    CAD -  Continue with aspirin. On xarelto    Pneumonia -  On azithromycin    DM -  On SSI    EDGAR -  CPAP at night. Morbid obesity    Patient initially refused stress test and ate. Now agrees, plan for stress test tomorrow. Disposition :     Review of systems  General: No fevers or chills. Cardiovascular: No chest pain or pressure. No palpitations. Pulmonary: No shortness of breath. Gastrointestinal: No nausea, vomiting. Physical Exam:  General: Awake, cooperative, no acute distress    HEENT: NC, Atraumatic. PERRLA, anicteric sclerae. Lungs: CTA Bilaterally. No Wheezing/Rhonchi/Rales.   Heart:  S1 S2,  No murmur, No Rubs, No Gallops  Abdomen: Soft, Non distended, Non tender.  +Bowel sounds,   Extremities: Edema bilaterally  Psych:   Not anxious or agitated. Neurologic:  No acute neurological deficit. Vital signs/Intake and Output:  Visit Vitals  BP (!) 128/48 (BP 1 Location: Right upper arm, BP Patient Position: At rest;Supine)   Pulse 72   Temp 98.3 °F (36.8 °C)   Resp 16   Ht 5' 3\" (1.6 m)   Wt 117.1 kg (258 lb 3.2 oz)   SpO2 98%   BMI 45.74 kg/m²     Current Shift:  07/27 0701 - 07/27 1900  In: 480 [P.O.:480]  Out: 1250 [Urine:1250]  Last three shifts:  07/25 1901 - 07/27 0700  In: 722 [P.O.:722]  Out: 1400 [Urine:1400]            Labs: Results:       Chemistry Recent Labs     07/27/21  0145   *      K 4.0      CO2 28   BUN 21*   CREA 0.88   CA 8.7   AGAP 6   BUCR 24*   AP 72   TP 6.8   ALB 2.7*   GLOB 4.1*   AGRAT 0.7*      CBC w/Diff Recent Labs     07/27/21  0145   WBC 5.1   RBC 4.27   HGB 12.1   HCT 38.7      GRANS 47   LYMPH 41   EOS 2      Cardiac Enzymes No results for input(s): CPK, CKND1, SARWAT in the last 72 hours. No lab exists for component: CKRMB, TROIP   Coagulation No results for input(s): PTP, INR, APTT, INREXT, INREXT in the last 72 hours. Lipid Panel Lab Results   Component Value Date/Time    Cholesterol, total 139 08/25/2020 05:30 AM    HDL Cholesterol 49 08/25/2020 05:30 AM    LDL, calculated 58.6 08/25/2020 05:30 AM    VLDL, calculated 31.4 08/25/2020 05:30 AM    Triglyceride 157 (H) 08/25/2020 05:30 AM    CHOL/HDL Ratio 2.8 08/25/2020 05:30 AM      BNP No results for input(s): BNPP in the last 72 hours.    Liver Enzymes Recent Labs     07/27/21  0145   TP 6.8   ALB 2.7*   AP 72      Thyroid Studies No results found for: T4, T3U, TSH, TSHEXT, TSHEXT     Procedures/imaging: see electronic medical records for all procedures/Xrays and details which were not copied into this note but were reviewed prior to creation of Plan

## 2021-07-28 ENCOUNTER — APPOINTMENT (OUTPATIENT)
Dept: NON INVASIVE DIAGNOSTICS | Age: 76
DRG: 291 | End: 2021-07-28
Attending: INTERNAL MEDICINE
Payer: MEDICARE

## 2021-07-28 ENCOUNTER — APPOINTMENT (OUTPATIENT)
Dept: NUCLEAR MEDICINE | Age: 76
DRG: 291 | End: 2021-07-28
Attending: INTERNAL MEDICINE
Payer: MEDICARE

## 2021-07-28 PROBLEM — I50.9 CHF (CONGESTIVE HEART FAILURE) (HCC): Status: RESOLVED | Noted: 2021-07-26 | Resolved: 2021-07-28

## 2021-07-28 PROBLEM — I50.43 ACUTE ON CHRONIC COMBINED SYSTOLIC AND DIASTOLIC ACC/AHA STAGE C CONGESTIVE HEART FAILURE (HCC): Status: ACTIVE | Noted: 2021-07-28

## 2021-07-28 LAB
GLUCOSE BLD STRIP.AUTO-MCNC: 137 MG/DL (ref 70–110)
GLUCOSE BLD STRIP.AUTO-MCNC: 215 MG/DL (ref 70–110)
GLUCOSE BLD STRIP.AUTO-MCNC: 318 MG/DL (ref 70–110)

## 2021-07-28 PROCEDURE — 82962 GLUCOSE BLOOD TEST: CPT

## 2021-07-28 PROCEDURE — 74011250637 HC RX REV CODE- 250/637: Performed by: INTERNAL MEDICINE

## 2021-07-28 PROCEDURE — 94660 CPAP INITIATION&MGMT: CPT

## 2021-07-28 PROCEDURE — 74011250636 HC RX REV CODE- 250/636: Performed by: INTERNAL MEDICINE

## 2021-07-28 PROCEDURE — 65270000029 HC RM PRIVATE

## 2021-07-28 PROCEDURE — 74011636637 HC RX REV CODE- 636/637: Performed by: FAMILY MEDICINE

## 2021-07-28 PROCEDURE — 74011250636 HC RX REV CODE- 250/636: Performed by: HOSPITALIST

## 2021-07-28 PROCEDURE — 74011250637 HC RX REV CODE- 250/637: Performed by: FAMILY MEDICINE

## 2021-07-28 PROCEDURE — 93017 CV STRESS TEST TRACING ONLY: CPT

## 2021-07-28 RX ORDER — TETRAKIS(2-METHOXYISOBUTYLISOCYANIDE)COPPER(I) TETRAFLUOROBORATE 1 MG/ML
32.4 INJECTION, POWDER, LYOPHILIZED, FOR SOLUTION INTRAVENOUS
Status: COMPLETED | OUTPATIENT
Start: 2021-07-28 | End: 2021-07-28

## 2021-07-28 RX ORDER — FUROSEMIDE 40 MG/1
40 TABLET ORAL DAILY
Status: DISCONTINUED | OUTPATIENT
Start: 2021-07-29 | End: 2021-07-29 | Stop reason: HOSPADM

## 2021-07-28 RX ORDER — TETRAKIS(2-METHOXYISOBUTYLISOCYANIDE)COPPER(I) TETRAFLUOROBORATE 1 MG/ML
10.8 INJECTION, POWDER, LYOPHILIZED, FOR SOLUTION INTRAVENOUS
Status: COMPLETED | OUTPATIENT
Start: 2021-07-28 | End: 2021-07-28

## 2021-07-28 RX ADMIN — ACETAMINOPHEN 650 MG: 325 TABLET ORAL at 13:57

## 2021-07-28 RX ADMIN — ALLOPURINOL 100 MG: 100 TABLET ORAL at 08:47

## 2021-07-28 RX ADMIN — FAMOTIDINE 20 MG: 20 TABLET ORAL at 08:47

## 2021-07-28 RX ADMIN — FAMOTIDINE 20 MG: 20 TABLET ORAL at 22:20

## 2021-07-28 RX ADMIN — FUROSEMIDE 40 MG: 10 INJECTION, SOLUTION INTRAMUSCULAR; INTRAVENOUS at 08:47

## 2021-07-28 RX ADMIN — INSULIN LISPRO 4 UNITS: 100 INJECTION, SOLUTION INTRAVENOUS; SUBCUTANEOUS at 06:33

## 2021-07-28 RX ADMIN — CARVEDILOL 12.5 MG: 12.5 TABLET, FILM COATED ORAL at 08:47

## 2021-07-28 RX ADMIN — TETRAKIS(2-METHOXYISOBUTYLISOCYANIDE)COPPER(I) TETRAFLUOROBORATE 32.4 MILLICURIE: 1 INJECTION, POWDER, LYOPHILIZED, FOR SOLUTION INTRAVENOUS at 12:18

## 2021-07-28 RX ADMIN — Medication 10 ML: at 06:34

## 2021-07-28 RX ADMIN — INSULIN LISPRO 8 UNITS: 100 INJECTION, SOLUTION INTRAVENOUS; SUBCUTANEOUS at 17:52

## 2021-07-28 RX ADMIN — Medication 5 ML: at 14:00

## 2021-07-28 RX ADMIN — LOSARTAN POTASSIUM 50 MG: 50 TABLET, FILM COATED ORAL at 08:47

## 2021-07-28 RX ADMIN — REGADENOSON 0.4 MG: 0.08 INJECTION, SOLUTION INTRAVENOUS at 12:18

## 2021-07-28 RX ADMIN — Medication 10 ML: at 22:21

## 2021-07-28 RX ADMIN — ALLOPURINOL 100 MG: 100 TABLET ORAL at 22:19

## 2021-07-28 RX ADMIN — CARVEDILOL 12.5 MG: 12.5 TABLET, FILM COATED ORAL at 17:53

## 2021-07-28 RX ADMIN — ASPIRIN 81 MG: 81 TABLET, COATED ORAL at 08:47

## 2021-07-28 RX ADMIN — SPIRONOLACTONE 25 MG: 25 TABLET ORAL at 08:47

## 2021-07-28 RX ADMIN — TETRAKIS(2-METHOXYISOBUTYLISOCYANIDE)COPPER(I) TETRAFLUOROBORATE 10.8 MILLICURIE: 1 INJECTION, POWDER, LYOPHILIZED, FOR SOLUTION INTRAVENOUS at 09:35

## 2021-07-28 RX ADMIN — ATORVASTATIN CALCIUM 10 MG: 10 TABLET, FILM COATED ORAL at 22:19

## 2021-07-28 NOTE — PROGRESS NOTES
Attempted to see patient related to Acute exacerbation of CHF. Pt at stress test. Will attempt at later time.     Zita Reed, CNS  Heart Failure Coordinator

## 2021-07-28 NOTE — PROGRESS NOTES
7/28/2021  PT treatment not completed due to:  [] Off Unit for testing/procedure  [] Pain  [] Eating  [] Patient too lethargic  [] Nausea/vomiting  [] Dialysis treatment in progress   [x] Other: Pt declines all attempt for participation with therapy at this time. States \"I'm worn out; they wore me out down there\". Pt states she will be just fine and that son and family will assist pt. Will f/u at later time as pt schedule allows.     Esperanza De La O, PT

## 2021-07-28 NOTE — DIABETES MGMT
GLYCEMIC CONTROL PLAN OF CARE        Diabetes Management:      Assessment: known h/o T2DM, HbA1C not within recommended range for age + comorbids basal/bolus/oral home regimen  admitted for acute CHF exacerbation    Recommend: FBG > 200 mg/dL, initiate conservative dose of basal insulin (Lantus 26 units home regimen)   *Lantus 5 units daily    BG in target range (non-ICU\" < 180 ; -180):  [] Yes  [x] No      Steroids:no    Current Insulin regimen: - Humalog Normal Insulin Sensitivity Corrective Coverage      TDD previous day = 14 units - Humalog Normal Insulin Sensitivity Corrective Coverage      Most recent blood glucose results:   Lab Results   Component Value Date/Time    GLUCPOC 215 (H) 07/28/2021 06:02 AM    GLUCPOC 340 (H) 07/27/2021 09:10 PM    GLUCPOC 185 (H) 07/27/2021 04:10 PM         Hypo: no    HbA1C: equivalent  to ave BGlucose of 177 mg/dl for 2-3 months prior to admission  Lab Results   Component Value Date/Time    Hemoglobin A1c 7.9 (H) 07/21/2021 03:00 AM       Adequate glycemic control PTA:  [] Yes  [x] No      Home diabetes medications:  Key Antihyperglycemic Medications             insulin glargine (LANTUS) 100 unit/mL injection (Taking) 26 Units by SubCUTAneous route nightly. insulin lispro (HUMALOG) 100 unit/mL injection (Taking) Use above sliding scale, start at 150-199 with 3 units if needed    metFORMIN (GLUCOPHAGE) 500 mg tablet (Taking) Take 500 mg by mouth two (2) times daily (with meals).  Indications: type 2 diabetes mellitus, pt taking differently once a day        Goals:  Blood glucose will be within target range of 70 - 180 mg/dL by: 7/31    Diet:   Active Orders   Diet    DIET NPO Sips of Water with Meds       Patient Vitals for the past 100 hrs:   % Diet Eaten   07/27/21 2040 76 - 100%   07/27/21 1920 26 - 50%   07/27/21 1204 76 - 100%   07/26/21 1910 26 - 50%   07/25/21 1749 76 - 100%   07/25/21 1139 76 - 100%        Education:  _____ Refer to Diabetes Education Record __X___ Education not indicated at this time    Hector Herron MS, RN, CDE  Glycemic Control Team  [phone]  Pager [phone] (M-TH 8:00-4:30P)  *After Hours pager [phone]

## 2021-07-28 NOTE — PROGRESS NOTES
Cardiology Progress Note        Patient: Jossue Avila        Sex: female          DOA: 7/20/2021  YOB: 1945      Age:  70 y.o.        LOS:  LOS: 1 day    Patient seen and examined, chart reviewed. Assessment/Plan     Patient Active Problem List   Diagnosis Code    Chest pain R07.9    CAD (coronary artery disease) I25.10    Hypertension I10    Gout M10.9    Anticoagulated Z79.01    Abnormal nuclear stress test R94.39    Cardiomyopathy ([address] I42.9    Right leg weakness R29.898    CAD S/P percutaneous coronary angioplasty I25.10, Z98.61    Insulin dependent type 2 diabetes mellitus ([facility]) E11.9, Z79.4    Acute exacerbation of CHF (congestive heart failure) ([facility]) I50.9    Morbid obesity with BMI of 45.0-49.9, adult ([facility]) E66.01, Z68.42    S/P drug eluting coronary stent placement Z95.5    EDGAR (obstructive sleep apnea) G47.33    CAP (community acquired pneumonia) J18.9    CHF (congestive heart failure) ([facility]) I50.9      Acute on chronic systolic heart failure   CAD  H/o DVT     She underwent cardiac catheterization in 08/2020 reported     single-vessel occlusive CAD.     Proximal left circumflex artery have 90% stenosis just proximal to the previously deployed mid left circumflex artery stent.  This lesion is treated by 2.75x23 mm Xience Audrey drug-eluting stent with excellent results.     Rest of coronary anatomy is patent with minimal luminal irregularity.  .     LVEDP 30 mmHg.     Echocardiogram revealed     · Image quality for this study was technically difficult. Contrast used: DEFINITY. · Left Ventricle: Mildly dilated left ventricle. Mild concentric hypertrophy. The estimated EF is 25 - 30%. Severely reduced systolic function. There is moderate (grade 2) left ventricular diastolic dysfunction E/E' ratio is 25.72. Wall Scoring: The left ventricular wall motion is globally hypokinetic. · Left Atrium: Mildly dilated left atrium.   · Right Ventricle: Mildly dilated right ventricle. Mildly reduced systolic function. Assessment of RV function: TAPSE = 18 mm. · Right Atrium: Mildly dilated right atrium. · Tricuspid Valve: Tricuspid valve not well visualized. No stenosis. Tricuspid regurgitation is inadequate for estimation of right ventricular systolic pressure.      71-UUDF-CNR female came with worsening of shortness of breath and being managed per decompensated systolic heart failure. Serial cardiac enzymes are negative. EKG revealed left bundle branch block which is old. She was scheduled for stress test today but as son had some misunderstanding and they refused and it was canceled after.      Plan:    Continue aspirin, carvedilol, losartan, spironolactone and atorvastatin. Continue IV Lasix to 40 mg once a day. On xarelto for H/o DVT  Salt restriction up to 2 g per day and fluid restriction up to 1.2 L per day. Discussed with patient and family member about stress test.    All risks, benefits and alternatives explained to patient and family member and they verbally understood. Patient and family member agreed for [employer] stress test.  Schedule for Lexiscan stress test to evaluate ischemic substrate for CHF  NPO after midnight               Subjective:    cc: My breathing is better today      REVIEW OF SYSTEMS:     General: No fevers or chills. Cardiovascular:  Positive shortness of breath,No chest pain,No palpitations, No orthopnea, No PND, No leg swelling, No claudication  Pulmonary: No  dyspnea. Gastrointestinal: No nausea, vomiting, bleeding  Neurology: No Dizziness    Objective:      Visit Vitals  BP (!) 104/55 (BP 1 Location: Right upper arm, BP Patient Position: At rest)   Pulse 72   Temp 98.1 °F (36.7 °C)   Resp 16   Ht 5' 3\" (1.6 m)   Wt 117.1 kg (258 lb 3.2 oz)   SpO2 98%   BMI 45.74 kg/m²     Body mass index is 45.74 kg/m².     Physical Exam:  General Appearance: Comfortable, not using accessory muscles of respiration. HEENT: JUAN. HEAD: Atraumatic  NECK: No JVD, no thyroidomeglay. CAROTIDS: no bruit  LUNGS: Clear bilaterally. HEART: S1+S2 audible, no murmur, no pericardial rub. ABD: Non-tender, BS Audible    EXT: No edema, and no cyanosis. VASCULAR EXAM: Pulses are intact. PSYCHIATRIC EXAM: Mood is appropriate. MUSCULOSKELETAL: Grossly no joint deformity.   NEUROLOGICAL: AAO times 3, No motor and sensory deficit,    Medication:  Current Facility-Administered Medications   Medication Dose Route Frequency    spironolactone (ALDACTONE) tablet 25 mg  25 mg Oral DAILY    furosemide (LASIX) injection 40 mg  40 mg IntraVENous DAILY    allopurinoL (ZYLOPRIM) tablet 100 mg  100 mg Oral BID    aspirin delayed-release tablet 81 mg  81 mg Oral DAILY    atorvastatin (LIPITOR) tablet 10 mg  10 mg Oral QHS    [Held by provider] rivaroxaban (XARELTO) tablet 15 mg  15 mg Oral DAILY    losartan (COZAAR) tablet 50 mg  50 mg Oral DAILY    carvediloL (COREG) tablet 12.5 mg  12.5 mg Oral BID WITH MEALS    albuterol-ipratropium (DUO-NEB) 2.5 MG-0.5 MG/3 ML  3 mL Nebulization Q4H PRN    benzonatate (TESSALON) capsule 100 mg  100 mg Oral TID PRN    sodium chloride (NS) flush 5-40 mL  5-40 mL IntraVENous Q8H    sodium chloride (NS) flush 5-40 mL  5-40 mL IntraVENous PRN    acetaminophen (TYLENOL) tablet 650 mg  650 mg Oral Q6H PRN    Or    acetaminophen (TYLENOL) suppository 650 mg  650 mg Rectal Q6H PRN    polyethylene glycol (MIRALAX) packet 17 g  17 g Oral DAILY PRN    ondansetron (ZOFRAN ODT) tablet 4 mg  4 mg Oral Q8H PRN    Or    ondansetron (ZOFRAN) injection 4 mg  4 mg IntraVENous Q6H PRN    famotidine (PEPCID) tablet 20 mg  20 mg Oral BID    insulin lispro (HUMALOG) injection   SubCUTAneous AC&HS    glucose chewable tablet 16 g  16 g Oral PRN    glucagon (GLUCAGEN) injection 1 mg  1 mg IntraMUSCular PRN    dextrose (D50W) injection syrg 12.5-25 g  25-50 mL IntraVENous PRN               Lab/Data Reviewed:       Recent Labs     07/27/21  0145   WBC 5.1   HGB 12.1   HCT 38.7        Recent Labs     07/27/21  0145      K 4.0      CO2 28   *   BUN 21*   CREA 0.88   CA 8.7       Signed By: Sanya Magana MD     July 27, 2021

## 2021-07-28 NOTE — PROGRESS NOTES
1325 Pt returned from stress test alert. Dr. Graham Paz at bedside spoke with pt; pt may now eat. Encouraged pt to participate with therapy. 1555 Bedside and Verbal shift change report given to SOO Alvarez RN (oncoming nurse) by Tania Fung RN  (offgoing nurse). Report included the following information SBAR, Kardex, Intake/Output and MAR.

## 2021-07-28 NOTE — PROGRESS NOTES
Care manager met with patient at bedside to discuss PT recommendation for SNF vs HH with 24 hr supervision. Per patient, she lives alone, has a walker and her CPAP and son Ma Buerger is very attentive; she adamantly refused HH but stated she would like some personal assistance with doing laundry and basic housekeeping, dishes, etc.  Provided patient with Personal care list; later son Ma Buerger was there and discussed this with him as well and made sure they knew that this is an out of pocket expense. Will continue to follow as needed    Care Management Interventions  PCP Verified by CM:  Yes  Mode of Transport at Discharge: Self  Transition of Care Consult (CM Consult): Discharge Planning  Health Maintenance Reviewed: Yes  Current Support Network: Own Home, Family Lives [place], Lives Alone  Confirm Follow Up Transport: Family  The Plan for Transition of Care is Related to the Following Treatment Goals : acute exacerbation of CHF  The Patient and/or Patient Representative was Provided with a Choice of Provider and Agrees with the Discharge Plan?: Yes  Name of the Patient Representative Who was Provided with a Choice of Provider and Agrees with the Discharge Plan: Barney Soulier, patient  Freedom of Choice List was Provided with Basic Dialogue that Supports the Patient's Individualized Plan of Care/Goals, Treatment Preferences and Shares the Quality Data Associated with the Providers?: Yes  Discharge Location  Discharge Placement: Home with family assistance

## 2021-07-28 NOTE — PROGRESS NOTES
7/28/2021  PT treatment not completed due to:  [x] Off Unit for testing/procedure  [] Pain  [] Eating  [] Patient too lethargic  [] Nausea/vomiting  [] Dialysis treatment in progress   [x] Other: pt off unit for stress test. Pt josé miguel Mcintosh present in room. Will f/u at later time as pt schedule allows.    Karla Reeves, PT

## 2021-07-28 NOTE — PROGRESS NOTES
Attempts x2 for OT treatment session. First attempt, pt off the unit for test.   Second attempt, pt adamantly refusing to participate with therapy. States she is going home today and does not need to participate with therapy. Pt education on role of therapy for rehab was provided. Pt continues to refuse therapy. To be followed later as pt schedule permits.

## 2021-07-29 VITALS
RESPIRATION RATE: 17 BRPM | TEMPERATURE: 98.5 F | HEART RATE: 76 BPM | BODY MASS INDEX: 45.75 KG/M2 | WEIGHT: 258.2 LBS | DIASTOLIC BLOOD PRESSURE: 68 MMHG | OXYGEN SATURATION: 99 % | HEIGHT: 63 IN | SYSTOLIC BLOOD PRESSURE: 122 MMHG

## 2021-07-29 LAB
GLUCOSE BLD STRIP.AUTO-MCNC: 154 MG/DL (ref 70–110)
GLUCOSE BLD STRIP.AUTO-MCNC: 169 MG/DL (ref 70–110)
STRESS BASELINE HR: 76 BPM
STRESS ESTIMATED WORKLOAD: 1 METS
STRESS EXERCISE DUR MIN: NORMAL
STRESS PEAK DIAS BP: 78 MMHG
STRESS PEAK SYS BP: 139 MMHG
STRESS PERCENT HR ACHIEVED: 59 %
STRESS POST PEAK HR: 86 BPM
STRESS RATE PRESSURE PRODUCT: NORMAL BPM*MMHG
STRESS ST DEPRESSION: 0 MM
STRESS ST ELEVATION: 0 MM
STRESS TARGET HR: 147 BPM

## 2021-07-29 PROCEDURE — 74011250637 HC RX REV CODE- 250/637: Performed by: INTERNAL MEDICINE

## 2021-07-29 PROCEDURE — 82962 GLUCOSE BLOOD TEST: CPT

## 2021-07-29 PROCEDURE — 74011250636 HC RX REV CODE- 250/636: Performed by: HOSPITALIST

## 2021-07-29 PROCEDURE — 74011250637 HC RX REV CODE- 250/637: Performed by: FAMILY MEDICINE

## 2021-07-29 PROCEDURE — 94660 CPAP INITIATION&MGMT: CPT

## 2021-07-29 PROCEDURE — 74011636637 HC RX REV CODE- 636/637: Performed by: FAMILY MEDICINE

## 2021-07-29 PROCEDURE — 91303 HC RX REV CODE- 250/636: CPT | Performed by: HOSPITALIST

## 2021-07-29 RX ORDER — SPIRONOLACTONE 25 MG/1
25 TABLET ORAL DAILY
Qty: 30 TABLET | Refills: 0 | Status: SHIPPED | OUTPATIENT
Start: 2021-07-30 | End: 2021-11-06

## 2021-07-29 RX ORDER — FUROSEMIDE 40 MG/1
40 TABLET ORAL DAILY
Qty: 30 TABLET | Refills: 0 | Status: SHIPPED | OUTPATIENT
Start: 2021-07-29 | End: 2021-11-06

## 2021-07-29 RX ORDER — CARVEDILOL 12.5 MG/1
12.5 TABLET ORAL 2 TIMES DAILY WITH MEALS
Qty: 60 TABLET | Refills: 0 | Status: SHIPPED | OUTPATIENT
Start: 2021-07-29 | End: 2021-11-06

## 2021-07-29 RX ADMIN — ALLOPURINOL 100 MG: 100 TABLET ORAL at 08:59

## 2021-07-29 RX ADMIN — ASPIRIN 81 MG: 81 TABLET, COATED ORAL at 08:59

## 2021-07-29 RX ADMIN — SPIRONOLACTONE 25 MG: 25 TABLET ORAL at 08:59

## 2021-07-29 RX ADMIN — FAMOTIDINE 20 MG: 20 TABLET ORAL at 08:59

## 2021-07-29 RX ADMIN — RIVAROXABAN 15 MG: 15 TABLET, FILM COATED ORAL at 00:03

## 2021-07-29 RX ADMIN — INSULIN LISPRO 2 UNITS: 100 INJECTION, SOLUTION INTRAVENOUS; SUBCUTANEOUS at 12:17

## 2021-07-29 RX ADMIN — JNJ-78436735 1 DOSE: 50000000000 SUSPENSION INTRAMUSCULAR at 15:37

## 2021-07-29 RX ADMIN — LOSARTAN POTASSIUM 50 MG: 50 TABLET, FILM COATED ORAL at 08:59

## 2021-07-29 RX ADMIN — CARVEDILOL 12.5 MG: 12.5 TABLET, FILM COATED ORAL at 08:59

## 2021-07-29 RX ADMIN — FUROSEMIDE 40 MG: 40 TABLET ORAL at 09:00

## 2021-07-29 NOTE — PROGRESS NOTES
Hospitalist Progress Note    Patient: Amber Mahajan MRN: [id number]  CSN: [id number]    YOB: 1945  Age: 70 y.o. Sex: female    DOA: 7/20/2021 LOS:  LOS: 2 days                Assessment/Plan     Patient Active Problem List   Diagnosis Code    Chest pain R07.9    CAD (coronary artery disease) I25.10    Hypertension I10    Gout M10.9    Anticoagulated Z79.01    Abnormal nuclear stress test R94.39    Cardiomyopathy ([facility] I42.9    Right leg weakness R29.898    CAD S/P percutaneous coronary angioplasty I25.10, Z98.61    Insulin dependent type 2 diabetes mellitus ([facility] E11.9, Z79.4    Morbid obesity with BMI of 45.0-49.9, adult ([facility]) E66.01, Z68.42    S/P drug eluting coronary stent placement Z95.5    EDGAR (obstructive sleep apnea) G47.33    CAP (community acquired pneumonia) J18.9    Acute on chronic combined systolic and diastolic ACC/AHA stage C congestive heart failure (HCC) I50.43            56-year-old female with insulin-dependent type 2 diabetes mellitus, morbid obesity, hypertension, CAD with drug-eluting stent and on anticoagulation, and cardiomyopathy with EF of 35 to 40% is admitted for acute CHF exacerbation. Acute on chronic systolic diastolic CHF -  Continue with diuresis  Echo with EF of 95-56%, grade 2 diastolic dysfunction. Cardiology following  Salt restriction to 2g daily  Fluid restriction to 1.5L    CAD -  Continue with aspirin. On xarelto    Pneumonia -  On azithromycin    DM -  On SSI    EDGAR -  CPAP at night. Morbid obesity    Disposition :     Review of systems  General: No fevers or chills. Cardiovascular: No chest pain or pressure. No palpitations. Pulmonary: No shortness of breath. Gastrointestinal: No nausea, vomiting. Physical Exam:  General: Awake, cooperative, no acute distress    HEENT: NC, Atraumatic. PERRLA, anicteric sclerae. Lungs: CTA Bilaterally. No Wheezing/Rhonchi/Rales.   Heart:  S1 S2,  No murmur, No Rubs, No Gallops  Abdomen: Soft, Non distended, Non tender.  +Bowel sounds,   Extremities: Edema bilaterally  Psych:   Not anxious or agitated. Neurologic:  No acute neurological deficit. Vital signs/Intake and Output:  Visit Vitals  /64   Pulse 71   Temp 98.3 °F (36.8 °C)   Resp 16   Ht 5' 3\" (1.6 m)   Wt 117.1 kg (258 lb 3.2 oz)   SpO2 100%   BMI 45.74 kg/m²     Current Shift:  No intake/output data recorded. Last three shifts:  07/27 0701 - 07/28 1900  In: 1024 [P.O.:1024]  Out: 1750 [PUDFD:[id number]]            Labs: Results:       Chemistry Recent Labs     07/27/21 0145   *      K 4.0      CO2 28   BUN 21*   CREA 0.88   CA 8.7   AGAP 6   BUCR 24*   AP 72   TP 6.8   ALB 2.7*   GLOB 4.1*   AGRAT 0.7*      CBC w/Diff Recent Labs     07/27/21 0145   WBC 5.1   RBC 4.27   HGB 12.1   HCT 38.7      GRANS 47   LYMPH 41   EOS 2      Cardiac Enzymes No results for input(s): CPK, CKND1, SARWAT in the last 72 hours. No lab exists for component: CKRMB, TROIP   Coagulation No results for input(s): PTP, INR, APTT, INREXT, INREXT in the last 72 hours. Lipid Panel Lab Results   Component Value Date/Time    Cholesterol, total 139 08/25/2020 05:30 AM    HDL Cholesterol 49 08/25/2020 05:30 AM    LDL, calculated 58.6 08/25/2020 05:30 AM    VLDL, calculated 31.4 08/25/2020 05:30 AM    Triglyceride 157 (H) 08/25/2020 05:30 AM    CHOL/HDL Ratio 2.8 08/25/2020 05:30 AM      BNP No results for input(s): BNPP in the last 72 hours.    Liver Enzymes Recent Labs     07/27/21 0145   TP 6.8   ALB 2.7*   AP 72      Thyroid Studies No results found for: T4, T3U, TSH, TSHEXT, TSHEXT     Procedures/imaging: see electronic medical records for all procedures/Xrays and details which were not copied into this note but were reviewed prior to creation of Plan

## 2021-07-29 NOTE — PROGRESS NOTES
6259 Assumed care of the patient from [address] [place] [address] Drive (offgoing Nurse). Patient is alert and oriented. Pt denies any pain or discomfort at this moment. bed in low position, call bell within reach. 4454 Discharge instructions reviewed with the patient and family. Patient verbalized understanding and verified by teach back. All questions answered. IV discontinued, no redness, swelling or pain noted. Patient discharged off the unit via wheelchair. Patient armband removed and shredded.

## 2021-07-29 NOTE — PROGRESS NOTES
7/29/2021  PT treatment not completed due to:  [] Off Unit for testing/procedure  [] Pain  [] Eating  [] Patient too lethargic  [] Nausea/vomiting  [] Dialysis treatment in progress   [x] Other:Pt states she is trying to get home; waiting for that doctor to come in and that they are trying to get her to take that shot (COVID vaccine). .  States she does not need any therapy and will walk when she gets home. Did not participate with PT at this time. Will f/u at later time as pt schedule allows.     Rizwana German, PT

## 2021-07-29 NOTE — PROGRESS NOTES
Pt placed on cpap for HS at this time:       07/28/21 2345   Oxygen Therapy   O2 Sat (%) 98 %   Pulse via Oximetry 73 beats per minute   O2 Device CPAP mask   O2 Flow Rate (L/min) 1.5 l/min   FIO2 (%) 26 %   Respiratory   Respiratory (WDL) WDL   Respiratory Pattern Regular   Chest/Tracheal Assessment Chest expansion, symmetrical   CPAP/BIPAP   CPAP/BIPAP Start/Stop On   Device Mode CPAP, auto-titrating   [employer] ID # [id number]   Mask Type and Size Full face; Medium   PIP Observed 7.3 cm H20   EPAP (cm H2O)   (5-20)   Total RR (Spontaneous) 18 breaths per minute   Pt's Home Machine No   Biomedical Check Performed Yes   Settings Verified Yes

## 2021-07-29 NOTE — PROGRESS NOTES
Attempted to see patient for skilled OT treatment session. Patient is currently getting COVID vaccine and declining therapy at this time. Patient is dressed, prepping for discharge and reports she \"does not need any therapy\". If patient does not d/c today, OT will f/u again at a later time as pt schedule allows.         Thank you,   Walter Yu MS, OTR/L

## 2021-07-29 NOTE — PROGRESS NOTES
Cardiology Progress Note        Patient: Benito Edward        Sex: female          DOA: 7/20/2021  YOB: 1945      Age:  70 y.o.        LOS:  LOS: 2 days    Patient seen and examined, chart reviewed. Assessment/Plan     Patient Active Problem List   Diagnosis Code    Chest pain R07.9    CAD (coronary artery disease) I25.10    Hypertension I10    Gout M10.9    Anticoagulated Z79.01    Abnormal nuclear stress test R94.39    Cardiomyopathy ([address] I42.9    Right leg weakness R29.898    CAD S/P percutaneous coronary angioplasty I25.10, Z98.61    Insulin dependent type 2 diabetes mellitus ([facility]) E11.9, Z79.4    Morbid obesity with BMI of 45.0-49.9, adult ([facility]) E66.01, Z68.42    S/P drug eluting coronary stent placement Z95.5    EDGAR (obstructive sleep apnea) G47.33    CAP (community acquired pneumonia) J18.9    Acute on chronic combined systolic and diastolic ACC/AHA stage C congestive heart failure (HCC) I50.43      Acute on chronic systolic heart failure   CAD  H/o DVT     She underwent cardiac catheterization in 08/2020 reported     single-vessel occlusive CAD.     Proximal left circumflex artery have 90% stenosis just proximal to the previously deployed mid left circumflex artery stent.  This lesion is treated by 2.75x23 mm Xience Audrey drug-eluting stent with excellent results.     Rest of coronary anatomy is patent with minimal luminal irregularity.  .     LVEDP 30 mmHg.     Echocardiogram revealed     · Image quality for this study was technically difficult. Contrast used: DEFINITY. · Left Ventricle: Mildly dilated left ventricle. Mild concentric hypertrophy. The estimated EF is 25 - 30%. Severely reduced systolic function. There is moderate (grade 2) left ventricular diastolic dysfunction E/E' ratio is 25.72. Wall Scoring: The left ventricular wall motion is globally hypokinetic. · Left Atrium: Mildly dilated left atrium.   · Right Ventricle: Mildly dilated right ventricle. Mildly reduced systolic function. Assessment of RV function: TAPSE = 18 mm. · Right Atrium: Mildly dilated right atrium. · Tricuspid Valve: Tricuspid valve not well visualized. No stenosis. Tricuspid regurgitation is inadequate for estimation of right ventricular systolic pressure. Lexiscan stress test is abnormal but negative for ischemia      Plan:    Continue aspirin, carvedilol, losartan, spironolactone and atorvastatin. change IV Lasix to oral Lasix  40 mg once a day   On xarelto for H/o DVT  Salt restriction up to 2 g per day and fluid restriction up to 1.2 L per day. Advised about medication adherence   plan discussed with patient and family member and they verbally understood. Okay to discharge tomorrow morning. Follow-up in cardiology clinic in 4 weeks with me after discharge              Subjective:    cc: My breathing is better today      REVIEW OF SYSTEMS:     General: No fevers or chills. Cardiovascular:  Positive shortness of breath,No chest pain,No palpitations, No orthopnea, No PND, No leg swelling, No claudication  Pulmonary: No  dyspnea. Gastrointestinal: No nausea, vomiting, bleeding  Neurology: No Dizziness    Objective:      Visit Vitals  /64   Pulse 71   Temp 98.3 °F (36.8 °C)   Resp 16   Ht 5' 3\" (1.6 m)   Wt 117.1 kg (258 lb 3.2 oz)   SpO2 100%   BMI 45.74 kg/m²     Body mass index is 45.74 kg/m². Physical Exam:  General Appearance: Comfortable, not using accessory muscles of respiration. HEENT: JUAN. HEAD: Atraumatic  NECK: No JVD, no thyroidomeglay. CAROTIDS: no bruit  LUNGS: Clear bilaterally. HEART: S1+S2 audible, no murmur, no pericardial rub. ABD: Non-tender, BS Audible    EXT: No edema, and no cyanosis. VASCULAR EXAM: Pulses are intact. PSYCHIATRIC EXAM: Mood is appropriate. MUSCULOSKELETAL: Grossly no joint deformity.   NEUROLOGICAL: AAO times 3, No motor and sensory deficit,    Medication:  Current Facility-Administered Medications   Medication Dose Route Frequency    [START ON 7/29/2021] rivaroxaban (XARELTO) tablet 15 mg  15 mg Oral DAILY WITH DINNER    [START ON 7/29/2021] furosemide (LASIX) tablet 40 mg  40 mg Oral DAILY    spironolactone (ALDACTONE) tablet 25 mg  25 mg Oral DAILY    allopurinoL (ZYLOPRIM) tablet 100 mg  100 mg Oral BID    aspirin delayed-release tablet 81 mg  81 mg Oral DAILY    atorvastatin (LIPITOR) tablet 10 mg  10 mg Oral QHS    losartan (COZAAR) tablet 50 mg  50 mg Oral DAILY    carvediloL (COREG) tablet 12.5 mg  12.5 mg Oral BID WITH MEALS    albuterol-ipratropium (DUO-NEB) 2.5 MG-0.5 MG/3 ML  3 mL Nebulization Q4H PRN    benzonatate (TESSALON) capsule 100 mg  100 mg Oral TID PRN    sodium chloride (NS) flush 5-40 mL  5-40 mL IntraVENous Q8H    sodium chloride (NS) flush 5-40 mL  5-40 mL IntraVENous PRN    acetaminophen (TYLENOL) tablet 650 mg  650 mg Oral Q6H PRN    Or    acetaminophen (TYLENOL) suppository 650 mg  650 mg Rectal Q6H PRN    polyethylene glycol (MIRALAX) packet 17 g  17 g Oral DAILY PRN    ondansetron (ZOFRAN ODT) tablet 4 mg  4 mg Oral Q8H PRN    Or    ondansetron (ZOFRAN) injection 4 mg  4 mg IntraVENous Q6H PRN    famotidine (PEPCID) tablet 20 mg  20 mg Oral BID    insulin lispro (HUMALOG) injection   SubCUTAneous AC&HS    glucose chewable tablet 16 g  16 g Oral PRN    glucagon (GLUCAGEN) injection 1 mg  1 mg IntraMUSCular PRN    dextrose (D50W) injection syrg 12.5-25 g  25-50 mL IntraVENous PRN               Lab/Data Reviewed:       Recent Labs     07/27/21  0145   WBC 5.1   HGB 12.1   HCT 38.7        Recent Labs     07/27/21  0145      K 4.0      CO2 28   *   BUN 21*   CREA 0.88   CA 8.7       Signed By: Whit Carlton MD     July 28, 2021

## 2021-07-29 NOTE — ROUTINE PROCESS
Bedside and Verbal shift change report given to Allen Miller (oncoming nurse) by Melody Bernal nurse). Report included the following information SBAR, Kardex and MAR.

## 2021-07-29 NOTE — PROGRESS NOTES
[address] [place] [address] Rd care from SOO Mcrae RN.    7052 Bedside and Verbal shift change report given to MICHAEL Leyva RN (oncoming nurse) by Randy Stewart RN   (offgoing nurse). Report included the following information SBAR, Kardex, ED Summary, Procedure Summary, Intake/Output, MAR, Recent Results and Med Rec Status.

## 2021-07-29 NOTE — PROGRESS NOTES
Problem: Falls - Risk of  Goal: *Absence of Falls  Description: Document Nelli Cruz Fall Risk and appropriate interventions in the flowsheet.   Outcome: Progressing Towards Goal  Note: Fall Risk Interventions:  Mobility Interventions: Assess mobility with egress test    Mentation Interventions: Adequate sleep, hydration, pain control    Medication Interventions: Evaluate medications/consider consulting pharmacy    Elimination Interventions: Call light in reach    History of Falls Interventions: Door open when patient unattended, Evaluate medications/consider consulting pharmacy, Investigate reason for fall, Room close to nurse's station

## 2021-07-29 NOTE — PROGRESS NOTES
Physician Progress Note      PATIENT:               Brady Dacosta  CSN #:                  [id number]  :                       1945  ADMIT DATE:       2021 6:32 PM  [address] [place] DATE:  RESPONDING  PROVIDER #:        Britney Morejon MD          QUERY TEXT:    Pt admitted with Acute exacerbation of CHF. Noted documentation of Acute on chronic systolic heart failure on 21 by ordered Cardiology consultant. If possible, please document in progress notes and discharge summary:    The medical record reflects the following:    Risk Factors: Morbid obesity with BMI of 45.0-49.9, CHF, CAD, DM, HTN    Clinical Indicators:  > Echocardiogram- EF is 25 - 30%. Severely reduced systolic function  > SOB on minimal exertion  > Developed orthopnea and PND for last 2 days  > Edema bilateral LE  >  NT pro BNP 4,181    Treatment: Receiving Cardiology consult, Echocardiogram, aspirin, carvedilol, losartan and atorvastatin, Change IV Lasix to 40 mg once a day,  continue antibiotic, Salt restriction up to 2 g per day and fluid restriction up to 1.2 L per day.     Thank you,  Alicia Jackson RN, BSN, [place], [address] [facility]  Options provided:  -- Acute on chronic systolic CHF confirmed present on admission  -- Defer to cardiology consultant documentation  -- Other - I will add my own diagnosis  -- Disagree - Not applicable / Not valid  -- Disagree - Clinically unable to determine / Unknown  -- Refer to Clinical Documentation Reviewer    PROVIDER RESPONSE TEXT:    See progress note    Query created by: Robin Larry on 2021 12:59 PM      Electronically signed by:  Britney Morejon MD 2021 9:42 PM

## 2021-07-30 NOTE — DISCHARGE SUMMARY
Discharge Summary    Patient: Jessica Suggs MRN: [id number]  CSN: [id number]    YOB: 1945  Age: 70 y.o.   Sex: female    DOA: 7/20/2021 LOS:  LOS: 3 days   Discharge Date: 7/29/2021     Primary Care Provider:  Salima Estrada MD    Admission Diagnoses: CHF (congestive heart failure) ([place] [address].) [I50.9]    Discharge Diagnoses:    Problem List as of 7/29/2021 Date Reviewed: 8/25/2020        Codes Class Noted - Resolved    Acute on chronic combined systolic and diastolic ACC/AHA stage C congestive heart failure ([facility] ICD-10-CM: I50.43  ICD-9-CM: 428.43, 428.0  7/28/2021 - Present        Morbid obesity with BMI of 45.0-49.9, adult ([facility] ICD-10-CM: E66.01, Z68.42  ICD-9-CM: 278.01, V85.42  Unknown - Present        S/P drug eluting coronary stent placement ICD-10-CM: Z95.5  ICD-9-CM: V45.82  7/21/2021 - Present        EDGAR (obstructive sleep apnea) ICD-10-CM: G47.33  ICD-9-CM: 327.23  7/21/2021 - Present        CAP (community acquired pneumonia) ICD-10-CM: J18.9  ICD-9-CM: 486  7/21/2021 - Present        Right leg weakness ICD-10-CM: R29.898  ICD-9-CM: 729.89  8/28/2020 - Present        CAD S/P percutaneous coronary angioplasty ICD-10-CM: I25.10, Z98.61  ICD-9-CM: 414.01, V45.82  8/28/2020 - Present        Insulin dependent type 2 diabetes mellitus ([facility] ICD-10-CM: E11.9, Z79.4  ICD-9-CM: 250.00, V58.67  8/28/2020 - Present        Anticoagulated ICD-10-CM: Z79.01  ICD-9-CM: V58.61  8/25/2020 - Present        Abnormal nuclear stress test ICD-10-CM: R94.39  ICD-9-CM: 794.39  8/24/2020 - Present    Overview Signed 8/25/2020  6:12 PM by Itzel Mcfarland MD     Added automatically from request for surgery [id number]             Cardiomyopathy [facility]) ICD-10-CM: I42.9  ICD-9-CM: 425.4  8/24/2020 - Present    Overview Signed 8/25/2020  6:12 PM by Itzel Mcfarland MD     Added automatically from request for surgery [id number]             Chest pain ICD-10-CM: R07.9  ICD-9-CM: 786.50  12/3/2018 - Present        CAD (coronary artery disease) ICD-10-CM: I25.10  ICD-9-CM: 414.00  12/3/2018 - Present        Hypertension ICD-10-CM: I10  ICD-9-CM: 401.9  12/3/2018 - Present        Gout ICD-10-CM: M10.9  ICD-9-CM: 274.9  12/3/2018 - Present        RESOLVED: CHF (congestive heart failure) (HCC) ICD-10-CM: I50.9  ICD-9-CM: 428.0  7/26/2021 - 7/28/2021        RESOLVED: Chest pain at rest ICD-10-CM: R07.9  ICD-9-CM: 786.50  8/24/2020 - 8/28/2020    Overview Signed 8/25/2020  6:12 PM by Austin Escalante MD     Added automatically from request for surgery [id number]             RESOLVED: Dyspepsia ICD-10-CM: R10.13  ICD-9-CM: 536.8  3/3/2020 - 8/28/2020        RESOLVED: Cough ICD-10-CM: R05  ICD-9-CM: 786.2  3/3/2020 - 8/28/2020        RESOLVED: Diabetes ([address] ICD-10-CM: E11.9  ICD-9-CM: 250.00  5/24/2019 - 8/28/2020        RESOLVED: Acute chest pain ICD-10-CM: R07.9  ICD-9-CM: 786.50  5/24/2019 - 8/28/2020        RESOLVED: Vomiting ICD-10-CM: R11.10  ICD-9-CM: 787.03  12/15/2018 - 8/28/2020        RESOLVED: UTI (urinary tract infection) ICD-10-CM: N39.0  ICD-9-CM: 599.0  12/15/2018 - 12/15/2018        RESOLVED: Pyelonephritis ICD-10-CM: N12  ICD-9-CM: 590.80  12/15/2018 - 8/28/2020        RESOLVED: Intractable vomiting with nausea ICD-10-CM: R11.2  ICD-9-CM: 536.2  12/15/2018 - 8/28/2020        RESOLVED: Knee pain ICD-10-CM: M25.569  ICD-9-CM: 719.46  12/5/2018 - 12/15/2018        RESOLVED: Back pain ICD-10-CM: M54.9  ICD-9-CM: 724.5  12/5/2018 - 12/15/2018        RESOLVED: Insulin dependent diabetes mellitus with complications [id number]: Alonzo Serrano  ICD-9-CM: 250.90, V58.67  12/3/2018 - 8/28/2020        RESOLVED: Gastrointestinal disorder ICD-10-CM: K92.9  ICD-9-CM: 569.9  12/3/2018 - 8/28/2020        RESOLVED: Acute DVT of left tibial vein ([facility] ICD-10-CM: I82.442  ICD-9-CM: 453.42  12/3/2018 - 8/28/2020        RESOLVED: Acute bronchitis ICD-10-CM: J20.9  ICD-9-CM: 466.0  9/1/2012 - 12/15/2018        RESOLVED: Leg muscle spasm ICD-10-CM: C38.566  ICD-9-CM: 728.85  9/1/2012 - 12/15/2018              Discharge Medications:     Discharge Medication List as of 7/29/2021  3:07 PM      START taking these medications    Details   furosemide (LASIX) 40 mg tablet Take 1 Tablet by mouth daily. , Normal, Disp-30 Tablet, R-0      carvediloL (COREG) 12.5 mg tablet Take 1 Tablet by mouth two (2) times daily (with meals). , Normal, Disp-60 Tablet, R-0      spironolactone (ALDACTONE) 25 mg tablet Take 1 Tablet by mouth daily. , Normal, Disp-30 Tablet, R-0         CONTINUE these medications which have NOT CHANGED    Details   atorvastatin (LIPITOR) 10 mg tablet Take 1 Tab by mouth nightly., No Print, Disp-30 Tab,R-0      rivaroxaban (XARELTO) 15 mg tab tablet Take 1 Tab by mouth daily (with lunch). Indications: start back in am as per DR lee, No Print, Disp-1 Tab,R-0      insulin glargine (LANTUS) 100 unit/mL injection 26 Units by SubCUTAneous route nightly., Normal, Disp-1 Vial,R-0      insulin lispro (HUMALOG) 100 unit/mL injection Use above sliding scale, start at 150-199 with 3 units if needed, No Print, Disp-1 Vial,R-0      albuterol sulfate (PROAIR RESPICLICK) 90 mcg/actuation breath activated inhaler Take 2 Puffs by inhalation. , Historical Med      omeprazole (PRILOSEC) 20 mg capsule Take 1 Cap by mouth two (2) times a day., Normal, Disp-30 Cap, R-0      aspirin delayed-release 81 mg tablet Take 1 Tab by mouth daily. , Normal, Disp-30 Tab, R-0      metFORMIN (GLUCOPHAGE) 500 mg tablet Take 500 mg by mouth two (2) times daily (with meals). Indications: type 2 diabetes mellitus, pt taking differently once a day, Historical Med      multivitamin, tx-iron-ca-min (THERA-M W/ IRON) 9 mg iron-400 mcg tab tablet Take 1 Tab by mouth daily.  Formulary substitution for DAILY MULTIVITAMIN, Historical Med      allopurinol (ZYLOPRIM) 100 mg tablet Take 100 mg by mouth two (2) times a day., Historical Med      gabapentin (NEURONTIN) 300 mg capsule Take 300 mg by mouth three (3) times daily. , Historical Med      polyethylene glycol (MIRALAX) 17 gram packet Take 1 Packet by mouth daily. , No Print, Disp-1 Packet,R-0      losartan (COZAAR) 50 mg tablet Take 1 Tab by mouth daily. , No Print, Disp-1 Tab,R-0         STOP taking these medications       metoprolol tartrate (LOPRESSOR) 25 mg tablet Comments:   Reason for Stopping:         clopidogreL (PLAVIX) 75 mg tab Comments:   Reason for Stopping:         cyclobenzaprine (FLEXERIL) 10 mg tablet Comments:   Reason for Stopping:         diclofenac (VOLTAREN) 1 % gel Comments:   Reason for Stopping:         ergocalciferol (VITAMIN D2) 50,000 unit capsule Comments:   Reason for Stopping:         Difluprednate (DUREZOL) 0.05 % ophthalmic emulsion Comments:   Reason for Stopping:               Discharge Condition: Good    Procedures : none    Consults: Cardiology      PHYSICAL EXAM   Visit Vitals  /68   Pulse 76   Temp 98.5 °F (36.9 °C)   Resp 17   Ht 5' 3\" (1.6 m)   Wt 117.1 kg (258 lb 3.2 oz)   SpO2 99%   BMI 45.74 kg/m²     General: Awake, cooperative, no acute distress    HEENT: NC, Atraumatic. PERRLA, EOMI. Anicteric sclerae. Lungs:  CTA Bilaterally. No Wheezing/Rhonchi/Rales. Heart:  Regular  rhythm,  No murmur, No Rubs, No Gallops  Abdomen: Soft, Non distended, Non tender. +Bowel sounds,   Extremities: Trace edema bilaterally  Psych:   Not anxious or agitated. Neurologic:  No acute neurological deficits. Admission HPI :   Brisa Landa is a 70 y.o. female with insulin-dependent type 2 diabetes mellitus, morbid obesity, hypertension, CAD with drug-eluting stent and on anticoagulation, and cardiomyopathy with EF of 35 to 40% presents to the ED for worsening shortness of breath (especially with exertion) and bilateral leg swelling over the past 5 days. She had her cardiac stent replaced last year due to occlusion. She was continued on Xarelto and aspirin rather than aspirin and Plavix after this. She has also noticed increased sputum production and cough. Her home CPAP has broken and that has made her symptoms worse. She denies any chest pain, nausea, vomiting, diarrhea, or fever. Hospital Course :   Ms. Sparkle Valdez was admitted to monitored floor, she was seen and followed by cardiology. She did not had any acute events during hospitalization. Acute on chronic systolic diastolic CHF -  Continue with diuresis  Echo with EF of 53-92%, grade 2 diastolic dysfunction. Cardiology following  Salt restriction to 2g daily  Fluid restriction to 1.5L     CAD -  Continue with aspirin. On xarelto  lexiscan stress abnormal but negative for ischemia. Cardiology recommended continuing with medical management.     Pneumonia -  received azithromycin     DM -  Started on SSI     EDGAR -  CPAP at night.     Morbid obesity -  Lifestyle modification. Activity: Activity as tolerated    Diet: Diabetic Diet    Follow-up: PCP, cardiology    Disposition: home    Minutes spent on discharge: 45       Labs: Results:       Chemistry Recent Labs     07/27/21  0145   *      K 4.0      CO2 28   BUN 21*   CREA 0.88   CA 8.7   AGAP 6   BUCR 24*   AP 72   TP 6.8   ALB 2.7*   GLOB 4.1*   AGRAT 0.7*      CBC w/Diff Recent Labs     07/27/21  0145   WBC 5.1   RBC 4.27   HGB 12.1   HCT 38.7      GRANS 47   LYMPH 41   EOS 2      Cardiac Enzymes No results for input(s): CPK, CKND1, SARWAT in the last 72 hours. No lab exists for component: CKRMB, TROIP   Coagulation No results for input(s): PTP, INR, APTT, INREXT in the last 72 hours. Lipid Panel Lab Results   Component Value Date/Time    Cholesterol, total 139 08/25/2020 05:30 AM    HDL Cholesterol 49 08/25/2020 05:30 AM    LDL, calculated 58.6 08/25/2020 05:30 AM    VLDL, calculated 31.4 08/25/2020 05:30 AM    Triglyceride 157 (H) 08/25/2020 05:30 AM    CHOL/HDL Ratio 2.8 08/25/2020 05:30 AM      BNP No results for input(s): BNPP in the last 72 hours.    Liver Enzymes Recent Labs     07/27/21  0145   TP 6.8   ALB 2.7*   AP 72      Thyroid Studies No results found for: T4, T3U, TSH, TSHEXT         Significant Diagnostic Studies: XR CHEST PORT    Result Date: 7/20/2021  EXAM:  AP Portable Chest X-ray 1 view INDICATION: Shortness of breath COMPARISON: August 24, 2020 and CT dated May 24, 2019 _______________ FINDINGS:  Heart size is enlarged partly due to AP magnification and mediastinal contours are within normal limits for portable radiograph. There is interstitial infiltrate at the right lung base. There are no pleural effusions. No acute osseous findings. ________________      Interstitial infiltrate at the right lung base suggesting pneumonia or pulmonary edema. ECHO ADULT COMPLETE    Result Date: 7/21/2021  · Image quality for this study was technically difficult. Contrast used: DEFINITY. · Left Ventricle: Mildly dilated left ventricle. Mild concentric hypertrophy. The estimated EF is 25 - 30%. Severely reduced systolic function. There is moderate (grade 2) left ventricular diastolic dysfunction E/E' ratio is 25.72. Wall Scoring: The left ventricular wall motion is globally hypokinetic. · Left Atrium: Mildly dilated left atrium. · Right Ventricle: Mildly dilated right ventricle. Mildly reduced systolic function. Assessment of RV function: TAPSE = 18 mm. · Right Atrium: Mildly dilated right atrium. · Tricuspid Valve: Tricuspid valve not well visualized. No stenosis. Tricuspid regurgitation is inadequate for estimation of right ventricular systolic pressure. NUCLEAR CARDIAC STRESS TEST    Result Date: 7/29/2021  · Baseline ECG: Sinus rhythm, left bundle branch block. · SPECT: There is no evidence of transient ischemic dilation (TID). The TID ratio is 0.99. · SPECT: Myocardial perfusion imaging defect 1: There is a defect that is small in size with a moderate reduction in uptake present in the apical, anterior and inferior location(s) that is predominantly fixed. The defect appears to probably be infarction. No results found for this or any previous visit. Please note that this dictation was completed with [employer], the computer voice recognition software. Quite often unanticipated grammatical, syntax, homophones, and other interpretive errors are inadvertently transcribed by the computer software. Please disregard these errors. Please excuse any errors that have escaped final proofreading.      CC: Reggie Jain MD

## 2021-10-29 ENCOUNTER — APPOINTMENT (OUTPATIENT)
Dept: ULTRASOUND IMAGING | Age: 76
DRG: 641 | End: 2021-10-29
Attending: PHYSICIAN ASSISTANT
Payer: MEDICARE

## 2021-10-29 ENCOUNTER — APPOINTMENT (OUTPATIENT)
Dept: CT IMAGING | Age: 76
DRG: 641 | End: 2021-10-29
Attending: PHYSICIAN ASSISTANT
Payer: MEDICARE

## 2021-10-29 ENCOUNTER — APPOINTMENT (OUTPATIENT)
Dept: GENERAL RADIOLOGY | Age: 76
DRG: 641 | End: 2021-10-29
Attending: PHYSICIAN ASSISTANT
Payer: MEDICARE

## 2021-10-29 ENCOUNTER — HOSPITAL ENCOUNTER (INPATIENT)
Age: 76
LOS: 8 days | Discharge: HOME HEALTH | DRG: 641 | End: 2021-11-06
Attending: EMERGENCY MEDICINE | Admitting: FAMILY MEDICINE
Payer: MEDICARE

## 2021-10-29 DIAGNOSIS — E86.0 SEVERE DEHYDRATION: ICD-10-CM

## 2021-10-29 DIAGNOSIS — N17.9 ACUTE KIDNEY INJURY (HCC): ICD-10-CM

## 2021-10-29 DIAGNOSIS — R55 SYNCOPE AND COLLAPSE: Primary | ICD-10-CM

## 2021-10-29 PROBLEM — K59.00 CONSTIPATION: Status: ACTIVE | Noted: 2021-10-29

## 2021-10-29 PROBLEM — S09.90XA CLOSED HEAD INJURY: Status: ACTIVE | Noted: 2021-10-29

## 2021-10-29 LAB
ALBUMIN SERPL-MCNC: 3.4 G/DL (ref 3.4–5)
ALBUMIN/GLOB SERPL: 0.7 {RATIO} (ref 0.8–1.7)
ALP SERPL-CCNC: 71 U/L (ref 45–117)
ALT SERPL-CCNC: 19 U/L (ref 13–56)
ANION GAP SERPL CALC-SCNC: 7 MMOL/L (ref 3–18)
APPEARANCE UR: ABNORMAL
APTT PPP: 25.1 SEC (ref 23–36.4)
AST SERPL-CCNC: 15 U/L (ref 10–38)
ATRIAL RATE: 81 BPM
BACTERIA URNS QL MICRO: ABNORMAL /HPF
BASOPHILS # BLD: 0 K/UL (ref 0–0.1)
BASOPHILS NFR BLD: 0 % (ref 0–2)
BILIRUB SERPL-MCNC: 0.5 MG/DL (ref 0.2–1)
BILIRUB UR QL: ABNORMAL
BNP SERPL-MCNC: 1104 PG/ML (ref 0–900)
BUN SERPL-MCNC: 61 MG/DL (ref 7–18)
BUN/CREAT SERPL: 16 (ref 12–20)
CALCIUM SERPL-MCNC: 9.6 MG/DL (ref 8.5–10.1)
CALCULATED P AXIS, ECG09: 77 DEGREES
CALCULATED R AXIS, ECG10: -42 DEGREES
CALCULATED T AXIS, ECG11: 134 DEGREES
CHLORIDE SERPL-SCNC: 97 MMOL/L (ref 100–111)
CK MB CFR SERPL CALC: 1.1 % (ref 0–4)
CK MB SERPL-MCNC: 1.7 NG/ML (ref 5–25)
CK SERPL-CCNC: 152 U/L (ref 26–192)
CO2 SERPL-SCNC: 30 MMOL/L (ref 21–32)
COLOR UR: ABNORMAL
CREAT SERPL-MCNC: 3.77 MG/DL (ref 0.6–1.3)
DIAGNOSIS, 93000: NORMAL
DIFFERENTIAL METHOD BLD: ABNORMAL
EOSINOPHIL # BLD: 0 K/UL (ref 0–0.4)
EOSINOPHIL NFR BLD: 0 % (ref 0–5)
EPITH CASTS URNS QL MICRO: ABNORMAL /LPF (ref 0–5)
ERYTHROCYTE [DISTWIDTH] IN BLOOD BY AUTOMATED COUNT: 13.2 % (ref 11.6–14.5)
EST. AVERAGE GLUCOSE BLD GHB EST-MCNC: 171 MG/DL
GLOBULIN SER CALC-MCNC: 5 G/DL (ref 2–4)
GLUCOSE SERPL-MCNC: 256 MG/DL (ref 74–99)
GLUCOSE UR STRIP.AUTO-MCNC: NEGATIVE MG/DL
HBA1C MFR BLD: 7.6 % (ref 4.2–5.6)
HCT VFR BLD AUTO: 48.3 % (ref 35–45)
HGB BLD-MCNC: 15.5 G/DL (ref 12–16)
HGB UR QL STRIP: NEGATIVE
INR PPP: 1 (ref 0.8–1.2)
KETONES UR QL STRIP.AUTO: 15 MG/DL
LEUKOCYTE ESTERASE UR QL STRIP.AUTO: ABNORMAL
LYMPHOCYTES # BLD: 1.5 K/UL (ref 0.9–3.6)
LYMPHOCYTES NFR BLD: 15 % (ref 21–52)
MAGNESIUM SERPL-MCNC: 2.5 MG/DL (ref 1.6–2.6)
MCH RBC QN AUTO: 28.1 PG (ref 24–34)
MCHC RBC AUTO-ENTMCNC: 32.1 G/DL (ref 31–37)
MCV RBC AUTO: 87.5 FL (ref 78–100)
MONOCYTES # BLD: 0.4 K/UL (ref 0.05–1.2)
MONOCYTES NFR BLD: 4 % (ref 3–10)
NEUTS SEG # BLD: 8.1 K/UL (ref 1.8–8)
NEUTS SEG NFR BLD: 81 % (ref 40–73)
NITRITE UR QL STRIP.AUTO: NEGATIVE
P-R INTERVAL, ECG05: 166 MS
PH UR STRIP: 5 [PH] (ref 5–8)
PLATELET # BLD AUTO: 256 K/UL (ref 135–420)
PMV BLD AUTO: 10.6 FL (ref 9.2–11.8)
POTASSIUM SERPL-SCNC: 3.6 MMOL/L (ref 3.5–5.5)
PROT SERPL-MCNC: 8.4 G/DL (ref 6.4–8.2)
PROT UR STRIP-MCNC: 30 MG/DL
PROTHROMBIN TIME: 12.9 SEC (ref 11.5–15.2)
Q-T INTERVAL, ECG07: 464 MS
QRS DURATION, ECG06: 174 MS
QTC CALCULATION (BEZET), ECG08: 539 MS
RBC # BLD AUTO: 5.52 M/UL (ref 4.2–5.3)
RBC #/AREA URNS HPF: NEGATIVE /HPF (ref 0–5)
SODIUM SERPL-SCNC: 134 MMOL/L (ref 136–145)
SP GR UR REFRACTOMETRY: 1.02 (ref 1–1.03)
TROPONIN I SERPL-MCNC: <0.02 NG/ML (ref 0–0.04)
UROBILINOGEN UR QL STRIP.AUTO: 1 EU/DL (ref 0.2–1)
VENTRICULAR RATE, ECG03: 81 BPM
WBC # BLD AUTO: 10 K/UL (ref 4.6–13.2)
WBC URNS QL MICRO: ABNORMAL /HPF (ref 0–5)

## 2021-10-29 PROCEDURE — 80053 COMPREHEN METABOLIC PANEL: CPT

## 2021-10-29 PROCEDURE — 96361 HYDRATE IV INFUSION ADD-ON: CPT

## 2021-10-29 PROCEDURE — 83880 ASSAY OF NATRIURETIC PEPTIDE: CPT

## 2021-10-29 PROCEDURE — 76770 US EXAM ABDO BACK WALL COMP: CPT

## 2021-10-29 PROCEDURE — 85610 PROTHROMBIN TIME: CPT

## 2021-10-29 PROCEDURE — 87086 URINE CULTURE/COLONY COUNT: CPT

## 2021-10-29 PROCEDURE — 83735 ASSAY OF MAGNESIUM: CPT

## 2021-10-29 PROCEDURE — 51798 US URINE CAPACITY MEASURE: CPT

## 2021-10-29 PROCEDURE — 85730 THROMBOPLASTIN TIME PARTIAL: CPT

## 2021-10-29 PROCEDURE — 99285 EMERGENCY DEPT VISIT HI MDM: CPT

## 2021-10-29 PROCEDURE — 74011250636 HC RX REV CODE- 250/636: Performed by: PHYSICIAN ASSISTANT

## 2021-10-29 PROCEDURE — 81001 URINALYSIS AUTO W/SCOPE: CPT

## 2021-10-29 PROCEDURE — 96374 THER/PROPH/DIAG INJ IV PUSH: CPT

## 2021-10-29 PROCEDURE — 74011000258 HC RX REV CODE- 258: Performed by: PHYSICIAN ASSISTANT

## 2021-10-29 PROCEDURE — 71045 X-RAY EXAM CHEST 1 VIEW: CPT

## 2021-10-29 PROCEDURE — 70450 CT HEAD/BRAIN W/O DYE: CPT

## 2021-10-29 PROCEDURE — 93005 ELECTROCARDIOGRAM TRACING: CPT

## 2021-10-29 PROCEDURE — 72125 CT NECK SPINE W/O DYE: CPT

## 2021-10-29 PROCEDURE — 96375 TX/PRO/DX INJ NEW DRUG ADDON: CPT

## 2021-10-29 PROCEDURE — 82553 CREATINE MB FRACTION: CPT

## 2021-10-29 PROCEDURE — 85025 COMPLETE CBC W/AUTO DIFF WBC: CPT

## 2021-10-29 PROCEDURE — 83036 HEMOGLOBIN GLYCOSYLATED A1C: CPT

## 2021-10-29 PROCEDURE — 65270000029 HC RM PRIVATE

## 2021-10-29 PROCEDURE — 94762 N-INVAS EAR/PLS OXIMTRY CONT: CPT

## 2021-10-29 PROCEDURE — 74011250637 HC RX REV CODE- 250/637: Performed by: PHYSICIAN ASSISTANT

## 2021-10-29 RX ORDER — DEXTROSE 50 % IN WATER (D50W) INTRAVENOUS SYRINGE
25-50 AS NEEDED
Status: DISCONTINUED | OUTPATIENT
Start: 2021-10-29 | End: 2021-11-06 | Stop reason: HOSPADM

## 2021-10-29 RX ORDER — ACETAMINOPHEN 325 MG/1
650 TABLET ORAL
Status: DISCONTINUED | OUTPATIENT
Start: 2021-10-29 | End: 2021-11-06 | Stop reason: HOSPADM

## 2021-10-29 RX ORDER — ACETAMINOPHEN 650 MG/1
650 SUPPOSITORY RECTAL
Status: DISCONTINUED | OUTPATIENT
Start: 2021-10-29 | End: 2021-11-06 | Stop reason: HOSPADM

## 2021-10-29 RX ORDER — ONDANSETRON 2 MG/ML
4 INJECTION INTRAMUSCULAR; INTRAVENOUS
Status: COMPLETED | OUTPATIENT
Start: 2021-10-29 | End: 2021-10-29

## 2021-10-29 RX ORDER — ASPIRIN 81 MG/1
81 TABLET ORAL DAILY
Status: DISCONTINUED | OUTPATIENT
Start: 2021-10-30 | End: 2021-11-06 | Stop reason: HOSPADM

## 2021-10-29 RX ORDER — ATORVASTATIN CALCIUM 10 MG/1
10 TABLET, FILM COATED ORAL
Status: DISCONTINUED | OUTPATIENT
Start: 2021-10-29 | End: 2021-11-06 | Stop reason: HOSPADM

## 2021-10-29 RX ORDER — INSULIN LISPRO 100 [IU]/ML
INJECTION, SOLUTION INTRAVENOUS; SUBCUTANEOUS
Status: DISCONTINUED | OUTPATIENT
Start: 2021-10-29 | End: 2021-11-06 | Stop reason: HOSPADM

## 2021-10-29 RX ORDER — CARVEDILOL 12.5 MG/1
12.5 TABLET ORAL 2 TIMES DAILY WITH MEALS
Status: DISCONTINUED | OUTPATIENT
Start: 2021-10-30 | End: 2021-11-02

## 2021-10-29 RX ORDER — ONDANSETRON 4 MG/1
4 TABLET, ORALLY DISINTEGRATING ORAL
Status: DISCONTINUED | OUTPATIENT
Start: 2021-10-29 | End: 2021-11-06 | Stop reason: HOSPADM

## 2021-10-29 RX ORDER — ACETAMINOPHEN 500 MG
1000 TABLET ORAL
Status: COMPLETED | OUTPATIENT
Start: 2021-10-29 | End: 2021-10-29

## 2021-10-29 RX ORDER — SODIUM CHLORIDE 0.9 % (FLUSH) 0.9 %
5-40 SYRINGE (ML) INJECTION EVERY 8 HOURS
Status: DISCONTINUED | OUTPATIENT
Start: 2021-10-29 | End: 2021-11-06 | Stop reason: HOSPADM

## 2021-10-29 RX ORDER — SODIUM CHLORIDE 9 MG/ML
75 INJECTION, SOLUTION INTRAVENOUS CONTINUOUS
Status: DISCONTINUED | OUTPATIENT
Start: 2021-10-29 | End: 2021-10-30

## 2021-10-29 RX ORDER — ONDANSETRON 2 MG/ML
4 INJECTION INTRAMUSCULAR; INTRAVENOUS
Status: DISCONTINUED | OUTPATIENT
Start: 2021-10-29 | End: 2021-11-06 | Stop reason: HOSPADM

## 2021-10-29 RX ORDER — POLYETHYLENE GLYCOL 3350 17 G/17G
17 POWDER, FOR SOLUTION ORAL DAILY PRN
Status: DISCONTINUED | OUTPATIENT
Start: 2021-10-29 | End: 2021-11-06 | Stop reason: HOSPADM

## 2021-10-29 RX ORDER — ALLOPURINOL 100 MG/1
100 TABLET ORAL 2 TIMES DAILY
Status: DISCONTINUED | OUTPATIENT
Start: 2021-10-29 | End: 2021-11-06 | Stop reason: HOSPADM

## 2021-10-29 RX ORDER — FAMOTIDINE 20 MG/1
20 TABLET, FILM COATED ORAL 2 TIMES DAILY
Status: DISCONTINUED | OUTPATIENT
Start: 2021-10-29 | End: 2021-10-30

## 2021-10-29 RX ORDER — MAGNESIUM SULFATE 100 %
16 CRYSTALS MISCELLANEOUS AS NEEDED
Status: DISCONTINUED | OUTPATIENT
Start: 2021-10-29 | End: 2021-11-06 | Stop reason: HOSPADM

## 2021-10-29 RX ORDER — SODIUM CHLORIDE 0.9 % (FLUSH) 0.9 %
5-40 SYRINGE (ML) INJECTION AS NEEDED
Status: DISCONTINUED | OUTPATIENT
Start: 2021-10-29 | End: 2021-11-06 | Stop reason: HOSPADM

## 2021-10-29 RX ADMIN — ACETAMINOPHEN 1000 MG: 500 TABLET ORAL at 16:26

## 2021-10-29 RX ADMIN — PROMETHAZINE HYDROCHLORIDE 12.5 MG: 25 INJECTION INTRAMUSCULAR; INTRAVENOUS at 17:42

## 2021-10-29 RX ADMIN — SODIUM CHLORIDE 75 ML/HR: 900 INJECTION, SOLUTION INTRAVENOUS at 17:42

## 2021-10-29 RX ADMIN — ONDANSETRON 4 MG: 2 INJECTION INTRAMUSCULAR; INTRAVENOUS at 16:26

## 2021-10-29 RX ADMIN — SODIUM CHLORIDE 250 ML: 900 INJECTION, SOLUTION INTRAVENOUS at 16:26

## 2021-10-29 NOTE — ED PROVIDER NOTES
EMERGENCY DEPARTMENT HISTORY AND PHYSICAL EXAM    Date: 10/29/2021  Patient Name: Amanda Galo    History of Presenting Illness     Chief Complaint   Patient presents with    Fall    Syncope    Dizziness    Head Injury    Nausea    Vomiting         History Provided By: Patient    Chief Complaint: head injury    HPI(Context):   3:34 PM  Amanda Galo is a 78 y.o. female with PMHX of HTN, DMII, CHF, CAD/MI with stent in left circumflex in 2020, GERD, obesity, anticoagulated on xarelto, constipation, who presents to the emergency department C/O head trauma. Associated sxs include syncope, headache, and nausea/vomiting. Pt notes at noon today she was on toilet having a BM when she straining hard to pass stool. Pt had syncopal episode and awoke next to toilet. Pt unsure if he hit head but notes 9/10 right frontal headache. Pt admits to headache for past several days. Pt notes she was able to change position after 10 minutes and sat on bathroom floor. Pt's son came home and found her on floor. Pt was helped to feet and she was brought to ED. Pt is on xarelto due to DVT with last dose last night. Pt take 81 mg ASA. Pt felt baseline this AM prior to syncope on toilet. Pt denies neck pain, visual change, abdominal pain, CP, SOB, and any other sxs or complaints. PCP: Latisha Chacon MD    Current Facility-Administered Medications   Medication Dose Route Frequency Provider Last Rate Last Admin    0.9% sodium chloride infusion  75 mL/hr IntraVENous CONTINUOUS Serafin Tucker PA-C 75 mL/hr at 10/29/21 1742 75 mL/hr at 10/29/21 1742     Current Outpatient Medications   Medication Sig Dispense Refill    furosemide (LASIX) 40 mg tablet Take 1 Tablet by mouth daily. 30 Tablet 0    carvediloL (COREG) 12.5 mg tablet Take 1 Tablet by mouth two (2) times daily (with meals). 60 Tablet 0    spironolactone (ALDACTONE) 25 mg tablet Take 1 Tablet by mouth daily.  30 Tablet 0    polyethylene glycol (MIRALAX) 17 gram packet Take 1 Packet by mouth daily. (Patient not taking: Reported on 7/21/2021) 1 Packet 0    atorvastatin (LIPITOR) 10 mg tablet Take 1 Tab by mouth nightly. 30 Tab 0    losartan (COZAAR) 50 mg tablet Take 1 Tab by mouth daily. (Patient not taking: Reported on 7/21/2021) 1 Tab 0    rivaroxaban (XARELTO) 15 mg tab tablet Take 1 Tab by mouth daily (with lunch). Indications: start back in am as per DR lee 1 Tab 0    insulin glargine (LANTUS) 100 unit/mL injection 26 Units by SubCUTAneous route nightly. 1 Vial 0    insulin lispro (HUMALOG) 100 unit/mL injection Use above sliding scale, start at 150-199 with 3 units if needed 1 Vial 0    albuterol sulfate (PROAIR RESPICLICK) 90 mcg/actuation breath activated inhaler Take 2 Puffs by inhalation.  omeprazole (PRILOSEC) 20 mg capsule Take 1 Cap by mouth two (2) times a day. 30 Cap 0    aspirin delayed-release 81 mg tablet Take 1 Tab by mouth daily. 30 Tab 0    metFORMIN (GLUCOPHAGE) 500 mg tablet Take 500 mg by mouth two (2) times daily (with meals). Indications: type 2 diabetes mellitus, pt taking differently once a day      multivitamin, tx-iron-ca-min (THERA-M W/ IRON) 9 mg iron-400 mcg tab tablet Take 1 Tab by mouth daily. Formulary substitution for DAILY MULTIVITAMIN      allopurinol (ZYLOPRIM) 100 mg tablet Take 100 mg by mouth two (2) times a day.  gabapentin (NEURONTIN) 300 mg capsule Take 300 mg by mouth three (3) times daily.          Past History     Past Medical History:  Past Medical History:   Diagnosis Date    CAD (coronary artery disease)     Diabetes ([facility] [address]     Gastrointestinal disorder     Gout     Hypertension     MI (myocardial infarction) ([facility] [address]     Morbid obesity with BMI of 45.0-49.9, adult ([facility] [address]     Reflux        Past Surgical History:  Past Surgical History:   Procedure Laterality Date    HX CATARACT REMOVAL Bilateral 2019    PA CARDIAC SURG PROCEDURE UNLIST      PCI 15 years ago/ with stent placement    VASCULAR SURGERY PROCEDURE UNLIST         Family History:  Family History   Problem Relation Age of Onset    Heart Disease Mother     Stroke Father        Social History:  Social History     Tobacco Use    Smoking status: Former Smoker    Smokeless tobacco: Never Used   Substance Use Topics    Alcohol use: No    Drug use: No       Allergies: Allergies   Allergen Reactions    Gabapentin Swelling     \"I'm back on it, they figured out it wasn't this medication causing me to swell up\"    Pcn [Penicillins] Angioedema    Lisinopril Swelling         Review of Systems   Review of Systems   Eyes: Negative for visual disturbance. Respiratory: Negative for shortness of breath. Cardiovascular: Negative for chest pain. Gastrointestinal: Positive for nausea and vomiting. Negative for abdominal pain. Musculoskeletal: Negative for back pain and neck pain. Skin: Negative for wound. Neurological: Positive for dizziness, syncope and headaches. Negative for weakness. Hematological: Bruises/bleeds easily. Physical Exam     Vitals:    10/29/21 1536 10/29/21 1600 10/29/21 1830   BP: (!) 146/67 111/70 (!) 129/41   Pulse: 81 75 79   Resp: 16 15 14   Temp: 97.1 °F (36.2 °C)     SpO2: 98%  98%   Weight: 103.9 kg (229 lb)       Physical Exam  Vitals and nursing note reviewed. Constitutional:       General: She is not in acute distress. Appearance: She is well-developed. She is not diaphoretic. Comments: Geriatric AA female in NAD. Alert. Answering questions. Following directions. HENT:      Head: Normocephalic and atraumatic. No raccoon eyes or Smith's sign. Jaw: No trismus. Right Ear: External ear normal. No hemotympanum. Left Ear: External ear normal. No hemotympanum. Nose: Nose normal. No rhinorrhea. Mouth/Throat:      Mouth: Mucous membranes are dry. Eyes:      General: No scleral icterus. Right eye: No discharge. Left eye: No discharge.       Conjunctiva/sclera: Conjunctivae normal.   Cardiovascular:      Rate and Rhythm: Normal rate and regular rhythm. Heart sounds: Normal heart sounds. No murmur heard. No friction rub. No gallop. Pulmonary:      Effort: Pulmonary effort is normal. No tachypnea, accessory muscle usage or respiratory distress. Breath sounds: Normal breath sounds. No decreased breath sounds, wheezing, rhonchi or rales. Abdominal:      General: There is no distension. Palpations: Abdomen is soft. Tenderness: There is no abdominal tenderness. Musculoskeletal:         General: Normal range of motion. Cervical back: Normal range of motion. Skin:     General: Skin is warm and dry. Neurological:      Mental Status: She is alert and oriented to person, place, and time. GCS: GCS eye subscore is 4. GCS verbal subscore is 5. GCS motor subscore is 6. Cranial Nerves: Cranial nerves are intact. No cranial nerve deficit. Sensory: Sensation is intact. Motor: Motor function is intact. No weakness. Coordination: Coordination is intact.    Psychiatric:         Behavior: Behavior normal.         Judgment: Judgment normal.           Diagnostic Study Results     Labs -     Recent Results (from the past 12 hour(s))   EKG, 12 LEAD, INITIAL    Collection Time: 10/29/21  3:38 PM   Result Value Ref Range    Ventricular Rate 81 BPM    Atrial Rate 81 BPM    P-R Interval 166 ms    QRS Duration 174 ms    Q-T Interval 464 ms    QTC Calculation (Bezet) 539 ms    Calculated P Axis 77 degrees    Calculated R Axis -42 degrees    Calculated T Axis 134 degrees    Diagnosis       Sinus rhythm with frequent premature ventricular complexes  Left axis deviation  Left bundle branch block  Abnormal ECG  When compared with ECG of 20-JUL-2021 19:17,  premature ventricular complexes are now present  Confirmed by Richa Guzmná MD, -- ([id number]) on 10/29/2021 5:25:08 PM     CBC WITH AUTOMATED DIFF    Collection Time: 10/29/21  3:44 PM   Result Value Ref Range    WBC 10.0 4.6 - 13.2 K/uL    RBC 5.52 (H) 4.20 - 5.30 M/uL    HGB 15.5 12.0 - 16.0 g/dL    HCT 48.3 (H) 35.0 - 45.0 %    MCV 87.5 78.0 - 100.0 FL    MCH 28.1 24.0 - 34.0 PG    MCHC 32.1 31.0 - 37.0 g/dL    RDW 13.2 11.6 - 14.5 %    PLATELET 605 544 - 394 K/uL    MPV 10.6 9.2 - 11.8 FL    NEUTROPHILS 81 (H) 40 - 73 %    LYMPHOCYTES 15 (L) 21 - 52 %    MONOCYTES 4 3 - 10 %    EOSINOPHILS 0 0 - 5 %    BASOPHILS 0 0 - 2 %    ABS. NEUTROPHILS 8.1 (H) 1.8 - 8.0 K/UL    ABS. LYMPHOCYTES 1.5 0.9 - 3.6 K/UL    ABS. MONOCYTES 0.4 0.05 - 1.2 K/UL    ABS. EOSINOPHILS 0.0 0.0 - 0.4 K/UL    ABS. BASOPHILS 0.0 0.0 - 0.1 K/UL    DF AUTOMATED     METABOLIC PANEL, COMPREHENSIVE    Collection Time: 10/29/21  3:44 PM   Result Value Ref Range    Sodium 134 (L) 136 - 145 mmol/L    Potassium 3.6 3.5 - 5.5 mmol/L    Chloride 97 (L) 100 - 111 mmol/L    CO2 30 21 - 32 mmol/L    Anion gap 7 3.0 - 18 mmol/L    Glucose 256 (H) 74 - 99 mg/dL    BUN 61 (H) 7.0 - 18 MG/DL    Creatinine 3.77 (H) 0.6 - 1.3 MG/DL    BUN/Creatinine ratio 16 12 - 20      GFR est AA 14 (L) >60 ml/min/1.73m2    GFR est non-AA 12 (L) >60 ml/min/1.73m2    Calcium 9.6 8.5 - 10.1 MG/DL    Bilirubin, total 0.5 0.2 - 1.0 MG/DL    ALT (SGPT) 19 13 - 56 U/L    AST (SGOT) 15 10 - 38 U/L    Alk.  phosphatase 71 45 - 117 U/L    Protein, total 8.4 (H) 6.4 - 8.2 g/dL    Albumin 3.4 3.4 - 5.0 g/dL    Globulin 5.0 (H) 2.0 - 4.0 g/dL    A-G Ratio 0.7 (L) 0.8 - 1.7     PTT    Collection Time: 10/29/21  3:44 PM   Result Value Ref Range    aPTT 25.1 23.0 - 36.4 SEC   PROTHROMBIN TIME + INR    Collection Time: 10/29/21  3:44 PM   Result Value Ref Range    Prothrombin time 12.9 11.5 - 15.2 sec    INR 1.0 0.8 - 1.2     MAGNESIUM    Collection Time: 10/29/21  3:44 PM   Result Value Ref Range    Magnesium 2.5 1.6 - 2.6 mg/dL   NT-PRO BNP    Collection Time: 10/29/21  3:44 PM   Result Value Ref Range    NT pro-BNP 1,104 (H) 0 - 900 PG/ML   CARDIAC PANEL,(CK, CKMB & TROPONIN) Collection Time: 10/29/21  3:45 PM   Result Value Ref Range    CK - MB 1.7 <3.6 ng/ml    CK-MB Index 1.1 0.0 - 4.0 %     26 - 192 U/L    Troponin-I, QT <0.02 0.0 - 0.045 NG/ML   URINALYSIS W/ RFLX MICROSCOPIC    Collection Time: 10/29/21  6:15 PM   Result Value Ref Range    Color DARK YELLOW      Appearance CLOUDY      Specific gravity 1.022 1.005 - 1.030      pH (UA) 5.0 5.0 - 8.0      Protein 30 (A) NEG mg/dL    Glucose Negative NEG mg/dL    Ketone 15 (A) NEG mg/dL    Bilirubin MODERATE (A) NEG      Blood Negative NEG      Urobilinogen 1.0 0.2 - 1.0 EU/dL    Nitrites Negative NEG      Leukocyte Esterase TRACE (A) NEG     URINE MICROSCOPIC ONLY    Collection Time: 10/29/21  6:15 PM   Result Value Ref Range    WBC 4 to 10 0 - 5 /hpf    RBC Negative 0 - 5 /hpf    Epithelial cells 3+ 0 - 5 /lpf    Bacteria 2+ (A) NEG /hpf         US RETROPERITONEUM COMP   Final Result      Unremarkable retroperitoneal sonogram      CT SPINE CERV WO CONT   Final Result      No evidence of acute fracture or traumatic subluxation. Multilevel spondylosis. CT HEAD WO CONT   Final Result         1. No acute intracranial abnormality. 2. Subcortical and periventricular white matter low-attenuation compatible with   chronic ischemic microvascular change. 3. Redemonstration of a calcified extra-axial mass lesion adjacent to the right   cerebellar hemisphere likely in keeping with an indolent infratentorial   meningioma, unchanged. XR CHEST PORT   Final Result      No acute radiographic cardiopulmonary abnormality. CT Results  (Last 48 hours)               10/29/21 1638  CT SPINE CERV WO CONT Final result    Impression:      No evidence of acute fracture or traumatic subluxation. Multilevel spondylosis. Narrative:  EXAM: CT Cervical spine       INDICATION: Cervical Pain       COMPARISON: 6/10/2008.        TECHNIQUE: Axial CT imaging of the cervical spine was performed from the skull   base to the upper thoracic spine without intravenous contrast. Multiplanar   reformats were generated. One or more dose reduction techniques were used on this CT: automated exposure   control, adjustment of the mAs and/or kVp according to patient size, and   iterative reconstruction techniques. The specific techniques used on this CT   exam have been documented in the patient's electronic medical record. Digital   Imaging and Communications in Medicine (DICOM) format image data are available   to nonaffiliated external healthcare facilities or entities on a secure, media   free, reciprocally searchable basis with patient authorization for at least a   12-month period after this study. _______________       FINDINGS:       VERTEBRAE AND DISCS: Extremely of the normal cervical lordosis without evidence   of acute fracture or traumatic subluxation. Multilevel cervical spondylosis with   disc space narrowing and ventral endplate osteophytes. Multilevel posterior   facet arthrosis. No area of erosion or aggressive-appearing bone destruction. No   area of erosion or aggressive-appearing bone destruction. PREVERTEBRAL SOFT TISSUES: Normal       VISIBLE INTRACRANIAL CONTENTS: Unremarkable. LUNG APICES: Clear. OTHER: None.       _______________           10/29/21 1638  CT HEAD WO CONT Final result    Impression:          1. No acute intracranial abnormality. 2. Subcortical and periventricular white matter low-attenuation compatible with   chronic ischemic microvascular change. 3. Redemonstration of a calcified extra-axial mass lesion adjacent to the right   cerebellar hemisphere likely in keeping with an indolent infratentorial   meningioma, unchanged.        Narrative:  EXAM: CT head       INDICATION: Patient with fall, blood thinning medication       COMPARISON: MR brain 10/29/2020       TECHNIQUE: Axial CT imaging of the head was performed without intravenous   contrast. Standard multiplanar coronal and sagittal reformatted images were   obtained and are included in interpretation. One or more dose reduction techniques were used on this CT: automated exposure   control, adjustment of the mAs and/or kVp according to patient size, and   iterative reconstruction techniques. The specific techniques used on this CT   exam have been documented in the patient's electronic medical record. Digital   Imaging and Communications in Medicine (DICOM) format image data are available   to nonaffiliated external healthcare facilities or entities on a secure, media   free, reciprocally searchable basis with patient authorization for at least a   12-month period after this study. _______________       FINDINGS:       BRAIN AND POSTERIOR FOSSA: Mild cortical and cerebellar volume loss   redemonstrated with stable ventricular size and configuration. Patent basilar   cisterns. Subcortical and periventricular white matter low-attenuation, as   previously. Redemonstration of a calcified extra-axial mass lesion along the   right side of the posterior fossa in keeping with infratentorial meningioma,   size estimated at approximately 1.7 x 1.6 x 1.9 cm in size. Minor adjacent   cerebellar distortion without evidence of edema or mass effect. No intra-axial   hemorrhage. No mass effect or midline shift. EXTRA-AXIAL SPACES AND MENINGES: There are no abnormal extra-axial fluid   collections. CALVARIUM: Intact. SINUSES: Clear. OTHER: None.       _______________               CXR Results  (Last 48 hours)               10/29/21 1612  XR CHEST PORT Final result    Impression:      No acute radiographic cardiopulmonary abnormality.         Narrative:  EXAM: XR CHEST PORT       CLINICAL INDICATION/HISTORY: syncope   -Additional: None       COMPARISON: July 20, 2021       TECHNIQUE: Frontal view of the chest       _______________       FINDINGS:       HEART AND MEDIASTINUM: Normal cardiac size and mediastinal contours. LUNGS AND PLEURAL SPACES: No focal pneumonic consolidation, pneumothorax, or   pleural effusion. BONY THORAX AND SOFT TISSUES: No acute osseous abnormality       _______________                 Medications given in the ED-  Medications   0.9% sodium chloride infusion (75 mL/hr IntraVENous New Bag 10/29/21 1742)   acetaminophen (TYLENOL) tablet 1,000 mg (1,000 mg Oral Given 10/29/21 1626)   ondansetron (ZOFRAN) injection 4 mg (4 mg IntraVENous Given 10/29/21 1626)   sodium chloride 0.9 % bolus infusion 250 mL (0 mL IntraVENous IV Completed 10/29/21 1814)   promethazine (PHENERGAN) 12.5 mg in 0.9% sodium chloride 50 mL IVPB (12.5 mg IntraVENous New Bag 10/29/21 1742)         Medical Decision Making   I am the first provider for this patient. I reviewed the vital signs, available nursing notes, past medical history, past surgical history, family history and social history. Vital Signs-Reviewed the patient's vital signs. Pulse Oximetry Analysis - 98% on RA. NORMAL     Records Reviewed: Nursing Notes and Old Medical Records    Provider Notes (Medical Decision Making): intracranial bleed, skull fx, concussion, vasovagal, ACS/MI, arrhythmia, dehydration, metabolic derangement, hypoglycemia, UTI/pyelo    Procedures:  Procedures    ED Course:   3:34 PM Initial assessment performed. The patients presenting problems have been discussed, and they are in agreement with the care plan formulated and outlined with them. I have encouraged them to ask questions as they arise throughout their visit. Diagnosis and Disposition       4:11 PM  Multiple calls made to CT tech to facilitate CT scans as pt has 9/10 headache on Xarelto with possible head trauma 4 hours ago. STAT scans were ordered at 1542. Delay in scanning due to ICU patient in CT scanner at the moment. No FND. Pt is A&Ox4. Stable.  Discussed with attending.     5:22 PM Consult: I discussed care with Dr. Manuel Lino (on call nephrology). It was a standard discussion, including history of patients chief complaint, available diagnostic results, and treatment course. Agrees likely prerenal. Start IVF at 76 cc/hr, US retro, and daily bladder checks    7:22 PM Consult: I discussed care with Dr. Jose G Johansen (on call medicine) It was a standard discussion, including history of patients chief complaint, available diagnostic results, and treatment course. Admit to tele. 1. Syncope and collapse    2. Severe dehydration    3. Acute kidney injury ([facility] [address]        PLAN:  1. Admit to tele  _______________________________    Attestations: This note is prepared by Diane Hemphill PA-C.  _______________________________        Please note that this dictation was completed with [employer], the computer voice recognition software. Quite often unanticipated grammatical, syntax, homophones, and other interpretive errors are inadvertently transcribed by the computer software. Please disregard these errors. Please excuse any errors that have escaped final proofreading.

## 2021-10-29 NOTE — ED TRIAGE NOTES
Pt arrives to ED with c\o dizziness, nausea, vomiting s\p syncopal episode this morning when she was having a bowel movement, pt sts she hit her head when she fell, pt endorses being on anti-coagulation

## 2021-10-29 NOTE — H&P
History & Physical    Patient: Terry Jensen MRN: [id number]  CSN: [id number]    YOB: 1945  Age: 78 y.o.   Sex: female      DOA: 10/29/2021  Primary Care Provider:  Hira Woodward MD      Assessment/Plan     Patient Active Problem List   Diagnosis Code    Chest pain R07.9    CAD (coronary artery disease) I25.10    Hypertension I10    Gout M10.9    Anticoagulated Z79.01    Abnormal nuclear stress test R94.39    Cardiomyopathy ([facility] I42.9    Right leg weakness R29.898    CAD S/P percutaneous coronary angioplasty I25.10, Z98.61    Insulin dependent type 2 diabetes mellitus ([facility] E11.9, Z79.4    Morbid obesity with BMI of 40.0-44.9, adult ([facility] E66.01, Z68.41    S/P drug eluting coronary stent placement Z95.5    EDGAR (obstructive sleep apnea) G47.33    CAP (community acquired pneumonia) J18.9    Acute on chronic combined systolic and diastolic ACC/AHA stage C congestive heart failure (HCC) I50.43    Syncope and collapse R55    Closed head injury S09.90XA    Severe dehydration E86.0    Acute renal injury ([facility] N17.9    Constipation K59.00    Chronic combined systolic and diastolic heart failure (HCC) I50.42    Abdominal pain R89     51-year-old female with insulin-dependent type 2 diabetes mellitus, morbid obesity, hypertension, CAD with drug-eluting stent and on anticoagulation, and cardiomyopathy with EF of 25-30% is admitted for acute CHF exacerbation.     Syncope with closed head injury-negative head CT for bleeding  --Telemetry    Severe dehydration with AME-suspect prerenal etiology due to abdominal pain and vomiting this week  --Nephrology consult  --NS at 75 cc/hr  --Hold lasix  --Avoid nephrotoxins    Abdominal pain and constipation  --Abdominal CT without contrast to evaluate for abnormality or evidence of bowel obstruction  --Empiric treatment with ciprofloxacin for possible UTI due to severe PCN allergy (await urine culture)    Chronic combined systolic and diastolic CHF  --Hold lasix  --Monitor for fluid overload     CAD with drug-eluting stent on anticoagulation  Continue aspirin  Continue Xarelto     Hypertension-relative hypotensive on admission  --Hold coreg tonight  --Hold losartan and spironolactone     Insulin-dependent type 2 diabetes mellitus-last A1C of 7.6  Hold Lantus for now as her blood sugar is not significantly elevated   Sliding scale insulin  Diabetic diet     Morbid obesityBMI 41  Aggressive lifestyle modification needed     EDGAR on CPAP  CPAP qhs     Full code     Prophylaxis: Pepcid p.o., Xarelto p.o.     Estimated length of stay : 2 nights     Marjean Spurling, MD  Nocturnist  ----------------------------------------------------------------------------------------------------------------------------------------------------------------------------------------------------------------  CC: syncope       HPI:     Arin Chen is a 78 y.o. female with insulin-dependent type 2 diabetes mellitus, morbid obesity, hypertension, CAD with drug-eluting stent and on anticoagulation, and cardiomyopathy presents to the ED with a syncopal episode today. She was sitting on the toilet attempting to have a bowel movement when she passed out. She awoke on the floor and sat on a seated position until her son came home about an hour later and called EMS. She was not able to have a bowel movement and reports constipation this week. Her last bowel movement was 2 to 3 days ago, and she is not usually constipated. She endorses having abdominal discomfort and multiple episodes of vomiting over the past week. She has had poor oral intake due to this. She denies any chest pain, shortness of breath, leg swelling, or fever.     Past Medical History:   Diagnosis Date    CAD (coronary artery disease)     Diabetes ([facility]     Gastrointestinal disorder     Gout     Hypertension     MI (myocardial infarction) ([facility]     Morbid obesity with BMI of 45.0-49.9, adult ([facility]  Reflux        Past Surgical History:   Procedure Laterality Date    HX CATARACT REMOVAL Bilateral 2019    MT CARDIAC SURG PROCEDURE UNLIST      PCI 15 years ago/ with stent placement    VASCULAR SURGERY PROCEDURE UNLIST         Family History   Problem Relation Age of Onset    Heart Disease Mother     Stroke Father        Social History     Socioeconomic History    Marital status:      Spouse name: Not on file    Number of children: Not on file    Years of education: Not on file    Highest education level: Not on file   Tobacco Use    Smoking status: Former Smoker    Smokeless tobacco: Never Used   Substance and Sexual Activity    Alcohol use: No    Drug use: No   Other Topics Concern     Social Determinants of Health     Financial Resource Strain:     Difficulty of Paying Living Expenses:    Food Insecurity:     Worried About Running Out of Food in the Last Year:     Ran Out of Food in the Last Year:    Transportation Needs:     Lack of Transportation (Medical):  Lack of Transportation (Non-Medical):    Physical Activity:     Days of Exercise per Week:     Minutes of Exercise per Session:    Stress:     Feeling of Stress :    Social Connections:     Frequency of Communication with Friends and Family:     Frequency of Social Gatherings with Friends and Family:     Attends Protestant Services:     Active Member of Clubs or Organizations:     Attends Club or Organization Meetings:     Marital Status:        Prior to Admission medications    Medication Sig Start Date End Date Taking? Authorizing Provider   furosemide (LASIX) 40 mg tablet Take 1 Tablet by mouth daily. 7/29/21   Mariaelena Orozco MD   carvediloL (COREG) 12.5 mg tablet Take 1 Tablet by mouth two (2) times daily (with meals). 7/29/21   Mariaelena Orozco MD   spironolactone (ALDACTONE) 25 mg tablet Take 1 Tablet by mouth daily.  7/30/21   Mariaelena Orozco MD   polyethylene glycol (MIRALAX) 17 gram packet Take 1 Packet by mouth daily. Patient not taking: Reported on 7/21/2021 9/1/20   Maranda Valdez MD   atorvastatin (LIPITOR) 10 mg tablet Take 1 Tab by mouth nightly. 9/1/20   Maranda Valdez MD   losartan (COZAAR) 50 mg tablet Take 1 Tab by mouth daily. Patient not taking: Reported on 7/21/2021 9/1/20   Maranda Valdez MD   rivaroxaban Luis Fernando Backers) 15 mg tab tablet Take 1 Tab by mouth daily (with lunch). Indications: start back in am as per DR lee 9/1/20   Maranda Valdez MD   insulin glargine (LANTUS) 100 unit/mL injection 26 Units by SubCUTAneous route nightly. 9/1/20   Maranda Valdez MD   insulin lispro (HUMALOG) 100 unit/mL injection Use above sliding scale, start at 150-199 with 3 units if needed 9/1/20   Maranda Valdez MD   albuterol sulfate (PROAIR RESPICLICK) 90 mcg/actuation breath activated inhaler Take 2 Puffs by inhalation. 6/22/20   OtherSantiago MD   omeprazole (PRILOSEC) 20 mg capsule Take 1 Cap by mouth two (2) times a day. 5/26/19   Libby Salgado MD   aspirin delayed-release 81 mg tablet Take 1 Tab by mouth daily. 12/6/18   Singh Figueredo MD   metFORMIN (GLUCOPHAGE) 500 mg tablet Take 500 mg by mouth two (2) times daily (with meals). Indications: type 2 diabetes mellitus, pt taking differently once a day    Provider, Historical   multivitamin, tx-iron-ca-min (THERA-M W/ IRON) 9 mg iron-400 mcg tab tablet Take 1 Tab by mouth daily. Formulary substitution for DAILY MULTIVITAMIN    Provider, Historical   allopurinol (ZYLOPRIM) 100 mg tablet Take 100 mg by mouth two (2) times a day. Provider, Historical   gabapentin (NEURONTIN) 300 mg capsule Take 300 mg by mouth three (3) times daily. Provider, Historical       Allergies   Allergen Reactions    Gabapentin Swelling     \"I'm back on it, they figured out it wasn't this medication causing me to swell up\"    Pcn [Penicillins] Angioedema    Lisinopril Swelling       Review of Systems  Gen: No fever, chills, malaise, weight loss/gain.    Heent: No headache, rhinorrhea, sore throat. Heart: No chest pain, palpitations, DEMARCO, pnd, or orthopnea. Resp: No cough, hemoptysis, wheezing and shortness of breath. GI: +nausea, vomiting, constipation, no diarrhea, melena or hematochezia. : No urinary obstruction, dysuria or hematuria. Derm: No rash, new skin lesion or pruritis. Musc/skeletal: no bone or joint complaints. Vasc: No edema, cyanosis or claudication. Endo: No heat/cold intolerance, no polyuria,polydipsia or polyphagia. Neuro: No unilateral weakness, numbness, tingling. No seizures. Heme: No easy bruising or bleeding. Physical Exam:     Physical Exam:  Visit Vitals  BP (!) 131/31   Pulse 72   Temp 97.1 °F (36.2 °C)   Resp 18   Wt 103.9 kg (229 lb)   SpO2 95%   BMI 40.57 kg/m²           Temp (24hrs), Av.1 °F (36.2 °C), Min:97.1 °F (36.2 °C), Max:97.1 °F (36.2 °C)    No intake/output data recorded. No intake/output data recorded. General:  Awake, cooperative, no distress. Head:  Normocephalic, without obvious abnormality, atraumatic. Eyes:  Conjunctivae/corneas clear, sclera anicteric. Nose: Nares normal. No drainage or sinus tenderness. Throat: Lips, mucosa, and tongue normal.    Neck: Supple, symmetrical, trachea midline. Lungs:   Clear to auscultation bilaterally. Heart:  Regular rate and rhythm, S1, S2 normal, no murmur, click, rub or gallop. Abdomen: Soft, non-tender, no rebound tenderness. Bowel sounds normal. No masses,  No organomegaly. Extremities: Extremities normal, atraumatic, no cyanosis or edema. Capillary refill normal.   Pulses: 2+ and symmetric all extremities. Skin: Skin color pink, turgor normal. No rashes or lesions   Neurologic: No focal motor or sensory deficit. Labs Reviewed: All lab results for the last 24 hours reviewed.   Recent Results (from the past 24 hour(s))   EKG, 12 LEAD, INITIAL    Collection Time: 10/29/21  3:38 PM   Result Value Ref Range    Ventricular Rate 81 BPM Atrial Rate 81 BPM    P-R Interval 166 ms    QRS Duration 174 ms    Q-T Interval 464 ms    QTC Calculation (Bezet) 539 ms    Calculated P Axis 77 degrees    Calculated R Axis -42 degrees    Calculated T Axis 134 degrees    Diagnosis       Sinus rhythm with frequent premature ventricular complexes  Left axis deviation  Left bundle branch block  Abnormal ECG  When compared with ECG of 20-JUL-2021 19:17,  premature ventricular complexes are now present  Confirmed by Jose Rocha MD, -- ([id number]) on 10/29/2021 5:25:08 PM     CBC WITH AUTOMATED DIFF    Collection Time: 10/29/21  3:44 PM   Result Value Ref Range    WBC 10.0 4.6 - 13.2 K/uL    RBC 5.52 (H) 4.20 - 5.30 M/uL    HGB 15.5 12.0 - 16.0 g/dL    HCT 48.3 (H) 35.0 - 45.0 %    MCV 87.5 78.0 - 100.0 FL    MCH 28.1 24.0 - 34.0 PG    MCHC 32.1 31.0 - 37.0 g/dL    RDW 13.2 11.6 - 14.5 %    PLATELET 645 778 - 307 K/uL    MPV 10.6 9.2 - 11.8 FL    NEUTROPHILS 81 (H) 40 - 73 %    LYMPHOCYTES 15 (L) 21 - 52 %    MONOCYTES 4 3 - 10 %    EOSINOPHILS 0 0 - 5 %    BASOPHILS 0 0 - 2 %    ABS. NEUTROPHILS 8.1 (H) 1.8 - 8.0 K/UL    ABS. LYMPHOCYTES 1.5 0.9 - 3.6 K/UL    ABS. MONOCYTES 0.4 0.05 - 1.2 K/UL    ABS. EOSINOPHILS 0.0 0.0 - 0.4 K/UL    ABS. BASOPHILS 0.0 0.0 - 0.1 K/UL    DF AUTOMATED     METABOLIC PANEL, COMPREHENSIVE    Collection Time: 10/29/21  3:44 PM   Result Value Ref Range    Sodium 134 (L) 136 - 145 mmol/L    Potassium 3.6 3.5 - 5.5 mmol/L    Chloride 97 (L) 100 - 111 mmol/L    CO2 30 21 - 32 mmol/L    Anion gap 7 3.0 - 18 mmol/L    Glucose 256 (H) 74 - 99 mg/dL    BUN 61 (H) 7.0 - 18 MG/DL    Creatinine 3.77 (H) 0.6 - 1.3 MG/DL    BUN/Creatinine ratio 16 12 - 20      GFR est AA 14 (L) >60 ml/min/1.73m2    GFR est non-AA 12 (L) >60 ml/min/1.73m2    Calcium 9.6 8.5 - 10.1 MG/DL    Bilirubin, total 0.5 0.2 - 1.0 MG/DL    ALT (SGPT) 19 13 - 56 U/L    AST (SGOT) 15 10 - 38 U/L    Alk.  phosphatase 71 45 - 117 U/L    Protein, total 8.4 (H) 6.4 - 8.2 g/dL    Albumin 3.4 3.4 - 5.0 g/dL    Globulin 5.0 (H) 2.0 - 4.0 g/dL    A-G Ratio 0.7 (L) 0.8 - 1.7     PTT    Collection Time: 10/29/21  3:44 PM   Result Value Ref Range    aPTT 25.1 23.0 - 36.4 SEC   PROTHROMBIN TIME + INR    Collection Time: 10/29/21  3:44 PM   Result Value Ref Range    Prothrombin time 12.9 11.5 - 15.2 sec    INR 1.0 0.8 - 1.2     MAGNESIUM    Collection Time: 10/29/21  3:44 PM   Result Value Ref Range    Magnesium 2.5 1.6 - 2.6 mg/dL   NT-PRO BNP    Collection Time: 10/29/21  3:44 PM   Result Value Ref Range    NT pro-BNP 1,104 (H) 0 - 900 PG/ML   HEMOGLOBIN A1C WITH EAG    Collection Time: 10/29/21  3:44 PM   Result Value Ref Range    Hemoglobin A1c 7.6 (H) 4.2 - 5.6 %    Est. average glucose 171 mg/dL   CARDIAC PANEL,(CK, CKMB & TROPONIN)    Collection Time: 10/29/21  3:45 PM   Result Value Ref Range    CK - MB 1.7 <3.6 ng/ml    CK-MB Index 1.1 0.0 - 4.0 %     26 - 192 U/L    Troponin-I, QT <0.02 0.0 - 0.045 NG/ML   URINALYSIS W/ RFLX MICROSCOPIC    Collection Time: 10/29/21  6:15 PM   Result Value Ref Range    Color DARK YELLOW      Appearance CLOUDY      Specific gravity 1.022 1.005 - 1.030      pH (UA) 5.0 5.0 - 8.0      Protein 30 (A) NEG mg/dL    Glucose Negative NEG mg/dL    Ketone 15 (A) NEG mg/dL    Bilirubin MODERATE (A) NEG      Blood Negative NEG      Urobilinogen 1.0 0.2 - 1.0 EU/dL    Nitrites Negative NEG      Leukocyte Esterase TRACE (A) NEG     URINE MICROSCOPIC ONLY    Collection Time: 10/29/21  6:15 PM   Result Value Ref Range    WBC 4 to 10 0 - 5 /hpf    RBC Negative 0 - 5 /hpf    Epithelial cells 3+ 0 - 5 /lpf    Bacteria 2+ (A) NEG /hpf   GLUCOSE, POC    Collection Time: 10/30/21 12:38 AM   Result Value Ref Range    Glucose (POC) 162 (H) 70 - 110 mg/dL     Results  XR CHEST PORT (Accession [id number]) (Order [id number])  Allergies     High: Gabapentin;  Pcn [Penicillins]   Not Specified: Lisinopril   Exam Information    Status Exam Begun  Exam Ended    Final [99] 10/29/2021 16:05 10/29/2021  4:12 PM [id number]  4:12 PM   Result Information    Status: Final result (Exam End: 10/29/2021 16:12) Provider Status: Open   Study Result    Narrative & Impression   EXAM: XR CHEST PORT     CLINICAL INDICATION/HISTORY: syncope  -Additional: None     COMPARISON: July 20, 2021     TECHNIQUE: Frontal view of the chest     _______________     FINDINGS:     HEART AND MEDIASTINUM: Normal cardiac size and mediastinal contours.     LUNGS AND PLEURAL SPACES: No focal pneumonic consolidation, pneumothorax, or  pleural effusion.     BONY THORAX AND SOFT TISSUES: No acute osseous abnormality     _______________     IMPRESSION     No acute radiographic cardiopulmonary abnormality. Results  CT SPINE CERV WO CONT (Accession [id number]) (Order [id number])  Allergies     High: Gabapentin;  Pcn [Penicillins]   Not Specified: Lisinopril   Exam Information    Status Exam Begun  Exam Ended    Final [99] 10/29/2021 16:27 10/29/2021  4:38 PM [id number]  4:38 PM   Result Information    Status: Final result (Exam End: 10/29/2021 16:38) Provider Status: Open   Study Result    Narrative & Impression   EXAM: CT Cervical spine     INDICATION: Cervical Pain     COMPARISON: 6/10/2008.     TECHNIQUE: Axial CT imaging of the cervical spine was performed from the skull  base to the upper thoracic spine without intravenous contrast. Multiplanar  reformats were generated.     One or more dose reduction techniques were used on this CT: automated exposure  control, adjustment of the mAs and/or kVp according to patient size, and  iterative reconstruction techniques. The specific techniques used on this CT  exam have been documented in the patient's electronic medical record.   Digital  Imaging and Communications in Medicine (DICOM) format image data are available  to nonaffiliated external healthcare facilities or entities on a secure, media  free, reciprocally searchable basis with patient authorization for at least a  12-month period after this study.     _______________     FINDINGS:     VERTEBRAE AND DISCS: Extremely of the normal cervical lordosis without evidence  of acute fracture or traumatic subluxation. Multilevel cervical spondylosis with  disc space narrowing and ventral endplate osteophytes. Multilevel posterior  facet arthrosis. No area of erosion or aggressive-appearing bone destruction. No  area of erosion or aggressive-appearing bone destruction.     PREVERTEBRAL SOFT TISSUES: Normal     VISIBLE INTRACRANIAL CONTENTS: Unremarkable.     LUNG APICES: Clear.     OTHER: None.     _______________     IMPRESSION     No evidence of acute fracture or traumatic subluxation.     Multilevel spondylosis.        Results  CT HEAD WO CONT (Accession [id number]) (Order [id number])  Allergies     High: Gabapentin;  Pcn [Penicillins]   Not Specified: Lisinopril   Exam Information    Status Exam Begun  Exam Ended    Final [99] 10/29/2021 16:26 10/29/2021  4:38 PM [id number]  4:38 PM   Result Information    Status: Final result (Exam End: 10/29/2021 16:38) Provider Status: Open   Study Result    Narrative & Impression   EXAM: CT head     INDICATION: Patient with fall, blood thinning medication     COMPARISON: MR brain 10/29/2020     TECHNIQUE: Axial CT imaging of the head was performed without intravenous  contrast. Standard multiplanar coronal and sagittal reformatted images were  obtained and are included in interpretation.     One or more dose reduction techniques were used on this CT: automated exposure  control, adjustment of the mAs and/or kVp according to patient size, and  iterative reconstruction techniques. The specific techniques used on this CT  exam have been documented in the patient's electronic medical record.   Digital  Imaging and Communications in Medicine (DICOM) format image data are available  to nonaffiliated external healthcare facilities or entities on a secure, media  free, reciprocally searchable basis with patient authorization for at least a  12-month period after this study. _______________     FINDINGS:     BRAIN AND POSTERIOR FOSSA: Mild cortical and cerebellar volume loss  redemonstrated with stable ventricular size and configuration. Patent basilar  cisterns. Subcortical and periventricular white matter low-attenuation, as  previously. Redemonstration of a calcified extra-axial mass lesion along the  right side of the posterior fossa in keeping with infratentorial meningioma,  size estimated at approximately 1.7 x 1.6 x 1.9 cm in size. Minor adjacent  cerebellar distortion without evidence of edema or mass effect. No intra-axial  hemorrhage. No mass effect or midline shift.     EXTRA-AXIAL SPACES AND MENINGES: There are no abnormal extra-axial fluid  collections.     CALVARIUM: Intact.     SINUSES: Clear.     OTHER: None.     _______________     IMPRESSION        1. No acute intracranial abnormality. 2. Subcortical and periventricular white matter low-attenuation compatible with  chronic ischemic microvascular change. 3. Redemonstration of a calcified extra-axial mass lesion adjacent to the right  cerebellar hemisphere likely in keeping with an indolent infratentorial  meningioma, unchanged.

## 2021-10-29 NOTE — ED NOTES
Assumed care of pt;  Pt lying on stretcher in NAD;  after assessing pt's IV sites; left hand IV painful and induration noted at the site;  IV d/c'ed;   Pt felt immediate relief after removal of catheter;  Pt c/o being hungry after checking diet order nsg supervisor aware that ED needs meals for pt;  Daughters went home after verifying contact numbers;  Pt given remote, call bell and warm blankets for comfort;  Lights dimmed and WCTMS

## 2021-10-30 ENCOUNTER — APPOINTMENT (OUTPATIENT)
Dept: CT IMAGING | Age: 76
DRG: 641 | End: 2021-10-30
Attending: FAMILY MEDICINE
Payer: MEDICARE

## 2021-10-30 PROBLEM — Z86.718 HISTORY OF DVT (DEEP VEIN THROMBOSIS): Status: ACTIVE | Noted: 2021-10-30

## 2021-10-30 PROBLEM — R10.9 ABDOMINAL PAIN: Status: ACTIVE | Noted: 2021-10-30

## 2021-10-30 PROBLEM — I50.42 CHRONIC COMBINED SYSTOLIC AND DIASTOLIC HEART FAILURE (HCC): Status: ACTIVE | Noted: 2021-10-30

## 2021-10-30 PROBLEM — R93.5 ABNORMAL ABDOMINAL CT SCAN: Status: ACTIVE | Noted: 2021-10-30

## 2021-10-30 PROBLEM — K63.89 EPIPLOIC APPENDAGITIS: Status: ACTIVE | Noted: 2021-10-30

## 2021-10-30 LAB
ALBUMIN SERPL-MCNC: 3 G/DL (ref 3.4–5)
ALBUMIN/GLOB SERPL: 0.7 {RATIO} (ref 0.8–1.7)
ALP SERPL-CCNC: 69 U/L (ref 45–117)
ALT SERPL-CCNC: 17 U/L (ref 13–56)
ANION GAP SERPL CALC-SCNC: 8 MMOL/L (ref 3–18)
AST SERPL-CCNC: 12 U/L (ref 10–38)
BILIRUB SERPL-MCNC: 0.4 MG/DL (ref 0.2–1)
BUN SERPL-MCNC: 66 MG/DL (ref 7–18)
BUN/CREAT SERPL: 16 (ref 12–20)
CALCIUM SERPL-MCNC: 9 MG/DL (ref 8.5–10.1)
CHLORIDE SERPL-SCNC: 100 MMOL/L (ref 100–111)
CK MB CFR SERPL CALC: 0.9 % (ref 0–4)
CK MB CFR SERPL CALC: 1.2 % (ref 0–4)
CK MB SERPL-MCNC: 1.2 NG/ML (ref 5–25)
CK MB SERPL-MCNC: 1.5 NG/ML (ref 5–25)
CK SERPL-CCNC: 123 U/L (ref 26–192)
CK SERPL-CCNC: 136 U/L (ref 26–192)
CO2 SERPL-SCNC: 27 MMOL/L (ref 21–32)
CREAT SERPL-MCNC: 4.13 MG/DL (ref 0.6–1.3)
ERYTHROCYTE [DISTWIDTH] IN BLOOD BY AUTOMATED COUNT: 13.4 % (ref 11.6–14.5)
GLOBULIN SER CALC-MCNC: 4.1 G/DL (ref 2–4)
GLUCOSE BLD STRIP.AUTO-MCNC: 105 MG/DL (ref 70–110)
GLUCOSE BLD STRIP.AUTO-MCNC: 110 MG/DL (ref 70–110)
GLUCOSE BLD STRIP.AUTO-MCNC: 162 MG/DL (ref 70–110)
GLUCOSE BLD STRIP.AUTO-MCNC: 169 MG/DL (ref 70–110)
GLUCOSE BLD STRIP.AUTO-MCNC: 263 MG/DL (ref 70–110)
GLUCOSE BLD STRIP.AUTO-MCNC: 276 MG/DL (ref 70–110)
GLUCOSE SERPL-MCNC: 139 MG/DL (ref 74–99)
HCT VFR BLD AUTO: 41.8 % (ref 35–45)
HGB BLD-MCNC: 13.6 G/DL (ref 12–16)
MCH RBC QN AUTO: 28.7 PG (ref 24–34)
MCHC RBC AUTO-ENTMCNC: 32.5 G/DL (ref 31–37)
MCV RBC AUTO: 88.2 FL (ref 78–100)
PLATELET # BLD AUTO: 219 K/UL (ref 135–420)
PMV BLD AUTO: 11.2 FL (ref 9.2–11.8)
POTASSIUM SERPL-SCNC: 4 MMOL/L (ref 3.5–5.5)
PROT SERPL-MCNC: 7.1 G/DL (ref 6.4–8.2)
RBC # BLD AUTO: 4.74 M/UL (ref 4.2–5.3)
SODIUM SERPL-SCNC: 135 MMOL/L (ref 136–145)
TROPONIN I SERPL-MCNC: <0.02 NG/ML (ref 0–0.04)
TROPONIN I SERPL-MCNC: <0.02 NG/ML (ref 0–0.04)
WBC # BLD AUTO: 11 K/UL (ref 4.6–13.2)

## 2021-10-30 PROCEDURE — 82962 GLUCOSE BLOOD TEST: CPT

## 2021-10-30 PROCEDURE — 77030013046 HC MSK CPAP NSL PHIL -B

## 2021-10-30 PROCEDURE — 82550 ASSAY OF CK (CPK): CPT

## 2021-10-30 PROCEDURE — 74011250636 HC RX REV CODE- 250/636: Performed by: HOSPITALIST

## 2021-10-30 PROCEDURE — 82533 TOTAL CORTISOL: CPT

## 2021-10-30 PROCEDURE — 82553 CREATINE MB FRACTION: CPT

## 2021-10-30 PROCEDURE — 65660000000 HC RM CCU STEPDOWN

## 2021-10-30 PROCEDURE — 80053 COMPREHEN METABOLIC PANEL: CPT

## 2021-10-30 PROCEDURE — 74011250636 HC RX REV CODE- 250/636: Performed by: STUDENT IN AN ORGANIZED HEALTH CARE EDUCATION/TRAINING PROGRAM

## 2021-10-30 PROCEDURE — 85027 COMPLETE CBC AUTOMATED: CPT

## 2021-10-30 PROCEDURE — 74176 CT ABD & PELVIS W/O CONTRAST: CPT

## 2021-10-30 PROCEDURE — 74011250636 HC RX REV CODE- 250/636: Performed by: FAMILY MEDICINE

## 2021-10-30 PROCEDURE — 84484 ASSAY OF TROPONIN QUANT: CPT

## 2021-10-30 PROCEDURE — 74011250637 HC RX REV CODE- 250/637: Performed by: FAMILY MEDICINE

## 2021-10-30 PROCEDURE — 36415 COLL VENOUS BLD VENIPUNCTURE: CPT

## 2021-10-30 PROCEDURE — 74011636637 HC RX REV CODE- 636/637: Performed by: FAMILY MEDICINE

## 2021-10-30 PROCEDURE — 74011250636 HC RX REV CODE- 250/636: Performed by: PHYSICIAN ASSISTANT

## 2021-10-30 PROCEDURE — 94660 CPAP INITIATION&MGMT: CPT

## 2021-10-30 PROCEDURE — 77030022645 HC CRCT CPAP/BPAP RESM -B

## 2021-10-30 RX ORDER — INSULIN GLARGINE 100 [IU]/ML
15 INJECTION, SOLUTION SUBCUTANEOUS
Status: DISCONTINUED | OUTPATIENT
Start: 2021-10-30 | End: 2021-11-06 | Stop reason: HOSPADM

## 2021-10-30 RX ORDER — CIPROFLOXACIN 2 MG/ML
400 INJECTION, SOLUTION INTRAVENOUS EVERY 24 HOURS
Status: COMPLETED | OUTPATIENT
Start: 2021-10-30 | End: 2021-11-03

## 2021-10-30 RX ORDER — INSULIN GLARGINE 100 [IU]/ML
20 INJECTION, SOLUTION SUBCUTANEOUS
Status: DISCONTINUED | OUTPATIENT
Start: 2021-10-30 | End: 2021-10-30

## 2021-10-30 RX ORDER — SODIUM CHLORIDE 9 MG/ML
500 INJECTION, SOLUTION INTRAVENOUS ONCE
Status: COMPLETED | OUTPATIENT
Start: 2021-10-30 | End: 2021-10-30

## 2021-10-30 RX ORDER — HEPARIN SODIUM 5000 [USP'U]/ML
5000 INJECTION, SOLUTION INTRAVENOUS; SUBCUTANEOUS EVERY 8 HOURS
Status: DISCONTINUED | OUTPATIENT
Start: 2021-10-31 | End: 2021-11-06 | Stop reason: HOSPADM

## 2021-10-30 RX ORDER — SODIUM CHLORIDE 9 MG/ML
75 INJECTION, SOLUTION INTRAVENOUS CONTINUOUS
Status: DISCONTINUED | OUTPATIENT
Start: 2021-10-30 | End: 2021-11-01

## 2021-10-30 RX ORDER — FAMOTIDINE 20 MG/1
20 TABLET, FILM COATED ORAL DAILY
Status: DISCONTINUED | OUTPATIENT
Start: 2021-10-31 | End: 2021-11-02

## 2021-10-30 RX ORDER — METRONIDAZOLE 500 MG/100ML
500 INJECTION, SOLUTION INTRAVENOUS EVERY 8 HOURS
Status: DISCONTINUED | OUTPATIENT
Start: 2021-10-30 | End: 2021-11-02

## 2021-10-30 RX ORDER — VANCOMYCIN 2 GRAM/500 ML IN 0.9 % SODIUM CHLORIDE INTRAVENOUS
2000 ONCE
Status: COMPLETED | OUTPATIENT
Start: 2021-10-30 | End: 2021-10-30

## 2021-10-30 RX ADMIN — ATORVASTATIN CALCIUM 10 MG: 10 TABLET, FILM COATED ORAL at 00:46

## 2021-10-30 RX ADMIN — INSULIN LISPRO 6 UNITS: 100 INJECTION, SOLUTION INTRAVENOUS; SUBCUTANEOUS at 16:46

## 2021-10-30 RX ADMIN — ALLOPURINOL 100 MG: 100 TABLET ORAL at 21:08

## 2021-10-30 RX ADMIN — FAMOTIDINE 20 MG: 20 TABLET ORAL at 08:00

## 2021-10-30 RX ADMIN — METRONIDAZOLE 500 MG: 500 INJECTION, SOLUTION INTRAVENOUS at 21:08

## 2021-10-30 RX ADMIN — Medication 10 ML: at 21:09

## 2021-10-30 RX ADMIN — SODIUM CHLORIDE 75 ML/HR: 900 INJECTION, SOLUTION INTRAVENOUS at 14:00

## 2021-10-30 RX ADMIN — ATORVASTATIN CALCIUM 10 MG: 10 TABLET, FILM COATED ORAL at 21:08

## 2021-10-30 RX ADMIN — INSULIN LISPRO 2 UNITS: 100 INJECTION, SOLUTION INTRAVENOUS; SUBCUTANEOUS at 06:40

## 2021-10-30 RX ADMIN — Medication 10 ML: at 14:00

## 2021-10-30 RX ADMIN — METRONIDAZOLE 500 MG: 500 INJECTION, SOLUTION INTRAVENOUS at 12:48

## 2021-10-30 RX ADMIN — INSULIN LISPRO 2 UNITS: 100 INJECTION, SOLUTION INTRAVENOUS; SUBCUTANEOUS at 00:46

## 2021-10-30 RX ADMIN — ASPIRIN 81 MG: 81 TABLET, COATED ORAL at 08:05

## 2021-10-30 RX ADMIN — CARVEDILOL 12.5 MG: 12.5 TABLET, FILM COATED ORAL at 07:57

## 2021-10-30 RX ADMIN — RIVAROXABAN 15 MG: 15 TABLET, FILM COATED ORAL at 08:05

## 2021-10-30 RX ADMIN — ALLOPURINOL 100 MG: 100 TABLET ORAL at 08:00

## 2021-10-30 RX ADMIN — INSULIN LISPRO 6 UNITS: 100 INJECTION, SOLUTION INTRAVENOUS; SUBCUTANEOUS at 11:38

## 2021-10-30 RX ADMIN — SODIUM CHLORIDE 500 ML: 900 INJECTION, SOLUTION INTRAVENOUS at 14:00

## 2021-10-30 RX ADMIN — VANCOMYCIN HYDROCHLORIDE 2000 MG: 10 INJECTION, POWDER, LYOPHILIZED, FOR SOLUTION INTRAVENOUS at 15:37

## 2021-10-30 RX ADMIN — FAMOTIDINE 20 MG: 20 TABLET ORAL at 00:46

## 2021-10-30 RX ADMIN — ALLOPURINOL 100 MG: 100 TABLET ORAL at 00:46

## 2021-10-30 RX ADMIN — ACETAMINOPHEN 650 MG: 325 TABLET ORAL at 01:05

## 2021-10-30 RX ADMIN — CIPROFLOXACIN 400 MG: 2 INJECTION, SOLUTION INTRAVENOUS at 02:34

## 2021-10-30 RX ADMIN — ACETAMINOPHEN 650 MG: 325 TABLET ORAL at 21:07

## 2021-10-30 RX ADMIN — SODIUM CHLORIDE 75 ML/HR: 900 INJECTION, SOLUTION INTRAVENOUS at 00:56

## 2021-10-30 NOTE — PROGRESS NOTES
Pt seen and evaluated. Labs, CT reviewed. Abd pain, abnormal abd ct - possible epiploic appendigitis. No surgery necessary -- rec NSAIDs, PO as tolerated, no abx necessary, sx's usually resolve over 2-3 weeks.   Consult dictated #[id number]  RP

## 2021-10-30 NOTE — PROGRESS NOTES
Pt placed on hospital-issued cpap for HS:       10/30/21 0204   Oxygen Therapy   O2 Device CPAP nasal   FIO2 (%) 21 %   Respiratory   Respiratory (WDL) WDL   Respiratory Pattern Regular   Chest/Tracheal Assessment Chest expansion, symmetrical   CPAP/BIPAP   CPAP/BIPAP Start/Stop On   Device Mode CPAP, auto-titrating   $$ CPAP Daily Yes   [employer] ID # [id number]   Mask Type and Size Nasal mask;Medium; Large   PIP Observed 10.4 cm H20   EPAP (cm H2O)   (5-20)   Backup Rate 16   Pt's Home Machine No   Biomedical Check Performed Yes   Settings Verified Yes   Oxygen Therapy   NIPPV Yes

## 2021-10-30 NOTE — PROGRESS NOTES
Reason for Admission:  Chart reviewed; per H&P, patient is a \"69 y.o. female with insulin-dependent type 2 diabetes mellitus, morbid obesity, hypertension, CAD with drug-eluting stent and on anticoagulation, and cardiomyopathy presents to the ED with a syncopal episode today. She was sitting on the toilet attempting to have a bowel movement when she passed out. She awoke on the floor and sat on a seated position until her son came home about an hour later and called EMS. She was not able to have a bowel movement and reports constipation this week. \"                     RUR Score:         Low, 12%          TBD  Plan for utilizing home health:      TBD    PCP: First and Last name:  Isa Bateman MD     Name of Practice: Family Medicine   Are you a current patient: Yes/No: yes   Approximate date of last visit: unknown; wants appt made because she says she has a yeast infx   Can you participate in a virtual visit with your PCP: no                    Current Advanced Directive/Advance Care Plan: Full Code      Healthcare Decision Maker:   Click here to complete [employer] including selection of the Healthcare Decision Maker Relationship (ie \"Primary\")             Primary Decision MakerCrista Tye - Child - [phone]                  Transition of Care Plan:      [address] met with patient who states she has no memory of syncopal episode while she was on commode; verified that she has own CPAP and walker at home; patient may benefit from PT/OT evaluation as last admission was on 7/20 and she went home with request for [facility] aides but per patient she and son Sp Cottrell never pursued this. Care Management Interventions  PCP Verified by CM:  Yes  Mode of Transport at Discharge: Self  Transition of Care Consult (CM Consult): Discharge Planning  Support Systems: Child(sonali)  Confirm Follow Up Transport: Family  The Plan for Transition of Care is Related to the Following Treatment Goals : syncope and collapse  The Patient and/or Patient Representative was Provided with a Choice of Provider and Agrees with the Discharge Plan?: Yes  Name of the Patient Representative Who was Provided with a Choice of Provider and Agrees with the Discharge Plan: Mena Hardin, patient  Discharge Location  Discharge Placement: Home with family assistance

## 2021-10-30 NOTE — PROGRESS NOTES
7970  Received report from Hammad Linton RN. Report included KARDEX, SBAR, med rec, and labs. 18  Called Dr. Storm Campos to confirm that the Pt is taking Xarelto for a history of DVT.      1915  Gave bedside report to Arely Isaac RN

## 2021-10-30 NOTE — CONSULTS
[id number] [facility]    Name:  Nancy Dejesus  MR#:   [id number]  :  1945  ACCOUNT #:  [de-identified]  DATE OF SERVICE:  10/30/2021    GENERAL SURGERY CONSULTATION    REASON FOR CONSULTATION:  Abdominal pain with abnormal CT, possible epiploic appendagitis. HISTORY OF PRESENT ILLNESS:  The patient is a 42-year-old [facility] [employer] female with multiple medical problems, currently admitted to the hospitalist service for acute CHF exacerbation, syncope, dehydration. The patient incidentally complained of a day-and-a-half to two-day history of vague left-sided abdominal pain. She denies any previous similar episode. She currently states that the pain is somewhat improved today. She did say that she had some nausea, vomiting, but mostly associated around the time of her syncopal episode and fall. She has chronic constipation. Denies any recent changes in bowel movements. She has had no nausea since arrival.  Denies any fever or chills. She denies any chest pain, shortness of breath, or other symptoms at this time. PAST MEDICAL HISTORY:  1. Coronary artery disease, status post stent, currently on Xarelto. 2.  Hypertension. 3.  History of CHF. 4.  Cardiomyopathy. 5.  Insulin-dependent diabetes. 6.  Morbid obesity. 7.  Sleep apnea. 8.  History of pneumonia. 9.  Gout. PAST SURGICAL HISTORY:  1. Cataract surgery. 2.  Coronary artery stents. 3.  She said that she had some kind of \"umbilical infection,\" which required surgery many years ago, but denies any other abdominal operations. MEDICATIONS:  Please med rec sheet. As mentioned, she is on,  1. Xarelto 15 mg daily. 2.  Aspirin 81 taken daily. No other anticoagulation medication. ALLERGIES:  TO GABAPENTIN, PENICILLIN, LISINOPRIL. SOCIAL HISTORY:  She does not smoke or use tobacco products. She does not drink alcohol. She denies any illicit drug use. FAMILY HISTORY:  Noncontributory.     REVIEW OF SYSTEMS:  A 12-point review of systems was reviewed with the patient and negative apart from that listed in the HPI. PHYSICAL EXAMINATION:  GENERAL:  She is well developed, obese, in no acute distress. VITAL SIGNS:  She has been afebrile since arrival, last temperature taken 97.4, blood pressure at that time 88/52, pulse 72, respirations 17, sating 94% on room air. HEENT:  Normocephalic, atraumatic. Pupils equal, round, reactive to light and accommodation. Extraocular movements intact. Sclerae anicteric bilaterally. NECK:  Supple. No JVD. CARDIOVASCULAR:  Regular rate and rhythm. LUNGS:  Clear to auscultation. No accessory muscle use. ABDOMEN:  Soft, nondistended, minimal diffuse left-sided tenderness. No focal tenderness when the patient is distracted. She exhibits no localized tenderness, guarding, or rebound. No other peritoneal signs. Possible surgical scar at the umbilicus, but no hernia. No other visible surgical scars. No other areas of tenderness. RECTAL:  Deferred. EXTREMITIES:  No clubbing or cyanosis. Mild edema. Good capillary refill. ANCILLARY STUDIES/LABS:  White count on admission yesterday on 10/29 was 10.0, repeat today is 11.0, did have a slight left shift of 81 yesterday. H and H today 13.6 and 41.8, down from 15.5 and 48.3, likely representing some degree of dehydration on admission. PT/INR on admission normal at 12.9 and 1.0. BMP:  Glucose elevated at 256 and repeat this morning at 139. BUN and creatinine significantly elevated on admission, 61 and 3.77. Her last BUN and creatinine in 07/2021 were 21 and 0.88. Repeat BUN and creatinine this morning 66 and 4.13. Albumin low at 3.0 today. Liver function tests all normal.    RADIOLOGY:  CT of the abdomen and pelvis showed no bowel dilation or wall thickening, scattered diverticulosis without diverticulitis.   There is a 1.8 cm oval fat density, against the anterior abdominal wall, left anterior mid abdomen, possibly representing epiploic appendagitis. ASSESSMENT:  A 53-year-old  female with multiple chronic and now with some new acute medical problems, being taken care by the hospitalist and medical consultants, with possible surgical problems. 1.  Abdominal pain. 2.  Abnormal abdominal CT. 3.  Possible epiploic appendagitis. PLAN:  No surgical intervention is necessary in the vast majority of cases of epiploic appendagitis and, even if surgery was necessary, she certainly would be at elevated risk for all of her medical issues, CHF, coronary artery disease, anticoagulation, acute renal failure, etc.  The standard treatment is anti-inflammatories, as long as that is okay with the medical team and time (no need for antibiotics unless need for another diagnosis by the medical team). She can be on a regular diet as tolerated, if okay with the medical team.  Not planning any surgical intervention. I did with discuss with the patient that the discomfort and pain may slowly taper off over the next two to three weeks. If the patient persists beyond that, certainly outpatient surgical consultation can be considered, understanding that she would need to be cleared preoperatively by Cardiology and any other pertinent medical specialist as well as be off her anticoagulation. We will sign off for now. Please re-consult as needed.       Tanna Knapp MD      [id number]  D:  10/30/2021 15:40  T:  10/30/2021 19:43  JOB #:  [id number]  CC:  Camila Alston MD

## 2021-10-30 NOTE — PROGRESS NOTES
8152 The patient arrived via stretcher from the ED to 332. The patient is alert, oriented and denies any needs at this moment. Oriented her to to room, call bell and unit routines. Tele box #2 on reading SR with frequent PVCs. VSS.     0141 The patient was transported to CT and back to her room for the ordered test. She tolerated it well. [id number] Shift Summary: Pt rested well overnight with no complaints. No new clinical concerns noted.      Nightshift Chart Audit Completed

## 2021-10-30 NOTE — PROGRESS NOTES
Hospitalist Progress Note-critical care note     Patient: Idalia Singh MRN: [id number]  CSN: [id number]    YOB: 1945  Age: 78 y.o. Sex: female    DOA: 10/29/2021 LOS:  LOS: 1 day            Chief complaint: chf , abdomen pain , epiploic appendagitis , syncope and collapse , dehydration, acosta , DM     Assessment/Plan         Hospital Problems  Date Reviewed: 10/29/2021        Codes Class Noted POA    Chronic combined systolic and diastolic heart failure ([facility] ICD-10-CM: I50.42  ICD-9-CM: 428.42  10/30/2021 Yes        Abdominal pain ICD-10-CM: R10.9  ICD-9-CM: 789.00  10/30/2021 Yes        Epiploic appendagitis ICD-10-CM: H79.07  ICD-9-CM: 569.89  10/30/2021 Yes        History of DVT (deep vein thrombosis) ICD-10-CM: N83.615  ICD-9-CM: V12.51  10/30/2021 Unknown        * (Principal) Syncope and collapse ICD-10-CM: R55  ICD-9-CM: 780.2  10/29/2021 Yes        Closed head injury ICD-10-CM: S09. 90XA  ICD-9-CM: 959.01  10/29/2021 Yes        Severe dehydration ICD-10-CM: E86.0  ICD-9-CM: 276.51  10/29/2021 Yes        Acute renal injury ([facility] ICD-10-CM: N17.9  ICD-9-CM: 584.9  10/29/2021 Yes        Constipation ICD-10-CM: K59.00  ICD-9-CM: 564.00  10/29/2021 Yes        BMI 40.0-44.9, adult (HCC) ICD-10-CM: Z68.41  ICD-9-CM: V85.41  Unknown Yes        EDGAR (obstructive sleep apnea) ICD-10-CM: G47.33  ICD-9-CM: 327.23  7/21/2021 Yes        Insulin dependent type 2 diabetes mellitus ([facility] ICD-10-CM: E11.9, Z79.4  ICD-9-CM: 250.00, V58.67  8/28/2020 Yes        Anticoagulated ICD-10-CM: Z79.01  ICD-9-CM: V58.61  8/25/2020 Yes        CAD (coronary artery disease) ICD-10-CM: I25.10  ICD-9-CM: 414.00  12/3/2018 Yes        Hypertension ICD-10-CM: I10  ICD-9-CM: 401.9  12/3/2018 Yes             79-year-old female with insulin-dependent type 2 diabetes mellitus, morbid obesity, hypertension, CAD with drug-eluting stent-off plavix last admission and hx of dvt  on anticoagulation, and cardiomyopathy with EF of 25-30% is admitted syncope , ame and abdomen pain.      Syncope with closed head injury  negative head CT for bleeding  -like dehydration ? 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 Eliot Villa to see pt      Chronic combined systolic and diastolic CHF  Ef 00-96 %  --Hold lasix and spirolactone, coreg due to ame and hypotension   --Monitor for fluid overload     CAD with drug-eluting stent on anticoagulation  Continue aspirin , bbb hold due to hypotension       hx of dvt on xarelto   Will hold xarelto due to cr 4  Heparin from  tomorrow     Will have pvl     Insulin-dependent type 2 diabetes mellitus-last A1C of 7.6  Low dose lantus   Diabetic diet change to clear one due to gi issue      BMI 41  Aggressive lifestyle modification needed     EDGAR on CPAP  CPAP qhs       Overall due to comorbidity overall prognosis is poor, will put palliative care consult-hope can be seen on Monday     Subjective : still having abdomen pain. rn : on xarelto due to hx of dvt     35 total min's spent on patient care including >50% on counseling/coordinating care. Disposition :tbd,   Review of systems:    General: No fevers or chills. Cardiovascular: No chest pain or pressure. No palpitations. Pulmonary: No shortness of breath.    Gastrointestinal: No nausea, vomiting. + abdomen pain     Vital signs/Intake and Output:  Visit Vitals  BP (!) 95/43   Pulse 68   Temp 97.6 °F (36.4 °C)   Resp 17   Wt 103.9 kg (229 lb)   SpO2 99% BMI 40.57 kg/m²     Current Shift:  10/30 0701 - 10/30 1900  In: 580 [P.O.:580]  Out: -   Last three shifts:  No intake/output data recorded. Physical Exam:  General: WD, WN. Alert, cooperative, no acute distress    HEENT: NC, Atraumatic. PERRLA, anicteric sclerae. Lungs: CTA Bilaterally. No Wheezing/Rhonchi/Rales. Heart:  Regular  rhythm,  No murmur, No Rubs, No Gallops  Abdomen: Soft, Non distended, tender-rt low abdomen   +Bowel sounds,   Extremities: No c/c/e  Psych:   Not anxious or agitated. Neurologic:  No acute neurological deficit. Labs: Results:       Chemistry Recent Labs     10/30/21  0100 10/29/21  1544   * 256*   * 134*   K 4.0 3.6    97*   CO2 27 30   BUN 66* 61*   CREA 4.13* 3.77*   CA 9.0 9.6   AGAP 8 7   BUCR 16 16   AP 69 71   TP 7.1 8.4*   ALB 3.0* 3.4   GLOB 4.1* 5.0*   AGRAT 0.7* 0.7*      CBC w/Diff Recent Labs     10/30/21  0100 10/29/21  1544   WBC 11.0 10.0   RBC 4.74 5.52*   HGB 13.6 15.5   HCT 41.8 48.3*    256   GRANS  --  81*   LYMPH  --  15*   EOS  --  0      Cardiac Enzymes Recent Labs     10/30/21  0705 10/30/21  0100    136   CKND1 1.2 0.9      Coagulation Recent Labs     10/29/21  1544   PTP 12.9   INR 1.0   APTT 25.1       Lipid Panel Lab Results   Component Value Date/Time    Cholesterol, total 139 08/25/2020 05:30 AM    HDL Cholesterol 49 08/25/2020 05:30 AM    LDL, calculated 58.6 08/25/2020 05:30 AM    VLDL, calculated 31.4 08/25/2020 05:30 AM    Triglyceride 157 (H) 08/25/2020 05:30 AM    CHOL/HDL Ratio 2.8 08/25/2020 05:30 AM      BNP No results for input(s): BNPP in the last 72 hours.    Liver Enzymes Recent Labs     10/30/21  0100   TP 7.1   ALB 3.0*   AP 69      Thyroid Studies No results found for: T4, T3U, TSH, TSHEXT, TSHEXT     Procedures/imaging: see electronic medical records for all procedures/Xrays and details which were not copied into this note but were reviewed prior to creation of Plan    CT HEAD WO CONT    Result Date: 10/29/2021  EXAM: CT head INDICATION: Patient with fall, blood thinning medication COMPARISON: MR brain 10/29/2020 TECHNIQUE: Axial CT imaging of the head was performed without intravenous contrast. Standard multiplanar coronal and sagittal reformatted images were obtained and are included in interpretation. One or more dose reduction techniques were used on this CT: automated exposure control, adjustment of the mAs and/or kVp according to patient size, and iterative reconstruction techniques. The specific techniques used on this CT exam have been documented in the patient's electronic medical record. Digital Imaging and Communications in Medicine (DICOM) format image data are available to nonaffiliated external healthcare facilities or entities on a secure, media free, reciprocally searchable basis with patient authorization for at least a 12-month period after this study. _______________ FINDINGS: BRAIN AND POSTERIOR FOSSA: Mild cortical and cerebellar volume loss redemonstrated with stable ventricular size and configuration. Patent basilar cisterns. Subcortical and periventricular white matter low-attenuation, as previously. Redemonstration of a calcified extra-axial mass lesion along the right side of the posterior fossa in keeping with infratentorial meningioma, size estimated at approximately 1.7 x 1.6 x 1.9 cm in size. Minor adjacent cerebellar distortion without evidence of edema or mass effect. No intra-axial hemorrhage. No mass effect or midline shift. EXTRA-AXIAL SPACES AND MENINGES: There are no abnormal extra-axial fluid collections. CALVARIUM: Intact. SINUSES: Clear. OTHER: None. _______________     1. No acute intracranial abnormality. 2. Subcortical and periventricular white matter low-attenuation compatible with chronic ischemic microvascular change.  3. 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 CT SPINE CERV WO CONT    Result Date: 10/29/2021  EXAM: CT Cervical spine INDICATION: Cervical Pain COMPARISON: 6/10/2008. TECHNIQUE: Axial CT imaging of the cervical spine was performed from the skull base to the upper thoracic spine without intravenous contrast. Multiplanar reformats were generated. One or more dose reduction techniques were used on this CT: automated exposure control, adjustment of the mAs and/or kVp according to patient size, and iterative reconstruction techniques. The specific techniques used on this CT exam have been documented in the patient's electronic medical record. Digital Imaging and Communications in Medicine (DICOM) format image data are available to nonaffiliated external healthcare facilities or entities on a secure, media free, reciprocally searchable basis with patient authorization for at least a 12-month period after this study. _______________ FINDINGS: VERTEBRAE AND DISCS: Extremely of the normal cervical lordosis without evidence of acute fracture or traumatic subluxation. Multilevel cervical spondylosis with disc space narrowing and ventral endplate osteophytes. Multilevel posterior facet arthrosis. No area of erosion or aggressive-appearing bone destruction. No area of erosion or aggressive-appearing bone destruction. PREVERTEBRAL SOFT TISSUES: Normal VISIBLE INTRACRANIAL CONTENTS: Unremarkable. LUNG APICES: Clear. OTHER: None. _______________     No evidence of acute fracture or traumatic subluxation. Multilevel spondylosis. CT ABD PELV WO CONT    Result Date: 10/30/2021  EXAM: CT of the abdomen and pelvis INDICATION: Abdominal pain, acute kidney injury, unable to have bowel movement COMPARISON: 12/12/2018, retroperitoneal ultrasound from 10/29/2021 TECHNIQUE: Axial CT imaging of the abdomen and pelvis was performed without intravenous contrast. Multiplanar reformats were generated.  One or more dose reduction techniques were used on this CT: automated exposure control, adjustment of the mAs and/or kVp according to patient size, and iterative reconstruction techniques. The specific techniques used on this CT exam have been documented in the patient's electronic medical record. Digital Imaging and Communications in Medicine (DICOM) format image data are available to nonaffiliated external healthcare facilities or entities on a secure, media free, reciprocally searchable basis with patient authorization for at least a 12-month period after this study. _______________ FINDINGS: LOWER CHEST: No acute abnormality. Small hiatus hernia LIVER, BILIARY: Liver is normal. No biliary dilation. Gallbladder is unremarkable. PANCREAS: Normal. SPLEEN: Normal. ADRENALS: Stable nodular fusiform thickened bilateral adrenal glands, favoring adrenal hyperplasia. KIDNEYS, URETERS, BLADDER: Isoattenuating bilateral kidneys without nephrolithiasis, hydronephrosis or perinephric fluid. Decompressed urinary bladder GASTROINTESTINAL TRACT: No bowel dilation or wall thickening. Scattered diverticulosis without findings of acute diverticulitis. No significant stool burden. LYMPH NODES: No enlarged lymph nodes. PELVIC ORGANS: Enlarged uterus with multiple coarse calcified degenerative leiomyoma VASCULATURE: Atherosclerotic calcification. OSSEOUS: No acute or aggressive osseous abnormalities identified. OTHER: Left anterior mid abdominal 1.8 cm oval fat with rim density, consistent with epiploic appendagitis (series 2 image 53), new since prior. _______________     1. Sigmoid colonic epiploic appendagitis which may be associated with focal pain, age-indeterminate but new finding since 12/12/2018. Otherwise, no acute intra-abdominal or intrapelvic obstructive or inflammatory process. 2. Diverticulosis without diverticulitis. No findings of bowel obstruction or significant stool burden. 3. Small hiatus hernia.      US RETROPERITONEUM COMP    Result Date: 10/29/2021  EXAM: Ultrasound retroperitoneum INDICATION: Renal insufficiency COMPARISON: None. _______________ FINDINGS: Right kidney measures 9.5 cm in length and is normal in echogenicity without hydronephrosis, masses, stones or cysts. Left kidney measures 9.4 cm in length and is normal in echogenicity without hydronephrosis, masses, stones or cysts. Urinary bladder is not visualized. Multiple calcified uterine fibroids are seen. _______________     Unremarkable retroperitoneal sonogram    XR CHEST PORT    Result Date: 10/29/2021  EXAM: XR CHEST PORT CLINICAL INDICATION/HISTORY: syncope -Additional: None COMPARISON: July 20, 2021 TECHNIQUE: Frontal view of the chest _______________ FINDINGS: HEART AND MEDIASTINUM: Normal cardiac size and mediastinal contours. LUNGS AND PLEURAL SPACES: No focal pneumonic consolidation, pneumothorax, or pleural effusion. BONY THORAX AND SOFT TISSUES: No acute osseous abnormality _______________     No acute radiographic cardiopulmonary abnormality.        Neli Alas MD

## 2021-10-30 NOTE — PROGRESS NOTES
1259: MD Kinza Chance makes this nurse aware pt to have a NS 500ml bolus and continue the NS @75 per hr routine, MD made aware matter to be addressed  Geena Vergara RN    1330: NS bolus infusing at this time  Geena Vergara FS    7520: MD Artemio Cardoza communicates with this nurse pt blood pressure, MD made aware MD Kinza Chance n/o for 500ml bolus of NS and to continue to infuse NS 75ml per hour, MD Artemio Cardoza instructs this nurse to recheck pt blood pressure manually post bolus  Geena Veragra RN

## 2021-10-30 NOTE — PROGRESS NOTES
Problem: Diabetes Self-Management  Goal: *Disease process and treatment process  Description: Define diabetes and identify own type of diabetes; list 3 options for treating diabetes. Outcome: Progressing Towards Goal  Goal: *Incorporating nutritional management into lifestyle  Description: Describe effect of type, amount and timing of food on blood glucose; list 3 methods for planning meals. Outcome: Progressing Towards Goal  Goal: *Incorporating physical activity into lifestyle  Description: State effect of exercise on blood glucose levels. Outcome: Progressing Towards Goal  Goal: *Developing strategies to promote health/change behavior  Description: Define the ABC's of diabetes; identify appropriate screenings, schedule and personal plan for screenings. Outcome: Progressing Towards Goal  Goal: *Using medications safely  Description: State effect of diabetes medications on diabetes; name diabetes medication taking, action and side effects. Outcome: Progressing Towards Goal  Goal: *Monitoring blood glucose, interpreting and using results  Description: Identify recommended blood glucose targets  and personal targets. Outcome: Progressing Towards Goal  Goal: *Prevention, detection, treatment of acute complications  Description: List symptoms of hyper- and hypoglycemia; describe how to treat low blood sugar and actions for lowering  high blood glucose level. Outcome: Progressing Towards Goal  Goal: *Prevention, detection and treatment of chronic complications  Description: Define the natural course of diabetes and describe the relationship of blood glucose levels to long term complications of diabetes.   Outcome: Progressing Towards Goal  Goal: *Developing strategies to address psychosocial issues  Description: Describe feelings about living with diabetes; identify support needed and support network  Outcome: Progressing Towards Goal  Goal: *Insulin pump training  Outcome: Progressing Towards Goal  Goal: *Sick day guidelines  Outcome: Progressing Towards Goal  Goal: *Patient Specific Goal (EDIT GOAL, INSERT TEXT)  Outcome: Progressing Towards Goal     Problem: Patient Education: Go to Patient Education Activity  Goal: Patient/Family Education  Outcome: Progressing Towards Goal     Problem: Falls - Risk of  Goal: *Absence of Falls  Description: Document Earma Rodrigo Fall Risk and appropriate interventions in the flowsheet.   Outcome: Progressing Towards Goal  Variance Patient Condition  Note: Fall Risk Interventions:  Mobility Interventions: Patient to call before getting OOB    Mentation Interventions: Adequate sleep, hydration, pain control    Medication Interventions: Patient to call before getting OOB    Elimination Interventions: Call light in reach    History of Falls Interventions: Vital signs minimum Q4HRs X 24 hrs (comment for end date)         Problem: Patient Education: Go to Patient Education Activity  Goal: Patient/Family Education  Outcome: Progressing Towards Goal

## 2021-10-30 NOTE — PROGRESS NOTES
Pharmacy Dosing Services: Pepcid      Previous Regimen Pepcid 20 mg PO BID   Serum Creatinine Lab Results   Component Value Date/Time    Creatinine 4.13 (H) 10/30/2021 01:00 AM      Creatinine Clearance Estimated Creatinine Clearance: 13.8 mL/min (A) (based on SCr of 4.13 mg/dL (H)).    BUN Lab Results   Component Value Date/Time    BUN 66 (H) 10/30/2021 01:00 AM       Dose adjusted based on CrCl and indication to Pepcid 20 mg PO daily

## 2021-10-30 NOTE — ROUTINE PROCESS
Bedside and Verbal shift change report given to Vance Valenzuela RN  (oncoming nurse) by Jitendra Llanes RN  (offgoing nurse). Report given with SBAR, Kardex, Intake/Output and Recent Results.

## 2021-10-30 NOTE — CONSULTS
RENAL CONSULT  10/30/2021    Patient:  Amalia Thapa  :  1945  Gender:  female  MRN #:  [id number]    Reason for Consult: Acute renal failure     Assessment:    Amalia Thapa is a 78y.o. year old female  with h/o ID diabetes mellitus, CAD, s/p stent , morbid obesity , Cardiomyopathy with EF 25-30 % in 2021 , EDGAR on CPAP   Presented to the ED with a syncopal episode. She has been taking lasix , coreg and aldactone. Denied vomiting, diarrhea and  NSAIDS intake  Clinically she looks dehydrated and hypotensive overnight   No obstruction in USG . Acute renal failure most likely due to hypotension and volume depletion , mild alkalosis also suggest volume contraction       Plan:   - will give her normal saline bolus 500 cc in 2 hour and 75 cc/hour until morning  - Hold lasix, coreg and aldactone for now - due to Hypotension   - Please give iv lasix 80 mg prn for worsening SOB/pulmonary congestion   - Consider cardiology consult if she has decompensation leading to syncope and AME   - will check random cortisol , urine electrolytes   - Bladder scan BID to ensure she is not retaining   - intake and output recording   - Avoid NSAIDS , contrast and nephrotoxin  - Agree to treat UTI with antibiotics  - Dose all meds and antibiotics for current eGFR  -NO indication of dialysis            History of Present Illness:    Amalia Thapa is a 78y.o. year old female  with h/o ID diabetes mellitus, CAD, s/p stent , morbid obesity , Cardiomyopathy with EF 25-30 % in 2021 , EDGAR on CPAP   Presented to the ED with a syncopal episode . She was sitting on the toilet attempting to have a bowel movement when she passed out. She says that she was perfectly fine prior to that . She has been taking lasix , corge and aldactone . Denied vomiting and diarrhea.  No recent exposure to contrast and NSAIDS     She was found to have Acute renal failure with creatinine of 3.77 mg/dl   She has been hypotensive overnight 71/48 mmhg , UA looks infected       Past Medical History:   Diagnosis Date    CAD (coronary artery disease)     [facility]     Gastrointestinal disorder     Gout     Hypertension     MI [facility]ocardial infarction) ([facility]     Morbid [facility] of 45.0-49.9[facility]     Reflux      Past Surgical History:   Procedure Laterality Date    HX CATARACT REMOVAL Bilateral 2019    [place] CARDIAC SURG PROCEDURE UNLIST      PCI 15 years ago/ with stent placement    VASCULAR SURGERY PROCEDURE UNLIST       Family History   Problem Relation Age of Onset    Heart Disease Mother     Stroke Father      Allergies   Allergen Reactions    Gabapentin Swelling     \"I'm back on it, they figured out it wasn't this medication causing me to swell up\"    Pcn [Penicillins] Angioedema    Lisinopril Swelling     Current Facility-Administered Medications   Medication Dose Route Frequency Provider Last Rate Last Admin    ciprofloxacin (CIPRO) 400 mg in D5W IVPB (premix)  400 mg IntraVENous Q24H Shilpi Vargas  mL/hr at 10/30/21 0234 400 mg at 10/30/21 0234    metroNIDAZOLE (FLAGYL) IVPB premix 500 mg  500 mg IntraVENous Q8H Yessenia Rose MD        [START ON 10/31/2021] famotidine (PEPCID) tablet 20 mg  20 mg Oral DAILY Shilpi Vargas MD        0.9% sodium chloride infusion  75 mL/hr IntraVENous CONTINUOUS Kavitha Overcast PA-C 75 mL/hr at 10/30/21 0056 75 mL/hr at 10/30/21 0056    sodium chloride (NS) flush 5-40 mL  5-40 mL IntraVENous Q8H Shilpi Vargas MD        sodium chloride (NS) flush 5-40 mL  5-40 mL IntraVENous PRN Shilpi Vargas MD        acetaminophen (TYLENOL) tablet 650 mg  650 mg Oral Q6H PRN Shilpi Vargas MD   650 mg at 10/30/21 0105    Or    acetaminophen (TYLENOL) suppository 650 mg  650 mg Rectal Q6H PRN Shilpi Vargas MD        polyethylene glycol (MIRALAX) packet 17 g  17 g Oral DAILY PRN Shilpi Vargas MD        ondansetron (ZOFRAN ODT) tablet 4 mg  4 mg Oral Q8H PRN Hi Alston MD        Or    [facility] 4 mg  4 mg IntraVENous Q6H PRN Hi Alston MD        allopurinoL (ZYLOPRIM) tablet 100 mg  100 mg Oral BID Hi Alston MD   100 mg at 10/30/21 0800    aspirin delayed-release tablet 81 mg  81 mg Oral DAILY Hi Alston MD   81 mg at 10/30/21 0805    atorvastatin (LIPITOR) tablet 10 mg  10 mg Oral QHS Hi Alston MD   10 mg at 10/30/21 0046    [Held by provider] carvediloL (COREG) tablet 12.5 mg  12.5 mg Oral BID WITH MEALS Hi Alston MD   12.5 mg at 10/30/21 0757    [Held by provider] rivaroxaban (XARELTO) tablet 15 mg  15 mg Oral DAILY Hi Alston MD   15 mg at 10/30/21 0805    insulin lispro (HUMALOG) injection   SubCUTAneous AC&HS Hi Alston MD   6 Units at 10/30/21 1138    glucose chewable tablet 16 g  16 g Oral PRN Hi Alston MD        glucagon (GLUCAGEN) injection 1 mg  1 mg IntraMUSCular PRN Hi Alston MD        dextrose (D50W) injection syrg 12.5-25 g  25-50 mL IntraVENous PRN Hi Alston MD             Review of Symptoms:     Consitutional Symptoms: No fever, weight loss, weight gain and fatigue  Eyes:- No change in vision , no itching   Ears, Nose, Mouth, Throat:  No neck pain , swelling ,hearing loss and nose bleed. Pulmonary: No cough and shortness of breath . CVS: No  Chest pain , palpitation and orthopnea  GI: No nausea, vomiting, abdominal pain, and blood in stool   - No burning, frequency ,urgency  and difficulty voiding . Neurological:No dizzy ness, syncope, focal weakness and numbness .   Skin : No rash and erythema   Endocrine: No feeling of excessive cold or warmth, hot flushes  Psychiatric: Denied feeling depressed    Objective:    Visit Vitals  BP (!) 95/43   Pulse 68   Temp 97.6 °F (36.4 °C)   Resp 17   Wt 103.9 kg (229 lb)   SpO2 99%   BMI 40.57 kg/m²       Physical Exam:    Pt awake,  alert and comfortable  HEENT: No JVD, dry mucosa   Lung: clear to auscultation, no crackles and wheeze  Heart: s1s2 heard with systolic murmur   Abdomen: soft, non tender, no guarding, normal bowel sounds. Ext: no edema and pulsation intact  Skin: No rash and erythema  CNS- Oriented to time , place and person     Laboratory Data:    Lab Results   Component Value Date    BUN 66 (H) 10/30/2021    BUN 61 (H) 10/29/2021    BUN 21 (H) 07/27/2021     (L) 10/30/2021     (L) 10/29/2021     07/27/2021    CO2 27 10/30/2021    CO2 30 10/29/2021    CO2 28 07/27/2021     Lab Results   Component Value Date    WBC 11.0 10/30/2021    HGB 13.6 10/30/2021    HCT 41.8 10/30/2021     No components found for: CALCIUM, PHOSPHORUS, MAGNESIUM  Lab Results   Component Value Date    HDL 49 08/25/2020     No results found for: SPECIMENTYP, TURBIDITY, UGLU    Imaging Reveiwed:  10/30/21:  CT abdomen/pelvis:- 1. Sigmoid colonic epiploic appendagitis which may be associated with focal  pain, age-indeterminate but new finding since 12/12/2018. Otherwise, no acute  intra-abdominal or intrapelvic obstructive or inflammatory process.     2. Diverticulosis without diverticulitis.  No findings of bowel obstruction or  significant stool burden.     3. Small hiatus hernia.      USG retropritoneum: Right kidney measures 9.5 cm in length and is normal in echogenicity without  hydronephrosis, masses, stones or cysts.     Left kidney measures 9.4 cm in length and is normal in echogenicity without  hydronephrosis, masses, stones or cysts.           Isabel Reese MD   [facility]

## 2021-10-30 NOTE — PROGRESS NOTES
Pt with epiploic appendagitis. Conservative treatment for now and can consult surgery if symptoms persist or ongoing. Results  CT ABD PELV WO CONT (Accession [id number]) (Order [id number])  Allergies     High: Gabapentin;  Pcn [Penicillins]   Not Specified: Lisinopril   Exam Information    Status Exam Begun  Exam Ended    Final [99] 10/30/2021 01:12 10/30/2021  1:41 AM [id number]  1:41 AM   Result Information    Status: Final result (Exam End: 10/30/2021 01:41) Provider Status: Open   Study Result    Narrative & Impression   EXAM: CT of the abdomen and pelvis     INDICATION: Abdominal pain, acute kidney injury, unable to have bowel movement     COMPARISON: 12/12/2018, retroperitoneal ultrasound from 10/29/2021     TECHNIQUE: Axial CT imaging of the abdomen and pelvis was performed without  intravenous contrast. Multiplanar reformats were generated. One or more dose  reduction techniques were used on this CT: automated exposure control,  adjustment of the mAs and/or kVp according to patient size, and iterative  reconstruction techniques. The specific techniques used on this CT exam have  been documented in the patient's electronic medical record. Digital Imaging and  Communications in Medicine (DICOM) format image data are available to  nonaffiliated external healthcare facilities or entities on a secure, media  free, reciprocally searchable basis with patient authorization for at least a  12-month period after this study.     _______________     FINDINGS:     LOWER CHEST: No acute abnormality. Small hiatus hernia     LIVER, BILIARY: Liver is normal. No biliary dilation. Gallbladder is  unremarkable.     PANCREAS: Normal.     SPLEEN: Normal.     ADRENALS: Stable nodular fusiform thickened bilateral adrenal glands, favoring  adrenal hyperplasia.     KIDNEYS, URETERS, BLADDER: Isoattenuating bilateral kidneys without  nephrolithiasis, hydronephrosis or perinephric fluid.  Decompressed urinary  bladder     GASTROINTESTINAL TRACT: No bowel dilation or wall thickening. Scattered  diverticulosis without findings of acute diverticulitis. No significant stool  burden.     LYMPH NODES: No enlarged lymph nodes.     PELVIC ORGANS: Enlarged uterus with multiple coarse calcified degenerative  leiomyoma     VASCULATURE: Atherosclerotic calcification.     OSSEOUS: No acute or aggressive osseous abnormalities identified.     OTHER: Left anterior mid abdominal 1.8 cm oval fat with rim density, consistent  with epiploic appendagitis (series 2 image 53), new since prior.     _______________     IMPRESSION     1. Sigmoid colonic epiploic appendagitis which may be associated with focal  pain, age-indeterminate but new finding since 12/12/2018. Otherwise, no acute  intra-abdominal or intrapelvic obstructive or inflammatory process.     2. Diverticulosis without diverticulitis. No findings of bowel obstruction or  significant stool burden.     3. Small hiatus hernia.

## 2021-10-31 ENCOUNTER — APPOINTMENT (OUTPATIENT)
Dept: MRI IMAGING | Age: 76
DRG: 641 | End: 2021-10-31
Attending: HOSPITALIST
Payer: MEDICARE

## 2021-10-31 ENCOUNTER — APPOINTMENT (OUTPATIENT)
Dept: VASCULAR SURGERY | Age: 76
DRG: 641 | End: 2021-10-31
Attending: HOSPITALIST
Payer: MEDICARE

## 2021-10-31 LAB
ALBUMIN SERPL-MCNC: 2.5 G/DL (ref 3.4–5)
ALBUMIN/GLOB SERPL: 0.7 {RATIO} (ref 0.8–1.7)
ALP SERPL-CCNC: 55 U/L (ref 45–117)
ALT SERPL-CCNC: 13 U/L (ref 13–56)
ANION GAP SERPL CALC-SCNC: 8 MMOL/L (ref 3–18)
AST SERPL-CCNC: 10 U/L (ref 10–38)
BACTERIA SPEC CULT: NORMAL
BILIRUB SERPL-MCNC: 0.4 MG/DL (ref 0.2–1)
BUN SERPL-MCNC: 70 MG/DL (ref 7–18)
BUN/CREAT SERPL: 14 (ref 12–20)
CALCIUM SERPL-MCNC: 8.4 MG/DL (ref 8.5–10.1)
CHLORIDE SERPL-SCNC: 101 MMOL/L (ref 100–111)
CHLORIDE UR-SCNC: 31 MMOL/L (ref 55–125)
CO2 SERPL-SCNC: 25 MMOL/L (ref 21–32)
CORTIS SERPL-MCNC: 12.3 UG/DL
CREAT SERPL-MCNC: 4.92 MG/DL (ref 0.6–1.3)
CREAT UR-MCNC: 64 MG/DL (ref 30–125)
EOSINOPHIL #/AREA URNS HPF: NORMAL /[HPF]
ERYTHROCYTE [DISTWIDTH] IN BLOOD BY AUTOMATED COUNT: 13.6 % (ref 11.6–14.5)
GLOBULIN SER CALC-MCNC: 3.6 G/DL (ref 2–4)
GLUCOSE BLD STRIP.AUTO-MCNC: 120 MG/DL (ref 70–110)
GLUCOSE BLD STRIP.AUTO-MCNC: 129 MG/DL (ref 70–110)
GLUCOSE BLD STRIP.AUTO-MCNC: 223 MG/DL (ref 70–110)
GLUCOSE BLD STRIP.AUTO-MCNC: 278 MG/DL (ref 70–110)
GLUCOSE SERPL-MCNC: 111 MG/DL (ref 74–99)
HCT VFR BLD AUTO: 36.5 % (ref 35–45)
HGB BLD-MCNC: 11.8 G/DL (ref 12–16)
MCH RBC QN AUTO: 29.3 PG (ref 24–34)
MCHC RBC AUTO-ENTMCNC: 32.3 G/DL (ref 31–37)
MCV RBC AUTO: 90.6 FL (ref 78–100)
PLATELET # BLD AUTO: 194 K/UL (ref 135–420)
PMV BLD AUTO: 11.2 FL (ref 9.2–11.8)
POTASSIUM SERPL-SCNC: 3.4 MMOL/L (ref 3.5–5.5)
PROT SERPL-MCNC: 6.1 G/DL (ref 6.4–8.2)
PROT UR-MCNC: 20 MG/DL
PROT/CREAT UR-RTO: 0.3
RBC # BLD AUTO: 4.03 M/UL (ref 4.2–5.3)
SERVICE CMNT-IMP: NORMAL
SODIUM SERPL-SCNC: 134 MMOL/L (ref 136–145)
SODIUM UR-SCNC: 41 MMOL/L (ref 20–110)
VANCOMYCIN SERPL-MCNC: 18.8 UG/ML (ref 5–40)
WBC # BLD AUTO: 8.6 K/UL (ref 4.6–13.2)

## 2021-10-31 PROCEDURE — 84156 ASSAY OF PROTEIN URINE: CPT

## 2021-10-31 PROCEDURE — 93970 EXTREMITY STUDY: CPT

## 2021-10-31 PROCEDURE — 82962 GLUCOSE BLOOD TEST: CPT

## 2021-10-31 PROCEDURE — 74011250637 HC RX REV CODE- 250/637: Performed by: HOSPITALIST

## 2021-10-31 PROCEDURE — 74011250637 HC RX REV CODE- 250/637: Performed by: FAMILY MEDICINE

## 2021-10-31 PROCEDURE — 85027 COMPLETE CBC AUTOMATED: CPT

## 2021-10-31 PROCEDURE — 80053 COMPREHEN METABOLIC PANEL: CPT

## 2021-10-31 PROCEDURE — 74011250636 HC RX REV CODE- 250/636: Performed by: HOSPITALIST

## 2021-10-31 PROCEDURE — 82436 ASSAY OF URINE CHLORIDE: CPT

## 2021-10-31 PROCEDURE — 80202 ASSAY OF VANCOMYCIN: CPT

## 2021-10-31 PROCEDURE — 70551 MRI BRAIN STEM W/O DYE: CPT

## 2021-10-31 PROCEDURE — 74011250636 HC RX REV CODE- 250/636: Performed by: FAMILY MEDICINE

## 2021-10-31 PROCEDURE — 84300 ASSAY OF URINE SODIUM: CPT

## 2021-10-31 PROCEDURE — 87205 SMEAR GRAM STAIN: CPT

## 2021-10-31 PROCEDURE — 74011636637 HC RX REV CODE- 636/637: Performed by: FAMILY MEDICINE

## 2021-10-31 PROCEDURE — 74011636637 HC RX REV CODE- 636/637: Performed by: HOSPITALIST

## 2021-10-31 PROCEDURE — 36415 COLL VENOUS BLD VENIPUNCTURE: CPT

## 2021-10-31 PROCEDURE — 65660000000 HC RM CCU STEPDOWN

## 2021-10-31 RX ORDER — CHLORTHALIDONE 25 MG/1
25 TABLET ORAL DAILY
COMMUNITY
End: 2021-11-06

## 2021-10-31 RX ORDER — BUTALBITAL, ACETAMINOPHEN AND CAFFEINE 50; 325; 40 MG/1; MG/1; MG/1
1 TABLET ORAL
Status: DISCONTINUED | OUTPATIENT
Start: 2021-10-31 | End: 2021-11-06 | Stop reason: HOSPADM

## 2021-10-31 RX ADMIN — ACETAMINOPHEN 650 MG: 325 TABLET ORAL at 06:17

## 2021-10-31 RX ADMIN — FAMOTIDINE 20 MG: 20 TABLET, FILM COATED ORAL at 08:26

## 2021-10-31 RX ADMIN — METRONIDAZOLE 500 MG: 500 INJECTION, SOLUTION INTRAVENOUS at 04:33

## 2021-10-31 RX ADMIN — INSULIN LISPRO 4 UNITS: 100 INJECTION, SOLUTION INTRAVENOUS; SUBCUTANEOUS at 17:34

## 2021-10-31 RX ADMIN — ALLOPURINOL 100 MG: 100 TABLET ORAL at 20:21

## 2021-10-31 RX ADMIN — ATORVASTATIN CALCIUM 10 MG: 10 TABLET, FILM COATED ORAL at 21:39

## 2021-10-31 RX ADMIN — METRONIDAZOLE 500 MG: 500 INJECTION, SOLUTION INTRAVENOUS at 20:21

## 2021-10-31 RX ADMIN — CIPROFLOXACIN 400 MG: 2 INJECTION, SOLUTION INTRAVENOUS at 01:51

## 2021-10-31 RX ADMIN — HEPARIN SODIUM 5000 UNITS: 5000 INJECTION INTRAVENOUS; SUBCUTANEOUS at 08:26

## 2021-10-31 RX ADMIN — BUTALBITAL, ACETAMINOPHEN AND CAFFEINE 1 TABLET: 50; 325; 40 TABLET ORAL at 18:27

## 2021-10-31 RX ADMIN — METRONIDAZOLE 500 MG: 500 INJECTION, SOLUTION INTRAVENOUS at 15:02

## 2021-10-31 RX ADMIN — ASPIRIN 81 MG: 81 TABLET, COATED ORAL at 08:26

## 2021-10-31 RX ADMIN — BUTALBITAL, ACETAMINOPHEN AND CAFFEINE 1 TABLET: 50; 325; 40 TABLET ORAL at 09:16

## 2021-10-31 RX ADMIN — ALLOPURINOL 100 MG: 100 TABLET ORAL at 08:26

## 2021-10-31 RX ADMIN — HEPARIN SODIUM 5000 UNITS: 5000 INJECTION INTRAVENOUS; SUBCUTANEOUS at 17:33

## 2021-10-31 RX ADMIN — Medication 5 ML: at 14:00

## 2021-10-31 RX ADMIN — INSULIN GLARGINE 15 UNITS: 100 INJECTION, SOLUTION SUBCUTANEOUS at 21:39

## 2021-10-31 RX ADMIN — INSULIN LISPRO 6 UNITS: 100 INJECTION, SOLUTION INTRAVENOUS; SUBCUTANEOUS at 21:40

## 2021-10-31 RX ADMIN — Medication 10 ML: at 06:13

## 2021-10-31 NOTE — PROGRESS NOTES
.    Patient Name: Valeria Montgomery   Age: 78 y.o. Sex:  female  YOB: 1945   Medical Record Number: [id number]      Antimicrobial  Current Antimicrobial Therapy (168h ago, onward)       Ordered     Start Stop    10/30/21 1131  metroNIDAZOLE (FLAGYL) IVPB premix 500 mg  500 mg,   IntraVENous,   EVERY 8 HOURS      10/30/21 1200 --    10/30/21 0119  ciprofloxacin (CIPRO) 400 mg in D5W IVPB (premix)  400 mg,   IntraVENous,   EVERY 24 HOURS      10/30/21 0200 11/04/21 0159                   Indication Empiric Therapy   Last Level (if applicable) No results found for: DOSE, TMG, DTG  Vancomycin   Lab Results   Component Value Date/Time    Vancomycin, random 18.8 10/31/2021 03:02 PM      Gentamicin   No results found for: GENP, GENT, GENR]  Tobramycin   No results found for: TOBP, TOBT, TOBR   Amikacin   No results found for: AMIKP, DAMIKP, AMIKT, DAMIKT, DAMIKR     Cultures (7 most recent)   Culture result:   Date Value Ref Range Status   10/29/2021 No growth (<1,000 CFU/ML)   Final   10/09/2020 MIXED UROGENITAL KEYSHA ISOLATED   Final   12/15/2018 NO GROWTH 1 DAY   Final   12/12/2018 >100,000 COLONIES/mL ESCHERICHIA COLI (A)   Final      Renal Overview (72 hr)         Recent Labs     10/31/21  0224 10/30/21  0100 10/29/21  1544   BUN 70* 66* 61*   CREA 4.92* 4.13* 3.77*       CrCl (Current): Serum creatinine: 4.92 mg/dL (H) 10/31/21 0224  Estimated creatinine clearance: 11.6 mL/min (A)      Lactic Acid    (Sepsis Criteria: >2.0 mmol/L) No results found for: LAC   Procalcitonin   No results found for: PCT  Suspected Sepsis:   <0.50 ng/mL      Low likelihood of sepsis. 0.50-2.00 ng/mL     Increased likelihood of sepsis. Antibioticsencouraged. >2.00 ng/mL   High risk of sepsis/shock. Antibiotics strongly encouraged. Suspected Lower Resp Tract Infections:   <0.24 ng/mL     Low likelihood of bacterial infection. >0.24 ng/mL     Increased likelihood of bacterial infection. Antibiotics encouraged. Hepatic Overview (72 hr) Recent Labs     10/31/21  0224 10/30/21  0100 10/30/21  0100 10/29/21  1544 10/29/21  1544   ALT 13   < > 17   < > 19   AP 55  --  69  --  71    < > = values in this interval not displayed.       Temp (24-hr Max) Temp (24hrs), Av.8 °F (36.6 °C), Min:97.3 °F (36.3 °C), Max:98.2 °F (36.8 °C)       Hematology Recent Labs     10/31/21  0224 10/30/21  0100 10/29/21  1544   WBC 8.6 11.0 10.0   HGB 11.8* 13.6 15.5   HCT 36.5 41.8 48.3*    219 256   GRANS  --   --  81*   ANEU  --   --  8.1*        DOT  2     Notes Vancomycin random = 18.8 mcg/ml Goal = 15-20 mcg/ml  No dose today  Random ordered for 21  Pharmacy will monitor daily and adjust as clinically necessary           Cece Williamson, [address] [facility] Pharmacist  [phone]

## 2021-10-31 NOTE — CONSULTS
RENAL CONSULT  10/31/2021    Patient:  Philippe Nyhan  :  1945  Gender:  female  MRN #:  [id number]    Reason for Consult: Acute renal failure     Assessment:    Philippe Nyhan is a 78y.o. year old female  with h/o ID diabetes mellitus, CAD, s/p stent , morbid obesity , Cardiomyopathy with EF 25-30 % in 2021 , EDGAR on CPAP   Presented to the ED with a syncopal episode. She has been taking lasix , coreg and aldactone. Denied vomiting, diarrhea and  NSAIDS intake  Clinically she looks dehydrated and hypotensive overnight   No obstruction in USG . Acute renal failure most likely  ATN due to hypotension and volume depletion , mild alkalosis also suggest volume contraction       Plan:   - Creatinine is slowly rising every day.  After [facility] she already made 600 cc of urine   - continue Arriaza's for now for strict urine output recording   - Decrease normal saline infusion to 75 cc/hour   - Hold lasix, coreg and aldactone for now  - Please give iv lasix 80 mg prn for worsening SOB/pulmonary congestion   - Consider cardiology consult if she has decompensation leading to syncope and AME   - Discussed with nurse to send urine for analysis, UPC and electrolytes   - intake and output recording   - Avoid NSAIDS , contrast and nephrotoxin  - Agree to treat UTI with antibiotics  - Dose all meds and antibiotics for current eGFR  -No indication of dialysis for now, but I discussed the possibility in events of worsening renal failure      Subjective:   She feels fine, denied abdominal pain , nausea, vomiting, chest pain and shortness of breath   No other symptoms reported       Intake/Output Summary (Last 24 hours) at 10/31/2021 1542  Last data filed at 10/31/2021 0458  Gross per 24 hour   Intake 1080 ml   Output    Net 1080 ml         Objective:    Visit Vitals  /71   Pulse 84   Temp 97.3 °F (36.3 °C)   Resp 16   Wt 104.9 kg (231 lb 4.2 oz)   SpO2 100%   BMI 40.97 kg/m²       Physical Exam:    Pt awake,  alert and comfortable  HEENT: No JVD, dry mucosa   Lung: clear to auscultation  Heart: s1s2 heard with systolic murmur  Ext: no edema and pulsation intact     Laboratory Data:    Lab Results   Component Value Date    BUN 70 (H) 10/31/2021    BUN 66 (H) 10/30/2021    BUN 61 (H) 10/29/2021     (L) 10/31/2021     (L) 10/30/2021     (L) 10/29/2021    CO2 25 10/31/2021    CO2 27 10/30/2021    CO2 30 10/29/2021     Lab Results   Component Value Date    WBC 8.6 10/31/2021    HGB 11.8 (L) 10/31/2021    HCT 36.5 10/31/2021     No components found for: CALCIUM, PHOSPHORUS, MAGNESIUM  Lab Results   Component Value Date    HDL 49 08/25/2020     No results found for: SPECIMENTYP, TURBIDITY, UGLU    Imaging Reveiwed:  10/30/21:  CT abdomen/pelvis:- 1. Sigmoid colonic epiploic appendagitis which may be associated with focal  pain, age-indeterminate but new finding since 12/12/2018. Otherwise, no acute  intra-abdominal or intrapelvic obstructive or inflammatory process.     2. Diverticulosis without diverticulitis.  No findings of bowel obstruction or  significant stool burden.     3. Small hiatus hernia.      USG retropritoneum: Right kidney measures 9.5 cm in length and is normal in echogenicity without  hydronephrosis, masses, stones or cysts.     Left kidney measures 9.4 cm in length and is normal in echogenicity without  hydronephrosis, masses, stones or cysts.           Shanita Monroy MD   [facility]

## 2021-10-31 NOTE — PROGRESS NOTES
Discussed the doctor orders of a montelongo catheter with patient. Patient refused montelongo catheter placement. Will notify doctor. Patient agreed to have montelongo catheter inserted. Montelongo placed.

## 2021-10-31 NOTE — PROGRESS NOTES
Patient's son stated that 3 weeks ago, patient was taking coreg and timolol at the same time. Patient's son stated that the cardiologist advised against this, but patient stated she forgot. Patient's son believes that she began to decline in health after this occurrence and wanted to inform the doctor.

## 2021-10-31 NOTE — ROUTINE PROCESS
Bedside and Verbal shift change report given to FADI Morocho 53 (oncoming nurse) by Lyla Becker RN (offgoing nurse). Report included the following information SBAR, Kardex and MAR.

## 2021-10-31 NOTE — PROGRESS NOTES
Hospitalist Progress Note-critical care note     Patient: Philippe Nyhan MRN: [id number]  [facility]: [id number]    YOB: 1945  Age: 78 y.o.   Sex: female    DOA: 10/29/2021 LOS:  LOS: 2 days            Chief complaint: chf , abdomen pain , epiploic appendagitis , syncope and collapse , dehydration, acosta , DM     Assessment/Plan         Hospital Problems  Date Reviewed: 10/29/2021        Codes Class Noted POA    Chronic combined systolic and diastolic heart failure ([facility] ICD-10-CM: I50.42  ICD-9-CM: 428.42  10/30/2021 Yes        Abdominal pain ICD-10-CM: R10.9  ICD-9-CM: 789.00  10/30/2021 Yes        Epiploic appendagitis ICD-10-CM: K63.89  ICD-9-CM: 569.89  10/30/2021 Yes        History of DVT (deep vein thrombosis) ICD-10-CM: Z86.718  ICD-9-CM: V12.51  10/30/2021 Unknown        Abnormal abdominal CT scan ICD-10-CM: R93.5  ICD-9-CM: 793.6  10/30/2021 Unknown        * (Principal) Syncope and collapse ICD-10-CM: R55  ICD-9-CM: 780.2  10/29/2021 Yes        Severe dehydration ICD-10-CM: E86.0  ICD-9-CM: 276.51  10/29/2021 Yes        Acute renal injury ([facility] ICD-10-CM: N17.9  ICD-9-CM: 584.9  10/29/2021 Yes        Constipation ICD-10-CM: K59.00  ICD-9-CM: 564.00  10/29/2021 Yes        BMI 40.0-44.9, adult ([facility] ICD-10-CM: Z68.41  ICD-9-CM: V85.41  Unknown Yes        EDGAR (obstructive sleep apnea) ICD-10-CM: G47.33  ICD-9-CM: 327.23  7/21/2021 Yes        Insulin dependent type 2 diabetes mellitus ([facility] ICD-10-CM: E11.9, Z79.4  ICD-9-CM: 250.00, V58.67  8/28/2020 Yes        Anticoagulated ICD-10-CM: Z79.01  ICD-9-CM: V58.61  8/25/2020 Yes        CAD (coronary artery disease) ICD-10-CM: I25.10  ICD-9-CM: 414.00  12/3/2018 Yes        Hypertension ICD-10-CM: I10  ICD-9-CM: 401.9  12/3/2018 Yes             21-year-old female with insulin-dependent type 2 diabetes mellitus, morbid obesity, hypertension, CAD with drug-eluting stent-off plavix last admission and hx of dvt  on anticoagulation, and cardiomyopathy with EF of 25-30% is admitted syncope , ame and abdomen pain.      Syncope with closed head injury  negative head CT for bleeding  -like dehydration ? Hx of dvt   Will check v/q scan r/o PE-ordered, not done   pvl for dvt   ce x3 wnl   Hx of mengioma mri brain due to syncope -ordered not done       boarderline bp remained bp   Continue to hold medication for anti-HTN        Severe dehydration  , AME   Renal function not improving, no urinary retention  Refused to have montelongo   Nephrology on board on NS at 75 cc/hr  Continue Hold lasix and spirolactone   Avoid nephrotoxins  Renal dose medication   Abdomen ct no hydro      Abdominal pain - epiploic appendagitis   No intervention needed per surgeon   Continue iv abx   Improving      Chronic combined systolic and diastolic CHF  Ef 25-33 %  --Hold lasix and spirolactone, coreg due to ame and hypotension   --Monitor for fluid overload  Cardiologist consult      CAD with drug-eluting stent on anticoagulation  Continue aspirin , bbb hold due to hypotension       hx of dvt on xarelto   Will hold xarelto due to cr 4  Will have pvl -hope will be done today     Insulin-dependent type 2 diabetes mellitus-last A1C of 7.6  Low dose lantus   Diabetic diet change to clear one due to gi issue      BMI 41  Aggressive lifestyle modification needed     EDGAR on CPAP  CPAP qhs       Overall due to comorbidity overall prognosis is poor,  palliative care consult      Subjective : I have headache, tylenol not working , abdomen pain is better     Talked with   Joanna Samuel [phone]     Per phone, he is updated and all questions have been answered    35 total min's spent on patient care including >50% on counseling/coordinating care. Disposition :tbd,   Review of systems:    General: No fevers or chills. Cardiovascular: No chest pain or pressure. No palpitations. Pulmonary: No shortness of breath. Gastrointestinal: No nausea, vomiting.  No abdomen pain   Neuro headache     Vital signs/Intake and Output:  Visit Vitals  BP (!) 142/88   Pulse 67   Temp 97.5 °F (36.4 °C)   Resp 17   Wt 104.9 kg (231 lb 4.2 oz)   SpO2 100%   BMI 40.97 kg/m²     Current Shift:  No intake/output data recorded. Last three shifts:  10/29 1901 - 10/31 0700  In: 2460 [P.O.:2460]  Out: -     Physical Exam:  General: WD, WN. Alert, cooperative, no acute distress    HEENT: NC, Atraumatic. PERRLA, anicteric sclerae. Lungs: CTA Bilaterally. No Wheezing/Rhonchi/Rales. Heart:  Regular  rhythm,  No murmur, No Rubs, No Gallops  Abdomen: Soft, Non distended, no tender-  +Bowel sounds,   Extremities: No c/c/e  Psych:   Not anxious or agitated. Neurologic:  No acute neurological deficit. Labs: Results:       Chemistry Recent Labs     10/31/21  0224 10/30/21  0100 10/29/21  1544   * 139* 256*   * 135* 134*   K 3.4* 4.0 3.6    100 97*   CO2 25 27 30   BUN 70* 66* 61*   CREA 4.92* 4.13* 3.77*   CA 8.4* 9.0 9.6   AGAP 8 8 7   BUCR 14 16 16   AP 55 69 71   TP 6.1* 7.1 8.4*   ALB 2.5* 3.0* 3.4   GLOB 3.6 4.1* 5.0*   AGRAT 0.7* 0.7* 0.7*      CBC w/Diff Recent Labs     10/31/21  0224 10/30/21  0100 10/29/21  1544   WBC 8.6 11.0 10.0   RBC 4.03* 4.74 5.52*   HGB 11.8* 13.6 15.5   HCT 36.5 41.8 48.3*    219 256   GRANS  --   --  81*   LYMPH  --   --  15*   EOS  --   --  0      Cardiac Enzymes Recent Labs     10/30/21  0705 10/30/21  0100    136   CKND1 1.2 0.9      Coagulation Recent Labs     10/29/21  1544   PTP 12.9   INR 1.0   APTT 25.1       Lipid Panel Lab Results   Component Value Date/Time    Cholesterol, total 139 08/25/2020 05:30 AM    HDL Cholesterol 49 08/25/2020 05:30 AM    LDL, calculated 58.6 08/25/2020 05:30 AM    VLDL, calculated 31.4 08/25/2020 05:30 AM    Triglyceride 157 (H) 08/25/2020 05:30 AM    CHOL/HDL Ratio 2.8 08/25/2020 05:30 AM      BNP No results for input(s): BNPP in the last 72 hours.    Liver Enzymes Recent Labs     10/31/21  0224   TP 6.1* ALB 2.5*   AP 55      Thyroid Studies No results found for: T4, T3U, TSH, TSHEXT, TSHEXT     Procedures/imaging: see electronic medical records for all procedures/Xrays and details which were not copied into this note but were reviewed prior to creation of Plan    CT HEAD WO CONT    Result Date: 10/29/2021  EXAM: CT head INDICATION: Patient with fall, blood thinning medication COMPARISON: MR brain 10/29/2020 TECHNIQUE: Axial CT imaging of the head was performed without intravenous contrast. Standard multiplanar coronal and sagittal reformatted images were obtained and are included in interpretation. One or more dose reduction techniques were used on this CT: automated exposure control, adjustment of the mAs and/or kVp according to patient size, and iterative reconstruction techniques. The specific techniques used on this CT exam have been documented in the patient's electronic medical record. Digital Imaging and Communications in Medicine (DICOM) format image data are available to nonaffiliated external healthcare facilities or entities on a secure, media free, reciprocally searchable basis with patient authorization for at least a 12-month period after this study. _______________ FINDINGS: BRAIN AND POSTERIOR FOSSA: Mild cortical and cerebellar volume loss redemonstrated with stable ventricular size and configuration. Patent basilar cisterns. Subcortical and periventricular white matter low-attenuation, as previously. Redemonstration of a calcified extra-axial mass lesion along the right side of the posterior fossa in keeping with infratentorial meningioma, size estimated at approximately 1.7 x 1.6 x 1.9 cm in size. Minor adjacent cerebellar distortion without evidence of edema or mass effect. No intra-axial hemorrhage. No mass effect or midline shift. EXTRA-AXIAL SPACES AND MENINGES: There are no abnormal extra-axial fluid collections. CALVARIUM: Intact. SINUSES: Clear. OTHER: None. _______________     1.  No acute intracranial abnormality. 2. Subcortical and periventricular white matter low-attenuation compatible with chronic ischemic microvascular change. 3. 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 CT SPINE CERV WO CONT    Result Date: 10/29/2021  EXAM: CT Cervical spine INDICATION: Cervical Pain COMPARISON: 6/10/2008. TECHNIQUE: Axial CT imaging of the cervical spine was performed from the skull base to the upper thoracic spine without intravenous contrast. Multiplanar reformats were generated. One or more dose reduction techniques were used on this CT: automated exposure control, adjustment of the mAs and/or kVp according to patient size, and iterative reconstruction techniques. The specific techniques used on this CT exam have been documented in the patient's electronic medical record. Digital Imaging and Communications in Medicine (DICOM) format image data are available to nonaffiliated external healthcare facilities or entities on a secure, media free, reciprocally searchable basis with patient authorization for at least a 12-month period after this study. _______________ FINDINGS: VERTEBRAE AND DISCS: Extremely of the normal cervical lordosis without evidence of acute fracture or traumatic subluxation. Multilevel cervical spondylosis with disc space narrowing and ventral endplate osteophytes. Multilevel posterior facet arthrosis. No area of erosion or aggressive-appearing bone destruction. No area of erosion or aggressive-appearing bone destruction. PREVERTEBRAL SOFT TISSUES: Normal VISIBLE INTRACRANIAL CONTENTS: Unremarkable. LUNG APICES: Clear. OTHER: None. _______________     No evidence of acute fracture or traumatic subluxation. Multilevel spondylosis.      CT ABD PELV WO CONT    Result Date: 10/30/2021  EXAM: CT of the abdomen and pelvis INDICATION: Abdominal pain, acute kidney injury, unable to have bowel movement COMPARISON: 12/12/2018, retroperitoneal ultrasound from 10/29/2021 TECHNIQUE: Axial CT imaging of the abdomen and pelvis was performed without intravenous contrast. Multiplanar reformats were generated. One or more dose reduction techniques were used on this CT: automated exposure control, adjustment of the mAs and/or kVp according to patient size, and iterative reconstruction techniques. The specific techniques used on this CT exam have been documented in the patient's electronic medical record. Digital Imaging and Communications in Medicine (DICOM) format image data are available to nonaffiliated external healthcare facilities or entities on a secure, media free, reciprocally searchable basis with patient authorization for at least a 12-month period after this study. _______________ FINDINGS: LOWER CHEST: No acute abnormality. Small hiatus hernia LIVER, BILIARY: Liver is normal. No biliary dilation. Gallbladder is unremarkable. PANCREAS: Normal. SPLEEN: Normal. ADRENALS: Stable nodular fusiform thickened bilateral adrenal glands, favoring adrenal hyperplasia. KIDNEYS, URETERS, BLADDER: Isoattenuating bilateral kidneys without nephrolithiasis, hydronephrosis or perinephric fluid. Decompressed urinary bladder GASTROINTESTINAL TRACT: No bowel dilation or wall thickening. Scattered diverticulosis without findings of acute diverticulitis. No significant stool burden. LYMPH NODES: No enlarged lymph nodes. PELVIC ORGANS: Enlarged uterus with multiple coarse calcified degenerative leiomyoma VASCULATURE: Atherosclerotic calcification. OSSEOUS: No acute or aggressive osseous abnormalities identified. OTHER: Left anterior mid abdominal 1.8 cm oval fat with rim density, consistent with epiploic appendagitis (series 2 image 53), new since prior. _______________     1.  Sigmoid colonic epiploic appendagitis which may be associated with focal pain, age-indeterminate but new finding since 12/12/2018. Otherwise, no acute intra-abdominal or intrapelvic obstructive or inflammatory process. 2. Diverticulosis without diverticulitis. No findings of bowel obstruction or significant stool burden. 3. Small hiatus hernia. US RETROPERITONEUM COMP    Result Date: 10/29/2021  EXAM: Ultrasound retroperitoneum INDICATION: Renal insufficiency COMPARISON: None. _______________ FINDINGS: Right kidney measures 9.5 cm in length and is normal in echogenicity without hydronephrosis, masses, stones or cysts. Left kidney measures 9.4 cm in length and is normal in echogenicity without hydronephrosis, masses, stones or cysts. Urinary bladder is not visualized. Multiple calcified uterine fibroids are seen. _______________     Unremarkable retroperitoneal sonogram    XR CHEST PORT    Result Date: 10/29/2021  EXAM: XR CHEST PORT CLINICAL INDICATION/HISTORY: syncope -Additional: None COMPARISON: July 20, 2021 TECHNIQUE: Frontal view of the chest _______________ FINDINGS: HEART AND MEDIASTINUM: Normal cardiac size and mediastinal contours. LUNGS AND PLEURAL SPACES: No focal pneumonic consolidation, pneumothorax, or pleural effusion. BONY THORAX AND SOFT TISSUES: No acute osseous abnormality _______________     No acute radiographic cardiopulmonary abnormality.        Radha Olguin MD

## 2021-10-31 NOTE — PROGRESS NOTES
Chart reviewed pt consulted by , no surgery intervention needed at this time , cm will cont to follow and remain available for d/c planning.

## 2021-11-01 ENCOUNTER — APPOINTMENT (OUTPATIENT)
Dept: GENERAL RADIOLOGY | Age: 76
DRG: 641 | End: 2021-11-01
Attending: HOSPITALIST
Payer: MEDICARE

## 2021-11-01 ENCOUNTER — APPOINTMENT (OUTPATIENT)
Dept: NUCLEAR MEDICINE | Age: 76
DRG: 641 | End: 2021-11-01
Attending: HOSPITALIST
Payer: MEDICARE

## 2021-11-01 PROBLEM — R33.9 URINARY RETENTION: Status: ACTIVE | Noted: 2021-11-01

## 2021-11-01 LAB
ALBUMIN SERPL-MCNC: 2.5 G/DL (ref 3.4–5)
ALBUMIN/GLOB SERPL: 0.7 {RATIO} (ref 0.8–1.7)
ALP SERPL-CCNC: 56 U/L (ref 45–117)
ALT SERPL-CCNC: 15 U/L (ref 13–56)
ANION GAP SERPL CALC-SCNC: 6 MMOL/L (ref 3–18)
AST SERPL-CCNC: 12 U/L (ref 10–38)
BILIRUB SERPL-MCNC: 0.3 MG/DL (ref 0.2–1)
BUN SERPL-MCNC: 46 MG/DL (ref 7–18)
BUN/CREAT SERPL: 24 (ref 12–20)
CALCIUM SERPL-MCNC: 8.4 MG/DL (ref 8.5–10.1)
CHLORIDE SERPL-SCNC: 105 MMOL/L (ref 100–111)
CO2 SERPL-SCNC: 27 MMOL/L (ref 21–32)
CREAT SERPL-MCNC: 1.93 MG/DL (ref 0.6–1.3)
ERYTHROCYTE [DISTWIDTH] IN BLOOD BY AUTOMATED COUNT: 13.5 % (ref 11.6–14.5)
GLOBULIN SER CALC-MCNC: 3.6 G/DL (ref 2–4)
GLUCOSE BLD STRIP.AUTO-MCNC: 143 MG/DL (ref 70–110)
GLUCOSE BLD STRIP.AUTO-MCNC: 144 MG/DL (ref 70–110)
GLUCOSE BLD STRIP.AUTO-MCNC: 167 MG/DL (ref 70–110)
GLUCOSE BLD STRIP.AUTO-MCNC: 172 MG/DL (ref 70–110)
GLUCOSE SERPL-MCNC: 143 MG/DL (ref 74–99)
HCT VFR BLD AUTO: 37.8 % (ref 35–45)
HGB BLD-MCNC: 12.4 G/DL (ref 12–16)
MCH RBC QN AUTO: 28.9 PG (ref 24–34)
MCHC RBC AUTO-ENTMCNC: 32.8 G/DL (ref 31–37)
MCV RBC AUTO: 88.1 FL (ref 78–100)
PLATELET # BLD AUTO: 134 K/UL (ref 135–420)
POTASSIUM SERPL-SCNC: 3.8 MMOL/L (ref 3.5–5.5)
PROT SERPL-MCNC: 6.1 G/DL (ref 6.4–8.2)
RBC # BLD AUTO: 4.29 M/UL (ref 4.2–5.3)
SODIUM SERPL-SCNC: 138 MMOL/L (ref 136–145)
VANCOMYCIN SERPL-MCNC: 9.6 UG/ML (ref 5–40)
WBC # BLD AUTO: 5.3 K/UL (ref 4.6–13.2)

## 2021-11-01 PROCEDURE — 65660000000 HC RM CCU STEPDOWN

## 2021-11-01 PROCEDURE — 74011250636 HC RX REV CODE- 250/636: Performed by: FAMILY MEDICINE

## 2021-11-01 PROCEDURE — 74011250636 HC RX REV CODE- 250/636: Performed by: INTERNAL MEDICINE

## 2021-11-01 PROCEDURE — 85027 COMPLETE CBC AUTOMATED: CPT

## 2021-11-01 PROCEDURE — 74011636637 HC RX REV CODE- 636/637: Performed by: HOSPITALIST

## 2021-11-01 PROCEDURE — 97116 GAIT TRAINING THERAPY: CPT

## 2021-11-01 PROCEDURE — 74011636637 HC RX REV CODE- 636/637: Performed by: FAMILY MEDICINE

## 2021-11-01 PROCEDURE — 82962 GLUCOSE BLOOD TEST: CPT

## 2021-11-01 PROCEDURE — 97162 PT EVAL MOD COMPLEX 30 MIN: CPT

## 2021-11-01 PROCEDURE — 80053 COMPREHEN METABOLIC PANEL: CPT

## 2021-11-01 PROCEDURE — 36415 COLL VENOUS BLD VENIPUNCTURE: CPT

## 2021-11-01 PROCEDURE — 80202 ASSAY OF VANCOMYCIN: CPT

## 2021-11-01 PROCEDURE — 71045 X-RAY EXAM CHEST 1 VIEW: CPT

## 2021-11-01 PROCEDURE — 74011250637 HC RX REV CODE- 250/637: Performed by: FAMILY MEDICINE

## 2021-11-01 PROCEDURE — 74011250636 HC RX REV CODE- 250/636: Performed by: HOSPITALIST

## 2021-11-01 RX ORDER — DIPHENHYDRAMINE HYDROCHLORIDE 50 MG/ML
12.5 INJECTION, SOLUTION INTRAMUSCULAR; INTRAVENOUS
Status: DISCONTINUED | OUTPATIENT
Start: 2021-11-01 | End: 2021-11-06 | Stop reason: HOSPADM

## 2021-11-01 RX ADMIN — ATORVASTATIN CALCIUM 10 MG: 10 TABLET, FILM COATED ORAL at 21:24

## 2021-11-01 RX ADMIN — METRONIDAZOLE 500 MG: 500 INJECTION, SOLUTION INTRAVENOUS at 20:03

## 2021-11-01 RX ADMIN — DIPHENHYDRAMINE HYDROCHLORIDE 12.5 MG: 50 INJECTION, SOLUTION INTRAMUSCULAR; INTRAVENOUS at 23:35

## 2021-11-01 RX ADMIN — VANCOMYCIN HYDROCHLORIDE 1000 MG: 1 INJECTION, POWDER, LYOPHILIZED, FOR SOLUTION INTRAVENOUS at 16:54

## 2021-11-01 RX ADMIN — HEPARIN SODIUM 5000 UNITS: 5000 INJECTION INTRAVENOUS; SUBCUTANEOUS at 01:16

## 2021-11-01 RX ADMIN — HEPARIN SODIUM 5000 UNITS: 5000 INJECTION INTRAVENOUS; SUBCUTANEOUS at 16:53

## 2021-11-01 RX ADMIN — ASPIRIN 81 MG: 81 TABLET, COATED ORAL at 08:51

## 2021-11-01 RX ADMIN — Medication 10 ML: at 14:00

## 2021-11-01 RX ADMIN — ACETAMINOPHEN 650 MG: 325 TABLET ORAL at 21:32

## 2021-11-01 RX ADMIN — INSULIN LISPRO 2 UNITS: 100 INJECTION, SOLUTION INTRAVENOUS; SUBCUTANEOUS at 21:50

## 2021-11-01 RX ADMIN — ALLOPURINOL 100 MG: 100 TABLET ORAL at 08:51

## 2021-11-01 RX ADMIN — ACETAMINOPHEN 650 MG: 325 TABLET ORAL at 16:52

## 2021-11-01 RX ADMIN — METRONIDAZOLE 500 MG: 500 INJECTION, SOLUTION INTRAVENOUS at 12:09

## 2021-11-01 RX ADMIN — CIPROFLOXACIN 400 MG: 2 INJECTION, SOLUTION INTRAVENOUS at 01:16

## 2021-11-01 RX ADMIN — HEPARIN SODIUM 5000 UNITS: 5000 INJECTION INTRAVENOUS; SUBCUTANEOUS at 08:51

## 2021-11-01 RX ADMIN — INSULIN GLARGINE 15 UNITS: 100 INJECTION, SOLUTION SUBCUTANEOUS at 21:50

## 2021-11-01 RX ADMIN — FAMOTIDINE 20 MG: 20 TABLET, FILM COATED ORAL at 08:51

## 2021-11-01 RX ADMIN — ALLOPURINOL 100 MG: 100 TABLET ORAL at 21:24

## 2021-11-01 RX ADMIN — INSULIN LISPRO 2 UNITS: 100 INJECTION, SOLUTION INTRAVENOUS; SUBCUTANEOUS at 16:53

## 2021-11-01 RX ADMIN — METRONIDAZOLE 500 MG: 500 INJECTION, SOLUTION INTRAVENOUS at 03:51

## 2021-11-01 NOTE — PROGRESS NOTES
RENAL CONSULT PROGRESS NOTE   2021    Patient:  Pam Sandhu  :  1945  Gender:  female  MRN #:  [id number]    Reason for Consult: Acute renal failure     Assessment:    Pam Sandhu is a 78y.o. year old female  with h/o ID diabetes mellitus, CAD, s/p stent , morbid obesity , Cardiomyopathy with EF 25-30 % in 2021 , EDGAR on CPAP   Presented to the ED with a syncopal episode. She has been taking lasix , coreg and aldactone. Denied vomiting, diarrhea and  NSAIDS intake  Clinically she looks dehydrated and hypotensive overnight   No obstruction in USG . Acute renal failure most likely  ATN due to hypotension and volume depletion , mild alkalosis also suggest volume contraction       Plan:   - After Arriaza's catheter she made 2150 cc urine output overnight . Creatinine down to 1.93 mg/dl from 4.92 mg/dl . Looks like she was retaining urine , not sure what is the cause ? Uterine fibroid . Consider Gyn consult    - continue Arriaza's for now for strict urine output recording   - Because of h/o cardiomyopathy and improving renal function , will stop Iv fluid .  Encourage oral liquid   - Hold lasix, coreg and aldactone for now  - Please give iv lasix 80 mg prn for worsening SOB/pulmonary congestion   - Consider cardiology consult if she has decompensation leading to syncope and AME     - intake and output recording   - Avoid NSAIDS , contrast and nephrotoxin  - Agree to treat UTI with antibiotics  - Dose all meds and antibiotics for current eGFR  -No indication of dialysis    Subjective:   She denied any symptoms, no shortness of breath and chest pain   Fells better  Decent urine output   Bp more stable       Intake/Output Summary (Last 24 hours) at 2021 1159  Last data filed at 2021 0946  Gross per 24 hour   Intake 3484.5 ml   Output 2150 ml   Net 1334.5 ml         Objective:    Visit Vitals  BP (!) 125/57   Pulse 69   Temp 98.3 °F (36.8 °C)   Resp 17   Wt 105.7 kg (233 lb)   SpO2 100%   BMI 41.27 kg/m²       Physical Exam:    Pt awake,  alert and comfortable  HEENT: No JVD, dry mucosa   Lung: clear to auscultation  Heart: s1s2 heard with systolic murmur  Ext: no edema and pulsation intact     Laboratory Data:    Lab Results   Component Value Date    BUN 46 (H) 11/01/2021    BUN 70 (H) 10/31/2021    BUN 66 (H) 10/30/2021     11/01/2021     (L) 10/31/2021     (L) 10/30/2021    CO2 27 11/01/2021    CO2 25 10/31/2021    CO2 27 10/30/2021     Lab Results   Component Value Date    WBC 5.3 11/01/2021    HGB 12.4 11/01/2021    HCT 37.8 11/01/2021     No components found for: CALCIUM, PHOSPHORUS, MAGNESIUM  Lab Results   Component Value Date    HDL 49 08/25/2020     No results found for: SPECIMENTYP, TURBIDITY, UGLU    Imaging Reveiwed:  10/30/21:  CT abdomen/pelvis:- 1. Sigmoid colonic epiploic appendagitis which may be associated with focal  pain, age-indeterminate but new finding since 12/12/2018. Otherwise, no acute  intra-abdominal or intrapelvic obstructive or inflammatory process.     2. Diverticulosis without diverticulitis.  No findings of bowel obstruction or  significant stool burden.     3. Small hiatus hernia.      USG retropritoneum: Right kidney measures 9.5 cm in length and is normal in echogenicity without  hydronephrosis, masses, stones or cysts.     Left kidney measures 9.4 cm in length and is normal in echogenicity without  hydronephrosis, masses, stones or cysts.           Scarlet Marcano MD   [facility]

## 2021-11-01 NOTE — PROGRESS NOTES
[id number] Shift Summary: Pt rested well overnight with no complaints. No new clinical concerns noted.      Nightshift Chart Audit Completed

## 2021-11-01 NOTE — PROGRESS NOTES
.    Patient Name: Scarlet Downey   Age: 78 y.o. Sex:  female  YOB: 1945   Medical Record Number: [id number]      Antimicrobial  Current Antimicrobial Therapy (168h ago, onward)       Ordered     Start Stop    11/01/21 1444  vancomycin (VANCOCIN) 1,000 mg in 0.9% sodium chloride 250 mL (VIAL-MATE)  1,000 mg,   IntraVENous,   ONCE      11/01/21 1600 11/02/21 0359    10/30/21 1131  metroNIDAZOLE (FLAGYL) IVPB premix 500 mg  500 mg,   IntraVENous,   EVERY 8 HOURS      10/30/21 1200 --    10/30/21 0119  ciprofloxacin (CIPRO) 400 mg in D5W IVPB (premix)  400 mg,   IntraVENous,   EVERY 24 HOURS      10/30/21 0200 11/04/21 0159                   Indication Empiric Therapy   Last Level (if applicable) No results found for: DOSE, TMG, DTG  Vancomycin   Lab Results   Component Value Date/Time    Vancomycin, random 9.6 11/01/2021 01:22 PM      Gentamicin   No results found for: GENP, GENT, GENR]  Tobramycin   No results found for: TOBP, TOBT, TOBR   Amikacin   No results found for: Haylee Minor, MARVIN, GARCIA, JOHANA     Cultures (7 most recent)   Culture result:   Date Value Ref Range Status   10/29/2021 No growth (<1,000 CFU/ML)   Final   10/09/2020 MIXED UROGENITAL KEYSHA ISOLATED   Final   12/15/2018 NO GROWTH 1 DAY   Final   12/12/2018 >100,000 COLONIES/mL ESCHERICHIA COLI (A)   Final      Renal Overview (72 hr)         Recent Labs     11/01/21  0300 10/31/21  0224 10/30/21  0100   BUN 46* 70* 66*   CREA 1.93* 4.92* 4.13*       CrCl (Current): Serum creatinine: 1.93 mg/dL (H) 11/01/21 0300  Estimated creatinine clearance: 29.8 mL/min (A)      Lactic Acid    (Sepsis Criteria: >2.0 mmol/L) No results found for: LAC   Procalcitonin   No results found for: PCT  Suspected Sepsis:   <0.50 ng/mL      Low likelihood of sepsis. 0.50-2.00 ng/mL     Increased likelihood of sepsis. Antibioticsencouraged. >2.00 ng/mL   High risk of sepsis/shock. Antibiotics strongly encouraged.          Suspected Lower Resp Tract Infections:   <0.24 ng/mL     Low likelihood of bacterial infection. >0.24 ng/mL     Increased likelihood of bacterial infection. Antibiotics encouraged. Hepatic Overview (72 hr) Recent Labs     11/01/21  0300 10/31/21  0224 10/31/21  0224 10/30/21  0100 10/30/21  0100   ALT 15   < > 13   < > 17   AP 56  --  55  --  69    < > = values in this interval not displayed. Temp (24-hr Max) Temp (24hrs), Av °F (36.7 °C), Min:97.3 °F (36.3 °C), Max:98.3 °F (36.8 °C)       Hematology Recent Labs     11/01/21  0300 10/31/21  0224 10/30/21  0100 10/29/21  1544 10/29/21  1544   WBC 5.3 8.6 11.0   < > 10.0   HGB 12.4 11.8* 13.6   < > 15.5   HCT 37.8 36.5 41.8   < > 48.3*   * 194 219   < > 256   GRANS  --   --   --   --  81*   ANEU  --   --   --   --  8.1*    < > = values in this interval not displayed.         DOT  3       Notes Random = 9.6 mcg/ml ( 1322 )  Dose Vancomycin 1000 mg once   Pharmacy will continue to monitor and make adjustments based on Clinical results         Lennox [place], [address] [facility] Pharmacist  [phone]

## 2021-11-01 NOTE — PALLIATIVE CARE
Palliative medicine consult received and appreciated. Attempted to see patient on rounds; however was out of room and unavailable for visit. Will follow-up at a later time as schedule allows. Thank you for the opportunity to participate in the care of Ms. Gonsales.     Bolivar Garcia, [facility]-BC  Palliative Medicine

## 2021-11-01 NOTE — PROGRESS NOTES
Problem: Mobility Impaired (Adult and Pediatric)  Goal: *Acute Goals and Plan of Care (Insert Text)  Description: Physical Therapy Goals   Initiated 11/1/2021 and to be accomplished within 5 day(s)  1. Patient will move from supine <> sit with mod I in prep for out of bed activity and change of position. 2.  Patient will perform sit<> stand with mod I with LRAD in prep for transfers/ambulation. 3.  Patient will transfer from bed <> chair with mod I with LRAD for time up in chair for completion of ADL activity. 4.  Patient will ambulate 100 feet with S/LRAD for improved functional mobility at discharge. Outcome: Progressing Towards Goal  PHYSICAL THERAPY EVALUATION    Patient: Fredi Sever (73 y.o. female)  Date: 11/1/2021  Primary Diagnosis: Syncope and collapse [R55]  Severe dehydration [E86.0]  Acute renal injury ([facility] [address] [N17.9]  Closed head injury [S09.90XA]  Anticoagulated [Z79.01]  Precautions:   Fall  PLOF: amb with rollator    ASSESSMENT :  Based on the objective data described below, the patient is seen on telemetry unit. Pt presents with decreased ROM/motor performance, and decr'd independence in functional mobility with regard to bed mobility/ transfers, gait, and decreased activity tolerance. Patient reports no pain during session. Demonstrates transition to sit EOB withCGA, transfers to stand with CGA and able to complete GT/RW/10ft (5+5 with seated rest between). Debora slow with forward posture; vc not to take rest break leaning elbows on walker handles. Pt left up in chair with all needs in reach, and nurse Rosa aware as in pt room. Radiology arriving to take chest x-ray. Answered pt questions regarding PT and mobility. Recommend HHPT for follow up physical therapy upon discharge to reach maximal level of independence/safety with functional mobility. Patient will benefit from skilled intervention to address the above impairments.     Pt Education: Role of physical therapy in acute care setting, fall prevention and safety/technique during functional mobility tasks    Patient's rehabilitation potential is considered to be Good  Factors which may influence rehabilitation potential include:   []         None noted  []         Mental ability/status  [x]         Medical condition  []         Home/family situation and support systems  []         Safety awareness  []         Pain tolerance/management  []         Other:      PLAN :  Recommendations and Planned Interventions:   [x]           Bed Mobility Training             []    Neuromuscular Re-Education  [x]           Transfer Training                   []    Orthotic/Prosthetic Training  [x]           Gait Training                          []    Modalities  [x]           Therapeutic Exercises           []    Edema Management/Control  [x]           Therapeutic Activities            []    Family Training/Education  [x]           Patient Education  []           Other (comment):    Frequency/Duration: Patient will be followed by physical therapy 1-2 times per day/4-7 days per week to address goals. Discharge Recommendations: Home Physical Therapy  Further Equipment Recommendations for Discharge: N/A     SUBJECTIVE:   Patient stated I'm getting better. I want to go home.     OBJECTIVE DATA SUMMARY:     Past Medical History:   Diagnosis Date    CAD (coronary artery disease)     Diabetes ([facility] [address]     Gastrointestinal disorder     Gout     Hypertension     MI (myocardial infarction) ([facility] [address]     Morbid obesity with BMI of 45.0-49.9, adult ([facility] [address]     Reflux      Past Surgical History:   Procedure Laterality Date    HX CATARACT REMOVAL Bilateral 2019    ND CARDIAC SURG PROCEDURE UNLIST      PCI 15 years ago/ with stent placement    VASCULAR SURGERY PROCEDURE UNLIST       Barriers to Learning/Limitations: yes;  physical  Compensate with: Verbal Cues  Home Situation:  Home Situation  Home Environment: Apartment (2nd flr)  # Steps to Enter: 0 (elevator access)  One/Two Story Residence: One story  Living Alone: Yes  Support Systems: Child(sonali)  Patient Expects to be Discharged to[de-identified] Apartment  Current DME Used/Available at Home: Rubie Mcardle, rollator  Critical Behavior:  Neurologic State: Alert; Appropriate for age  Orientation Level: Oriented X4  Cognition: Follows commands; Appropriate decision making; Appropriate for age attention/concentration; Appropriate safety awareness  Safety/Judgement: Awareness of environment; Fall prevention  Psychosocial  Patient Behaviors: Calm; Cooperative  Purposeful Interaction: Yes  Pt Identified Daily Priority: Clinical issues (comment)  Caritas Process: Nurture loving kindness;Establish trust  Caring Interventions: Reassure; Therapeutic modalities  Reassure: Therapeutic listening; Informing;Caring rounds  Therapeutic Modalities: Intentional therapeutic touch;Humor  Strength:    Strength: Generally decreased, functional  Tone & Sensation:   Sensation: Intact  Range Of Motion:  AROM: Generally decreased, functional  Functional Mobility:  Bed Mobility:  Supine to Sit: Contact guard assistance;Stand-by assistance  Scooting: Contact guard assistance;Stand-by assistance  Transfers:  Sit to Stand: Contact guard assistance  Stand to Sit: Contact guard assistance  Bed to Chair: Contact guard assistance  Balance:   Sitting: Intact  Standing: Intact; With support  Ambulation/Gait Training:  Distance (ft): 10 Feet (ft)  Assistive Device: Gait belt;Walker, rolling  Ambulation - Level of Assistance: Contact guard assistance  Gait Description (WDL): Exceptions to WDL  Gait Abnormalities: Decreased step clearance  Base of Support: Widened  Speed/Debora: Pace decreased (<100 feet/min)  Interventions: Safety awareness training; Tactile cues; Verbal cues; Visual/Demos  Pain:  Pain level pre-treatment: 0/10   Pain level post-treatment: 0/10   Pain Intervention(s) : Medication (see MAR);  Rest, Ice, Repositioning  Response to intervention: Nurse notified, See doc flow  Activity Tolerance:   Fair   Please refer to the flowsheet for vital signs taken during this treatment. After treatment:   [x]         Patient left in no apparent distress sitting up in chair  []         Patient left in no apparent distress in bed  [x]         Call bell left within reach  [x]         Nursing notified  []         Caregiver present  []         Bed alarm activated  []         SCDs applied    COMMUNICATION/EDUCATION:   [x]         Role of Physical Therapy in the acute care setting. [x]         Fall prevention education was provided and the patient/caregiver indicated understanding. [x]         Patient/family have participated as able in goal setting and plan of care. [x]         Patient/family agree to work toward stated goals and plan of care. []         Patient understands intent and goals of therapy, but is neutral about his/her participation. []         Patient is unable to participate in goal setting/plan of care: ongoing with therapy staff.  []         Other:     Thank you for this referral.  Aby Marmolejo, PT   Time Calculation: 16 mins      Eval Complexity: History: HIGH Complexity :3+ comorbidities / personal factors will impact the outcome/ POC Exam:MEDIUM Complexity : 3 Standardized tests and measures addressing body structure, function, activity limitation and / or participation in recreation  Presentation: MEDIUM Complexity : Evolving with changing characteristics  Clinical Decision Making:Medium Complexity    Overall Complexity:MEDIUM

## 2021-11-01 NOTE — ROUTINE PROCESS
Bedside and Verbal shift change report given to Hannah Phillips RN  (oncoming nurse) by Ebony Taylor RN  (offgoing nurse). Report given with SBAR, Kardex, Intake/Output and Recent Results.

## 2021-11-01 NOTE — PROGRESS NOTES
Bedside and Verbal shift change report given to MICHAEL Mast RN (oncoming nurse) by KRIS Hull RN (offgoing nurse). Report included the following information SBAR, Kardex, Intake/Output, MAR and Recent Results.

## 2021-11-01 NOTE — PROGRESS NOTES
Hospitalist Progress Note-critical care note     Patient: Dia Lesches MRN: [id number]  CSN: [id number]    YOB: 1945  Age: 78 y.o.   Sex: female    DOA: 10/29/2021 LOS:  LOS: 3 days            Chief complaint: chf , abdomen pain , epiploic appendagitis , syncope and collapse , dehydration, acosta , DM     Assessment/Plan         Hospital Problems  Date Reviewed: 10/29/2021        Codes Class Noted POA    Chronic combined systolic and diastolic heart failure ([facility] ICD-10-CM: I50.42  ICD-9-CM: 428.42  10/30/2021 Yes        Abdominal pain ICD-10-CM: R10.9  ICD-9-CM: 789.00  10/30/2021 Yes        Epiploic appendagitis ICD-10-CM: K63.89  ICD-9-CM: 569.89  10/30/2021 Yes        History of DVT (deep vein thrombosis) ICD-10-CM: Z86.718  ICD-9-CM: V12.51  10/30/2021 Unknown        Abnormal abdominal CT scan ICD-10-CM: R93.5  ICD-9-CM: 793.6  10/30/2021 Unknown        * (Principal) Syncope and collapse ICD-10-CM: R55  ICD-9-CM: 780.2  10/29/2021 Yes        Severe dehydration ICD-10-CM: E86.0  ICD-9-CM: 276.51  10/29/2021 Yes        Acute renal injury ([facility] ICD-10-CM: N17.9  ICD-9-CM: 584.9  10/29/2021 Yes        Constipation ICD-10-CM: K59.00  ICD-9-CM: 564.00  10/29/2021 Yes        BMI 40.0-44.9, adult ([facility] ICD-10-CM: Z68.41  ICD-9-CM: V85.41  Unknown Yes        EDGAR (obstructive sleep apnea) ICD-10-CM: G47.33  ICD-9-CM: 327.23  7/21/2021 Yes        Insulin dependent type 2 diabetes mellitus ([facility] ICD-10-CM: E11.9, Z79.4  ICD-9-CM: 250.00, V58.67  8/28/2020 Yes        Anticoagulated ICD-10-CM: Z79.01  ICD-9-CM: V58.61  8/25/2020 Yes        CAD (coronary artery disease) ICD-10-CM: I25.10  ICD-9-CM: 414.00  12/3/2018 Yes        Hypertension ICD-10-CM: I10  ICD-9-CM: 401.9  12/3/2018 Yes             72-year-old female with insulin-dependent type 2 diabetes mellitus, morbid obesity, hypertension, CAD with drug-eluting stent-off plavix last admission and hx of dvt  on anticoagulation, and cardiomyopathy with EF of 25-30% is admitted syncope , ame and abdomen pain.      Syncope   negative head CT for bleeding  Mri brain no acute stroke   V/q scan negative for pe   pvl no dvt   ce x3 wnl         boarderline bp remained bp   Continue to hold medication for anti-HTN        Severe dehydration  , AME   Like due to urinary retention   No fibroid indicated from ct -do not think gyn will help it without proof of fibroid -will have vaginal ultrasound to see if we can find anything can contribute to the issue      Abdominal pain - epiploic appendagitis   No intervention needed per surgeon   Continue iv abx   Improving      Chronic combined systolic and diastolic CHF  Ef 83-48 %  So far compensated   --Hold lasix and spirolactone, coreg due to ame and hypotension   --Monitor for fluid overload  Cardiologist consult      CAD with drug-eluting stent on anticoagulation  Continue aspirin , bbb hold due to hypotension       hx of dvt on xarelto   Will hold xarelto due to cr     Insulin-dependent type 2 diabetes mellitus-last A1C of 7.6  Low dose lantus   Diabetic diet change to clear one due to gi issue      BMI 41  Aggressive lifestyle modification needed     EDGAR on CPAP  CPAP qhs       Subjective : no sob, I am glad to hear my kidney function is better, pain in my neck     Will have ultrasound to look for fibroid    Lidocaine patch for neck pain   Disposition :tbd,   Review of systems:    General: No fevers or chills. Cardiovascular: No chest pain or pressure. No palpitations. Pulmonary: No shortness of breath. Gastrointestinal: No nausea, vomiting.  No abdomen pain       Vital signs/Intake and Output:  Visit Vitals  BP (!) 125/57   Pulse 69   Temp 98.3 °F (36.8 °C)   Resp 17   Wt 104.9 kg (231 lb 4.2 oz)   SpO2 100%   BMI 40.97 kg/m²     Current Shift:  11/01 0701 - 11/01 1900  In: 420 [P.O.:420]  Out: -   Last three shifts:  10/30 1901 - 11/01 0700  In: 3322.5 [P.O.:960; I.V.:2362.5]  Out: 2150 [Urine:2150]    Physical Exam:  General: WD, WN. Alert, cooperative, no acute distress    HEENT: NC, Atraumatic. PERRLA, anicteric sclerae. Lungs: CTA Bilaterally. No Wheezing/Rhonchi/Rales. Heart:  Regular  rhythm,  No murmur, No Rubs, No Gallops  Abdomen: Soft, Non distended, no tender-  +Bowel sounds, montelongo noted   Extremities: No c/c/e  Psych:   Not anxious or agitated. Neurologic:  No acute neurological deficit. Labs: Results:       Chemistry Recent Labs     11/01/21  0300 10/31/21  0224 10/30/21  0100   * 111* 139*    134* 135*   K 3.8 3.4* 4.0    101 100   CO2 27 25 27   BUN 46* 70* 66*   CREA 1.93* 4.92* 4.13*   CA 8.4* 8.4* 9.0   AGAP 6 8 8   BUCR 24* 14 16   AP 56 55 69   TP 6.1* 6.1* 7.1   ALB 2.5* 2.5* 3.0*   GLOB 3.6 3.6 4.1*   AGRAT 0.7* 0.7* 0.7*      CBC w/Diff Recent Labs     11/01/21  0300 10/31/21  0224 10/30/21  0100 10/29/21  1544 10/29/21  1544   WBC 5.3 8.6 11.0   < > 10.0   RBC 4.29 4.03* 4.74   < > 5.52*   HGB 12.4 11.8* 13.6   < > 15.5   HCT 37.8 36.5 41.8   < > 48.3*   * 194 219   < > 256   GRANS  --   --   --   --  81*   LYMPH  --   --   --   --  15*   EOS  --   --   --   --  0    < > = values in this interval not displayed. Cardiac Enzymes Recent Labs     10/30/21  0705 10/30/21  0100    136   CKND1 1.2 0.9      Coagulation Recent Labs     10/29/21  1544   PTP 12.9   INR 1.0   APTT 25.1       Lipid Panel Lab Results   Component Value Date/Time    Cholesterol, total 139 08/25/2020 05:30 AM    HDL Cholesterol 49 08/25/2020 05:30 AM    LDL, calculated 58.6 08/25/2020 05:30 AM    VLDL, calculated 31.4 08/25/2020 05:30 AM    Triglyceride 157 (H) 08/25/2020 05:30 AM    CHOL/HDL Ratio 2.8 08/25/2020 05:30 AM      BNP No results for input(s): BNPP in the last 72 hours.    Liver Enzymes Recent Labs     11/01/21  0300   TP 6.1*   ALB 2.5*   AP 56      Thyroid Studies No results found for: T4, T3U, TSH, TSHEXT, TSHEXT     Procedures/imaging: see electronic medical records for all procedures/Xrays and details which were not copied into this note but were reviewed prior to creation of Plan    CT HEAD WO CONT    Result Date: 10/29/2021  EXAM: CT head INDICATION: Patient with fall, blood thinning medication COMPARISON: MR brain 10/29/2020 TECHNIQUE: Axial CT imaging of the head was performed without intravenous contrast. Standard multiplanar coronal and sagittal reformatted images were obtained and are included in interpretation. One or more dose reduction techniques were used on this CT: automated exposure control, adjustment of the mAs and/or kVp according to patient size, and iterative reconstruction techniques. The specific techniques used on this CT exam have been documented in the patient's electronic medical record. Digital Imaging and Communications in Medicine (DICOM) format image data are available to nonaffiliated external healthcare facilities or entities on a secure, media free, reciprocally searchable basis with patient authorization for at least a 12-month period after this study. _______________ FINDINGS: BRAIN AND POSTERIOR FOSSA: Mild cortical and cerebellar volume loss redemonstrated with stable ventricular size and configuration. Patent basilar cisterns. Subcortical and periventricular white matter low-attenuation, as previously. Redemonstration of a calcified extra-axial mass lesion along the right side of the posterior fossa in keeping with infratentorial meningioma, size estimated at approximately 1.7 x 1.6 x 1.9 cm in size. Minor adjacent cerebellar distortion without evidence of edema or mass effect. No intra-axial hemorrhage. No mass effect or midline shift. EXTRA-AXIAL SPACES AND MENINGES: There are no abnormal extra-axial fluid collections. CALVARIUM: Intact. SINUSES: Clear. OTHER: None. _______________     1. No acute intracranial abnormality.  2. Subcortical and periventricular white matter low-attenuation compatible with chronic ischemic microvascular change. 3. 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 CT SPINE CERV WO CONT    Result Date: 10/29/2021  EXAM: CT Cervical spine INDICATION: Cervical Pain COMPARISON: 6/10/2008. TECHNIQUE: Axial CT imaging of the cervical spine was performed from the skull base to the upper thoracic spine without intravenous contrast. Multiplanar reformats were generated. One or more dose reduction techniques were used on this CT: automated exposure control, adjustment of the mAs and/or kVp according to patient size, and iterative reconstruction techniques. The specific techniques used on this CT exam have been documented in the patient's electronic medical record. Digital Imaging and Communications in Medicine (DICOM) format image data are available to nonaffiliated external healthcare facilities or entities on a secure, media free, reciprocally searchable basis with patient authorization for at least a 12-month period after this study. _______________ FINDINGS: VERTEBRAE AND DISCS: Extremely of the normal cervical lordosis without evidence of acute fracture or traumatic subluxation. Multilevel cervical spondylosis with disc space narrowing and ventral endplate osteophytes. Multilevel posterior facet arthrosis. No area of erosion or aggressive-appearing bone destruction. No area of erosion or aggressive-appearing bone destruction. PREVERTEBRAL SOFT TISSUES: Normal VISIBLE INTRACRANIAL CONTENTS: Unremarkable. LUNG APICES: Clear. OTHER: None. _______________     No evidence of acute fracture or traumatic subluxation. Multilevel spondylosis.      CT ABD PELV WO CONT    Result Date: 10/30/2021  EXAM: CT of the abdomen and pelvis INDICATION: Abdominal pain, acute kidney injury, unable to have bowel movement COMPARISON: 12/12/2018, retroperitoneal ultrasound from 10/29/2021 TECHNIQUE: Axial CT imaging of the abdomen and pelvis was performed without intravenous contrast. Multiplanar reformats were generated. One or more dose reduction techniques were used on this CT: automated exposure control, adjustment of the mAs and/or kVp according to patient size, and iterative reconstruction techniques. The specific techniques used on this CT exam have been documented in the patient's electronic medical record. Digital Imaging and Communications in Medicine (DICOM) format image data are available to nonaffiliated external healthcare facilities or entities on a secure, media free, reciprocally searchable basis with patient authorization for at least a 12-month period after this study. _______________ FINDINGS: LOWER CHEST: No acute abnormality. Small hiatus hernia LIVER, BILIARY: Liver is normal. No biliary dilation. Gallbladder is unremarkable. PANCREAS: Normal. SPLEEN: Normal. ADRENALS: Stable nodular fusiform thickened bilateral adrenal glands, favoring adrenal hyperplasia. KIDNEYS, URETERS, BLADDER: Isoattenuating bilateral kidneys without nephrolithiasis, hydronephrosis or perinephric fluid. Decompressed urinary bladder GASTROINTESTINAL TRACT: No bowel dilation or wall thickening. Scattered diverticulosis without findings of acute diverticulitis. No significant stool burden. LYMPH NODES: No enlarged lymph nodes. PELVIC ORGANS: Enlarged uterus with multiple coarse calcified degenerative leiomyoma VASCULATURE: Atherosclerotic calcification. OSSEOUS: No acute or aggressive osseous abnormalities identified. OTHER: Left anterior mid abdominal 1.8 cm oval fat with rim density, consistent with epiploic appendagitis (series 2 image 53), new since prior. _______________     1. Sigmoid colonic epiploic appendagitis which may be associated with focal pain, age-indeterminate but new finding since 12/12/2018. Otherwise, no acute intra-abdominal or intrapelvic obstructive or inflammatory process.  2. Diverticulosis without diverticulitis. No findings of bowel obstruction or significant stool burden. 3. Small hiatus hernia. US RETROPERITONEUM COMP    Result Date: 10/29/2021  EXAM: Ultrasound retroperitoneum INDICATION: Renal insufficiency COMPARISON: None. _______________ FINDINGS: Right kidney measures 9.5 cm in length and is normal in echogenicity without hydronephrosis, masses, stones or cysts. Left kidney measures 9.4 cm in length and is normal in echogenicity without hydronephrosis, masses, stones or cysts. Urinary bladder is not visualized. Multiple calcified uterine fibroids are seen. _______________     Unremarkable retroperitoneal sonogram    XR CHEST PORT    Result Date: 10/29/2021  EXAM: XR CHEST PORT CLINICAL INDICATION/HISTORY: syncope -Additional: None COMPARISON: July 20, 2021 TECHNIQUE: Frontal view of the chest _______________ FINDINGS: HEART AND MEDIASTINUM: Normal cardiac size and mediastinal contours. LUNGS AND PLEURAL SPACES: No focal pneumonic consolidation, pneumothorax, or pleural effusion. BONY THORAX AND SOFT TISSUES: No acute osseous abnormality _______________     No acute radiographic cardiopulmonary abnormality.        Radha Olguin MD

## 2021-11-02 ENCOUNTER — APPOINTMENT (OUTPATIENT)
Dept: ULTRASOUND IMAGING | Age: 76
DRG: 641 | End: 2021-11-02
Attending: HOSPITALIST
Payer: MEDICARE

## 2021-11-02 LAB
ALBUMIN SERPL-MCNC: 2.4 G/DL (ref 3.4–5)
ALBUMIN/GLOB SERPL: 0.7 {RATIO} (ref 0.8–1.7)
ALP SERPL-CCNC: 58 U/L (ref 45–117)
ALT SERPL-CCNC: 12 U/L (ref 13–56)
ANION GAP SERPL CALC-SCNC: 5 MMOL/L (ref 3–18)
AST SERPL-CCNC: 13 U/L (ref 10–38)
BILIRUB SERPL-MCNC: 0.3 MG/DL (ref 0.2–1)
BUN SERPL-MCNC: 24 MG/DL (ref 7–18)
BUN/CREAT SERPL: 22 (ref 12–20)
CALCIUM SERPL-MCNC: 8.4 MG/DL (ref 8.5–10.1)
CHLORIDE SERPL-SCNC: 109 MMOL/L (ref 100–111)
CO2 SERPL-SCNC: 27 MMOL/L (ref 21–32)
CREAT SERPL-MCNC: 1.11 MG/DL (ref 0.6–1.3)
ERYTHROCYTE [DISTWIDTH] IN BLOOD BY AUTOMATED COUNT: 13.4 % (ref 11.6–14.5)
GLOBULIN SER CALC-MCNC: 3.5 G/DL (ref 2–4)
GLUCOSE BLD STRIP.AUTO-MCNC: 119 MG/DL (ref 70–110)
GLUCOSE BLD STRIP.AUTO-MCNC: 123 MG/DL (ref 70–110)
GLUCOSE BLD STRIP.AUTO-MCNC: 133 MG/DL (ref 70–110)
GLUCOSE BLD STRIP.AUTO-MCNC: 201 MG/DL (ref 70–110)
GLUCOSE SERPL-MCNC: 129 MG/DL (ref 74–99)
HCT VFR BLD AUTO: 37.2 % (ref 35–45)
HGB BLD-MCNC: 11.9 G/DL (ref 12–16)
MCH RBC QN AUTO: 28.2 PG (ref 24–34)
MCHC RBC AUTO-ENTMCNC: 32 G/DL (ref 31–37)
MCV RBC AUTO: 88.2 FL (ref 78–100)
PLATELET # BLD AUTO: 173 K/UL (ref 135–420)
PMV BLD AUTO: 10.7 FL (ref 9.2–11.8)
POTASSIUM SERPL-SCNC: 3.4 MMOL/L (ref 3.5–5.5)
PROT SERPL-MCNC: 5.9 G/DL (ref 6.4–8.2)
RBC # BLD AUTO: 4.22 M/UL (ref 4.2–5.3)
SODIUM SERPL-SCNC: 141 MMOL/L (ref 136–145)
VANCOMYCIN SERPL-MCNC: 12 UG/ML (ref 5–40)
WBC # BLD AUTO: 5.2 K/UL (ref 4.6–13.2)

## 2021-11-02 PROCEDURE — 97530 THERAPEUTIC ACTIVITIES: CPT

## 2021-11-02 PROCEDURE — 65660000000 HC RM CCU STEPDOWN

## 2021-11-02 PROCEDURE — 99223 1ST HOSP IP/OBS HIGH 75: CPT | Performed by: NURSE PRACTITIONER

## 2021-11-02 PROCEDURE — 74011250637 HC RX REV CODE- 250/637: Performed by: STUDENT IN AN ORGANIZED HEALTH CARE EDUCATION/TRAINING PROGRAM

## 2021-11-02 PROCEDURE — 97165 OT EVAL LOW COMPLEX 30 MIN: CPT

## 2021-11-02 PROCEDURE — 74011250637 HC RX REV CODE- 250/637: Performed by: INTERNAL MEDICINE

## 2021-11-02 PROCEDURE — 85027 COMPLETE CBC AUTOMATED: CPT

## 2021-11-02 PROCEDURE — 74011250637 HC RX REV CODE- 250/637: Performed by: FAMILY MEDICINE

## 2021-11-02 PROCEDURE — 36415 COLL VENOUS BLD VENIPUNCTURE: CPT

## 2021-11-02 PROCEDURE — 74011250636 HC RX REV CODE- 250/636: Performed by: FAMILY MEDICINE

## 2021-11-02 PROCEDURE — 76856 US EXAM PELVIC COMPLETE: CPT

## 2021-11-02 PROCEDURE — 74011250637 HC RX REV CODE- 250/637: Performed by: HOSPITALIST

## 2021-11-02 PROCEDURE — 74011250636 HC RX REV CODE- 250/636: Performed by: HOSPITALIST

## 2021-11-02 PROCEDURE — 80202 ASSAY OF VANCOMYCIN: CPT

## 2021-11-02 PROCEDURE — 82962 GLUCOSE BLOOD TEST: CPT

## 2021-11-02 PROCEDURE — 97116 GAIT TRAINING THERAPY: CPT

## 2021-11-02 PROCEDURE — 97110 THERAPEUTIC EXERCISES: CPT

## 2021-11-02 PROCEDURE — 74011636637 HC RX REV CODE- 636/637: Performed by: FAMILY MEDICINE

## 2021-11-02 PROCEDURE — 74011636637 HC RX REV CODE- 636/637: Performed by: HOSPITALIST

## 2021-11-02 PROCEDURE — 80053 COMPREHEN METABOLIC PANEL: CPT

## 2021-11-02 RX ORDER — VANCOMYCIN/0.9 % SOD CHLORIDE 1.5G/250ML
1500 PLASTIC BAG, INJECTION (ML) INTRAVENOUS EVERY 24 HOURS
Status: DISCONTINUED | OUTPATIENT
Start: 2021-11-02 | End: 2021-11-06

## 2021-11-02 RX ORDER — FAMOTIDINE 20 MG/1
20 TABLET, FILM COATED ORAL EVERY 12 HOURS
Status: DISCONTINUED | OUTPATIENT
Start: 2021-11-02 | End: 2021-11-06 | Stop reason: HOSPADM

## 2021-11-02 RX ORDER — TRAMADOL HYDROCHLORIDE 50 MG/1
50 TABLET ORAL
Status: DISCONTINUED | OUTPATIENT
Start: 2021-11-02 | End: 2021-11-06 | Stop reason: HOSPADM

## 2021-11-02 RX ORDER — POTASSIUM CHLORIDE 20 MEQ/1
40 TABLET, EXTENDED RELEASE ORAL ONCE
Status: COMPLETED | OUTPATIENT
Start: 2021-11-02 | End: 2021-11-02

## 2021-11-02 RX ORDER — DIPHENHYDRAMINE HCL 25 MG
25 CAPSULE ORAL
Status: DISCONTINUED | OUTPATIENT
Start: 2021-11-02 | End: 2021-11-06 | Stop reason: HOSPADM

## 2021-11-02 RX ORDER — METRONIDAZOLE 500 MG/100ML
500 INJECTION, SOLUTION INTRAVENOUS EVERY 12 HOURS
Status: DISCONTINUED | OUTPATIENT
Start: 2021-11-02 | End: 2021-11-05 | Stop reason: DRUGHIGH

## 2021-11-02 RX ADMIN — ALLOPURINOL 100 MG: 100 TABLET ORAL at 21:57

## 2021-11-02 RX ADMIN — ALLOPURINOL 100 MG: 100 TABLET ORAL at 08:43

## 2021-11-02 RX ADMIN — METRONIDAZOLE 500 MG: 500 INJECTION, SOLUTION INTRAVENOUS at 04:31

## 2021-11-02 RX ADMIN — HEPARIN SODIUM 5000 UNITS: 5000 INJECTION INTRAVENOUS; SUBCUTANEOUS at 08:38

## 2021-11-02 RX ADMIN — METRONIDAZOLE 500 MG: 500 INJECTION, SOLUTION INTRAVENOUS at 11:45

## 2021-11-02 RX ADMIN — TRAMADOL HYDROCHLORIDE 50 MG: 50 TABLET ORAL at 23:01

## 2021-11-02 RX ADMIN — BUTALBITAL, ACETAMINOPHEN AND CAFFEINE 1 TABLET: 50; 325; 40 TABLET ORAL at 08:43

## 2021-11-02 RX ADMIN — HEPARIN SODIUM 5000 UNITS: 5000 INJECTION INTRAVENOUS; SUBCUTANEOUS at 17:06

## 2021-11-02 RX ADMIN — INSULIN LISPRO 4 UNITS: 100 INJECTION, SOLUTION INTRAVENOUS; SUBCUTANEOUS at 17:06

## 2021-11-02 RX ADMIN — FAMOTIDINE 20 MG: 20 TABLET, FILM COATED ORAL at 21:57

## 2021-11-02 RX ADMIN — Medication 10 ML: at 14:00

## 2021-11-02 RX ADMIN — VANCOMYCIN HYDROCHLORIDE 1500 MG: 10 INJECTION, POWDER, LYOPHILIZED, FOR SOLUTION INTRAVENOUS at 17:06

## 2021-11-02 RX ADMIN — METRONIDAZOLE 500 MG: 500 INJECTION, SOLUTION INTRAVENOUS at 23:45

## 2021-11-02 RX ADMIN — FAMOTIDINE 20 MG: 20 TABLET, FILM COATED ORAL at 08:43

## 2021-11-02 RX ADMIN — INSULIN GLARGINE 15 UNITS: 100 INJECTION, SOLUTION SUBCUTANEOUS at 21:58

## 2021-11-02 RX ADMIN — ASPIRIN 81 MG: 81 TABLET, COATED ORAL at 08:43

## 2021-11-02 RX ADMIN — HEPARIN SODIUM 5000 UNITS: 5000 INJECTION INTRAVENOUS; SUBCUTANEOUS at 02:03

## 2021-11-02 RX ADMIN — DIPHENHYDRAMINE HYDROCHLORIDE 25 MG: 25 CAPSULE ORAL at 23:01

## 2021-11-02 RX ADMIN — POTASSIUM CHLORIDE 40 MEQ: 20 TABLET, EXTENDED RELEASE ORAL at 09:17

## 2021-11-02 RX ADMIN — ATORVASTATIN CALCIUM 10 MG: 10 TABLET, FILM COATED ORAL at 21:57

## 2021-11-02 RX ADMIN — CIPROFLOXACIN 400 MG: 2 INJECTION, SOLUTION INTRAVENOUS at 02:02

## 2021-11-02 NOTE — PALLIATIVE CARE
Full note to follow. Spoke with patient who wishes for DNR/DNI. Orders placed. Attending and BSRN notified. Thank you for the opportunity to participate in the care of Ms. Gonsales.     Cate aJmil, CHRISTY-BC  Palliative Medicine

## 2021-11-02 NOTE — PROGRESS NOTES
Problem: Diabetes Self-Management  Goal: *Disease process and treatment process  Description: Define diabetes and identify own type of diabetes; list 3 options for treating diabetes. Outcome: Progressing Towards Goal  Goal: *Incorporating nutritional management into lifestyle  Description: Describe effect of type, amount and timing of food on blood glucose; list 3 methods for planning meals. Outcome: Progressing Towards Goal  Goal: *Incorporating physical activity into lifestyle  Description: State effect of exercise on blood glucose levels. Outcome: Progressing Towards Goal  Goal: *Developing strategies to promote health/change behavior  Description: Define the ABC's of diabetes; identify appropriate screenings, schedule and personal plan for screenings. Outcome: Progressing Towards Goal  Goal: *Using medications safely  Description: State effect of diabetes medications on diabetes; name diabetes medication taking, action and side effects. Outcome: Progressing Towards Goal  Goal: *Monitoring blood glucose, interpreting and using results  Description: Identify recommended blood glucose targets  and personal targets. Outcome: Progressing Towards Goal  Goal: *Prevention, detection, treatment of acute complications  Description: List symptoms of hyper- and hypoglycemia; describe how to treat low blood sugar and actions for lowering  high blood glucose level. Outcome: Progressing Towards Goal  Goal: *Prevention, detection and treatment of chronic complications  Description: Define the natural course of diabetes and describe the relationship of blood glucose levels to long term complications of diabetes.   Outcome: Progressing Towards Goal  Goal: *Developing strategies to address psychosocial issues  Description: Describe feelings about living with diabetes; identify support needed and support network  Outcome: Progressing Towards Goal  Goal: *Insulin pump training  Outcome: Progressing Towards Goal  Goal: *Sick day guidelines  Outcome: Progressing Towards Goal  Goal: *Patient Specific Goal (EDIT GOAL, INSERT TEXT)  Outcome: Progressing Towards Goal     Problem: Patient Education: Go to Patient Education Activity  Goal: Patient/Family Education  Outcome: Progressing Towards Goal     Problem: Falls - Risk of  Goal: *Absence of Falls  Description: Document Margaret Blood Fall Risk and appropriate interventions in the flowsheet. Outcome: Progressing Towards Goal  Note: Fall Risk Interventions:  Mobility Interventions: Assess mobility with egress test, Patient to call before getting OOB, Utilize walker, cane, or other assistive device    Mentation Interventions: Adequate sleep, hydration, pain control, Bed/chair exit alarm, Door open when patient unattended, Evaluate medications/consider consulting pharmacy, Eyeglasses and hearing aids, Increase mobility, More frequent rounding, Reorient patient, Room close to nurse's station, Toileting rounds, Update white board    Medication Interventions: Patient to call before getting OOB    Elimination Interventions: Call light in reach, Patient to call for help with toileting needs    History of Falls Interventions: Room close to nurse's station         Problem: Patient Education: Go to Patient Education Activity  Goal: Patient/Family Education  Outcome: Progressing Towards Goal     Problem: Pressure Injury - Risk of  Goal: *Prevention of pressure injury  Description: Document Sheldon Scale and appropriate interventions in the flowsheet.   Outcome: Progressing Towards Goal  Note: Pressure Injury Interventions:  Sensory Interventions: Assess changes in LOC         Activity Interventions: Pressure redistribution bed/mattress(bed type)    Mobility Interventions: HOB 30 degrees or less, Pressure redistribution bed/mattress (bed type)    Nutrition Interventions: Document food/fluid/supplement intake    Friction and Shear Interventions: Apply protective barrier, creams and emollients, HOB 30 degrees or less                Problem: Patient Education: Go to Patient Education Activity  Goal: Patient/Family Education  Outcome: Progressing Towards Goal     Problem: Patient Education: Go to Patient Education Activity  Goal: Patient/Family Education  Outcome: Progressing Towards Goal

## 2021-11-02 NOTE — ROUTINE PROCESS
Bedside and Verbal shift change report given to LINDA Wallace (oncoming nurse) by Kimo Arriola RN (offgoing nurse). Report included the following information SBAR, Kardex, Intake/Output, MAR, Recent Results, and Cardiac Rhythm: SR/BBB/PVCs.

## 2021-11-02 NOTE — ACP (ADVANCE CARE PLANNING)
Advance Care Planning     General Advance Care Planning (ACP) Conversation  Date of Conversation: 11/2/2021  Conducted with: Patient with Decision Making Capacity    Healthcare Decision Maker:     Primary Decision Maker: Sharonda Paulino - Child - [phone]    Secondary Decision Maker: Ghada Redmond - Daughter-in-Law - [phone]    Today we documented Decision Maker(s) consistent with ACP documents completed and placed on chart for scanning. Content/Action Overview: Has ACP document(s) placed on the chart - reflects the patient's care preferences  Reviewed DNR/DNI and patient elects DNR status - completed forms placed on chart     Length of Voluntary ACP Conversation in minutes:  <16 minutes (Non-Billable)       11/2/2021 1030 Seen today in room 332 along with Leroy Fregoso NP. Lying in bed appearing comfortable. Awake, alert. Oriented x 4. Respirations unlabored on room air. Denies pain. States is having nausea. Very engaged in conversationn    Came to ED on 10/29/2021 per POV for syncope after BM at home. Her son found her on the floor and assisted getting her up and subsequently to the hospital for evaluation. Found to have AME on admission labs. Head CT negative. Admitted for nephrology consultation, surgical consultation for abdominal pain, CHF/fluid management     Ref.  Range 10/29/2021 15:44 10/29/2021 15:45 10/30/2021 01:00 10/30/2021 07:05 10/31/2021 02:24 11/1/2021 03:00 11/2/2021 01:00   Sodium Latest Ref Range: 136 - 145 mmol/L 134 (L)  135 (L)  134 (L) 138 141   Potassium Latest Ref Range: 3.5 - 5.5 mmol/L 3.6  4.0  3.4 (L) 3.8 3.4 (L)   Chloride Latest Ref Range: 100 - 111 mmol/L 97 (L)  100  101 105 109   CO2 Latest Ref Range: 21 - 32 mmol/L 30  27  25 27 27   Anion gap Latest Ref Range: 3.0 - 18 mmol/L 7  8  8 6 5   Glucose Latest Ref Range: 74 - 99 mg/dL 256 (H)  139 (H)  111 (H) 143 (H) 129 (H)   BUN Latest Ref Range: 7.0 - 18 MG/DL 61 (H)  66 (H)  70 (H) 46 (H) 24 (H) Creatinine Latest Ref Range: 0.6 - 1.3 MG/DL 3.77 (H)  4.13 (H)  4.92 (H) 1.93 (H) 1.11   BUN/Creatinine ratio Latest Ref Range: 12 - 20   16  16  14 24 (H) 22 (H)   Calcium Latest Ref Range: 8.5 - 10.1 MG/DL 9.6  9.0  8.4 (L) 8.4 (L) 8.4 (L)   Magnesium Latest Ref Range: 1.6 - 2.6 mg/dL 2.5         GFR est non-AA Latest Ref Range: >60 ml/min/1.73m2 12 (L)  11 (L)  9 (L) 25 (L) 48 (L)   GFR est AA Latest Ref Range: >60 ml/min/1.73m2 14 (L)  13 (L)  10 (L) 31 (L) 58 (L)      · 10/29/2021: Head CT: 1. No acute intracranial abnormality. 2. Subcortical and periventricular white matter low-attenuation compatible with chronic ischemic microvascular change. 3. 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 · 10/29/2021: Cspine CT:  No evidence of acute fracture or traumatic subluxation. Multilevel spondylosis. · 10/30/2021: CT A/P: 1. Sigmoid colonic epiploic appendagitis which may be associated with focal         pain, age-indeterminate but new finding since 12/12/2018. Otherwise, no acute intra-abdominal           or intrapelvic obstructive or inflammatory process. 2. Diverticulosis without diverticulitis. No               findings of bowel obstruction or significant stool burden. 3. Small hiatus hernia. PMH significant for CHF; CAD s/p drug eluting stent; MI; DM II; HTN; on anticoagulation; EDGAR on CPAP    The palliative care team has been consulted for goals of care discussion    Lives in a single family home by herself with her son, Marjorie Francois, and daughter-in-law, Hodan Mckeon, ~ 5 minutes away and they check on her often. They bring her all her meals. Prior to admission,she was fairly independent in ADLs. Uses a cane and walker for ambulation assistance. She does use home CPAP. She has another son, Cyndy Stewart, and he lives in [place].      Introduced the role of palliative medicine for the hospitalized patient. She does not have an AMD and was offered the opportunity to complete one. She did naming [place] as her MPOAs. Discussed coped status. Described the four components of cardiac resuscitation: compressions, medications, electric shocks, and intubation/ventilation. Reviewed benefits and burdens to include fractured ribs, punctured lungs, hypoxic brain injury, and long-term ventilation with need for discussion of tracheostomy or compassionate extubation. She was clear that when her heart stops that is her natural time to die. POST completed. Disposition plan: anticipate DC home with family support. Palliative care will continue to follow Elis Peguero  and her family during her hospitalization and support them as they make healthcare decisions and define goals of care. Patient completed an advance medical directive and POST today. Copies placed on chart for scanning into EMR and copies given to patient for personal records. Primary Decision Maker (Health Care Agent): Abhijit Hilliard  Relationship to patient: son  Phone number: [phone]  [x] Named in a scanned document   [] Legal Next of Kin  [] Guardian    Secondary Decision Maker ([facility] ):  She Collazo  Relationship to patient: daughter-in-law  Phone number:  [phone]  [x] Named in a scanned document   [] Legal Next of Kin  [] Guardian    ACP documents you currently have include:  [] Advance Directive or Living Will  [] Durable Do Not Resuscitate  [x] Physician Orders for Scope of Treatment (POST)  [] Medical Power of   [] Other    Advanced Steps [facility] (Physician Orders for Scope of Treatment)     Conversation Topics   (If Patient does not have decision making capacity, Agent/Surrogate responds based on understanding of how patient would respond if capable)    Understanding of Medical Condition/s AND Potential Complications: I plan on living for a while but when it is my time, that is fine    Cardiopulmonary Resuscitation      \"What do you understand about CPR? \" Response: \"It doesn't bring you back, now does it? \"    Order Elected for CPR:  []  Attempt Resuscitation [x]  Do Not Attempt Resuscitation  When NOT in Cardiopulmonary Arrest, Order Elected:    [] Comfort Measures  [x] Limited Additional Interventions  [] Full Interventions  Artificially Administered Nutrition, Order Elected:  [x] No Feeding Tube   [] Feeding Tube for a defined trial period  [] Feeding Tube long-term if indicated      Meeting Outcomes:   [x] ACP discussion completed   [x] Araseli form completed  [x] Araseli prepared for Provider review and signature   [x] Original placed on Chart, if in facility (form to be sent with patient at discharge)  [x] Copy given to healthcare agent    [x] Copy scanned to electronic medical record    Nasima Metzger RN, MSN  Palliative Medicine  [phone]

## 2021-11-02 NOTE — PROGRESS NOTES
Problem: Mobility Impaired (Adult and Pediatric)  Goal: *Acute Goals and Plan of Care (Insert Text)  Description: Physical Therapy Goals   Initiated 11/1/2021 and to be accomplished within 5 day(s)  1. Patient will move from supine <> sit with mod I in prep for out of bed activity and change of position. 2.  Patient will perform sit<> stand with mod I with LRAD in prep for transfers/ambulation. 3.  Patient will transfer from bed <> chair with mod I with LRAD for time up in chair for completion of ADL activity. 4.  Patient will ambulate 100 feet with S/LRAD for improved functional mobility at discharge. Outcome: Progressing Towards Goal   PHYSICAL THERAPY TREATMENT    Patient: Estela Weaver (64 y.o. female)  Date: 11/2/2021  Diagnosis: Syncope and collapse [R55]  Severe dehydration [E86.0]  Acute renal injury ([address] [N17.9]  Closed head injury [S09.90XA]  Anticoagulated [Z79.01] Syncope and collapse    Precautions: Fall  PLOF: ha a RW, occasionally using a RW    ASSESSMENT:  No assistance needed to sit up EOB. Elevated bed for sit to stand. Pt performed lateral and forward/backward steps for 12ft with RW, no LOB. Performed (B)LE ROM strengthening exercises at EOB. Pt left sitting EOB per request.  Progression toward goals:   []      Improving appropriately and progressing toward goals  [x]      Improving slowly and progressing toward goals  []      Not making progress toward goals and plan of care will be adjusted     PLAN:  Patient continues to benefit from skilled intervention to address the above impairments. Continue treatment per established plan of care. Discharge Recommendations:  Home Health with family support  Further Equipment Recommendations for Discharge:  rolling walker     SUBJECTIVE:   Patient stated I was walking all over until this happened.     OBJECTIVE DATA SUMMARY:   Critical Behavior:  Neurologic State: Eyes open spontaneously, Alert, Appropriate for age  Orientation Level: Oriented X4  Cognition: Appropriate decision making, Appropriate for age attention/concentration, Appropriate safety awareness, Follows commands  Safety/Judgement: Awareness of environment, Fall prevention  Functional Mobility Training:  Bed Mobility:    Supine to Sit: Supervision; Additional time  Transfers:  Sit to Stand: Contact guard assistance;Minimum assistance  Stand to Sit: Contact guard assistance  Balance:  Sitting: Intact  Standing: Intact; With support   Ambulation/Gait Training:  Distance (ft): 12 Feet (ft)  Assistive Device: Gait belt;Walker, rolling  Ambulation - Level of Assistance: Contact guard assistance  Gait Abnormalities: Decreased step clearance  Base of Support: Widened  Therapeutic Exercises:   (B)LEs      EXERCISE   Sets   Reps   Active Active Assist   Passive Self ROM   Comments   Ankle Pumps    [] [] [] []    Quad Sets/Glut Sets    [] [] [] [] Hold for 5 secs   Hamstring Sets   [] [] [] []    Short Arc Quads   [] [] [] []    Heel Slides   [] [] [] []    Straight Leg Raises   [] [] [] []    Hip Add  10 [x] [] [] [] Hold for 5 secs, w/ pillow squeeze   Long Arc Quads  10 [x] [] [] []    Seated Marching  10 [x] [] [] []    Standing Marching   [] [] [] []       [] [] [] []        Pain:  Pain level pre-treatment: 0/10  Pain level post-treatment: 0/10   Pain Intervention(s): Medication (see MAR); Rest, Ice, Repositioning   Response to intervention: Nurse notified, See doc flow    Activity Tolerance:   Fair  Please refer to the flowsheet for vital signs taken during this treatment. After treatment:   [] Patient left in no apparent distress sitting up in chair  [x] Patient left in no apparent distress in bed  [x] Call bell left within reach  [x] Nursing notified  [] Caregiver present  [] Bed alarm activated  [] SCDs applied      COMMUNICATION/EDUCATION:   [x]         Role of Physical Therapy in the acute care setting.   [x]         Fall prevention education was provided and the patient/caregiver indicated understanding. [x]         Patient/family have participated as able in working toward goals and plan of care. [x]         Patient/family agree to work toward stated goals and plan of care. []         Patient understands intent and goals of therapy, but is neutral about his/her participation.   []         Patient is unable to participate in stated goals/plan of care: ongoing with therapy staff.  []         Other:        Aries Babb, DELMI   Time Calculation: 23 mins

## 2021-11-02 NOTE — PROGRESS NOTES
Physician Progress Note      PATIENT:               Cornel Ambrosio  CSN #:                  [id number]  :                       1945  ADMIT DATE:       10/29/2021 3:29 PM  [address] [place] DATE:  RESPONDING  PROVIDER #:        Anirudh ROBERTS MD          QUERY TEXT:    Pt admitted with Severe dehydration with AME. Pt noted to have Acute renal failure most likely ATN due to hypotension and volume depletion , mild alkalosis also suggest volume contraction. If possible, please document in the progress notes and discharge summary if you are evaluating and/or treating any of the following: The medical record reflects the following:    Risk Factors: CHF, AME, DM, CAD, HTN, cardiomyopathy, UTI    Clinical Indicators:  > 2021 Nephrology- \" Acute renal failure most likely ATN due to hypotension and volume depletion , mild alkalosis also suggest volume contraction\"  > Creatinine 10/29/21 3.77, 10/30/21 4.33, 10/31/21 4.92,  1.93  > GFR          10/29/31 12,  10/30/21 11, 10/31/31 9, 31 25    Treatment: Receiving Nephrology, Arriaza, BMP, IV fluids, IV ABT    Defined by Kidney Disease Improving Global Outcomes (KDIGO) clinical practice guideline for acute kidney injury:?  -Increase in?SCr?by greater than or equal to 0.3 mg/dl within 48?hours;?or?  -Increase or decrease in?SCr?to greater than or equal to 1.5 times baseline, which is known or presumed to have occurred within the prior 7?days;?or?  -Urine volume < 0.5ml/kg/h for 6 hours? Thank you,  Jung Vazquez, Boo Driver, RN, BSN, [place], [address]  Options provided:  -- Acute kidney failure  -- Acute kidney failure with acute tubular necrosis  -- Acute kidney injury  -- Other - I will add my own diagnosis  -- Disagree - Not applicable / Not valid  -- Disagree - Clinically unable to determine / Unknown  -- Refer to Clinical Documentation Reviewer    PROVIDER RESPONSE TEXT:    This patient is in Acute kidney failure.     Query created by: Lori Garcia on 11/1/2021 2:43 PM      Electronically signed by:  Logan Mo MD 11/2/2021 8:49 AM

## 2021-11-02 NOTE — PROGRESS NOTES
Pharmacy Dosing Services: Renal Dosing    The following medication: Pepcid was automatically dose-adjusted per THE [facility] P&T Committee Protocol, with respect to renal function. Consult provided for this   78 y.o. , female , for the indication of SUP. Pt Weight:   Wt Readings from Last 1 Encounters:   11/01/21 105.7 kg (233 lb)         Previous Regimen Pepcid 20 mg tab daily   Serum Creatinine Lab Results   Component Value Date/Time    Creatinine 1.11 11/02/2021 01:00 AM       Creatinine Clearance Estimated Creatinine Clearance: 51.7 mL/min (based on SCr of 1.11 mg/dL). BUN Lab Results   Component Value Date/Time    BUN 24 (H) 11/02/2021 01:00 AM       Dosage changed to:  Pepcid 20 mg tab BID    Pharmacy to continue to monitor patient daily. Will make dosage adjustments based upon changing renal function.   Signed Yash Weir, [address]

## 2021-11-02 NOTE — PROGRESS NOTES
DC Plan: home with [address] manager rounded on patient, noted that PT is recommending HH: offered FOC and patient is accepting of [facility]; referral placed and will continue to follow patient for discharge needs. Care Management Interventions  PCP Verified by CM:  Yes  Mode of Transport at Discharge: Self  Transition of Care Consult (CM Consult): Discharge Planning, [address]: Yes  Physical Therapy Consult: Yes  Occupational Therapy Consult: Yes  Support Systems: Child(sonali)  Confirm Follow Up Transport: Family  The Plan for Transition of Care is Related to the Following Treatment Goals : syncope and collapse  The Patient and/or Patient Representative was Provided with a Choice of Provider and Agrees with the Discharge Plan?: Yes  Name of the Patient Representative Who was Provided with a Choice of Provider and Agrees with the Discharge Plan: Fredi Sever, patient  Freedom of Choice List was Provided with Basic Dialogue that Supports the Patient's Individualized Plan of Care/Goals, Treatment Preferences and Shares the Quality Data Associated with the Providers?: Yes  Discharge Location  Discharge Placement: Home with home health

## 2021-11-02 NOTE — PROGRESS NOTES
Pharmacy Dosing - Metronidazole    Arin Chen is a 78 y.o. yo female prescribed metronidazole for Intra-Abdominal Infection    Ordered Dose: Flagyl 500 mg IV q8h    Dose adjusted to 500 mg IV/PO every 12 hours per P&T protocol     Every 12 hours dosing NOT indicated for C.  Difficile, CNS or non-anaerobic infections    Gris Rangel PharmD  [phone]

## 2021-11-02 NOTE — PROGRESS NOTES
Problem: Self Care Deficits Care Plan (Adult)  Goal: *Acute Goals and Plan of Care (Insert Text)  Description: Occupational Therapy Goals  Initiated 11/2/2021 within 7 day(s). 1.  Patient will perform grooming with supervision/set-up standing at sink for 5 minutes or more. 2.  Patient will perform upper body dressing with modified independence. 3.  Patient will perform lower body dressing with modified independence. 4.  Patient will perform toilet transfers with supervision/set-up. 5.  Patient will perform all aspects of toileting with supervision/set-up. 6.  Patient will participate in upper extremity therapeutic exercise/activities with independence for 10 minutes. 7.  Patient will utilize energy conservation techniques during functional activities with verbal, visual, and tactile cues. OCCUPATIONAL THERAPY EVALUATION    Patient: Tomy Santiago (73 y.o. female)  Date: 11/2/2021  Primary Diagnosis: Syncope and collapse [R55]  Severe dehydration [E86.0]  Acute renal injury ([facility] [address] [N17.9]  Closed head injury [S09.90XA]  Anticoagulated [Z79.01]        Precautions:   Fall  PLOF: mod I for ADls and transfers     ASSESSMENT :  Based on the objective data described below, the patient presents with decreased strength, endurance and balance for carryover of ADLs and transfers following above mentioned medical diagnosis. Pt presented supine in bed at the beginning of session and agrees to participate with therapy. Pt participate with bed mobility, supine<>sit, sit<>stand, lateral stepping at EOB and LB dressing before returning to bed. Pt reports sudden fatigue with few steps. Encouraged pt to increase OOB times or sitting up in chair to improve endurance and strength. Pt's bed was placed in chair position at the end of session. Patient will benefit from skilled intervention to address the above impairments.   Patient's rehabilitation potential is considered to be Good  Factors which may influence rehabilitation potential include:   [x]             None noted  []             Mental ability/status  []             Medical condition  []             Home/family situation and support systems  []             Safety awareness  []             Pain tolerance/management  []             Other:      PLAN :  Recommendations and Planned Interventions:   [x]               Self Care Training                  [x]      Therapeutic Activities  [x]               Functional Mobility Training   []      Cognitive Retraining  [x]               Therapeutic Exercises           [x]      Endurance Activities  [x]               Balance Training                    []      Neuromuscular Re-Education  []               Visual/Perceptual Training     [x]      Home Safety Training  [x]               Patient Education                   [x]      Family Training/Education  []               Other (comment):    Frequency/Duration: Patient will be followed by occupational therapy 1-2 times per day/4-7 days per week to address goals. Discharge Recommendations: Home Health  Further Equipment Recommendations for Discharge: N/A     SUBJECTIVE:   Patient stated  I get tired very easily.     OBJECTIVE DATA SUMMARY:     Past Medical History:   Diagnosis Date    CAD (coronary artery disease)     Diabetes ([facility] [address]     Gastrointestinal disorder     Gout     Hypertension     MI [facility] infarction) [facility]     Morbid obesity with BMI of 45.0-49.9, adult ([facility]     Reflux      Past Surgical History:   Procedure Laterality Date    HX CATARACT REMOVAL Bilateral 2019    AR CARDIAC SURG PROCEDURE UNLIST      PCI 15 years ago/ with stent placement    VASCULAR SURGERY PROCEDURE UNLIST       Barriers to Learning/Limitations: None  Compensate with: visual, verbal, tactile, kinesthetic cues/model    Home Situation:   Home Situation  Home Environment: Private residence  # Steps to Enter: 0  One/Two Story Residence: One story  Living Alone: Yes  Support Systems: Child(sonali)  Patient Expects to be Discharged to[de-identified] Apartment  Current DME Used/Available at Home: Walker, rolling  Tub or Shower Type: Shower  []  Right hand dominant   []  Left hand dominant    Cognitive/Behavioral Status:  Neurologic State: Alert  Orientation Level: Oriented X4  Cognition: Appropriate for age attention/concentration; Impaired decision making  Safety/Judgement: Awareness of environment; Fall prevention    Skin: intact  Edema: none    Vision/Perceptual:    Tracking: Able to track stimulus in all quadrants w/o difficulty    Coordination: BUE  Coordination: Within functional limits  Fine Motor Skills-Upper: Left Intact; Right Intact    Gross Motor Skills-Upper: Left Intact; Right Intact  Balance:  Sitting: Intact  Standing: Intact; With support  Strength: BUE  Strength: Generally decreased, functional  Tone & Sensation: BUE  Sensation: Intact  Range of Motion: BUE  AROM: Generally decreased, functional  Functional Mobility and Transfers for ADLs:  Bed Mobility:  Supine to Sit: Supervision  Sit to Supine: Supervision  Scooting: Stand-by assistance;Supervision  Transfers:  Sit to Stand: Contact guard assistance  Stand to Sit: Contact guard assistance  ADL Assessment:   Feeding: Independent    Oral Facial Hygiene/Grooming: Contact guard assistance;Stand-by assistance    Upper Body Dressing: Contact guard assistance;Stand-by assistance    Lower Body Dressing: Contact guard assistance;Stand-by assistance    Toileting: Contact guard assistance  ADL Intervention:  Lower Body Dressing Assistance  Dressing Assistance: Contact guard assistance;Stand-by assistance  Socks: Contact guard assistance;Stand-by assistance  Leg Crossed Method Used: Yes  Position Performed: Seated edge of bed  Cues: Don;Doff    Cognitive Retraining  Safety/Judgement: Awareness of environment; Fall prevention  Pain:  Pain level pre-treatment: 0/10   Pain level post-treatment: 0/10   Pain Intervention(s): Medication (see MAR);  Rest, Ice, Repositioning   Response to intervention: Nurse notified, See doc flow    Activity Tolerance:   Fair     Please refer to the flowsheet for vital signs taken during this treatment. After treatment:   [] Patient left in no apparent distress sitting up in chair  [x] Patient left in no apparent distress in bed  [x] Call bell left within reach  [x] Nursing notified  [] Caregiver present  [] Bed alarm activated    COMMUNICATION/EDUCATION:   [x] Role of Occupational Therapy in the acute care setting  [x] Home safety education was provided and the patient/caregiver indicated understanding. [x] Patient/family have participated as able in goal setting and plan of care. [x] Patient/family agree to work toward stated goals and plan of care. [] Patient understands intent and goals of therapy, but is neutral about his/her participation. [] Patient is unable to participate in goal setting and plan of care. Thank you for this referral.  Jeanne Ballard OTR/L  Time Calculation: 15 mins    Eval Complexity: History: LOW Complexity : Brief history review ; Examination: LOW Complexity : 1-3 performance deficits relating to physical, cognitive , or psychosocial skils that result in activity limitations and / or participation restrictions ; Decision Making:MEDIUM Complexity : Patient may present with comorbidities that affect occupational performnce.  Miniml to moderate modification of tasks or assistance (eg, physical or verbal ) with assesment(s) is necessary to enable patient to complete evaluation

## 2021-11-02 NOTE — PROGRESS NOTES
RENAL CONSULT PROGRESS NOTE   2021    Patient:  Terry Jensen  :  1945  Gender:  female  MRN #:  [id number]    Reason for Consult: Acute renal failure     Assessment:    Terry Jensen is a 78y.o. year old female  with h/o ID diabetes mellitus, CAD, s/p stent , morbid obesity , Cardiomyopathy with EF 25-30 % in 2021 , EDGAR on CPAP   Presented to the ED with a syncopal episode. She has been taking lasix , coreg and aldactone. Denied vomiting, diarrhea and  NSAIDS intake  Clinically she looks dehydrated and hypotensive overnight   No obstruction in USG . Acute renal failure most likely  ATN due to hypotension and volume depletion , mild alkalosis also suggest volume contraction       Plan:     -Decent urine output over night. Renal function improved, and almost close to her baseline . Creatinine this morning is 1.1 mg/dl   - After Arriaza's catheter she made 2150 cc, Looks like she was retaining urine , not sure what is the cause ? Uterine fibroid . Consider Gyn consult vs urology    - Resume her home medicines  prior to discharge   - Please give iv lasix 80 mg prn for worsening SOB/pulmonary congestion   - Consider cardiology consult if she has decompensation leading to syncope and AME     - intake and output recording   - Avoid NSAIDS , contrast and nephrotoxin  - Agree to treat UTI with antibiotics  - Dose all meds and antibiotics for current eGFR    # Hypokalemia - replace 40 meq KCL  Keep level around 4- 4.5 mmol/lt     I will sign of as renal function improved. I am always available for any questions and concern. Will see her in office once in 6-8 weeks for follow up .     Subjective:   She denied any symptoms, no shortness of breath and chest pain   Asking for regular diet   Decent urine output   BP more stable  Feels better        Intake/Output Summary (Last 24 hours) at 2021 1048  Last data filed at 2021 0202  Gross per 24 hour   Intake 522 ml   Output 1300 ml   Net -778 ml Objective:    Visit Vitals  /66   Pulse 70   Temp 98 °F (36.7 °C)   Resp 16   Wt 105.7 kg (233 lb)   SpO2 100%   BMI 41.27 kg/m²       Physical Exam:    Pt awake,  alert and comfortable  HEENT: No JVD, dry mucosa   Lung: clear to auscultation  Heart: s1s2 heard with systolic murmur  Ext: no edema and pulsation intact     Laboratory Data:    Lab Results   Component Value Date    BUN 24 (H) 11/02/2021    BUN 46 (H) 11/01/2021    BUN 70 (H) 10/31/2021     11/02/2021     11/01/2021     (L) 10/31/2021    CO2 27 11/02/2021    CO2 27 11/01/2021    CO2 25 10/31/2021     Lab Results   Component Value Date    WBC 5.2 11/02/2021    HGB 11.9 (L) 11/02/2021    HCT 37.2 11/02/2021     No components found for: CALCIUM, PHOSPHORUS, MAGNESIUM  Lab Results   Component Value Date    HDL 49 08/25/2020     No results found for: SPECIMENTYP, TURBIDITY, UGLU    Imaging Reveiwed:  10/30/21:  CT abdomen/pelvis:- 1. Sigmoid colonic epiploic appendagitis which may be associated with focal  pain, age-indeterminate but new finding since 12/12/2018. Otherwise, no acute  intra-abdominal or intrapelvic obstructive or inflammatory process.     2. Diverticulosis without diverticulitis.  No findings of bowel obstruction or  significant stool burden.     3. Small hiatus hernia.      USG retropritoneum: Right kidney measures 9.5 cm in length and is normal in echogenicity without  hydronephrosis, masses, stones or cysts.     Left kidney measures 9.4 cm in length and is normal in echogenicity without  hydronephrosis, masses, stones or cysts.           Heather Rosenthal MD   [facility]

## 2021-11-02 NOTE — PROGRESS NOTES
Hospitalist Progress Note    Patient: Kamilla Pruett MRN: [id number]  CSN: [id number]    YOB: 1945  Age: 78 y.o.   Sex: female    DOA: 10/29/2021 LOS:  LOS: 4 days          Chief Complaint:    acute CHF exacerbation.     Assessment/Plan     44-year-old female with insulin-dependent type 2 diabetes mellitus, morbid obesity, hypertension, CAD with drug-eluting stent-off plavix last admission and hx of dvt  on anticoagulation, and cardiomyopathy with EF of 25-30% is admitted syncope , ame and abdomen pain.      Syncope   negative head CT for bleeding  Mri brain no acute stroke   V/q scan negative for pe   pvl no dvt   ce x3 wnl      boarderline bp remained bp   Continue to hold medication for anti-HTN      Severe dehydration  , AME   improved  Likely due to urinary retention   No fibroid indicated from ct -do not think gyn will help it without proof of fibroid -will have vaginal ultrasound to see if we can find anything can contribute to the issue      Abdominal pain - epiploic appendagitis   No intervention needed per surgeon   Continue iv abx   Improving      Chronic combined systolic and diastolic CHF  EF 73-35 %  So far compensated   --Hold lasix and spirolactone, coreg due to ame and hypotension   --Monitor for fluid overload  Cardiologist consult tomorrow     CAD with drug-eluting stent on anticoagulation  Continue aspirin , bbb hold due to hypotension      hx of dvt on xarelto   Will hold xarelto due to cr      Insulin-dependent type 2 diabetes mellitus-last A1C of 7.6  Low dose lantus   Diabetic diet change to clear one due to gi issue      BMI 41  Aggressive lifestyle modification needed     EDGAR on CPAP  CPAP qhs    Palliative care updated Code Status: DNR/DNI     Disposition :  Patient Active Problem List   Diagnosis Code    Chest pain R07.9    CAD (coronary artery disease) I25.10    Hypertension I10    Gout M10.9    Anticoagulated Z79.01    Abnormal nuclear stress test R94.39    Cardiomyopathy ([facility] [address] I42.9    Right leg weakness R29.898    CAD S/P percutaneous coronary angioplasty I25.10, Z98.61    Insulin dependent type 2 diabetes mellitus (HCC) E11.9, Z79.4    BMI 40.0-44.9, adult ([facility]) Z68.41    S/P drug eluting coronary stent placement Z95.5    EDGAR (obstructive sleep apnea) G47.33    CAP (community acquired pneumonia) J18.9    Acute on chronic combined systolic and diastolic ACC/AHA stage C congestive heart failure ([facility]) I50.43    Syncope and collapse R55    Closed head injury S09.90XA    Severe dehydration E86.0    Acute renal injury ([facility]) N17.9    Constipation K59.00    Chronic combined systolic and diastolic heart failure ([facility]) I50.42    Abdominal pain R10.9    Epiploic appendagitis K63.89    History of DVT (deep vein thrombosis) Z86.718    Abnormal abdominal CT scan R93.5    Urinary retention R33.9       Subjective:    Really wants to advance her diet    Review of systems:    Constitutional: denies fevers, chills, myalgias  Respiratory: denies SOB, cough  Cardiovascular: denies chest pain, palpitations  Gastrointestinal: denies nausea, vomiting, diarrhea      Vital signs/Intake and Output:  Visit Vitals  /66   Pulse 70   Temp 98 °F (36.7 °C)   Resp 16   Wt 105.7 kg (233 lb)   SpO2 100%   BMI 41.27 kg/m²     Current Shift:  No intake/output data recorded.   Last three shifts:  10/31 1901 - 11/02 0700  In: 3646.5 [P.O.:984; I.V.:2662.5]  Out: 2450 [Urine:2450]    Exam:    General: Older, debilitated AAF  Head/Neck: NCAT, supple, No masses, No lymphadenopathy  CVS:Regular rate and rhythm, no M/R/G, S1/S2 heard, no thrill  Lungs:Clear to auscultation bilaterally, no wheezes, rhonchi, or rales  Abdomen: Soft, Nontender, No distention, Normal Bowel sounds, No hepatomegaly  Extremities: No C/C/E, pulses palpable 2+  Skin:normal texture and turgor, no rashes, no lesions  Neuro:grossly normal , follows commands  Psych:appropriate                Labs: Results: Chemistry Recent Labs     11/02/21  0100 11/01/21  0300 10/31/21  0224   * 143* 111*    138 134*   K 3.4* 3.8 3.4*    105 101   CO2 27 27 25   BUN 24* 46* 70*   CREA 1.11 1.93* 4.92*   CA 8.4* 8.4* 8.4*   AGAP 5 6 8   BUCR 22* 24* 14   AP 58 56 55   TP 5.9* 6.1* 6.1*   ALB 2.4* 2.5* 2.5*   GLOB 3.5 3.6 3.6   AGRAT 0.7* 0.7* 0.7*      CBC w/Diff Recent Labs     11/02/21  0100 11/01/21  0300 10/31/21  0224   WBC 5.2 5.3 8.6   RBC 4.22 4.29 4.03*   HGB 11.9* 12.4 11.8*   HCT 37.2 37.8 36.5    134* 194      Cardiac Enzymes No results for input(s): CPK, CKND1, SARWAT in the last 72 hours. No lab exists for component: CKRMB, TROIP   Coagulation No results for input(s): PTP, INR, APTT, INREXT in the last 72 hours. Lipid Panel Lab Results   Component Value Date/Time    Cholesterol, total 139 08/25/2020 05:30 AM    HDL Cholesterol 49 08/25/2020 05:30 AM    LDL, calculated 58.6 08/25/2020 05:30 AM    VLDL, calculated 31.4 08/25/2020 05:30 AM    Triglyceride 157 (H) 08/25/2020 05:30 AM    CHOL/HDL Ratio 2.8 08/25/2020 05:30 AM      BNP No results for input(s): BNPP in the last 72 hours.    Liver Enzymes Recent Labs     11/02/21 0100   TP 5.9*   ALB 2.4*   AP 58      Thyroid Studies No results found for: T4, T3U, TSH, TSHEXT     Procedures/imaging: see electronic medical records for all procedures/Xrays and details which were not copied into this note but were reviewed prior to creation of Claudean Bair, MD

## 2021-11-02 NOTE — PROGRESS NOTES
[address] [facility] Pharmacokinetic Monitoring Service - Vancomycin    Consulting Provider: Emili Mas   Indication: Empiric txt  Target Concentration: Goal AUC/JERRY 400-600 mg*hr/L  Day of Therapy: 4  Additional Antimicrobials: ciprofloxacin, Flagyl    Pertinent Laboratory Values: Wt Readings from Last 1 Encounters:   11/01/21 105.7 kg (233 lb)     Temp Readings from Last 1 Encounters:   11/02/21 98.4 °F (36.9 °C)     No components found for: PROCAL  Estimated Creatinine Clearance: 51.7 mL/min (based on SCr of 1.11 mg/dL). Recent Labs     11/02/21  0100 11/01/21  0300   WBC 5.2 5.3       Pertinent Cultures:  Culture Date Source Results   10/29/21 Urine NG   MRSA Nasal Swab: N/A. Non-respiratory infection. Oleg Iniguez     @[facility]@    Assessment:  Date/Time Current Dose Concentration Timing of Concentration (h) AUC   11/2/21 1055 Per level 12.0 mcg/ml 18h 1m since last dose 363 mg*h/L (trough 9.9 mcg/ml)   Note: Serum concentrations collected for AUC dosing may appear elevated if collected in close proximity to the dose administered, this is not necessarily an indication of toxicity    Plan:  Current dosing regimen is sub-therapeutic  Increase dose to Vancomycin 1500 mg Iv q24h starting 11/2/21 @1700  Pharmacy will continue to monitor patient and adjust therapy as indicated    Thank you for the consult,  VIVIAN Cesar  11/2/2021 2:34 PM

## 2021-11-02 NOTE — CONSULTS
Former[facility]: [phone]  [facility]: [phone]     Patient Name: Juan Parikh  YOB: 1945    Date of Initial Consult: 11/2/2021  Reason for Consult: Care decisions  Requesting Provider: Dr. Titi West   Primary Care Physician: Naomy Shoemaker MD      SUMMARY:   Juan Parikh is a 78 y.o. female with a past history of insulin-dependent type 2 diabetes, morbid obesity, hypertension, CAD and cardiomyopathy, who was admitted on 10/29/2021 from home with a diagnosis of syncopal episode. Current medical issues leading to Palliative Medicine involvement include: 68-year-old female with multiple comorbid conditions admitted with syncope after passing out at home. Palliative medicine was consulted for care decisions. CHIEF COMPLAINT: syncope episode at home    HPI/SUBJECTIVE:    Past history as above. Ms. Marly Felix is a 68-year-old female with multiple comorbid conditions who presented to the ER after a syncopal episode at home. She was seated on the toilet attempting to have a bowel movement when she passed out. Reports having issues with constipation for several days prior to admission. Patient also reports abdominal discomfort and multiple vomiting episodes over the past week prior to admission. Denies pain or shortness of breath during our visit however does endorse nausea. The patient is:   [x] Verbal and participatory  [] Non-participatory due to:     GOALS OF CARE: DNR/DNI, limited interventions, no feeding tube  Patient/Health Care Proxy Stated Goals: Prolong life  TREATMENT PREFERENCES:   Code Status: DNR/DNI         PALLIATIVE DIAGNOSES:   1. Encounter for palliative care/goals of care  2. Syncope  3. AME  4. Debility       PLAN:   1. Encounter for palliative care/goals of care - seen at bedside along with Ms. Morelia Corona RN. Ms. Marly Felix has reclined in bed, awake, alert and oriented x4.   Patient is very pleasant and highly engaged in conversation with our team.  Denies pain or shortness of breath, on room air. Reports that she is hungry but also has some nausea. Denies vomiting. Ms. Chino Lynn lives alone. She has 3 sons, 1 of whom is . Patient's son, Ju Raygoza, lives in [place] and her other son, Cate Kong, lives 5 minutes from patient's house and he checks on her frequently. Asked patient if she had ever completed paperwork to designate surrogate decision makers and she said she had not. Introduced the [employer] and explained to patient that in the absence of any paperwork that legal decision making would default to a majority of her 2 adult sons, Johnnie Batista and Miguel.  Asked patient if she had thought about who she would want to make her medical decisions in the event she could not and she replied that she would want Miguel and his wife, Paty Lopez, to be her medical power of . Introduced the [place] advanced medical directive to patient and offered to complete with her today to formalize her MPOA's and she was agreeable. AMD completed today naming her son, Cate Kong, as her primary MPOA and her daughter-in-law/Alfredo's wife, Paty Lopez, as her secondary MPOA. Copy placed on chart for scanning and copies provided to patient. Introduced and discussed goals of care including benefits and burdens of resuscitation, intubation and ventilation and Ms. Chino Lynn was very clear that she would not want to be resuscitated in the event of cardiac arrest and that she would not want to be placed on a ventilator for any reason. Patient agrees with DNR/DNI status. Also discussed benefits and burdens of artificial nutrition and feeding tubes and shared that feeding tubes do not prevent the risk of aspiration. Patient stated that she would not want to have a feeding tube.   Introduced and explained the physician order for scope of treatment form and offered to complete with patient today to formalize her wishes and she was agreeable. Physician order for scope of treatment form completed today with the following measures: DNR/DNI, limited medical interventions and no feeding tube. Copy placed on chart for scanning and copies provided to patient. Ms. Man Hendrickson shared that she would be sharing her wishes and these decisions with her son Sp Cottrell so he is aware of her decisions. Goals of care are DNR/DNI, limited interventions and no feeding tube. Attending and bedside RN notified of goals of care change. 2. Syncope - primary team managing, cardiac monitoring, cardiology consult, echo in July 2021 revealed EF of 25-30%    3. AME - primary team managing, nephrology consult, IV fluids, montelongo catheter, BUN/creatinine 61 and 3.77 on admission which cameron to 70 and 4.92 but is now trending down    4. Debility - PPS 70, lives alone, her son and daughter-in-law check on her frequently, daughter in law prepares her meals every day, does not use any assistive devices for ambulation, reportedly independent with ADLs at home    5. Initial consult note routed to primary continuity provider    6. Communicated plan of care with: Palliative IDT, patient      Advance Care Planning:  [] The [facility] Interdisciplinary Team has updated the ACP Navigator with [address] and Patient Capacity    Primary Decision [facility] ([address]):   Primary Decision Maker: Waleska Pete - Child - [phone]    Secondary Decision Maker: Shellia Sandifer - Daughter-in-Law - [phone]    Medical Interventions: Limited additional interventions   Artificially Administered Nutrition: No feeding tube     As far as possible, the palliative care team has discussed with patient / health care proxy about goals of care / treatment preferences for patient.          HISTORY:     History obtained from: Chart and patient    Principal Problem:    Syncope and collapse (10/29/2021)    Active Problems:    CAD (coronary artery disease) (12/3/2018)      Hypertension (12/3/2018)      Anticoagulated (8/25/2020)      Insulin dependent type 2 diabetes mellitus ([facility] (8/28/2020)      BMI 40.0-44.9, adult (formerly [facility]) ()      EDGAR (obstructive sleep apnea) (7/21/2021)      Severe dehydration (10/29/2021)      Acute renal injury ([facility] (10/29/2021)      Constipation (10/29/2021)      Chronic combined systolic and diastolic heart failure ([facility] (10/30/2021)      Abdominal pain (10/30/2021)      Epiploic appendagitis (10/30/2021)      History of DVT (deep vein thrombosis) (10/30/2021)      Abnormal abdominal CT scan (10/30/2021)      Urinary retention (11/1/2021)      Past Medical History:   Diagnosis Date    CAD (coronary artery disease)     Diabetes ([facility]     Gastrointestinal disorder     Gout     Hypertension     MI (myocardial infarction) ([facility]     Morbid obesity with BMI of 45.0-49.9, adult ([facility]     Reflux       Past Surgical History:   Procedure Laterality Date    HX CATARACT REMOVAL Bilateral 2019    [place] CARDIAC SURG PROCEDURE UNLIST      PCI 15 years ago/ with stent placement    VASCULAR SURGERY PROCEDURE UNLIST        Family History   Problem Relation Age of Onset    Heart Disease Mother     Stroke Father      History reviewed, no pertinent family history.   Social History     Tobacco Use    Smoking status: Former Smoker    Smokeless tobacco: Never Used   Substance Use Topics    Alcohol use: No     Allergies   Allergen Reactions    Gabapentin Swelling     \"I'm back on it, they figured out it wasn't this medication causing me to swell up\"    Pcn [Penicillins] Angioedema    Lisinopril Swelling      Current Facility-Administered Medications   Medication Dose Route Frequency    famotidine (PEPCID) tablet 20 mg  20 mg Oral Q12H    metroNIDAZOLE (FLAGYL) IVPB premix 500 mg  500 mg IntraVENous Q12H    diphenhydrAMINE (BENADRYL) injection 12.5 mg  12.5 mg IntraVENous Q6H PRN    butalbital-acetaminophen-caffeine (FIORICET, ESGIC) -40 mg per tablet 1 Tablet  1 Tablet Oral Q6H PRN    ciprofloxacin (CIPRO) 400 mg in D5W IVPB (premix)  400 mg IntraVENous Q24H    Vancomycin Pharmacy to Dose  1 Each Other Rx Dosing/Monitoring    insulin glargine (LANTUS) injection 15 Units  15 Units SubCUTAneous QHS    heparin (porcine) injection 5,000 Units  5,000 Units SubCUTAneous Q8H    sodium chloride (NS) flush 5-40 mL  5-40 mL IntraVENous Q8H    sodium chloride (NS) flush 5-40 mL  5-40 mL IntraVENous PRN    acetaminophen (TYLENOL) tablet 650 mg  650 mg Oral Q6H PRN    Or    acetaminophen (TYLENOL) suppository 650 mg  650 mg Rectal Q6H PRN    polyethylene glycol (MIRALAX) packet 17 g  17 g Oral DAILY PRN    ondansetron (ZOFRAN ODT) tablet 4 mg  4 mg Oral Q8H PRN    Or    ondansetron (ZOFRAN) injection 4 mg  4 mg IntraVENous Q6H PRN    allopurinoL (ZYLOPRIM) tablet 100 mg  100 mg Oral BID    aspirin delayed-release tablet 81 mg  81 mg Oral DAILY    atorvastatin (LIPITOR) tablet 10 mg  10 mg Oral QHS    [Held by provider] carvediloL (COREG) tablet 12.5 mg  12.5 mg Oral BID WITH MEALS    [Held by provider] rivaroxaban (XARELTO) tablet 15 mg  15 mg Oral DAILY    insulin lispro (HUMALOG) injection   SubCUTAneous AC&HS    glucose chewable tablet 16 g  16 g Oral PRN    glucagon (GLUCAGEN) injection 1 mg  1 mg IntraMUSCular PRN    dextrose (D50W) injection syrg 12.5-25 g  25-50 mL IntraVENous PRN          Clinical Pain Assessment (nonverbal scale for nonverbal patients): Clinical Pain Assessment  Severity: 0          Duration: for how long has pt been experiencing pain (e.g., 2 days, 1 month, years)  Frequency: how often pain is an issue (e.g., several times per day, once every few days, constant)     FUNCTIONAL ASSESSMENT:     Palliative Performance Scale (PPS):  PPS: 70    ECOG  ECOG Status : Ambulatory, but unable to carry out work activities     PSYCHOSOCIAL/SPIRITUAL SCREENING:      Any spiritual / Jainism concerns:  [] Yes /  [x] No    Caregiver Burnout:  [] Yes /  [] No /  [x] No Caregiver Present      Anticipatory grief assessment:   [x] Normal  / [] Maladaptive        REVIEW OF SYSTEMS:     Systems: constitutional, ears/nose/mouth/throat, respiratory, gastrointestinal, genitourinary, musculoskeletal, integumentary, neurologic, psychiatric, endocrine. Positive findings noted below. Modified ESAS Completed by: provider           Pain: 0     Nausea: 1     Dyspnea: 0     Constipation: Yes         Positive and pertinent negative findings in ROS are noted above in HPI. The following systems were [x] reviewed / [] unable to be reviewed as noted in HPI  Other findings are noted below. PHYSICAL EXAM:     Constitutional: lying in bed, awake, alert and oriented x4, very engaging and conversant  Eyes: pupils equal, anicteric  ENMT: no nasal discharge, moist mucous membranes  Cardiovascular: regular rhythm, distal pulses intact  Respiratory: breathing not labored, symmetric  Gastrointestinal: soft non-tender, + nausea, no vomiting  Last bowel movement: none recorded  Musculoskeletal: no deformity, no tenderness to palpation  Skin: warm, dry  Neurologic: following commands, moving all extremities  Psychiatric: full affect, no hallucinations    Other: Wt Readings from Last 3 Encounters:   11/01/21 105.7 kg (233 lb)   07/27/21 117.1 kg (258 lb 3.2 oz)   10/09/20 119.3 kg (263 lb)     Blood pressure 127/89, pulse 76, temperature 98.4 °F (36.9 °C), resp. rate 16, weight 105.7 kg (233 lb), SpO2 99 %.   Pain:  Pain Scale 1: Visual  Pain Intensity 1: 0     Pain Location 1: Neck, Back                LAB AND IMAGING FINDINGS:     Lab Results   Component Value Date/Time    WBC 5.2 11/02/2021 01:00 AM    HGB 11.9 (L) 11/02/2021 01:00 AM    PLATELET 360 65/16/8974 01:00 AM     Lab Results   Component Value Date/Time    Sodium 141 11/02/2021 01:00 AM    Potassium 3.4 (L) 11/02/2021 01:00 AM    Chloride 109 11/02/2021 01:00 AM    CO2 27 11/02/2021 01:00 AM BUN 24 (H) 11/02/2021 01:00 AM    Creatinine 1.11 11/02/2021 01:00 AM    Calcium 8.4 (L) 11/02/2021 01:00 AM    Magnesium 2.5 10/29/2021 03:44 PM      Lab Results   Component Value Date/Time    Alk. phosphatase 58 11/02/2021 01:00 AM    Protein, total 5.9 (L) 11/02/2021 01:00 AM    Albumin 2.4 (L) 11/02/2021 01:00 AM    Globulin 3.5 11/02/2021 01:00 AM     Lab Results   Component Value Date/Time    INR 1.0 10/29/2021 03:44 PM    Prothrombin time 12.9 10/29/2021 03:44 PM    aPTT 25.1 10/29/2021 03:44 PM      No results found for: IRON, FE, TIBC, IBCT, PSAT, FERR   No results found for: PH, PCO2, PO2  No components found for: [place]   Lab Results   Component Value Date/Time     10/30/2021 07:05 AM    CK - MB 1.5 10/30/2021 07:05 AM              Total time: 70 minutes    > 50% counseling / coordination:  Time spent in direct consultation with the patient, medical team, and family     Prolonged service was provided for  []30 min   []75 min in face to face time in the presence of the patient, spent as noted above. Time Start:   Time End:     Disclaimer: Sections of this note are dictated using utilizing voice recognition software, which may have resulted in some phonetic based errors in grammar and contents. Even though attempts were made to correct all the mistakes, some may have been missed, and remained in the body of the document. If questions arise, please contact our department.

## 2021-11-02 NOTE — CONSULTS
[facility] Consult Note      Patient: Kamilla Pruett MRN: [id number]  SSN: [id number]    YOB: 1945  Age: 78 y.o. Sex: female    Date of Consultation: 10/31/2021  Referring Physician: Madison Baird MD  Reason for Consultation: CHF    Chief complain: I passed out     HPI: 65-year-old female brought to emergency room with episode of loss of consciousness. Patient mentioned that she has trouble walking. She managed to go to the bathroom and was sitting on the commode and she passed out. She does not remember how she was landed on the floor. She does not remember the duration of the episode. She is very minimally ambulatory. She denies any chest pain. She is complaining of shortness of breath on exertion. She denies any orthopnea or PND. She denies any palpitation. She denies any smoking or alcohol abuse.    She is complaining of abdominal pain and vomiting for few days prior to this episode    Past Medical History:   Diagnosis Date    CAD (coronary artery disease)     Diabetes ([address]     Gastrointestinal disorder     Gout     Hypertension     MI (myocardial infarction) ([address]     Morbid obesity with BMI of 45.0-49.9, adult ([address]     Reflux      Past Surgical History:   Procedure Laterality Date    HX CATARACT REMOVAL Bilateral 2019    [place] CARDIAC SURG PROCEDURE UNLIST      PCI 15 years ago/ with stent placement    VASCULAR SURGERY PROCEDURE UNLIST       Current Facility-Administered Medications   Medication Dose Route Frequency    Vancomycin Random 11/2/21 at 1300  1 Each Other ONCE    diphenhydrAMINE (BENADRYL) injection 12.5 mg  12.5 mg IntraVENous Q6H PRN    butalbital-acetaminophen-caffeine (FIORICET, ESGIC) -40 mg per tablet 1 Tablet  1 Tablet Oral Q6H PRN    Vancomycin Random 11/1/21 at 1300  1 Each Other ONCE    ciprofloxacin (CIPRO) 400 mg in D5W IVPB (premix)  400 mg IntraVENous Q24H    metroNIDAZOLE (FLAGYL) IVPB premix 500 mg  500 mg IntraVENous Q8H    famotidine (PEPCID) tablet 20 mg  20 mg Oral DAILY    Vancomycin Pharmacy to Dose  1 Each Other Rx Dosing/Monitoring    insulin glargine (LANTUS) injection 15 Units  15 Units SubCUTAneous QHS    heparin (porcine) injection 5,000 Units  5,000 Units SubCUTAneous Q8H    sodium chloride (NS) flush 5-40 mL  5-40 mL IntraVENous Q8H    sodium chloride (NS) flush 5-40 mL  5-40 mL IntraVENous PRN    acetaminophen (TYLENOL) tablet 650 mg  650 mg Oral Q6H PRN    Or    acetaminophen (TYLENOL) suppository 650 mg  650 mg Rectal Q6H PRN    polyethylene glycol (MIRALAX) packet 17 g  17 g Oral DAILY PRN    ondansetron (ZOFRAN ODT) tablet 4 mg  4 mg Oral Q8H PRN    Or    ondansetron (ZOFRAN) injection 4 mg  4 mg IntraVENous Q6H PRN    allopurinoL (ZYLOPRIM) tablet 100 mg  100 mg Oral BID    aspirin delayed-release tablet 81 mg  81 mg Oral DAILY    atorvastatin (LIPITOR) tablet 10 mg  10 mg Oral QHS    [Held by provider] carvediloL (COREG) tablet 12.5 mg  12.5 mg Oral BID WITH MEALS    [Held by provider] rivaroxaban (XARELTO) tablet 15 mg  15 mg Oral DAILY    insulin lispro (HUMALOG) injection   SubCUTAneous AC&HS    glucose chewable tablet 16 g  16 g Oral PRN    glucagon (GLUCAGEN) injection 1 mg  1 mg IntraMUSCular PRN    dextrose (D50W) injection syrg 12.5-25 g  25-50 mL IntraVENous PRN       Allergies and Intolerances: Allergies   Allergen Reactions    Gabapentin Swelling     \"I'm back on it, they figured out it wasn't this medication causing me to swell up\"    Pcn [Penicillins] Angioedema    Lisinopril Swelling       Family History:   Family History   Problem Relation Age of Onset    Heart Disease Mother     Stroke Father        Social History:   She  reports that she has quit smoking. She has never used smokeless tobacco.  She  reports no history of alcohol use. Review of Systems:     Gen: No fever, chills, malaise, weight loss/gain.    Heent: No headache, rhinorrhea, epistaxis, ear pain, hearing loss, sinus pain, neck pain/stiffness, sore throat. Heart: positive shortness of breath on exertion, syncope, No chest pain, palpitations, pnd, or orthopnea. Resp: No cough, hemoptysis, wheezing and dyspnea  GI:   positive nausea, vomiting, no diarrhea, constipation, melena or hematochezia. : No urinary obstruction, dysuria or hematuria. Derm: No rash, new skin lesion or pruritis. Musc/skeletal:  Positive bone or joint complains. Vasc:  positive edema, No cyanosis or claudication. Endo: No heat/cold intolerance, no polyuria,polydipsia or polyphagia. Neuro: No unilateral weakness, numbness, tingling. No seizures. Heme: No easy bruising or bleeding. Physical:   Patient Vitals for the past 6 hrs:   Temp Pulse Resp BP SpO2   11/01/21 1952 97.6 °F (36.4 °C) 70 17 (!) 123/57 99 %         Exam:   General Appearance: Comfortable, not using accessory muscles of respiration. HEENT: JUAN. HEAD: Atraumatic  NECK: No JVD, no thyroidomeglay. CAROTIDS: No bruit  LUNGS: Clear bilaterally. HEART: S1+S2 audible, no murmur, no pericardial rub. ABD: Non-tender, BS Audible    EXT:bilateral lower extremity + edema, and no cysnosis. VASCULAR EXAM: Pulses are intact. PSYCHIATRIC EXAM: Mood is appropriate. MUSCULOSKELETAL: Grossly no joint deformity.   NEUROLOGICAL: AAO times 3, Motor and sensory sytem intact     Review of Data:   LABS:   Lab Results   Component Value Date/Time    WBC 5.3 11/01/2021 03:00 AM    HGB 12.4 11/01/2021 03:00 AM    HCT 37.8 11/01/2021 03:00 AM    PLATELET 311 (L) 10/67/0207 03:00 AM     Lab Results   Component Value Date/Time    Sodium 138 11/01/2021 03:00 AM    Potassium 3.8 11/01/2021 03:00 AM    Chloride 105 11/01/2021 03:00 AM    CO2 27 11/01/2021 03:00 AM    Glucose 143 (H) 11/01/2021 03:00 AM    BUN 46 (H) 11/01/2021 03:00 AM    Creatinine 1.93 (H) 11/01/2021 03:00 AM     Lab Results   Component Value Date/Time    Cholesterol, total 139 08/25/2020 05:30 AM    HDL Cholesterol 49 08/25/2020 05:30 AM    LDL, calculated 58.6 08/25/2020 05:30 AM    Triglyceride 157 (H) 08/25/2020 05:30 AM     No components found for: GPT  Lab Results   Component Value Date/Time    Hemoglobin A1c 7.6 (H) 10/29/2021 03:44 PM         Cardiology Procedures:   Results for orders placed or performed during the hospital encounter of 10/29/21   EKG, 12 LEAD, INITIAL   Result Value Ref Range    Ventricular Rate 81 BPM    Atrial Rate 81 BPM    P-R Interval 166 ms    QRS Duration 174 ms    Q-T Interval 464 ms    QTC Calculation (Bezet) 539 ms    Calculated P Axis 77 degrees    Calculated R Axis -42 degrees    Calculated T Axis 134 degrees    Diagnosis       Sinus rhythm with frequent premature ventricular complexes  Left axis deviation  Left bundle branch block  Abnormal ECG               Impression / Plan:    Patient Active Problem List   Diagnosis Code    Chest pain R07.9    CAD (coronary artery disease) I25.10    Hypertension I10    Gout M10.9    Anticoagulated Z79.01    Abnormal nuclear stress test R94.39    Cardiomyopathy (HCC) I42.9    Right leg weakness R29.898    CAD S/P percutaneous coronary angioplasty I25.10, Z98.61    Insulin dependent type 2 diabetes mellitus (HCC) E11.9, Z79.4    BMI 40.0-44.9, adult ([facility]) Z68.41    S/P drug eluting coronary stent placement Z95.5    EDGAR (obstructive sleep apnea) G47.33    CAP (community acquired pneumonia) J18.9    Acute on chronic combined systolic and diastolic ACC/AHA stage C congestive heart failure (HCC) I50.43    Syncope and collapse R55    Closed head injury S09.90XA    Severe dehydration E86.0    Acute renal injury ([address] N17.9    Constipation K59.00    Chronic combined systolic and diastolic heart failure (HCC) I50.42    Abdominal pain R10.9    Epiploic appendagitis K63.89    History of DVT (deep vein thrombosis) Z86.718    Abnormal abdominal CT scan R93.5    Urinary retention R33.9     CAD PCI LCx  Acute renal failure    Cardiac catheterization was done in 08/2020 reported    single-vessel occlusive CAD.     Proximal left circumflex artery have 90% stenosis just proximal to the previously deployed mid left circumflex artery stent. This lesion is treated by 2.75x23 mm Xience Audrey drug-eluting stent with excellent results.     Rest of coronary anatomy is patent with minimal luminal irregularity. .     LVEDP 30 mmHg.     Echocardiogram done in July 2021 reported    · Image quality for this study was technically difficult. Contrast used: DEFINITY. · Left Ventricle: Mildly dilated left ventricle. Mild concentric hypertrophy. The estimated EF is 25 - 30%. Severely reduced systolic function. There is moderate (grade 2) left ventricular diastolic dysfunction E/E' ratio is 25.72. Wall Scoring: The left ventricular wall motion is globally hypokinetic. · Left Atrium: Mildly dilated left atrium. · Right Ventricle: Mildly dilated right ventricle. Mildly reduced systolic function. Assessment of RV function: TAPSE = 18 mm. · Right Atrium: Mildly dilated right atrium. · Tricuspid Valve: Tricuspid valve not well visualized. No stenosis. Tricuspid regurgitation is inadequate for estimation of right ventricular systolic pressure. Plan:     Continue aspirin and atorvastatin. Agree with holding carvedilol, losartan and spironolactone. Hold Lasix    Strict input output. Monitor renal function. Continue tele monitoring.             Signed By: Freddie Philippe MD     November 1, 2021

## 2021-11-02 NOTE — ROUTINE PROCESS
Bedside and Verbal shift change report given to Amisha Luke RN (oncoming nurse) by Prince Montes RN (offgoing nurse). Report included the following information SBAR, Kardex, Procedure Summary and Recent Results.

## 2021-11-03 ENCOUNTER — APPOINTMENT (OUTPATIENT)
Dept: NON INVASIVE DIAGNOSTICS | Age: 76
DRG: 641 | End: 2021-11-03
Attending: INTERNAL MEDICINE
Payer: MEDICARE

## 2021-11-03 LAB
ALBUMIN SERPL-MCNC: 2.4 G/DL (ref 3.4–5)
ALBUMIN/GLOB SERPL: 0.7 {RATIO} (ref 0.8–1.7)
ALP SERPL-CCNC: 63 U/L (ref 45–117)
ALT SERPL-CCNC: 16 U/L (ref 13–56)
ANION GAP SERPL CALC-SCNC: 4 MMOL/L (ref 3–18)
AST SERPL-CCNC: 21 U/L (ref 10–38)
BILIRUB SERPL-MCNC: 0.3 MG/DL (ref 0.2–1)
BUN SERPL-MCNC: 13 MG/DL (ref 7–18)
BUN/CREAT SERPL: 14 (ref 12–20)
CALCIUM SERPL-MCNC: 8.5 MG/DL (ref 8.5–10.1)
CHLORIDE SERPL-SCNC: 108 MMOL/L (ref 100–111)
CO2 SERPL-SCNC: 28 MMOL/L (ref 21–32)
CREAT SERPL-MCNC: 0.94 MG/DL (ref 0.6–1.3)
ECHO AO ROOT DIAM: 3.42 CM
ECHO AV AREA PEAK VELOCITY: 1.85 CM2
ECHO AV AREA VTI: 1.87 CM2
ECHO AV AREA/BSA PEAK VELOCITY: 0.9 CM2/M2
ECHO AV AREA/BSA VTI: 0.9 CM2/M2
ECHO AV MEAN GRADIENT: 6.6 MMHG
ECHO AV PEAK GRADIENT: 11.85 MMHG
ECHO AV PEAK VELOCITY: 172.09 CM/S
ECHO AV VTI: 34.5 CM
ECHO EST RA PRESSURE: 3 MMHG
ECHO IVC PROX: 1.17 CM
ECHO LA AREA 4C: 14.34 CM2
ECHO LA MAJOR AXIS: 3.58 CM
ECHO LA MINOR AXIS: 1.72 CM
ECHO LA VOL 2C: 26.92 ML (ref 22–52)
ECHO LA VOL 4C: 36.87 ML (ref 22–52)
ECHO LA VOL BP: 33.87 ML (ref 22–52)
ECHO LA VOL/BSA BIPLANE: 16.28 ML/M2 (ref 16–28)
ECHO LA VOLUME INDEX A2C: 12.94 ML/M2 (ref 16–28)
ECHO LA VOLUME INDEX A4C: 17.73 ML/M2 (ref 16–28)
ECHO LV E' LATERAL VELOCITY: 9.17 CM/S
ECHO LV E' SEPTAL VELOCITY: 5.23 CM/S
ECHO LV EDV A2C: 72.79 ML
ECHO LV EDV A4C: 146.31 ML
ECHO LV EDV BP: 108.55 ML (ref 56–104)
ECHO LV EDV INDEX A4C: 70.3 ML/M2
ECHO LV EDV INDEX BP: 52.2 ML/M2
ECHO LV EDV NDEX A2C: 35 ML/M2
ECHO LV EJECTION FRACTION A2C: 18 PERCENT
ECHO LV EJECTION FRACTION A4C: 39 PERCENT
ECHO LV EJECTION FRACTION BIPLANE: 29.1 PERCENT (ref 55–100)
ECHO LV ESV A2C: 59.41 ML
ECHO LV ESV A4C: 89.57 ML
ECHO LV ESV BP: 77.01 ML (ref 19–49)
ECHO LV ESV INDEX A2C: 28.6 ML/M2
ECHO LV ESV INDEX A4C: 43.1 ML/M2
ECHO LV ESV INDEX BP: 37 ML/M2
ECHO LV INTERNAL DIMENSION DIASTOLIC: 5.69 CM (ref 3.9–5.3)
ECHO LV INTERNAL DIMENSION SYSTOLIC: 4.7 CM
ECHO LV IVSD: 0.89 CM (ref 0.6–0.9)
ECHO LV MASS 2D: 188.6 G (ref 67–162)
ECHO LV MASS INDEX 2D: 90.7 G/M2 (ref 43–95)
ECHO LV POSTERIOR WALL DIASTOLIC: 0.85 CM (ref 0.6–0.9)
ECHO LVOT CARDIAC OUTPUT: 5.26 LITER/MINUTE
ECHO LVOT DIAM: 1.96 CM
ECHO LVOT PEAK GRADIENT: 4.45 MMHG
ECHO LVOT PEAK VELOCITY: 105.51 CM/S
ECHO LVOT SV: 13.4 ML
ECHO LVOT SV: 64.6 ML
ECHO LVOT VTI: 21.43 CM
ECHO MV A VELOCITY: 103.48 CM/S
ECHO MV E DECELERATION TIME (DT): 226.38 MS
ECHO MV E VELOCITY: 62.5 CM/S
ECHO MV E/A RATIO: 0.6
ECHO MV E/E' LATERAL: 6.82
ECHO MV E/E' RATIO (AVERAGED): 9.38
ECHO MV E/E' SEPTAL: 11.95
ECHO RA AREA 4C: 9.19 CM2
ECHO RIGHT VENTRICULAR SYSTOLIC PRESSURE (RVSP): 35 MMHG
ECHO RV INTERNAL DIMENSION: 3.48 CM
ECHO RV TAPSE: 1.43 CM (ref 1.5–2)
ECHO TV REGURGITANT MAX VELOCITY: 284.11 CM/S
ECHO TV REGURGITANT PEAK GRADIENT: 32.29 MMHG
ERYTHROCYTE [DISTWIDTH] IN BLOOD BY AUTOMATED COUNT: 13.4 % (ref 11.6–14.5)
GLOBULIN SER CALC-MCNC: 3.6 G/DL (ref 2–4)
GLUCOSE BLD STRIP.AUTO-MCNC: 106 MG/DL (ref 70–110)
GLUCOSE BLD STRIP.AUTO-MCNC: 156 MG/DL (ref 70–110)
GLUCOSE BLD STRIP.AUTO-MCNC: 176 MG/DL (ref 70–110)
GLUCOSE BLD STRIP.AUTO-MCNC: 226 MG/DL (ref 70–110)
GLUCOSE SERPL-MCNC: 113 MG/DL (ref 74–99)
HCT VFR BLD AUTO: 37.5 % (ref 35–45)
HGB BLD-MCNC: 11.9 G/DL (ref 12–16)
LA VOL DISK BP: 32.82 ML (ref 22–52)
LVOT MG: 2.62 MMHG
MCH RBC QN AUTO: 28.1 PG (ref 24–34)
MCHC RBC AUTO-ENTMCNC: 31.7 G/DL (ref 31–37)
MCV RBC AUTO: 88.4 FL (ref 78–100)
PLATELET # BLD AUTO: 186 K/UL (ref 135–420)
PMV BLD AUTO: 11 FL (ref 9.2–11.8)
POTASSIUM SERPL-SCNC: 3.6 MMOL/L (ref 3.5–5.5)
PROT SERPL-MCNC: 6 G/DL (ref 6.4–8.2)
RBC # BLD AUTO: 4.24 M/UL (ref 4.2–5.3)
SODIUM SERPL-SCNC: 140 MMOL/L (ref 136–145)
WBC # BLD AUTO: 6.6 K/UL (ref 4.6–13.2)

## 2021-11-03 PROCEDURE — 74011250637 HC RX REV CODE- 250/637: Performed by: HOSPITALIST

## 2021-11-03 PROCEDURE — 97116 GAIT TRAINING THERAPY: CPT

## 2021-11-03 PROCEDURE — 74011250636 HC RX REV CODE- 250/636: Performed by: HOSPITALIST

## 2021-11-03 PROCEDURE — 65660000000 HC RM CCU STEPDOWN

## 2021-11-03 PROCEDURE — 82962 GLUCOSE BLOOD TEST: CPT

## 2021-11-03 PROCEDURE — 36415 COLL VENOUS BLD VENIPUNCTURE: CPT

## 2021-11-03 PROCEDURE — 74011636637 HC RX REV CODE- 636/637: Performed by: FAMILY MEDICINE

## 2021-11-03 PROCEDURE — 85027 COMPLETE CBC AUTOMATED: CPT

## 2021-11-03 PROCEDURE — 93321 DOPPLER ECHO F-UP/LMTD STD: CPT

## 2021-11-03 PROCEDURE — 74011636637 HC RX REV CODE- 636/637: Performed by: HOSPITALIST

## 2021-11-03 PROCEDURE — 97110 THERAPEUTIC EXERCISES: CPT

## 2021-11-03 PROCEDURE — 74011250637 HC RX REV CODE- 250/637: Performed by: FAMILY MEDICINE

## 2021-11-03 PROCEDURE — 80053 COMPREHEN METABOLIC PANEL: CPT

## 2021-11-03 PROCEDURE — 74011250636 HC RX REV CODE- 250/636: Performed by: FAMILY MEDICINE

## 2021-11-03 PROCEDURE — 99232 SBSQ HOSP IP/OBS MODERATE 35: CPT | Performed by: NURSE PRACTITIONER

## 2021-11-03 RX ORDER — METOPROLOL SUCCINATE 25 MG/1
25 TABLET, EXTENDED RELEASE ORAL DAILY
Status: DISCONTINUED | OUTPATIENT
Start: 2021-11-04 | End: 2021-11-06 | Stop reason: HOSPADM

## 2021-11-03 RX ADMIN — INSULIN LISPRO 2 UNITS: 100 INJECTION, SOLUTION INTRAVENOUS; SUBCUTANEOUS at 11:30

## 2021-11-03 RX ADMIN — INSULIN LISPRO 4 UNITS: 100 INJECTION, SOLUTION INTRAVENOUS; SUBCUTANEOUS at 17:41

## 2021-11-03 RX ADMIN — HEPARIN SODIUM 5000 UNITS: 5000 INJECTION INTRAVENOUS; SUBCUTANEOUS at 00:56

## 2021-11-03 RX ADMIN — Medication 5 ML: at 14:00

## 2021-11-03 RX ADMIN — FAMOTIDINE 20 MG: 20 TABLET, FILM COATED ORAL at 22:27

## 2021-11-03 RX ADMIN — ALLOPURINOL 100 MG: 100 TABLET ORAL at 22:27

## 2021-11-03 RX ADMIN — INSULIN LISPRO 2 UNITS: 100 INJECTION, SOLUTION INTRAVENOUS; SUBCUTANEOUS at 22:27

## 2021-11-03 RX ADMIN — CIPROFLOXACIN 400 MG: 2 INJECTION, SOLUTION INTRAVENOUS at 01:00

## 2021-11-03 RX ADMIN — ATORVASTATIN CALCIUM 10 MG: 10 TABLET, FILM COATED ORAL at 22:27

## 2021-11-03 RX ADMIN — HEPARIN SODIUM 5000 UNITS: 5000 INJECTION INTRAVENOUS; SUBCUTANEOUS at 17:00

## 2021-11-03 RX ADMIN — HEPARIN SODIUM 5000 UNITS: 5000 INJECTION INTRAVENOUS; SUBCUTANEOUS at 08:46

## 2021-11-03 RX ADMIN — Medication 10 ML: at 07:30

## 2021-11-03 RX ADMIN — METRONIDAZOLE 500 MG: 500 INJECTION, SOLUTION INTRAVENOUS at 22:28

## 2021-11-03 RX ADMIN — INSULIN GLARGINE 15 UNITS: 100 INJECTION, SOLUTION SUBCUTANEOUS at 22:28

## 2021-11-03 RX ADMIN — METRONIDAZOLE 500 MG: 500 INJECTION, SOLUTION INTRAVENOUS at 10:47

## 2021-11-03 RX ADMIN — BUTALBITAL, ACETAMINOPHEN AND CAFFEINE 1 TABLET: 50; 325; 40 TABLET ORAL at 16:11

## 2021-11-03 RX ADMIN — BUTALBITAL, ACETAMINOPHEN AND CAFFEINE 1 TABLET: 50; 325; 40 TABLET ORAL at 08:06

## 2021-11-03 RX ADMIN — FAMOTIDINE 20 MG: 20 TABLET, FILM COATED ORAL at 08:45

## 2021-11-03 RX ADMIN — VANCOMYCIN HYDROCHLORIDE 1500 MG: 10 INJECTION, POWDER, LYOPHILIZED, FOR SOLUTION INTRAVENOUS at 18:19

## 2021-11-03 RX ADMIN — ALLOPURINOL 100 MG: 100 TABLET ORAL at 09:00

## 2021-11-03 RX ADMIN — ASPIRIN 81 MG: 81 TABLET, COATED ORAL at 08:45

## 2021-11-03 NOTE — PROGRESS NOTES
DC Plan: home with [facility] vs SNF    Care manager rounded on patient who was cheerful and pleased that she had eaten something; when care manager discussed discharge plans, patient was very clear that she would be going home to [facility] until she was strong enough to return to her own home independent. Will continue to follow for PT/OT recommendations - currently patient is still with montelongo catheter for retention. Care Management Interventions  PCP Verified by CM:  Yes  Mode of Transport at Discharge: Self  Transition of Care Consult (CM Consult): Discharge Planning, [address]: Yes  Physical Therapy Consult: Yes  Occupational Therapy Consult: Yes  Support Systems: Child(sonali)  Confirm Follow Up Transport: Family  The Plan for Transition of Care is Related to the Following Treatment Goals : syncope and collapse  The Patient and/or Patient Representative was Provided with a Choice of Provider and Agrees with the Discharge Plan?: Yes  Name of the Patient Representative Who was Provided with a Choice of Provider and Agrees with the Discharge Plan: Terry Jensen, patient  [place] of Choice List was Provided with Basic Dialogue that Supports the Patient's Individualized Plan of Care/Goals, Treatment Preferences and Shares the Quality Data Associated with the Providers?: Yes  Discharge Location  Discharge Placement: Home with home health

## 2021-11-03 NOTE — PROGRESS NOTES
Occupational Therapy Treatment Attempt     Chart reviewed. Attempted Occupational Therapy Treatment, however, patient unable to be seen due to:  []  Nausea/vomiting  []  Eating  []  Pain  []  Patient too lethargic  []  Off Unit for testing/procedure  []  Dialysis treatment in progress  []  Telemetry Results  [x]  Other: pt reports fatigue after participation with PT. Requests to return later. Will f/u later as patient's schedule allows.   David Bryant, OTR/L

## 2021-11-03 NOTE — PROGRESS NOTES
Problem: Pressure Injury - Risk of  Goal: *Prevention of pressure injury  Description: Document Sheldon Scale and appropriate interventions in the flowsheet.   Outcome: Progressing Towards Goal  Note: Pressure Injury Interventions:  Sensory Interventions: Assess changes in LOC         Activity Interventions: Pressure redistribution bed/mattress(bed type)    Mobility Interventions: Pressure redistribution bed/mattress (bed type)    Nutrition Interventions: Document food/fluid/supplement intake    Friction and Shear Interventions: Apply protective barrier, creams and emollients                Problem: Patient Education: Go to Patient Education Activity  Goal: Patient/Family Education  Outcome: Progressing Towards Goal

## 2021-11-03 NOTE — PROGRESS NOTES
[address] [facility] Pharmacokinetic Monitoring Service - Vancomycin    Consulting Provider: Tavares Adam   Indication: Empiric Txt  Target Concentration: Goal AUC/JERRY 400-600 mg*hr/L  Day of Therapy: 5  Additional Antimicrobials: Flagyl    Pertinent Laboratory Values: Wt Readings from Last 1 Encounters:   11/03/21 108 kg (238 lb)     Temp Readings from Last 1 Encounters:   11/03/21 97.5 °F (36.4 °C)     No components found for: PROCAL  Estimated Creatinine Clearance: 61.8 mL/min (based on SCr of 0.94 mg/dL). Recent Labs     11/03/21  0159 11/02/21  0100   WBC 6.6 5.2       Pertinent Cultures:  Culture Date Source Results   10/29/21 Urine NG   MRSA Nasal Swab: N/A. Non-respiratory infection. Rylee Church     [unfilled]    Assessment:  Date/Time Current Dose Concentration Timing of Concentration (h) AUC   NA NA NA NA NA   Note: Serum concentrations collected for AUC dosing may appear elevated if collected in close proximity to the dose administered, this is not necessarily an indication of toxicity    Plan:  Current dosing regimen is therapeutic  Continue current dose vancomycin 1500 mg q24h  Repeat vancomycin concentration ordered for 11/4/21 @ 0400   Pharmacy will continue to monitor patient and adjust therapy as indicated    Thank you for the consult,  VIVIAN Kearney  11/3/2021 9:10 AM

## 2021-11-03 NOTE — PROGRESS NOTES
Problem: Mobility Impaired (Adult and Pediatric)  Goal: *Acute Goals and Plan of Care (Insert Text)  Description: Physical Therapy Goals   Initiated 11/1/2021 and to be accomplished within 5 day(s)  1. Patient will move from supine <> sit with mod I in prep for out of bed activity and change of position. 2.  Patient will perform sit<> stand with mod I with LRAD in prep for transfers/ambulation. 3.  Patient will transfer from bed <> chair with mod I with LRAD for time up in chair for completion of ADL activity. 4.  Patient will ambulate 100 feet with S/LRAD for improved functional mobility at discharge. Outcome: Progressing Towards Goal   PHYSICAL THERAPY TREATMENT    Patient: Dia Lesches (55 y.o. female)  Date: 11/3/2021  Diagnosis: Syncope and collapse [R55]  Severe dehydration [E86.0]  Acute renal injury ([address] [N17.9]  Closed head injury [S09.90XA]  Anticoagulated [Z79.01]   Syncope and collapse    Precautions: Fall  PLOF: ambulatory    ASSESSMENT:  No assistance needed for bed mobility. Elevated bed to the height of bed at home for sit to stand. 1st sit to stand pt sat back down immediately. 2nd attempt was without issue. Pt ambulated 20ft with RW, wide MANGO, decreased step height, no LOB or knee buckling. Performed seated ROM strengthening exercises on (B)LEs. Pt left sitting EOB per pt request.  Noted montelongo catheter is leaking, notified nurse. Progression toward goals:   []      Improving appropriately and progressing toward goals  [x]      Improving slowly and progressing toward goals  []      Not making progress toward goals and plan of care will be adjusted     PLAN:  Patient continues to benefit from skilled intervention to address the above impairments. Continue treatment per established plan of care.   Discharge Recommendations:  Home Health with family supervision  Further Equipment Recommendations for Discharge:  rolling walker     SUBJECTIVE:   Patient stated My foot hurts.    OBJECTIVE DATA SUMMARY:   Critical Behavior:  Neurologic State: Alert, Appropriate for age  Orientation Level: Oriented X4  Cognition: Appropriate decision making, Appropriate for age attention/concentration, Appropriate safety awareness, Follows commands  Safety/Judgement: Awareness of environment, Fall prevention  Functional Mobility Training:  Bed Mobility:    Supine to Sit: Supervision    Scooting: Supervision  Transfers:  Sit to Stand: Contact guard assistance; Minimum assistance  Stand to Sit: Contact guard assistance; Stand-by assistance  Balance:  Sitting: Intact  Standing: Intact; With support   Ambulation/Gait Training:  Distance (ft): 20 Feet (ft)  Assistive Device: Gait belt; Walker, rolling  Ambulation - Level of Assistance: Contact guard assistance; Stand-by assistance  Gait Abnormalities: Decreased step clearance  Base of Support: Widened        Pain:  Pain level pre-treatment: 8/10  Pain level post-treatment: 8/10   Pain Intervention(s): Medication (see MAR); Rest, Ice, Repositioning   Response to intervention: Nurse notified, See doc flow    Activity Tolerance:   fair  Please refer to the flowsheet for vital signs taken during this treatment. After treatment:   [] Patient left in no apparent distress sitting up in chair  [x] Patient left in no apparent distress in bed  [x] Call bell left within reach  [x] Nursing notified  [] Caregiver present  [] Bed alarm activated  [] SCDs applied      COMMUNICATION/EDUCATION:   [x]         Role of Physical Therapy in the acute care setting. [x]         Fall prevention education was provided and the patient/caregiver indicated understanding. [x]         Patient/family have participated as able in working toward goals and plan of care. [x]         Patient/family agree to work toward stated goals and plan of care. []         Patient understands intent and goals of therapy, but is neutral about his/her participation.   []         Patient is unable to participate in stated goals/plan of care: ongoing with therapy staff.  []         Other:        Mark Mora PTA   Time Calculation: 25 mins

## 2021-11-03 NOTE — ROUTINE PROCESS
Bedside and Verbal shift change report given to FADI [address] (oncoming nurse) by Neymar Franklin RN (offgoing nurse). Report included the following information SBAR, Kardex, MAR and Recent Results.

## 2021-11-03 NOTE — PROGRESS NOTES
Problem: Diabetes Self-Management  Goal: *Disease process and treatment process  Description: Define diabetes and identify own type of diabetes; list 3 options for treating diabetes. Outcome: Progressing Towards Goal  Goal: *Incorporating nutritional management into lifestyle  Description: Describe effect of type, amount and timing of food on blood glucose; list 3 methods for planning meals. Outcome: Progressing Towards Goal  Goal: *Incorporating physical activity into lifestyle  Description: State effect of exercise on blood glucose levels. Outcome: Progressing Towards Goal  Goal: *Developing strategies to promote health/change behavior  Description: Define the ABC's of diabetes; identify appropriate screenings, schedule and personal plan for screenings. Outcome: Progressing Towards Goal  Goal: *Using medications safely  Description: State effect of diabetes medications on diabetes; name diabetes medication taking, action and side effects. Outcome: Progressing Towards Goal  Goal: *Monitoring blood glucose, interpreting and using results  Description: Identify recommended blood glucose targets  and personal targets. Outcome: Progressing Towards Goal  Goal: *Prevention, detection, treatment of acute complications  Description: List symptoms of hyper- and hypoglycemia; describe how to treat low blood sugar and actions for lowering  high blood glucose level. Outcome: Progressing Towards Goal  Goal: *Prevention, detection and treatment of chronic complications  Description: Define the natural course of diabetes and describe the relationship of blood glucose levels to long term complications of diabetes.   Outcome: Progressing Towards Goal  Goal: *Developing strategies to address psychosocial issues  Description: Describe feelings about living with diabetes; identify support needed and support network  Outcome: Progressing Towards Goal  Goal: *Sick day guidelines  Outcome: Progressing Towards Goal

## 2021-11-03 NOTE — PROGRESS NOTES
Cardiology Progress Note        Patient: Abhijeet Washburn        Sex: female          DOA: 10/29/2021  YOB: 1945      Age:  78 y.o.        LOS:  LOS: 4 days      Patient seen and examined, chart reviewed.     Assessment/Plan     Patient Active Problem List   Diagnosis Code    Chest pain R07.9    CAD (coronary artery disease) I25.10    Hypertension I10    Gout M10.9    Anticoagulated Z79.01    Abnormal nuclear stress test R94.39    Cardiomyopathy ([address] I42.9    Right leg weakness R29.898    CAD S/P percutaneous coronary angioplasty I25.10, Z98.61    Insulin dependent type 2 diabetes mellitus (HCC) E11.9, Z79.4    BMI 40.0-44.9, adult ([facility]) Z68.41    S/P drug eluting coronary stent placement Z95.5    EDGAR (obstructive sleep apnea) G47.33    CAP (community acquired pneumonia) J18.9    Acute on chronic combined systolic and diastolic ACC/AHA stage C congestive heart failure (HCC) I50.43    Syncope and collapse R55    Closed head injury S09.90XA    Severe dehydration E86.0    Acute renal injury (HCC) N17.9    Constipation K59.00    Chronic combined systolic and diastolic heart failure (HCC) I50.42    Abdominal pain R10.9    Epiploic appendagitis K63.89    History of DVT (deep vein thrombosis) Z86.718    Abnormal abdominal CT scan R93.5    Urinary retention R33.9    Encounter for palliative care Z51.5    Debility R53.81      CAD PCI LCx  Acute renal failure improving      Cardiac catheterization was done in 08/2020 reported     single-vessel occlusive CAD.     Proximal left circumflex artery have 90% stenosis just proximal to the previously deployed mid left circumflex artery stent.  This lesion is treated by 2.75x23 mm Xience Audrey drug-eluting stent with excellent results.     Rest of coronary anatomy is patent with minimal luminal irregularity.  .     LVEDP 30 mmHg.     Echocardiogram done in July 2021 reported     · Image quality for this study was technically difficult. Contrast used: DEFINITY. · Left Ventricle: Mildly dilated left ventricle. Mild concentric hypertrophy. The estimated EF is 25 - 30%. Severely reduced systolic function. There is moderate (grade 2) left ventricular diastolic dysfunction E/E' ratio is 25.72. Wall Scoring: The left ventricular wall motion is globally hypokinetic. · Left Atrium: Mildly dilated left atrium. · Right Ventricle: Mildly dilated right ventricle. Mildly reduced systolic function. Assessment of RV function: TAPSE = 18 mm. · Right Atrium: Mildly dilated right atrium. · Tricuspid Valve: Tricuspid valve not well visualized. No stenosis. Tricuspid regurgitation is inadequate for estimation of right ventricular systolic pressure.        Plan:      Continue aspirin and atorvastatin. Agree with holding carvedilol, losartan and spironolactone. Hold Lasix    Limited echocardiogram  Strict input output. Monitor renal function. Continue tele monitoring                Subjective:    cc:    Denies any chest pain, I feel much better today. REVIEW OF SYSTEMS:     General: No fevers or chills. Cardiovascular: No chest pain,No palpitations, No orthopnea, No PND, No leg swelling, No claudication  Pulmonary: No   Dyspnea   Gastrointestinal: No nausea, vomiting, bleeding  Neurology: No Dizziness    Objective:      Visit Vitals  BP (!) 122/58 (BP 1 Location: Right upper arm, BP Patient Position: At rest)   Pulse 88   Temp 97.4 °F (36.3 °C)   Resp 16   Wt 108.1 kg (238 lb 5.1 oz)   SpO2 100%   BMI 42.22 kg/m²     Body mass index is 42.22 kg/m². Physical Exam:  General Appearance: Comfortable, not using accessory muscles of respiration. HEENT: JUAN. HEAD: Atraumatic  NECK: No JVD, no thyroidomeglay. CAROTIDS: no bruit  LUNGS: Clear bilaterally. HEART: S1+S2 audible, no murmur, no pericardial rub. ABD: Non-tender, BS Audible    EXT: No edema, and no cyanosis.   NEUROLOGICAL: AAO times 3, No motor and sensory deficit    Medication:  Current Facility-Administered Medications   Medication Dose Route Frequency    famotidine (PEPCID) tablet 20 mg  20 mg Oral Q12H    metroNIDAZOLE (FLAGYL) IVPB premix 500 mg  500 mg IntraVENous Q12H    vancomycin (VANCOCIN) 1500 mg in  ml infusion  1,500 mg IntraVENous Q24H    traMADoL (ULTRAM) tablet 50 mg  50 mg Oral Q6H PRN    diphenhydrAMINE (BENADRYL) capsule 25 mg  25 mg Oral Q6H PRN    diphenhydrAMINE (BENADRYL) injection 12.5 mg  12.5 mg IntraVENous Q6H PRN    butalbital-acetaminophen-caffeine (FIORICET, ESGIC) -40 mg per tablet 1 Tablet  1 Tablet Oral Q6H PRN    ciprofloxacin (CIPRO) 400 mg in D5W IVPB (premix)  400 mg IntraVENous Q24H    Vancomycin Pharmacy to Dose  1 Each Other Rx Dosing/Monitoring    insulin glargine (LANTUS) injection 15 Units  15 Units SubCUTAneous QHS    heparin (porcine) injection 5,000 Units  5,000 Units SubCUTAneous Q8H    sodium chloride (NS) flush 5-40 mL  5-40 mL IntraVENous Q8H    sodium chloride (NS) flush 5-40 mL  5-40 mL IntraVENous PRN    acetaminophen (TYLENOL) tablet 650 mg  650 mg Oral Q6H PRN    Or    acetaminophen (TYLENOL) suppository 650 mg  650 mg Rectal Q6H PRN    polyethylene glycol (MIRALAX) packet 17 g  17 g Oral DAILY PRN    ondansetron (ZOFRAN ODT) tablet 4 mg  4 mg Oral Q8H PRN    Or    ondansetron (ZOFRAN) injection 4 mg  4 mg IntraVENous Q6H PRN    allopurinoL (ZYLOPRIM) tablet 100 mg  100 mg Oral BID    aspirin delayed-release tablet 81 mg  81 mg Oral DAILY    atorvastatin (LIPITOR) tablet 10 mg  10 mg Oral QHS    [Held by provider] rivaroxaban (XARELTO) tablet 15 mg  15 mg Oral DAILY    insulin lispro (HUMALOG) injection   SubCUTAneous AC&HS    glucose chewable tablet 16 g  16 g Oral PRN    glucagon (GLUCAGEN) injection 1 mg  1 mg IntraMUSCular PRN    dextrose (D50W) injection syrg 12.5-25 g  25-50 mL IntraVENous PRN               Lab/Data Reviewed:       Recent Labs     11/02/21  0100 11/01/21  0300 10/31/21  0224   WBC 5.2 5.3 8.6   HGB 11.9* 12.4 11.8*   HCT 37.2 37.8 36.5    134* 194     Recent Labs     11/02/21  0100 11/01/21  0300 10/31/21  0224    138 134*   K 3.4* 3.8 3.4*    105 101   CO2 27 27 25   * 143* 111*   BUN 24* 46* 70*   CREA 1.11 1.93* 4.92*   CA 8.4* 8.4* 8.4*       Signed By: Renita Todd MD     November 2, 2021

## 2021-11-03 NOTE — PROGRESS NOTES
Hospitalist Progress Note    Patient: Soren Hughes MRN: [id number]  [facility]: [id number]    YOB: 1945  Age: 78 y.o.   Sex: female    DOA: 10/29/2021 LOS:  LOS: 5 days          Chief Complaint:    acute CHF exacerbation.     Assessment/Plan     79-year-old female with insulin-dependent type 2 diabetes mellitus, morbid obesity, hypertension, CAD with drug-eluting stent-off plavix last admission and hx of dvt  on anticoagulation, and cardiomyopathy with EF of 25-30% is admitted syncope , ame and abdomen pain.      Syncope   negative head CT for bleeding  Mri brain no acute stroke   V/q scan negative for pe   pvl no dvt   ce x3 wnl      BP controlled without antihypertensive  Continue to hold medication for anti-HTN: carvedilol, losartan and spironolactone     Severe dehydration, AME -resolved  Creatinine wnl   urinary retention, montelongo in place, possibly due to uterine fibroids, multiple calcified and noncalcified uterine fibroids per pelv US   Nephrology following  F/u with Dr. Barron King in 6-8 weeks     Pt still has montelongo in place, need to challenge her and see if she can urinate without it    Abdominal pain - epiploic appendagitis   No intervention needed per surgeon   Continue iv abx   Improving   If pain recurs, can see gen surg OP   Will advance to bland regular diet today     Chronic combined systolic and diastolic CHF  EF 27-74 %  So far compensated   Cardiology following, appreciate Dr. Victoriano Baron expertise, recommending continuing aspirin and atorvastatin and agree with continuing to hold carvedilol, losartan and spironolactone and Lasix     CAD with drug-eluting stent on anticoagulation  Continue aspirin , bbb hold due to hypotension      hx of dvt on xarelto   Xarelto is being held due to creatinine of almost 5, creatinine normal now at 0.94, will resume Xarelto     Insulin-dependent type 2 diabetes mellitus-last A1C of 7.6  Low dose lantus   Diabetic diet change to clear one due to gi issue    BMI 41  Aggressive lifestyle modification needed     EDGAR on CPAP  CPAP [facility]    Palliative care updated Code Status: DNR/DNI     Disposition : Awaiting safe discharge plan. Patient thinks that her son can take care of all of her needs, however patient has significant mobility and debility issues that would likely be overwhelming. I feel like patient would benefit from spending time in a SNF and transitioning to home from there. Will ask palliative care to assist with a family meeting to look at a safe discharge plan. Patient Active Problem List   Diagnosis Code    Chest pain R07.9    CAD (coronary artery disease) I25.10    Hypertension I10    Gout M10.9    Anticoagulated Z79.01    Abnormal nuclear stress test R94.39    Cardiomyopathy ([address] I42.9    Right leg weakness R29.898    CAD S/P percutaneous coronary angioplasty I25.10, Z98.61    Insulin dependent type 2 diabetes mellitus (HCC) E11.9, Z79.4    BMI 40.0-44.9, adult ([facility]) Z68.41    S/P drug eluting coronary stent placement Z95.5    EDGAR (obstructive sleep apnea) G47.33    CAP (community acquired pneumonia) J18.9    Acute on chronic combined systolic and diastolic ACC/AHA stage C congestive heart failure (HCC) I50.43    Syncope and collapse R55    Closed head injury S09.90XA    Severe dehydration E86.0    Acute renal injury (HCC) N17.9    Constipation K59.00    Chronic combined systolic and diastolic heart failure (HCC) I50.42    Abdominal pain R10.9    Epiploic appendagitis K63.89    History of DVT (deep vein thrombosis) Z86.718    Abnormal abdominal CT scan R93.5    Urinary retention R33.9    Encounter for palliative care Z51.5    Debility R53.81       Subjective:    Patient very excited about her diet advanced yesterday and stated that she had multiple servings of pudding and ice cream.  Very excited I plan to continue to advance her diet today.      Review of systems:    Constitutional: denies fevers, chills, myalgias  Respiratory: denies SOB, cough  Cardiovascular: denies chest pain, palpitations  Gastrointestinal: denies nausea, vomiting, diarrhea      Vital signs/Intake and Output:  Visit Vitals  /63   Pulse 72   Temp 97.5 °F (36.4 °C)   Resp 18   Ht 5' 3\" (1.6 m)   Wt 108 kg (238 lb)   SpO2 100%   BMI 42.16 kg/m²     Current Shift:  No intake/output data recorded. Last three shifts:  11/01 1901 - 11/03 0700  In: 960 [P.O.:660; I.V.:300]  Out: 1850 [Urine:1850]    Exam:    General: Older, debilitated AAF  Head/Neck: NCAT, supple, No masses, No lymphadenopathy  CVS:Regular rate and rhythm, no M/R/G, S1/S2 heard, no thrill  Lungs:Clear to auscultation bilaterally, no wheezes, rhonchi, or rales  Abdomen: Soft, Nontender, No distention, Normal Bowel sounds, No hepatomegaly  Extremities: No C/C/E, pulses palpable 2+  Skin:normal texture and turgor, no rashes, no lesions  Neuro:grossly normal , follows commands  Psych:appropriate                Labs: Results:       Chemistry Recent Labs     11/03/21 0159 11/02/21 0100 11/01/21  0300   * 129* 143*    141 138   K 3.6 3.4* 3.8    109 105   CO2 28 27 27   BUN 13 24* 46*   CREA 0.94 1.11 1.93*   CA 8.5 8.4* 8.4*   AGAP 4 5 6   BUCR 14 22* 24*   AP 63 58 56   TP 6.0* 5.9* 6.1*   ALB 2.4* 2.4* 2.5*   GLOB 3.6 3.5 3.6   AGRAT 0.7* 0.7* 0.7*      CBC w/Diff Recent Labs     11/03/21 0159 11/02/21  0100 11/01/21  0300   WBC 6.6 5.2 5.3   RBC 4.24 4.22 4.29   HGB 11.9* 11.9* 12.4   HCT 37.5 37.2 37.8    173 134*      Cardiac Enzymes No results for input(s): CPK, CKND1, SARWAT in the last 72 hours. No lab exists for component: CKRMB, TROIP   Coagulation No results for input(s): PTP, INR, APTT, INREXT, INREXT in the last 72 hours.     Lipid Panel Lab Results   Component Value Date/Time    Cholesterol, total 139 08/25/2020 05:30 AM    HDL Cholesterol 49 08/25/2020 05:30 AM    LDL, calculated 58.6 08/25/2020 05:30 AM    VLDL, calculated 31.4 08/25/2020 05:30 AM    Triglyceride 157 (H) 08/25/2020 05:30 AM    CHOL/HDL Ratio 2.8 08/25/2020 05:30 AM      BNP No results for input(s): BNPP in the last 72 hours.    Liver Enzymes Recent Labs     11/03/21  0159   TP 6.0*   ALB 2.4*   AP 63      Thyroid Studies No results found for: T4, T3U, TSH, TSHEXT, TSHEXT     Procedures/imaging: see electronic medical records for all procedures/Xrays and details which were not copied into this note but were reviewed prior to creation of Cheryle Freeman MD

## 2021-11-03 NOTE — PROGRESS NOTES
[facility]: [phone]  [facility]: [phone]     Patient Name: Brittnee Arriaga  YOB: 1945    Date of Follow-up Visit: 11/3/2021  Reason for Consult: Care decisions  Requesting Provider: Dr. Tiara Baltazar   Primary Care Physician: Olivia Cannon MD      SUMMARY:   Brittnee Arriaga is a 78 y.o. female with a past history of insulin-dependent type 2 diabetes, morbid obesity, hypertension, CAD and cardiomyopathy, who was admitted on 10/29/2021 from home with a diagnosis of syncopal episode. Current medical issues leading to Palliative Medicine involvement include: 68-year-old female with multiple comorbid conditions admitted with syncope after passing out at home. Palliative medicine was consulted for care decisions. CHIEF COMPLAINT: syncope episode at home    HPI/SUBJECTIVE:    Past history as above. Ms. Diya Casillas is a 68-year-old female with multiple comorbid conditions who presented to the ER after a syncopal episode at home. She was seated on the toilet attempting to have a bowel movement when she passed out. Reports having issues with constipation for several days prior to admission. Patient also reports abdominal discomfort and multiple vomiting episodes over the past week prior to admission. Denies pain or shortness of breath during our visit however does endorse nausea. 11/3/2021:  Reclined in bed, awake, alert and oriented x4. Highly engaged in conversation. Good appetite, ate ~90% of breakfast.  Reports \"a little\" nausea but relates it to not having eaten until this morning. Denies vomiting, pain or shortness of breath. The patient is:   [x] Verbal and participatory  [] Non-participatory due to:     GOALS OF CARE: DNR/DNI, limited interventions, no feeding tube  Patient/Health Care Proxy Stated Goals: Prolong life  TREATMENT PREFERENCES:   Code Status: DNR/DNI         PALLIATIVE DIAGNOSES:   1.  Encounter for palliative care/goals of care  2. Syncope  3. AME  4. Debility        PLAN:   11/3/2021: Seen at bedside in room 332 this morning. Ms. Inez Chisholm is reclined in bed, awake, alert and oriented x4, watching TV. Highly engaged in conversation. Reports having a good appetite this morning and has eaten for the first time. Ate ~90% of her breakfast tray. Denies pain, nausea or shortness of breath. Does report \"a little\" nausea and attributes this to not having eaten until this morning. Reports that she has not yet had a bowel movement and thinks this may also be contributing to her mild nausea. Patient states that her son, Jett Marley, has been up to visit and she has shared with him the documents she completed with our team yesterday and has discussed her wishes with him. AMD on file. Physician order for scope of treatment on file. Goals of care are DNR/DNI, limited interventions and no feeding tube. Palliative medicine will sign off as goals of care have been established and paperwork completed. Palliative medicine remains available for reconsult if warranted or appropriate. Thank you for the opportunity to participate in the care of Ms. Gonsales. Please see below for previous conversations with the palliative medicine team:    1. Encounter for palliative care/goals of care - seen at bedside along with Ms. Laurel Avelar RN. Ms. Inez Chisholm has reclined in bed, awake, alert and oriented x4. Patient is very pleasant and highly engaged in conversation with our team.  Denies pain or shortness of breath, on room air. Reports that she is hungry but also has some nausea. Denies vomiting. Ms. Inez Chisholm lives alone. She has 3 sons, 1 of whom is . Patient's son, Jose Santamaria, lives in [place] and her other son, Jett Marley, lives 5 minutes from patient's house and he checks on her frequently. Asked patient if she had ever completed paperwork to designate surrogate decision makers and she said she had not. Introduced the [employer] and explained to patient that in the absence of any paperwork that legal decision making would default to a majority of her 2 adult sons, Christy Sotelo and Miguel.  Asked patient if she had thought about who she would want to make her medical decisions in the event she could not and she replied that she would want Miguel and his wife, Bhavna Weiner, to be her medical power of . Introduced the [place] advanced medical directive to patient and offered to complete with her today to formalize her MPOA's and she was agreeable. AMD completed today naming her son, Kirt Will, as her primary MPOA and her daughter-in-law/Alfredo's wife, Bhavna Weiner, as her secondary MPOA. Copy placed on chart for scanning and copies provided to patient. Introduced and discussed goals of care including benefits and burdens of resuscitation, intubation and ventilation and Ms. Kylah Philippe was very clear that she would not want to be resuscitated in the event of cardiac arrest and that she would not want to be placed on a ventilator for any reason. Patient agrees with DNR/DNI status. Also discussed benefits and burdens of artificial nutrition and feeding tubes and shared that feeding tubes do not prevent the risk of aspiration. Patient stated that she would not want to have a feeding tube. Introduced and explained the physician order for scope of treatment form and offered to complete with patient today to formalize her wishes and she was agreeable. Physician order for scope of treatment form completed today with the following measures: DNR/DNI, limited medical interventions and no feeding tube. Copy placed on chart for scanning and copies provided to patient. Ms. Kylah Philippe shared that she would be sharing her wishes and these decisions with her son Miguel so he is aware of her decisions. Goals of care are DNR/DNI, limited interventions and no feeding tube.   Attending and bedside RN notified of goals of care change. 2. Syncope - primary team managing, cardiac monitoring, cardiology consult, echo in July 2021 revealed EF of 25-30%    3. AME - primary team managing, nephrology consult, IV fluids, montelongo catheter, BUN/creatinine 61 and 3.77 on admission which cameron to 70 and 4.92 but is now trending down    4. Debility - PPS 70, lives alone, her son and daughter-in-law check on her frequently, daughter in law prepares her meals every day, does not use any assistive devices for ambulation, reportedly independent with ADLs at home    5. Initial consult note routed to primary continuity provider    6. Communicated plan of care with: Palliative IDT, patient      Advance Care Planning:  [] [facility] Interdisciplinary Team has updated the ACP Navigator with [address] and Patient Capacity    Primary Decision [facility] ([address]):   Primary Decision Maker: Stacey Redmond - Child - [phone]    Secondary Decision Maker: Suzanne Burns - Daughter-in-Law - [phone]    Medical Interventions: Limited additional interventions   Artificially Administered Nutrition: No feeding tube     As far as possible, the palliative care team has discussed with patient / health care proxy about goals of care / treatment preferences for patient.          HISTORY:     History obtained from: Chart and patient    Principal Problem:    Syncope and collapse (10/29/2021)    Active Problems:    CAD (coronary artery disease) (12/3/2018)      Hypertension (12/3/2018)      Anticoagulated (8/25/2020)      Insulin dependent type 2 diabetes mellitus ([address] (8/28/2020)      BMI 40.0-44.9, adult (HCC) ()      EDGAR (obstructive sleep apnea) (7/21/2021)      Severe dehydration (10/29/2021)      Acute renal injury ([address] (10/29/2021)      Constipation (10/29/2021)      Chronic combined systolic and diastolic heart failure ([address] (10/30/2021)      Abdominal pain (10/30/2021)      Epiploic appendagitis (10/30/2021)      History of DVT (deep vein thrombosis) (10/30/2021)      Abnormal abdominal CT scan (10/30/2021)      Urinary retention (11/1/2021)      Encounter for palliative care ()      Debility ()      Past Medical History:   Diagnosis Date    CAD (coronary artery disease)     Diabetes [address]     Gastrointestinal disorder     Gout     Hypertension     MI (myocardial infarction) ([facility]     Morbid obesity with BMI of 45.0-49.9, adult ([facility] [address] [facility]     Reflux       Past Surgical History:   Procedure Laterality Date    HX CATARACT REMOVAL Bilateral 2019    AK CARDIAC SURG PROCEDURE UNLIST      PCI 15 years ago/ with stent placement    VASCULAR SURGERY PROCEDURE UNLIST        Family History   Problem Relation Age of Onset    Heart Disease Mother     Stroke Father      History reviewed, no pertinent family history.   Social History     Tobacco Use    Smoking status: Former Smoker    Smokeless tobacco: Never Used   Substance Use Topics    Alcohol use: No     Allergies   Allergen Reactions    Gabapentin Swelling     \"I'm back on it, they figured out it wasn't this medication causing me to swell up\"    Pcn [Penicillins] Angioedema    Lisinopril Swelling      Current Facility-Administered Medications   Medication Dose Route Frequency    [START ON 11/4/2021] Vancomycin level with AM labs 11/4/21  1 Each Other ONCE    famotidine (PEPCID) tablet 20 mg  20 mg Oral Q12H    metroNIDAZOLE (FLAGYL) IVPB premix 500 mg  500 mg IntraVENous Q12H    vancomycin (VANCOCIN) 1500 mg in  ml infusion  1,500 mg IntraVENous Q24H    traMADoL (ULTRAM) tablet 50 mg  50 mg Oral Q6H PRN    diphenhydrAMINE (BENADRYL) capsule 25 mg  25 mg Oral Q6H PRN    diphenhydrAMINE (BENADRYL) injection 12.5 mg  12.5 mg IntraVENous Q6H PRN    butalbital-acetaminophen-caffeine (FIORICET, ESGIC) -40 mg per tablet 1 Tablet  1 Tablet Oral Q6H PRN    Vancomycin Pharmacy to Dose  1 Each Other Rx Dosing/Monitoring    insulin glargine (LANTUS) injection 15 Units  15 Units SubCUTAneous QHS    heparin (porcine) injection 5,000 Units  5,000 Units SubCUTAneous Q8H    sodium chloride (NS) flush 5-40 mL  5-40 mL IntraVENous Q8H    sodium chloride (NS) flush 5-40 mL  5-40 mL IntraVENous PRN    acetaminophen (TYLENOL) tablet 650 mg  650 mg Oral Q6H PRN    Or    acetaminophen (TYLENOL) suppository 650 mg  650 mg Rectal Q6H PRN    polyethylene glycol (MIRALAX) packet 17 g  17 g Oral DAILY PRN    ondansetron (ZOFRAN ODT) tablet 4 mg  4 mg Oral Q8H PRN    Or    ondansetron (ZOFRAN) injection 4 mg  4 mg IntraVENous Q6H PRN    allopurinoL (ZYLOPRIM) tablet 100 mg  100 mg Oral BID    aspirin delayed-release tablet 81 mg  81 mg Oral DAILY    atorvastatin (LIPITOR) tablet 10 mg  10 mg Oral QHS    [Held by provider] rivaroxaban (XARELTO) tablet 15 mg  15 mg Oral DAILY    insulin lispro (HUMALOG) injection   SubCUTAneous AC&HS    glucose chewable tablet 16 g  16 g Oral PRN    glucagon (GLUCAGEN) injection 1 mg  1 mg IntraMUSCular PRN    dextrose (D50W) injection syrg 12.5-25 g  25-50 mL IntraVENous PRN          Clinical Pain Assessment (nonverbal scale for nonverbal patients): Clinical Pain Assessment  Severity: 0          Duration: for how long has pt been experiencing pain (e.g., 2 days, 1 month, years)  Frequency: how often pain is an issue (e.g., several times per day, once every few days, constant)     FUNCTIONAL ASSESSMENT:     Palliative Performance Scale (PPS):  PPS: 70    ECOG  ECOG Status : Ambulatory, but unable to carry out work activities     PSYCHOSOCIAL/SPIRITUAL SCREENING:      Any spiritual / Orthodox concerns:  [] Yes /  [x] No    Caregiver Burnout:  [] Yes /  [] No /  [x] No Caregiver Present      Anticipatory grief assessment:   [x] Normal  / [] Maladaptive        REVIEW OF SYSTEMS:     Systems: constitutional, ears/nose/mouth/throat, respiratory, gastrointestinal, genitourinary, musculoskeletal, integumentary, neurologic, psychiatric, endocrine. Positive findings noted below. Modified ESAS Completed by: provider           Pain: 0     Nausea: 1     Dyspnea: 0     Constipation: Yes         Positive and pertinent negative findings in ROS are noted above in HPI. The following systems were [x] reviewed / [] unable to be reviewed as noted in HPI  Other findings are noted below. PHYSICAL EXAM:     Constitutional: lying in bed, awake, alert and oriented x4, very engaging and conversant  Eyes: pupils equal, anicteric  ENMT: no nasal discharge, moist mucous membranes  Cardiovascular: regular rhythm  Respiratory: breathing not labored, symmetric  Gastrointestinal: soft non-tender, bowel sounds hypoactive but present x4 quadrants  Last bowel movement: none recorded  Musculoskeletal: no deformity, no tenderness to palpation  Skin: warm, dry  Neurologic: following commands, moving all extremities  Psychiatric: full affect, no hallucinations    Other: Wt Readings from Last 3 Encounters:   11/03/21 108 kg (238 lb)   07/27/21 117.1 kg (258 lb 3.2 oz)   10/09/20 119.3 kg (263 lb)     Blood pressure 123/63, pulse 72, temperature 97.5 °F (36.4 °C), resp. rate 18, height 5' 3\" (1.6 m), weight 108 kg (238 lb), SpO2 100 %. Pain:  Pain Scale 1: Numeric (0 - 10)  Pain Intensity 1: 0     Pain Location 1: Neck, Back                LAB AND IMAGING FINDINGS:     Lab Results   Component Value Date/Time    WBC 6.6 11/03/2021 01:59 AM    HGB 11.9 (L) 11/03/2021 01:59 AM    PLATELET 948 38/83/6012 01:59 AM     Lab Results   Component Value Date/Time    Sodium 140 11/03/2021 01:59 AM    Potassium 3.6 11/03/2021 01:59 AM    Chloride 108 11/03/2021 01:59 AM    CO2 28 11/03/2021 01:59 AM    BUN 13 11/03/2021 01:59 AM    Creatinine 0.94 11/03/2021 01:59 AM    Calcium 8.5 11/03/2021 01:59 AM    Magnesium 2.5 10/29/2021 03:44 PM      Lab Results   Component Value Date/Time    Alk.  phosphatase 63 11/03/2021 01:59 AM    Protein, total 6.0 (L) 11/03/2021 01:59 AM Albumin 2.4 (L) 11/03/2021 01:59 AM    Globulin 3.6 11/03/2021 01:59 AM     Lab Results   Component Value Date/Time    INR 1.0 10/29/2021 03:44 PM    Prothrombin time 12.9 10/29/2021 03:44 PM    aPTT 25.1 10/29/2021 03:44 PM      No results found for: IRON, FE, TIBC, IBCT, PSAT, FERR   No results found for: PH, PCO2, PO2  No components found for: [place]   Lab Results   Component Value Date/Time     10/30/2021 07:05 AM    CK - MB 1.5 10/30/2021 07:05 AM              Total time: 25 minutes    > 50% counseling / coordination:  Time spent in direct consultation with the patient, medical team, and family     Prolonged service was provided for  []30 min   []75 min in face to face time in the presence of the patient, spent as noted above. Time Start:   Time End:     Disclaimer: Sections of this note are dictated using utilizing voice recognition software, which may have resulted in some phonetic based errors in grammar and contents. Even though attempts were made to correct all the mistakes, some may have been missed, and remained in the body of the document. If questions arise, please contact our department.

## 2021-11-04 ENCOUNTER — HOME HEALTH ADMISSION (OUTPATIENT)
Dept: HOME HEALTH SERVICES | Facility: HOME HEALTH | Age: 76
End: 2021-11-04
Payer: MEDICARE

## 2021-11-04 LAB
ALBUMIN SERPL-MCNC: 2.3 G/DL (ref 3.4–5)
ALBUMIN/GLOB SERPL: 0.6 {RATIO} (ref 0.8–1.7)
ALP SERPL-CCNC: 68 U/L (ref 45–117)
ALT SERPL-CCNC: 21 U/L (ref 13–56)
ANION GAP SERPL CALC-SCNC: 2 MMOL/L (ref 3–18)
AST SERPL-CCNC: 26 U/L (ref 10–38)
BILIRUB SERPL-MCNC: 0.3 MG/DL (ref 0.2–1)
BUN SERPL-MCNC: 15 MG/DL (ref 7–18)
BUN/CREAT SERPL: 15 (ref 12–20)
CALCIUM SERPL-MCNC: 8.1 MG/DL (ref 8.5–10.1)
CHLORIDE SERPL-SCNC: 108 MMOL/L (ref 100–111)
CO2 SERPL-SCNC: 28 MMOL/L (ref 21–32)
CREAT SERPL-MCNC: 0.98 MG/DL (ref 0.6–1.3)
ERYTHROCYTE [DISTWIDTH] IN BLOOD BY AUTOMATED COUNT: 13.5 % (ref 11.6–14.5)
GLOBULIN SER CALC-MCNC: 3.7 G/DL (ref 2–4)
GLUCOSE BLD STRIP.AUTO-MCNC: 125 MG/DL (ref 70–110)
GLUCOSE BLD STRIP.AUTO-MCNC: 150 MG/DL (ref 70–110)
GLUCOSE BLD STRIP.AUTO-MCNC: 209 MG/DL (ref 70–110)
GLUCOSE BLD STRIP.AUTO-MCNC: 211 MG/DL (ref 70–110)
GLUCOSE SERPL-MCNC: 133 MG/DL (ref 74–99)
HCT VFR BLD AUTO: 37.4 % (ref 35–45)
HGB BLD-MCNC: 12.1 G/DL (ref 12–16)
MCH RBC QN AUTO: 28.7 PG (ref 24–34)
MCHC RBC AUTO-ENTMCNC: 32.4 G/DL (ref 31–37)
MCV RBC AUTO: 88.6 FL (ref 78–100)
PLATELET # BLD AUTO: 181 K/UL (ref 135–420)
PMV BLD AUTO: 11.4 FL (ref 9.2–11.8)
POTASSIUM SERPL-SCNC: 4 MMOL/L (ref 3.5–5.5)
PROT SERPL-MCNC: 6 G/DL (ref 6.4–8.2)
RBC # BLD AUTO: 4.22 M/UL (ref 4.2–5.3)
SODIUM SERPL-SCNC: 138 MMOL/L (ref 136–145)
VANCOMYCIN SERPL-MCNC: 16.4 UG/ML (ref 5–40)
WBC # BLD AUTO: 8.4 K/UL (ref 4.6–13.2)

## 2021-11-04 PROCEDURE — 82962 GLUCOSE BLOOD TEST: CPT

## 2021-11-04 PROCEDURE — 74011250637 HC RX REV CODE- 250/637: Performed by: INTERNAL MEDICINE

## 2021-11-04 PROCEDURE — 74011250636 HC RX REV CODE- 250/636: Performed by: INTERNAL MEDICINE

## 2021-11-04 PROCEDURE — 74011250637 HC RX REV CODE- 250/637: Performed by: FAMILY MEDICINE

## 2021-11-04 PROCEDURE — 97116 GAIT TRAINING THERAPY: CPT

## 2021-11-04 PROCEDURE — 74011250636 HC RX REV CODE- 250/636: Performed by: HOSPITALIST

## 2021-11-04 PROCEDURE — 80202 ASSAY OF VANCOMYCIN: CPT

## 2021-11-04 PROCEDURE — 65660000000 HC RM CCU STEPDOWN

## 2021-11-04 PROCEDURE — 36415 COLL VENOUS BLD VENIPUNCTURE: CPT

## 2021-11-04 PROCEDURE — 80053 COMPREHEN METABOLIC PANEL: CPT

## 2021-11-04 PROCEDURE — 74011636637 HC RX REV CODE- 636/637: Performed by: FAMILY MEDICINE

## 2021-11-04 PROCEDURE — 97110 THERAPEUTIC EXERCISES: CPT

## 2021-11-04 PROCEDURE — 85027 COMPLETE CBC AUTOMATED: CPT

## 2021-11-04 PROCEDURE — 74011636637 HC RX REV CODE- 636/637: Performed by: HOSPITALIST

## 2021-11-04 RX ADMIN — METRONIDAZOLE 500 MG: 500 INJECTION, SOLUTION INTRAVENOUS at 12:08

## 2021-11-04 RX ADMIN — DIPHENHYDRAMINE HYDROCHLORIDE 12.5 MG: 50 INJECTION, SOLUTION INTRAMUSCULAR; INTRAVENOUS at 22:24

## 2021-11-04 RX ADMIN — INSULIN LISPRO 4 UNITS: 100 INJECTION, SOLUTION INTRAVENOUS; SUBCUTANEOUS at 12:08

## 2021-11-04 RX ADMIN — FAMOTIDINE 20 MG: 20 TABLET, FILM COATED ORAL at 22:12

## 2021-11-04 RX ADMIN — ATORVASTATIN CALCIUM 10 MG: 10 TABLET, FILM COATED ORAL at 22:12

## 2021-11-04 RX ADMIN — ALLOPURINOL 100 MG: 100 TABLET ORAL at 22:12

## 2021-11-04 RX ADMIN — HEPARIN SODIUM 5000 UNITS: 5000 INJECTION INTRAVENOUS; SUBCUTANEOUS at 09:26

## 2021-11-04 RX ADMIN — METRONIDAZOLE 500 MG: 500 INJECTION, SOLUTION INTRAVENOUS at 22:23

## 2021-11-04 RX ADMIN — INSULIN LISPRO 6 UNITS: 100 INJECTION, SOLUTION INTRAVENOUS; SUBCUTANEOUS at 18:38

## 2021-11-04 RX ADMIN — ASPIRIN 81 MG: 81 TABLET, COATED ORAL at 09:26

## 2021-11-04 RX ADMIN — DIPHENHYDRAMINE HYDROCHLORIDE 25 MG: 25 CAPSULE ORAL at 00:38

## 2021-11-04 RX ADMIN — Medication 10 ML: at 18:38

## 2021-11-04 RX ADMIN — VANCOMYCIN HYDROCHLORIDE 1500 MG: 10 INJECTION, POWDER, LYOPHILIZED, FOR SOLUTION INTRAVENOUS at 18:38

## 2021-11-04 RX ADMIN — ALLOPURINOL 100 MG: 100 TABLET ORAL at 09:26

## 2021-11-04 RX ADMIN — INSULIN GLARGINE 15 UNITS: 100 INJECTION, SOLUTION SUBCUTANEOUS at 22:13

## 2021-11-04 RX ADMIN — HEPARIN SODIUM 5000 UNITS: 5000 INJECTION INTRAVENOUS; SUBCUTANEOUS at 00:38

## 2021-11-04 RX ADMIN — METOPROLOL SUCCINATE 25 MG: 25 TABLET, EXTENDED RELEASE ORAL at 09:26

## 2021-11-04 RX ADMIN — FAMOTIDINE 20 MG: 20 TABLET, FILM COATED ORAL at 09:26

## 2021-11-04 RX ADMIN — HEPARIN SODIUM 5000 UNITS: 5000 INJECTION INTRAVENOUS; SUBCUTANEOUS at 18:38

## 2021-11-04 RX ADMIN — INSULIN LISPRO 4 UNITS: 100 INJECTION, SOLUTION INTRAVENOUS; SUBCUTANEOUS at 22:13

## 2021-11-04 NOTE — PROGRESS NOTES
Problem: Mobility Impaired (Adult and Pediatric)  Goal: *Acute Goals and Plan of Care (Insert Text)  Description: Physical Therapy Goals   Initiated 11/1/2021 and to be accomplished within 5 day(s)  1. Patient will move from supine <> sit with mod I in prep for out of bed activity and change of position. 2.  Patient will perform sit<> stand with mod I with LRAD in prep for transfers/ambulation. 3.  Patient will transfer from bed <> chair with mod I with LRAD for time up in chair for completion of ADL activity. 4.  Patient will ambulate 100 feet with S/LRAD for improved functional mobility at discharge. Outcome: Progressing Towards Goal   PHYSICAL THERAPY TREATMENT    Patient: Valeria Montgomery (81 y.o. female)  Date: 11/4/2021  Diagnosis: Syncope and collapse [R55]  Severe dehydration [E86.0]  Acute renal injury ([facility] [N17.9]  Closed head injury [S09.90XA]  Anticoagulated [Z79.01]   Syncope and collapse    Precautions: Fall  PLOF: independent, ambulatory without AD, has a RW, recovering at son's home at d/c    ASSESSMENT:  Pt required increased time to sit up due to urethra pain from montelongo removal.  Elevated bed for sit to stand. Ambulated 25ft with RW, wide MANGO, decreased pace, no LOB or path deviations. Performed seated (B)LE ROM strengthening exercises. Pt returned to supine in bed. New pure wick placed. Progression toward goals:   []      Improving appropriately and progressing toward goals  [x]      Improving slowly and progressing toward goals  []      Not making progress toward goals and plan of care will be adjusted     PLAN:  Patient continues to benefit from skilled intervention to address the above impairments. Continue treatment per established plan of care. Discharge Recommendations:  Home Health with family assist  Further Equipment Recommendations for Discharge:  bedside commode     SUBJECTIVE:   Patient stated I am so sore down there.     OBJECTIVE DATA SUMMARY:   Critical Behavior:  Neurologic State: Alert  Orientation Level: Oriented X4  Cognition: Appropriate decision making, Appropriate for age attention/concentration, Appropriate safety awareness  Safety/Judgement: Awareness of environment, Fall prevention  Functional Mobility Training:  Bed Mobility:    Supine to Sit: Supervision  Sit to Supine: Supervision  Scooting: Supervision  Transfers:  Sit to Stand: Contact guard assistance; Minimum assistance  Stand to Sit: Stand-by assistance  Balance:  Sitting: Intact  Standing: Intact; With support   Ambulation/Gait Training:  Distance (ft): 25 Feet (ft)  Assistive Device: Gait belt; Walker, rolling  Ambulation - Level of Assistance: Stand-by assistance  Gait Abnormalities: Decreased step clearance  Base of Support: Widened        Pain:  Pain level pre-treatment: 10/10  Pain level post-treatment: 10/10   Pain Intervention(s): Medication (see MAR); Rest, Ice, Repositioning   Response to intervention: Nurse notified, See doc flow    Activity Tolerance:   fair  Please refer to the flowsheet for vital signs taken during this treatment. After treatment:   [] Patient left in no apparent distress sitting up in chair  [x] Patient left in no apparent distress in bed  [x] Call bell left within reach  [x] Nursing notified  [] Caregiver present  [] Bed alarm activated  [] SCDs applied      COMMUNICATION/EDUCATION:   [x]         Role of Physical Therapy in the acute care setting. [x]         Fall prevention education was provided and the patient/caregiver indicated understanding. [x]         Patient/family have participated as able in working toward goals and plan of care. [x]         Patient/family agree to work toward stated goals and plan of care. []         Patient understands intent and goals of therapy, but is neutral about his/her participation.   []         Patient is unable to participate in stated goals/plan of care: ongoing with therapy staff.  []         Other:        Lexii Dais Eva PTA   Time Calculation: 23 mins

## 2021-11-04 NOTE — PROGRESS NOTES
Occupational Therapy Treatment Attempt     Chart reviewed. Attempted Occupational Therapy Treatment, however, patient unable to be seen due to:  []  Nausea/vomiting  [x]  Eating  []  Pain  []  Patient too lethargic  []  Off Unit for testing/procedure  []  Dialysis treatment in progress  []  Telemetry Results  []  Other:      Will f/u later as patient's schedule allows.   Nahid Purcell, OTR/L

## 2021-11-04 NOTE — PROGRESS NOTES
Cardiology Progress Note        Patient: August Blaire        Sex: female          DOA: 10/29/2021  YOB: 1945      Age:  78 y.o.        LOS:  LOS: 5 days      Patient seen and examined, chart reviewed.     Assessment/Plan     Patient Active Problem List   Diagnosis Code    Chest pain R07.9    CAD (coronary artery disease) I25.10    Hypertension I10    Gout M10.9    Anticoagulated Z79.01    Abnormal nuclear stress test R94.39    Cardiomyopathy ([address] I42.9    Right leg weakness R29.898    CAD S/P percutaneous coronary angioplasty I25.10, Z98.61    Insulin dependent type 2 diabetes mellitus (HCC) E11.9, Z79.4    BMI 40.0-44.9, adult ([facility]) Z68.41    S/P drug eluting coronary stent placement Z95.5    EDGAR (obstructive sleep apnea) G47.33    CAP (community acquired pneumonia) J18.9    Acute on chronic combined systolic and diastolic ACC/AHA stage C congestive heart failure (HCC) I50.43    Syncope and collapse R55    Closed head injury S09.90XA    Severe dehydration E86.0    Acute renal injury (HCC) N17.9    Constipation K59.00    Chronic combined systolic and diastolic heart failure (HCC) I50.42    Abdominal pain R10.9    Epiploic appendagitis K63.89    History of DVT (deep vein thrombosis) Z86.718    Abnormal abdominal CT scan R93.5    Urinary retention R33.9    Encounter for palliative care Z51.5    Debility R53.81      CAD PCI LCx  Acute renal failure improving      Cardiac catheterization was done in 08/2020 reported     single-vessel occlusive CAD.     Proximal left circumflex artery have 90% stenosis just proximal to the previously deployed mid left circumflex artery stent.  This lesion is treated by 2.75x23 mm Xience Audrey drug-eluting stent with excellent results.     Rest of coronary anatomy is patent with minimal luminal irregularity.  .     LVEDP 30 mmHg.     Echocardiogram done in July 2021 reported     · Image quality for this study was technically difficult. Contrast used: DEFINITY. · Left Ventricle: Mildly dilated left ventricle. Mild concentric hypertrophy. The estimated EF is 25 - 30%. Severely reduced systolic function. There is moderate (grade 2) left ventricular diastolic dysfunction E/E' ratio is 25.72. Wall Scoring: The left ventricular wall motion is globally hypokinetic. · Left Atrium: Mildly dilated left atrium. · Right Ventricle: Mildly dilated right ventricle. Mildly reduced systolic function. Assessment of RV function: TAPSE = 18 mm. · Right Atrium: Mildly dilated right atrium. · Tricuspid Valve: Tricuspid valve not well visualized. No stenosis. Tricuspid regurgitation is inadequate for estimation of right ventricular systolic pressure.     Echocardiogram revealed     Left Ventricle: Normal wall thickness. Mildly dilated left ventricle. The estimated EF is 25 - 30%. Severely reduced systolic function. There is mild (grade 1) left ventricular diastolic dysfunction E/E' ratio = 9.38. Wall Scoring: The left ventricular wall motion is globally hypokinetic. Left Atrium: Mildly dilated left atrium. Right Ventricle: Mildly dilated right ventricle. Borderline low systolic function. Assessment of RV function: TAPSE = 14 mm. Pulmonary Artery: Pulmonary arterial systolic pressure (PASP) is 35 mmHg. Pulmonary hypertension found to be mild.         Plan:      Continue aspirin and atorvastatin. Start metoprolol succinate 25 mg by mouth once a day. If blood pressure and renal function permits will consider adding ACE inhibitor /ARB and spironolactone. Episode of syncope appears due to hypovolemia however patient has chronic systolic heart failure and arrhythmias cannot be completely excluded. Patient has issue with compliance and follow-up with cardiologist.  She is not following with any cardiologist as an outpatient. She mentioned that she is not adherent with her medications. Strongly advised about medication adherence. Continue telemetry monitoring. Subjective:    cc:    Denies any chest pain, I feel much better today. REVIEW OF SYSTEMS:     General: No fevers or chills. Cardiovascular: No chest pain,No palpitations, No orthopnea, No PND, No leg swelling, No claudication  Pulmonary: No   Dyspnea   Gastrointestinal: No nausea, vomiting, bleeding  Neurology: No Dizziness    Objective:      Visit Vitals  /64   Pulse 79   Temp 98.3 °F (36.8 °C)   Resp 18   Ht 5' 3\" (1.6 m)   Wt 108 kg (238 lb)   SpO2 100%   BMI 42.16 kg/m²     Body mass index is 42.16 kg/m². Physical Exam:  General Appearance: Comfortable, not using accessory muscles of respiration. HEENT: JUAN. HEAD: Atraumatic  NECK: No JVD, no thyroidomeglay. CAROTIDS: no bruit  LUNGS: Clear bilaterally. HEART: S1+S2 audible, no murmur, no pericardial rub. ABD: Non-tender, BS Audible    EXT: No edema, and no cyanosis.   NEUROLOGICAL: AAO times 3, No motor and sensory deficit    Medication:  Current Facility-Administered Medications   Medication Dose Route Frequency    [START ON 11/4/2021] Vancomycin level with AM labs 11/4/21  1 Each Other ONCE    [START ON 11/4/2021] metoprolol succinate (TOPROL-XL) XL tablet 25 mg  25 mg Oral DAILY    famotidine (PEPCID) tablet 20 mg  20 mg Oral Q12H    metroNIDAZOLE (FLAGYL) IVPB premix 500 mg  500 mg IntraVENous Q12H    vancomycin (VANCOCIN) 1500 mg in  ml infusion  1,500 mg IntraVENous Q24H    traMADoL (ULTRAM) tablet 50 mg  50 mg Oral Q6H PRN    diphenhydrAMINE (BENADRYL) capsule 25 mg  25 mg Oral Q6H PRN    diphenhydrAMINE (BENADRYL) injection 12.5 mg  12.5 mg IntraVENous Q6H PRN    butalbital-acetaminophen-caffeine (FIORICET, ESGIC) -40 mg per tablet 1 Tablet  1 Tablet Oral Q6H PRN    Vancomycin Pharmacy to Dose  1 Each Other Rx Dosing/Monitoring    insulin glargine (LANTUS) injection 15 Units  15 Units SubCUTAneous QHS    heparin (porcine) injection 5,000 Units  5,000 Units SubCUTAneous Q8H    sodium chloride (NS) flush 5-40 mL  5-40 mL IntraVENous Q8H    sodium chloride (NS) flush 5-40 mL  5-40 mL IntraVENous PRN    acetaminophen (TYLENOL) tablet 650 mg  650 mg Oral Q6H PRN    Or    acetaminophen (TYLENOL) suppository 650 mg  650 mg Rectal Q6H PRN    polyethylene glycol (MIRALAX) packet 17 g  17 g Oral DAILY PRN    ondansetron (ZOFRAN ODT) tablet 4 mg  4 mg Oral Q8H PRN    Or    ondansetron (ZOFRAN) injection 4 mg  4 mg IntraVENous Q6H PRN    allopurinoL (ZYLOPRIM) tablet 100 mg  100 mg Oral BID    aspirin delayed-release tablet 81 mg  81 mg Oral DAILY    atorvastatin (LIPITOR) tablet 10 mg  10 mg Oral QHS    [Held by provider] rivaroxaban (XARELTO) tablet 15 mg  15 mg Oral DAILY    insulin lispro (HUMALOG) injection   SubCUTAneous AC&HS    glucose chewable tablet 16 g  16 g Oral PRN    glucagon (GLUCAGEN) injection 1 mg  1 mg IntraMUSCular PRN    dextrose (D50W) injection syrg 12.5-25 g  25-50 mL IntraVENous PRN               Lab/Data Reviewed:       Recent Labs     11/03/21  0159 11/02/21  0100 11/01/21  0300   WBC 6.6 5.2 5.3   HGB 11.9* 11.9* 12.4   HCT 37.5 37.2 37.8    173 134*     Recent Labs     11/03/21  0159 11/02/21  0100 11/01/21  0300    141 138   K 3.6 3.4* 3.8    109 105   CO2 28 27 27   * 129* 143*   BUN 13 24* 46*   CREA 0.94 1.11 1.93*   CA 8.5 8.4* 8.4*       Signed By: Naif Bear MD     November 3, 2021

## 2021-11-04 NOTE — PROGRESS NOTES
Bedside and Verbal shift change report given to SOO Calderon RN (oncoming nurse) by KRIS Hull RN (offgoing nurse). Report included the following information SBAR, Kardex, Intake/Output, MAR and Recent Results.

## 2021-11-04 NOTE — WOUND CARE
Wound Care Note: Wound Care in to see patient because of Prevalence Skin Care & Pressure Relief Recommendations: 
Minimize layers of linen Pads under patient to optimize support surface and microclimate Turn/reposition approximately every 2 hours. Pillow Wedges Manage incontinence Promote continence; Skin Protective lotion to buttocks and sacrum daily and as needed with incontinence care Offload heels with pillows or offloading boots. Discussed above plan with patient Transition of Care: Consult wound care if needed

## 2021-11-04 NOTE — PROGRESS NOTES
0720 Bedside and Verbal shift change report given to SOO Hui RN (oncoming nurse) by KRIS Hull, LINDA (offgoing nurse). Report included the following information SBAR, Kardex, Intake/Output, and MAR. Pt in bed, call light in reach. 0900 Pt assessed. No signs of acute distress. Pt in bed.    1212 Pt assessed. No signs of acute distress. Pt in bed. 1515 Pt assessed. No signs of acute distress. Pt in bed.    1905 Bedside and Verbal shift change report given to KRIS Hull, LINDA (oncoming nurse) by Jalil Blanc. Violet Hui RN (offgoing nurse). Report included the following information SBAR, Kardex, Intake/Output and MAR. Pt in bed, call light in reach.

## 2021-11-04 NOTE — ROUTINE PROCESS
9959: Pt eating breakfast at this time, will follow up as schedule permits. 1124: 2nd attempt, pt is sound asleep, did not arouse to voice, will follow up as schedule permits.

## 2021-11-04 NOTE — HOME CARE
Spoke with son, Liliane Jara to [facility] address and phone. Explained home care services and routines. Will continue to follow for discharge.      Marisol Trujillo RN, BSN  [employer]

## 2021-11-04 NOTE — PROGRESS NOTES
[address] [facility] Pharmacokinetic Monitoring Service - Vancomycin    Consulting Provider: Ángel Jacinto   Indication: Empiric  Target Concentration: Goal AUC/JERRY 400-600 mg*hr/L  Day of Therapy: 6  Additional Antimicrobials: Flagyl    Pertinent Laboratory Values: Wt Readings from Last 1 Encounters:   11/03/21 108 kg (238 lb)     Temp Readings from Last 1 Encounters:   11/04/21 99.2 °F (37.3 °C)     No components found for: PROCAL  Estimated Creatinine Clearance: 59.3 mL/min (based on SCr of 0.98 mg/dL). Recent Labs     11/04/21  0200 11/03/21  0159   WBC 8.4 6.6       Pertinent Cultures:  Culture Date Source Results   10/29/21 urine NG   MRSA Nasal Swab: N/A. Non-respiratory infection. Nghia Sun     [unfilled]    Assessment:  Date/Time Current Dose Concentration Timing of Concentration (h) AUC   11/4/21 0200 Vancomycin 750 mg IV q12h 16.4 mcg/ml 7h 41m since last dose 402 mg*h/L, trough 10.8   Note: Serum concentrations collected for AUC dosing may appear elevated if collected in close proximity to the dose administered, this is not necessarily an indication of toxicity    Plan:  Current dosing regimen is therapeutic  Continue current dose Vancomycin 750 mg IV q12h  Pharmacy will continue to monitor patient and adjust therapy as indicated    Thank you for the consult,  VIVIAN Garcia  11/4/2021 8:48 AM

## 2021-11-04 NOTE — PROGRESS NOTES
DC Plan: home with [facility] - will initially be going to son [address] manager contacted patient's son, Slime Swartz to verify that he was in agreement for patient to initially come to his home at discharge - his address is [address] [place] [address], [facility]; his cell number is [phone]. He is in agreement for her to be at his home for 24/7 monitoring with [facility] services. Will monitor patient's progress with PT/OT now that montelongo has been removed.

## 2021-11-05 LAB
ALBUMIN SERPL-MCNC: 2.1 G/DL (ref 3.4–5)
ALBUMIN/GLOB SERPL: 0.6 {RATIO} (ref 0.8–1.7)
ALP SERPL-CCNC: 69 U/L (ref 45–117)
ALT SERPL-CCNC: 22 U/L (ref 13–56)
ANION GAP SERPL CALC-SCNC: 5 MMOL/L (ref 3–18)
AST SERPL-CCNC: 28 U/L (ref 10–38)
BILIRUB SERPL-MCNC: 0.2 MG/DL (ref 0.2–1)
BUN SERPL-MCNC: 17 MG/DL (ref 7–18)
BUN/CREAT SERPL: 15 (ref 12–20)
CALCIUM SERPL-MCNC: 8.5 MG/DL (ref 8.5–10.1)
CHLORIDE SERPL-SCNC: 108 MMOL/L (ref 100–111)
CO2 SERPL-SCNC: 27 MMOL/L (ref 21–32)
CREAT SERPL-MCNC: 1.1 MG/DL (ref 0.6–1.3)
ERYTHROCYTE [DISTWIDTH] IN BLOOD BY AUTOMATED COUNT: 13.6 % (ref 11.6–14.5)
GLOBULIN SER CALC-MCNC: 3.6 G/DL (ref 2–4)
GLUCOSE BLD STRIP.AUTO-MCNC: 113 MG/DL (ref 70–110)
GLUCOSE BLD STRIP.AUTO-MCNC: 159 MG/DL (ref 70–110)
GLUCOSE BLD STRIP.AUTO-MCNC: 197 MG/DL (ref 70–110)
GLUCOSE BLD STRIP.AUTO-MCNC: 205 MG/DL (ref 70–110)
GLUCOSE SERPL-MCNC: 126 MG/DL (ref 74–99)
HCT VFR BLD AUTO: 36.3 % (ref 35–45)
HGB BLD-MCNC: 11.8 G/DL (ref 12–16)
MCH RBC QN AUTO: 29.2 PG (ref 24–34)
MCHC RBC AUTO-ENTMCNC: 32.5 G/DL (ref 31–37)
MCV RBC AUTO: 89.9 FL (ref 78–100)
PLATELET # BLD AUTO: 182 K/UL (ref 135–420)
PMV BLD AUTO: 10.9 FL (ref 9.2–11.8)
POTASSIUM SERPL-SCNC: 3.7 MMOL/L (ref 3.5–5.5)
PROT SERPL-MCNC: 5.7 G/DL (ref 6.4–8.2)
RBC # BLD AUTO: 4.04 M/UL (ref 4.2–5.3)
SODIUM SERPL-SCNC: 140 MMOL/L (ref 136–145)
WBC # BLD AUTO: 9.4 K/UL (ref 4.6–13.2)

## 2021-11-05 PROCEDURE — 65660000000 HC RM CCU STEPDOWN

## 2021-11-05 PROCEDURE — 82962 GLUCOSE BLOOD TEST: CPT

## 2021-11-05 PROCEDURE — 74011250637 HC RX REV CODE- 250/637: Performed by: INTERNAL MEDICINE

## 2021-11-05 PROCEDURE — 74011250637 HC RX REV CODE- 250/637: Performed by: FAMILY MEDICINE

## 2021-11-05 PROCEDURE — 80053 COMPREHEN METABOLIC PANEL: CPT

## 2021-11-05 PROCEDURE — 36415 COLL VENOUS BLD VENIPUNCTURE: CPT

## 2021-11-05 PROCEDURE — 85027 COMPLETE CBC AUTOMATED: CPT

## 2021-11-05 PROCEDURE — 97116 GAIT TRAINING THERAPY: CPT

## 2021-11-05 PROCEDURE — 97530 THERAPEUTIC ACTIVITIES: CPT

## 2021-11-05 PROCEDURE — 97535 SELF CARE MNGMENT TRAINING: CPT

## 2021-11-05 PROCEDURE — 74011250636 HC RX REV CODE- 250/636: Performed by: HOSPITALIST

## 2021-11-05 PROCEDURE — 74011250637 HC RX REV CODE- 250/637: Performed by: HOSPITALIST

## 2021-11-05 PROCEDURE — 74011636637 HC RX REV CODE- 636/637: Performed by: FAMILY MEDICINE

## 2021-11-05 PROCEDURE — 74011636637 HC RX REV CODE- 636/637: Performed by: HOSPITALIST

## 2021-11-05 RX ORDER — NYSTATIN 100000 [USP'U]/G
POWDER TOPICAL 2 TIMES DAILY
Status: DISCONTINUED | OUTPATIENT
Start: 2021-11-05 | End: 2021-11-06 | Stop reason: HOSPADM

## 2021-11-05 RX ORDER — METRONIDAZOLE 250 MG/1
500 TABLET ORAL EVERY 12 HOURS
Status: DISCONTINUED | OUTPATIENT
Start: 2021-11-05 | End: 2021-11-06 | Stop reason: HOSPADM

## 2021-11-05 RX ADMIN — ASPIRIN 81 MG: 81 TABLET, COATED ORAL at 09:04

## 2021-11-05 RX ADMIN — METRONIDAZOLE 500 MG: 500 INJECTION, SOLUTION INTRAVENOUS at 12:27

## 2021-11-05 RX ADMIN — VANCOMYCIN HYDROCHLORIDE 1500 MG: 10 INJECTION, POWDER, LYOPHILIZED, FOR SOLUTION INTRAVENOUS at 17:58

## 2021-11-05 RX ADMIN — HEPARIN SODIUM 5000 UNITS: 5000 INJECTION INTRAVENOUS; SUBCUTANEOUS at 09:03

## 2021-11-05 RX ADMIN — FAMOTIDINE 20 MG: 20 TABLET, FILM COATED ORAL at 09:04

## 2021-11-05 RX ADMIN — METRONIDAZOLE 500 MG: 250 TABLET ORAL at 21:19

## 2021-11-05 RX ADMIN — INSULIN LISPRO 4 UNITS: 100 INJECTION, SOLUTION INTRAVENOUS; SUBCUTANEOUS at 17:58

## 2021-11-05 RX ADMIN — METOPROLOL SUCCINATE 25 MG: 25 TABLET, EXTENDED RELEASE ORAL at 09:04

## 2021-11-05 RX ADMIN — NYSTATIN: 100000 POWDER TOPICAL at 21:23

## 2021-11-05 RX ADMIN — HEPARIN SODIUM 5000 UNITS: 5000 INJECTION INTRAVENOUS; SUBCUTANEOUS at 17:57

## 2021-11-05 RX ADMIN — ATORVASTATIN CALCIUM 10 MG: 10 TABLET, FILM COATED ORAL at 21:19

## 2021-11-05 RX ADMIN — Medication 10 ML: at 17:59

## 2021-11-05 RX ADMIN — INSULIN GLARGINE 15 UNITS: 100 INJECTION, SOLUTION SUBCUTANEOUS at 21:19

## 2021-11-05 RX ADMIN — ALLOPURINOL 100 MG: 100 TABLET ORAL at 21:19

## 2021-11-05 RX ADMIN — ACETAMINOPHEN 650 MG: 325 TABLET ORAL at 18:01

## 2021-11-05 RX ADMIN — INSULIN LISPRO 2 UNITS: 100 INJECTION, SOLUTION INTRAVENOUS; SUBCUTANEOUS at 21:19

## 2021-11-05 RX ADMIN — INSULIN LISPRO 2 UNITS: 100 INJECTION, SOLUTION INTRAVENOUS; SUBCUTANEOUS at 12:26

## 2021-11-05 RX ADMIN — ALLOPURINOL 100 MG: 100 TABLET ORAL at 09:04

## 2021-11-05 RX ADMIN — NYSTATIN: 100000 POWDER TOPICAL at 17:58

## 2021-11-05 RX ADMIN — FAMOTIDINE 20 MG: 20 TABLET, FILM COATED ORAL at 21:19

## 2021-11-05 NOTE — PROGRESS NOTES
DC Plan: home to son Alfredo's home; [place] has been arranged with [address]; patient is awaiting [facility]    Primary care nurse verified cardiology office is arranging for [address]; will notify [address] that juan carlos will be going home to different address for follow up PT/OT therapy. Care Management Interventions  PCP Verified by CM:  Yes  Mode of Transport at Discharge: Self  Transition of Care Consult (CM Consult): Discharge Planning, [address]: Yes  Physical Therapy Consult: Yes  Occupational Therapy Consult: Yes  Support Systems: Child(sonali)  Confirm Follow Up Transport: Family  The Plan for Transition of Care is Related to the Following Treatment Goals : syncope and collapse  The Patient and/or Patient Representative was Provided with a Choice of Provider and Agrees with the Discharge Plan?: Yes  Name of the Patient Representative Who was Provided with a Choice of Provider and Agrees with the Discharge Plan: Amalia Thapa, patient  Freedom of Choice List was Provided with Basic Dialogue that Supports the Patient's Individualized Plan of Care/Goals, Treatment Preferences and Shares the Quality Data Associated with the Providers?: Yes  Discharge Location  Discharge Placement: Home with home health

## 2021-11-05 NOTE — PROGRESS NOTES
Problem: Self Care Deficits Care Plan (Adult)  Goal: *Acute Goals and Plan of Care (Insert Text)  Description: Occupational Therapy Goals  Initiated 11/2/2021 within 7 day(s). 1.  Patient will perform grooming with supervision/set-up standing at sink for 5 minutes or more. 2.  Patient will perform upper body dressing with modified independence. 3.  Patient will perform lower body dressing with modified independence. 4.  Patient will perform toilet transfers with supervision/set-up. 5.  Patient will perform all aspects of toileting with supervision/set-up. 6.  Patient will participate in upper extremity therapeutic exercise/activities with independence for 10 minutes. 7.  Patient will utilize energy conservation techniques during functional activities with verbal, visual, and tactile cues. Outcome: Progressing Towards Goal    OCCUPATIONAL THERAPY TREATMENT    Patient: Ayaz Rudd (73 y.o. female)  Date: 11/5/2021  Diagnosis: Syncope and collapse [R55]  Severe dehydration [E86.0]  Acute renal injury ([address] [N17.9]  Closed head injury [S09.90XA]  Anticoagulated [Z79.01]   Syncope and collapse       Precautions: Fall  PLOF:     Chart, occupational therapy assessment, plan of care, and goals were reviewed. ASSESSMENT:  Pt presented supine in bed at the beginning of session and agrees to participate with therapy. Pt participate with bed mobility, supine<>sit, sit<>stand transfers, LB dressing, bed<>bsc transfers, toileting, and grooming during this session with SB-CGA. Pt was left supine in bed at the end of session in NAD. Progression toward goals:  [x]          Improving appropriately and progressing toward goals  []          Improving slowly and progressing toward goals  []          Not making progress toward goals and plan of care will be adjusted     PLAN:  Patient continues to benefit from skilled intervention to address the above impairments.   Continue treatment per established plan of care.  Discharge Recommendations:  Home Health  Further Equipment Recommendations for Discharge:  N/A     SUBJECTIVE:   Patient stated  I am ready to get up.     OBJECTIVE DATA SUMMARY:   Cognitive/Behavioral Status:  Neurologic State: Alert  Orientation Level: Oriented X4  Cognition: Appropriate for age attention/concentration, Appropriate safety awareness, Follows commands  Safety/Judgement: Awareness of environment, Fall prevention  Functional Mobility and Transfers for ADLs:   Bed Mobility:  Supine to Sit: Supervision  Sit to Supine: Supervision  Scooting: Supervision   Transfers:  Sit to Stand: Contact guard assistance; Stand-by assistance  Stand to Sit: Contact guard assistance; Stand-by assistance   Toilet Transfer : Minimum assistance (bed<>bsc)  Balance:  Sitting: Intact  Standing: Intact; With support  ADL Intervention:  Grooming  Grooming Assistance: Set-up  Position Performed: Standing  Washing Hands: Set-up (using hand sanitizing wipes)    Lower Body Dressing Assistance  Dressing Assistance: Moderate assistance  Protective Undergarmet: Moderate assistance  Position Performed: Seated edge of bed  Cues: Aida Salgado; [facility] Assistance: Moderate assistance  Bladder Hygiene: Minimum assistance  Bowel Hygiene: Moderate assistance  Clothing Management: Moderate assistance    Cognitive Retraining  Safety/Judgement: Awareness of environment; Fall prevention  Pain:  Pain level pre-treatment: 0/10   Pain level post-treatment: 0/10  Pain Intervention(s): Medication (see MAR); Rest, Ice, Repositioning   Response to intervention: Nurse notified, See doc flow    Activity Tolerance:    Good     Please refer to the flowsheet for vital signs taken during this treatment.   After treatment:   []  Patient left in no apparent distress sitting up in chair  [x]  Patient left in no apparent distress in bed  [x]  Call bell left within reach  [x]  Nursing notified  []  Caregiver present  []  Bed alarm activated    COMMUNICATION/EDUCATION:   [x] Role of Occupational Therapy in the acute care setting  [x] Home safety education was provided and the patient/caregiver indicated understanding. [x] Patient/family have participated as able in working towards goals and plan of care. [x] Patient/family agree to work toward stated goals and plan of care. [] Patient understands intent and goals of therapy, but is neutral about his/her participation. [] Patient is unable to participate in goal setting and plan of care.       Thank you for this referral.  Lennox Mccoy, OTR/L  Time Calculation: 35 mins

## 2021-11-05 NOTE — PROGRESS NOTES
Cardiology Progress Note        Patient: Lynnette Serra        Sex: female          DOA: 10/29/2021  YOB: 1945      Age:  78 y.o.        LOS:  LOS: 6 days      Patient seen and examined, chart reviewed.     Assessment/Plan     Patient Active Problem List   Diagnosis Code    Chest pain R07.9    CAD (coronary artery disease) I25.10    Hypertension I10    Gout M10.9    Anticoagulated Z79.01    Abnormal nuclear stress test R94.39    Cardiomyopathy ([address] I42.9    Right leg weakness R29.898    CAD S/P percutaneous coronary angioplasty I25.10, Z98.61    Insulin dependent type 2 diabetes mellitus (HCC) E11.9, Z79.4    BMI 40.0-44.9, adult ([facility]) Z68.41    S/P drug eluting coronary stent placement Z95.5    EDGAR (obstructive sleep apnea) G47.33    CAP (community acquired pneumonia) J18.9    Acute on chronic combined systolic and diastolic ACC/AHA stage C congestive heart failure (HCC) I50.43    Syncope and collapse R55    Closed head injury S09.90XA    Severe dehydration E86.0    Acute renal injury (HCC) N17.9    Constipation K59.00    Chronic combined systolic and diastolic heart failure (HCC) I50.42    Abdominal pain R10.9    Epiploic appendagitis K63.89    History of DVT (deep vein thrombosis) Z86.718    Abnormal abdominal CT scan R93.5    Urinary retention R33.9    Encounter for palliative care Z51.5    Debility R53.81      CAD PCI LCx  Acute renal failure improving      Cardiac catheterization was done in 08/2020 reported     single-vessel occlusive CAD.     Proximal left circumflex artery have 90% stenosis just proximal to the previously deployed mid left circumflex artery stent.  This lesion is treated by 2.75x23 mm Xience Audrey drug-eluting stent with excellent results.     Rest of coronary anatomy is patent with minimal luminal irregularity.  .     LVEDP 30 mmHg.     Echocardiogram done in July 2021 reported     · Image quality for this study was technically difficult. Contrast used: DEFINITY. · Left Ventricle: Mildly dilated left ventricle. Mild concentric hypertrophy. The estimated EF is 25 - 30%. Severely reduced systolic function. There is moderate (grade 2) left ventricular diastolic dysfunction E/E' ratio is 25.72. Wall Scoring: The left ventricular wall motion is globally hypokinetic. · Left Atrium: Mildly dilated left atrium. · Right Ventricle: Mildly dilated right ventricle. Mildly reduced systolic function. Assessment of RV function: TAPSE = 18 mm. · Right Atrium: Mildly dilated right atrium. · Tricuspid Valve: Tricuspid valve not well visualized. No stenosis. Tricuspid regurgitation is inadequate for estimation of right ventricular systolic pressure.     Echocardiogram revealed     Left Ventricle: Normal wall thickness. Mildly dilated left ventricle. The estimated EF is 25 - 30%. Severely reduced systolic function. There is mild (grade 1) left ventricular diastolic dysfunction E/E' ratio = 9.38. Wall Scoring: The left ventricular wall motion is globally hypokinetic. Left Atrium: Mildly dilated left atrium. Right Ventricle: Mildly dilated right ventricle. Borderline low systolic function. Assessment of RV function: TAPSE = 14 mm. Pulmonary Artery: Pulmonary arterial systolic pressure (PASP) is 35 mmHg. Pulmonary hypertension found to be mild.         Plan:      Continue aspirin and atorvastatin. Continue metoprolol succinate 25 mg by mouth once a day. If blood pressure and renal function permits will consider adding ACE inhibitor /ARB and spironolactone. Episode of syncope appears due to hypovolemia however patient has chronic systolic heart failure and arrhythmias cannot be completely excluded. Patient has issue with compliance and follow-up with cardiologist.  She is not following with any cardiologist as an outpatient. She mentioned that she is not adherent with her medications.     Strongly advised about medication adherence. Advised about lifevest                  Subjective:    cc:    Denies any chest pain or shortness of breath       REVIEW OF SYSTEMS:     General: No fevers or chills. Cardiovascular: No chest pain,No palpitations, No orthopnea, No PND, No leg swelling, No claudication  Pulmonary: No   Dyspnea   Gastrointestinal: No nausea, vomiting, bleeding  Neurology: No Dizziness    Objective:      Visit Vitals  BP (!) 130/51   Pulse 86   Temp 98.2 °F (36.8 °C)   Resp 18   Ht 5' 3\" (1.6 m)   Wt 110 kg (242 lb 6.4 oz)   SpO2 98%   BMI 42.94 kg/m²     Body mass index is 42.94 kg/m². Physical Exam:  General Appearance: Comfortable, not using accessory muscles of respiration. HEENT: JUAN. HEAD: Atraumatic  NECK: No JVD, no thyroidomeglay. CAROTIDS: no bruit  LUNGS: Clear bilaterally. HEART: S1+S2 audible, no murmur, no pericardial rub. ABD: Non-tender, BS Audible    EXT: No edema, and no cyanosis.   NEUROLOGICAL: AAO times 3, No motor and sensory deficit    Medication:  Current Facility-Administered Medications   Medication Dose Route Frequency    metoprolol succinate (TOPROL-XL) XL tablet 25 mg  25 mg Oral DAILY    famotidine (PEPCID) tablet 20 mg  20 mg Oral Q12H    metroNIDAZOLE (FLAGYL) IVPB premix 500 mg  500 mg IntraVENous Q12H    vancomycin (VANCOCIN) 1500 mg in  ml infusion  1,500 mg IntraVENous Q24H    traMADoL (ULTRAM) tablet 50 mg  50 mg Oral Q6H PRN    diphenhydrAMINE (BENADRYL) capsule 25 mg  25 mg Oral Q6H PRN    diphenhydrAMINE (BENADRYL) injection 12.5 mg  12.5 mg IntraVENous Q6H PRN    butalbital-acetaminophen-caffeine (FIORICET, ESGIC) -40 mg per tablet 1 Tablet  1 Tablet Oral Q6H PRN    Vancomycin Pharmacy to Dose  1 Each Other Rx Dosing/Monitoring    insulin glargine (LANTUS) injection 15 Units  15 Units SubCUTAneous QHS    heparin (porcine) injection 5,000 Units  5,000 Units SubCUTAneous Q8H    sodium chloride (NS) flush 5-40 mL  5-40 mL IntraVENous Q8H    sodium chloride (NS) flush 5-40 mL  5-40 mL IntraVENous PRN    acetaminophen (TYLENOL) tablet 650 mg  650 mg Oral Q6H PRN    Or    acetaminophen (TYLENOL) suppository 650 mg  650 mg Rectal Q6H PRN    polyethylene glycol (MIRALAX) packet 17 g  17 g Oral DAILY PRN    ondansetron (ZOFRAN ODT) tablet 4 mg  4 mg Oral Q8H PRN    Or    ondansetron (ZOFRAN) injection 4 mg  4 mg IntraVENous Q6H PRN    allopurinoL (ZYLOPRIM) tablet 100 mg  100 mg Oral BID    aspirin delayed-release tablet 81 mg  81 mg Oral DAILY    atorvastatin (LIPITOR) tablet 10 mg  10 mg Oral QHS    [Held by provider] rivaroxaban (XARELTO) tablet 15 mg  15 mg Oral DAILY    insulin lispro (HUMALOG) injection   SubCUTAneous AC&HS    glucose chewable tablet 16 g  16 g Oral PRN    glucagon (GLUCAGEN) injection 1 mg  1 mg IntraMUSCular PRN    dextrose (D50W) injection syrg 12.5-25 g  25-50 mL IntraVENous PRN               Lab/Data Reviewed:       Recent Labs     11/04/21  0200 11/03/21  0159 11/02/21  0100   WBC 8.4 6.6 5.2   HGB 12.1 11.9* 11.9*   HCT 37.4 37.5 37.2    186 173     Recent Labs     11/04/21  0200 11/03/21  0159 11/02/21  0100    140 141   K 4.0 3.6 3.4*    108 109   CO2 28 28 27   * 113* 129*   BUN 15 13 24*   CREA 0.98 0.94 1.11   CA 8.1* 8.5 8.4*       Signed By: Freddie Philippe MD     November 4, 2021

## 2021-11-05 NOTE — PROGRESS NOTES
Hospitalist Progress Note-critical care note     Patient: Kayode Pandya MRN: [id number]  [facility]: [id number]    YOB: 1945  Age: 78 y.o.   Sex: female    DOA: 10/29/2021 LOS:  LOS: 7 days            Chief complaint: chf , abdomen pain , epiploic appendagitis , syncope and collapse , dehydration, acosta , DM     Assessment/Plan         Hospital Problems  Date Reviewed: 10/29/2021          Codes Class Noted POA    Encounter for palliative care ICD-10-CM: Z51.5  ICD-9-CM: V66.7  Unknown Unknown        Debility ICD-10-CM: R53.81  ICD-9-CM: 799.3  Unknown Unknown        Urinary retention ICD-10-CM: R33.9  ICD-9-CM: 788.20  11/1/2021 Unknown        Chronic combined systolic and diastolic heart failure ([facility] ICD-10-CM: I50.42  ICD-9-CM: 428.42  10/30/2021 Yes        Abdominal pain ICD-10-CM: R10.9  ICD-9-CM: 789.00  10/30/2021 Yes        Epiploic appendagitis ICD-10-CM: K63.89  ICD-9-CM: 569.89  10/30/2021 Yes        History of DVT (deep vein thrombosis) ICD-10-CM: Z86.718  ICD-9-CM: V12.51  10/30/2021 Unknown        Abnormal abdominal CT scan ICD-10-CM: R93.5  ICD-9-CM: 793.6  10/30/2021 Unknown        * (Principal) Syncope and collapse ICD-10-CM: R55  ICD-9-CM: 780.2  10/29/2021 Yes        Severe dehydration ICD-10-CM: E86.0  ICD-9-CM: 276.51  10/29/2021 Yes        Acute renal injury ([facility] ICD-10-CM: N17.9  ICD-9-CM: 584.9  10/29/2021 Yes        Constipation ICD-10-CM: K59.00  ICD-9-CM: 564.00  10/29/2021 Yes        BMI 40.0-44.9, adult ([facility] ICD-10-CM: Z68.41  ICD-9-CM: V85.41  Unknown Yes        EDGAR (obstructive sleep apnea) ICD-10-CM: G47.33  ICD-9-CM: 327.23  7/21/2021 Yes        Insulin dependent type 2 diabetes mellitus ([facility] ICD-10-CM: E11.9, Z79.4  ICD-9-CM: 250.00, V58.67  8/28/2020 Yes        Anticoagulated ICD-10-CM: Z79.01  ICD-9-CM: V58.61  8/25/2020 Yes        CAD (coronary artery disease) ICD-10-CM: I25.10  ICD-9-CM: 414.00  12/3/2018 Yes        Hypertension ICD-10-CM: Y86  ICD-9-CM: 401.9 12/3/2018 Yes             72-year-old female with insulin-dependent type 2 diabetes mellitus, morbid obesity, hypertension, CAD with drug-eluting stent-off plavix last admission and hx of dvt  on anticoagulation, and cardiomyopathy with EF of 25-30% is admitted syncope , ame and abdomen pain. Nephrologist on board for ame and her renal function resolved now . she has been on iv abx for epiploic appendagitis. Will switch to po abx on d.c .  cardiologist was consulted and recommend lifevest      She is waiting for lifevest-per CM son will be with her after d/c. Dc in 1-2 days-recommend son will be trained for lifevest also       Syncope   negative head CT for bleeding  Mri brain no acute stroke   V/q scan negative for pe   pvl no dvt   ce x3 wnl         boarderline bp remained bp   Continue to hold medication for anti-HTN        Severe dehydration  , AME   Resolved now      Abdominal pain - epiploic appendagitis   No intervention needed per surgeon   Continue iv abx -will switch to po abx on d/c      Chronic combined systolic and diastolic CHF  Ef 91-72 %  Need life-vest      CAD with drug-eluting stent on anticoagulation  Continue aspirin , bbb hold due to hypotension       hx of dvt on xarelto   Will hold xarelto , restart on d/c     Insulin-dependent type 2 diabetes mellitus-last A1C of 7.6  Low dose lantus   Diabetic diet change to clear one due to gi issue      BMI 41  Aggressive lifestyle modification needed     EDGAR on CPAP  CPAP qhs       Subjective : irritated skin   Put nystatin powder on the groin area, try to avoid puric-she can use bed pan -discussed with RN       Disposition :1-2 days   Review of systems:    General: No fevers or chills. Cardiovascular: No chest pain or pressure. No palpitations. Pulmonary: No shortness of breath. Gastrointestinal: No nausea, vomiting.  No abdomen pain   Skin irritated     Vital signs/Intake and Output:  Visit Vitals  /60 (BP 1 Location: Left upper arm, BP Patient Position: At rest)   Pulse 77   Temp 98.5 °F (36.9 °C)   Resp 18   Ht 5' 3\" (1.6 m)   Wt 110 kg (242 lb 6.4 oz)   SpO2 100%   BMI 42.94 kg/m²     Current Shift:  11/05 0701 - 11/05 1900  In: 120 [P.O.:120]  Out: -   Last three shifts:  11/03 1901 - 11/05 0700  In: 1060 [P.O.:360; I.V.:700]  Out: 200 [Urine:200]    Physical Exam:  General: WD, WN. Alert, cooperative, no acute distress    HEENT: NC, Atraumatic. PERRLA, anicteric sclerae. Lungs: CTA Bilaterally. No Wheezing/Rhonchi/Rales. Heart:  Regular  rhythm,  No murmur, No Rubs, No Gallops  Abdomen: Soft, Non distended, no tender-  +Bowel sounds, montelongo noted , mild erythema on rt groin area   Extremities: No c/c/e  Psych:   Not anxious or agitated. Neurologic:  No acute neurological deficit. Labs: Results:       Chemistry Recent Labs     11/05/21 0305 11/04/21 0200 11/03/21 0159   * 133* 113*    138 140   K 3.7 4.0 3.6    108 108   CO2 27 28 28   BUN 17 15 13   CREA 1.10 0.98 0.94   CA 8.5 8.1* 8.5   AGAP 5 2* 4   BUCR 15 15 14   AP 69 68 63   TP 5.7* 6.0* 6.0*   ALB 2.1* 2.3* 2.4*   GLOB 3.6 3.7 3.6   AGRAT 0.6* 0.6* 0.7*      CBC w/Diff Recent Labs     11/05/21 0305 11/04/21 0200 11/03/21  0159   WBC 9.4 8.4 6.6   RBC 4.04* 4.22 4.24   HGB 11.8* 12.1 11.9*   HCT 36.3 37.4 37.5    181 186      Cardiac Enzymes No results for input(s): CPK, CKND1, SARWAT in the last 72 hours. No lab exists for component: CKRMB, TROIP   Coagulation No results for input(s): PTP, INR, APTT, INREXT, INREXT in the last 72 hours. Lipid Panel Lab Results   Component Value Date/Time    Cholesterol, total 139 08/25/2020 05:30 AM    HDL Cholesterol 49 08/25/2020 05:30 AM    LDL, calculated 58.6 08/25/2020 05:30 AM    VLDL, calculated 31.4 08/25/2020 05:30 AM    Triglyceride 157 (H) 08/25/2020 05:30 AM    CHOL/HDL Ratio 2.8 08/25/2020 05:30 AM      BNP No results for input(s): BNPP in the last 72 hours.    Liver Enzymes Recent Labs 11/05/21  0305   TP 5.7*   ALB 2.1*   AP 69      Thyroid Studies No results found for: T4, T3U, TSH, TSHEXT, TSHEXT     Procedures/imaging: see electronic medical records for all procedures/Xrays and details which were not copied into this note but were reviewed prior to creation of Plan    CT HEAD WO CONT    Result Date: 10/29/2021  EXAM: CT head INDICATION: Patient with fall, blood thinning medication COMPARISON: MR brain 10/29/2020 TECHNIQUE: Axial CT imaging of the head was performed without intravenous contrast. Standard multiplanar coronal and sagittal reformatted images were obtained and are included in interpretation. One or more dose reduction techniques were used on this CT: automated exposure control, adjustment of the mAs and/or kVp according to patient size, and iterative reconstruction techniques. The specific techniques used on this CT exam have been documented in the patient's electronic medical record. Digital Imaging and Communications in Medicine (DICOM) format image data are available to nonaffiliated external healthcare facilities or entities on a secure, media free, reciprocally searchable basis with patient authorization for at least a 12-month period after this study. _______________ FINDINGS: BRAIN AND POSTERIOR FOSSA: Mild cortical and cerebellar volume loss redemonstrated with stable ventricular size and configuration. Patent basilar cisterns. Subcortical and periventricular white matter low-attenuation, as previously. Redemonstration of a calcified extra-axial mass lesion along the right side of the posterior fossa in keeping with infratentorial meningioma, size estimated at approximately 1.7 x 1.6 x 1.9 cm in size. Minor adjacent cerebellar distortion without evidence of edema or mass effect. No intra-axial hemorrhage. No mass effect or midline shift. EXTRA-AXIAL SPACES AND MENINGES: There are no abnormal extra-axial fluid collections. CALVARIUM: Intact. SINUSES: Clear. OTHER: None. _______________     1. No acute intracranial abnormality. 2. Subcortical and periventricular white matter low-attenuation compatible with chronic ischemic microvascular change. 3. 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 CT SPINE CERV WO CONT    Result Date: 10/29/2021  EXAM: CT Cervical spine INDICATION: Cervical Pain COMPARISON: 6/10/2008. TECHNIQUE: Axial CT imaging of the cervical spine was performed from the skull base to the upper thoracic spine without intravenous contrast. Multiplanar reformats were generated. One or more dose reduction techniques were used on this CT: automated exposure control, adjustment of the mAs and/or kVp according to patient size, and iterative reconstruction techniques. The specific techniques used on this CT exam have been documented in the patient's electronic medical record. Digital Imaging and Communications in Medicine (DICOM) format image data are available to nonaffiliated external healthcare facilities or entities on a secure, media free, reciprocally searchable basis with patient authorization for at least a 12-month period after this study. _______________ FINDINGS: VERTEBRAE AND DISCS: Extremely of the normal cervical lordosis without evidence of acute fracture or traumatic subluxation. Multilevel cervical spondylosis with disc space narrowing and ventral endplate osteophytes. Multilevel posterior facet arthrosis. No area of erosion or aggressive-appearing bone destruction. No area of erosion or aggressive-appearing bone destruction. PREVERTEBRAL SOFT TISSUES: Normal VISIBLE INTRACRANIAL CONTENTS: Unremarkable. LUNG APICES: Clear. OTHER: None. _______________     No evidence of acute fracture or traumatic subluxation. Multilevel spondylosis.      CT ABD PELV WO CONT    Result Date: 10/30/2021  EXAM: CT of the abdomen and pelvis INDICATION: Abdominal pain, acute kidney injury, unable to have bowel movement COMPARISON: 12/12/2018, retroperitoneal ultrasound from 10/29/2021 TECHNIQUE: Axial CT imaging of the abdomen and pelvis was performed without intravenous contrast. Multiplanar reformats were generated. One or more dose reduction techniques were used on this CT: automated exposure control, adjustment of the mAs and/or kVp according to patient size, and iterative reconstruction techniques. The specific techniques used on this CT exam have been documented in the patient's electronic medical record. Digital Imaging and Communications in Medicine (DICOM) format image data are available to nonaffiliated external healthcare facilities or entities on a secure, media free, reciprocally searchable basis with patient authorization for at least a 12-month period after this study. _______________ FINDINGS: LOWER CHEST: No acute abnormality. Small hiatus hernia LIVER, BILIARY: Liver is normal. No biliary dilation. Gallbladder is unremarkable. PANCREAS: Normal. SPLEEN: Normal. ADRENALS: Stable nodular fusiform thickened bilateral adrenal glands, favoring adrenal hyperplasia. KIDNEYS, URETERS, BLADDER: Isoattenuating bilateral kidneys without nephrolithiasis, hydronephrosis or perinephric fluid. Decompressed urinary bladder GASTROINTESTINAL TRACT: No bowel dilation or wall thickening. Scattered diverticulosis without findings of acute diverticulitis. No significant stool burden. LYMPH NODES: No enlarged lymph nodes. PELVIC ORGANS: Enlarged uterus with multiple coarse calcified degenerative leiomyoma VASCULATURE: Atherosclerotic calcification. OSSEOUS: No acute or aggressive osseous abnormalities identified. OTHER: Left anterior mid abdominal 1.8 cm oval fat with rim density, consistent with epiploic appendagitis (series 2 image 53), new since prior. _______________     1.  Sigmoid colonic epiploic appendagitis which may be associated with focal pain, age-indeterminate but new finding since 12/12/2018. Otherwise, no acute intra-abdominal or intrapelvic obstructive or inflammatory process. 2. Diverticulosis without diverticulitis. No findings of bowel obstruction or significant stool burden. 3. Small hiatus hernia. US RETROPERITONEUM COMP    Result Date: 10/29/2021  EXAM: Ultrasound retroperitoneum INDICATION: Renal insufficiency COMPARISON: None. _______________ FINDINGS: Right kidney measures 9.5 cm in length and is normal in echogenicity without hydronephrosis, masses, stones or cysts. Left kidney measures 9.4 cm in length and is normal in echogenicity without hydronephrosis, masses, stones or cysts. Urinary bladder is not visualized. Multiple calcified uterine fibroids are seen. _______________     Unremarkable retroperitoneal sonogram    XR CHEST PORT    Result Date: 10/29/2021  EXAM: XR CHEST PORT CLINICAL INDICATION/HISTORY: syncope -Additional: None COMPARISON: July 20, 2021 TECHNIQUE: Frontal view of the chest _______________ FINDINGS: HEART AND MEDIASTINUM: Normal cardiac size and mediastinal contours. LUNGS AND PLEURAL SPACES: No focal pneumonic consolidation, pneumothorax, or pleural effusion. BONY THORAX AND SOFT TISSUES: No acute osseous abnormality _______________     No acute radiographic cardiopulmonary abnormality.        Joanie Merlos MD

## 2021-11-05 NOTE — PROGRESS NOTES
Cardiology Progress Note        Patient: Kayode Pandya        Sex: female          DOA: 10/29/2021  YOB: 1945      Age:  78 y.o.        LOS:  LOS: 7 days      Patient seen and examined, chart reviewed.     Assessment/Plan     Patient Active Problem List   Diagnosis Code    Chest pain R07.9    CAD (coronary artery disease) I25.10    Hypertension I10    Gout M10.9    Anticoagulated Z79.01    Abnormal nuclear stress test R94.39    Cardiomyopathy ([address] I42.9    Right leg weakness R29.898    CAD S/P percutaneous coronary angioplasty I25.10, Z98.61    Insulin dependent type 2 diabetes mellitus (HCC) E11.9, Z79.4    BMI 40.0-44.9, adult ([facility]) Z68.41    S/P drug eluting coronary stent placement Z95.5    EDGAR (obstructive sleep apnea) G47.33    CAP (community acquired pneumonia) J18.9    Acute on chronic combined systolic and diastolic ACC/AHA stage C congestive heart failure (HCC) I50.43    Syncope and collapse R55    Closed head injury S09.90XA    Severe dehydration E86.0    Acute renal injury (HCC) N17.9    Constipation K59.00    Chronic combined systolic and diastolic heart failure (HCC) I50.42    Abdominal pain R10.9    Epiploic appendagitis K63.89    History of DVT (deep vein thrombosis) Z86.718    Abnormal abdominal CT scan R93.5    Urinary retention R33.9    Encounter for palliative care Z51.5    Debility R53.81      CAD PCI LCx  Acute renal failure improving      Cardiac catheterization was done in 08/2020 reported     single-vessel occlusive CAD.     Proximal left circumflex artery have 90% stenosis just proximal to the previously deployed mid left circumflex artery stent.  This lesion is treated by 2.75x23 mm Xience Audrey drug-eluting stent with excellent results.     Rest of coronary anatomy is patent with minimal luminal irregularity.  .     LVEDP 30 mmHg.     Echocardiogram done in July 2021 reported     · Image quality for this study was technically difficult. Contrast used: DEFINITY. · Left Ventricle: Mildly dilated left ventricle. Mild concentric hypertrophy. The estimated EF is 25 - 30%. Severely reduced systolic function. There is moderate (grade 2) left ventricular diastolic dysfunction E/E' ratio is 25.72. Wall Scoring: The left ventricular wall motion is globally hypokinetic. · Left Atrium: Mildly dilated left atrium. · Right Ventricle: Mildly dilated right ventricle. Mildly reduced systolic function. Assessment of RV function: TAPSE = 18 mm. · Right Atrium: Mildly dilated right atrium. · Tricuspid Valve: Tricuspid valve not well visualized. No stenosis. Tricuspid regurgitation is inadequate for estimation of right ventricular systolic pressure.     Echocardiogram revealed     Left Ventricle: Normal wall thickness. Mildly dilated left ventricle. The estimated EF is 25 - 30%. Severely reduced systolic function. There is mild (grade 1) left ventricular diastolic dysfunction E/E' ratio = 9.38. Wall Scoring: The left ventricular wall motion is globally hypokinetic. Left Atrium: Mildly dilated left atrium. Right Ventricle: Mildly dilated right ventricle. Borderline low systolic function. Assessment of RV function: TAPSE = 14 mm. Pulmonary Artery: Pulmonary arterial systolic pressure (PASP) is 35 mmHg. Pulmonary hypertension found to be mild.         Plan:      Continue aspirin and atorvastatin. Continue metoprolol succinate 25 mg by mouth once a day. If blood pressure and renal function permits will consider adding ACE inhibitor /ARB and spironolactone. Episode of syncope appears due to hypovolemia however patient has chronic systolic heart failure and arrhythmias cannot be completely excluded. Patient has issue with compliance and follow-up with cardiologist.  She is not following with any cardiologist as an outpatient. She mentioned that she is not adherent with her medications.     Strongly advised about medication adherence. Waiting for lifevest                  Subjective:    cc:    Denies any chest pain or shortness of breath       REVIEW OF SYSTEMS:     General: No fevers or chills. Cardiovascular: No chest pain,No palpitations, No orthopnea, No PND, No leg swelling, No claudication  Pulmonary: No   Dyspnea   Gastrointestinal: No nausea, vomiting, bleeding  Neurology: No Dizziness    Objective:      Visit Vitals  BP (!) 119/57 (BP 1 Location: Left upper arm, BP Patient Position: At rest)   Pulse 83   Temp 98.6 °F (37 °C)   Resp 19   Ht 5' 3\" (1.6 m)   Wt 110 kg (242 lb 6.4 oz)   SpO2 100%   BMI 42.94 kg/m²     Body mass index is 42.94 kg/m². Physical Exam:  General Appearance: Comfortable, not using accessory muscles of respiration. HEENT: JUAN. HEAD: Atraumatic  NECK: No JVD, no thyroidomeglay. CAROTIDS: no bruit  LUNGS: Clear bilaterally. HEART: S1+S2 audible, no murmur, no pericardial rub. ABD: Non-tender, BS Audible    EXT: No edema, and no cyanosis.   NEUROLOGICAL: AAO times 3, No motor and sensory deficit    Medication:  Current Facility-Administered Medications   Medication Dose Route Frequency    nystatin (MYCOSTATIN) 100,000 unit/gram powder   Topical BID    metroNIDAZOLE (FLAGYL) tablet 500 mg  500 mg Oral Q12H    metoprolol succinate (TOPROL-XL) XL tablet 25 mg  25 mg Oral DAILY    famotidine (PEPCID) tablet 20 mg  20 mg Oral Q12H    vancomycin (VANCOCIN) 1500 mg in  ml infusion  1,500 mg IntraVENous Q24H    traMADoL (ULTRAM) tablet 50 mg  50 mg Oral Q6H PRN    diphenhydrAMINE (BENADRYL) capsule 25 mg  25 mg Oral Q6H PRN    diphenhydrAMINE (BENADRYL) injection 12.5 mg  12.5 mg IntraVENous Q6H PRN    butalbital-acetaminophen-caffeine (FIORICET, ESGIC) -40 mg per tablet 1 Tablet  1 Tablet Oral Q6H PRN    Vancomycin Pharmacy to Dose  1 Each Other Rx Dosing/Monitoring    insulin glargine (LANTUS) injection 15 Units  15 Units SubCUTAneous QHS    heparin (porcine) injection 5,000 Units  5,000 Units SubCUTAneous Q8H    sodium chloride (NS) flush 5-40 mL  5-40 mL IntraVENous Q8H    sodium chloride (NS) flush 5-40 mL  5-40 mL IntraVENous PRN    acetaminophen (TYLENOL) tablet 650 mg  650 mg Oral Q6H PRN    Or    acetaminophen (TYLENOL) suppository 650 mg  650 mg Rectal Q6H PRN    polyethylene glycol (MIRALAX) packet 17 g  17 g Oral DAILY PRN    ondansetron (ZOFRAN ODT) tablet 4 mg  4 mg Oral Q8H PRN    Or    ondansetron (ZOFRAN) injection 4 mg  4 mg IntraVENous Q6H PRN    allopurinoL (ZYLOPRIM) tablet 100 mg  100 mg Oral BID    aspirin delayed-release tablet 81 mg  81 mg Oral DAILY    atorvastatin (LIPITOR) tablet 10 mg  10 mg Oral QHS    [Held by provider] rivaroxaban (XARELTO) tablet 15 mg  15 mg Oral DAILY    insulin lispro (HUMALOG) injection   SubCUTAneous AC&HS    glucose chewable tablet 16 g  16 g Oral PRN    glucagon (GLUCAGEN) injection 1 mg  1 mg IntraMUSCular PRN    dextrose (D50W) injection syrg 12.5-25 g  25-50 mL IntraVENous PRN               Lab/Data Reviewed:       Recent Labs     11/05/21  0305 11/04/21  0200 11/03/21  0159   WBC 9.4 8.4 6.6   HGB 11.8* 12.1 11.9*   HCT 36.3 37.4 37.5    181 186     Recent Labs     11/05/21  0305 11/04/21  0200 11/03/21  0159    138 140   K 3.7 4.0 3.6    108 108   CO2 27 28 28   * 133* 113*   BUN 17 15 13   CREA 1.10 0.98 0.94   CA 8.5 8.1* 8.5       Signed By: Antoine Reyes MD     November 5, 2021

## 2021-11-05 NOTE — PROGRESS NOTES
Physician Progress Note      PATIENT:               Kiara Connelly  CSN #:                  [id number]  :                       1945  ADMIT DATE:       10/29/2021 3:29 PM  [address] [place] DATE:  RESPONDING  PROVIDER #:        Sina ROBERTS MD          QUERY TEXT:    Pt admitted with syncope and has both acute CHF exacerbation and Chronic combined systolic and diastolic CHF documented. If possible, please document in progress notes and discharge summary further specificity regarding the type and acuity of CHF:    The medical record reflects the following:    Risk Factors: PMH CHF, HTN, CAD, Morbid obesity, DM, Cardiomyopathy    Clinical Indicators:  > Per H&P- admitted for acute CHF exacerbation  > Per PN 10/30/21-21 Chronic combined systolic and diastolic CHF  > EF 20-77 %  > Per General Surgery 10/30- \"currently admitted to the hospitalist service for acute CHF exacerbation, syncope, dehydration\"  > Per Cardiology consult- pt complaining of shortness of breath on exertion  > Patient Active Problem List has both Acute on chronic combined systolic and diastolic congestive heart failure and Chronic combined systolic and diastolic heart failure  > 10/29/21 NT pro-BNP 1,104,  NT pro-BNP  3 months ago 4,184  > Last echocardiogram 21- The estimated EF is 25 - 30%. Severely reduced systolic function. There is moderate (grade 2) left ventricular diastolic dysfunction    Treatment: Receiving cardiology consult, [place]    Thank you,  Ana Crews, RN, BSN, [facility], [address] [facility]  Options provided:  -- Acute on Chronic Systolic and Diastolic CHF  -- Chronic Systolic and Diastolic CHF  -- Other - I will add my own diagnosis  -- Disagree - Not applicable / Not valid  -- Disagree - Clinically unable to determine / Unknown  -- Refer to Clinical Documentation Reviewer    PROVIDER RESPONSE TEXT:    This patient has chronic systolic and diastolic CHF.     Query created by: Martha Arguelles on 11/3/2021 7:28 AM      Electronically signed by:  Elio ROBERTS MD 11/5/2021 4:58 PM

## 2021-11-05 NOTE — PROGRESS NOTES
0710 Bedside and Verbal shift change report given to SOO Stanley RN (oncoming nurse) by KRIS Hull RN (offgoing nurse). Report included the following information SBAR, Kardex, Intake/Output, and MAR. Pt in bed, call light in reach. 0900 Pt assessed. No signs of acute distress. Pt in bed.    1226 Pt assessed. No signs of acute distress. Pt in bed. 1700 Pt assessed. No signs of acute distress. Pt in bed.    1755 Tylenol given for pain. 1930 Bedside and Verbal shift change report given to Chris Bustillos RN (oncoming nurse) by Carmen Grover. Jez Stanley RN (offgoing nurse). Report included the following information SBAR, Kardex, Intake/Output, and MAR. Pt in bed, call light in reach.

## 2021-11-05 NOTE — PROGRESS NOTES
Hospitalist Progress Note    Patient: Ayaz Rudd MRN: [id number]  CSN: [id number]    YOB: 1945  Age: 78 y.o.   Sex: female    DOA: 10/29/2021 LOS:  LOS: 6 days                Assessment/Plan     Patient Active Problem List   Diagnosis Code    Chest pain R07.9    CAD (coronary artery disease) I25.10    Hypertension I10    Gout M10.9    Anticoagulated Z79.01    Abnormal nuclear stress test R94.39    Cardiomyopathy ([facility] I42.9    Right leg weakness R29.898    CAD S/P percutaneous coronary angioplasty I25.10, Z98.61    Insulin dependent type 2 diabetes mellitus (HCC) E11.9, Z79.4    BMI 40.0-44.9, adult (HCC) Z68.41    S/P drug eluting coronary stent placement Z95.5    EDGAR (obstructive sleep apnea) G47.33    CAP (community acquired pneumonia) J18.9    Acute on chronic combined systolic and diastolic ACC/AHA stage C congestive heart failure (HCC) I50.43    Syncope and collapse R55    Closed head injury S09.90XA    Severe dehydration E86.0    Acute renal injury ([facility] [address] N17.9    Constipation K59.00    Chronic combined systolic and diastolic heart failure (HCC) I50.42    Abdominal pain R10.9    Epiploic appendagitis K63.89    History of DVT (deep vein thrombosis) Z86.718    Abnormal abdominal CT scan R93.5    Urinary retention R33.9    Encounter for palliative care Z51.5    Debility R53.81        Chief complaint :  24-year-old female with insulin-dependent type 2 diabetes mellitus, morbid obesity, hypertension, CAD with drug-eluting stent-off plavix last admission and hx of dvt  on anticoagulation, and cardiomyopathy with EF of 25-30% is admitted syncope , ame and abdomen pain.      Syncope -  CT with no acute findings  Mri brain no acute stroke   V/q scan negative for pe   pvl no dvt   ce x3 wnl      BP controlled without antihypertensive  Continue to hold medication for anti-HTN: carvedilol, losartan and spironolactone     Severe dehydration, AME -resolved  Creatinine wnl urinary retention, montelongo in place,   Voiding trial     Abdominal pain -   No intervention needed per surgeon   Continue iv abx   Improving   If pain recurs, can see gen surg OP        Chronic combined systolic and diastolic CHF  EF 51-82 %  compensated   continue aspirin and atorvastatin and agree with continuing to hold carvedilol, losartan and spironolactone and Lasix     CAD -  Continue aspirin , bbb hold due to hypotension      History of dvt on xarelto -  Xarelto is being held due to creatinine of almost 5, creatinine normal now at 0.94, will resume Xarelto     Insulin-dependent type 2 diabetes mellitus-  last A1C of 7.6  Low dose lantus   Diabetic diet change to clear one due to gi issue      BMI 41  Aggressive lifestyle modification needed     EDGAR on CPAP  CPAP qhs       Disposition : 1-2 days    Review of systems  General: No fevers or chills. Cardiovascular: No chest pain or pressure. No palpitations. Pulmonary: No shortness of breath. Gastrointestinal: No nausea, vomiting. Physical Exam:  General: Awake, cooperative, no acute distress    HEENT: NC, Atraumatic. PERRLA, anicteric sclerae. Lungs: CTA Bilaterally. No Wheezing/Rhonchi/Rales. Heart:  S1 S2,  No murmur, No Rubs, No Gallops  Abdomen: Soft, Non distended, Non tender.  +Bowel sounds,   Extremities: No c/c/e  Psych:   Not anxious or agitated. Neurologic:  No acute neurological deficit. Vital signs/Intake and Output:  Visit Vitals  BP (!) 130/51   Pulse 86   Temp 98.2 °F (36.8 °C)   Resp 18   Ht 5' 3\" (1.6 m)   Wt 110 kg (242 lb 6.4 oz)   SpO2 98%   BMI 42.94 kg/m²     Current Shift:  No intake/output data recorded. Last three shifts:  11/03 0701 - 11/04 1900  In: 940 [P.O.:840;  I.V.:100]  Out: 450 [Urine:450]            Labs: Results:       Chemistry Recent Labs     11/04/21  0200 11/03/21  0159 11/02/21  0100   * 113* 129*    140 141   K 4.0 3.6 3.4*    108 109   CO2 28 28 27   BUN 15 13 24*   CREA 0. 98 0.94 1.11   CA 8.1* 8.5 8.4*   AGAP 2* 4 5   BUCR 15 14 22*   AP 68 63 58   TP 6.0* 6.0* 5.9*   ALB 2.3* 2.4* 2.4*   GLOB 3.7 3.6 3.5   AGRAT 0.6* 0.7* 0.7*      CBC w/Diff Recent Labs     11/04/21  0200 11/03/21  0159 11/02/21  0100   WBC 8.4 6.6 5.2   RBC 4.22 4.24 4.22   HGB 12.1 11.9* 11.9*   HCT 37.4 37.5 37.2    186 173      Cardiac Enzymes No results for input(s): CPK, CKND1, SARWAT in the last 72 hours. No lab exists for component: CKRMB, TROIP   Coagulation No results for input(s): PTP, INR, APTT, INREXT in the last 72 hours. Lipid Panel Lab Results   Component Value Date/Time    Cholesterol, total 139 08/25/2020 05:30 AM    HDL Cholesterol 49 08/25/2020 05:30 AM    LDL, calculated 58.6 08/25/2020 05:30 AM    VLDL, calculated 31.4 08/25/2020 05:30 AM    Triglyceride 157 (H) 08/25/2020 05:30 AM    CHOL/HDL Ratio 2.8 08/25/2020 05:30 AM      BNP No results for input(s): BNPP in the last 72 hours.    Liver Enzymes Recent Labs     11/04/21  0200   TP 6.0*   ALB 2.3*   AP 68      Thyroid Studies No results found for: T4, T3U, TSH, TSHEXT     Procedures/imaging: see electronic medical records for all procedures/Xrays and details which were not copied into this note but were reviewed prior to creation of Plan

## 2021-11-05 NOTE — PROGRESS NOTES
Comprehensive Nutrition Assessment    Type and Reason for Visit: Initial, RD nutrition re-screen/LOS    Nutrition Recommendations/Plan: Continue current diet and POC. Nutrition Assessment:  PMHx: insuline-dependent T2DM, HTN, CAD. asmitted d/t syncope, AME-resolved, and abdomen pain (epiploic appendagitis. No intervention needed per surgeon). Last bm- 10/30. Labs- glucose (126) H + all POC tests (avg. 176 for past 5), GFR est AA 59 (49 est nonAA). Meds- pepcid, lantus, humalog, zofran, miralax. Estimated Daily Nutrient Needs:  Energy (kcal): 1306 - 1560; Weight Used for Energy Requirements: Ideal  Protein (g): 105; Weight Used for Protein Requirements: Ideal (2g/kg)  Fluid (ml/day): 1306 - 1560; Method Used for Fluid Requirements: 1 ml/kcal      Nutrition Related Findings: All PO documentation since 10/30 (avg. 1-2/day) has been %. No concerns about intake. Per chart review, pt has not lost any weight recently. Went to speak with pt- was asleep. Will follow up. Wounds:    None       Current Nutrition Therapies:  ADULT DIET Regular; GI [place] (GERD/Peptic Ulcer)    Anthropometric Measures:  Height:  5' 3\" (160 cm)  Current Body Wt:  110 kg (242 lb 6.4 oz)   Admission Body Wt:  229 lb    Usual Body Wt:  110.2 kg (243 lb) (8/17/21)     Ideal Body Wt:  115 lbs:  210.8 %    BMI Category:  Obese class 3 (BMI 40.0 or greater)       Nutrition Diagnosis:   Overweight/obesity related to excessive energy intake as evidenced by BMI    Nutrition Interventions:   Food and/or Nutrient Delivery: Continue current diet  Nutrition Education and Counseling: No recommendations at this time  Coordination of Nutrition Care: Continue to monitor while inpatient    Goals:  Pt to continue PO intake >75% for next 7 days. Pt will maintain or lose 1-2 lbs of weight throughout the next 7 days.        Nutrition Monitoring and Evaluation:   Behavioral-Environmental Outcomes: None identified  Food/Nutrient Intake Outcomes: Food and nutrient intake  Physical Signs/Symptoms Outcomes: Biochemical data, Meal time behavior, GI status, Weight, Skin    Discharge Planning:    Continue current diet     Electronically signed by Lizzie Roach RD on 11/5/2021 at 12:54 PM

## 2021-11-05 NOTE — PROGRESS NOTES
Problem: Mobility Impaired (Adult and Pediatric)  Goal: *Acute Goals and Plan of Care (Insert Text)  Description: Physical Therapy Goals   Initiated 11/1/2021 and to be accomplished within 5 day(s)  1. Patient will move from supine <> sit with mod I in prep for out of bed activity and change of position. 2.  Patient will perform sit<> stand with mod I with LRAD in prep for transfers/ambulation. 3.  Patient will transfer from bed <> chair with mod I with LRAD for time up in chair for completion of ADL activity. 4.  Patient will ambulate 100 feet with S/LRAD for improved functional mobility at discharge. Outcome: Progressing Towards Goal   PHYSICAL THERAPY TREATMENT    Patient: Terry Jensen (31 y.o. female)  Date: 11/5/2021  Diagnosis: Syncope and collapse [R55]  Severe dehydration [E86.0]  Acute renal injury ([facility] [address] [N17.9]  Closed head injury [S09.90XA]  Anticoagulated [Z79.01]   Syncope and collapse    Precautions: Fall  PLOF: independent, ambulates without AD, has a RW, plans to recover at son's home    ASSESSMENT:  No assistance needed for bed mobility. CGA for sit to stand. Ambulated with RW, 70ft reciprocal gt pattern, wide MANGO, decreased stride, 4 brief standing rest breaks. Pt left sitting EOB to eat breakfast.  Progression toward goals:   []      Improving appropriately and progressing toward goals  [x]      Improving slowly and progressing toward goals  []      Not making progress toward goals and plan of care will be adjusted     PLAN:  Patient continues to benefit from skilled intervention to address the above impairments. Continue treatment per established plan of care. Discharge Recommendations:  Home Health with family support  Further Equipment Recommendations for Discharge:  rolling walker, BSC     SUBJECTIVE:   Patient stated My ankles feel weak.     OBJECTIVE DATA SUMMARY:   Critical Behavior:  Neurologic State: Alert  Orientation Level: Oriented X4  Cognition: Appropriate decision making, Appropriate for age attention/concentration, Appropriate safety awareness, Follows commands  Safety/Judgement: Awareness of environment, Fall prevention  Functional Mobility Training:  Bed Mobility:    Supine to Sit: Supervision    Scooting: Supervision  Transfers:  Sit to Stand: Contact guard assistance  Stand to Sit: Stand-by assistance  Balance:  Sitting: Intact  Standing: Intact; With support   Ambulation/Gait Training:  Distance (ft): 70 Feet (ft)  Assistive Device: Gait belt; Walker, rolling  Ambulation - Level of Assistance: Stand-by assistance  Base of Support: Widened        Pain:  Pain level pre-treatment: 0/10  Pain level post-treatment: 0/10   Pain Intervention(s): Medication (see MAR); Rest, Ice, Repositioning   Response to intervention: Nurse notified, See doc flow    Activity Tolerance:   Fair  Please refer to the flowsheet for vital signs taken during this treatment. After treatment:   [] Patient left in no apparent distress sitting up in chair  [x] Patient left in no apparent distress in bed  [x] Call bell left within reach  [] Nursing notified  [] Caregiver present  [] Bed alarm activated  [] SCDs applied      COMMUNICATION/EDUCATION:   [x]         Role of Physical Therapy in the acute care setting. [x]         Fall prevention education was provided and the patient/caregiver indicated understanding. [x]         Patient/family have participated as able in working toward goals and plan of care. [x]         Patient/family agree to work toward stated goals and plan of care. []         Patient understands intent and goals of therapy, but is neutral about his/her participation.   []         Patient is unable to participate in stated goals/plan of care: ongoing with therapy staff.  []         Other:        Jorden Shen PTA   Time Calculation: 24 mins

## 2021-11-06 VITALS
HEIGHT: 63 IN | OXYGEN SATURATION: 99 % | TEMPERATURE: 98.4 F | HEART RATE: 79 BPM | SYSTOLIC BLOOD PRESSURE: 133 MMHG | RESPIRATION RATE: 18 BRPM | BODY MASS INDEX: 42.95 KG/M2 | DIASTOLIC BLOOD PRESSURE: 75 MMHG | WEIGHT: 242.4 LBS

## 2021-11-06 LAB
ANION GAP SERPL CALC-SCNC: 5 MMOL/L (ref 3–18)
BUN SERPL-MCNC: 16 MG/DL (ref 7–18)
BUN/CREAT SERPL: 17 (ref 12–20)
CALCIUM SERPL-MCNC: 8.2 MG/DL (ref 8.5–10.1)
CHLORIDE SERPL-SCNC: 110 MMOL/L (ref 100–111)
CO2 SERPL-SCNC: 26 MMOL/L (ref 21–32)
CREAT SERPL-MCNC: 0.92 MG/DL (ref 0.6–1.3)
GLUCOSE BLD STRIP.AUTO-MCNC: 126 MG/DL (ref 70–110)
GLUCOSE BLD STRIP.AUTO-MCNC: 200 MG/DL (ref 70–110)
GLUCOSE SERPL-MCNC: 125 MG/DL (ref 74–99)
POTASSIUM SERPL-SCNC: 3.8 MMOL/L (ref 3.5–5.5)
SODIUM SERPL-SCNC: 141 MMOL/L (ref 136–145)

## 2021-11-06 PROCEDURE — 74011250637 HC RX REV CODE- 250/637: Performed by: FAMILY MEDICINE

## 2021-11-06 PROCEDURE — 74011250637 HC RX REV CODE- 250/637: Performed by: HOSPITALIST

## 2021-11-06 PROCEDURE — 82962 GLUCOSE BLOOD TEST: CPT

## 2021-11-06 PROCEDURE — 74011250637 HC RX REV CODE- 250/637: Performed by: INTERNAL MEDICINE

## 2021-11-06 PROCEDURE — 74011636637 HC RX REV CODE- 636/637: Performed by: FAMILY MEDICINE

## 2021-11-06 PROCEDURE — 36415 COLL VENOUS BLD VENIPUNCTURE: CPT

## 2021-11-06 PROCEDURE — 80048 BASIC METABOLIC PNL TOTAL CA: CPT

## 2021-11-06 PROCEDURE — 74011250636 HC RX REV CODE- 250/636: Performed by: HOSPITALIST

## 2021-11-06 PROCEDURE — 74011250636 HC RX REV CODE- 250/636: Performed by: INTERNAL MEDICINE

## 2021-11-06 RX ORDER — METOPROLOL SUCCINATE 25 MG/1
25 TABLET, EXTENDED RELEASE ORAL DAILY
Qty: 30 TABLET | Status: SHIPPED | OUTPATIENT
Start: 2021-11-06

## 2021-11-06 RX ORDER — NYSTATIN 100000 [USP'U]/G
POWDER TOPICAL 2 TIMES DAILY
Qty: 100 G | Refills: 0 | Status: SHIPPED | OUTPATIENT
Start: 2021-11-06

## 2021-11-06 RX ORDER — METRONIDAZOLE 500 MG/1
500 TABLET ORAL EVERY 12 HOURS
Qty: 5 TABLET | Refills: 0 | Status: SHIPPED | OUTPATIENT
Start: 2021-11-06 | End: 2021-11-09

## 2021-11-06 RX ADMIN — NYSTATIN: 100000 POWDER TOPICAL at 09:00

## 2021-11-06 RX ADMIN — HEPARIN SODIUM 5000 UNITS: 5000 INJECTION INTRAVENOUS; SUBCUTANEOUS at 00:24

## 2021-11-06 RX ADMIN — METRONIDAZOLE 500 MG: 250 TABLET ORAL at 09:25

## 2021-11-06 RX ADMIN — HEPARIN SODIUM 5000 UNITS: 5000 INJECTION INTRAVENOUS; SUBCUTANEOUS at 09:26

## 2021-11-06 RX ADMIN — FAMOTIDINE 20 MG: 20 TABLET, FILM COATED ORAL at 09:26

## 2021-11-06 RX ADMIN — DIPHENHYDRAMINE HYDROCHLORIDE 12.5 MG: 50 INJECTION, SOLUTION INTRAMUSCULAR; INTRAVENOUS at 00:29

## 2021-11-06 RX ADMIN — INSULIN LISPRO 4 UNITS: 100 INJECTION, SOLUTION INTRAVENOUS; SUBCUTANEOUS at 12:09

## 2021-11-06 RX ADMIN — ASPIRIN 81 MG: 81 TABLET, COATED ORAL at 09:26

## 2021-11-06 RX ADMIN — VANCOMYCIN HYDROCHLORIDE 1000 MG: 1 INJECTION, POWDER, LYOPHILIZED, FOR SOLUTION INTRAVENOUS at 14:22

## 2021-11-06 RX ADMIN — METOPROLOL SUCCINATE 25 MG: 25 TABLET, EXTENDED RELEASE ORAL at 09:26

## 2021-11-06 RX ADMIN — ALLOPURINOL 100 MG: 100 TABLET ORAL at 09:26

## 2021-11-06 NOTE — PROGRESS NOTES
Cardiology Progress Note        Patient: Idalia Singh        Sex: female          DOA: 10/29/2021  YOB: 1945      Age:  78 y.o.        LOS:  LOS: 8 days      Patient seen and examined, chart reviewed.     Assessment/Plan     Patient Active Problem List   Diagnosis Code    Chest pain R07.9    CAD (coronary artery disease) I25.10    Hypertension I10    Gout M10.9    Anticoagulated Z79.01    Abnormal nuclear stress test R94.39    Cardiomyopathy ([address] I42.9    Right leg weakness R29.898    CAD S/P percutaneous coronary angioplasty I25.10, Z98.61    Insulin dependent type 2 diabetes mellitus (HCC) E11.9, Z79.4    BMI 40.0-44.9, adult ([facility]) Z68.41    S/P drug eluting coronary stent placement Z95.5    EDGAR (obstructive sleep apnea) G47.33    CAP (community acquired pneumonia) J18.9    Acute on chronic combined systolic and diastolic ACC/AHA stage C congestive heart failure (HCC) I50.43    Syncope and collapse R55    Closed head injury S09.90XA    Severe dehydration E86.0    Acute renal injury (HCC) N17.9    Constipation K59.00    Chronic combined systolic and diastolic heart failure (HCC) I50.42    Abdominal pain R10.9    Epiploic appendagitis K63.89    History of DVT (deep vein thrombosis) Z86.718    Abnormal abdominal CT scan R93.5    Urinary retention R33.9    Encounter for palliative care Z51.5    Debility R53.81      CAD PCI LCx  Acute renal failure improving      Cardiac catheterization was done in 08/2020 reported     single-vessel occlusive CAD.     Proximal left circumflex artery have 90% stenosis just proximal to the previously deployed mid left circumflex artery stent.  This lesion is treated by 2.75x23 mm Xience Audrey drug-eluting stent with excellent results.     Rest of coronary anatomy is patent with minimal luminal irregularity.  .     LVEDP 30 mmHg.     Echocardiogram done in July 2021 reported     · Image quality for this study was technically difficult. Contrast used: DEFINITY. · Left Ventricle: Mildly dilated left ventricle. Mild concentric hypertrophy. The estimated EF is 25 - 30%. Severely reduced systolic function. There is moderate (grade 2) left ventricular diastolic dysfunction E/E' ratio is 25.72. Wall Scoring: The left ventricular wall motion is globally hypokinetic. · Left Atrium: Mildly dilated left atrium. · Right Ventricle: Mildly dilated right ventricle. Mildly reduced systolic function. Assessment of RV function: TAPSE = 18 mm. · Right Atrium: Mildly dilated right atrium. · Tricuspid Valve: Tricuspid valve not well visualized. No stenosis. Tricuspid regurgitation is inadequate for estimation of right ventricular systolic pressure.     Echocardiogram revealed     Left Ventricle: Normal wall thickness. Mildly dilated left ventricle. The estimated EF is 25 - 30%. Severely reduced systolic function. There is mild (grade 1) left ventricular diastolic dysfunction E/E' ratio = 9.38. Wall Scoring: The left ventricular wall motion is globally hypokinetic. Left Atrium: Mildly dilated left atrium. Right Ventricle: Mildly dilated right ventricle. Borderline low systolic function. Assessment of RV function: TAPSE = 14 mm. Pulmonary Artery: Pulmonary arterial systolic pressure (PASP) is 35 mmHg. Pulmonary hypertension found to be mild.         Plan:      Continue aspirin and atorvastatin. Continue metoprolol succinate 25 mg by mouth once a day. If blood pressure and renal function permits will consider adding ACE inhibitor /ARB and spironolactone. Can be done as out     Episode of syncope appears due to hypovolemia however patient has chronic systolic heart failure and arrhythmias cannot be completely excluded. Patient has issue with compliance and follow-up with cardiologist.  She is not following with any cardiologist as an outpatient. She mentioned that she is not adherent with her medications.     Strongly advised about medication adherence. Continue lifevest   Discussed with patient's son. Follow up with cardiologist  after discharge              Subjective:    cc:    Denies any chest pain or shortness of breath       REVIEW OF SYSTEMS:     General: No fevers or chills. Cardiovascular: No chest pain,No palpitations, No orthopnea, No PND, No leg swelling, No claudication  Pulmonary: No   Dyspnea   Gastrointestinal: No nausea, vomiting, bleeding  Neurology: No Dizziness    Objective:      Visit Vitals  /75   Pulse 79   Temp 98.4 °F (36.9 °C)   Resp 18   Ht 5' 3\" (1.6 m)   Wt 110 kg (242 lb 6.4 oz)   SpO2 99%   BMI 42.94 kg/m²     Body mass index is 42.94 kg/m². Physical Exam:  General Appearance: Comfortable, not using accessory muscles of respiration. HEENT: JUAN. HEAD: Atraumatic  NECK: No JVD, no thyroidomeglay. CAROTIDS: no bruit  LUNGS: Clear bilaterally. HEART: S1+S2 audible, no murmur, no pericardial rub. ABD: Non-tender, BS Audible    EXT: No edema, and no cyanosis.   NEUROLOGICAL: AAO times 3, No motor and sensory deficit    Medication:  Current Facility-Administered Medications   Medication Dose Route Frequency    vancomycin (VANCOCIN) 1,000 mg in 0.9% sodium chloride 250 mL (VIAL-MATE)  1,000 mg IntraVENous Q12H    nystatin (MYCOSTATIN) 100,000 unit/gram powder   Topical BID    metroNIDAZOLE (FLAGYL) tablet 500 mg  500 mg Oral Q12H    metoprolol succinate (TOPROL-XL) XL tablet 25 mg  25 mg Oral DAILY    famotidine (PEPCID) tablet 20 mg  20 mg Oral Q12H    traMADoL (ULTRAM) tablet 50 mg  50 mg Oral Q6H PRN    diphenhydrAMINE (BENADRYL) capsule 25 mg  25 mg Oral Q6H PRN    diphenhydrAMINE (BENADRYL) injection 12.5 mg  12.5 mg IntraVENous Q6H PRN    butalbital-acetaminophen-caffeine (FIORICET, ESGIC) -40 mg per tablet 1 Tablet  1 Tablet Oral Q6H PRN    Vancomycin Pharmacy to Dose  1 Each Other Rx Dosing/Monitoring    insulin glargine (LANTUS) injection 15 Units  15 Units SubCUTAneous QHS    heparin (porcine) injection 5,000 Units  5,000 Units SubCUTAneous Q8H    sodium chloride (NS) flush 5-40 mL  5-40 mL IntraVENous Q8H    sodium chloride (NS) flush 5-40 mL  5-40 mL IntraVENous PRN    acetaminophen (TYLENOL) tablet 650 mg  650 mg Oral Q6H PRN    Or    acetaminophen (TYLENOL) suppository 650 mg  650 mg Rectal Q6H PRN    polyethylene glycol (MIRALAX) packet 17 g  17 g Oral DAILY PRN    ondansetron (ZOFRAN ODT) tablet 4 mg  4 mg Oral Q8H PRN    Or    ondansetron (ZOFRAN) injection 4 mg  4 mg IntraVENous Q6H PRN    allopurinoL (ZYLOPRIM) tablet 100 mg  100 mg Oral BID    aspirin delayed-release tablet 81 mg  81 mg Oral DAILY    atorvastatin (LIPITOR) tablet 10 mg  10 mg Oral QHS    [Held by provider] rivaroxaban (XARELTO) tablet 15 mg  15 mg Oral DAILY    insulin lispro (HUMALOG) injection   SubCUTAneous AC&HS    glucose chewable tablet 16 g  16 g Oral PRN    glucagon (GLUCAGEN) injection 1 mg  1 mg IntraMUSCular PRN    dextrose (D50W) injection syrg 12.5-25 g  25-50 mL IntraVENous PRN               Lab/Data Reviewed:       Recent Labs     11/05/21  0305 11/04/21  0200   WBC 9.4 8.4   HGB 11.8* 12.1   HCT 36.3 37.4    181     Recent Labs     11/06/21  0328 11/05/21  0305 11/04/21  0200    140 138   K 3.8 3.7 4.0    108 108   CO2 26 27 28   * 126* 133*   BUN 16 17 15   CREA 0.92 1.10 0.98   CA 8.2* 8.5 8.1*       Signed By: Mikal Espinal MD     November 6, 2021

## 2021-11-06 NOTE — PROGRESS NOTES
Problem: Diabetes Self-Management  Goal: *Disease process and treatment process  Description: Define diabetes and identify own type of diabetes; list 3 options for treating diabetes. 11/6/2021 1007 by Jennifer Doyle RN  Outcome: Progressing Towards Goal  11/6/2021 1006 by Jennifer Doyle RN  Outcome: Progressing Towards Goal  Goal: *Incorporating nutritional management into lifestyle  Description: Describe effect of type, amount and timing of food on blood glucose; list 3 methods for planning meals. 11/6/2021 1007 by Jennifer Doyle RN  Outcome: Progressing Towards Goal  11/6/2021 1006 by Jennifer Doyle RN  Outcome: Progressing Towards Goal  Goal: *Incorporating physical activity into lifestyle  Description: State effect of exercise on blood glucose levels. 11/6/2021 1007 by Jennifer Doyle RN  Outcome: Progressing Towards Goal  11/6/2021 1006 by Jennifer Doyle RN  Outcome: Progressing Towards Goal  Goal: *Developing strategies to promote health/change behavior  Description: Define the ABC's of diabetes; identify appropriate screenings, schedule and personal plan for screenings. 11/6/2021 1007 by Jennifer Doyle RN  Outcome: Progressing Towards Goal  11/6/2021 1006 by Jennifer Doyle RN  Outcome: Progressing Towards Goal  Goal: *Using medications safely  Description: State effect of diabetes medications on diabetes; name diabetes medication taking, action and side effects. 11/6/2021 1007 by Jennifer Doyle RN  Outcome: Progressing Towards Goal  11/6/2021 1006 by Jennifer Doyle RN  Outcome: Progressing Towards Goal  Goal: *Monitoring blood glucose, interpreting and using results  Description: Identify recommended blood glucose targets  and personal targets.   11/6/2021 1007 by Jennifer Doyle RN  Outcome: Progressing Towards Goal  11/6/2021 1006 by Jennifer Doyle RN  Outcome: Progressing Towards Goal  Goal: *Prevention, detection, treatment of acute complications  Description: List symptoms of hyper- and hypoglycemia; describe how to treat low blood sugar and actions for lowering  high blood glucose level. 11/6/2021 1007 by Renetta Garcia RN  Outcome: Progressing Towards Goal  11/6/2021 1006 by Renetta Garcia RN  Outcome: Progressing Towards Goal  Goal: *Prevention, detection and treatment of chronic complications  Description: Define the natural course of diabetes and describe the relationship of blood glucose levels to long term complications of diabetes. 11/6/2021 1007 by Renetta Garcia RN  Outcome: Progressing Towards Goal  11/6/2021 1006 by Renetta Garcia RN  Outcome: Progressing Towards Goal  Goal: *Developing strategies to address psychosocial issues  Description: Describe feelings about living with diabetes; identify support needed and support network  11/6/2021 1007 by Renetta Garcia RN  Outcome: Progressing Towards Goal  11/6/2021 1006 by Renetta Garcia RN  Outcome: Progressing Towards Goal  Goal: *Insulin pump training  11/6/2021 1007 by Renetta Garcia RN  Outcome: Progressing Towards Goal  11/6/2021 1006 by Renetta Garcia RN  Outcome: Progressing Towards Goal  Goal: *Sick day guidelines  11/6/2021 1007 by Renetta Garcia RN  Outcome: Progressing Towards Goal  11/6/2021 1006 by Renetta Garcia RN  Outcome: Progressing Towards Goal  Goal: *Patient Specific Goal (EDIT GOAL, INSERT TEXT)  11/6/2021 1007 by Renetta Garcia RN  Outcome: Progressing Towards Goal  11/6/2021 1006 by Renetta Garcia RN  Outcome: Progressing Towards Goal     Problem: Patient Education: Go to Patient Education Activity  Goal: Patient/Family Education  11/6/2021 1007 by Renetta Garcia RN  Outcome: Progressing Towards Goal  11/6/2021 1006 by Renetta Garcia RN  Outcome: Progressing Towards Goal     Problem: Falls - Risk of  Goal: *Absence of Falls  Description: Document Rosalia Kamara Fall Risk and appropriate interventions in the flowsheet.   11/6/2021 1007 by Renetta Garcia RN  Outcome: Progressing Towards Goal  Variances  Patient Condition  Patient Condition  Note: Fall Risk Interventions:  Mobility Interventions: Utilize walker, cane, or other assistive device    Mentation Interventions: Adequate sleep, hydration, pain control    Medication Interventions: Patient to call before getting OOB    Elimination Interventions: Toileting schedule/hourly rounds, Patient to call for help with toileting needs    History of Falls Interventions: Room close to nurse's station      11/6/2021 1006 by Jenney Bence, RN  Outcome: Progressing Towards Goal  Note: Fall Risk Interventions:  Mobility Interventions: Utilize walker, cane, or other assistive device    Mentation Interventions: Adequate sleep, hydration, pain control    Medication Interventions: Patient to call before getting OOB    Elimination Interventions: Toileting schedule/hourly rounds, Patient to call for help with toileting needs    History of Falls Interventions: Room close to nurse's station         Problem: Patient Education: Go to Patient Education Activity  Goal: Patient/Family Education  11/6/2021 1007 by Jenney Bence, RN  Outcome: Progressing Towards Goal  11/6/2021 1006 by Jenney Bence, RN  Outcome: Progressing Towards Goal     Problem: Pressure Injury - Risk of  Goal: *Prevention of pressure injury  Description: Document Sheldon Scale and appropriate interventions in the flowsheet.   11/6/2021 1007 by Jenney Bence, RN  Outcome: Progressing Towards Goal  Variances  Patient Condition  Patient Condition  Note: Pressure Injury Interventions:  Sensory Interventions: Minimize linen layers, Keep linens dry and wrinkle-free    Moisture Interventions: Absorbent underpads, Minimize layers    Activity Interventions: Pressure redistribution bed/mattress(bed type)    Mobility Interventions: HOB 30 degrees or less    Nutrition Interventions: Document food/fluid/supplement intake    Friction and Shear Interventions: HOB 30 degrees or less 11/6/2021 1006 by Renetta Garcia RN  Outcome: Progressing Towards Goal  Note: Pressure Injury Interventions:  Sensory Interventions: Minimize linen layers, Keep linens dry and wrinkle-free    Moisture Interventions: Absorbent underpads, Minimize layers    Activity Interventions: Pressure redistribution bed/mattress(bed type)    Mobility Interventions: HOB 30 degrees or less    Nutrition Interventions: Document food/fluid/supplement intake    Friction and Shear Interventions: HOB 30 degrees or less                Problem: Patient Education: Go to Patient Education Activity  Goal: Patient/Family Education  11/6/2021 1007 by Renetta Garcia RN  Outcome: Progressing Towards Goal  11/6/2021 1006 by Renetta Garcia RN  Outcome: Progressing Towards Goal     Problem: Patient Education: Go to Patient Education Activity  Goal: Patient/Family Education  11/6/2021 1007 by Renetta Garcia RN  Outcome: Progressing Towards Goal  11/6/2021 1006 by Renetta Garcia RN  Outcome: Progressing Towards Goal     Problem: Patient Education: Go to Patient Education Activity  Goal: Patient/Family Education  11/6/2021 1007 by Renetta Garcia RN  Outcome: Progressing Towards Goal  11/6/2021 1006 by Renetta Garcia RN  Outcome: Progressing Towards Goal

## 2021-11-06 NOTE — PROGRESS NOTES
DC plan: home to own home with [facility]    Spoke with daughter in law Bernard Vaca who expressed concern about patient going to their home due to bedroom being on second floor; they prefer to take patient to own home and they have agreed to have someone stay with her - her address is [address] [place] [address] [address]; [place] [phone]. They are also requesting extra help at home - CM will complete [facility] and have notfied [facility] of discharge location for today and added medical SW and HHA if possible - SW to assist with using [facility] for home care assistance. Care Management Interventions  PCP Verified by CM:  Yes  Mode of Transport at Discharge: Self  Transition of Care Consult (CM Consult): Discharge Planning, [address]: Yes  Physical Therapy Consult: Yes  Occupational Therapy Consult: Yes  Support Systems: Child(sonali)  Confirm Follow Up Transport: Family  The Plan for Transition of Care is Related to the Following Treatment Goals : syncope and collapse  The Patient and/or Patient Representative was Provided with a Choice of Provider and Agrees with the Discharge Plan?: Yes  Name of the Patient Representative Who was Provided with a Choice of Provider and Agrees with the Discharge Plan: Fredi Sever, patient  Freedom of Choice List was Provided with Basic Dialogue that Supports the Patient's Individualized Plan of Care/Goals, Treatment Preferences and Shares the Quality Data Associated with the Providers?: Yes  Discharge Location  Discharge Placement: Home with home health

## 2021-11-06 NOTE — PROGRESS NOTES
Bedside and Verbal shift change report given to Arti Marroquin RN (oncoming nurse) by   Serene Drummond RN (offgoing nurse). Report included the following information SBAR, Kardex, ED Summary, Intake/Output, MAR, Recent Results, Med Rec Status and Cardiac Rhythm NSR.     1942  Shift assessment complete   Pt resting quietly with eyes open and chest rising and falling evenly   No c/o pain or signs of discomfort   Bed locked and in lowest position   Call light within reach     Pt has life vest on   Received orders from Dr. Merary Mai that vest can come off until D/C    [facility] Cardiac/Medical Night Shift Chart Audit    Chart Audit completed? YES      Bedside and Verbal shift change report given to CHAYO Franklin (oncoming nurse) by Arti Marroquin RN (offgoing nurse). Report included the following information SBAR, Kardex, ED Summary, Intake/Output, MAR, Recent Results, Med Rec Status and Cardiac Rhythm NSR.

## 2021-11-06 NOTE — PROGRESS NOTES
Problem: Diabetes Self-Management  Goal: *Disease process and treatment process  Description: Define diabetes and identify own type of diabetes; list 3 options for treating diabetes. Outcome: Progressing Towards Goal  Goal: *Incorporating nutritional management into lifestyle  Description: Describe effect of type, amount and timing of food on blood glucose; list 3 methods for planning meals. Outcome: Progressing Towards Goal  Goal: *Incorporating physical activity into lifestyle  Description: State effect of exercise on blood glucose levels. Outcome: Progressing Towards Goal  Goal: *Developing strategies to promote health/change behavior  Description: Define the ABC's of diabetes; identify appropriate screenings, schedule and personal plan for screenings. Outcome: Progressing Towards Goal  Goal: *Using medications safely  Description: State effect of diabetes medications on diabetes; name diabetes medication taking, action and side effects. Outcome: Progressing Towards Goal  Goal: *Monitoring blood glucose, interpreting and using results  Description: Identify recommended blood glucose targets  and personal targets. Outcome: Progressing Towards Goal  Goal: *Prevention, detection, treatment of acute complications  Description: List symptoms of hyper- and hypoglycemia; describe how to treat low blood sugar and actions for lowering  high blood glucose level. Outcome: Progressing Towards Goal  Goal: *Prevention, detection and treatment of chronic complications  Description: Define the natural course of diabetes and describe the relationship of blood glucose levels to long term complications of diabetes.   Outcome: Progressing Towards Goal  Goal: *Developing strategies to address psychosocial issues  Description: Describe feelings about living with diabetes; identify support needed and support network  Outcome: Progressing Towards Goal  Goal: *Insulin pump training  Outcome: Progressing Towards Goal  Goal: *Sick day guidelines  Outcome: Progressing Towards Goal  Goal: *Patient Specific Goal (EDIT GOAL, INSERT TEXT)  Outcome: Progressing Towards Goal     Problem: Patient Education: Go to Patient Education Activity  Goal: Patient/Family Education  Outcome: Progressing Towards Goal     Problem: Falls - Risk of  Goal: *Absence of Falls  Description: Document Calvin Fall Risk and appropriate interventions in the flowsheet. Outcome: Progressing Towards Goal  Note: Fall Risk Interventions:  Mobility Interventions: Communicate number of staff needed for ambulation/transfer, Assess mobility with egress test, Patient to call before getting OOB    Mentation Interventions: Adequate sleep, hydration, pain control, Door open when patient unattended    Medication Interventions: Patient to call before getting OOB, Teach patient to arise slowly    Elimination Interventions: Call light in reach, Patient to call for help with toileting needs    History of Falls Interventions: Vital signs minimum Q4HRs X 24 hrs (comment for end date)         Problem: Patient Education: Go to Patient Education Activity  Goal: Patient/Family Education  Outcome: Progressing Towards Goal     Problem: Pressure Injury - Risk of  Goal: *Prevention of pressure injury  Description: Document Sheldon Scale and appropriate interventions in the flowsheet.   Outcome: Progressing Towards Goal  Note: Pressure Injury Interventions:  Sensory Interventions: Assess changes in LOC, Float heels, Keep linens dry and wrinkle-free, Maintain/enhance activity level, Minimize linen layers, Monitor skin under medical devices, Pad between skin to skin    Moisture Interventions: Absorbent underpads, Internal/External urinary devices    Activity Interventions: Pressure redistribution bed/mattress(bed type), Increase time out of bed, PT/OT evaluation    Mobility Interventions: Pressure redistribution bed/mattress (bed type), HOB 30 degrees or less, PT/OT evaluation    Nutrition Interventions: Document food/fluid/supplement intake    Friction and Shear Interventions: HOB 30 degrees or less                Problem: Patient Education: Go to Patient Education Activity  Goal: Patient/Family Education  Outcome: Progressing Towards Goal     Problem: Patient Education: Go to Patient Education Activity  Goal: Patient/Family Education  Outcome: Progressing Towards Goal     Problem: Patient Education: Go to Patient Education Activity  Goal: Patient/Family Education  Outcome: Progressing Towards Goal

## 2021-11-06 NOTE — PROGRESS NOTES
0700  Received beside report from Jazmine Rouse RN. Report included KARDEX, SBAR, med rec, and labs    [phone]  Pt's daughter-in-law called regarding discharge and home health.  was notified.

## 2021-11-06 NOTE — DISCHARGE SUMMARY
Discharge Summary  Subjective:       Admit Date: 10/29/2021  Discharge Date:  11/6/2021, 9:27 AM    Discharging Physician: Lila Grimm pager [phone]  Primary Care Provider:  Hira Woodward MD    Patient ID:  Terry Jensen  78 y.o. female  1945    Reason for Admission:  Syncope and collapse [R55]  Severe dehydration [E86.0]  Acute renal injury ([address] [N17.9]  Closed head injury [S09.90XA]  Anticoagulated [Z79.01]    Discharge Diagnosis:   - syncope likely due to dehydration  - AME- resolved  - CAD   - chronic systolic heart failure  - epiploic appendagitis  - DM2  - HTN    Patient Active Problem List   Diagnosis Code    Chest pain R07.9    CAD (coronary artery disease) I25.10    Hypertension I10    Gout M10.9    Anticoagulated Z79.01    Abnormal nuclear stress test R94.39    Cardiomyopathy ([address] I42.9    Right leg weakness R29.898    CAD S/P percutaneous coronary angioplasty I25.10, Z98.61    Insulin dependent type 2 diabetes mellitus (HCC) E11.9, Z79.4    BMI 40.0-44.9, adult (HCC) Z68.41    S/P drug eluting coronary stent placement Z95.5    EDGAR (obstructive sleep apnea) G47.33    CAP (community acquired pneumonia) J18.9    Acute on chronic combined systolic and diastolic ACC/AHA stage C congestive heart failure (HCC) I50.43    Syncope and collapse R55    Closed head injury S09.90XA    Severe dehydration E86.0    Acute renal injury ([address] N17.9    Constipation K59.00    Chronic combined systolic and diastolic heart failure (HCC) I50.42    Abdominal pain R10.9    Epiploic appendagitis K63.89    History of DVT (deep vein thrombosis) Z86.718    Abnormal abdominal CT scan R93.5    Urinary retention R33.9    Encounter for palliative care Z51.5    Debility R53.81       Consultants:    cardiology    Hospital Course:   Reason for admission ( Admitting HPI): \"   Terry Jensen is a 78 y.o. female with insulin-dependent type 2 diabetes mellitus, morbid obesity, hypertension, CAD with drug-eluting stent and on anticoagulation, and cardiomyopathy presents to the ED with a syncopal episode today. She was sitting on the toilet attempting to have a bowel movement when she passed out. She awoke on the floor and sat on a seated position until her son came home about an hour later and called EMS. She was not able to have a bowel movement and reports constipation this week. Her last bowel movement was 2 to 3 days ago, and she is not usually constipated. She endorses having abdominal discomfort and multiple episodes of vomiting over the past week. She has had poor oral intake due to this. She denies any chest pain, shortness of breath, leg swelling, or fever. \"    She was admitted to the hospitalist service. She was hydrated w improvement in her renal function andhemodynamics. 2d echo demonstrated EF 25-30 %, VQ low prob, MRI brain with out acute findings. She is stable for DC home. Due to her relative hypotension her ACE & aldactone have been held. She has a life vest and cardiology follow up. Pertinent Imaging/ Operative procedures:   2d echo:\"   Echocardiogram revealed      Left Ventricle: Normal wall thickness. Mildly dilated left ventricle. The estimated EF is 25 - 30%. Severely reduced systolic function. There is mild (grade 1) left ventricular diastolic dysfunction E/E' ratio = 9.38. Wall Scoring: The left ventricular wall motion is globally hypokinetic. Left Atrium: Mildly dilated left atrium. Right Ventricle: Mildly dilated right ventricle. Borderline low systolic function. Assessment of RV function: TAPSE = 14 mm. Pulmonary Artery: Pulmonary arterial systolic pressure (PASP) is 35 mmHg. Pulmonary hypertension found to be mild. \"    CTA ab/pelvis:\"      1. Sigmoid colonic epiploic appendagitis which may be associated with focal  pain, age-indeterminate but new finding since 12/12/2018. Otherwise, no acute  intra-abdominal or intrapelvic obstructive or inflammatory process.     2. Diverticulosis without diverticulitis. No findings of bowel obstruction or  significant stool burden.     3. Small hiatus hernia. \"    MRI brain:\"      1. No evidence of acute infarct or other new acute intracranial finding.     2. Moderate white matter basal ganglia and thalamic signal changes nonspecific  likely chronic microvascular ischemic disease. Stable moderate diffuse volume  loss.     3. Tiny chronic microbleed superior right thalamus usually related to  hypertension.     4. Stable likely incidental 19 mm calcified meningioma underneath the lateral  right tentorium, mildly deforming cerebellum but without adjacent parenchymal  Edema. \"    VQ scan:\"   Low probability for PE\"    Pertinent Results:    BMP:   Lab Results   Component Value Date/Time     11/06/2021 03:28 AM    K 3.8 11/06/2021 03:28 AM     11/06/2021 03:28 AM    CO2 26 11/06/2021 03:28 AM    AGAP 5 11/06/2021 03:28 AM     (H) 11/06/2021 03:28 AM    BUN 16 11/06/2021 03:28 AM    CREA 0.92 11/06/2021 03:28 AM    GFRAA >60 11/06/2021 03:28 AM    GFRNA 60 (L) 11/06/2021 03:28 AM     CMP:   Lab Results   Component Value Date/Time     11/06/2021 03:28 AM    K 3.8 11/06/2021 03:28 AM     11/06/2021 03:28 AM    CO2 26 11/06/2021 03:28 AM    AGAP 5 11/06/2021 03:28 AM     (H) 11/06/2021 03:28 AM    BUN 16 11/06/2021 03:28 AM    CREA 0.92 11/06/2021 03:28 AM    GFRAA >60 11/06/2021 03:28 AM    GFRNA 60 (L) 11/06/2021 03:28 AM    CA 8.2 (L) 11/06/2021 03:28 AM         Physical Exam on Discharge:  Visit Vitals  /68   Pulse 82   Temp 98.8 °F (37.1 °C)   Resp 18   Ht 5' 3\" (1.6 m)   Wt 110 kg (242 lb 6.4 oz)   SpO2 100%   BMI 42.94 kg/m²     Body mass index is 42.94 kg/m².   GEN: NAD  HEART: S1 S2  NEURO: nonfocal   Condition at discharge: stable    Discharge Medications  Current Discharge Medication List      START taking these medications    Details   metoprolol succinate (TOPROL-XL) 25 mg XL tablet Take 1 Tablet by mouth daily. Qty: 30 Tablet, Refills: `0      metroNIDAZOLE (FLAGYL) 500 mg tablet Take 1 Tablet by mouth every twelve (12) hours for 5 doses. Qty: 5 Tablet, Refills: 0      nystatin (MYCOSTATIN) powder Apply  to affected area two (2) times a day. Qty: 100 g, Refills: 0      !! rivaroxaban (Xarelto) 15 mg tab tablet Take 1 Tablet by mouth daily (with breakfast). Qty: 30 Tablet, Refills: 0       !! - Potential duplicate medications found. Please discuss with provider. CONTINUE these medications which have NOT CHANGED    Details   polyethylene glycol (MIRALAX) 17 gram packet Take 1 Packet by mouth daily. Qty: 1 Packet, Refills: 0      atorvastatin (LIPITOR) 10 mg tablet Take 1 Tab by mouth nightly. Qty: 30 Tab, Refills: 0      !! rivaroxaban (XARELTO) 15 mg tab tablet Take 1 Tab by mouth daily (with lunch). Indications: start back in am as per DR lee  Qty: 1 Tab, Refills: 0      insulin glargine (LANTUS) 100 unit/mL injection 26 Units by SubCUTAneous route nightly. Qty: 1 Vial, Refills: 0      insulin lispro (HUMALOG) 100 unit/mL injection Use above sliding scale, start at 150-199 with 3 units if needed  Qty: 1 Vial, Refills: 0      albuterol sulfate (PROAIR RESPICLICK) 90 mcg/actuation breath activated inhaler Take 2 Puffs by inhalation. omeprazole (PRILOSEC) 20 mg capsule Take 1 Cap by mouth two (2) times a day. Qty: 30 Cap, Refills: 0      aspirin delayed-release 81 mg tablet Take 1 Tab by mouth daily. Qty: 30 Tab, Refills: 0      multivitamin, tx-iron-ca-min (THERA-M W/ IRON) 9 mg iron-400 mcg tab tablet Take 1 Tab by mouth daily. Formulary substitution for DAILY MULTIVITAMIN      allopurinol (ZYLOPRIM) 100 mg tablet Take 100 mg by mouth two (2) times a day. !! - Potential duplicate medications found. Please discuss with provider.       STOP taking these medications       chlorthalidone (HYGROTON) 25 mg tablet Comments:   Reason for Stopping:         furosemide (LASIX) 40 mg tablet Comments:   Reason for Stopping:         carvediloL (COREG) 12.5 mg tablet Comments:   Reason for Stopping:         spironolactone (ALDACTONE) 25 mg tablet Comments:   Reason for Stopping:         losartan (COZAAR) 50 mg tablet Comments:   Reason for Stopping:         metFORMIN (GLUCOPHAGE) 500 mg tablet Comments:   Reason for Stopping:         gabapentin (NEURONTIN) 300 mg capsule Comments:   Reason for Stopping:               Disposition: home   Follow up:  pcp  Restrictions: none    Diagnostic Imaging/Labs pending at discharge: none      Giancarlo Donovan, [address] [facility]  Internal Medicine/Geriatrics    Time Spent 37 minutes with >50% coordination of care          CC: Ppo Walters MD

## 2021-11-06 NOTE — DISCHARGE INSTRUCTIONS
Patient Education        DISCHARGE SUMMARY from Nurse    PATIENT INSTRUCTIONS:      Report the following to your physician:    · Temperature over 101.5  · Nausea and vomiting lasting longer than 6 hours or if unable to take medications  · Any signs of decreased circulation or nerve impairment to extremity: change in color, persistent  numbness, tingling, coldness or increase pain  · Any questions    What to do at Home:  Recommended activity:as tolerated. If you experience any of the following symptoms chest pain, shortness of breath, please follow up with physician. *  Please give a list of your current medications to your Primary Care Provider. *  Please update this list whenever your medications are discontinued, doses are      changed, or new medications (including over-the-counter products) are added. *  Please carry medication information at all times in case of emergency situations. These are general instructions for a healthy lifestyle:    No smoking/ No tobacco products/ Avoid exposure to second hand smoke  Surgeon General's Warning:  Quitting smoking now greatly reduces serious risk to your health. Obesity, smoking, and sedentary lifestyle greatly increases your risk for illness    A healthy diet, regular physical exercise & weight monitoring are important for maintaining a healthy lifestyle    You may be retaining fluid if you have a history of heart failure or if you experience any of the following symptoms:  Weight gain of 3 pounds or more overnight or 5 pounds in a week, increased swelling in our hands or feet or shortness of breath while lying flat in bed. Please call your doctor as soon as you notice any of these symptoms; do not wait until your next office visit. The discharge information has been reviewed with the {PATIENT PARENT GUARDIAN:[id number]}. The {PATIENT PARENT GUARDIAN:[id number]} verbalized understanding.   Discharge medications reviewed with the [facility]} and appropriate educational materials and side effects teaching were provided. ___________________________________________________________________________________________________________________________________  Learning About ACE Inhibitors for Heart Failure  Introduction     ACE (angiotensin-converting enzyme) inhibitors block an enzyme that makes blood vessels narrow. As a result, the blood vessels relax and widen. This lowers blood pressure. These medicines also put more water and salt into the urine. This also lowers blood pressure. In heart failure, your heart does not pump as much blood as your body needs. These medicines can help:  · Make it easier for your heart to pump. · Reduce symptoms. · Make it less likely you will need to stay in a hospital.  · Lower the risk of early death. Examples  · benazepril (Lotensin)  · enalapril (Vasotec)  · lisinopril (Prinivil, Zestril)  · ramipril (Altace)  This is not a complete list.  Possible side effects  Side effects may include:  · A cough. · Low blood pressure. This can make you feel dizzy or weak. · Too much potassium in your body. · Swelling of your lips, tongue, or face. If the swelling is severe, you may need treatment right away. Severe swelling can make it hard to breathe, but this is rare. You may have other side effects or reactions not listed here. Check the information that comes with your medicine. What to know about taking this medicine  · ACE inhibitors can cause a dry cough. Talk to your doctor if you have a dry cough. You may need a different medicine. · These medicines can cause an allergic reaction. This can cause a little swelling. Or it can cause red bumps on your skin that hurt. In rare cases, the swelling may make it hard for you to breathe. · Do not take this medicine if you are pregnant or plan to become pregnant. · Take your medicines exactly as prescribed.  Call your doctor if you think you are having a problem with your medicine. · Check with your doctor or pharmacist before you use any other medicines. This includes over-the-counter medicines. Make sure your doctor knows all of the medicines, vitamins, herbal products, and supplements you take. Taking some medicines together can cause problems. · You may need regular blood tests. Where can you learn more? Go to http://www.gray.com/  Enter Y510 in the search box to learn more about \"Learning About ACE Inhibitors for Heart Failure. \"  Current as of: April 29, 2021               Content Version: 13.0  © 9987-8568 [employer]. Care instructions adapted under license by [employer] (which disclaims liability or warranty for this information). If you have questions about a medical condition or this instruction, always ask your healthcare professional. [address] any warranty or liability for your use of this information.

## 2021-11-06 NOTE — PROGRESS NOTES
Problem: Diabetes Self-Management  Goal: *Disease process and treatment process  Description: Define diabetes and identify own type of diabetes; list 3 options for treating diabetes. 11/6/2021 1517 by Ranjan Lee RN  Outcome: Resolved/Met  11/6/2021 1007 by Ranjan Lee RN  Outcome: Progressing Towards Goal  11/6/2021 1006 by Ranjan Lee RN  Outcome: Progressing Towards Goal  Goal: *Incorporating nutritional management into lifestyle  Description: Describe effect of type, amount and timing of food on blood glucose; list 3 methods for planning meals. 11/6/2021 1517 by Ranjan Lee RN  Outcome: Resolved/Met  11/6/2021 1007 by Ranjan Lee RN  Outcome: Progressing Towards Goal  11/6/2021 1006 by Ranjan Lee RN  Outcome: Progressing Towards Goal  Goal: *Incorporating physical activity into lifestyle  Description: State effect of exercise on blood glucose levels. 11/6/2021 1517 by Ranjan Lee RN  Outcome: Resolved/Met  11/6/2021 1007 by Ranjan Lee RN  Outcome: Progressing Towards Goal  11/6/2021 1006 by Ranjan Lee RN  Outcome: Progressing Towards Goal  Goal: *Developing strategies to promote health/change behavior  Description: Define the ABC's of diabetes; identify appropriate screenings, schedule and personal plan for screenings. 11/6/2021 1517 by Ranjan Lee RN  Outcome: Resolved/Met  11/6/2021 1007 by Ranjan Lee RN  Outcome: Progressing Towards Goal  11/6/2021 1006 by Ranjan Lee RN  Outcome: Progressing Towards Goal  Goal: *Using medications safely  Description: State effect of diabetes medications on diabetes; name diabetes medication taking, action and side effects.   11/6/2021 1517 by Ranjan Lee RN  Outcome: Resolved/Met  11/6/2021 1007 by Ranjan Lee RN  Outcome: Progressing Towards Goal  11/6/2021 1006 by Ranjan Lee RN  Outcome: Progressing Towards Goal  Goal: *Monitoring blood glucose, interpreting and using results  Description: Identify recommended blood glucose targets  and personal targets. 11/6/2021 1517 by Britany Brooke RN  Outcome: Resolved/Met  11/6/2021 1007 by Britany Brooke RN  Outcome: Progressing Towards Goal  11/6/2021 1006 by Britany Brooke RN  Outcome: Progressing Towards Goal  Goal: *Prevention, detection, treatment of acute complications  Description: List symptoms of hyper- and hypoglycemia; describe how to treat low blood sugar and actions for lowering  high blood glucose level. 11/6/2021 1517 by Britany Brooke RN  Outcome: Resolved/Met  11/6/2021 1007 by Britany Brooke RN  Outcome: Progressing Towards Goal  11/6/2021 1006 by Britany Brooke RN  Outcome: Progressing Towards Goal  Goal: *Prevention, detection and treatment of chronic complications  Description: Define the natural course of diabetes and describe the relationship of blood glucose levels to long term complications of diabetes.   11/6/2021 1517 by Britany Brooke RN  Outcome: Resolved/Met  11/6/2021 1007 by Britany Brooke RN  Outcome: Progressing Towards Goal  11/6/2021 1006 by Britany Brooke RN  Outcome: Progressing Towards Goal  Goal: *Developing strategies to address psychosocial issues  Description: Describe feelings about living with diabetes; identify support needed and support network  11/6/2021 1517 by Britany Brooke RN  Outcome: Resolved/Met  11/6/2021 1007 by Britany Brooke RN  Outcome: Progressing Towards Goal  11/6/2021 1006 by Britany Brooke RN  Outcome: Progressing Towards Goal  Goal: *Insulin pump training  11/6/2021 1517 by Britany Brooke RN  Outcome: Resolved/Met  11/6/2021 1007 by Britany Brooke RN  Outcome: Progressing Towards Goal  11/6/2021 1006 by Britany Brooke RN  Outcome: Progressing Towards Goal  Goal: *Sick day guidelines  11/6/2021 1517 by Britany Brooke RN  Outcome: Resolved/Met  11/6/2021 1007 by Britany Brooke RN  Outcome: Progressing Towards Goal  11/6/2021 1006 by Britany Brooke RN  Outcome: Progressing Towards Goal  Goal: *Patient Specific Goal (EDIT GOAL, INSERT TEXT)  11/6/2021 1517 by Britany Brooke RN  Outcome: Resolved/Met  11/6/2021 1007 by Britany Brooke RN  Outcome: Progressing Towards Goal  11/6/2021 1006 by Britany Brooke RN  Outcome: Progressing Towards Goal     Problem: Patient Education: Go to Patient Education Activity  Goal: Patient/Family Education  11/6/2021 1517 by Britany Brooke RN  Outcome: Resolved/Met  11/6/2021 1007 by Britany Brooke RN  Outcome: Progressing Towards Goal  11/6/2021 1006 by Britany Brooke RN  Outcome: Progressing Towards Goal     Problem: Falls - Risk of  Goal: *Absence of Falls  Description: Document Ashu Wheeler Fall Risk and appropriate interventions in the flowsheet. 11/6/2021 1517 by Britany Brooke RN  Outcome: Resolved/Met  Note: Fall Risk Interventions:  Mobility Interventions: Utilize walker, cane, or other assistive device    Mentation Interventions: Adequate sleep, hydration, pain control    Medication Interventions: Patient to call before getting OOB    Elimination Interventions: Toileting schedule/hourly rounds, Patient to call for help with toileting needs    History of Falls Interventions: Room close to nurse's station      11/6/2021 1007 by Britany Brooke RN  Outcome: Progressing Towards Goal  Variances  Patient Condition  Patient Condition  Note: Fall Risk Interventions:  Mobility Interventions: Utilize walker, cane, or other assistive device    Mentation Interventions: Adequate sleep, hydration, pain control    Medication Interventions: Patient to call before getting OOB    Elimination Interventions:  Toileting schedule/hourly rounds, Patient to call for help with toileting needs    History of Falls Interventions: Room close to nurse's station      11/6/2021 1006 by Britany Brooke RN  Outcome: Progressing Towards Goal  Note: Fall Risk Interventions:  Mobility Interventions: Utilize walker, cane, or other assistive device    Mentation Interventions: Adequate sleep, hydration, pain control    Medication Interventions: Patient to call before getting OOB    Elimination Interventions: Toileting schedule/hourly rounds, Patient to call for help with toileting needs    History of Falls Interventions: Room close to nurse's station         Problem: Patient Education: Go to Patient Education Activity  Goal: Patient/Family Education  11/6/2021 1517 by Yevgeniy Thacker RN  Outcome: Resolved/Met  11/6/2021 1007 by Yevgeniy Thacker RN  Outcome: Progressing Towards Goal  11/6/2021 1006 by Yevgeniy Thacker RN  Outcome: Progressing Towards Goal     Problem: Pressure Injury - Risk of  Goal: *Prevention of pressure injury  Description: Document Sheldon Scale and appropriate interventions in the flowsheet.   11/6/2021 1517 by Yevgeniy Thacker RN  Outcome: Resolved/Met  Variance Patient Condition  Note: Pressure Injury Interventions:  Sensory Interventions: Minimize linen layers, Keep linens dry and wrinkle-free    Moisture Interventions: Absorbent underpads, Minimize layers    Activity Interventions: Pressure redistribution bed/mattress(bed type)    Mobility Interventions: HOB 30 degrees or less    Nutrition Interventions: Document food/fluid/supplement intake    Friction and Shear Interventions: HOB 30 degrees or less             11/6/2021 1007 by Yevgeniy Thacker RN  Outcome: Progressing Towards Goal  Variances  Patient Condition  Patient Condition  Note: Pressure Injury Interventions:  Sensory Interventions: Minimize linen layers, Keep linens dry and wrinkle-free    Moisture Interventions: Absorbent underpads, Minimize layers    Activity Interventions: Pressure redistribution bed/mattress(bed type)    Mobility Interventions: HOB 30 degrees or less    Nutrition Interventions: Document food/fluid/supplement intake    Friction and Shear Interventions: HOB 30 degrees or less             11/6/2021 1006 by Yevgeniy Thacker RN  Outcome: Progressing Towards Goal  Note: Pressure Injury Interventions:  Sensory Interventions: Minimize linen layers, Keep linens dry and wrinkle-free    Moisture Interventions: Absorbent underpads, Minimize layers    Activity Interventions: Pressure redistribution bed/mattress(bed type)    Mobility Interventions: HOB 30 degrees or less    Nutrition Interventions: Document food/fluid/supplement intake    Friction and Shear Interventions: HOB 30 degrees or less                Problem: Patient Education: Go to Patient Education Activity  Goal: Patient/Family Education  11/6/2021 1517 by Montana Mata RN  Outcome: Resolved/Met  11/6/2021 1007 by Montana Mata RN  Outcome: Progressing Towards Goal  11/6/2021 1006 by Montana Mata RN  Outcome: Progressing Towards Goal     Problem: Patient Education: Go to Patient Education Activity  Goal: Patient/Family Education  11/6/2021 1517 by Montana Mata RN  Outcome: Resolved/Met  11/6/2021 1007 by Montana Mata RN  Outcome: Progressing Towards Goal  11/6/2021 1006 by Montana Mata RN  Outcome: Progressing Towards Goal     Problem: Patient Education: Go to Patient Education Activity  Goal: Patient/Family Education  11/6/2021 1517 by Montana Mata RN  Outcome: Resolved/Met  11/6/2021 1007 by Montana Mata RN  Outcome: Progressing Towards Goal  11/6/2021 1006 by Montana Mata RN  Outcome: Progressing Towards Goal

## 2021-11-06 NOTE — PROGRESS NOTES
Problem: Diabetes Self-Management  Goal: *Disease process and treatment process  Description: Define diabetes and identify own type of diabetes; list 3 options for treating diabetes. Outcome: Progressing Towards Goal  Goal: *Incorporating nutritional management into lifestyle  Description: Describe effect of type, amount and timing of food on blood glucose; list 3 methods for planning meals. Outcome: Progressing Towards Goal  Goal: *Incorporating physical activity into lifestyle  Description: State effect of exercise on blood glucose levels. Outcome: Progressing Towards Goal  Goal: *Developing strategies to promote health/change behavior  Description: Define the ABC's of diabetes; identify appropriate screenings, schedule and personal plan for screenings. Outcome: Progressing Towards Goal  Goal: *Using medications safely  Description: State effect of diabetes medications on diabetes; name diabetes medication taking, action and side effects. Outcome: Progressing Towards Goal  Goal: *Monitoring blood glucose, interpreting and using results  Description: Identify recommended blood glucose targets  and personal targets. Outcome: Progressing Towards Goal  Goal: *Prevention, detection, treatment of acute complications  Description: List symptoms of hyper- and hypoglycemia; describe how to treat low blood sugar and actions for lowering  high blood glucose level. Outcome: Progressing Towards Goal  Goal: *Prevention, detection and treatment of chronic complications  Description: Define the natural course of diabetes and describe the relationship of blood glucose levels to long term complications of diabetes.   Outcome: Progressing Towards Goal  Goal: *Developing strategies to address psychosocial issues  Description: Describe feelings about living with diabetes; identify support needed and support network  Outcome: Progressing Towards Goal  Goal: *Insulin pump training  Outcome: Progressing Towards Goal  Goal: *Sick day guidelines  Outcome: Progressing Towards Goal  Goal: *Patient Specific Goal (EDIT GOAL, INSERT TEXT)  Outcome: Progressing Towards Goal     Problem: Patient Education: Go to Patient Education Activity  Goal: Patient/Family Education  Outcome: Progressing Towards Goal     Problem: Falls - Risk of  Goal: *Absence of Falls  Description: Document Sarah Thornton Fall Risk and appropriate interventions in the flowsheet. Outcome: Progressing Towards Goal  Variance Patient Condition  Note: Fall Risk Interventions:  Mobility Interventions: Utilize walker, cane, or other assistive device    Mentation Interventions: Adequate sleep, hydration, pain control    Medication Interventions: Patient to call before getting OOB    Elimination Interventions: Toileting schedule/hourly rounds, Patient to call for help with toileting needs    History of Falls Interventions: Room close to nurse's station         Problem: Patient Education: Go to Patient Education Activity  Goal: Patient/Family Education  Outcome: Progressing Towards Goal     Problem: Pressure Injury - Risk of  Goal: *Prevention of pressure injury  Description: Document Sheldon Scale and appropriate interventions in the flowsheet.   Outcome: Progressing Towards Goal  Variance Patient Condition  Note: Pressure Injury Interventions:  Sensory Interventions: Minimize linen layers, Keep linens dry and wrinkle-free    Moisture Interventions: Absorbent underpads, Minimize layers    Activity Interventions: Pressure redistribution bed/mattress(bed type)    Mobility Interventions: HOB 30 degrees or less    Nutrition Interventions: Document food/fluid/supplement intake    Friction and Shear Interventions: HOB 30 degrees or less                Problem: Patient Education: Go to Patient Education Activity  Goal: Patient/Family Education  Outcome: Progressing Towards Goal     Problem: Patient Education: Go to Patient Education Activity  Goal: Patient/Family Education  Outcome: Progressing Towards Goal     Problem: Patient Education: Go to Patient Education Activity  Goal: Patient/Family Education  Outcome: Progressing Towards Goal

## 2021-11-07 ENCOUNTER — HOME CARE VISIT (OUTPATIENT)
Dept: SCHEDULING | Facility: HOME HEALTH | Age: 76
End: 2021-11-07
Payer: MEDICARE

## 2021-11-07 PROCEDURE — 400013 HH SOC

## 2021-11-07 PROCEDURE — G0299 HHS/HOSPICE OF RN EA 15 MIN: HCPCS

## 2021-11-08 ENCOUNTER — HOME CARE VISIT (OUTPATIENT)
Dept: HOME HEALTH SERVICES | Facility: HOME HEALTH | Age: 76
End: 2021-11-08
Payer: MEDICARE

## 2021-11-08 VITALS
DIASTOLIC BLOOD PRESSURE: 78 MMHG | SYSTOLIC BLOOD PRESSURE: 132 MMHG | TEMPERATURE: 98.4 F | HEART RATE: 90 BPM | RESPIRATION RATE: 16 BRPM | OXYGEN SATURATION: 99 %

## 2021-11-08 NOTE — HOME HEALTH
Skilled services/Home bound verification: patient understands homebound status      Skilled Reason for admission/summary of clinical condition: Recent hospital stay after Syncopal episode with collapse. .  This patient is homebound for the following reasons Requires considerable and taxing effort to leave the home , Requires the assistance of 1 or more persons to leave the home  and Only leaves the home for medical reasons or [employer] services and are infrequent and of short duration for other reasons . Caregiver: Son Stan Armstrong is caregiver is available 24/7, he  Assists with ADLs, Medication management, Transportation to appointments, Meal prep and assists with ambulation. Medications reconciled and all medications are available in the home this visit. Medications  are effective at this time. High risk medication teaching regarding anticoagulants, hyperglycemic agents performed,  Patient on Xarelto I discussed the signs of margo and occult bleeding, prevention of bleeding when taking anticoagulants. and when to call MD.  Regarding her insulin, Went over the need to understand the signs of hypoglycemia and hyperglycemia, also to check sugar on a daily basis. Rotate injections to different areas of body, dispose of syringes in a non-porus container      Home health supplies by type and quantity ordered/delivered this visit include: n/a    Patient education provided this visit to include: the importance of regular blood sugar monitoring for good blood sugar control pt instructed to follow with diabetic diet- monitoring sugar intake, limiting foods with high sugar content. MEDICATION ADHERENCE IS AN IMPORTANT COMPONENT OF HTN MANAGEMENT. PATIENT STATES THE IMPORTANCE TO TAKE HTN MEDS SAME TIME EACH DAY. Continue a heart Healthy diet. Watch out for high sodium foods, read labels. Observe for signs of infection. Monitor B/P, take meds as ordered and f/u with PCP.    Read labels of foods, stay away from high sodium canned foods and processed meats. Discussed the importance of staying well hydrated with water 6-8 glasses a day. Patient is a fall risk, went over the need of having someone with him when ambulating, keep hallways and living areas free of clutter and throw rugs. I went over the use of the Life vest to patient and son. How to set it up and change battery, how to wear it and wash it. [employer] representative will be out soon to go over any other questions concerns they may have. Patient/caregiver degree of understanding:good    Home exercise program/Homework provided: To wear Life vest most of the time, take off only to bathe and or wash vest.  Discussed s/s of infection to monitor for, s/s of UTI, who to report to/when. Instructed cg to notify staff/md/seek tx if complications occur. Patient instructed to maintain clear pathways in home and to minimize clutter to prevent falls from occurring/minimize fall potential.   Patient needing a well balanced diet with the 5 food groups, patient to increase fiber in diet, fresh fruits and vegetables, whole grains and increase water intake. Maintain healthy low sodium ADA diet, Continue to monitor B/P and Blood sugars, Observe for signs of infection. Work with PT to increase strength and f/u with PCP. I went over the importance of taking all prescriptions as ordered. I discussed how to avoid extra sodium in his diet. We discussed the signs of infection and when to call MD.  We discussed the high risk for falls and ways to prevent falls in the future. We discussed taking B/P daily and keeping a log. We also discussed the need of a heart healthy diet, and the need to change positions frequently. Gaining strength with PT for increased mobility. Pt/Caregiver instructed on plan of care and are agreeable to plan of care at this time.       Physician Milan Seaman notified of patient admission to home health and plan of care including anticipated frequency of visits and treatments/interventions/modalities of SN/PT/OT/HHA/MSW    Discharge planning discussed with patient and caregiver. Discharge planning as follows: Will discharge from nursing when education is complete and patient is medically stable. Darrell Adams Pt/Caregiver did verbalize understanding of discharge planning. Next MD appointment TBD with Guanakito Hicks MD.  Patient/caregiver encouraged/instructed to keep appointment as lack of follow through with physician appointment could result in discontinuation of home care services for non-compliance.

## 2021-11-09 ENCOUNTER — HOME CARE VISIT (OUTPATIENT)
Dept: HOME HEALTH SERVICES | Facility: HOME HEALTH | Age: 76
End: 2021-11-09
Payer: MEDICARE

## 2021-11-10 ENCOUNTER — HOME CARE VISIT (OUTPATIENT)
Dept: HOME HEALTH SERVICES | Facility: HOME HEALTH | Age: 76
End: 2021-11-10
Payer: MEDICARE

## 2021-11-10 ENCOUNTER — HOME CARE VISIT (OUTPATIENT)
Dept: SCHEDULING | Facility: HOME HEALTH | Age: 76
End: 2021-11-10
Payer: MEDICARE

## 2021-11-10 VITALS
OXYGEN SATURATION: 98 % | RESPIRATION RATE: 16 BRPM | HEART RATE: 91 BPM | TEMPERATURE: 98.2 F | DIASTOLIC BLOOD PRESSURE: 78 MMHG | SYSTOLIC BLOOD PRESSURE: 130 MMHG

## 2021-11-10 PROCEDURE — G0152 HHCP-SERV OF OT,EA 15 MIN: HCPCS

## 2021-11-11 ENCOUNTER — HOME CARE VISIT (OUTPATIENT)
Dept: HOME HEALTH SERVICES | Facility: HOME HEALTH | Age: 76
End: 2021-11-11
Payer: MEDICARE

## 2021-11-11 ENCOUNTER — HOME CARE VISIT (OUTPATIENT)
Dept: SCHEDULING | Facility: HOME HEALTH | Age: 76
End: 2021-11-11
Payer: MEDICARE

## 2021-11-11 PROCEDURE — G0155 HHCP-SVS OF CSW,EA 15 MIN: HCPCS

## 2021-11-11 NOTE — HOME HEALTH
Summary of clinical condition:  Pt was sitting on her commode when she passed out. She sat on the floor about an hour when her son found her and she was taken to ED. She was admitted to the hospital with Dx syncope due to dehydration,  She was found to be in of renal failure. She was hydrated and improved. MRI ruled out acute brain findings and echo showed EF of 30%. Pt was table and was discharged to her son's house on 11/6/2021. Pt returned to her home for the first time today. Pt has orders for [facility] OT. Medical History: CAD, HTN, Gout, DM, EDGAR, CHF  Medications review completed. No adverse reactions noted. Caregiver involvement: Pt's son assists pt with ADLs, IADLs, and transportation  Patient education provided this visit:Pt was educated about the difference between a shower chair and a tub transfer bench. Home exercise program:  Pt was trained in HEP including bilateral shoulder flexion and 2 hands touch neck and lumbar x 10. She was instructed to do daily. ASSESSMENT:  Pt has decreased a safety with bed, chair, commode, and shower transfer. I recommended she obtain a tub transfer bench. Pt has decreased ability to retrieve clothing safely and needs assistance for LE dressing. She needs assistance for sponge bathing and is unable to shower. Pt has decreased shoulder strength bilaterally. Pt has decreased safety for fixing meals. Pt will be seen for therapeutic exercise, ADL training, transfer training, and IADLs. Patient Verbalized Goals:  Pt wants to be able to shower again. Continued need for the following skills: Occupational Therapy  Pt/Caregiver instructed on plan of care and are agreeable to plan of care at this time. Discharge planning discussed with patient and caregiver. Discharge planning as follows: Pt will be seen 2 x week x 3 then discharge when goals are met. Dariel Fuentes Pt/Caregiver did verbalize understanding.

## 2021-11-12 ENCOUNTER — HOME CARE VISIT (OUTPATIENT)
Dept: SCHEDULING | Facility: HOME HEALTH | Age: 76
End: 2021-11-12
Payer: MEDICARE

## 2021-11-12 ENCOUNTER — HOME CARE VISIT (OUTPATIENT)
Dept: HOME HEALTH SERVICES | Facility: HOME HEALTH | Age: 76
End: 2021-11-12
Payer: MEDICARE

## 2021-11-12 VITALS
RESPIRATION RATE: 16 BRPM | TEMPERATURE: 96.7 F | OXYGEN SATURATION: 95 % | SYSTOLIC BLOOD PRESSURE: 137 MMHG | HEART RATE: 81 BPM | DIASTOLIC BLOOD PRESSURE: 84 MMHG

## 2021-11-12 VITALS
RESPIRATION RATE: 16 BRPM | OXYGEN SATURATION: 97 % | DIASTOLIC BLOOD PRESSURE: 80 MMHG | SYSTOLIC BLOOD PRESSURE: 146 MMHG | HEART RATE: 79 BPM | TEMPERATURE: 97.7 F

## 2021-11-12 PROCEDURE — G0152 HHCP-SERV OF OT,EA 15 MIN: HCPCS

## 2021-11-12 PROCEDURE — G0151 HHCP-SERV OF PT,EA 15 MIN: HCPCS

## 2021-11-12 PROCEDURE — G0495 RN CARE TRAIN/EDU IN HH: HCPCS

## 2021-11-12 NOTE — HOME HEALTH
SUMMARY: Tomy Santiago is a 77 yo female who was recently hospitalized after experiencing syncope with fall as a result of dehydration. Medical history includes: B LE neuropathy, CHF, insulin dependent DM 2, gout, HTN, Hx DVT, CAD, coronary stent placement, Hx MI, EDGAR, and elevated BMI. Pt will benefit from home health PT to improve strength, balance, and functional activity tolerance. PT POC 1w1, 2w3. Courtney Yu Precautions: Pt has LifeVest, Fall risk  . SUBJECTIVE: Pt reports her feet hurt all of the time. She is willing to participate in PT.  LIVING SITUATION: Pt lives alone in a second floor apartment with elevator access. Her son assists as needed. REQUIRES CAREGIVER ASSISTANCE FOR: Recommend assist for safe ADLs, ambulation, transfers. Family provides transportation/meals. PLOF: Prior to hospitalization able to walk with rollator. Family helped with meal preparation and transportation. Activity was limited due to pain, fatigue, and shortness of breath. MEDICATIONS REVIEWED AND UPDATED: Pt denies any medication changes. NEXT MD APPT: unknown  . WOUNDS: None reported. LE ASSESSMENT:  Swelling noted bilateral feet. Feet without any wounds. ROM: LE ROM WFL for ADLs. Limited by soft tissue approximation. STRENGTH: Seated B LE MMT: hip flexion 3/5, hip abd/add 4/5, knee extension 3+/5, knee flexion 4/5. BED MOBILITY: SBA for supine to sit/sit to supine with cues for safe sequencing. TRANSFERS: Sit to stand/stand to sit training from bed. CGA with cues for safe sequencing/hand placement. Educated patient on safe use of rollator and how to utilize brakes. GAIT: Gait training x 15 feet with rollator with CGA. Gait characterized by forward flexed posture with rollator extended far from body. Decreased step height/length noted. STAIRS: N/A  BALANCE: Pt scored 10/28 on Tinetti Balance Assessment placing patient at increased risk for falls. Courtney Yu   PATIENT EDUCATION PROVIDED THIS VISIT: safety, HEP, walking, deep breathing, DVT prevention, elevation, and fall risk/ prevention strategies. Educated patient on recommendations for clearing walkways/removal of small rug. Pt expressed understanding of all education. HEP consisting of:  1. Walking as tolerated with assist.  2. Deep breathing and ankle pumps throughout day. 3. 2-3x daily: quad/glute sets 15x5\", supine hip abd 10x, heel slides 10x, SAQ/LAQ 10x, seated march 10x. Written instructions issued. Patient/caregiver verbalized understanding. .  CONTINUED NEED FOR THE FOLLOWING SKILLS: HH PT is medically necessary to address pain, decreased ROM, decreased strength, increased swelling, impaired bed mobility, decreased independence with functional transfers, impaired gait, impaired stair negotiation, and impaired balance in order to improve functional independence, quality of life, return to PLOF, and reduce the risk for falls. PLAN: 1w1, 2w3. DISCHARGE PLANNING DISCUSSED: Discharge to self and family under MD supervision once all goals have been met or patient has reached max potential.  . Spoke with patient regarding importance of clear walkways. She stated her son is going to move her bed to enlarge walkway and will remove items as needed to clear path. She reports rugs will be removed. Educated patient on services such as fall alert systems. Pt expressed interest and said she would discuss it with her son. Patient asked that therapist call son, Desmond Piedraanayeli to inform him of POC/recommendations. Called son and educated regarding fall risk/prevention strategies and recommendation for fall alert. He expressed understanding of all information.

## 2021-11-12 NOTE — HOME HEALTH
Skilled reason for visit: Patient was recently hospitalized for CHF, requiring observation by a SN for s/s of decomposition or adverse effects resulting from newly prescribed medications. Skilled observation needed to determine if new medication regimen prescribed requires modifications or other therapeutic interventions considered until pt's clinical condition or treatment has stabilized. Caregiver involvement:  Patient's caregiver is her son. Caregiver assists patient with bathing, dressing, walking, bathroom, meal prep and setup, medication management, grocery shopping, household chores, transportation to MD appointment and home exercise program.  Medications reconciled and all medications are available in the home this visit. The following education was provided regarding medications, medication interactions, and look alike medications:  rivaroxaban (Xarelto) 15 mg tab tablet - Contact Agency or MD with questions. Medications are effective at this time. Patient states understanding. Patient education provided this visit:  Arianna Church Disease Management:Bleeding precautions, CHF, elevating bilateral legs to reduce edema, HTN, DM teaching, pain management, constipation prevention, fall precautions, s/s of infection,   Goals/teaching progressing. Patient's goal is to regain her strength. Patient has remained free from falls, free from infection; no safety concerns at this time and is ambulating independently with walker . SN to complete education of patient and patient to follow up with Dr Dolores Beasley exercise program: PT, OT  Continued need for the following skills: Nursing, PT, OT, HHA  Patient and/or caregiver notified and agree to changes in the Plan of Care: No changes  The following discharge planning was discussed with the pt/caregiver:  SN to continue education of patient and discharge patient when teaching and goals are met. HHA has not yet made a visit, so unable to do a supervisory visit today.

## 2021-11-13 VITALS
SYSTOLIC BLOOD PRESSURE: 140 MMHG | TEMPERATURE: 98.2 F | OXYGEN SATURATION: 97 % | DIASTOLIC BLOOD PRESSURE: 80 MMHG | HEART RATE: 88 BPM | RESPIRATION RATE: 16 BRPM

## 2021-11-13 NOTE — HOME HEALTH
Patient/Caregiver Subjective:  Pt said she is getting a little better. Caregiver involvement: Pt' son assists pt daily. I asked her son to move items in the room for safer walking. Patient education provided this visit:  Pt was educated about a raised toilet seat and a hip kit and explained how to get them. Home exercise program: See intervention  Progress toward goals: Pt was improved in bed transfer to go to her commode. She verbalized the needed AE. Pt verbalized understanding the concept of energy conservation. Continued need for the following skills: Pt continues to have decreased ability to transfer in the bathroom, bathe, dress, and fix meals. Occupational Therapy will continue for thera ex, [employer] [address] training, transfer training, and IADL training. The following discharge planning was discussed with the pt/caregiver: Pt will be seen 2 x week x 2 then discharge to community.

## 2021-11-15 ENCOUNTER — HOME CARE VISIT (OUTPATIENT)
Dept: HOME HEALTH SERVICES | Facility: HOME HEALTH | Age: 76
End: 2021-11-15
Payer: MEDICARE

## 2021-11-15 ENCOUNTER — HOME CARE VISIT (OUTPATIENT)
Dept: SCHEDULING | Facility: HOME HEALTH | Age: 76
End: 2021-11-15
Payer: MEDICARE

## 2021-11-15 VITALS
RESPIRATION RATE: 16 BRPM | SYSTOLIC BLOOD PRESSURE: 140 MMHG | HEART RATE: 81 BPM | DIASTOLIC BLOOD PRESSURE: 90 MMHG | TEMPERATURE: 98.2 F | OXYGEN SATURATION: 98 %

## 2021-11-15 PROCEDURE — G0152 HHCP-SERV OF OT,EA 15 MIN: HCPCS

## 2021-11-15 PROCEDURE — G0157 HHC PT ASSISTANT EA 15: HCPCS

## 2021-11-16 ENCOUNTER — HOME CARE VISIT (OUTPATIENT)
Dept: HOME HEALTH SERVICES | Facility: HOME HEALTH | Age: 76
End: 2021-11-16
Payer: MEDICARE

## 2021-11-16 VITALS
TEMPERATURE: 96.6 F | SYSTOLIC BLOOD PRESSURE: 147 MMHG | HEART RATE: 108 BPM | OXYGEN SATURATION: 97 % | DIASTOLIC BLOOD PRESSURE: 95 MMHG

## 2021-11-16 NOTE — HOME HEALTH
Subjective; Pt reporting B feet \"hurt like the devil\" Pt reporting she has a rash on her buttocks and she is using cream and keeping herself dry by changing her breifs often. Family/caregiver assistance needed for;Pt's son is assisting with meals, transportation, medication management, meals    Home Health supplies ordered/delivered this visit;NA  Pt's son is purchasing transfer tub bench    Objective; See Interventions    Assessment:  Patient progressing towards goals as previously established in POC with home health physical therapy at this time. Family/caregiver involvement are present/set-up at this point in time. No noted signs/symptoms of infection today. Pt declines gait this treatment. and states her feet hurt too much today and she \"has to get her mind right for walking. \" Patient was able to complete new exercise with some fatigue but no reports of increased pain. Plan:  Discharge planning discussed at this visit regarding eventual discharge once patient has met goals and met maximum benefit from home health skilled PT services. Continue towards goals per POC as previously established. Continued need for skilled home health PT at this time to address deficits, reduce risk of falls, and obtain goals as previously established per POC. Continue with therexs to improve strength and allow ease of gait.

## 2021-11-16 NOTE — HOME HEALTH
Patient/Caregiver Subjective:  Pt said her son is buying a raised toilet seat. Caregiver involvement: Pt's son assists with IADLs. Patient education provided this visit:  Pt was educated about energy conservation. Home exercise program: See intervention  Progress toward goals: Pt's\son verbalized what changes he needs to make to make pt safer. Pt was improved in bed transfers. She was able to verbalize energy conservation concept. Pt was improved in UE exercises. Continued need for the following skills:  Pt continues to have decreased ADLs, transfers, and UE function. Occupational Therapy  will continue for ADL, Transfer, and thera ex. The following discharge planning was discussed with the pt/caregiver: Pt will be seen 1 x week x 1, 2 x week x 1 then discharge to community.

## 2021-11-17 ENCOUNTER — HOME CARE VISIT (OUTPATIENT)
Dept: HOME HEALTH SERVICES | Facility: HOME HEALTH | Age: 76
End: 2021-11-17
Payer: MEDICARE

## 2021-11-17 ENCOUNTER — HOME CARE VISIT (OUTPATIENT)
Dept: SCHEDULING | Facility: HOME HEALTH | Age: 76
End: 2021-11-17
Payer: MEDICARE

## 2021-11-17 VITALS
OXYGEN SATURATION: 97 % | TEMPERATURE: 97.1 F | HEART RATE: 80 BPM | SYSTOLIC BLOOD PRESSURE: 144 MMHG | DIASTOLIC BLOOD PRESSURE: 92 MMHG

## 2021-11-17 PROCEDURE — G0157 HHC PT ASSISTANT EA 15: HCPCS

## 2021-11-17 NOTE — HOME HEALTH
Subjective; Pt reporting she has been coughing alot and her son is to get her donal cough medicine. Family/caregiver assistance needed for;pt's son assisting as needed daily    [address] supplies ordered/delivered this visit;NA    Objective; See Interventions    Assessment:  Patient progressing towards goals as previously established in POC with home health physical therapy at this time. Family/caregiver involvement are present/set-up at this point in time. No noted signs/symptoms of infection today. Patient declines gait and as prev treatment would not move past the side of her bed. Patient does state she is using the commode in the bathroom but declines to demonstrate amb to the bathroom. Educated patient that she will need to show progress with PT to continue services. Patient was able to complete standing exercises with some mild fatigue but no reports of increased pain. Pt requires much encouragement to perform standing exs and sits on eob after each exercise. Pt coughing deep this treatment. Called 's office and left message with Estefani concerning cough. Plan:  Discharge planning discussed at this visit regarding eventual discharge once patient has met goals and met maximum benefit from home health skilled PT services. [address] [place] [address] signed this treatment for DC 12-2-21. Continue towards goals per POC as previously established. Continued need for skilled home health PT at this time to address deficits, reduce risk of falls, and obtain goals as previously established per POC. Continue with therexs to improve strength to allow pt to get oob and amb in the home and to get to MD appts.

## 2021-11-17 NOTE — CASE COMMUNICATION
SN visit set up through son today to see patient between 930-1030. Arrived to [facility], put in door code I was given and it is an invalid code. Called the son, called the patient to get correct code, no answer at either number. Messages left for both, no call back while I waited for 15 minutes. Today's visit is a missed visit.  Case communication with PT, OT, MSW

## 2021-11-18 NOTE — CASE COMMUNICATION
Pt coughing deep this treatment. Called 's office and left message with Estefani concerning cough. Patient stated her son was going to get her some cough medicine.     [id number]

## 2021-11-19 ENCOUNTER — HOME CARE VISIT (OUTPATIENT)
Dept: HOME HEALTH SERVICES | Facility: HOME HEALTH | Age: 76
End: 2021-11-19
Payer: MEDICARE

## 2021-11-19 ENCOUNTER — HOME CARE VISIT (OUTPATIENT)
Dept: SCHEDULING | Facility: HOME HEALTH | Age: 76
End: 2021-11-19
Payer: MEDICARE

## 2021-11-19 PROCEDURE — G0152 HHCP-SERV OF OT,EA 15 MIN: HCPCS

## 2021-11-20 VITALS
SYSTOLIC BLOOD PRESSURE: 158 MMHG | RESPIRATION RATE: 16 BRPM | OXYGEN SATURATION: 96 % | DIASTOLIC BLOOD PRESSURE: 88 MMHG | HEART RATE: 81 BPM | TEMPERATURE: 98.8 F

## 2021-11-20 NOTE — HOME HEALTH
Patient/Caregiver Subjective:  Pt said she has asked her son to buy needed AE and he is planning to do it this weekend. Caregiver involvement: Pt's son sees her daily and assist with ADLs, IADLs, and medication management. Patient education provided this visit:  Pt was educated about the role of our [place] aide. Pt said she only wants her to help her with a shower after she gets her tub transfer bench. Home exercise program: See intervention  Progress toward goals: Pt was improved in LE dressing. She is still unsafe when standing to pull her pants past her hips. Continued need for the following skills:Pt continues to become SOB when dressing and needs a hip kit. She is unsafe when standing and transferring in the bathroom. Occupational Therapy will continue for there ex, ADL training, AE training, and transfer training. The following discharge planning was discussed with the pt/caregiver: Pt will be seen 2 x week x 1 then discharge if goals are met.

## 2021-11-22 ENCOUNTER — HOME CARE VISIT (OUTPATIENT)
Dept: SCHEDULING | Facility: HOME HEALTH | Age: 76
End: 2021-11-22
Payer: MEDICARE

## 2021-11-22 ENCOUNTER — HOME CARE VISIT (OUTPATIENT)
Dept: HOME HEALTH SERVICES | Facility: HOME HEALTH | Age: 76
End: 2021-11-22
Payer: MEDICARE

## 2021-11-22 PROCEDURE — G0152 HHCP-SERV OF OT,EA 15 MIN: HCPCS

## 2021-11-23 ENCOUNTER — HOME CARE VISIT (OUTPATIENT)
Dept: SCHEDULING | Facility: HOME HEALTH | Age: 76
End: 2021-11-23
Payer: MEDICARE

## 2021-11-23 PROCEDURE — G0157 HHC PT ASSISTANT EA 15: HCPCS

## 2021-11-24 ENCOUNTER — HOME CARE VISIT (OUTPATIENT)
Dept: HOME HEALTH SERVICES | Facility: HOME HEALTH | Age: 76
End: 2021-11-24
Payer: MEDICARE

## 2021-11-24 VITALS
TEMPERATURE: 97.2 F | HEART RATE: 90 BPM | DIASTOLIC BLOOD PRESSURE: 92 MMHG | SYSTOLIC BLOOD PRESSURE: 156 MMHG | OXYGEN SATURATION: 96 %

## 2021-11-24 NOTE — HOME HEALTH
Subjective; Pt reporting her right knee is hurting her 10/10. Pt sitting on eob with heating pad to right knee. Family/caregiver assistance needed for;Pt;s son assisting with all needs. Home Health supplies ordered/delivered this visit;    Objective; See Interventions   PTA educated pt to only use heating pad for 20 minutes the off for 40 minutes. Pt educated to perform skin checks to make sure there is no redness or blistering from heat and to use at low temp. Called Dr. Flor Mo office and spoke to ADRIENNE as pt stating she does not have anything for pain. Assessment:  Patient not progressing towards goals as previously established in POC with home health physical therapy at this time as pt declines to stand this treatment due to pain right knee. Pt does have some edema at knee joint and advised to alternate heat and ice Pt will have son get her an ice asim. Family/caregiver involvement are present/set-up at this point in time. No noted signs/symptoms of infection today except for donal mild edema right knee. Pt declining to ambulate as prev treatments and declined to stand this treatment due to right knee pain. Patient was able to seated TKE, Marching, Toe/[place]el [place] exercises with some mild fatigue but no reports of increased pain. Plan:  Discharge planning discussed at this visit regarding eventual discharge once patient has met goals and met maximum benefit from home health skilled PT services. Continue towards goals per POC as previously established. Continued need for skilled home health PT at this time to address deficits, reduce risk of falls, and obtain goals as previously established per POC. Continue with therexs to improve mobility to allow pt to be more indep in her apt.

## 2021-11-25 ENCOUNTER — HOME CARE VISIT (OUTPATIENT)
Dept: HOME HEALTH SERVICES | Facility: HOME HEALTH | Age: 76
End: 2021-11-25
Payer: MEDICARE

## 2021-11-26 ENCOUNTER — HOME CARE VISIT (OUTPATIENT)
Dept: HOME HEALTH SERVICES | Facility: HOME HEALTH | Age: 76
End: 2021-11-26
Payer: MEDICARE

## 2021-11-26 ENCOUNTER — HOME CARE VISIT (OUTPATIENT)
Dept: SCHEDULING | Facility: HOME HEALTH | Age: 76
End: 2021-11-26
Payer: MEDICARE

## 2021-11-28 VITALS
OXYGEN SATURATION: 98 % | HEART RATE: 81 BPM | DIASTOLIC BLOOD PRESSURE: 88 MMHG | RESPIRATION RATE: 16 BRPM | TEMPERATURE: 98.3 F | SYSTOLIC BLOOD PRESSURE: 134 MMHG

## 2021-11-28 NOTE — HOME HEALTH
Patient/Caregiver Subjective:  Pt said she woke up yesterday with pain in her right knee. She said it has been hurting since. Caregiver involvement: Pt's son sees pt several times a day for IADLs. Patient education provided this visit:  Pt was educated about use of ice for pain control of her knee. Home exercise program: See intervention  Progress toward goals: Pt refused to attempt any of her treatment interventions except for UE exercises. She complained of severe pain in her right knee that started last night. She denied having any falls or other incidents that could be the cause. She does have gout. Pt's doctor was called several times without getting a hold of him. Pt was instructed to continue calling him and if she felt worse to go to urgent care. Pt does not want her second visit this week because of Thanksgiving. I will ask for 2 more visits starting the week of 11/28/2021. Continued need for the following skills: Occupational Therapy  The following discharge planning was discussed with the pt/caregiver: I will ask for 2 more visits starting the week of 11/28/2021.

## 2021-11-29 ENCOUNTER — HOME CARE VISIT (OUTPATIENT)
Dept: SCHEDULING | Facility: HOME HEALTH | Age: 76
End: 2021-11-29
Payer: MEDICARE

## 2021-11-29 PROCEDURE — G0157 HHC PT ASSISTANT EA 15: HCPCS

## 2021-11-30 VITALS
HEART RATE: 92 BPM | DIASTOLIC BLOOD PRESSURE: 94 MMHG | OXYGEN SATURATION: 97 % | SYSTOLIC BLOOD PRESSURE: 158 MMHG | TEMPERATURE: 97.2 F

## 2021-11-30 NOTE — HOME HEALTH
Subjective; Pt reporting she fell while she was standing in attempt to get to the bathroom. Pt is unable to explain why she fell if it was weakness or pain and in what joint. Pt's sonwas coming through the front door and was immediately able to assist her back to the bed. Pt states she was unhurt and did not call the MD. PTA called and left VM to MD.    Family/caregiver assistance needed for; Son assisting all aspects of patient care    [facility] [place] supplies ordered/delivered this visit;PTA adjusted tub transfer bench to fit in tub properly. Pt declined transfer education. Objective; See Interventions    BED MOBILITY: Pt. is Mod Indep with all bed mobility as pt takes extra time for positioning. TRANSFERS: Unable to address as pt declines to demonstrate STS. Pt was Mod Indep prev treatment that pt stood 11-17-21  GAIT/WC MOBILITY:Pt declines gait training each treatment    Assessment:  Patient is not progressing towards goals as previously established in [address] with home health physical therapy at this time. Family/caregiver involvement are present/set-up at this point in time as pt's son assists pt between work hours as needed. Declines gait and transfer training and is not compliant with exs for strengthning and ROM. Patient continues to have pain complaints R knee and now R foot stating she has gout and neuropathy and is not taking meds for either. Plan:  Discharge planning discussed at this visit regarding discharge per POC. Patient has not met goals and has met maximum benefit from home health skilled PT services due to non compliance when PTA is on site for exs, gait and transfer training. PTA recommended to patient and son that pt have consistent care to attend to her 24x7 at this time as pt is not getting off the bed to use the bathroom and is unable to get herself food or drinks.  Son and patient also instructed to make a MD appt to have her knee and foot pain addressed by the doctor due to patient not getting out of bed and falling when she did on her own.

## 2021-12-02 ENCOUNTER — HOME CARE VISIT (OUTPATIENT)
Dept: SCHEDULING | Facility: HOME HEALTH | Age: 76
End: 2021-12-02
Payer: MEDICARE

## 2021-12-02 ENCOUNTER — HOME CARE VISIT (OUTPATIENT)
Dept: HOME HEALTH SERVICES | Facility: HOME HEALTH | Age: 76
End: 2021-12-02
Payer: MEDICARE

## 2021-12-02 VITALS
OXYGEN SATURATION: 96 % | TEMPERATURE: 98 F | HEART RATE: 88 BPM | DIASTOLIC BLOOD PRESSURE: 80 MMHG | RESPIRATION RATE: 16 BRPM | SYSTOLIC BLOOD PRESSURE: 154 MMHG

## 2021-12-02 PROCEDURE — G0151 HHCP-SERV OF PT,EA 15 MIN: HCPCS

## 2021-12-02 NOTE — HOME HEALTH
SUBJECTIVE: Pt reports she was supposed to see her PCP yesterday, but the doctor cancelled the appointment due to a gas leak. She is seeing her cardiologist this afternoon. Pt reports her pain is the same. She is c/o bilateral foot, ankle, and knee pain. \"It is my gout and nerve pain. \"   MEDICATIONS REVIEWED AND UPDATED: Medication reconciliation performed. Unable to locate the following medications in the patient's home: Allopurinol, Atorvastatin, Prilosec, and Miralax. Pt reported taking 24 units of Lantus daily instead of documented 26 units. Pt had Simvastatin 10mg in the home, but prescription was . Called Dr. Leo Acosta and left message informing of medication discrepancies. .  WOUNDS: Pt denies any wounds. ROM:  LE ROM guarded due to pain, but WFL. STRENGTH: Seated LE MMT:  Left hip flexion at least 4-/5, Right hip flexion at least 3/5 (resist not applied due to pain), left LAQ 4/5, right LAQ not tested due to pain. (Goal not met)  BED MOBILITY: MI bed mobility. (Goal met)  TRANSFERS: Pt declined transfer training. (Goal not met)  GAIT: Pt declined gait training. (Goal not met)  STAIRS: N/A  BALANCE/NEUROMUSCULAR REEDUCATION: Good sitting balance noted. Unable to assess Tinetti. (Goal not met)  . PATIENT/CAREGIVER EDUCATION PROVIDED THIS VISIT: safety, HEP, walking, deep breathing, fall risk/prevention strategies, medication teaching. HEP consisting of:  1. Pt has HEP that was previously issued. Pt not consistently participating in HEP. Pt encouraged to perform HEP. Olivia Perrin PROGRESS: Iliana Olmedo has participated in 6 home health PT sessions with treatments focused on transfer training, bed mobility training, and therapeutic exercise. Patient has presented with limited treatment participation due to pain which has significantly impacted patient progress.   Patient's PCP has been contacted multiple times by various staff members regarding patient status/pain with no response from MD.  Pt is discharged from home health PT at this time as patient is not making functional gains. 24 hour care has been recommended to patient/family due to limited activity tolerance/safety. Pt has been advised to see MD to determine further POC. PLAN: Discharge patient from [facility] PT as patient is not demonstrating functional gains with therapy. [facility] patient's PCP Dr. Michael Blackmon. Was on hold with MD office for 45 minutes. Left message informing of discharge from home health, pain, and medication discrepancies. Recommended follow up with MD as soon as possible.

## 2021-12-03 NOTE — CASE COMMUNICATION
Patient was discharge from SN due to not being able to reached her, after trying multiple times with messages left and no calls back. SN discipline discharge from last SN visit 11/12/2021 for non-compliance.

## 2023-07-24 NOTE — PROGRESS NOTES
Insurance auth approved for rehab services, due to time facility would like to accept pt tomorrow, cm updated pt and son Rosie Steinberg, josé miguel plans to visit pt today due to accepting facility not allowing visitors at this time, if covid test results still pending tomorrow at time of discharge pt will need approval for rapid covid. 338

## 2025-01-08 NOTE — PROGRESS NOTES
Cardiology Progress Note Patient: Sinai Haque        Sex: female          DOA: 12/3/2018 YOB: 1945      Age:  72 y.o.        LOS:  LOS: 2 days Assessment/Plan Principal Problem: 
  Chest pain (12/3/2018) Active Problems: 
  CAD (coronary artery disease) (12/3/2018) Hypertension (12/3/2018) Diabetes ([address] (12/3/2018) Gastrointestinal disorder (12/3/2018) Acute DVT of left tibial vein ([address] (12/3/2018) Morbid obesity with BMI of 40.0-44.9, adult ([address] (12/3/2018) Gout (12/3/2018) Plan: 
Doing well Pt can be switched aspirin and xarelto before Discharge Cardiac SR occasional non conducted PA wave Waiting for lung VQ scan-scheduled for tomorrow I will sign off and please call if any questions/concerned F/u in one month with me Subjective:  
 cc: 
Chest pain REVIEW OF SYSTEMS:  
 
General: No fevers or chills. Cardiovascular: No chest pain or pressure. No palpitations. No ankle swelling Pulmonary: No SOB, orthopnea, PND Gastrointestinal: No nausea, vomiting or diarrhea Objective:  
  
Visit Vitals /58 (BP 1 Location: Left arm, BP Patient Position: Sitting) Pulse 64 Temp 97.4 °F (36.3 °C) Resp 18 Ht 5' 4\" (1.626 m) Wt 122.3 kg (269 lb 10 oz) SpO2 100% BMI 46.28 kg/m² Body mass index is 46.28 kg/m². Physical Exam: 
General Appearance: Comfortable, not using accessory muscles of respiration. NECK: No JVD, no thyroidomeglay. LUNGS: Clear bilaterally. HEART: S1+S2 audible, ABD: Non-tender, BS Audible EXT: No edema, and no cysnosis. VASCULAR EXAM: Pulses are intact. PSYCHIATRIC EXAM: Mood is appropriate. Medication: 
Current Facility-Administered Medications Medication Dose Route Frequency  hydrALAZINE (APRESOLINE) 20 mg/mL injection 10 mg  10 mg IntraVENous PRN  
 lidocaine 4 % patch 1 Patch  1 Patch TransDERmal Q24H Copied from CRM #[id number]. Topic: MW Referral/Order - MW Referral/Order Request  >> Jan 8, 2025 11:30 AM Wendi CABRERA wrote:  hank called regarding a Referral (or Service to Order).      New referral/ service-to order requested have NOT discussed with provider; declined scheduling appointment.  Selected 'Wrap Up CRM' and created new Telephone Encounter after clicking 'Convert to Clinical Call'. Selected reason for call 'Referral'. Sent Referral message and routed as routine priority per Clinician KB page to appropriate clinician Pool.    -- DO NOT REPLY / DO NOT REPLY ALL --  -- This inbox is not monitored. If this was sent to the wrong provider or department, reroute message to P ECO Reroute pool. --  -- Message is from Engagement Center Operations (ECO) --        Referral Request  Name of Specialist: Umang Urena  Provider's specialty: Podiatry    Medical condition for referral:  Nail Trim; Please call Hank when it is ordered.     Is this a NEW request?: yes      Referral ordered by: Breann Blue      Insurance type: See below      Payor: MEDICARE / Plan: PARTA AND B / Product Type: MEDICARE    Preferred Delivery Method  Input in Epic    Caller Information       Contact Date/Time Type Contact Phone/Fax    01/08/2025 11:27 AM CST Phone (Incoming) HANK TREVIÑO (Emergency Contact) [phone] (M)            Alternative phone number: [phone]    Can a detailed message be left?  Yes - Voicemail     Patient has been advised the message will be addressed within 2-3 business days           lidocaine 4 % patch 1 Patch  1 Patch TransDERmal Q24H  
 heparin 25,000 units in D5W 250 ml infusion  18-36 Units/kg/hr IntraVENous TITRATE  insulin lispro (HUMALOG) injection   SubCUTAneous AC&HS  
 glucose chewable tablet 16 g  4 Tab Oral PRN  
 glucagon (GLUCAGEN) injection 1 mg  1 mg IntraMUSCular PRN  
 dextrose (D50W) injection syrg 12.5-25 g  25-50 mL IntraVENous PRN  
 acetaminophen (TYLENOL) tablet 650 mg  650 mg Oral Q4H PRN  
 naloxone (NARCAN) injection 0.4 mg  0.4 mg IntraVENous PRN  
 diphenhydrAMINE (BENADRYL) injection 12.5 mg  12.5 mg IntraVENous Q4H PRN  
 ondansetron (ZOFRAN) injection 4 mg  4 mg IntraVENous Q4H PRN  
 morphine injection 1 mg  1 mg IntraVENous Q2H PRN  
 nitroglycerin (NITROSTAT) tablet 0.4 mg  0.4 mg SubLINGual PRN  pantoprazole (PROTONIX) 40 mg in sodium chloride 0.9% 10 mL injection  40 mg IntraVENous DAILY  atenolol (TENORMIN) tablet 100 mg  100 mg Oral DAILY  Difluprednate (DUREZOL) 0.05 % ophthalmic emulsion 1 Drop (Patient Supplied)  1 Drop Right Eye QID  gabapentin (NEURONTIN) capsule 300 mg  300 mg Oral TID  simvastatin (ZOCOR) tablet 10 mg  10 mg Oral QHS  traZODone (DESYREL) tablet 50 mg  50 mg Oral QHS PRN  
 allopurinol (ZYLOPRIM) tablet 100 mg  100 mg Oral BID  insulin glargine (LANTUS) injection 35 Units  35 Units SubCUTAneous DAILY  nitroglycerin (NITRODUR) 0.1 mg/hr patch 1 Patch  1 Patch TransDERmal Q24H Lab/Data Reviewed: 
Procedures/imaging: see electronic medical records for all procedures/Xrays  
and details which were not copied into this note but were reviewed prior to creation of Plan 
  
 
All lab results for the last 24 hours reviewed. Recent Labs 12/05/18 0425 12/04/18 
0113 12/03/18 
0510 WBC 6.7 7.5 7.7 HGB 12.1 12.2 12.8 HCT 37.5 37.4 39.2  210 230 Recent Labs 12/05/18 0425 12/04/18 
0113 12/03/18 
0510  138 142  
K 4.1 3.7 3.5  102 102 CO2 31 29 31 * 128* 134* BUN 14 12 16 CREA 0.80 0.76 0.88 CA 8.2* 7.9* 8.6 Signed By: Bard Hayley MD   
 December 5, 2018

## (undated) DEVICE — Device

## (undated) DEVICE — DRAPE,ANGIO,BRACH,STERILE,38X44: Brand: MEDLINE

## (undated) DEVICE — PRESSURE MONITORING SET: Brand: TRUWAVE

## (undated) DEVICE — SHIELD RAD 14X16 IN W/ SCOOP ABSORBER

## (undated) DEVICE — RADIFOCUS GLIDEWIRE: Brand: GLIDEWIRE

## (undated) DEVICE — PROCEDURE KIT FLUID MGMT 10 FR CUST MAINFOLD

## (undated) DEVICE — CATHETER DIAG AD 5FR L100CM COR NYL JUDKINS R 5 DILATED

## (undated) DEVICE — STOPCOCK TRNSDUC 500PSI 3 W ROT M LUER LT BLU OFF HNDL R

## (undated) DEVICE — HI-TORQUE WHISPER ES GUIDE WIRE .014 STRAIGHTTIP 3.0 CM X 190 CM: Brand: HI-TORQUE WHISPER

## (undated) DEVICE — CATH DIAG FR4 EXPO 5FRX100CM -- EXPO

## (undated) DEVICE — DEVICE INFL W/ HEM VLV TORQ

## (undated) DEVICE — PACK PROCEDURE SURG CATH CUST

## (undated) DEVICE — SENSOR PLSE OXMTR AD CBL L36IN ADH FRM FIT SPO2 DISP

## (undated) DEVICE — GLIDESHEATH SLENDER STAINLESS STEEL KIT: Brand: GLIDESHEATH SLENDER

## (undated) DEVICE — GUIDE WIRE INTRODUCER: Brand: INTRODUCER

## (undated) DEVICE — NC TREK CORONARY DILATATION CATHETER 2.5 MM X 12 MM / RAPID-EXCHANGE: Brand: NC TREK

## (undated) DEVICE — COPILOT BLEEDBACK CONTROL VALVE: Brand: COPILOT

## (undated) DEVICE — TUBING PRSS MON L24IN PVC RIG NONEXPANDING M TO FEM CONN

## (undated) DEVICE — CATH GUID COR EB30 6FR 100CM -- LAUNCHER

## (undated) DEVICE — BAND RADIAL COMPR ARTERY 27CM -- LNG BX/10

## (undated) DEVICE — GUIDEWIRE VASC L260CM DIA0.035IN RAD 3MM J TIP L7CM PTFE

## (undated) DEVICE — TORCON NB, ADVANTAGE CATHETER: Brand: TORCON NB